# Patient Record
Sex: FEMALE | Race: WHITE | NOT HISPANIC OR LATINO | URBAN - METROPOLITAN AREA
[De-identification: names, ages, dates, MRNs, and addresses within clinical notes are randomized per-mention and may not be internally consistent; named-entity substitution may affect disease eponyms.]

---

## 2015-09-15 RX ORDER — HALOPERIDOL DECANOATE 100 MG/ML
1 INJECTION INTRAMUSCULAR
Qty: 0 | Refills: 0 | COMMUNITY
Start: 2015-09-15

## 2015-09-15 RX ORDER — HALOPERIDOL DECANOATE 100 MG/ML
7 INJECTION INTRAMUSCULAR
Qty: 0 | Refills: 0 | COMMUNITY
Start: 2015-09-15

## 2016-07-11 RX ORDER — ACETAMINOPHEN 500 MG
2 TABLET ORAL
Qty: 0 | Refills: 0 | COMMUNITY
Start: 2016-07-11

## 2017-01-09 ENCOUNTER — EMERGENCY (EMERGENCY)
Facility: HOSPITAL | Age: 67
LOS: 1 days | Discharge: HOME | End: 2017-01-09
Attending: EMERGENCY MEDICINE
Payer: COMMERCIAL

## 2017-01-09 VITALS
OXYGEN SATURATION: 99 % | TEMPERATURE: 99 F | SYSTOLIC BLOOD PRESSURE: 114 MMHG | WEIGHT: 139.99 LBS | HEIGHT: 66 IN | RESPIRATION RATE: 16 BRPM | DIASTOLIC BLOOD PRESSURE: 65 MMHG | HEART RATE: 77 BPM

## 2017-01-09 DIAGNOSIS — Z93.2 ILEOSTOMY STATUS: Chronic | ICD-10-CM

## 2017-01-09 LAB
ALBUMIN SERPL ELPH-MCNC: 3.6 G/DL — SIGNIFICANT CHANGE UP (ref 3.3–5.2)
ALP SERPL-CCNC: 88 U/L — SIGNIFICANT CHANGE UP (ref 40–120)
ALT FLD-CCNC: 22 U/L — SIGNIFICANT CHANGE UP
ANION GAP SERPL CALC-SCNC: 9 MMOL/L — SIGNIFICANT CHANGE UP (ref 5–17)
APTT BLD: 29.7 SEC — SIGNIFICANT CHANGE UP (ref 27.5–37.4)
AST SERPL-CCNC: 15 U/L — SIGNIFICANT CHANGE UP
BASOPHILS # BLD AUTO: 0 K/UL — SIGNIFICANT CHANGE UP (ref 0–0.2)
BASOPHILS NFR BLD AUTO: 0.2 % — SIGNIFICANT CHANGE UP (ref 0–2)
BILIRUB SERPL-MCNC: 0.4 MG/DL — SIGNIFICANT CHANGE UP (ref 0.4–2)
BLD GP AB SCN SERPL QL: SIGNIFICANT CHANGE UP
BUN SERPL-MCNC: 25 MG/DL — HIGH (ref 8–20)
CALCIUM SERPL-MCNC: 9 MG/DL — SIGNIFICANT CHANGE UP (ref 8.6–10.2)
CHLORIDE SERPL-SCNC: 104 MMOL/L — SIGNIFICANT CHANGE UP (ref 98–107)
CO2 SERPL-SCNC: 27 MMOL/L — SIGNIFICANT CHANGE UP (ref 22–29)
CREAT SERPL-MCNC: 0.89 MG/DL — SIGNIFICANT CHANGE UP (ref 0.5–1.3)
EOSINOPHIL # BLD AUTO: 0.1 K/UL — SIGNIFICANT CHANGE UP (ref 0–0.5)
EOSINOPHIL NFR BLD AUTO: 3.1 % — SIGNIFICANT CHANGE UP (ref 0–6)
GLUCOSE SERPL-MCNC: 95 MG/DL — SIGNIFICANT CHANGE UP (ref 70–115)
HCT VFR BLD CALC: 33.2 % — LOW (ref 37–47)
HGB BLD-MCNC: 10.4 G/DL — LOW (ref 12–16)
INR BLD: 1.3 RATIO — HIGH (ref 0.88–1.16)
LYMPHOCYTES # BLD AUTO: 1.4 K/UL — SIGNIFICANT CHANGE UP (ref 1–4.8)
LYMPHOCYTES # BLD AUTO: 33.1 % — SIGNIFICANT CHANGE UP (ref 20–55)
MCHC RBC-ENTMCNC: 29 PG — SIGNIFICANT CHANGE UP (ref 27–31)
MCHC RBC-ENTMCNC: 31.3 G/DL — LOW (ref 32–36)
MCV RBC AUTO: 92.5 FL — SIGNIFICANT CHANGE UP (ref 81–99)
MONOCYTES # BLD AUTO: 0.4 K/UL — SIGNIFICANT CHANGE UP (ref 0–0.8)
MONOCYTES NFR BLD AUTO: 10.3 % — HIGH (ref 3–10)
NEUTROPHILS # BLD AUTO: 2.3 K/UL — SIGNIFICANT CHANGE UP (ref 1.8–8)
NEUTROPHILS NFR BLD AUTO: 53.1 % — SIGNIFICANT CHANGE UP (ref 37–73)
PLATELET # BLD AUTO: 105 K/UL — LOW (ref 150–400)
POTASSIUM SERPL-MCNC: 4.3 MMOL/L — SIGNIFICANT CHANGE UP (ref 3.5–5.3)
POTASSIUM SERPL-SCNC: 4.3 MMOL/L — SIGNIFICANT CHANGE UP (ref 3.5–5.3)
PROT SERPL-MCNC: 6.1 G/DL — LOW (ref 6.6–8.7)
PROTHROM AB SERPL-ACNC: 14.3 SEC — HIGH (ref 10–13.1)
RBC # BLD: 3.59 M/UL — LOW (ref 4.4–5.2)
RBC # FLD: 14.7 % — SIGNIFICANT CHANGE UP (ref 11–15.6)
SODIUM SERPL-SCNC: 140 MMOL/L — SIGNIFICANT CHANGE UP (ref 135–145)
TYPE + AB SCN PNL BLD: SIGNIFICANT CHANGE UP
WBC # BLD: 4.26 K/UL — LOW (ref 4.8–10.8)
WBC # FLD AUTO: 4.26 K/UL — LOW (ref 4.8–10.8)

## 2017-01-09 PROCEDURE — 99284 EMERGENCY DEPT VISIT MOD MDM: CPT

## 2017-01-09 RX ORDER — SODIUM CHLORIDE 9 MG/ML
3 INJECTION INTRAMUSCULAR; INTRAVENOUS; SUBCUTANEOUS ONCE
Qty: 0 | Refills: 0 | Status: COMPLETED | OUTPATIENT
Start: 2017-01-09 | End: 2017-01-09

## 2017-01-09 RX ADMIN — SODIUM CHLORIDE 3 MILLILITER(S): 9 INJECTION INTRAMUSCULAR; INTRAVENOUS; SUBCUTANEOUS at 16:31

## 2017-01-09 NOTE — ED ADULT NURSE NOTE - CHIEF COMPLAINT QUOTE
BIBA, patient is awake and oriented times 3, complains of abdominal pain, patient was sent from [place] for eval
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
## 2017-01-09 NOTE — ED PROVIDER NOTE - CARE PLAN
Principal Discharge DX:	Vomiting, intractability of vomiting not specified, presence of nausea not specified, unspecified vomiting type

## 2017-01-09 NOTE — ED ADULT TRIAGE NOTE - CHIEF COMPLAINT QUOTE
BIBA, patient is awake and oriented times 3, complains of abdominal pain, patient was sent from [place] for eval

## 2017-01-09 NOTE — ED PROVIDER NOTE - OBJECTIVE STATEMENT
66 yr old female with vomiting. Pt has h/o colostomy that is working. pt had one episode of vomiting yesterday and once today

## 2017-01-10 VITALS
RESPIRATION RATE: 20 BRPM | SYSTOLIC BLOOD PRESSURE: 109 MMHG | TEMPERATURE: 99 F | OXYGEN SATURATION: 96 % | HEART RATE: 73 BPM | DIASTOLIC BLOOD PRESSURE: 64 MMHG

## 2017-01-10 PROCEDURE — 85610 PROTHROMBIN TIME: CPT

## 2017-01-10 PROCEDURE — 99284 EMERGENCY DEPT VISIT MOD MDM: CPT | Mod: 25

## 2017-01-10 PROCEDURE — 86850 RBC ANTIBODY SCREEN: CPT

## 2017-01-10 PROCEDURE — 85027 COMPLETE CBC AUTOMATED: CPT

## 2017-01-10 PROCEDURE — 86900 BLOOD TYPING SEROLOGIC ABO: CPT

## 2017-01-10 PROCEDURE — 74177 CT ABD & PELVIS W/CONTRAST: CPT

## 2017-01-10 PROCEDURE — 80053 COMPREHEN METABOLIC PANEL: CPT

## 2017-01-10 PROCEDURE — 74177 CT ABD & PELVIS W/CONTRAST: CPT | Mod: 26

## 2017-01-10 PROCEDURE — 86901 BLOOD TYPING SEROLOGIC RH(D): CPT

## 2017-01-10 PROCEDURE — 85730 THROMBOPLASTIN TIME PARTIAL: CPT

## 2017-01-10 RX ORDER — MOXIFLOXACIN HYDROCHLORIDE TABLETS, 400 MG 400 MG/1
1 TABLET, FILM COATED ORAL
Qty: 20 | Refills: 0 | OUTPATIENT
Start: 2017-01-10 | End: 2017-01-20

## 2017-01-10 RX ORDER — METRONIDAZOLE 500 MG
1 TABLET ORAL
Qty: 30 | Refills: 0 | OUTPATIENT
Start: 2017-01-10 | End: 2017-01-20

## 2017-01-13 ENCOUNTER — APPOINTMENT (OUTPATIENT)
Dept: COLORECTAL SURGERY | Facility: HOSPITAL | Age: 67
End: 2017-01-13

## 2017-01-17 ENCOUNTER — OUTPATIENT (OUTPATIENT)
Dept: OUTPATIENT SERVICES | Facility: HOSPITAL | Age: 67
LOS: 1 days | End: 2017-01-17
Payer: MEDICARE

## 2017-01-17 VITALS
HEART RATE: 76 BPM | TEMPERATURE: 97 F | WEIGHT: 166.01 LBS | HEIGHT: 66 IN | SYSTOLIC BLOOD PRESSURE: 110 MMHG | RESPIRATION RATE: 16 BRPM | DIASTOLIC BLOOD PRESSURE: 60 MMHG

## 2017-01-17 DIAGNOSIS — Z01.818 ENCOUNTER FOR OTHER PREPROCEDURAL EXAMINATION: ICD-10-CM

## 2017-01-17 DIAGNOSIS — I10 ESSENTIAL (PRIMARY) HYPERTENSION: ICD-10-CM

## 2017-01-17 DIAGNOSIS — Z93.2 ILEOSTOMY STATUS: Chronic | ICD-10-CM

## 2017-01-17 DIAGNOSIS — Z43.2 ENCOUNTER FOR ATTENTION TO ILEOSTOMY: ICD-10-CM

## 2017-01-17 LAB
ANION GAP SERPL CALC-SCNC: 11 MMOL/L — SIGNIFICANT CHANGE UP (ref 5–17)
APTT BLD: 30.2 SEC — SIGNIFICANT CHANGE UP (ref 27.5–37.4)
BASOPHILS # BLD AUTO: 0 K/UL — SIGNIFICANT CHANGE UP (ref 0–0.2)
BASOPHILS NFR BLD AUTO: 0.3 % — SIGNIFICANT CHANGE UP (ref 0–2)
BUN SERPL-MCNC: 30 MG/DL — HIGH (ref 8–20)
CALCIUM SERPL-MCNC: 9.1 MG/DL — SIGNIFICANT CHANGE UP (ref 8.6–10.2)
CHLORIDE SERPL-SCNC: 103 MMOL/L — SIGNIFICANT CHANGE UP (ref 98–107)
CO2 SERPL-SCNC: 26 MMOL/L — SIGNIFICANT CHANGE UP (ref 22–29)
CREAT SERPL-MCNC: 1.01 MG/DL — SIGNIFICANT CHANGE UP (ref 0.5–1.3)
EOSINOPHIL # BLD AUTO: 0.1 K/UL — SIGNIFICANT CHANGE UP (ref 0–0.5)
EOSINOPHIL NFR BLD AUTO: 3 % — SIGNIFICANT CHANGE UP (ref 0–6)
GLUCOSE SERPL-MCNC: 98 MG/DL — SIGNIFICANT CHANGE UP (ref 70–115)
HCT VFR BLD CALC: 31.6 % — LOW (ref 37–47)
HGB BLD-MCNC: 9.8 G/DL — LOW (ref 12–16)
INR BLD: 1.31 RATIO — HIGH (ref 0.88–1.16)
LYMPHOCYTES # BLD AUTO: 1.3 K/UL — SIGNIFICANT CHANGE UP (ref 1–4.8)
LYMPHOCYTES # BLD AUTO: 42.1 % — SIGNIFICANT CHANGE UP (ref 20–55)
MCHC RBC-ENTMCNC: 28.8 PG — SIGNIFICANT CHANGE UP (ref 27–31)
MCHC RBC-ENTMCNC: 31 G/DL — LOW (ref 32–36)
MCV RBC AUTO: 92.9 FL — SIGNIFICANT CHANGE UP (ref 81–99)
MONOCYTES # BLD AUTO: 0.5 K/UL — SIGNIFICANT CHANGE UP (ref 0–0.8)
MONOCYTES NFR BLD AUTO: 16.1 % — HIGH (ref 3–10)
NEUTROPHILS # BLD AUTO: 1.2 K/UL — LOW (ref 1.8–8)
NEUTROPHILS NFR BLD AUTO: 38.5 % — SIGNIFICANT CHANGE UP (ref 37–73)
PLATELET # BLD AUTO: 139 K/UL — LOW (ref 150–400)
POTASSIUM SERPL-MCNC: 4.3 MMOL/L — SIGNIFICANT CHANGE UP (ref 3.5–5.3)
POTASSIUM SERPL-SCNC: 4.3 MMOL/L — SIGNIFICANT CHANGE UP (ref 3.5–5.3)
PROTHROM AB SERPL-ACNC: 14.4 SEC — HIGH (ref 10–13.1)
RBC # BLD: 3.4 M/UL — LOW (ref 4.4–5.2)
RBC # FLD: 14.7 % — SIGNIFICANT CHANGE UP (ref 11–15.6)
SODIUM SERPL-SCNC: 140 MMOL/L — SIGNIFICANT CHANGE UP (ref 135–145)
WBC # BLD: 3.04 K/UL — LOW (ref 4.8–10.8)
WBC # FLD AUTO: 3.04 K/UL — LOW (ref 4.8–10.8)

## 2017-01-17 PROCEDURE — 85730 THROMBOPLASTIN TIME PARTIAL: CPT

## 2017-01-17 PROCEDURE — 80048 BASIC METABOLIC PNL TOTAL CA: CPT

## 2017-01-17 PROCEDURE — 85610 PROTHROMBIN TIME: CPT

## 2017-01-17 PROCEDURE — 85027 COMPLETE CBC AUTOMATED: CPT

## 2017-01-17 PROCEDURE — 93005 ELECTROCARDIOGRAM TRACING: CPT

## 2017-01-17 PROCEDURE — G0463: CPT

## 2017-01-17 PROCEDURE — 93010 ELECTROCARDIOGRAM REPORT: CPT

## 2017-01-17 NOTE — H&P PST ADULT - ASSESSMENT
66F poor historian, unable to provide information, with PMH HTN, CVA, Seizure Disorder, Hypothyroid, Osteopenia, GERD, Constipation, Neutropenia, Venous Insufficiency and Obesity with Ileostomy for Flexible Sigmoidoscopy.

## 2017-01-17 NOTE — H&P PST ADULT - COMMENTS
Unknown, unable to obtain information Difficult to obtain, unable to answer questions appropriately, consistently.

## 2017-01-17 NOTE — H&P PST ADULT - VTE RISK COMMENTS
Difficult to accurately assess, patient unable to provide specific information or answer appropriately.

## 2017-01-17 NOTE — H&P PST ADULT - HISTORY OF PRESENT ILLNESS
66F with history of Ileostomy, for Flexible Sigmoidoscopy. Patient is a resident of [facility], with a history of schizophrenia. Patient unable to provide any further information.

## 2017-01-18 DIAGNOSIS — Z01.818 ENCOUNTER FOR OTHER PREPROCEDURAL EXAMINATION: ICD-10-CM

## 2017-01-18 DIAGNOSIS — Z43.2 ENCOUNTER FOR ATTENTION TO ILEOSTOMY: ICD-10-CM

## 2017-01-24 ENCOUNTER — APPOINTMENT (OUTPATIENT)
Dept: COLORECTAL SURGERY | Facility: HOSPITAL | Age: 67
End: 2017-01-24

## 2017-02-10 ENCOUNTER — APPOINTMENT (OUTPATIENT)
Dept: COLORECTAL SURGERY | Facility: HOSPITAL | Age: 67
End: 2017-02-10

## 2017-03-08 ENCOUNTER — INPATIENT (INPATIENT)
Facility: HOSPITAL | Age: 67
LOS: 5 days | Discharge: PSYCHIATRIC OR SUBSTANCE USE FACILITY | DRG: 394 | End: 2017-03-14
Attending: HOSPITALIST | Admitting: FAMILY MEDICINE
Payer: MEDICARE

## 2017-03-08 VITALS
OXYGEN SATURATION: 96 % | HEART RATE: 93 BPM | RESPIRATION RATE: 20 BRPM | DIASTOLIC BLOOD PRESSURE: 74 MMHG | TEMPERATURE: 99 F | SYSTOLIC BLOOD PRESSURE: 121 MMHG

## 2017-03-08 DIAGNOSIS — Z93.2 ILEOSTOMY STATUS: Chronic | ICD-10-CM

## 2017-03-08 LAB
ALBUMIN SERPL ELPH-MCNC: 3.5 G/DL — SIGNIFICANT CHANGE UP (ref 3.3–5.2)
ALP SERPL-CCNC: 97 U/L — SIGNIFICANT CHANGE UP (ref 40–120)
ALT FLD-CCNC: 21 U/L — SIGNIFICANT CHANGE UP
ANION GAP SERPL CALC-SCNC: 11 MMOL/L — SIGNIFICANT CHANGE UP (ref 5–17)
ANISOCYTOSIS BLD QL: SLIGHT — SIGNIFICANT CHANGE UP
APTT BLD: 29 SEC — SIGNIFICANT CHANGE UP (ref 27.5–37.4)
AST SERPL-CCNC: 26 U/L — SIGNIFICANT CHANGE UP
BILIRUB SERPL-MCNC: 0.6 MG/DL — SIGNIFICANT CHANGE UP (ref 0.4–2)
BLD GP AB SCN SERPL QL: SIGNIFICANT CHANGE UP
BUN SERPL-MCNC: 28 MG/DL — HIGH (ref 8–20)
CALCIUM SERPL-MCNC: 9.1 MG/DL — SIGNIFICANT CHANGE UP (ref 8.6–10.2)
CHLORIDE SERPL-SCNC: 101 MMOL/L — SIGNIFICANT CHANGE UP (ref 98–107)
CO2 SERPL-SCNC: 24 MMOL/L — SIGNIFICANT CHANGE UP (ref 22–29)
CREAT SERPL-MCNC: 0.97 MG/DL — SIGNIFICANT CHANGE UP (ref 0.5–1.3)
ELLIPTOCYTES BLD QL SMEAR: SLIGHT — SIGNIFICANT CHANGE UP
GLUCOSE SERPL-MCNC: 100 MG/DL — SIGNIFICANT CHANGE UP (ref 70–115)
HCT VFR BLD CALC: 31.9 % — LOW (ref 37–47)
HGB BLD-MCNC: 10.2 G/DL — LOW (ref 12–16)
INR BLD: 1.27 RATIO — HIGH (ref 0.88–1.16)
LYMPHOCYTES # BLD AUTO: 13 % — LOW (ref 20–55)
MACROCYTES BLD QL: SLIGHT — SIGNIFICANT CHANGE UP
MCHC RBC-ENTMCNC: 29.2 PG — SIGNIFICANT CHANGE UP (ref 27–31)
MCHC RBC-ENTMCNC: 32 G/DL — SIGNIFICANT CHANGE UP (ref 32–36)
MCV RBC AUTO: 91.4 FL — SIGNIFICANT CHANGE UP (ref 81–99)
MICROCYTES BLD QL: SLIGHT — SIGNIFICANT CHANGE UP
MONOCYTES NFR BLD AUTO: 2 % — LOW (ref 3–10)
NEUTROPHILS NFR BLD AUTO: 83 % — HIGH (ref 37–73)
NEUTS BAND # BLD: 2 % — SIGNIFICANT CHANGE UP (ref 0–8)
OVALOCYTES BLD QL SMEAR: SLIGHT — SIGNIFICANT CHANGE UP
PLAT MORPH BLD: NORMAL — SIGNIFICANT CHANGE UP
PLATELET # BLD AUTO: 112 K/UL — LOW (ref 150–400)
POIKILOCYTOSIS BLD QL AUTO: SLIGHT — SIGNIFICANT CHANGE UP
POLYCHROMASIA BLD QL SMEAR: SLIGHT — SIGNIFICANT CHANGE UP
POTASSIUM SERPL-MCNC: 5.3 MMOL/L — SIGNIFICANT CHANGE UP (ref 3.5–5.3)
POTASSIUM SERPL-SCNC: 5.3 MMOL/L — SIGNIFICANT CHANGE UP (ref 3.5–5.3)
PROT SERPL-MCNC: 6.7 G/DL — SIGNIFICANT CHANGE UP (ref 6.6–8.7)
PROTHROM AB SERPL-ACNC: 14 SEC — HIGH (ref 10–13.1)
RBC # BLD: 3.49 M/UL — LOW (ref 4.4–5.2)
RBC # FLD: 14.6 % — SIGNIFICANT CHANGE UP (ref 11–15.6)
RBC BLD AUTO: ABNORMAL
SODIUM SERPL-SCNC: 136 MMOL/L — SIGNIFICANT CHANGE UP (ref 135–145)
TYPE + AB SCN PNL BLD: SIGNIFICANT CHANGE UP
WBC # BLD: 6.8 K/UL — SIGNIFICANT CHANGE UP (ref 4.8–10.8)
WBC # FLD AUTO: 6.8 K/UL — SIGNIFICANT CHANGE UP (ref 4.8–10.8)

## 2017-03-08 PROCEDURE — 99285 EMERGENCY DEPT VISIT HI MDM: CPT

## 2017-03-08 PROCEDURE — 74176 CT ABD & PELVIS W/O CONTRAST: CPT | Mod: 26

## 2017-03-08 RX ORDER — SODIUM CHLORIDE 9 MG/ML
3 INJECTION INTRAMUSCULAR; INTRAVENOUS; SUBCUTANEOUS ONCE
Qty: 0 | Refills: 0 | Status: COMPLETED | OUTPATIENT
Start: 2017-03-08 | End: 2017-03-08

## 2017-03-08 RX ORDER — CIPROFLOXACIN LACTATE 400MG/40ML
400 VIAL (ML) INTRAVENOUS ONCE
Qty: 0 | Refills: 0 | Status: COMPLETED | OUTPATIENT
Start: 2017-03-08 | End: 2017-03-08

## 2017-03-08 RX ORDER — METRONIDAZOLE 500 MG
500 TABLET ORAL ONCE
Qty: 0 | Refills: 0 | Status: COMPLETED | OUTPATIENT
Start: 2017-03-08 | End: 2017-03-08

## 2017-03-08 RX ADMIN — Medication 200 MILLIGRAM(S): at 22:48

## 2017-03-08 NOTE — ED PROVIDER NOTE - CONSTITUTIONAL, MLM
normal... Well appearing, well nourished, awake, alert, oriented to person, place,in no apparent distress.

## 2017-03-08 NOTE — ED CLERICAL - CLERICAL COMMENTS
Ellen Toribio 65 y/o f w/ hx rectal prolapse illiostomy current w/ low abd pain workse w ambulation r/o acute abd low grade fever  foul smel brownish rectal discharge

## 2017-03-08 NOTE — ED ADULT NURSE NOTE - CHIEF COMPLAINT QUOTE
pt presents to er from [place] with aide c/o of abdominal pain x 2 days. pt has ileostomy s/p vaginal and rectal prolapse on 3/20/15. surgeon dr lunsford

## 2017-03-08 NOTE — H&P ADULT - NSHPPHYSICALEXAM_GEN_ALL_CORE
General: Well developed. well nourished. not in distress  HEENT: AT, NC. PERRL.  Neck: supple. no JVD. no palpable lymph nodes  Chest: CTA bilaterally  Heart: normal S1,S2. RRR. no heart murmur.  Abdomen: soft. non-tender. non-distended. + BS. ilostomy with watery stools in the bag.  Genital: not examined  Ext: no C/C/E. no calf tenderness.   Neuro: sleeping but awakable  Skin: no rash

## 2017-03-08 NOTE — ED PROVIDER NOTE - PROGRESS NOTE DETAILS
CALLED BY CT THAT PT DOES NOT HAVE CAPACITY TO CONSENT FOR CT. PT SEEN BY DR LOPEZ WHO DID NOT SIGN PT OUT TO MAIN A [facility]. PT NEEDS CT ABDOMEN AND PELVIS B/C OF ILEOSTOMY HISTORY AND NOW PASSING STOOL PER RECTUM CALLED BY CT THAT PT DOES NOT HAVE CAPACITY TO CONSENT FOR CT. PT SEEN BY DR LOPEZ WHO DID NOT SIGN PT OUT TO [facility]. PT NEEDS CT ABDOMEN AND PELVIS B/C OF ILEOSTOMY HISTORY AND NOW PASSING STOOL PER RECTUM. IT IS MEDICALLY NECESSARY TO HAVE THIS TEST
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
## 2017-03-08 NOTE — ED PROVIDER NOTE - MEDICAL DECISION MAKING DETAILS
PT WITH STOOL PER RECTUM DESPITE ILEOSTOMY AND ABDOMINAL PAIN TODAY. EVAL SIG COLITIS. NEEDS ADMIT FOR IV AB, IVF AND BOWEL REST. DR KAYE CALLED AND CASE DISCUSSED, HE WILL ADMIT.  DR FERRARI COLORECTAL SURGEON TO CONSULT - CASE DISCUSSED

## 2017-03-08 NOTE — ED PROVIDER NOTE - CHPI ED SYMPTOMS NEG
no nausea/no dysuria/no vomiting/no abdominal distension/no chills/no hematuria/no blood in stool/no fever/no burning urination

## 2017-03-08 NOTE — H&P ADULT - NSHPLABSRESULTS_GEN_ALL_CORE
10.2   6.8   )-----------( 112      ( 08 Mar 2017 15:53 )             31.9     08 Mar 2017 15:53    136    |  101    |  28.0   ----------------------------<  100    5.3     |  24.0   |  0.97     Ca    9.1        08 Mar 2017 15:53    TPro  6.7    /  Alb  3.5    /  TBili  0.6    /  DBili  x      /  AST  26     /  ALT  21     /  AlkPhos  97     08 Mar 2017 15:53            PT/INR - ( 08 Mar 2017 15:53 )   PT: 14.0 sec;   INR: 1.27 ratio         PTT - ( 08 Mar 2017 15:53 )  PTT:29.0 sec

## 2017-03-08 NOTE — ED STATDOCS - PROGRESS NOTE DETAILS
67 year old female, with hx of ileostomy, presenting to the ED from [place] with aide at bedside sent in by Dr. Miller for further evaluation as pt says she normally is never able to have a bowel movement through the rectal area and is now having bowel movements through her rectum. Upon examination in the intake room, pt was found to have an ileostomy that appears to be functioning. ?Small prolapse noted. Pt to be transferred to the main ED for further workup.

## 2017-03-08 NOTE — ED PROVIDER NOTE - OBJECTIVE STATEMENT
PT SENT FROM  [place] WITH STOOL PER RECTUM. HAS ILEOSTOMY BY DR FERRARI IN PAST. WAS GOING TO HAVE OUTPT CT SCAN BUT THE PT DEVELOPED PAIN AND CAME TO ER FOR URTHER EVALUATION. NO FEVER/CHILLS. NO CP OR SOB. + ABDOMINAL PAIN. NO N/V. + GREEN LIQUIDS STOOL IN ILEOSTOMY. + GREENISH STOOL FROM RECTUM. NO OTHER COMPLAINTS

## 2017-03-08 NOTE — ED ADULT TRIAGE NOTE - CHIEF COMPLAINT QUOTE
pt presents to er from [place] with aide c/o of abdominal pain x 2 days. pt has ileostomy s/p vaginal and rectal prolapse on 3/20/15. surgeon dr lunsford

## 2017-03-09 ENCOUNTER — RESULT REVIEW (OUTPATIENT)
Age: 67
End: 2017-03-09

## 2017-03-09 DIAGNOSIS — F25.0 SCHIZOAFFECTIVE DISORDER, BIPOLAR TYPE: ICD-10-CM

## 2017-03-09 DIAGNOSIS — D64.9 ANEMIA, UNSPECIFIED: ICD-10-CM

## 2017-03-09 DIAGNOSIS — D69.6 THROMBOCYTOPENIA, UNSPECIFIED: ICD-10-CM

## 2017-03-09 DIAGNOSIS — R93.8 ABNORMAL FINDINGS ON DIAGNOSTIC IMAGING OF OTHER SPECIFIED BODY STRUCTURES: ICD-10-CM

## 2017-03-09 DIAGNOSIS — K52.9 NONINFECTIVE GASTROENTERITIS AND COLITIS, UNSPECIFIED: ICD-10-CM

## 2017-03-09 DIAGNOSIS — E03.9 HYPOTHYROIDISM, UNSPECIFIED: ICD-10-CM

## 2017-03-09 DIAGNOSIS — R10.9 UNSPECIFIED ABDOMINAL PAIN: ICD-10-CM

## 2017-03-09 LAB
ALBUMIN SERPL ELPH-MCNC: 3.3 G/DL — SIGNIFICANT CHANGE UP (ref 3.3–5.2)
ALP SERPL-CCNC: 87 U/L — SIGNIFICANT CHANGE UP (ref 40–120)
ALT FLD-CCNC: 17 U/L — SIGNIFICANT CHANGE UP
ANION GAP SERPL CALC-SCNC: 15 MMOL/L — SIGNIFICANT CHANGE UP (ref 5–17)
AST SERPL-CCNC: 14 U/L — SIGNIFICANT CHANGE UP
BILIRUB SERPL-MCNC: 0.4 MG/DL — SIGNIFICANT CHANGE UP (ref 0.4–2)
BUN SERPL-MCNC: 23 MG/DL — HIGH (ref 8–20)
C DIFF BY PCR RESULT: SIGNIFICANT CHANGE UP
C DIFF TOX GENS STL QL NAA+PROBE: SIGNIFICANT CHANGE UP
CALCIUM SERPL-MCNC: 8.2 MG/DL — LOW (ref 8.6–10.2)
CHLORIDE SERPL-SCNC: 101 MMOL/L — SIGNIFICANT CHANGE UP (ref 98–107)
CO2 SERPL-SCNC: 21 MMOL/L — LOW (ref 22–29)
CREAT SERPL-MCNC: 1.19 MG/DL — SIGNIFICANT CHANGE UP (ref 0.5–1.3)
GLUCOSE SERPL-MCNC: 124 MG/DL — HIGH (ref 70–115)
HCT VFR BLD CALC: 29.5 % — LOW (ref 37–47)
HGB BLD-MCNC: 9.3 G/DL — LOW (ref 12–16)
LACTATE SERPL-SCNC: 0.5 MMOL/L — SIGNIFICANT CHANGE UP (ref 0.5–2)
LYMPHOCYTES # BLD AUTO: 0.8 K/UL — LOW (ref 1–4.8)
LYMPHOCYTES # BLD AUTO: 14.9 % — LOW (ref 20–55)
MAGNESIUM SERPL-MCNC: 1.9 MG/DL — SIGNIFICANT CHANGE UP (ref 1.8–2.5)
MCHC RBC-ENTMCNC: 28.8 PG — SIGNIFICANT CHANGE UP (ref 27–31)
MCHC RBC-ENTMCNC: 31.5 G/DL — LOW (ref 32–36)
MCV RBC AUTO: 91.3 FL — SIGNIFICANT CHANGE UP (ref 81–99)
MONOCYTES # BLD AUTO: 0.6 K/UL — SIGNIFICANT CHANGE UP (ref 0–0.8)
MONOCYTES NFR BLD AUTO: 12 % — HIGH (ref 3–10)
NEUTROPHILS # BLD AUTO: 4 K/UL — SIGNIFICANT CHANGE UP (ref 1.8–8)
NEUTROPHILS NFR BLD AUTO: 72.9 % — SIGNIFICANT CHANGE UP (ref 37–73)
OB PNL STL: NEGATIVE — SIGNIFICANT CHANGE UP
PHOSPHATE SERPL-MCNC: 2.2 MG/DL — LOW (ref 2.4–4.7)
PLATELET # BLD AUTO: 104 K/UL — LOW (ref 150–400)
POTASSIUM SERPL-MCNC: 3.4 MMOL/L — LOW (ref 3.5–5.3)
POTASSIUM SERPL-SCNC: 3.4 MMOL/L — LOW (ref 3.5–5.3)
PROT SERPL-MCNC: 5.8 G/DL — LOW (ref 6.6–8.7)
RBC # BLD: 3.23 M/UL — LOW (ref 4.4–5.2)
RBC # FLD: 14.1 % — SIGNIFICANT CHANGE UP (ref 11–15.6)
SODIUM SERPL-SCNC: 137 MMOL/L — SIGNIFICANT CHANGE UP (ref 135–145)
WBC # BLD: 5.4 K/UL — SIGNIFICANT CHANGE UP (ref 4.8–10.8)
WBC # FLD AUTO: 5.4 K/UL — SIGNIFICANT CHANGE UP (ref 4.8–10.8)

## 2017-03-09 PROCEDURE — 99232 SBSQ HOSP IP/OBS MODERATE 35: CPT

## 2017-03-09 PROCEDURE — 99223 1ST HOSP IP/OBS HIGH 75: CPT

## 2017-03-09 RX ORDER — METRONIDAZOLE 500 MG
500 TABLET ORAL EVERY 8 HOURS
Qty: 0 | Refills: 0 | Status: DISCONTINUED | OUTPATIENT
Start: 2017-03-09 | End: 2017-03-12

## 2017-03-09 RX ORDER — LEVETIRACETAM 250 MG/1
500 TABLET, FILM COATED ORAL
Qty: 0 | Refills: 0 | Status: DISCONTINUED | OUTPATIENT
Start: 2017-03-09 | End: 2017-03-14

## 2017-03-09 RX ORDER — HEPARIN SODIUM 5000 [USP'U]/ML
5000 INJECTION INTRAVENOUS; SUBCUTANEOUS EVERY 8 HOURS
Qty: 0 | Refills: 0 | Status: DISCONTINUED | OUTPATIENT
Start: 2017-03-09 | End: 2017-03-14

## 2017-03-09 RX ORDER — LEVOTHYROXINE SODIUM 125 MCG
112 TABLET ORAL DAILY
Qty: 0 | Refills: 0 | Status: DISCONTINUED | OUTPATIENT
Start: 2017-03-09 | End: 2017-03-14

## 2017-03-09 RX ORDER — BENZTROPINE MESYLATE 1 MG
0.5 TABLET ORAL
Qty: 0 | Refills: 0 | Status: DISCONTINUED | OUTPATIENT
Start: 2017-03-09 | End: 2017-03-14

## 2017-03-09 RX ORDER — UREA 40 %
1 CREAM (GRAM) TOPICAL
Qty: 0 | Refills: 0 | COMMUNITY

## 2017-03-09 RX ORDER — LACTOBACILLUS ACIDOPHILUS 100MM CELL
1 CAPSULE ORAL DAILY
Qty: 0 | Refills: 0 | Status: DISCONTINUED | OUTPATIENT
Start: 2017-03-09 | End: 2017-03-14

## 2017-03-09 RX ORDER — ASPIRIN/CALCIUM CARB/MAGNESIUM 324 MG
81 TABLET ORAL DAILY
Qty: 0 | Refills: 0 | Status: DISCONTINUED | OUTPATIENT
Start: 2017-03-09 | End: 2017-03-14

## 2017-03-09 RX ORDER — ACETAMINOPHEN 500 MG
650 TABLET ORAL EVERY 6 HOURS
Qty: 0 | Refills: 0 | Status: DISCONTINUED | OUTPATIENT
Start: 2017-03-09 | End: 2017-03-14

## 2017-03-09 RX ORDER — BENZTROPINE MESYLATE 1 MG
1 TABLET ORAL AT BEDTIME
Qty: 0 | Refills: 0 | Status: DISCONTINUED | OUTPATIENT
Start: 2017-03-09 | End: 2017-03-14

## 2017-03-09 RX ORDER — PANTOPRAZOLE SODIUM 20 MG/1
40 TABLET, DELAYED RELEASE ORAL
Qty: 0 | Refills: 0 | Status: DISCONTINUED | OUTPATIENT
Start: 2017-03-09 | End: 2017-03-14

## 2017-03-09 RX ORDER — HALOPERIDOL DECANOATE 100 MG/ML
5 INJECTION INTRAMUSCULAR AT BEDTIME
Qty: 0 | Refills: 0 | Status: DISCONTINUED | OUTPATIENT
Start: 2017-03-09 | End: 2017-03-14

## 2017-03-09 RX ORDER — CIPROFLOXACIN LACTATE 400MG/40ML
400 VIAL (ML) INTRAVENOUS EVERY 12 HOURS
Qty: 0 | Refills: 0 | Status: DISCONTINUED | OUTPATIENT
Start: 2017-03-09 | End: 2017-03-12

## 2017-03-09 RX ORDER — DEXTROSE MONOHYDRATE, SODIUM CHLORIDE, AND POTASSIUM CHLORIDE 50; .745; 4.5 G/1000ML; G/1000ML; G/1000ML
1000 INJECTION, SOLUTION INTRAVENOUS
Qty: 0 | Refills: 0 | Status: DISCONTINUED | OUTPATIENT
Start: 2017-03-09 | End: 2017-03-12

## 2017-03-09 RX ORDER — LACTOBACILLUS ACIDOPHILUS 100MM CELL
1 CAPSULE ORAL
Qty: 0 | Refills: 0 | COMMUNITY

## 2017-03-09 RX ADMIN — Medication 1 TABLET(S): at 11:15

## 2017-03-09 RX ADMIN — DEXTROSE MONOHYDRATE, SODIUM CHLORIDE, AND POTASSIUM CHLORIDE 100 MILLILITER(S): 50; .745; 4.5 INJECTION, SOLUTION INTRAVENOUS at 09:38

## 2017-03-09 RX ADMIN — Medication 1 MILLIGRAM(S): at 22:06

## 2017-03-09 RX ADMIN — Medication 1 TABLET(S): at 13:30

## 2017-03-09 RX ADMIN — Medication 100 MILLIGRAM(S): at 00:00

## 2017-03-09 RX ADMIN — Medication 81 MILLIGRAM(S): at 11:15

## 2017-03-09 RX ADMIN — Medication 1 MILLIGRAM(S): at 06:07

## 2017-03-09 RX ADMIN — Medication 0.5 MILLIGRAM(S): at 09:38

## 2017-03-09 RX ADMIN — LEVETIRACETAM 500 MILLIGRAM(S): 250 TABLET, FILM COATED ORAL at 06:07

## 2017-03-09 RX ADMIN — PANTOPRAZOLE SODIUM 40 MILLIGRAM(S): 20 TABLET, DELAYED RELEASE ORAL at 06:08

## 2017-03-09 RX ADMIN — DEXTROSE MONOHYDRATE, SODIUM CHLORIDE, AND POTASSIUM CHLORIDE 100 MILLILITER(S): 50; .745; 4.5 INJECTION, SOLUTION INTRAVENOUS at 20:58

## 2017-03-09 RX ADMIN — Medication 112 MICROGRAM(S): at 06:08

## 2017-03-09 RX ADMIN — Medication 100 MILLIGRAM(S): at 13:30

## 2017-03-09 RX ADMIN — Medication 1 MILLIGRAM(S): at 13:30

## 2017-03-09 RX ADMIN — HEPARIN SODIUM 5000 UNIT(S): 5000 INJECTION INTRAVENOUS; SUBCUTANEOUS at 22:06

## 2017-03-09 RX ADMIN — Medication 100 MILLIGRAM(S): at 06:51

## 2017-03-09 RX ADMIN — Medication 200 MILLIGRAM(S): at 17:11

## 2017-03-09 RX ADMIN — Medication 100 MILLIGRAM(S): at 22:06

## 2017-03-09 RX ADMIN — LEVETIRACETAM 500 MILLIGRAM(S): 250 TABLET, FILM COATED ORAL at 17:12

## 2017-03-09 RX ADMIN — Medication 650 MILLIGRAM(S): at 16:41

## 2017-03-09 RX ADMIN — Medication 200 MILLIGRAM(S): at 05:58

## 2017-03-09 RX ADMIN — HALOPERIDOL DECANOATE 5 MILLIGRAM(S): 100 INJECTION INTRAMUSCULAR at 22:06
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
## 2017-03-09 NOTE — CONSULT NOTE ADULT - ASSESSMENT
The patient is unreliable but the intermittant abdominal pain could be related to the large peristomal hernia or biliary colic as she does have gallstones. The etiology of the colitis on cat scan is unclear but PMC should be excluded as should other infectious etiology although the diarrhea does not appear to be significant at this time. Some of the cat scan findings could be related to bowel being in the peristomal hernia as well.

## 2017-03-09 NOTE — PROGRESS NOTE ADULT - SUBJECTIVE AND OBJECTIVE BOX
INTERVAL HPI/OVERNIGHT EVENTS:    CC: Abdominal pain    HPI: 66 y/o female with Psychiatric disorder, with iliostomy after a leakage from repaire of rectal prolapse, multiple Er visits for abdominal pain, was sent from [facility] for abdominal pain. no vomiting. no fever. Ct abdomen shows colitis of descending and sigmoid colon. also thickening of the walls of segments  small bowel. patient was evaluated in the Er By the surgeon, Dr. Miller who recommend no surgical diagnosis or management but GI consult.     Patient seen and examined, denies abdominal pain, nausea or diarrhea       Vital Signs Last 24 Hrs  T(C): 37.7, Max: 38.1 (03-09 @ 08:11)  T(F): 99.9, Max: 100.6 (03-09 @ 08:11)  HR: 80 (80 - 93)  BP: 95/51 (95/51 - 121/74)  BP(mean): --  RR: 18 (18 - 20)  SpO2: 95% (93% - 96%)    PHYSICAL EXAM:    GENERAL: NAD, AOX1  HEAD:  Atraumatic, Normocephalic  EYES: EOMI, PERRLA, conjunctiva and sclera clear  ENMT: Moist mucous membranes  NECK: Supple, No JVD  CHEST/LUNG: Clear to auscultation bilaterally; No rales, rhonchi, wheezing, or rubs  HEART: Regular rate and rhythm; No murmurs, rubs, or gallops  ABDOMEN: Soft, Nontender, Nondistended; Bowel sounds present + colostomy in place hernia at site noted  EXTREMITIES:  2+ Peripheral Pulses, No clubbing, cyanosis, or edema        MEDICATIONS  (STANDING):  sodium chloride 0.9% lock flush 3milliLiter(s) IV Push once  aspirin enteric coated 81milliGRAM(s) Oral daily  levETIRAcetam 500milliGRAM(s) Oral two times a day  LORazepam     Tablet 1milliGRAM(s) Oral three times a day  benztropine 0.5milliGRAM(s) Oral <User Schedule>  benztropine 1milliGRAM(s) Oral at bedtime  haloperidol     Tablet 5milliGRAM(s) Oral at bedtime  pantoprazole    Tablet 40milliGRAM(s) Oral before breakfast  levothyroxine 112MICROGram(s) Oral daily  multivitamin 1Tablet(s) Oral daily  dextrose 5% + sodium chloride 0.45% with potassium chloride 20 mEq/L 1000milliLiter(s) IV Continuous <Continuous>  ciprofloxacin   IVPB 400milliGRAM(s) IV Intermittent every 12 hours  metroNIDAZOLE  IVPB 500milliGRAM(s) IV Intermittent every 8 hours  lactobacillus acidophilus 1Tablet(s) Oral daily    MEDICATIONS  (PRN):      Allergies    clozapine (Other)  Depakote (Other)  erythromycin (Unknown)  Neupogen (Other)    Intolerances          LABS:                          10.2   6.8   )-----------( 112      ( 08 Mar 2017 15:53 )             31.9     08 Mar 2017 15:53    136    |  101    |  28.0   ----------------------------<  100    5.3     |  24.0   |  0.97     Ca    9.1        08 Mar 2017 15:53    TPro  6.7    /  Alb  3.5    /  TBili  0.6    /  DBili  x      /  AST  26     /  ALT  21     /  AlkPhos  97     08 Mar 2017 15:53    PT/INR - ( 08 Mar 2017 15:53 )   PT: 14.0 sec;   INR: 1.27 ratio         PTT - ( 08 Mar 2017 15:53 )  PTT:29.0 sec      RADIOLOGY & ADDITIONAL TESTS:

## 2017-03-09 NOTE — PROGRESS NOTE ADULT - ASSESSMENT
66 y/o female with Psychiatric disorder, with loop ileostomy after a leakage from repair of rectal prolapse in 2015. She has apparently had multiple Er visits for abdominal pain and  was sent from [facility] for abdominal pain yesterday with no vomiting. no fever. Ct abdomen shows colitis of descending and sigmoid colon as well as very large peristomal hernia containing large and small bowel. There was  thickening of the walls of segments of the small bowel as well. The patient was evaluated in the Er By the surgeon, Dr. Miller who recommend no surgical diagnosis or management but GI consult. It was his intent to take down the loop colostomy after sigmoidoscopy but he has not been able to get her into his office for either.
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
## 2017-03-09 NOTE — PROGRESS NOTE ADULT - ASSESSMENT
DX colitis fevers  check labs unlikely abdomen source of fever  Check urine  will need flex sig to evaluate colon  npo and fleet ennema

## 2017-03-10 DIAGNOSIS — E87.6 HYPOKALEMIA: ICD-10-CM

## 2017-03-10 LAB
CULTURE RESULTS: SIGNIFICANT CHANGE UP
CULTURE RESULTS: SIGNIFICANT CHANGE UP
SPECIMEN SOURCE: SIGNIFICANT CHANGE UP
SPECIMEN SOURCE: SIGNIFICANT CHANGE UP

## 2017-03-10 PROCEDURE — 88305 TISSUE EXAM BY PATHOLOGIST: CPT | Mod: 26

## 2017-03-10 PROCEDURE — 99232 SBSQ HOSP IP/OBS MODERATE 35: CPT

## 2017-03-10 PROCEDURE — 99233 SBSQ HOSP IP/OBS HIGH 50: CPT

## 2017-03-10 PROCEDURE — 71020: CPT | Mod: 26

## 2017-03-10 RX ORDER — POTASSIUM CHLORIDE 20 MEQ
20 PACKET (EA) ORAL ONCE
Qty: 0 | Refills: 0 | Status: DISCONTINUED | OUTPATIENT
Start: 2017-03-10 | End: 2017-03-10

## 2017-03-10 RX ORDER — HALOPERIDOL DECANOATE 100 MG/ML
2 INJECTION INTRAMUSCULAR ONCE
Qty: 0 | Refills: 0 | Status: COMPLETED | OUTPATIENT
Start: 2017-03-10 | End: 2017-03-10

## 2017-03-10 RX ORDER — POTASSIUM CHLORIDE 20 MEQ
20 PACKET (EA) ORAL ONCE
Qty: 0 | Refills: 0 | Status: COMPLETED | OUTPATIENT
Start: 2017-03-10 | End: 2017-03-10

## 2017-03-10 RX ADMIN — Medication 1 MILLIGRAM(S): at 10:25

## 2017-03-10 RX ADMIN — Medication 1 MILLIGRAM(S): at 17:32

## 2017-03-10 RX ADMIN — HEPARIN SODIUM 5000 UNIT(S): 5000 INJECTION INTRAVENOUS; SUBCUTANEOUS at 21:15

## 2017-03-10 RX ADMIN — Medication 1 MILLIGRAM(S): at 21:14

## 2017-03-10 RX ADMIN — LEVETIRACETAM 500 MILLIGRAM(S): 250 TABLET, FILM COATED ORAL at 17:32

## 2017-03-10 RX ADMIN — Medication 1 MILLIGRAM(S): at 12:58

## 2017-03-10 RX ADMIN — Medication 100 MILLIGRAM(S): at 18:35

## 2017-03-10 RX ADMIN — HALOPERIDOL DECANOATE 2 MILLIGRAM(S): 100 INJECTION INTRAMUSCULAR at 10:13

## 2017-03-10 RX ADMIN — Medication 100 MILLIGRAM(S): at 18:00

## 2017-03-10 RX ADMIN — Medication 100 MILLIGRAM(S): at 21:15

## 2017-03-10 RX ADMIN — Medication 650 MILLIGRAM(S): at 17:31

## 2017-03-10 RX ADMIN — Medication 200 MILLIGRAM(S): at 17:32

## 2017-03-10 RX ADMIN — Medication 20 MILLIEQUIVALENT(S): at 21:14

## 2017-03-10 RX ADMIN — HALOPERIDOL DECANOATE 5 MILLIGRAM(S): 100 INJECTION INTRAMUSCULAR at 21:14

## 2017-03-10 NOTE — PROGRESS NOTE ADULT - SUBJECTIVE AND OBJECTIVE BOX
Patient with colitis present on ctscan.  She had abdominal pain and diarrhea from rectum, distal segment though both have resolved.  Her abdomen is begnin with no tenderness.  She does have a large parastomal hernia.  patient has developed fevers in the interum though chest xray and cdiff is negative.  Patient was taking clears and tolerating.  Patient fever wourk up pending blood and urine cultures.  I discussed with patient flex sig to r/o reasins for colitis though seems most consitent with diversion.  I also disussed with patient and brother.  The consent to flex sig understanding the risks and benefits involved, nut the do not any further surgical treatment such as reversal of ileostomy and repair of ventral hernia.  If flex show only mild cotiis would treat with cipro and flgyl make sure patient deferveses and return to [place] state.

## 2017-03-10 NOTE — PROGRESS NOTE ADULT - SUBJECTIVE AND OBJECTIVE BOX
Febrile to 102 yesterday. Has no complaints this morning. Denies abdominal pain. Asking to go home    Exam:  Vital Signs Last 24 Hrs  T(C): 37.2, Max: 39.1 (03-09 @ 16:09)  T(F): 99, Max: 102.4 (03-09 @ 16:09)  HR: 88 (80 - 88)  BP: 110/62 (95/51 - 110/62)  RR: 18 (18 - 18)  SpO2: 96% (95% - 96%)    General: in no distress  Respiratory: non labored on room air  Abdomen: soft, non tender, parastomal hernia, ileostomy with stool                          9.3    5.4   )-----------( 104      ( 09 Mar 2017 18:32 )             29.5   09 Mar 2017 18:32    137    |  101    |  23.0   ----------------------------<  124    3.4     |  21.0   |  1.19     Ca    8.2        09 Mar 2017 18:32  Phos  2.2       09 Mar 2017 18:32  Mg     1.9       09 Mar 2017 18:32    TPro  5.8    /  Alb  3.3    /  TBili  0.4    /  DBili  x      /  AST  14     /  ALT  17     /  AlkPhos  87     09 Mar 2017 18:32

## 2017-03-10 NOTE — BRIEF OPERATIVE NOTE - PRE-OP DX
Abdominal pain, unspecified location  03/10/2017    Active  Raymond Miller  Colitis  03/10/2017    Active  Raymond Miller

## 2017-03-10 NOTE — PROGRESS NOTE ADULT - SUBJECTIVE AND OBJECTIVE BOX
CC: abdominal pain     HPI:  68 y/o female with Psychiatric disorder, with iliostomy after a leakage from repair of rectal prolapse, multiple Er visits for abdominal pain, was sent from [facility] for abdominal pain. Ct abdomen shows colitis of descending and sigmoid colon. also thickening of the walls of segments  small bowel. Patient was evaluated in the Er By the surgeon, Dr. Miller who recommend no surgical diagnosis or management.  Patient currently being treated with Cipro and Flagyl.  Patient awaiting flex sig today. Patient had fever 102 yesterday.  Blood cultures pending. UA and Urine culture pending.    INTERVAL HPI/OVERNIGHT EVENTS: Patient agitated lying in bed.  Patient refusing to answer questions and to be examined.  Patient confused.    Vital Signs Last 24 Hrs  T(C): 37.4, Max: 39.1 (03-09 @ 16:09)  T(F): 99.4, Max: 102.4 (03-09 @ 16:09)  HR: 73 (73 - 88)  BP: 111/67 (100/64 - 111/67)  BP(mean): --  RR: 18 (18 - 18)  SpO2: 95% (95% - 96%)  I&O's Detail    I & Os for current day (as of 10 Mar 2017 11:20)  =============================================  IN:    dextrose 5% + sodium chloride 0.45% with potassium chloride 20 mEq/L: 1200 ml    Solution: 200 ml    Solution: 100 ml    Total IN: 1500 ml  ---------------------------------------------  OUT:    Ileostomy: 15 ml    Total OUT: 15 ml  ---------------------------------------------  Total NET: 1485 ml    Physical exam: Patient refused.                            9.3    5.4   )-----------( 104      ( 09 Mar 2017 18:32 )             29.5     09 Mar 2017 18:32    137    |  101    |  23.0   ----------------------------<  124    3.4     |  21.0   |  1.19     Ca    8.2        09 Mar 2017 18:32  Phos  2.2       09 Mar 2017 18:32  Mg     1.9       09 Mar 2017 18:32    TPro  5.8    /  Alb  3.3    /  TBili  0.4    /  DBili  x      /  AST  14     /  ALT  17     /  AlkPhos  87     09 Mar 2017 18:32    PT/INR - ( 08 Mar 2017 15:53 )   PT: 14.0 sec;   INR: 1.27 ratio         PTT - ( 08 Mar 2017 15:53 )  PTT:29.0 sec      LIVER FUNCTIONS - ( 09 Mar 2017 18:32 )  Alb: 3.3 g/dL / Pro: 5.8 g/dL / ALK PHOS: 87 U/L / ALT: 17 U/L / AST: 14 U/L / GGT: x               MEDICATIONS  (STANDING):  sodium chloride 0.9% lock flush 3milliLiter(s) IV Push once  aspirin enteric coated 81milliGRAM(s) Oral daily  levETIRAcetam 500milliGRAM(s) Oral two times a day  LORazepam     Tablet 1milliGRAM(s) Oral three times a day  benztropine 0.5milliGRAM(s) Oral <User Schedule>  benztropine 1milliGRAM(s) Oral at bedtime  haloperidol     Tablet 5milliGRAM(s) Oral at bedtime  pantoprazole    Tablet 40milliGRAM(s) Oral before breakfast  levothyroxine 112MICROGram(s) Oral daily  multivitamin 1Tablet(s) Oral daily  dextrose 5% + sodium chloride 0.45% with potassium chloride 20 mEq/L 1000milliLiter(s) IV Continuous <Continuous>  ciprofloxacin   IVPB 400milliGRAM(s) IV Intermittent every 12 hours  metroNIDAZOLE  IVPB 500milliGRAM(s) IV Intermittent every 8 hours  lactobacillus acidophilus 1Tablet(s) Oral daily  heparin  Injectable 5000Unit(s) SubCutaneous every 8 hours  sodium biphosphate Rectal Enema 1Enema Rectal once  sodium biphosphate Rectal Enema 1Enema Rectal once  potassium chloride   Powder 20milliEquivalent(s) Oral once    MEDICATIONS  (PRN):  acetaminophen   Tablet 650milliGRAM(s) Oral every 6 hours PRN For Temp greater than 38 C (100.4 F)  LORazepam   Injectable 1milliGRAM(s) IV Push three times a day PRN Agitation CC: abdominal pain     HPI:  66 y/o female with Psychiatric disorder, with iliostomy after a leakage from repair of rectal prolapse, multiple Er visits for abdominal pain, was sent from [facility] for abdominal pain. Ct abdomen shows colitis of descending and sigmoid colon. also thickening of the walls of segments  small bowel. Patient was evaluated in the Er By the surgeon, Dr. Miller who recommend no surgical diagnosis or management.  Patient currently being treated with Cipro and Flagyl.  Patient awaiting flex sig today. Patient had fever 102 yesterday.  Blood cultures pending. UA and Urine culture pending and chest xray. .    INTERVAL HPI/OVERNIGHT EVENTS: Patient agitated lying in bed.  Patient refusing to answer questions and to be examined.  Patient confused. Refusing all medications and examination.    Vital Signs Last 24 Hrs  T(C): 37.4, Max: 39.1 (03-09 @ 16:09)  T(F): 99.4, Max: 102.4 (03-09 @ 16:09)  HR: 73 (73 - 88)  BP: 111/67 (100/64 - 111/67)  BP(mean): --  RR: 18 (18 - 18)  SpO2: 95% (95% - 96%)  I&O's Detail    I & Os for current day (as of 10 Mar 2017 11:20)  =============================================  IN:    dextrose 5% + sodium chloride 0.45% with potassium chloride 20 mEq/L: 1200 ml    Solution: 200 ml    Solution: 100 ml    Total IN: 1500 ml  ---------------------------------------------  OUT:    Ileostomy: 15 ml    Total OUT: 15 ml  ---------------------------------------------  Total NET: 1485 ml    Physical exam: Patient refused.                            9.3    5.4   )-----------( 104      ( 09 Mar 2017 18:32 )             29.5     09 Mar 2017 18:32    137    |  101    |  23.0   ----------------------------<  124    3.4     |  21.0   |  1.19     Ca    8.2        09 Mar 2017 18:32  Phos  2.2       09 Mar 2017 18:32  Mg     1.9       09 Mar 2017 18:32    TPro  5.8    /  Alb  3.3    /  TBili  0.4    /  DBili  x      /  AST  14     /  ALT  17     /  AlkPhos  87     09 Mar 2017 18:32    PT/INR - ( 08 Mar 2017 15:53 )   PT: 14.0 sec;   INR: 1.27 ratio         PTT - ( 08 Mar 2017 15:53 )  PTT:29.0 sec      LIVER FUNCTIONS - ( 09 Mar 2017 18:32 )  Alb: 3.3 g/dL / Pro: 5.8 g/dL / ALK PHOS: 87 U/L / ALT: 17 U/L / AST: 14 U/L / GGT: x               MEDICATIONS  (STANDING):  sodium chloride 0.9% lock flush 3milliLiter(s) IV Push once  aspirin enteric coated 81milliGRAM(s) Oral daily  levETIRAcetam 500milliGRAM(s) Oral two times a day  LORazepam     Tablet 1milliGRAM(s) Oral three times a day  benztropine 0.5milliGRAM(s) Oral <User Schedule>  benztropine 1milliGRAM(s) Oral at bedtime  haloperidol     Tablet 5milliGRAM(s) Oral at bedtime  pantoprazole    Tablet 40milliGRAM(s) Oral before breakfast  levothyroxine 112MICROGram(s) Oral daily  multivitamin 1Tablet(s) Oral daily  dextrose 5% + sodium chloride 0.45% with potassium chloride 20 mEq/L 1000milliLiter(s) IV Continuous <Continuous>  ciprofloxacin   IVPB 400milliGRAM(s) IV Intermittent every 12 hours  metroNIDAZOLE  IVPB 500milliGRAM(s) IV Intermittent every 8 hours  lactobacillus acidophilus 1Tablet(s) Oral daily  heparin  Injectable 5000Unit(s) SubCutaneous every 8 hours  sodium biphosphate Rectal Enema 1Enema Rectal once  sodium biphosphate Rectal Enema 1Enema Rectal once  potassium chloride   Powder 20milliEquivalent(s) Oral once    MEDICATIONS  (PRN):  acetaminophen   Tablet 650milliGRAM(s) Oral every 6 hours PRN For Temp greater than 38 C (100.4 F)  LORazepam   Injectable 1milliGRAM(s) IV Push three times a day PRN Agitation

## 2017-03-10 NOTE — PROGRESS NOTE ADULT - PROBLEM SELECTOR PLAN 1
IV flagyl and cipro  C diff neg  Stool cultures testing  GI on consult  Will need eventual hernia repair  Flex sig today. IV flagyl and cipro day 3   C diff neg  Stool cultures testing  GI on consult  Will need eventual hernia repair  Flex sig today.  refusing iv fluids

## 2017-03-10 NOTE — PROGRESS NOTE ADULT - ASSESSMENT
67 year old female with multiple medical problems with history of leak following [place] repair of rectal prolapse followed by diverting ileostomy admitted with colitis. C diff negative and has no pain at rest, however still had fever. Continue current IV antibiotics. Cxr and urine cx for source of fever, blood cultures previously sent. Plan for flex sig today

## 2017-03-10 NOTE — PROGRESS NOTE ADULT - SUBJECTIVE AND OBJECTIVE BOX
Pt seen and examined F/U of abdominal pain and left sided colitis on cat scan  This morning she is resting comfortably and denies abdominal pain. Had temp of 102.4 last nite. On cipro and flagyl IV. BC ordered but no chest x ray. Stool for C diff toxin was  negative.    REVIEW OF SYSTEMS:    CONSTITUTIONAL: No fever, weight loss, or fatigue  EYES: No eye pain, visual disturbances, or discharge  ENMT:  No difficulty hearing, tinnitus, vertigo; No sinus or throat pain  RESPIRATORY: No cough, wheezing, chills or hemoptysis; No shortness of breath  CARDIOVASCULAR: No chest pain, palpitations, dizziness, or leg swelling  GASTROINTESTINAL: No abdominal or epigastric pain. No nausea, vomiting, or hematemesis; No diarrhea or constipation. No melena or hematochezia.    MEDICATIONS:  MEDICATIONS  (STANDING):  sodium chloride 0.9% lock flush 3milliLiter(s) IV Push once  aspirin enteric coated 81milliGRAM(s) Oral daily  levETIRAcetam 500milliGRAM(s) Oral two times a day  LORazepam     Tablet 1milliGRAM(s) Oral three times a day  benztropine 0.5milliGRAM(s) Oral <User Schedule>  benztropine 1milliGRAM(s) Oral at bedtime  haloperidol     Tablet 5milliGRAM(s) Oral at bedtime  pantoprazole    Tablet 40milliGRAM(s) Oral before breakfast  levothyroxine 112MICROGram(s) Oral daily  multivitamin 1Tablet(s) Oral daily  dextrose 5% + sodium chloride 0.45% with potassium chloride 20 mEq/L 1000milliLiter(s) IV Continuous <Continuous>  ciprofloxacin   IVPB 400milliGRAM(s) IV Intermittent every 12 hours  metroNIDAZOLE  IVPB 500milliGRAM(s) IV Intermittent every 8 hours  lactobacillus acidophilus 1Tablet(s) Oral daily  heparin  Injectable 5000Unit(s) SubCutaneous every 8 hours  sodium biphosphate Rectal Enema 1Enema Rectal once  sodium biphosphate Rectal Enema 1Enema Rectal once    MEDICATIONS  (PRN):  acetaminophen   Tablet 650milliGRAM(s) Oral every 6 hours PRN For Temp greater than 38 C (100.4 F)      Allergies    clozapine (Other)  Depakote (Other)  erythromycin (Unknown)  Neupogen (Other)    Intolerances        Vital Signs Last 24 Hrs  T(C): 37.2, Max: 39.1 (03-09 @ 16:09)  T(F): 99, Max: 102.4 (03-09 @ 16:09)  HR: 88 (80 - 88)  BP: 110/62 (95/51 - 110/62)  BP(mean): --  RR: 18 (18 - 18)  SpO2: 96% (95% - 96%)    I & Os for current day (as of 03-10 @ 07:17)  =============================================  IN: 1500 ml / OUT: 15 ml / NET: 1485 ml      PHYSICAL EXAM:    General: Well developed; well nourished; in no acute distress  HEENT: MMM, conjunctiva and sclera clear  Gastrointestinal:Abdomen: Soft  witj mild diffuse tenderness but no guarding or rebound, normal bowel sounds; No hepatosplenomegaly  no surgical scars. Colostomy in RLQ with some loose to semiformed brown stool in it.  Extremities: no cyanosis, clubbing or edema.  Skin: Warm and dry. No obvious rash    LABS:      CBC Full  -  ( 09 Mar 2017 18:32 )  WBC Count : 5.4 K/uL  Hemoglobin : 9.3 g/dL  Hematocrit : 29.5 %  Platelet Count - Automated : 104 K/uL  Mean Cell Volume : 91.3 fl  Mean Cell Hemoglobin : 28.8 pg  Mean Cell Hemoglobin Concentration : 31.5 g/dL  Auto Neutrophil # : 4.0 K/uL  Auto Lymphocyte # : 0.8 K/uL  Auto Monocyte # : 0.6 K/uL  Auto Eosinophil # : x  Auto Basophil # : x  Auto Neutrophil % : 72.9 %  Auto Lymphocyte % : 14.9 %  Auto Monocyte % : 12.0 %  Auto Eosinophil % : x  Auto Basophil % : x    09 Mar 2017 18:32    137    |  101    |  23.0   ----------------------------<  124    3.4     |  21.0   |  1.19     Ca    8.2        09 Mar 2017 18:32  Phos  2.2       09 Mar 2017 18:32  Mg     1.9       09 Mar 2017 18:32    TPro  5.8    /  Alb  3.3    /  TBili  0.4    /  DBili  x      /  AST  14     /  ALT  17     /  AlkPhos  87     09 Mar 2017 18:32    PT/INR - ( 08 Mar 2017 15:53 )   PT: 14.0 sec;   INR: 1.27 ratio         PTT - ( 08 Mar 2017 15:53 )  PTT:29.0 sec Pt seen and examined F/U of abdominal pain and left sided colitis on cat scan  This morning she is resting comfortably and denies abdominal pain. Had temp of 102.4 last nite. On cipro and flagyl IV. BC ordered but no chest x ray. Stool for C diff toxin was  negative.    REVIEW OF SYSTEMS:    CONSTITUTIONAL: No fever, weight loss, or fatigue  EYES: No eye pain, visual disturbances, or discharge  ENMT:  No difficulty hearing, tinnitus, vertigo; No sinus or throat pain  RESPIRATORY: No cough, wheezing, chills or hemoptysis; No shortness of breath  CARDIOVASCULAR: No chest pain, palpitations, dizziness, or leg swelling  GASTROINTESTINAL: No abdominal or epigastric pain. No nausea, vomiting, or hematemesis; No diarrhea or constipation. No melena or hematochezia.    MEDICATIONS:  MEDICATIONS  (STANDING):  sodium chloride 0.9% lock flush 3milliLiter(s) IV Push once  aspirin enteric coated 81milliGRAM(s) Oral daily  levETIRAcetam 500milliGRAM(s) Oral two times a day  LORazepam     Tablet 1milliGRAM(s) Oral three times a day  benztropine 0.5milliGRAM(s) Oral <User Schedule>  benztropine 1milliGRAM(s) Oral at bedtime  haloperidol     Tablet 5milliGRAM(s) Oral at bedtime  pantoprazole    Tablet 40milliGRAM(s) Oral before breakfast  levothyroxine 112MICROGram(s) Oral daily  multivitamin 1Tablet(s) Oral daily  dextrose 5% + sodium chloride 0.45% with potassium chloride 20 mEq/L 1000milliLiter(s) IV Continuous <Continuous>  ciprofloxacin   IVPB 400milliGRAM(s) IV Intermittent every 12 hours  metroNIDAZOLE  IVPB 500milliGRAM(s) IV Intermittent every 8 hours  lactobacillus acidophilus 1Tablet(s) Oral daily  heparin  Injectable 5000Unit(s) SubCutaneous every 8 hours  sodium biphosphate Rectal Enema 1Enema Rectal once  sodium biphosphate Rectal Enema 1Enema Rectal once    MEDICATIONS  (PRN):  acetaminophen   Tablet 650milliGRAM(s) Oral every 6 hours PRN For Temp greater than 38 C (100.4 F)      Allergies    clozapine (Other)  Depakote (Other)  erythromycin (Unknown)  Neupogen (Other)    Intolerances        Vital Signs Last 24 Hrs  T(C): 37.2, Max: 39.1 (03-09 @ 16:09)  T(F): 99, Max: 102.4 (03-09 @ 16:09)  HR: 88 (80 - 88)  BP: 110/62 (95/51 - 110/62)  BP(mean): --  RR: 18 (18 - 18)  SpO2: 96% (95% - 96%)    I & Os for current day (as of 03-10 @ 07:17)  =============================================  IN: 1500 ml / OUT: 15 ml / NET: 1485 ml      PHYSICAL EXAM:    General: Well developed; well nourished; in no acute distress  HEENT: MMM, conjunctiva and sclera clear  Gastrointestinal:Abdomen: Soft  witj mild diffuse tenderness but no guarding or rebound, normal bowel sounds; No hepatosplenomegaly  no surgical scars. Colostomy in RLQ with some loose to semiformed brown stool in it. Large peristomal hernia without change.  Extremities: no cyanosis, clubbing or edema.  Skin: Warm and dry. No obvious rash    LABS:      CBC Full  -  ( 09 Mar 2017 18:32 )  WBC Count : 5.4 K/uL  Hemoglobin : 9.3 g/dL  Hematocrit : 29.5 %  Platelet Count - Automated : 104 K/uL  Mean Cell Volume : 91.3 fl  Mean Cell Hemoglobin : 28.8 pg  Mean Cell Hemoglobin Concentration : 31.5 g/dL  Auto Neutrophil # : 4.0 K/uL  Auto Lymphocyte # : 0.8 K/uL  Auto Monocyte # : 0.6 K/uL  Auto Eosinophil # : x  Auto Basophil # : x  Auto Neutrophil % : 72.9 %  Auto Lymphocyte % : 14.9 %  Auto Monocyte % : 12.0 %  Auto Eosinophil % : x  Auto Basophil % : x    09 Mar 2017 18:32    137    |  101    |  23.0   ----------------------------<  124    3.4     |  21.0   |  1.19     Ca    8.2        09 Mar 2017 18:32  Phos  2.2       09 Mar 2017 18:32  Mg     1.9       09 Mar 2017 18:32    TPro  5.8    /  Alb  3.3    /  TBili  0.4    /  DBili  x      /  AST  14     /  ALT  17     /  AlkPhos  87     09 Mar 2017 18:32    PT/INR - ( 08 Mar 2017 15:53 )   PT: 14.0 sec;   INR: 1.27 ratio         PTT - ( 08 Mar 2017 15:53 )  PTT:29.0 sec

## 2017-03-10 NOTE — PROGRESS NOTE ADULT - PROBLEM SELECTOR PLAN 1
Etiology unclear. Possibly related to peristomal hernia or colitis but no pain now. Possibly biliary colic but no acute cholecystitis.

## 2017-03-10 NOTE — BRIEF OPERATIVE NOTE - PROCEDURE
Flexible sigmoidoscopy with biopsy  03/10/2017    Active  LEANNANZ  Flex sig  03/10/2017    Active  LEANNANZ

## 2017-03-10 NOTE — PROGRESS NOTE ADULT - ASSESSMENT
68 y/o female with Psychiatric disorder, with loop ileostomy after a leakage from repair of rectal prolapse in 2015. She has apparently had multiple Er visits for abdominal pain and  was sent from [facility] for abdominal pain yesterday with no vomiting. no fever. Ct abdomen shows colitis of descending and sigmoid colon as well as very large peristomal hernia containing large and small bowel. There was  thickening of the walls of segments of the small bowel as well. The patient was evaluated in the Er by the surgeon, Dr. Miller who recommend no surgical diagnosis or management but GI consult. It was his intent to take down the loop colostomy after sigmoidoscopy but he has not been able to get her into his office for either. Patient scheduled for flex sig today.  Patient agitated and confused and refusing to answer questions and be examined.

## 2017-03-11 LAB
ANION GAP SERPL CALC-SCNC: 9 MMOL/L — SIGNIFICANT CHANGE UP (ref 5–17)
APPEARANCE UR: CLEAR — SIGNIFICANT CHANGE UP
BILIRUB UR-MCNC: NEGATIVE — SIGNIFICANT CHANGE UP
BUN SERPL-MCNC: 13 MG/DL — SIGNIFICANT CHANGE UP (ref 8–20)
CALCIUM SERPL-MCNC: 8.7 MG/DL — SIGNIFICANT CHANGE UP (ref 8.6–10.2)
CHLORIDE SERPL-SCNC: 109 MMOL/L — HIGH (ref 98–107)
CO2 SERPL-SCNC: 24 MMOL/L — SIGNIFICANT CHANGE UP (ref 22–29)
COLOR SPEC: YELLOW — SIGNIFICANT CHANGE UP
CREAT SERPL-MCNC: 1.04 MG/DL — SIGNIFICANT CHANGE UP (ref 0.5–1.3)
CULTURE RESULTS: SIGNIFICANT CHANGE UP
DIFF PNL FLD: ABNORMAL
GLUCOSE SERPL-MCNC: 108 MG/DL — SIGNIFICANT CHANGE UP (ref 70–115)
GLUCOSE UR QL: NEGATIVE MG/DL — SIGNIFICANT CHANGE UP
HCT VFR BLD CALC: 29.6 % — LOW (ref 37–47)
HGB BLD-MCNC: 9.6 G/DL — LOW (ref 12–16)
KETONES UR-MCNC: NEGATIVE — SIGNIFICANT CHANGE UP
LEUKOCYTE ESTERASE UR-ACNC: ABNORMAL
MAGNESIUM SERPL-MCNC: 2 MG/DL — SIGNIFICANT CHANGE UP (ref 1.8–2.5)
MCHC RBC-ENTMCNC: 29.3 PG — SIGNIFICANT CHANGE UP (ref 27–31)
MCHC RBC-ENTMCNC: 32.4 G/DL — SIGNIFICANT CHANGE UP (ref 32–36)
MCV RBC AUTO: 90.2 FL — SIGNIFICANT CHANGE UP (ref 81–99)
NITRITE UR-MCNC: NEGATIVE — SIGNIFICANT CHANGE UP
PH UR: 6 — SIGNIFICANT CHANGE UP (ref 4.8–8)
PLATELET # BLD AUTO: 102 K/UL — LOW (ref 150–400)
POTASSIUM SERPL-MCNC: 3.8 MMOL/L — SIGNIFICANT CHANGE UP (ref 3.5–5.3)
POTASSIUM SERPL-SCNC: 3.8 MMOL/L — SIGNIFICANT CHANGE UP (ref 3.5–5.3)
PROT UR-MCNC: NEGATIVE MG/DL — SIGNIFICANT CHANGE UP
RBC # BLD: 3.28 M/UL — LOW (ref 4.4–5.2)
RBC # FLD: 13.4 % — SIGNIFICANT CHANGE UP (ref 11–15.6)
SODIUM SERPL-SCNC: 142 MMOL/L — SIGNIFICANT CHANGE UP (ref 135–145)
SP GR SPEC: 1.01 — SIGNIFICANT CHANGE UP (ref 1.01–1.02)
SPECIMEN SOURCE: SIGNIFICANT CHANGE UP
UROBILINOGEN FLD QL: NEGATIVE MG/DL — SIGNIFICANT CHANGE UP
WBC # BLD: 3.8 K/UL — LOW (ref 4.8–10.8)
WBC # FLD AUTO: 3.8 K/UL — LOW (ref 4.8–10.8)
WBC UR QL: SIGNIFICANT CHANGE UP

## 2017-03-11 PROCEDURE — 99232 SBSQ HOSP IP/OBS MODERATE 35: CPT

## 2017-03-11 PROCEDURE — 99233 SBSQ HOSP IP/OBS HIGH 50: CPT

## 2017-03-11 RX ADMIN — Medication 200 MILLIGRAM(S): at 05:18

## 2017-03-11 RX ADMIN — Medication 0.5 MILLIGRAM(S): at 10:48

## 2017-03-11 RX ADMIN — Medication 1 MILLIGRAM(S): at 00:37

## 2017-03-11 RX ADMIN — PANTOPRAZOLE SODIUM 40 MILLIGRAM(S): 20 TABLET, DELAYED RELEASE ORAL at 05:17

## 2017-03-11 RX ADMIN — Medication 112 MICROGRAM(S): at 05:17

## 2017-03-11 RX ADMIN — Medication 1 MILLIGRAM(S): at 22:43

## 2017-03-11 RX ADMIN — HALOPERIDOL DECANOATE 5 MILLIGRAM(S): 100 INJECTION INTRAMUSCULAR at 22:43

## 2017-03-11 RX ADMIN — LEVETIRACETAM 500 MILLIGRAM(S): 250 TABLET, FILM COATED ORAL at 05:17

## 2017-03-11 RX ADMIN — Medication 1 TABLET(S): at 11:21

## 2017-03-11 RX ADMIN — Medication 81 MILLIGRAM(S): at 11:21

## 2017-03-11 RX ADMIN — Medication 100 MILLIGRAM(S): at 05:18

## 2017-03-11 RX ADMIN — Medication 1 MILLIGRAM(S): at 05:17

## 2017-03-11 NOTE — PROGRESS NOTE ADULT - SUBJECTIVE AND OBJECTIVE BOX
Pt seen and examined She had a flexible sigmoidoscopy yesterday which showed diversion colitis to 40 cm. All stool studies are negative.    MEDICATIONS:  MEDICATIONS  (STANDING):  sodium chloride 0.9% lock flush 3milliLiter(s) IV Push once  aspirin enteric coated 81milliGRAM(s) Oral daily  levETIRAcetam 500milliGRAM(s) Oral two times a day  LORazepam     Tablet 1milliGRAM(s) Oral three times a day  benztropine 0.5milliGRAM(s) Oral <User Schedule>  benztropine 1milliGRAM(s) Oral at bedtime  haloperidol     Tablet 5milliGRAM(s) Oral at bedtime  pantoprazole    Tablet 40milliGRAM(s) Oral before breakfast  levothyroxine 112MICROGram(s) Oral daily  multivitamin 1Tablet(s) Oral daily  dextrose 5% + sodium chloride 0.45% with potassium chloride 20 mEq/L 1000milliLiter(s) IV Continuous <Continuous>  ciprofloxacin   IVPB 400milliGRAM(s) IV Intermittent every 12 hours  metroNIDAZOLE  IVPB 500milliGRAM(s) IV Intermittent every 8 hours  lactobacillus acidophilus 1Tablet(s) Oral daily  heparin  Injectable 5000Unit(s) SubCutaneous every 8 hours  sodium biphosphate Rectal Enema 1Enema Rectal once  sodium biphosphate Rectal Enema 1Enema Rectal once    MEDICATIONS  (PRN):  acetaminophen   Tablet 650milliGRAM(s) Oral every 6 hours PRN For Temp greater than 38 C (100.4 F)  LORazepam   Injectable 1milliGRAM(s) IV Push three times a day PRN Agitation      Allergies    clozapine (Other)  Depakote (Other)  erythromycin (Unknown)  Neupogen (Other)    Intolerances        Vital Signs Last 24 Hrs  T(C): 37.1, Max: 38 (03-10 @ 16:22)  T(F): 98.7, Max: 100.4 (03-10 @ 16:22)  HR: 77 (77 - 86)  BP: 98/60 (98/60 - 137/81)  BP(mean): --  RR: 18 (18 - 18)  SpO2: 97% (97% - 100%)  I & Os for 24h ending 03-11 @ 07:00  =============================================  IN: 1200 ml / OUT: 150 ml / NET: 1050 ml    I & Os for current day (as of 03-11 @ 10:15)  =============================================  IN: 100 ml / OUT: 0 ml / NET: 100 ml      PHYSICAL EXAM:    General: Well developed; well nourished; in no acute distress  HEENT: MMM, conjunctiva and sclera clear  Lungs: clear to auscultation and percussion.  Cor: RRR S1, S2 only w/o mrg or clicks  Gastrointestinal: Abdomen: Soft non-tender non-distended; Normal bowel sounds; No hepatosplenomegaly  + ileostomy with parastomal hernia.      LABS:      CBC Full  -  ( 11 Mar 2017 08:05 )  WBC Count : 3.8 K/uL  Hemoglobin : 9.6 g/dL  Hematocrit : 29.6 %  Platelet Count - Automated : 102 K/uL  Mean Cell Volume : 90.2 fl  Mean Cell Hemoglobin : 29.3 pg  Mean Cell Hemoglobin Concentration : 32.4 g/dL  Auto Neutrophil # : x  Auto Lymphocyte # : x  Auto Monocyte # : x  Auto Eosinophil # : x  Auto Basophil # : x  Auto Neutrophil % : x  Auto Lymphocyte % : x  Auto Monocyte % : x  Auto Eosinophil % : x  Auto Basophil % : x    11 Mar 2017 08:05    142    |  109    |  13.0   ----------------------------<  108    3.8     |  24.0   |  1.04     Ca    8.7        11 Mar 2017 08:05  Phos  2.2       09 Mar 2017 18:32  Mg     2.0       11 Mar 2017 08:05    TPro  5.8    /  Alb  3.3    /  TBili  0.4    /  DBili  x      /  AST  14     /  ALT  17     /  AlkPhos  87     09 Mar 2017 18:32                      RADIOLOGY & ADDITIONAL STUDIES (The following images were personally reviewed):

## 2017-03-11 NOTE — PROGRESS NOTE ADULT - ASSESSMENT
66 y/o female with Psychiatric disorder, with loop ileostomy after a leakage from repair of rectal prolapse in 2015. She has apparently had multiple Er visits for abdominal pain and  was sent from [facility] for abdominal pain yesterday with no vomiting. no fever. Ct abdomen shows colitis of descending and sigmoid colon as well as very large peristomal hernia containing large and small bowel. There was  thickening of the walls of segments of the small bowel as well. The patient was evaluated in the Er by the surgeon, Dr. Miller who recommend no surgical diagnosis or management but GI consult. It was his intent to take down the loop colostomy after sigmoidoscopy but he has not been able to get her into his office for either. Patient scheduled for flex sig today.  Patient agitated and confused and refusing to answer questions and be examined.

## 2017-03-11 NOTE — PROGRESS NOTE ADULT - PROBLEM SELECTOR PLAN 1
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

## 2017-03-11 NOTE — PROGRESS NOTE ADULT - PROBLEM SELECTOR PLAN 1
Pt. with diversion colitis. Will resolve with reattachment. No evidence of infectious colitis, and further diet advancement and management as per colorectal surgery. No plans or need for further GI w/u or f/u. Signing off. Reconsult as needed. Thank you.

## 2017-03-11 NOTE — PROGRESS NOTE ADULT - SUBJECTIVE AND OBJECTIVE BOX
CHIEF COMPLAINT/INTERVAL HISTORY:    Patient is a 67y old  Female who presents with a chief complaint of abdominal pain (08 Mar 2017 23:31)      HPI:  68 y/o female with Psychiatric disorder, with iliostomy after a leakage from repaire of rectal prolapse, multiple Er visits for abdominal pain, was sent from [facility] for abdominal pain. no vomiting. no fever. Ct abdomen shows colitis of descending and sigmoid colon. also thickening of the walls of segments  small bowel. patient was evaluated in the Er By the surgeon, Dr. Miller who recommend no surgical diagnosis or management but GI consult. (08 Mar 2017 23:31)      SUBJECTIVE & OBJECTIVE: Pt seen and examined at bedside. Refusing meds. "want to go home"    ICU Vital Signs Last 24 Hrs  T(C): 37.1, Max: 38 (03-10 @ 16:22)  T(F): 98.7, Max: 100.4 (03-10 @ 16:22)  HR: 77 (77 - 86)  BP: 98/60 (98/60 - 137/81)  BP(mean): --  ABP: --  ABP(mean): --  RR: 18 (18 - 18)  SpO2: 97% (97% - 100%)        MEDICATIONS  (STANDING):  sodium chloride 0.9% lock flush 3milliLiter(s) IV Push once  aspirin enteric coated 81milliGRAM(s) Oral daily  levETIRAcetam 500milliGRAM(s) Oral two times a day  LORazepam     Tablet 1milliGRAM(s) Oral three times a day  benztropine 0.5milliGRAM(s) Oral <User Schedule>  benztropine 1milliGRAM(s) Oral at bedtime  haloperidol     Tablet 5milliGRAM(s) Oral at bedtime  pantoprazole    Tablet 40milliGRAM(s) Oral before breakfast  levothyroxine 112MICROGram(s) Oral daily  multivitamin 1Tablet(s) Oral daily  dextrose 5% + sodium chloride 0.45% with potassium chloride 20 mEq/L 1000milliLiter(s) IV Continuous <Continuous>  ciprofloxacin   IVPB 400milliGRAM(s) IV Intermittent every 12 hours  metroNIDAZOLE  IVPB 500milliGRAM(s) IV Intermittent every 8 hours  lactobacillus acidophilus 1Tablet(s) Oral daily  heparin  Injectable 5000Unit(s) SubCutaneous every 8 hours  sodium biphosphate Rectal Enema 1Enema Rectal once  sodium biphosphate Rectal Enema 1Enema Rectal once    MEDICATIONS  (PRN):  acetaminophen   Tablet 650milliGRAM(s) Oral every 6 hours PRN For Temp greater than 38 C (100.4 F)  LORazepam   Injectable 1milliGRAM(s) IV Push three times a day PRN Agitation      LABS:                        9.6    3.8   )-----------( 102      ( 11 Mar 2017 08:05 )             29.6     11 Mar 2017 08:05    142    |  109    |  13.0   ----------------------------<  108    3.8     |  24.0   |  1.04     Ca    8.7        11 Mar 2017 08:05  Phos  2.2       09 Mar 2017 18:32  Mg     2.0       11 Mar 2017 08:05    TPro  5.8    /  Alb  3.3    /  TBili  0.4    /  DBili  x      /  AST  14     /  ALT  17     /  AlkPhos  87     09 Mar 2017 18:32      Urinalysis Basic - ( 11 Mar 2017 12:21 )    Color: Yellow / Appearance: Clear / S.010 / pH: x  Gluc: x / Ketone: Negative  / Bili: Negative / Urobili: Negative mg/dL   Blood: x / Protein: Negative mg/dL / Nitrite: Negative   Leuk Esterase: Trace / RBC: x / WBC 0-2   Sq Epi: x / Non Sq Epi: x / Bacteria: x        CAPILLARY BLOOD GLUCOSE      RECENT CULTURES:      RADIOLOGY & ADDITIONAL TESTS:      PHYSICAL EXAM:    GENERAL: NAD,   HEAD:  Atraumatic, Normocephalic  EYES: EOMI, PERRLA, conjunctiva and sclera clear  ENMT: Moist mucous membranes  NECK: Supple, No JVD  NERVOUS SYSTEM:  Alert & Oriented X1, non focal  CHEST/LUNG: Clear to auscultation bilaterally; No rales, rhonchi, wheezing, or rubs  HEART: Regular rate and rhythm; No murmurs, rubs, or gallops  ABDOMEN: Soft, Nontender, Nondistended; Bowel sounds present,  + colostomy in place hernia at site noted  EXTREMITIES:  2+ Peripheral Pulses, No clubbing, cyanosis, or edema

## 2017-03-12 LAB
CULTURE RESULTS: SIGNIFICANT CHANGE UP
SPECIMEN SOURCE: SIGNIFICANT CHANGE UP

## 2017-03-12 PROCEDURE — 99233 SBSQ HOSP IP/OBS HIGH 50: CPT

## 2017-03-12 RX ADMIN — LEVETIRACETAM 500 MILLIGRAM(S): 250 TABLET, FILM COATED ORAL at 06:31

## 2017-03-12 RX ADMIN — Medication 1 MILLIGRAM(S): at 06:31

## 2017-03-12 RX ADMIN — PANTOPRAZOLE SODIUM 40 MILLIGRAM(S): 20 TABLET, DELAYED RELEASE ORAL at 06:31

## 2017-03-12 RX ADMIN — Medication 112 MICROGRAM(S): at 06:31

## 2017-03-12 NOTE — CHART NOTE - NSCHARTNOTEFT_GEN_A_CORE
spoke with Dr Rivers with Dr Martinez    informed of pts condition    Dr Martinez rec d/c of current antibiotics    Dr Martinez spoke with Dr Miller re surgical options for patient during this admission ( reversal of ileostomy and repair of parastomal hernia)    Dr Miller stated to Dr Martinez that brother of patient is refusing for patient to have anymore surgery at this time and that he has no plans for surgical intervention on this admission.

## 2017-03-12 NOTE — PROGRESS NOTE ADULT - ASSESSMENT
66 y/o female with Psychiatric disorder, with loop ileostomy after a leakage from repair of rectal prolapse in 2015. She has apparently had multiple Er visits for abdominal pain and  was sent from [facility] for abdominal pain yesterday with no vomiting. no fever. Ct abdomen shows colitis of descending and sigmoid colon as well as very large peristomal hernia containing large and small bowel. There was  thickening of the walls of segments of the small bowel as well. The patient was evaluated in the Er by the surgeon, Dr. Miller who recommend no surgical diagnosis or management but GI consult. It was his intent to take down the loop colostomy after sigmoidoscopy but he has not been able to get her into his office for either. Patient scheduled for flex sig today.  Patient agitated and confused and refusing to answer questions and be examined.

## 2017-03-12 NOTE — PROGRESS NOTE ADULT - SUBJECTIVE AND OBJECTIVE BOX
CHIEF COMPLAINT/INTERVAL HISTORY:    Patient is a 67y old  Female who presents with a chief complaint of abdominal pain (08 Mar 2017 23:31)      HPI:  66 y/o female with Psychiatric disorder, with iliostomy after a leakage from repaire of rectal prolapse, multiple Er visits for abdominal pain, was sent from [facility] for abdominal pain. no vomiting. no fever. Ct abdomen shows colitis of descending and sigmoid colon. also thickening of the walls of segments  small bowel. patient was evaluated in the Er By the surgeon, Dr. Miller who recommend no surgical diagnosis or management but GI consult. (08 Mar 2017 23:31)      SUBJECTIVE & OBJECTIVE: Pt seen and examined at bedside. Denies any overnight issues.      ICU Vital Signs Last 24 Hrs  T(C): 37.1, Max: 37.1 (03-12 @ 08:47)  T(F): 98.7, Max: 98.7 (03-12 @ 08:47)  HR: 73 (70 - 77)  BP: 93/54 (93/54 - 116/68)  BP(mean): --  ABP: --  ABP(mean): --  RR: 16 (16 - 18)  SpO2: 95% (95% - 98%)        MEDICATIONS  (STANDING):  sodium chloride 0.9% lock flush 3milliLiter(s) IV Push once  aspirin enteric coated 81milliGRAM(s) Oral daily  levETIRAcetam 500milliGRAM(s) Oral two times a day  LORazepam     Tablet 1milliGRAM(s) Oral three times a day  benztropine 0.5milliGRAM(s) Oral <User Schedule>  benztropine 1milliGRAM(s) Oral at bedtime  haloperidol     Tablet 5milliGRAM(s) Oral at bedtime  pantoprazole    Tablet 40milliGRAM(s) Oral before breakfast  levothyroxine 112MICROGram(s) Oral daily  multivitamin 1Tablet(s) Oral daily  dextrose 5% + sodium chloride 0.45% with potassium chloride 20 mEq/L 1000milliLiter(s) IV Continuous <Continuous>  ciprofloxacin   IVPB 400milliGRAM(s) IV Intermittent every 12 hours  metroNIDAZOLE  IVPB 500milliGRAM(s) IV Intermittent every 8 hours  lactobacillus acidophilus 1Tablet(s) Oral daily  heparin  Injectable 5000Unit(s) SubCutaneous every 8 hours  sodium biphosphate Rectal Enema 1Enema Rectal once  sodium biphosphate Rectal Enema 1Enema Rectal once    MEDICATIONS  (PRN):  acetaminophen   Tablet 650milliGRAM(s) Oral every 6 hours PRN For Temp greater than 38 C (100.4 F)  LORazepam   Injectable 1milliGRAM(s) IV Push three times a day PRN Agitation      LABS:                        9.6    3.8   )-----------( 102      ( 11 Mar 2017 08:05 )             29.6     11 Mar 2017 08:05    142    |  109    |  13.0   ----------------------------<  108    3.8     |  24.0   |  1.04     Ca    8.7        11 Mar 2017 08:05  Mg     2.0       11 Mar 2017 08:05        Urinalysis Basic - ( 11 Mar 2017 12:21 )    Color: Yellow / Appearance: Clear / S.010 / pH: x  Gluc: x / Ketone: Negative  / Bili: Negative / Urobili: Negative mg/dL   Blood: x / Protein: Negative mg/dL / Nitrite: Negative   Leuk Esterase: Trace / RBC: x / WBC 0-2   Sq Epi: x / Non Sq Epi: x / Bacteria: x        CAPILLARY BLOOD GLUCOSE      RECENT CULTURES:      RADIOLOGY & ADDITIONAL TESTS:      PHYSICAL EXAM:    GENERAL: NAD,   HEAD:  Atraumatic, Normocephalic  EYES: EOMI, PERRLA, conjunctiva and sclera clear  ENMT: Moist mucous membranes  NECK: Supple, No JVD  NERVOUS SYSTEM:  Alert & Oriented X1, non focal  CHEST/LUNG: Clear to auscultation bilaterally; No rales, rhonchi, wheezing, or rubs  HEART: Regular rate and rhythm; No murmurs, rubs, or gallops  ABDOMEN: Soft, Nontender, Nondistended; Bowel sounds present,  + colostomy in place hernia at site noted  EXTREMITIES:  2+ Peripheral Pulses, No clubbing, cyanosis, or edema

## 2017-03-12 NOTE — PROGRESS NOTE ADULT - PROBLEM SELECTOR PLAN 1
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 Vivar.   f/u Flex sig path results  D/c planning for tomorrow

## 2017-03-13 ENCOUNTER — TRANSCRIPTION ENCOUNTER (OUTPATIENT)
Age: 67
End: 2017-03-13

## 2017-03-13 LAB — SURGICAL PATHOLOGY FINAL REPORT - CH: SIGNIFICANT CHANGE UP

## 2017-03-13 PROCEDURE — 99233 SBSQ HOSP IP/OBS HIGH 50: CPT

## 2017-03-13 RX ORDER — HALOPERIDOL DECANOATE 100 MG/ML
7 INJECTION INTRAMUSCULAR
Qty: 0 | Refills: 0 | COMMUNITY

## 2017-03-13 RX ORDER — MESALAMINE 400 MG
1000 TABLET, DELAYED RELEASE (ENTERIC COATED) ORAL AT BEDTIME
Qty: 0 | Refills: 0 | Status: DISCONTINUED | OUTPATIENT
Start: 2017-03-13 | End: 2017-03-14

## 2017-03-13 RX ORDER — MESALAMINE 400 MG
1 TABLET, DELAYED RELEASE (ENTERIC COATED) ORAL
Qty: 0 | Refills: 0 | COMMUNITY
Start: 2017-03-13

## 2017-03-13 RX ADMIN — Medication 1 MILLIGRAM(S): at 22:38

## 2017-03-13 RX ADMIN — Medication 1 MILLIGRAM(S): at 22:37

## 2017-03-13 RX ADMIN — HALOPERIDOL DECANOATE 5 MILLIGRAM(S): 100 INJECTION INTRAMUSCULAR at 22:38

## 2017-03-13 NOTE — DISCHARGE NOTE ADULT - HOSPITAL COURSE
68 y/o female with Psychiatric disorder, with loop ileostomy after a leakage from repair of rectal prolapse in 2015. She has apparently had multiple Er visits for abdominal pain and  was sent from [facility] for abdominal pain yesterday with no vomiting. no fever. Ct abdomen shows colitis of descending and sigmoid colon as well as very large peristomal hernia containing large and small bowel. There was  thickening of the walls of segments of the small bowel as well. The patient was evaluated in the Er by the surgeon, Dr. Miller who recommend no surgical diagnosis or management but GI consult. It was his intent to take down the loop colostomy after sigmoidoscopy but he has not been able to get her into his office for either. Patient scheduled for flex sig today. 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 No evidence of infectious colitis.  flagyl and cipro  that were inti tally started now d/hong. Advanced diet  C diff neg. GI consult signed off.   Flex sig showed mild colitis likely related to diversion  Patient needs reversal of ileostomy and extensive hernia repair.  However, patient though not apparently able to sign consent and brother do not want any further surgical intervention.  Dr. Miller will discuss with brother once again, and then either plan on surgery this week, or return to [facility] with canasa suppositories as she is stable at this point. 68 y/o female with Psychiatric disorder, with loop ileostomy after a leakage from repair of rectal prolapse in 2015. She has apparently had multiple Er visits for abdominal pain and  was sent from [facility] for abdominal pain yesterday with no vomiting. no fever. Ct abdomen shows colitis of descending and sigmoid colon as well as very large peristomal hernia containing large and small bowel. There was  thickening of the walls of segments of the small bowel as well. The patient was evaluated in the Er by the surgeon, Dr. Miller who recommend no surgical diagnosis or management but GI consult. It was his intent to take down the loop colostomy after sigmoidoscopy but he has not been able to get her into his office for either. Patient scheduled for flex sig today. 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 No evidence of infectious colitis.  flagyl and cipro  that were inti tally started now d/hong. Advanced diet  C diff neg. GI consult signed off.   Flex sig showed mild colitis likely related to diversion  Patient needs reversal of ileostomy and extensive hernia repair.  However, patient though not apparently able to sign consent and brother do not want any further surgical intervention.  Reversal of ileostomy and repair of ventral hernia deemed non urgent, pt despite having no capacity yet refusing surgery, no role of HCP when pt is refusing despite having no capacity as per Dr. Gusman, Plan to send pt back to [place], Discussed with Gulliver attending.  Agrees with plan.     As per Dr. Miller,    'Dx colitis  Flex sig showed mild colitis likely related to diversion, will check path.  Patient needs reversal of ileostomy and extensive hernia repair.  However, patient though not apparently able to sign consent and brother do not want any further surgical intervention.  I will discuss with brother once again, and then either plan on surgery this week, or return to [facility] with canasa suppositories as she is stable at this point'  VSS. Pt medically stable for discharge

## 2017-03-13 NOTE — PHYSICAL THERAPY INITIAL EVALUATION ADULT - ADDITIONAL COMMENTS
Pt is a poor historian, As per H&P pt lives @ [facility]. Pt unable to give Hx 2*2 cognition.

## 2017-03-13 NOTE — DISCHARGE NOTE ADULT - MEDICATION SUMMARY - MEDICATIONS TO TAKE
I will START or STAY ON the medications listed below when I get home from the hospital:    mesalamine 1000 mg rectal suppository  -- 1 suppository(ies) rectally once a day (at bedtime)  -- Indication: For Colitis    aspirin 81 mg oral tablet  -- 1 tab(s) by mouth once a day  -- Indication: For Cad    calcium (as carbonate) 500 mg oral tablet  -- 1 tab(s) by mouth 2 times a day  -- Indication: For Nutrition    levETIRAcetam 500 mg oral tablet  -- 500 milligram(s) by mouth 2 times a day  -- Indication: For Seizure    LORazepam 1 mg oral tablet  -- 1 tab(s) by mouth 3 times a day  -- Indication: For Anxiety    Imodium 2 mg oral capsule  -- 1 cap(s) by mouth once a day  -- Indication: For diarrhea    benztropine 1 mg oral tablet  -- 1 tab(s) by mouth once a day (in the evening)  -- Indication: For psych    benztropine 0.5 mg oral tablet  -- 1 tab(s) by mouth once a day (in the morning)  -- Indication: For psych    haloperidol 5 mg oral tablet  -- 1 tab(s) by mouth once a day (at bedtime)  -- Indication: For psych    aMILoride 5 mg oral tablet  -- 1 tab(s) by mouth once a day  -- Indication: For Htn    omeprazole 20 mg oral delayed release capsule  -- 1 cap(s) by mouth once a day  -- Indication: For ppx    levothyroxine 112 mcg (0.112 mg) oral tablet  -- 1 tab(s) by mouth once a day  -- Indication: For Hypothyroidism, unspecified type    multivitamin  -- 1 tab(s) by mouth once a day  -- Indication: For Nutrition    cyanocobalamin 1000 mcg oral tablet  -- 1 tab(s) by mouth once a day  -- Indication: For Nutrition    cholecalciferol 2000 intl units oral capsule  -- 1 cap(s) by mouth once a day (at bedtime)  -- Indication: For Nutrition I will START or STAY ON the medications listed below when I get home from the hospital:    mesalamine 1000 mg rectal suppository  -- 1 suppository(ies) rectally once a day (at bedtime)  -- Indication: For Colitis    aspirin 81 mg oral tablet  -- 1 tab(s) by mouth once a day  -- Indication: For ppx    calcium (as carbonate) 500 mg oral tablet  -- 1 tab(s) by mouth 2 times a day  -- Indication: For Nutrition    levETIRAcetam 500 mg oral tablet  -- 500 milligram(s) by mouth 2 times a day  -- Indication: For Seizure    LORazepam 1 mg oral tablet  -- 1 tab(s) by mouth 3 times a day  -- Indication: For Anxiety    Imodium 2 mg oral capsule  -- 1 cap(s) by mouth once a day  -- Indication: For Diarrhea    benztropine 1 mg oral tablet  -- 1 tab(s) by mouth once a day (in the evening)  -- Indication: For Schizo affective schizophrenia    benztropine 0.5 mg oral tablet  -- 1 tab(s) by mouth once a day (in the morning)  -- Indication: For Schizo affective schizophrenia    haloperidol 5 mg oral tablet  -- 1 tab(s) by mouth once a day (at bedtime)  -- Indication: For Schizo affective schizophrenia    omeprazole 20 mg oral delayed release capsule  -- 1 cap(s) by mouth once a day  -- Indication: For gerd    levothyroxine 112 mcg (0.112 mg) oral tablet  -- 1 tab(s) by mouth once a day  -- Indication: For Hypothyroidism    multivitamin  -- 1 tab(s) by mouth once a day  -- Indication: For Nutrition    cyanocobalamin 1000 mcg oral tablet  -- 1 tab(s) by mouth once a day  -- Indication: For Nutrition    cholecalciferol 2000 intl units oral capsule  -- 1 cap(s) by mouth once a day (at bedtime)  -- Indication: For Nutrition
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
## 2017-03-13 NOTE — PHYSICAL THERAPY INITIAL EVALUATION ADULT - PERTINENT HX OF CURRENT PROBLEM, REHAB EVAL
68 y/o female with Psychiatric disorder, with iliostomy after a leakage from repair of rectal prolapse, multiple ER visits for abdominal pain, was sent from [facility] for abdominal pain. no vomiting. no fever. Ct abdomen shows colitis of descending and sigmoid colon. also thickening of the walls of segments small bowel

## 2017-03-13 NOTE — PROGRESS NOTE ADULT - ASSESSMENT
Dx colitis  Flex sig showed mild colitis likely related to diversion, will check path.  Patient needs reversal of ileostomy and extensive hernia repair.  However, patient though not apparently able to sign consent and brother do not want any further surgical intervention.  I will discuss with brother once again, and then either plan on surgery this week, or return to [facility] with canasa suppositories as she is stable at this point.

## 2017-03-13 NOTE — DISCHARGE NOTE ADULT - PLAN OF CARE
resolution Follow up with Dr. Miller for follow up & further management. Pls take your meds as directed. Pls take your meds as directed. Follow up with your PMD & psychiatry as outpatient Pls take your meds as directed. Follow up with your PMD replaced Follow up with your PMD Norvasc on hold as BP on lower side. Follow up with your PMD & reevaluate need for it. Follow up with your PMD. Repeat BMP as outpatient Norvasc on hold as BP wnl. Follow up with your PMD & reevaluate need for it.

## 2017-03-13 NOTE — DISCHARGE NOTE ADULT - CARE PLAN
Principal Discharge DX:	Diversion colitis  Goal:	resolution  Instructions for follow-up, activity and diet:	Follow up with Dr. Miller for follow up & further management. Pls take your meds as directed.  Secondary Diagnosis:	Schizoaffective disorder, bipolar type  Instructions for follow-up, activity and diet:	Pls take your meds as directed. Follow up with your PMD & psychiatry as outpatient  Secondary Diagnosis:	Hypothyroidism  Instructions for follow-up, activity and diet:	Pls take your meds as directed. Follow up with your PMD  Secondary Diagnosis:	Hypokalemia  Goal:	replaced  Instructions for follow-up, activity and diet:	Follow up with your PMD  Secondary Diagnosis:	HTN (hypertension)  Instructions for follow-up, activity and diet:	Norvasc on hold as BP on lower side. Follow up with your PMD & reevaluate need for it. Principal Discharge DX:	Diversion colitis  Goal:	resolution  Instructions for follow-up, activity and diet:	Follow up with Dr. Miller for follow up & further management. Pls take your meds as directed.  Secondary Diagnosis:	Schizoaffective disorder, bipolar type  Instructions for follow-up, activity and diet:	Pls take your meds as directed. Follow up with your PMD & psychiatry as outpatient  Secondary Diagnosis:	Hypothyroidism  Instructions for follow-up, activity and diet:	Pls take your meds as directed. Follow up with your PMD  Secondary Diagnosis:	Hypokalemia  Goal:	replaced  Instructions for follow-up, activity and diet:	Follow up with your PMD. Repeat BMP as outpatient  Secondary Diagnosis:	HTN (hypertension)  Instructions for follow-up, activity and diet:	Norvasc on hold as BP wnl. Follow up with your PMD & reevaluate need for it.

## 2017-03-13 NOTE — DISCHARGE NOTE ADULT - PATIENT PORTAL LINK FT
“You can access the Follow[employer] Patient Portal, offered by [facility], by registering with the following website: [email or web address]”

## 2017-03-13 NOTE — PHYSICAL THERAPY INITIAL EVALUATION ADULT - GENERAL OBSERVATIONS, REHAB EVAL
Pt received supine in bed, + Iliostomy intact, pt without c/o pain, required increased time and encouragement to participate 2*2 cognitive state. Pt agreeable to PT evaluation.

## 2017-03-13 NOTE — DISCHARGE NOTE ADULT - CARE PROVIDER_API CALL
Raymond Miller), ColonRectal Surgery; Surgery  [address] [facility] [address] [place] [address]  Phone: [phone]  Fax: [phone]

## 2017-03-13 NOTE — DIETITIAN INITIAL EVALUATION ADULT. - OTHER INFO
Pt admitted for gastroenteritis. Tolerating full liquid diet well, 100% PO intake. Pt wants regular diet, Pt made aware of restrictions and advancement process. Pt has ileostomy.

## 2017-03-13 NOTE — DISCHARGE NOTE ADULT - CARE PROVIDERS DIRECT ADDRESSES
[email or web address]_Unknown

## 2017-03-14 VITALS
RESPIRATION RATE: 18 BRPM | HEART RATE: 76 BPM | SYSTOLIC BLOOD PRESSURE: 143 MMHG | TEMPERATURE: 98 F | OXYGEN SATURATION: 96 % | DIASTOLIC BLOOD PRESSURE: 84 MMHG

## 2017-03-14 PROCEDURE — 99233 SBSQ HOSP IP/OBS HIGH 50: CPT

## 2017-03-14 RX ORDER — INFLUENZA VIRUS VACCINE 15; 15; 15; 15 UG/.5ML; UG/.5ML; UG/.5ML; UG/.5ML
0.5 SUSPENSION INTRAMUSCULAR ONCE
Qty: 0 | Refills: 0 | Status: COMPLETED | OUTPATIENT
Start: 2017-03-14 | End: 2017-03-14

## 2017-03-14 RX ORDER — MESALAMINE 400 MG
1 TABLET, DELAYED RELEASE (ENTERIC COATED) ORAL
Qty: 2 | Refills: 0 | OUTPATIENT
Start: 2017-03-14 | End: 2017-03-16

## 2017-03-14 NOTE — PROGRESS NOTE ADULT - ASSESSMENT
Dx parastomal hernia diverting colitis  continue diet  I had a lengthy discussion with patient brother.  As we had previously discussed.  She wishes at this point to make sure she is comfortable and he does not want any further surgery to be done.  He fully understands risk of obstruction and need for emergency surgery.  She will return to [facility]

## 2017-03-14 NOTE — PROGRESS NOTE ADULT - PROBLEM SELECTOR PROBLEM 3
Hypothyroidism, unspecified type

## 2017-03-14 NOTE — PROGRESS NOTE ADULT - PROBLEM SELECTOR PLAN 4
refused po and iv meds for now
unable to check BMP, refusing labs
unable to check BMP, refusing labs
refused po and iv meds for now

## 2017-03-14 NOTE — PROGRESS NOTE ADULT - PROBLEM SELECTOR PLAN 2
1:1 in place  on Cogentin, ativan and haloperidol  Patient refusing to take medications, Haldol IM given.
with colitis but now with little if any diarrhea. ? resolving ischemic colitis. Will await further stool studies. Contiune antibiotics and clear liquids. Chest x ray and urine  culture as well.
1:1 in place  on Cogentin, ativan and haloperidol

## 2017-03-14 NOTE — PROGRESS NOTE ADULT - SUBJECTIVE AND OBJECTIVE BOX
see written note in chart  discussed patient's refusals with Dr Rivers  proposed surgery while needed is not emergent as such court authorization will be needed to go forward advise return to [place]

## 2017-03-14 NOTE — PROGRESS NOTE ADULT - PROBLEM SELECTOR PROBLEM 2
Schizo affective schizophrenia
Abnormal CAT scan
Schizo affective schizophrenia

## 2017-03-14 NOTE — PROGRESS NOTE ADULT - PROBLEM SELECTOR PLAN 1
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 Gusman, Plan to send pt back to [place], Discussed with Snyder attending.    D/c planning to [place] today

## 2017-03-14 NOTE — PROGRESS NOTE ADULT - ASSESSMENT
68 y/o female with Psychiatric disorder, with loop ileostomy after a leakage from repair of rectal prolapse in 2015. She has apparently had multiple Er visits for abdominal pain and  was sent from [facility] for abdominal pain yesterday with no vomiting. no fever. Ct abdomen shows colitis of descending and sigmoid colon as well as very large peristomal hernia containing large and small bowel. There was  thickening of the walls of segments of the small bowel as well. The patient was evaluated in the Er by the surgeon, Dr. Miller who recommend no surgical diagnosis or management but GI consult. It was his intent to take down the loop colostomy after sigmoidoscopy but he has not been able to get her into his office for either. Patient scheduled for flex sig today.  Patient agitated and confused and refusing to answer questions and be examined.

## 2017-03-14 NOTE — PROVIDER CONTACT NOTE (OTHER) - SITUATION
Pt refused medication at morning pass and then lunch pass. States that she doesn't need medications.

## 2017-03-24 ENCOUNTER — EMERGENCY (EMERGENCY)
Facility: HOSPITAL | Age: 67
LOS: 1 days | Discharge: HOME | End: 2017-03-24
Attending: EMERGENCY MEDICINE | Admitting: EMERGENCY MEDICINE
Payer: MEDICARE

## 2017-03-24 VITALS
RESPIRATION RATE: 18 BRPM | TEMPERATURE: 98 F | SYSTOLIC BLOOD PRESSURE: 110 MMHG | DIASTOLIC BLOOD PRESSURE: 66 MMHG | OXYGEN SATURATION: 98 % | HEART RATE: 70 BPM

## 2017-03-24 VITALS
DIASTOLIC BLOOD PRESSURE: 62 MMHG | TEMPERATURE: 98 F | RESPIRATION RATE: 16 BRPM | SYSTOLIC BLOOD PRESSURE: 109 MMHG | OXYGEN SATURATION: 99 % | HEART RATE: 76 BPM | WEIGHT: 154.98 LBS | HEIGHT: 64 IN

## 2017-03-24 DIAGNOSIS — Z93.2 ILEOSTOMY STATUS: Chronic | ICD-10-CM

## 2017-03-24 PROCEDURE — 99283 EMERGENCY DEPT VISIT LOW MDM: CPT

## 2017-03-24 NOTE — CHART NOTE - NSCHARTNOTEFT_GEN_A_CORE
Called to see patient for leaking ileostomy device.   Skin broken down along the inferior portion of the site where the wafer would adhere.   Applied coloplast comfeel ulcer dressing around stoma to provide barrier, and then ileostomy wafer applied .     Upon discharge-     Please do not change wafer daily.   The wafer should only be changed every 3 days.   When the wafer is changed, please apply the coloplast barrier dressing first.   The coloplast barrier can be folded in half to make a window for the stoma.  Then the ileostomy wafer can be applied over that as usual.   Please evaluate the current dressing and repeat what you see for all drssing changes to avoid leaking.   Please ask for more of these supplies when you see Dr. Miller early next week.   I have left you  a few samples to get you through a few dressing changes.

## 2017-03-24 NOTE — ED PROVIDER NOTE - MEDICAL DECISION MAKING DETAILS
67F presenting with leakage around ileostomy bag  - no other acute complaints - wll c/s surgery for evaluation of dressing/ bag

## 2017-03-24 NOTE — ED PROVIDER NOTE - OBJECTIVE STATEMENT
68 y/o female with Psychiatric disorder, with loop ileostomy after a leakage from repair of rectal prolapse in 2015, s/p recent admission for colitis - deemed to need ileostomy reversal/ parastomal hernia repair - however declined by family and deemed nonemergent - brought to ER today with leakeage around ileostomy bag - no other active complaints.  Nursing felt incorrect bag/ dressing used and patient requires better dressing - paged Dr. Miller's service for evaluation.

## 2017-03-24 NOTE — ED PROVIDER NOTE - PROGRESS NOTE DETAILS
patient seen by surgery PA Lula Lane who placed a barrier dressing on her skin underneath the bag to prevent leakage and prevent stool from contacting skin. She is sending patient home with extra comfeel Ulcer dressing with instructions for staff.

## 2017-03-27 ENCOUNTER — APPOINTMENT (OUTPATIENT)
Dept: COLORECTAL SURGERY | Facility: CLINIC | Age: 67
End: 2017-03-27

## 2017-03-27 VITALS
HEART RATE: 74 BPM | BODY MASS INDEX: 21.86 KG/M2 | TEMPERATURE: 98.4 F | HEIGHT: 66 IN | SYSTOLIC BLOOD PRESSURE: 129 MMHG | WEIGHT: 136 LBS | OXYGEN SATURATION: 97 % | DIASTOLIC BLOOD PRESSURE: 76 MMHG

## 2017-03-28 ENCOUNTER — APPOINTMENT (OUTPATIENT)
Dept: COLORECTAL SURGERY | Facility: CLINIC | Age: 67
End: 2017-03-28

## 2017-03-29 ENCOUNTER — TRANSCRIPTION ENCOUNTER (OUTPATIENT)
Age: 67
End: 2017-03-29

## 2017-03-31 ENCOUNTER — INPATIENT (INPATIENT)
Facility: HOSPITAL | Age: 67
LOS: 12 days | Discharge: PSYCHIATRIC OR SUBSTANCE USE FACILITY | DRG: 330 | End: 2017-04-13
Attending: COLON & RECTAL SURGERY | Admitting: COLON & RECTAL SURGERY
Payer: MEDICARE

## 2017-03-31 ENCOUNTER — TRANSCRIPTION ENCOUNTER (OUTPATIENT)
Age: 67
End: 2017-03-31

## 2017-03-31 VITALS
WEIGHT: 162.04 LBS | TEMPERATURE: 100 F | SYSTOLIC BLOOD PRESSURE: 150 MMHG | HEART RATE: 95 BPM | RESPIRATION RATE: 18 BRPM | HEIGHT: 66 IN | DIASTOLIC BLOOD PRESSURE: 70 MMHG

## 2017-03-31 DIAGNOSIS — Z93.2 ILEOSTOMY STATUS: ICD-10-CM

## 2017-03-31 DIAGNOSIS — Z93.2 ILEOSTOMY STATUS: Chronic | ICD-10-CM

## 2017-03-31 DIAGNOSIS — Z43.2 ENCOUNTER FOR ATTENTION TO ILEOSTOMY: ICD-10-CM

## 2017-03-31 LAB
ANION GAP SERPL CALC-SCNC: 10 MMOL/L — SIGNIFICANT CHANGE UP (ref 5–17)
APTT BLD: 30.4 SEC — SIGNIFICANT CHANGE UP (ref 27.5–37.4)
BASOPHILS # BLD AUTO: 0 K/UL — SIGNIFICANT CHANGE UP (ref 0–0.2)
BASOPHILS NFR BLD AUTO: 0.1 % — SIGNIFICANT CHANGE UP (ref 0–2)
BUN SERPL-MCNC: 24 MG/DL — HIGH (ref 8–20)
CALCIUM SERPL-MCNC: 9.1 MG/DL — SIGNIFICANT CHANGE UP (ref 8.6–10.2)
CHLORIDE SERPL-SCNC: 104 MMOL/L — SIGNIFICANT CHANGE UP (ref 98–107)
CO2 SERPL-SCNC: 29 MMOL/L — SIGNIFICANT CHANGE UP (ref 22–29)
CREAT SERPL-MCNC: 1.03 MG/DL — SIGNIFICANT CHANGE UP (ref 0.5–1.3)
EOSINOPHIL # BLD AUTO: 0 K/UL — SIGNIFICANT CHANGE UP (ref 0–0.5)
EOSINOPHIL NFR BLD AUTO: 0.7 % — SIGNIFICANT CHANGE UP (ref 0–6)
GLUCOSE SERPL-MCNC: 84 MG/DL — SIGNIFICANT CHANGE UP (ref 70–115)
HCT VFR BLD CALC: 32.9 % — LOW (ref 37–47)
HGB BLD-MCNC: 10.2 G/DL — LOW (ref 12–16)
INR BLD: 1.2 RATIO — HIGH (ref 0.88–1.16)
LYMPHOCYTES # BLD AUTO: 1.1 K/UL — SIGNIFICANT CHANGE UP (ref 1–4.8)
LYMPHOCYTES # BLD AUTO: 14.1 % — LOW (ref 20–55)
MCHC RBC-ENTMCNC: 29 PG — SIGNIFICANT CHANGE UP (ref 27–31)
MCHC RBC-ENTMCNC: 31 G/DL — LOW (ref 32–36)
MCV RBC AUTO: 93.5 FL — SIGNIFICANT CHANGE UP (ref 81–99)
MONOCYTES # BLD AUTO: 0.6 K/UL — SIGNIFICANT CHANGE UP (ref 0–0.8)
MONOCYTES NFR BLD AUTO: 8.1 % — SIGNIFICANT CHANGE UP (ref 3–10)
NEUTROPHILS # BLD AUTO: 5.8 K/UL — SIGNIFICANT CHANGE UP (ref 1.8–8)
NEUTROPHILS NFR BLD AUTO: 76.7 % — HIGH (ref 37–73)
PLATELET # BLD AUTO: 124 K/UL — LOW (ref 150–400)
POTASSIUM SERPL-MCNC: 4.6 MMOL/L — SIGNIFICANT CHANGE UP (ref 3.5–5.3)
POTASSIUM SERPL-SCNC: 4.6 MMOL/L — SIGNIFICANT CHANGE UP (ref 3.5–5.3)
PROTHROM AB SERPL-ACNC: 13.2 SEC — HIGH (ref 9.8–12.7)
RBC # BLD: 3.52 M/UL — LOW (ref 4.4–5.2)
RBC # FLD: 15.4 % — SIGNIFICANT CHANGE UP (ref 11–15.6)
SODIUM SERPL-SCNC: 143 MMOL/L — SIGNIFICANT CHANGE UP (ref 135–145)
WBC # BLD: 7.6 K/UL — SIGNIFICANT CHANGE UP (ref 4.8–10.8)
WBC # FLD AUTO: 7.6 K/UL — SIGNIFICANT CHANGE UP (ref 4.8–10.8)

## 2017-03-31 RX ORDER — LEVETIRACETAM 250 MG/1
500 TABLET, FILM COATED ORAL
Qty: 0 | Refills: 0 | Status: DISCONTINUED | OUTPATIENT
Start: 2017-03-31 | End: 2017-04-04

## 2017-03-31 RX ORDER — LOPERAMIDE HCL 2 MG
2 TABLET ORAL DAILY
Qty: 0 | Refills: 0 | Status: DISCONTINUED | OUTPATIENT
Start: 2017-03-31 | End: 2017-04-04

## 2017-03-31 RX ORDER — MESALAMINE 400 MG
1000 TABLET, DELAYED RELEASE (ENTERIC COATED) ORAL AT BEDTIME
Qty: 0 | Refills: 0 | Status: DISCONTINUED | OUTPATIENT
Start: 2017-03-31 | End: 2017-04-04

## 2017-03-31 RX ORDER — SODIUM CHLORIDE 9 MG/ML
1000 INJECTION, SOLUTION INTRAVENOUS
Qty: 0 | Refills: 0 | Status: DISCONTINUED | OUTPATIENT
Start: 2017-03-31 | End: 2017-04-04

## 2017-03-31 RX ORDER — HALOPERIDOL DECANOATE 100 MG/ML
5 INJECTION INTRAMUSCULAR AT BEDTIME
Qty: 0 | Refills: 0 | Status: DISCONTINUED | OUTPATIENT
Start: 2017-03-31 | End: 2017-04-04

## 2017-03-31 RX ORDER — LEVOTHYROXINE SODIUM 125 MCG
112 TABLET ORAL DAILY
Qty: 0 | Refills: 0 | Status: DISCONTINUED | OUTPATIENT
Start: 2017-03-31 | End: 2017-04-04

## 2017-03-31 RX ORDER — HEPARIN SODIUM 5000 [USP'U]/ML
5000 INJECTION INTRAVENOUS; SUBCUTANEOUS EVERY 8 HOURS
Qty: 0 | Refills: 0 | Status: DISCONTINUED | OUTPATIENT
Start: 2017-03-31 | End: 2017-04-04

## 2017-03-31 RX ORDER — BENZTROPINE MESYLATE 1 MG
0.5 TABLET ORAL DAILY
Qty: 0 | Refills: 0 | Status: DISCONTINUED | OUTPATIENT
Start: 2017-04-01 | End: 2017-04-04

## 2017-03-31 RX ORDER — BENZTROPINE MESYLATE 1 MG
1 TABLET ORAL AT BEDTIME
Qty: 0 | Refills: 0 | Status: DISCONTINUED | OUTPATIENT
Start: 2017-03-31 | End: 2017-04-04

## 2017-03-31 RX ORDER — PANTOPRAZOLE SODIUM 20 MG/1
40 TABLET, DELAYED RELEASE ORAL
Qty: 0 | Refills: 0 | Status: DISCONTINUED | OUTPATIENT
Start: 2017-03-31 | End: 2017-04-04

## 2017-03-31 RX ADMIN — SODIUM CHLORIDE 50 MILLILITER(S): 9 INJECTION, SOLUTION INTRAVENOUS at 14:16

## 2017-03-31 NOTE — H&P ADULT - HISTORY OF PRESENT ILLNESS
6 y/o female with Psychiatric disorder, with ileostomy after a leakage from repaire of rectal prolapse, multiple Er visits for abdominal pain. Previously admitted  and diagnosed with colitis secondary to diversion. Planned and offered reversal but brother refused to consent at the time for patient. Patient returned now for intent of reversal. no complaints. No significant changes or events since last admission. Ostomy functioning well.

## 2017-03-31 NOTE — H&P ADULT - NSHPREVIEWOFSYSTEMS_GEN_ALL_CORE
all review of system neg   except the following   GI: intermittent crampy abd pain, currently denies

## 2017-03-31 NOTE — H&P ADULT - PROBLEM SELECTOR PLAN 1
plan for reversal tues   stat CT a/p with contrast   NPO for scan   IVF   medical optimization for surgery   cont meds

## 2017-03-31 NOTE — H&P ADULT - NSHPPHYSICALEXAM_GEN_ALL_CORE
alert awake NAD   CTA bilat   NSR   abd: stoma pink viable, ostomy bag with stool and air   midline scar well healed soft ND NTTP   LE: calf soft supple NT bilat

## 2017-04-01 LAB
ANION GAP SERPL CALC-SCNC: 11 MMOL/L — SIGNIFICANT CHANGE UP (ref 5–17)
BASOPHILS # BLD AUTO: 0 K/UL — SIGNIFICANT CHANGE UP (ref 0–0.2)
BASOPHILS NFR BLD AUTO: 0.2 % — SIGNIFICANT CHANGE UP (ref 0–2)
BUN SERPL-MCNC: 19 MG/DL — SIGNIFICANT CHANGE UP (ref 8–20)
CALCIUM SERPL-MCNC: 9.2 MG/DL — SIGNIFICANT CHANGE UP (ref 8.6–10.2)
CHLORIDE SERPL-SCNC: 106 MMOL/L — SIGNIFICANT CHANGE UP (ref 98–107)
CO2 SERPL-SCNC: 26 MMOL/L — SIGNIFICANT CHANGE UP (ref 22–29)
CREAT SERPL-MCNC: 1.09 MG/DL — SIGNIFICANT CHANGE UP (ref 0.5–1.3)
EOSINOPHIL # BLD AUTO: 0.1 K/UL — SIGNIFICANT CHANGE UP (ref 0–0.5)
EOSINOPHIL NFR BLD AUTO: 3.1 % — SIGNIFICANT CHANGE UP (ref 0–6)
GLUCOSE SERPL-MCNC: 88 MG/DL — SIGNIFICANT CHANGE UP (ref 70–115)
HCT VFR BLD CALC: 29.3 % — LOW (ref 37–47)
HGB BLD-MCNC: 9.3 G/DL — LOW (ref 12–16)
LYMPHOCYTES # BLD AUTO: 0.9 K/UL — LOW (ref 1–4.8)
LYMPHOCYTES # BLD AUTO: 20.2 % — SIGNIFICANT CHANGE UP (ref 20–55)
MAGNESIUM SERPL-MCNC: 2.2 MG/DL — SIGNIFICANT CHANGE UP (ref 1.8–2.5)
MCHC RBC-ENTMCNC: 29.2 PG — SIGNIFICANT CHANGE UP (ref 27–31)
MCHC RBC-ENTMCNC: 31.7 G/DL — LOW (ref 32–36)
MCV RBC AUTO: 92.1 FL — SIGNIFICANT CHANGE UP (ref 81–99)
MONOCYTES # BLD AUTO: 0.4 K/UL — SIGNIFICANT CHANGE UP (ref 0–0.8)
MONOCYTES NFR BLD AUTO: 10.4 % — HIGH (ref 3–10)
NEUTROPHILS # BLD AUTO: 2.8 K/UL — SIGNIFICANT CHANGE UP (ref 1.8–8)
NEUTROPHILS NFR BLD AUTO: 66.1 % — SIGNIFICANT CHANGE UP (ref 37–73)
PHOSPHATE SERPL-MCNC: 3.5 MG/DL — SIGNIFICANT CHANGE UP (ref 2.4–4.7)
PLATELET # BLD AUTO: 109 K/UL — LOW (ref 150–400)
POTASSIUM SERPL-MCNC: 4.2 MMOL/L — SIGNIFICANT CHANGE UP (ref 3.5–5.3)
POTASSIUM SERPL-SCNC: 4.2 MMOL/L — SIGNIFICANT CHANGE UP (ref 3.5–5.3)
RBC # BLD: 3.18 M/UL — LOW (ref 4.4–5.2)
RBC # FLD: 15.2 % — SIGNIFICANT CHANGE UP (ref 11–15.6)
SODIUM SERPL-SCNC: 143 MMOL/L — SIGNIFICANT CHANGE UP (ref 135–145)
WBC # BLD: 4.2 K/UL — LOW (ref 4.8–10.8)
WBC # FLD AUTO: 4.2 K/UL — LOW (ref 4.8–10.8)

## 2017-04-01 RX ADMIN — Medication 1 MILLIGRAM(S): at 00:07

## 2017-04-01 RX ADMIN — PANTOPRAZOLE SODIUM 40 MILLIGRAM(S): 20 TABLET, DELAYED RELEASE ORAL at 06:05

## 2017-04-01 RX ADMIN — HEPARIN SODIUM 5000 UNIT(S): 5000 INJECTION INTRAVENOUS; SUBCUTANEOUS at 00:08

## 2017-04-01 RX ADMIN — HEPARIN SODIUM 5000 UNIT(S): 5000 INJECTION INTRAVENOUS; SUBCUTANEOUS at 06:05

## 2017-04-01 RX ADMIN — HALOPERIDOL DECANOATE 5 MILLIGRAM(S): 100 INJECTION INTRAMUSCULAR at 21:41

## 2017-04-01 RX ADMIN — LEVETIRACETAM 500 MILLIGRAM(S): 250 TABLET, FILM COATED ORAL at 06:05

## 2017-04-01 RX ADMIN — Medication 1000 MILLIGRAM(S): at 21:41

## 2017-04-01 RX ADMIN — Medication 1000 MILLIGRAM(S): at 00:08

## 2017-04-01 RX ADMIN — Medication 0.5 MILLIGRAM(S): at 11:49

## 2017-04-01 RX ADMIN — Medication 1 MILLIGRAM(S): at 21:41

## 2017-04-01 RX ADMIN — LEVETIRACETAM 500 MILLIGRAM(S): 250 TABLET, FILM COATED ORAL at 17:20

## 2017-04-01 RX ADMIN — Medication 112 MICROGRAM(S): at 06:05

## 2017-04-01 RX ADMIN — Medication 1 MILLIGRAM(S): at 00:08

## 2017-04-01 RX ADMIN — Medication 1 MILLIGRAM(S): at 11:49

## 2017-04-01 RX ADMIN — HALOPERIDOL DECANOATE 5 MILLIGRAM(S): 100 INJECTION INTRAMUSCULAR at 00:08

## 2017-04-01 RX ADMIN — Medication 1 MILLIGRAM(S): at 06:05

## 2017-04-01 NOTE — PROGRESS NOTE ADULT - SUBJECTIVE AND OBJECTIVE BOX
INTERVAL HPI/OVERNIGHT EVENTS: No issues over night.  Patient refused CT.     STATUS POST: Ileostomy, plan for reversal       SUBJECTIVE:  Flatus: [ x] YES [ ] NO             Bowel Movement: [x ] YES [ ] NO  Pain Control Adequate: [x ] YES [ ] NO  Nausea: [ ] YES [x ] NO            Vomiting: [ ] YES [ x] NO  Diarrhea: [ ] YES [x ] NO         Constipation: [ ] YES [x ] NO     Chest Pain: [ ] YES [x ] NO    SOB:  [ ] YES [x ] NO    MEDICATIONS  (STANDING):  lactated ringers. 1000milliLiter(s) IV Continuous <Continuous>  benztropine 0.5milliGRAM(s) Oral daily  benztropine 1milliGRAM(s) Oral at bedtime  haloperidol     Tablet 5milliGRAM(s) Oral at bedtime  levETIRAcetam 500milliGRAM(s) Oral two times a day  levothyroxine 112MICROGram(s) Oral daily  LORazepam     Tablet 1milliGRAM(s) Oral three times a day  mesalamine Suppository 1000milliGRAM(s) Rectal at bedtime  loperamide 2milliGRAM(s) Oral daily  pantoprazole    Tablet 40milliGRAM(s) Oral before breakfast  heparin  Injectable 5000Unit(s) SubCutaneous every 8 hours    MEDICATIONS  (PRN):      Vital Signs Last 24 Hrs  T(C): 37.4, Max: 37.6 (03-31 @ 11:46)  T(F): 99.4, Max: 99.7 (03-31 @ 11:46)  HR: 74 (74 - 95)  BP: 92/55 (92/55 - 150/70)  BP(mean): --  RR: 20 (18 - 20)  SpO2: 92% (92% - 96%)    PHYSICAL EXAM:      Constitutional: AOx3, NAD    Respiratory: CTABL    Cardiovascular: RRR    Gastrointestinal: Abd soft, NT/ND    Ileostomy: Pink patent viable, semi-formed stool in bag. +herniation      I&O's Detail    I & Os for current day (as of 01 Apr 2017 09:38)  =============================================  IN:    Total IN: 0 ml  ---------------------------------------------  OUT:    Colostomy: 1 ml    Total OUT: 1 ml  ---------------------------------------------  Total NET: -1 ml      LABS:                        9.3    4.2   )-----------( 109      ( 01 Apr 2017 07:26 )             29.3     01 Apr 2017 07:26    143    |  106    |  19.0   ----------------------------<  88     4.2     |  26.0   |  1.09     Ca    9.2        01 Apr 2017 07:26  Phos  3.5       01 Apr 2017 07:26  Mg     2.2       01 Apr 2017 07:26      PT/INR - ( 31 Mar 2017 16:05 )   PT: 13.2 sec;   INR: 1.20 ratio         PTT - ( 31 Mar 2017 16:05 )  PTT:30.4 sec      RADIOLOGY & ADDITIONAL STUDIES:

## 2017-04-02 LAB
ANION GAP SERPL CALC-SCNC: 14 MMOL/L — SIGNIFICANT CHANGE UP (ref 5–17)
BUN SERPL-MCNC: 29 MG/DL — HIGH (ref 8–20)
CALCIUM SERPL-MCNC: 9.4 MG/DL — SIGNIFICANT CHANGE UP (ref 8.6–10.2)
CHLORIDE SERPL-SCNC: 103 MMOL/L — SIGNIFICANT CHANGE UP (ref 98–107)
CO2 SERPL-SCNC: 24 MMOL/L — SIGNIFICANT CHANGE UP (ref 22–29)
CREAT SERPL-MCNC: 1.26 MG/DL — SIGNIFICANT CHANGE UP (ref 0.5–1.3)
GLUCOSE SERPL-MCNC: 107 MG/DL — SIGNIFICANT CHANGE UP (ref 70–115)
HCT VFR BLD CALC: 32.4 % — LOW (ref 37–47)
HGB BLD-MCNC: 10.5 G/DL — LOW (ref 12–16)
MAGNESIUM SERPL-MCNC: 2.2 MG/DL — SIGNIFICANT CHANGE UP (ref 1.8–2.5)
MCHC RBC-ENTMCNC: 29.8 PG — SIGNIFICANT CHANGE UP (ref 27–31)
MCHC RBC-ENTMCNC: 32.4 G/DL — SIGNIFICANT CHANGE UP (ref 32–36)
MCV RBC AUTO: 92 FL — SIGNIFICANT CHANGE UP (ref 81–99)
PHOSPHATE SERPL-MCNC: 3.7 MG/DL — SIGNIFICANT CHANGE UP (ref 2.4–4.7)
PLATELET # BLD AUTO: 117 K/UL — LOW (ref 150–400)
POTASSIUM SERPL-MCNC: 4.4 MMOL/L — SIGNIFICANT CHANGE UP (ref 3.5–5.3)
POTASSIUM SERPL-SCNC: 4.4 MMOL/L — SIGNIFICANT CHANGE UP (ref 3.5–5.3)
RBC # BLD: 3.52 M/UL — LOW (ref 4.4–5.2)
RBC # FLD: 15 % — SIGNIFICANT CHANGE UP (ref 11–15.6)
SODIUM SERPL-SCNC: 141 MMOL/L — SIGNIFICANT CHANGE UP (ref 135–145)
WBC # BLD: 4.1 K/UL — LOW (ref 4.8–10.8)
WBC # FLD AUTO: 4.1 K/UL — LOW (ref 4.8–10.8)

## 2017-04-02 RX ORDER — SOD SULF/SODIUM/NAHCO3/KCL/PEG
2000 SOLUTION, RECONSTITUTED, ORAL ORAL ONCE
Qty: 0 | Refills: 0 | Status: COMPLETED | OUTPATIENT
Start: 2017-04-02 | End: 2017-04-02

## 2017-04-02 RX ADMIN — Medication 2000 MILLILITER(S): at 20:00

## 2017-04-02 RX ADMIN — Medication 1 MILLIGRAM(S): at 12:54

## 2017-04-02 RX ADMIN — HALOPERIDOL DECANOATE 5 MILLIGRAM(S): 100 INJECTION INTRAMUSCULAR at 21:51

## 2017-04-02 RX ADMIN — PANTOPRAZOLE SODIUM 40 MILLIGRAM(S): 20 TABLET, DELAYED RELEASE ORAL at 06:52

## 2017-04-02 RX ADMIN — Medication 0.5 MILLIGRAM(S): at 12:53

## 2017-04-02 RX ADMIN — Medication 1 MILLIGRAM(S): at 06:53

## 2017-04-02 RX ADMIN — Medication 112 MICROGRAM(S): at 06:52

## 2017-04-02 RX ADMIN — LEVETIRACETAM 500 MILLIGRAM(S): 250 TABLET, FILM COATED ORAL at 06:52

## 2017-04-02 RX ADMIN — Medication 1000 MILLIGRAM(S): at 21:52

## 2017-04-02 RX ADMIN — Medication 1 MILLIGRAM(S): at 21:52

## 2017-04-02 RX ADMIN — Medication 1 MILLIGRAM(S): at 21:50

## 2017-04-02 NOTE — PROGRESS NOTE ADULT - SUBJECTIVE AND OBJECTIVE BOX
INTERVAL HPI/OVERNIGHT EVENTS: No events over night.  Patient confused, stating she is going home today because she had her operation yesterday.  Re-directed to bed. No complaints elicited.       SUBJECTIVE:  Flatus: [x ] YES [ ] NO             Bowel Movement: [x ] YES [ ] NO  Pain Control Adequate: [x ] YES [ ] NO  Nausea: [ ] YES [x ] NO            Vomiting: [ ] YES [x ] NO  Diarrhea: [ ] YES [x ] NO         Constipation: [ ] YES [x ] NO     Chest Pain: [ ] YES [x ] NO    SOB:  [ ] YES [x ] NO    MEDICATIONS  (STANDING):  lactated ringers. 1000milliLiter(s) IV Continuous <Continuous>  benztropine 0.5milliGRAM(s) Oral daily  benztropine 1milliGRAM(s) Oral at bedtime  haloperidol     Tablet 5milliGRAM(s) Oral at bedtime  levETIRAcetam 500milliGRAM(s) Oral two times a day  levothyroxine 112MICROGram(s) Oral daily  LORazepam     Tablet 1milliGRAM(s) Oral three times a day  mesalamine Suppository 1000milliGRAM(s) Rectal at bedtime  loperamide 2milliGRAM(s) Oral daily  pantoprazole    Tablet 40milliGRAM(s) Oral before breakfast  heparin  Injectable 5000Unit(s) SubCutaneous every 8 hours    MEDICATIONS  (PRN):      Vital Signs Last 24 Hrs  T(C): 37.9, Max: 37.9 (04-02 @ 00:00)  T(F): 100.3, Max: 100.3 (04-02 @ 00:00)  HR: 61 (61 - 77)  BP: 103/66 (103/66 - 106/67)  BP(mean): --  RR: 18 (18 - 18)  SpO2: 95% (95% - 96%)    PHYSICAL EXAM:      Constitutional: AOx3, NAD    Respiratory: CTABL    Cardiovascular: RRR    Gastrointestinal: Abdomen soft, NT/ND, ileostomy pink, patent, viable     Genitourinary: Appropriate u/o        I&O's Detail    I & Os for current day (as of 02 Apr 2017 09:13)  =============================================  IN:    Total IN: 0 ml  ---------------------------------------------  OUT:    Colostomy: 350 ml    Total OUT: 350 ml  ---------------------------------------------  Total NET: -350 ml      LABS:                        10.5   4.1   )-----------( 117      ( 02 Apr 2017 07:22 )             32.4     02 Apr 2017 07:22    141    |  103    |  29.0   ----------------------------<  107    4.4     |  24.0   |  1.26     Ca    9.4        02 Apr 2017 07:22  Phos  3.7       02 Apr 2017 07:22  Mg     2.2       02 Apr 2017 07:22      PT/INR - ( 31 Mar 2017 16:05 )   PT: 13.2 sec;   INR: 1.20 ratio         PTT - ( 31 Mar 2017 16:05 )  PTT:30.4 sec      RADIOLOGY & ADDITIONAL STUDIES:

## 2017-04-03 ENCOUNTER — RESULT REVIEW (OUTPATIENT)
Age: 67
End: 2017-04-03

## 2017-04-03 DIAGNOSIS — F25.0 SCHIZOAFFECTIVE DISORDER, BIPOLAR TYPE: ICD-10-CM

## 2017-04-03 RX ORDER — NEOMYCIN SULFATE 500 MG/1
500 TABLET ORAL
Qty: 0 | Refills: 0 | Status: DISCONTINUED | OUTPATIENT
Start: 2017-04-03 | End: 2017-04-04

## 2017-04-03 RX ORDER — METRONIDAZOLE 500 MG
500 TABLET ORAL
Qty: 0 | Refills: 0 | Status: DISCONTINUED | OUTPATIENT
Start: 2017-04-03 | End: 2017-04-04

## 2017-04-03 RX ORDER — CEFOTETAN DISODIUM 1 G
2 VIAL (EA) INJECTION ONCE
Qty: 0 | Refills: 0 | Status: DISCONTINUED | OUTPATIENT
Start: 2017-04-04 | End: 2017-04-04

## 2017-04-03 RX ADMIN — Medication 112 MICROGRAM(S): at 05:50

## 2017-04-03 RX ADMIN — Medication 2 MILLIGRAM(S): at 13:08

## 2017-04-03 RX ADMIN — PANTOPRAZOLE SODIUM 40 MILLIGRAM(S): 20 TABLET, DELAYED RELEASE ORAL at 05:50

## 2017-04-03 RX ADMIN — HEPARIN SODIUM 5000 UNIT(S): 5000 INJECTION INTRAVENOUS; SUBCUTANEOUS at 23:53

## 2017-04-03 RX ADMIN — LEVETIRACETAM 500 MILLIGRAM(S): 250 TABLET, FILM COATED ORAL at 17:47

## 2017-04-03 RX ADMIN — Medication 1000 MILLIGRAM(S): at 23:54

## 2017-04-03 RX ADMIN — Medication 1 MILLIGRAM(S): at 09:34

## 2017-04-03 RX ADMIN — Medication 1 MILLIGRAM(S): at 21:01

## 2017-04-03 RX ADMIN — NEOMYCIN SULFATE 500 MILLIGRAM(S): 500 TABLET ORAL at 23:53

## 2017-04-03 RX ADMIN — FENTANYL CITRATE 50 MICROGRAM(S): 50 INJECTION INTRAVENOUS at 23:25

## 2017-04-03 RX ADMIN — Medication 500 MILLIGRAM(S): at 23:53

## 2017-04-03 RX ADMIN — Medication 1 MILLIGRAM(S): at 05:50

## 2017-04-03 RX ADMIN — HALOPERIDOL DECANOATE 5 MILLIGRAM(S): 100 INJECTION INTRAMUSCULAR at 23:53

## 2017-04-03 RX ADMIN — Medication 500 MILLIGRAM(S): at 13:08

## 2017-04-03 RX ADMIN — NEOMYCIN SULFATE 500 MILLIGRAM(S): 500 TABLET ORAL at 15:00

## 2017-04-03 RX ADMIN — Medication 500 MILLIGRAM(S): at 15:00

## 2017-04-03 RX ADMIN — Medication 1 MILLIGRAM(S): at 23:53

## 2017-04-03 RX ADMIN — Medication 1 MILLIGRAM(S): at 13:08

## 2017-04-03 RX ADMIN — LEVETIRACETAM 500 MILLIGRAM(S): 250 TABLET, FILM COATED ORAL at 05:50

## 2017-04-03 RX ADMIN — NEOMYCIN SULFATE 500 MILLIGRAM(S): 500 TABLET ORAL at 13:08

## 2017-04-03 RX ADMIN — Medication 0.5 MILLIGRAM(S): at 13:08

## 2017-04-03 NOTE — BEHAVIORAL HEALTH ASSESSMENT NOTE - DETAILS
[facility] Madis[facility] External transfer summary reviewed NA last attempt 2007 past aggression at [place] allergic to e-mycin bradycardia on beta blockers

## 2017-04-03 NOTE — BEHAVIORAL HEALTH ASSESSMENT NOTE - NSBHCONSULTRECOMMENDOTHER_PSY_A_CORE FT
surrogate consent needed for informed consent Patient unable to comprehend her treatment needs, inherent risks, nor ramifications of refusal of recommended care

## 2017-04-03 NOTE — BEHAVIORAL HEALTH ASSESSMENT NOTE - NSBHCHARTREVIEWLAB_PSY_A_CORE FT
10.5   4.1   )-----------( 117      ( 02 Apr 2017 07:22 )             32.4     02 Apr 2017 07:22    141    |  103    |  29.0   ----------------------------<  107    4.4     |  24.0   |  1.26     Ca    9.4        02 Apr 2017 07:22  Phos  3.7       02 Apr 2017 07:22  Mg     2.2       02 Apr 2017 07:22

## 2017-04-03 NOTE — PROGRESS NOTE ADULT - SUBJECTIVE AND OBJECTIVE BOX
Patient is a 67y old  Female who presents with a chief complaint of ostomy reversal (31 Mar 2017 15:31)  She is scheduled for an OPEN VENTRAL HERNIA REPAIR, REVERSAL OF ILEOSTOMY, LYSIS OF   ADHESIONS, IMPLANTATION OF MESH.       PAST MEDICAL HISTORY:  Uterine prolapse  Rectal prolapse  Venous insufficiency  Urinary bladder incontinence  Displaced fracture of olecranon process with intraarticular extension of left ulna  Schizo affective schizophrenia  Urinary incontinence  GERD (gastroesophageal reflux disease)  CVA (cerebral vascular accident)  Splenomegaly  Anemia  Seizure  Hemiparesis, left  Suicide attempt  Schizoaffective disorder, bipolar type  Hypothyroidism  Osteopenia  Sensory hearing loss  Constipation  Neutropenia  Hypertension      PAST SURGICAL HISTORY:  H/O ileostomy    MEDICATIONS  (STANDING):  lactated ringers. 1000milliLiter(s) IV Continuous <Continuous>  benztropine 0.5milliGRAM(s) Oral daily  benztropine 1milliGRAM(s) Oral at bedtime  haloperidol     Tablet 5milliGRAM(s) Oral at bedtime  levETIRAcetam 500milliGRAM(s) Oral two times a day  levothyroxine 112MICROGram(s) Oral daily  mesalamine Suppository 1000milliGRAM(s) Rectal at bedtime  loperamide 2milliGRAM(s) Oral daily  pantoprazole    Tablet 40milliGRAM(s) Oral before breakfast  heparin  Injectable 5000Unit(s) SubCutaneous every 8 hours  neomycin 500milliGRAM(s) Oral <User Schedule>  metroNIDAZOLE    Tablet 500milliGRAM(s) Oral <User Schedule>    MEDICATIONS  (PRN):  LORazepam   Injectable 1milliGRAM(s) IV Push every 8 hours PRN Anxiety      Allergies:  clozapine (Other)                  Depakote (Other)                  erythromycin (Unknown)                  Neupogen (Other      SOCIAL HISTORY:  unknown                      10.5   4.1   )-----------( 117      ( 2017 07:22 )             32.4     PT/INR - ( 31 Mar 2017 16:05 )   PT: 13.2 sec;   INR: 1.20 ratio       PTT - ( 31 Mar 2017 16:05 )  PTT:30.4 sec    2017 07:22    141    |  103    |  29.0   ----------------------------<  107    4.4     |  24.0   |  1.26     Ca    9.4        2017 07:22  Phos  3.7       2017 07:22  Mg     2.2       2017 07:22    [id number]  Normal sinus rhythm  Normal ECG    TT ECHO: ECHO TRANSTHORACIC   -   Mar 13 2015    IMPRESSION:  Summary:   1.Technically limited study.   2. Normal left ventricular internal cavity size.   3. Left ventricular ejection fraction, by visual estimation, is 70 to        75%.   4. Hyperdynamic global left ventricular systolic function.   5. Normal right ventricular size and function.   6. Could not obtain LA volume; LA size appears mildy enlarged visually.   7. Sclerotic aortic valve with normal opening.   8. The right atrium is normal in size.   9. Small pericardial effusion.  10. Recommend clinical correlation.     CHEST P.A. LATERAL   -   03/10/2017    FINDINGS:  Single frontal view of the chest demonstrates the lungs to be clear. The   cardiomediastinal silhouette is normal. No acute osseous abnormalities.      CT ABDOMEN AND PELVIS     -      2017    IMPRESSION:   A patent  loop ileostomy in the right lower quadrant with a large   parastomal hernia containing nonobstructed loops of the small and large   bowel with cecum within the right lower quadrant of the abdomen. The  New colitis involving the rectum sigmoid left colon and distal transverse   colon without perforation.  No obstruction.  Anterior abdominal wall mesh intact.    ASA # =   3    Mallampati # =  UNABLE TO ASSESS

## 2017-04-03 NOTE — BEHAVIORAL HEALTH ASSESSMENT NOTE - NSBHCHARTREVIEWVS_PSY_A_CORE FT
Vital Signs Last 24 Hrs  T(C): 37, Max: 37 (04-03 @ 01:00)  T(F): 98.6, Max: 98.6 (04-03 @ 01:00)  HR: 68 (66 - 68)  BP: 123/85 (103/68 - 123/85)  BP(mean): --  RR: 18 (18 - 18)  SpO2: 100% (96% - 100%)

## 2017-04-03 NOTE — BEHAVIORAL HEALTH ASSESSMENT NOTE - NSBHCONSULTMEDS_PSY_A_CORE FT
continue per [place] transfer list   haldol 7 mg PO BID  Cogentin 0.5 mg PO am 1 mg po hs  Ativan 1 mg po TID

## 2017-04-03 NOTE — PROGRESS NOTE ADULT - SUBJECTIVE AND OBJECTIVE BOX
INTERVAL HPI/OVERNIGHT EVENTS: Patient had bowel prep over night, tolerated well.  She is comfortable appearing and confused at baseline.     SUBJECTIVE:  Flatus: [x ] YES [ ] NO             Bowel Movement: [x ] YES [ ] NO  Pain Control Adequate: [x ] YES [ ] NO  Nausea: [ ] YES x[ ] NO            Vomiting: [ ] YES [x ] NO  Diarrhea: [ ] YES [x ] NO         Constipation: [ ] YES [x ] NO     Chest Pain: [ ] YES [x ] NO    SOB:  [ ] YES [x ] NO    MEDICATIONS  (STANDING):  lactated ringers. 1000milliLiter(s) IV Continuous <Continuous>  benztropine 0.5milliGRAM(s) Oral daily  benztropine 1milliGRAM(s) Oral at bedtime  haloperidol     Tablet 5milliGRAM(s) Oral at bedtime  levETIRAcetam 500milliGRAM(s) Oral two times a day  levothyroxine 112MICROGram(s) Oral daily  LORazepam     Tablet 1milliGRAM(s) Oral three times a day  mesalamine Suppository 1000milliGRAM(s) Rectal at bedtime  loperamide 2milliGRAM(s) Oral daily  pantoprazole    Tablet 40milliGRAM(s) Oral before breakfast  heparin  Injectable 5000Unit(s) SubCutaneous every 8 hours    MEDICATIONS  (PRN):      Vital Signs Last 24 Hrs  T(C): 37, Max: 37 (04-03 @ 01:00)  T(F): 98.6, Max: 98.6 (04-03 @ 01:00)  HR: 66 (66 - 66)  BP: 103/68 (103/68 - 103/68)  BP(mean): --  RR: 18 (18 - 18)  SpO2: 96% (96% - 96%)    PHYSICAL EXAM:      Constitutional: Alert, oriented to person and place.     Respiratory: CTABL, No WRR    Cardiovascular: RRR    Gastrointestinal: Abdomen soft, non-TTP, ND    Ileostomy pink, patent, viable. Liquid stool and gas in bag.         I&O's Detail    I & Os for current day (as of 03 Apr 2017 08:50)  =============================================  IN:    Total IN: 0 ml  ---------------------------------------------  OUT:    Colostomy: 1 ml    Total OUT: 1 ml  ---------------------------------------------  Total NET: -1 ml      LABS:                        10.5   4.1   )-----------( 117      ( 02 Apr 2017 07:22 )             32.4     02 Apr 2017 07:22    141    |  103    |  29.0   ----------------------------<  107    4.4     |  24.0   |  1.26     Ca    9.4        02 Apr 2017 07:22  Phos  3.7       02 Apr 2017 07:22  Mg     2.2       02 Apr 2017 07:22            RADIOLOGY & ADDITIONAL STUDIES:

## 2017-04-04 ENCOUNTER — OTHER (OUTPATIENT)
Age: 67
End: 2017-04-04

## 2017-04-04 LAB
ANION GAP SERPL CALC-SCNC: 12 MMOL/L — SIGNIFICANT CHANGE UP (ref 5–17)
APTT BLD: 33.1 SEC — SIGNIFICANT CHANGE UP (ref 27.5–37.4)
BLD GP AB SCN SERPL QL: SIGNIFICANT CHANGE UP
BUN SERPL-MCNC: 23 MG/DL — HIGH (ref 8–20)
CALCIUM SERPL-MCNC: 9.7 MG/DL — SIGNIFICANT CHANGE UP (ref 8.6–10.2)
CHLORIDE SERPL-SCNC: 103 MMOL/L — SIGNIFICANT CHANGE UP (ref 98–107)
CO2 SERPL-SCNC: 25 MMOL/L — SIGNIFICANT CHANGE UP (ref 22–29)
CREAT SERPL-MCNC: 1.06 MG/DL — SIGNIFICANT CHANGE UP (ref 0.5–1.3)
GLUCOSE SERPL-MCNC: 92 MG/DL — SIGNIFICANT CHANGE UP (ref 70–115)
HCT VFR BLD CALC: 35.5 % — LOW (ref 37–47)
HGB BLD-MCNC: 11.4 G/DL — LOW (ref 12–16)
INR BLD: 1.19 RATIO — HIGH (ref 0.88–1.16)
MCHC RBC-ENTMCNC: 29.2 PG — SIGNIFICANT CHANGE UP (ref 27–31)
MCHC RBC-ENTMCNC: 32.1 G/DL — SIGNIFICANT CHANGE UP (ref 32–36)
MCV RBC AUTO: 90.8 FL — SIGNIFICANT CHANGE UP (ref 81–99)
PLATELET # BLD AUTO: 144 K/UL — LOW (ref 150–400)
POTASSIUM SERPL-MCNC: 4 MMOL/L — SIGNIFICANT CHANGE UP (ref 3.5–5.3)
POTASSIUM SERPL-SCNC: 4 MMOL/L — SIGNIFICANT CHANGE UP (ref 3.5–5.3)
PROTHROM AB SERPL-ACNC: 13.1 SEC — HIGH (ref 9.8–12.7)
RBC # BLD: 3.91 M/UL — LOW (ref 4.4–5.2)
RBC # FLD: 14.9 % — SIGNIFICANT CHANGE UP (ref 11–15.6)
SODIUM SERPL-SCNC: 140 MMOL/L — SIGNIFICANT CHANGE UP (ref 135–145)
TYPE + AB SCN PNL BLD: SIGNIFICANT CHANGE UP
WBC # BLD: 3.1 K/UL — LOW (ref 4.8–10.8)
WBC # FLD AUTO: 3.1 K/UL — LOW (ref 4.8–10.8)

## 2017-04-04 PROCEDURE — 44620 REPAIR BOWEL OPENING: CPT | Mod: 80,59

## 2017-04-04 PROCEDURE — 88304 TISSUE EXAM BY PATHOLOGIST: CPT | Mod: 26

## 2017-04-04 PROCEDURE — 88307 TISSUE EXAM BY PATHOLOGIST: CPT | Mod: 26

## 2017-04-04 PROCEDURE — 44625 REPAIR BOWEL OPENING: CPT | Mod: 80

## 2017-04-04 PROCEDURE — 44620 REPAIR BOWEL OPENING: CPT | Mod: 59

## 2017-04-04 PROCEDURE — 44625 REPAIR BOWEL OPENING: CPT

## 2017-04-04 PROCEDURE — 88302 TISSUE EXAM BY PATHOLOGIST: CPT | Mod: 26

## 2017-04-04 RX ORDER — LEVOTHYROXINE SODIUM 125 MCG
112 TABLET ORAL DAILY
Qty: 0 | Refills: 0 | Status: DISCONTINUED | OUTPATIENT
Start: 2017-04-04 | End: 2017-04-04

## 2017-04-04 RX ORDER — HEPARIN SODIUM 5000 [USP'U]/ML
5000 INJECTION INTRAVENOUS; SUBCUTANEOUS EVERY 8 HOURS
Qty: 0 | Refills: 0 | Status: DISCONTINUED | OUTPATIENT
Start: 2017-04-04 | End: 2017-04-13

## 2017-04-04 RX ORDER — MORPHINE SULFATE 50 MG/1
2 CAPSULE, EXTENDED RELEASE ORAL EVERY 4 HOURS
Qty: 0 | Refills: 0 | Status: DISCONTINUED | OUTPATIENT
Start: 2017-04-04 | End: 2017-04-08

## 2017-04-04 RX ORDER — LEVETIRACETAM 250 MG/1
500 TABLET, FILM COATED ORAL
Qty: 0 | Refills: 0 | Status: DISCONTINUED | OUTPATIENT
Start: 2017-04-04 | End: 2017-04-13

## 2017-04-04 RX ORDER — FENTANYL CITRATE 50 UG/ML
50 INJECTION INTRAVENOUS
Qty: 0 | Refills: 0 | Status: DISCONTINUED | OUTPATIENT
Start: 2017-04-04 | End: 2017-04-05

## 2017-04-04 RX ORDER — BENZTROPINE MESYLATE 1 MG
0.5 TABLET ORAL
Qty: 0 | Refills: 0 | Status: DISCONTINUED | OUTPATIENT
Start: 2017-04-04 | End: 2017-04-13

## 2017-04-04 RX ORDER — CEFOTETAN DISODIUM 1 G
1 VIAL (EA) INJECTION EVERY 12 HOURS
Qty: 0 | Refills: 0 | Status: COMPLETED | OUTPATIENT
Start: 2017-04-04 | End: 2017-04-05

## 2017-04-04 RX ORDER — HYDROMORPHONE HYDROCHLORIDE 2 MG/ML
0.5 INJECTION INTRAMUSCULAR; INTRAVENOUS; SUBCUTANEOUS
Qty: 0 | Refills: 0 | Status: DISCONTINUED | OUTPATIENT
Start: 2017-04-05 | End: 2017-04-06

## 2017-04-04 RX ORDER — HALOPERIDOL DECANOATE 100 MG/ML
5 INJECTION INTRAMUSCULAR
Qty: 0 | Refills: 0 | Status: DISCONTINUED | OUTPATIENT
Start: 2017-04-04 | End: 2017-04-13

## 2017-04-04 RX ORDER — SODIUM CHLORIDE 9 MG/ML
1000 INJECTION, SOLUTION INTRAVENOUS
Qty: 0 | Refills: 0 | Status: DISCONTINUED | OUTPATIENT
Start: 2017-04-05 | End: 2017-04-07

## 2017-04-04 RX ORDER — PANTOPRAZOLE SODIUM 20 MG/1
40 TABLET, DELAYED RELEASE ORAL DAILY
Qty: 0 | Refills: 0 | Status: DISCONTINUED | OUTPATIENT
Start: 2017-04-04 | End: 2017-04-13

## 2017-04-04 RX ORDER — LEVOTHYROXINE SODIUM 125 MCG
112 TABLET ORAL DAILY
Qty: 0 | Refills: 0 | Status: DISCONTINUED | OUTPATIENT
Start: 2017-04-04 | End: 2017-04-13

## 2017-04-04 RX ORDER — ONDANSETRON 8 MG/1
4 TABLET, FILM COATED ORAL ONCE
Qty: 0 | Refills: 0 | Status: DISCONTINUED | OUTPATIENT
Start: 2017-04-05 | End: 2017-04-13

## 2017-04-04 RX ORDER — HYDROMORPHONE HYDROCHLORIDE 2 MG/ML
1 INJECTION INTRAMUSCULAR; INTRAVENOUS; SUBCUTANEOUS EVERY 4 HOURS
Qty: 0 | Refills: 0 | Status: DISCONTINUED | OUTPATIENT
Start: 2017-04-04 | End: 2017-04-06

## 2017-04-04 RX ORDER — SODIUM CHLORIDE 9 MG/ML
1000 INJECTION INTRAMUSCULAR; INTRAVENOUS; SUBCUTANEOUS
Qty: 0 | Refills: 0 | Status: DISCONTINUED | OUTPATIENT
Start: 2017-04-04 | End: 2017-04-05

## 2017-04-04 RX ORDER — ONDANSETRON 8 MG/1
4 TABLET, FILM COATED ORAL EVERY 6 HOURS
Qty: 0 | Refills: 0 | Status: DISCONTINUED | OUTPATIENT
Start: 2017-04-04 | End: 2017-04-13

## 2017-04-04 RX ORDER — SODIUM CHLORIDE 9 MG/ML
1000 INJECTION, SOLUTION INTRAVENOUS
Qty: 0 | Refills: 0 | Status: DISCONTINUED | OUTPATIENT
Start: 2017-04-04 | End: 2017-04-06

## 2017-04-04 RX ORDER — ONDANSETRON 8 MG/1
4 TABLET, FILM COATED ORAL ONCE
Qty: 0 | Refills: 0 | Status: DISCONTINUED | OUTPATIENT
Start: 2017-04-04 | End: 2017-04-05

## 2017-04-04 NOTE — BRIEF OPERATIVE NOTE - POST-OP DX
Ileostomy dysfunction  04/04/2017    Raymond Avery  Parastomal hernia without obstruction or gangrene  04/04/2017    Raymond Avery  Ventral hernia, recurrent  04/04/2017    Raymond Avery

## 2017-04-04 NOTE — BRIEF OPERATIVE NOTE - PROCEDURE
Ileostomy takedown  04/04/2017  Ileostomy reversal  Active  CSANZ  Small bowel resection  04/04/2017    Active  CSANZ  Exploratory laparotomy  04/04/2017    Active  CSANZ  Lysis of adhesions of peritoneum  04/04/2017  Extensive  Active  CSANZ

## 2017-04-04 NOTE — PROGRESS NOTE ADULT - SUBJECTIVE AND OBJECTIVE BOX
INTERVAL HPI/OVERNIGHT EVENTS: No acute events over night.  Patient is agitated at present, but does not endorse any pain.  She has been made aware of plan for surgery this afternoon but verbally expressing resistance.  Brother Renzo Toribio spoken to by phone yesterday, made aware of plan for ileostomy reversal today, and will consent.       SUBJECTIVE:  Flatus: [x ] YES [ ] NO             Bowel Movement: [x ] YES [ ] NO  Pain Control Adequate: [x ] YES [ ] NO  Nausea: [ ] YES [x ] NO            Vomiting: [ ] YES [x ] NO  Diarrhea: [ ] YES [x ] NO         Constipation: [ ] YES [x ] NO     Chest Pain: [ ] YES [x ] NO    SOB:  [ ] YES [x ] NO      Vital Signs Last 24 Hrs  T(C): 36.8, Max: 37.2 (04-03 @ 16:14)  T(F): 98.3, Max: 98.9 (04-03 @ 16:14)  HR: 76 (63 - 76)  BP: 140/76 (120/69 - 140/76)  BP(mean): --  RR: 18 (18 - 18)  SpO2: 98% (98% - 99%)    PHYSICAL EXAM:      Constitutional: AOx2, Agitated     Respiratory: Appropriate work of breathing, good air exchange     Cardiovascular: RRR     Gastrointestinal: Abdomen soft, NT  Unable to visualize ostomy, patient refusing exam.     I&O's Detail    I & Os for current day (as of 04 Apr 2017 10:08)  =============================================  IN:    lactated ringers.: 1000 ml    Total IN: 1000 ml  ---------------------------------------------  OUT:    Total OUT: 0 ml  ---------------------------------------------  Total NET: 1000 ml      LABS:                        11.4   3.1   )-----------( 144      ( 04 Apr 2017 07:59 )             35.5     04 Apr 2017 07:59    140    |  103    |  23.0   ----------------------------<  92     4.0     |  25.0   |  1.06     Ca    9.7        04 Apr 2017 07:59      PT/INR - ( 04 Apr 2017 07:59 )   PT: 13.1 sec;   INR: 1.19 ratio         PTT - ( 04 Apr 2017 07:59 )  PTT:33.1 sec      RADIOLOGY & ADDITIONAL STUDIES:

## 2017-04-05 ENCOUNTER — TRANSCRIPTION ENCOUNTER (OUTPATIENT)
Age: 67
End: 2017-04-05

## 2017-04-05 RX ADMIN — Medication 1 TABLET(S): at 14:56

## 2017-04-05 RX ADMIN — Medication 0.5 MILLIGRAM(S): at 06:30

## 2017-04-05 RX ADMIN — Medication 1 MILLIGRAM(S): at 14:56

## 2017-04-05 RX ADMIN — Medication 1 MILLIGRAM(S): at 10:19

## 2017-04-05 RX ADMIN — HEPARIN SODIUM 5000 UNIT(S): 5000 INJECTION INTRAVENOUS; SUBCUTANEOUS at 14:56

## 2017-04-05 RX ADMIN — LEVETIRACETAM 500 MILLIGRAM(S): 250 TABLET, FILM COATED ORAL at 06:30

## 2017-04-05 RX ADMIN — HALOPERIDOL DECANOATE 5 MILLIGRAM(S): 100 INJECTION INTRAMUSCULAR at 06:30

## 2017-04-05 RX ADMIN — HYDROMORPHONE HYDROCHLORIDE 1 MILLIGRAM(S): 2 INJECTION INTRAMUSCULAR; INTRAVENOUS; SUBCUTANEOUS at 18:00

## 2017-04-05 RX ADMIN — LEVETIRACETAM 500 MILLIGRAM(S): 250 TABLET, FILM COATED ORAL at 17:07

## 2017-04-05 RX ADMIN — Medication 100 GRAM(S): at 17:07

## 2017-04-05 RX ADMIN — Medication 1 MILLIGRAM(S): at 21:15

## 2017-04-05 RX ADMIN — PANTOPRAZOLE SODIUM 40 MILLIGRAM(S): 20 TABLET, DELAYED RELEASE ORAL at 14:56

## 2017-04-05 RX ADMIN — Medication 112 MICROGRAM(S): at 06:30

## 2017-04-05 RX ADMIN — HEPARIN SODIUM 5000 UNIT(S): 5000 INJECTION INTRAVENOUS; SUBCUTANEOUS at 21:15

## 2017-04-05 RX ADMIN — Medication 0.5 MILLIGRAM(S): at 17:05

## 2017-04-05 RX ADMIN — Medication 100 GRAM(S): at 06:29

## 2017-04-05 RX ADMIN — HEPARIN SODIUM 5000 UNIT(S): 5000 INJECTION INTRAVENOUS; SUBCUTANEOUS at 06:48

## 2017-04-05 RX ADMIN — HYDROMORPHONE HYDROCHLORIDE 1 MILLIGRAM(S): 2 INJECTION INTRAMUSCULAR; INTRAVENOUS; SUBCUTANEOUS at 17:07

## 2017-04-05 RX ADMIN — HALOPERIDOL DECANOATE 5 MILLIGRAM(S): 100 INJECTION INTRAMUSCULAR at 17:07

## 2017-04-05 NOTE — PROGRESS NOTE ADULT - SUBJECTIVE AND OBJECTIVE BOX
SURGICAL PA NOTE:     STATUS POST:  Ileostomy takedown  04/04/2017  Ileostomy reversal   Small bowel resection  04/04/2017    Exploratory laparotomy  04/04/2017   Lysis of adhesions of peritoneum  04/04/2017  Extensive      Repair of parastomal hernia       Operative Findings:  · Operative Findings	Patient with multiple dense adhesions.  One loop stuck to the abdominal wall had to be resected.	      POST OPERATIVE DAY #: post op check    Vital Signs Last 24 Hrs  T(C): 37, Max: 37 (04-04 @ 21:49)  T(F): 98.6, Max: 98.6 (04-04 @ 21:49)  HR: 83 (67 - 83)  BP: 157/75 (115/48 - 157/75)  BP(mean): --  RR: 18 (14 - 19)  SpO2: 100% (98% - 100%)    HPI:  8 y/o female with Psychiatric disorder, with ileostomy after a leakage from repaire of rectal prolapse, multiple Er visits for abdominal pain. Previously admitted  and diagnosed with colitis secondary to diversion. Planned and offered reversal but brother refused to consent at the time for patient. Patient returned now for intent of reversal. no complaints. No significant changes or events since last admission. Ostomy functioning well. (31 Mar 2017 15:31)      Attention to ileostomy  No h/o HF  No pertinent family history in first degree relatives  No pertinent family history in first degree relatives  Handoff  MEWS Score  Uterine prolapse  Onychomycosis  Rectal prolapse  Venous insufficiency  Urinary bladder incontinence  Displaced fracture of olecranon process with intraarticular extension of left ulna  Schizo affective schizophrenia  Urinary incontinence  Obesity  GERD (gastroesophageal reflux disease)  HTN (hypertension)  CVA (cerebral vascular accident)  Venous insufficiency  Splenomegaly  Anemia  Neutropenia  Vaginal prolapse  Fall  Constipation  Osteopenia  Hypothyroid  Seizure  Hemiparesis, left  Behavior problems  Suicide attempt  Schizoaffective disorder, bipolar type  Hypothyroidism  Osteopenia  GERD (gastroesophageal reflux disease)  Vaginal prolapse without mention of uterine prolapse  Sensory hearing loss  Constipation  Neutropenia  Venous insufficiency (chronic) (peripheral)  Obesity  Hypertension  CVA (cerebral vascular accident)  Parastomal hernia without obstruction or gangrene  Ventral hernia, recurrent  Ileostomy dysfunction  Ileostomy dysfunction  Parastomal hernia without obstruction or gangrene  Ventral hernia, recurrent  Schizo affective schizophrenia  Ileostomy, has currently  Lysis of adhesions of peritoneum  Exploratory laparotomy  Small bowel resection  Ileostomy takedown  H/O ileostomy  No significant past surgical history  ENCOUNTER FOR ATTENTION TO ILE      SUBJECTIVE: Pt seen lying supine with HOB up, resting comfortably but playing with NGT    Diet: NPO    Activity: bedrest    SOB:  [ ] YES [ x] NO  Dyspnea: [ ]YES [ x]NO  Chest Discomfort: [ ] YES [x ] NO    Nausea: [ ] YES [x ] NO           Vomiting: [ ] YES [ x] NO  Flatus: [ ] YES [ x] NO             Bowel Movement: [ ] YES [x ] NO  Diarrhea: [ ] YES [x ] NO         Void: [ ]YES [ ]No  Constipation: [ ] YES [ ] NO       Pain (0-10):              Pain Control Adequate: [ ] YES [ ] NO    Chance: indwelling    NGT: indwelling      I&O's Detail  I & Os for 24h ending 04 Apr 2017 07:00  =============================================  IN:    lactated ringers.: 1000 ml    Total IN: 1000 ml  ---------------------------------------------  OUT:    Total OUT: 0 ml  ---------------------------------------------  Total NET: 1000 ml    I & Os for current day (as of 05 Apr 2017 00:43)  =============================================  IN:    Total IN: 0 ml  ---------------------------------------------  OUT:    Indwelling Catheter - Urethral: 125 ml    Bulb: 40 ml    Bulb: 40 ml    Total OUT: 205 ml  ---------------------------------------------  Total NET: -205 ml    I&O's Summary  I & Os for 24h ending 04 Apr 2017 07:00  =============================================  IN: 1000 ml / OUT: 0 ml / NET: 1000 ml    I & Os for current day (as of 05 Apr 2017 00:43)  =============================================  IN: 0 ml / OUT: 205 ml / NET: -205 ml    I&O's Detail  I & Os for 24h ending 04 Apr 2017 07:00  =============================================  IN:    lactated ringers.: 1000 ml    Total IN: 1000 ml  ---------------------------------------------  OUT:    Total OUT: 0 ml  ---------------------------------------------  Total NET: 1000 ml    I & Os for current day (as of 05 Apr 2017 00:43)  =============================================  IN:    Total IN: 0 ml  ---------------------------------------------  OUT:    Indwelling Catheter - Urethral: 125 ml    Bulb: 40 ml    Bulb: 40 ml    Total OUT: 205 ml  ---------------------------------------------  Total NET: -205 ml      MEDICATIONS  (STANDING):  sodium chloride 0.9%. 1000milliLiter(s) IV Continuous <Continuous>  heparin  Injectable 5000Unit(s) SubCutaneous every 8 hours  lactated ringers. 1000milliLiter(s) IV Continuous <Continuous>  multivitamin 1Tablet(s) Oral daily  levETIRAcetam 500milliGRAM(s) Oral two times a day  LORazepam     Tablet 1milliGRAM(s) Oral three times a day  benztropine 0.5milliGRAM(s) Oral two times a day  haloperidol     Tablet 5milliGRAM(s) Oral two times a day  levothyroxine 112MICROGram(s) Oral daily  pantoprazole  Injectable 40milliGRAM(s) IV Push daily  cefoTEtan  IVPB 1Gram(s) IV Intermittent every 12 hours    MEDICATIONS  (PRN):  fentaNYL    Injectable 50MICROGram(s) IV Push every 5 minutes PRN Severe Pain  ondansetron Injectable 4milliGRAM(s) IV Push once PRN Nausea and/or Vomiting  HYDROmorphone  Injectable 1milliGRAM(s) IV Push every 4 hours PRN Severe Pain  morphine  - Injectable 2milliGRAM(s) IV Push every 4 hours PRN Mild Pain (1 - 3)  ondansetron Injectable 4milliGRAM(s) IV Push every 6 hours PRN Nausea      LABS:                        11.4   3.1   )-----------( 144      ( 04 Apr 2017 07:59 )             35.5     04 Apr 2017 07:59    140    |  103    |  23.0   ----------------------------<  92     4.0     |  25.0   |  1.06     Ca    9.7        04 Apr 2017 07:59      PT/INR - ( 04 Apr 2017 07:59 )   PT: 13.1 sec;   INR: 1.19 ratio         PTT - ( 04 Apr 2017 07:59 )  PTT:33.1 sec        RADIOLOGY & ADDITIONAL STUDIES:

## 2017-04-05 NOTE — PROGRESS NOTE ADULT - GASTROINTESTINAL COMMENTS
R and L EVAN drains lower abd to SS - bloody fluid, ostomy dressing stained with bloody drainage (? penrose under dressing), midline dressing CDI
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
## 2017-04-06 LAB
ANION GAP SERPL CALC-SCNC: 11 MMOL/L — SIGNIFICANT CHANGE UP (ref 5–17)
BUN SERPL-MCNC: 15 MG/DL — SIGNIFICANT CHANGE UP (ref 8–20)
CALCIUM SERPL-MCNC: 8.8 MG/DL — SIGNIFICANT CHANGE UP (ref 8.6–10.2)
CHLORIDE SERPL-SCNC: 109 MMOL/L — HIGH (ref 98–107)
CO2 SERPL-SCNC: 28 MMOL/L — SIGNIFICANT CHANGE UP (ref 22–29)
CREAT SERPL-MCNC: 1.17 MG/DL — SIGNIFICANT CHANGE UP (ref 0.5–1.3)
GLUCOSE SERPL-MCNC: 105 MG/DL — SIGNIFICANT CHANGE UP (ref 70–115)
HCT VFR BLD CALC: 26.3 % — LOW (ref 37–47)
HGB BLD-MCNC: 8.4 G/DL — LOW (ref 12–16)
MAGNESIUM SERPL-MCNC: 2 MG/DL — SIGNIFICANT CHANGE UP (ref 1.8–2.5)
MCHC RBC-ENTMCNC: 29.4 PG — SIGNIFICANT CHANGE UP (ref 27–31)
MCHC RBC-ENTMCNC: 31.9 G/DL — LOW (ref 32–36)
MCV RBC AUTO: 92 FL — SIGNIFICANT CHANGE UP (ref 81–99)
PHOSPHATE SERPL-MCNC: 2.3 MG/DL — LOW (ref 2.4–4.7)
PLATELET # BLD AUTO: 119 K/UL — LOW (ref 150–400)
POTASSIUM SERPL-MCNC: 3.7 MMOL/L — SIGNIFICANT CHANGE UP (ref 3.5–5.3)
POTASSIUM SERPL-SCNC: 3.7 MMOL/L — SIGNIFICANT CHANGE UP (ref 3.5–5.3)
RBC # BLD: 2.86 M/UL — LOW (ref 4.4–5.2)
RBC # FLD: 15.6 % — SIGNIFICANT CHANGE UP (ref 11–15.6)
SODIUM SERPL-SCNC: 148 MMOL/L — HIGH (ref 135–145)
WBC # BLD: 5.8 K/UL — SIGNIFICANT CHANGE UP (ref 4.8–10.8)
WBC # FLD AUTO: 5.8 K/UL — SIGNIFICANT CHANGE UP (ref 4.8–10.8)

## 2017-04-06 RX ORDER — KETOROLAC TROMETHAMINE 30 MG/ML
15 SYRINGE (ML) INJECTION EVERY 6 HOURS
Qty: 0 | Refills: 0 | Status: DISCONTINUED | OUTPATIENT
Start: 2017-04-06 | End: 2017-04-06

## 2017-04-06 RX ORDER — ACETAMINOPHEN 500 MG
1000 TABLET ORAL ONCE
Qty: 0 | Refills: 0 | Status: COMPLETED | OUTPATIENT
Start: 2017-04-06 | End: 2017-04-06

## 2017-04-06 RX ADMIN — Medication 0.5 MILLIGRAM(S): at 05:07

## 2017-04-06 RX ADMIN — Medication 112 MICROGRAM(S): at 05:06

## 2017-04-06 RX ADMIN — HEPARIN SODIUM 5000 UNIT(S): 5000 INJECTION INTRAVENOUS; SUBCUTANEOUS at 14:57

## 2017-04-06 RX ADMIN — LEVETIRACETAM 500 MILLIGRAM(S): 250 TABLET, FILM COATED ORAL at 18:18

## 2017-04-06 RX ADMIN — LEVETIRACETAM 500 MILLIGRAM(S): 250 TABLET, FILM COATED ORAL at 05:07

## 2017-04-06 RX ADMIN — Medication 1 MILLIGRAM(S): at 15:00

## 2017-04-06 RX ADMIN — MORPHINE SULFATE 2 MILLIGRAM(S): 50 CAPSULE, EXTENDED RELEASE ORAL at 23:17

## 2017-04-06 RX ADMIN — Medication 1 MILLIGRAM(S): at 05:06

## 2017-04-06 RX ADMIN — Medication 400 MILLIGRAM(S): at 18:17

## 2017-04-06 RX ADMIN — HALOPERIDOL DECANOATE 5 MILLIGRAM(S): 100 INJECTION INTRAMUSCULAR at 18:17

## 2017-04-06 RX ADMIN — SODIUM CHLORIDE 100 MILLILITER(S): 9 INJECTION, SOLUTION INTRAVENOUS at 20:40

## 2017-04-06 RX ADMIN — PANTOPRAZOLE SODIUM 40 MILLIGRAM(S): 20 TABLET, DELAYED RELEASE ORAL at 15:00

## 2017-04-06 RX ADMIN — HEPARIN SODIUM 5000 UNIT(S): 5000 INJECTION INTRAVENOUS; SUBCUTANEOUS at 05:07

## 2017-04-06 RX ADMIN — Medication 0.5 MILLIGRAM(S): at 18:17

## 2017-04-06 RX ADMIN — HEPARIN SODIUM 5000 UNIT(S): 5000 INJECTION INTRAVENOUS; SUBCUTANEOUS at 20:39

## 2017-04-06 RX ADMIN — HALOPERIDOL DECANOATE 5 MILLIGRAM(S): 100 INJECTION INTRAMUSCULAR at 05:07

## 2017-04-06 RX ADMIN — Medication 1000 MILLIGRAM(S): at 19:15

## 2017-04-06 RX ADMIN — Medication 1 TABLET(S): at 14:59

## 2017-04-06 RX ADMIN — MORPHINE SULFATE 2 MILLIGRAM(S): 50 CAPSULE, EXTENDED RELEASE ORAL at 23:30

## 2017-04-06 RX ADMIN — Medication 1 MILLIGRAM(S): at 20:39

## 2017-04-06 NOTE — PROGRESS NOTE ADULT - SUBJECTIVE AND OBJECTIVE BOX
66 yo female s/p hernia repair and abdominal wall reconstruction    Exam:  dressings were removed   incision c/d/i  there are no signs of infection  EVAN draining serosanguinous fluid     A/P  dressings were changed today  can be changed q48 hours  continue current care as per primary team

## 2017-04-06 NOTE — PROGRESS NOTE ADULT - SUBJECTIVE AND OBJECTIVE BOX
INTERVAL HPI/OVERNIGHT EVENTS/SUBJECTIVE:    ICU Vital Signs Last 24 Hrs  T(C): 37.1, Max: 37.8 (04-05 @ 12:30)  T(F): 98.8, Max: 100 (04-05 @ 12:30)  HR: 100 (87 - 100)  BP: 112/65 (95/59 - 112/65)  BP(mean): --  ABP: --  ABP(mean): --  RR: 18 (17 - 18)  SpO2: 94% (91% - 99%)      I&O's Detail    I & Os for current day (as of 06 Apr 2017 12:02)  =============================================  IN:    lactated ringers.: 2500 ml    Oral Fluid: 2 ml    Total IN: 2502 ml  ---------------------------------------------  OUT:    Indwelling Catheter - Urethral: 2000 ml    Bulb: 100 ml    Bulb: 40 ml    Total OUT: 2140 ml  ---------------------------------------------  Total NET: 362 ml            MEDICATIONS  (STANDING):  lactated ringers. 1000milliLiter(s) IV Continuous <Continuous>  heparin  Injectable 5000Unit(s) SubCutaneous every 8 hours  multivitamin 1Tablet(s) Oral daily  levETIRAcetam 500milliGRAM(s) Oral two times a day  LORazepam     Tablet 1milliGRAM(s) Oral three times a day  benztropine 0.5milliGRAM(s) Oral two times a day  haloperidol     Tablet 5milliGRAM(s) Oral two times a day  levothyroxine 112MICROGram(s) Oral daily  pantoprazole  Injectable 40milliGRAM(s) IV Push daily    MEDICATIONS  (PRN):  ondansetron Injectable 4milliGRAM(s) IV Push once PRN Nausea and/or Vomiting  morphine  - Injectable 2milliGRAM(s) IV Push every 4 hours PRN Mild Pain (1 - 3)  ondansetron Injectable 4milliGRAM(s) IV Push every 6 hours PRN Nausea      NUTRITION/IVF:     CENTRAL LINE:  LOCATION:   DATE INSERTED:  CVP:  SCVO2:    AMADOR:   DATE INSERTED:    A-LINE:    LOCATION:   DATE INSERTED:   SVV:  CO/CI:     CHEST TUBE:  LOCATION:  DATE INSERTED: OUTPUT/24 HRS:  SUCTION/WATER SEAL:     NG/OG TUBE:  DATE INSERTED:  OUTPUT/24 HRS:    MISC:     PHYSICAL EXAM:    Gen:    Eyes:    Neurological:    ENMT:    Neck:    Pulmonary:    Cardiovascular:    Gastrointestinal:    Genitourinary:    Back:    Extremities:    Skin:    Musculoskeletal:          LABS:              RECENT CULTURES:            CAPILLARY BLOOD GLUCOSE      RADIOLOGY & ADDITIONAL STUDIES:    ASSESSMENT/PLAN:  67yFemale presenting with:    Neuro:    HEENT:    CV:    Pulm:    GI/Nutrition:    /Renal:    ID:    Lines/Tubes:    Endo:    Skin:    Proph:    Dispo:      CRITICAL CARE TIME SPENT: INTERVAL HPI/OVERNIGHT EVENTS/SUBJECTIVE:  POD # 2 s/p laparotomy, reversal ileostomy, repair para-stomal hernia  IV: LR @ 150cc/hr  diet: npo  Patient: afebrile, VSS awake, resting in bed comfortable at present, NGT in place minimal output ( scant ) over night.  no acute distress - says has mild surgical/ incisional pain- would like tylenol again        Vital Signs Last 24 Hrs  T(C): 37.1, Max: 37.8 (04-05 @ 12:30)  T(F): 98.8, Max: 100 (04-05 @ 12:30)  HR: 100 (87 - 100)  BP: 112/65 (95/59 - 112/65)  BP(mean): --  ABP: --  ABP(mean): --  RR: 18 (17 - 18)  SpO2: 94% (91% - 99%)      I&O's Detail    I & Os for current day (as of 06 Apr 2017 12:02)  =============================================  IN:    lactated ringers.: 2500 ml    Oral Fluid: 2 ml    Total IN: 2502 ml  ---------------------------------------------  OUT:    Indwelling Catheter - Urethral: 2000 ml    Bulb: 100 ml    Bulb: 40 ml    Total OUT: 2140 ml  ---------------------------------------------  Total NET: 362 ml            MEDICATIONS  (STANDING):  lactated ringers. 1000milliLiter(s) IV Continuous <Continuous>  heparin  Injectable 5000Unit(s) SubCutaneous every 8 hours  multivitamin 1Tablet(s) Oral daily  levETIRAcetam 500milliGRAM(s) Oral two times a day  LORazepam     Tablet 1milliGRAM(s) Oral three times a day  benztropine 0.5milliGRAM(s) Oral two times a day  haloperidol     Tablet 5milliGRAM(s) Oral two times a day  levothyroxine 112MICROGram(s) Oral daily  pantoprazole  Injectable 40milliGRAM(s) IV Push daily    MEDICATIONS  (PRN):  ondansetron Injectable 4milliGRAM(s) IV Push once PRN Nausea and/or Vomiting  morphine  - Injectable 2milliGRAM(s) IV Push every 4 hours PRN Mild Pain (1 - 3)  ondansetron Injectable 4milliGRAM(s) IV Push every 6 hours PRN Nausea        PHYSICAL EXAM:    Gen: alert awake NAD     Abdomen:  soft, non-distended, mild surgical/ incisional pain or discomfort, surgical site dressing dry and intact midline, right mid abdominal dressing dressing with drainage, old ostomy site with penrose drain.  bilateral VEAN drains   NGT scant output   Extremities: warm to toes no calf pain or tenderness                        RECENT CULTURES:            CAPILLARY BLOOD GLUCOSE      RADIOLOGY & ADDITIONAL STUDIES:    ASSESSMENT/PLAN:  67yFemale s/p ileostomy reversal paratomal hernia repair  pod# 3, stable  dvt ppx   IS   pain control, limit narcotics, tylenol  and toraol added   oob ambulation   PT   cont NGT / CLD, consider d/c in am and adv diet   IVF , will decreased after NGT out   labs and am labss

## 2017-04-06 NOTE — PHYSICAL THERAPY INITIAL EVALUATION ADULT - MANUAL MUSCLE TESTING RESULTS, REHAB EVAL
no strength deficits were identified/Functional AROM is 3/5 in billateral UE and LE in available ranges

## 2017-04-06 NOTE — PHYSICAL THERAPY INITIAL EVALUATION ADULT - ADDITIONAL COMMENTS
Pt lives at [facility]   Pt owns medical equipment: None

## 2017-04-07 LAB
ANION GAP SERPL CALC-SCNC: 11 MMOL/L — SIGNIFICANT CHANGE UP (ref 5–17)
ANISOCYTOSIS BLD QL: SLIGHT — SIGNIFICANT CHANGE UP
BUN SERPL-MCNC: 14 MG/DL — SIGNIFICANT CHANGE UP (ref 8–20)
CALCIUM SERPL-MCNC: 8.8 MG/DL — SIGNIFICANT CHANGE UP (ref 8.6–10.2)
CHLORIDE SERPL-SCNC: 110 MMOL/L — HIGH (ref 98–107)
CO2 SERPL-SCNC: 26 MMOL/L — SIGNIFICANT CHANGE UP (ref 22–29)
CREAT SERPL-MCNC: 0.99 MG/DL — SIGNIFICANT CHANGE UP (ref 0.5–1.3)
ELLIPTOCYTES BLD QL SMEAR: SLIGHT — SIGNIFICANT CHANGE UP
GLUCOSE SERPL-MCNC: 92 MG/DL — SIGNIFICANT CHANGE UP (ref 70–115)
HCT VFR BLD CALC: 23.3 % — LOW (ref 37–47)
HGB BLD-MCNC: 7.4 G/DL — LOW (ref 12–16)
HYPOCHROMIA BLD QL: SLIGHT — SIGNIFICANT CHANGE UP
LYMPHOCYTES # BLD AUTO: 17 % — LOW (ref 20–55)
MAGNESIUM SERPL-MCNC: 2.1 MG/DL — SIGNIFICANT CHANGE UP (ref 1.8–2.5)
MCHC RBC-ENTMCNC: 29 PG — SIGNIFICANT CHANGE UP (ref 27–31)
MCHC RBC-ENTMCNC: 31.8 G/DL — LOW (ref 32–36)
MCV RBC AUTO: 91.4 FL — SIGNIFICANT CHANGE UP (ref 81–99)
MICROCYTES BLD QL: SLIGHT — SIGNIFICANT CHANGE UP
MONOCYTES NFR BLD AUTO: 11 % — HIGH (ref 3–10)
NEUTROPHILS NFR BLD AUTO: 71 % — SIGNIFICANT CHANGE UP (ref 37–73)
NEUTS BAND # BLD: 1 % — SIGNIFICANT CHANGE UP (ref 0–8)
OVALOCYTES BLD QL SMEAR: SLIGHT — SIGNIFICANT CHANGE UP
PHOSPHATE SERPL-MCNC: 2 MG/DL — LOW (ref 2.4–4.7)
PLAT MORPH BLD: NORMAL — SIGNIFICANT CHANGE UP
PLATELET # BLD AUTO: 114 K/UL — LOW (ref 150–400)
POIKILOCYTOSIS BLD QL AUTO: SLIGHT — SIGNIFICANT CHANGE UP
POTASSIUM SERPL-MCNC: 3 MMOL/L — LOW (ref 3.5–5.3)
POTASSIUM SERPL-SCNC: 3 MMOL/L — LOW (ref 3.5–5.3)
RBC # BLD: 2.55 M/UL — LOW (ref 4.4–5.2)
RBC # FLD: 15.6 % — SIGNIFICANT CHANGE UP (ref 11–15.6)
RBC BLD AUTO: ABNORMAL
SODIUM SERPL-SCNC: 147 MMOL/L — HIGH (ref 135–145)
WBC # BLD: 3.5 K/UL — LOW (ref 4.8–10.8)
WBC # FLD AUTO: 3.5 K/UL — LOW (ref 4.8–10.8)

## 2017-04-07 PROCEDURE — 74000: CPT | Mod: 26

## 2017-04-07 RX ORDER — POTASSIUM PHOSPHATE, MONOBASIC POTASSIUM PHOSPHATE, DIBASIC 236; 224 MG/ML; MG/ML
15 INJECTION, SOLUTION INTRAVENOUS EVERY 6 HOURS
Qty: 0 | Refills: 0 | Status: COMPLETED | OUTPATIENT
Start: 2017-04-07 | End: 2017-04-07

## 2017-04-07 RX ORDER — DEXTROSE MONOHYDRATE, SODIUM CHLORIDE, AND POTASSIUM CHLORIDE 50; .745; 4.5 G/1000ML; G/1000ML; G/1000ML
1000 INJECTION, SOLUTION INTRAVENOUS
Qty: 0 | Refills: 0 | Status: DISCONTINUED | OUTPATIENT
Start: 2017-04-07 | End: 2017-04-09

## 2017-04-07 RX ADMIN — MORPHINE SULFATE 2 MILLIGRAM(S): 50 CAPSULE, EXTENDED RELEASE ORAL at 10:35

## 2017-04-07 RX ADMIN — MORPHINE SULFATE 2 MILLIGRAM(S): 50 CAPSULE, EXTENDED RELEASE ORAL at 05:41

## 2017-04-07 RX ADMIN — Medication 0.5 MILLIGRAM(S): at 05:05

## 2017-04-07 RX ADMIN — POTASSIUM PHOSPHATE, MONOBASIC POTASSIUM PHOSPHATE, DIBASIC 62.5 MILLIMOLE(S): 236; 224 INJECTION, SOLUTION INTRAVENOUS at 11:51

## 2017-04-07 RX ADMIN — Medication 1 MILLIGRAM(S): at 05:06

## 2017-04-07 RX ADMIN — POTASSIUM PHOSPHATE, MONOBASIC POTASSIUM PHOSPHATE, DIBASIC 62.5 MILLIMOLE(S): 236; 224 INJECTION, SOLUTION INTRAVENOUS at 17:40

## 2017-04-07 RX ADMIN — Medication 1 MILLIGRAM(S): at 21:01

## 2017-04-07 RX ADMIN — Medication 0.5 MILLIGRAM(S): at 17:40

## 2017-04-07 RX ADMIN — HALOPERIDOL DECANOATE 5 MILLIGRAM(S): 100 INJECTION INTRAMUSCULAR at 05:06

## 2017-04-07 RX ADMIN — HEPARIN SODIUM 5000 UNIT(S): 5000 INJECTION INTRAVENOUS; SUBCUTANEOUS at 05:05

## 2017-04-07 RX ADMIN — HALOPERIDOL DECANOATE 5 MILLIGRAM(S): 100 INJECTION INTRAMUSCULAR at 17:39

## 2017-04-07 RX ADMIN — PANTOPRAZOLE SODIUM 40 MILLIGRAM(S): 20 TABLET, DELAYED RELEASE ORAL at 11:50

## 2017-04-07 RX ADMIN — MORPHINE SULFATE 2 MILLIGRAM(S): 50 CAPSULE, EXTENDED RELEASE ORAL at 23:57

## 2017-04-07 RX ADMIN — Medication 112 MICROGRAM(S): at 05:05

## 2017-04-07 RX ADMIN — LEVETIRACETAM 500 MILLIGRAM(S): 250 TABLET, FILM COATED ORAL at 17:39

## 2017-04-07 RX ADMIN — Medication 1 MILLIGRAM(S): at 14:00

## 2017-04-07 RX ADMIN — Medication 1 TABLET(S): at 11:51

## 2017-04-07 RX ADMIN — MORPHINE SULFATE 2 MILLIGRAM(S): 50 CAPSULE, EXTENDED RELEASE ORAL at 05:58

## 2017-04-07 RX ADMIN — LEVETIRACETAM 500 MILLIGRAM(S): 250 TABLET, FILM COATED ORAL at 05:06

## 2017-04-07 RX ADMIN — DEXTROSE MONOHYDRATE, SODIUM CHLORIDE, AND POTASSIUM CHLORIDE 100 MILLILITER(S): 50; .745; 4.5 INJECTION, SOLUTION INTRAVENOUS at 14:11

## 2017-04-07 RX ADMIN — DEXTROSE MONOHYDRATE, SODIUM CHLORIDE, AND POTASSIUM CHLORIDE 100 MILLILITER(S): 50; .745; 4.5 INJECTION, SOLUTION INTRAVENOUS at 11:51

## 2017-04-07 RX ADMIN — HEPARIN SODIUM 5000 UNIT(S): 5000 INJECTION INTRAVENOUS; SUBCUTANEOUS at 21:01

## 2017-04-07 RX ADMIN — DEXTROSE MONOHYDRATE, SODIUM CHLORIDE, AND POTASSIUM CHLORIDE 100 MILLILITER(S): 50; .745; 4.5 INJECTION, SOLUTION INTRAVENOUS at 21:01

## 2017-04-07 RX ADMIN — MORPHINE SULFATE 2 MILLIGRAM(S): 50 CAPSULE, EXTENDED RELEASE ORAL at 12:30

## 2017-04-07 RX ADMIN — HEPARIN SODIUM 5000 UNIT(S): 5000 INJECTION INTRAVENOUS; SUBCUTANEOUS at 14:01

## 2017-04-07 NOTE — PROGRESS NOTE ADULT - SUBJECTIVE AND OBJECTIVE BOX
INTERVAL HPI/OVERNIGHT EVENTS/SUBJECTIVE:   POD #  s/p laparotomy, reversal ileostomy, repair para-stomal hernia  IV: LR @ 150cc/hr  diet: npo  Patient: afebrile, VSS awake, resting in bed comfortable at present, NGT accidentally pulled overnight by patient        Vital Signs Last 24 Hrs  T(C): 37.4, Max: 37.8 (04-06 @ 16:33)  T(F): 99.3, Max: 100.1 (04-06 @ 16:33)  HR: 90 (90 - 105)  BP: 133/64 (101/60 - 134/69)  BP(mean): --  ABP: --  ABP(mean): --  RR: 16 (16 - 18)  SpO2: 94% (92% - 95%)      I&O's Detail    I & Os for current day (as of 07 Apr 2017 10:09)  =============================================  IN:    lactated ringers.: 2000 ml    Total IN: 2000 ml  ---------------------------------------------  OUT:    Indwelling Catheter - Urethral: 2550 ml    Bulb: 110 ml    Bulb: 70 ml    Total OUT: 2730 ml  ---------------------------------------------  Total NET: -730 ml            MEDICATIONS  (STANDING):  lactated ringers. 1000milliLiter(s) IV Continuous <Continuous>  heparin  Injectable 5000Unit(s) SubCutaneous every 8 hours  multivitamin 1Tablet(s) Oral daily  levETIRAcetam 500milliGRAM(s) Oral two times a day  LORazepam     Tablet 1milliGRAM(s) Oral three times a day  benztropine 0.5milliGRAM(s) Oral two times a day  haloperidol     Tablet 5milliGRAM(s) Oral two times a day  levothyroxine 112MICROGram(s) Oral daily  pantoprazole  Injectable 40milliGRAM(s) IV Push daily    MEDICATIONS  (PRN):  ondansetron Injectable 4milliGRAM(s) IV Push once PRN Nausea and/or Vomiting  morphine  - Injectable 2milliGRAM(s) IV Push every 4 hours PRN Mild Pain (1 - 3)  ondansetron Injectable 4milliGRAM(s) IV Push every 6 hours PRN Nausea        PHYSICAL EXAM:    Gen: alert and awake     abdomen:  soft, but distended, mild surgical/ incisional pain or discomfort, surgical site dressing dry and intact midline, right mid abdominal dressing  old ostomy site with penrose drain.  bilateral EVAN drains     Extremities: warm to toes no calf pain or tenderness            LABS:  CBC Full  -  ( 07 Apr 2017 05:27 )  WBC Count : 3.5 K/uL  Hemoglobin : 7.4 g/dL  Hematocrit : 23.3 %  Platelet Count - Automated : 114 K/uL  Mean Cell Volume : 91.4 fl  Mean Cell Hemoglobin : 29.0 pg  Mean Cell Hemoglobin Concentration : 31.8 g/dL  Auto Neutrophil # : x  Auto Lymphocyte # : x  Auto Monocyte # : x  Auto Eosinophil # : x  Auto Basophil # : x  Auto Neutrophil % : 71.0 %  Auto Lymphocyte % : 17.0 %  Auto Monocyte % : 11.0 %  Auto Eosinophil % : x  Auto Basophil % : x    04-07    147<H>  |  110<H>  |  14.0  ----------------------------<  92  3.0<L>   |  26.0  |  0.99    Ca    8.8      07 Apr 2017 05:27  Phos  2.0     04-07  Mg     2.1     04-07          RECENT CULTURES:            CAPILLARY BLOOD GLUCOSE      RADIOLOGY & ADDITIONAL STUDIES:    ASSESSMENT/PLAN:   67yFemale s/p ileostomy reversal paratomal hernia repair  pod# 4, stable, ngt out with distention  will discuss ambulation with patient and if she passes flatus , if not flatus and continued distention will replace NGT   dvt ppx   IS   pain control, limit narcotics, tylenol  and toraol added   oob ambulation   PT    labs, supplements and am labs

## 2017-04-08 LAB
ANION GAP SERPL CALC-SCNC: 10 MMOL/L — SIGNIFICANT CHANGE UP (ref 5–17)
ANION GAP SERPL CALC-SCNC: 13 MMOL/L — SIGNIFICANT CHANGE UP (ref 5–17)
ANISOCYTOSIS BLD QL: SLIGHT — SIGNIFICANT CHANGE UP
BASOPHILS # BLD AUTO: 0 K/UL — SIGNIFICANT CHANGE UP (ref 0–0.2)
BASOPHILS NFR BLD AUTO: 0.4 % — SIGNIFICANT CHANGE UP (ref 0–2)
BUN SERPL-MCNC: 9 MG/DL — SIGNIFICANT CHANGE UP (ref 8–20)
BUN SERPL-MCNC: 9 MG/DL — SIGNIFICANT CHANGE UP (ref 8–20)
CALCIUM SERPL-MCNC: 8.8 MG/DL — SIGNIFICANT CHANGE UP (ref 8.6–10.2)
CALCIUM SERPL-MCNC: 8.9 MG/DL — SIGNIFICANT CHANGE UP (ref 8.6–10.2)
CHLORIDE SERPL-SCNC: 112 MMOL/L — HIGH (ref 98–107)
CHLORIDE SERPL-SCNC: 113 MMOL/L — HIGH (ref 98–107)
CO2 SERPL-SCNC: 26 MMOL/L — SIGNIFICANT CHANGE UP (ref 22–29)
CO2 SERPL-SCNC: 27 MMOL/L — SIGNIFICANT CHANGE UP (ref 22–29)
CREAT ?TM UR-MCNC: 36 MG/DL — SIGNIFICANT CHANGE UP
CREAT SERPL-MCNC: 0.85 MG/DL — SIGNIFICANT CHANGE UP (ref 0.5–1.3)
CREAT SERPL-MCNC: 0.9 MG/DL — SIGNIFICANT CHANGE UP (ref 0.5–1.3)
EOSINOPHIL # BLD AUTO: 0 K/UL — SIGNIFICANT CHANGE UP (ref 0–0.5)
EOSINOPHIL NFR BLD AUTO: 1.2 % — SIGNIFICANT CHANGE UP (ref 0–6)
GLUCOSE SERPL-MCNC: 119 MG/DL — HIGH (ref 70–115)
GLUCOSE SERPL-MCNC: 131 MG/DL — HIGH (ref 70–115)
HCT VFR BLD CALC: 24.2 % — LOW (ref 37–47)
HGB BLD-MCNC: 7.6 G/DL — LOW (ref 12–16)
HYPOCHROMIA BLD QL: SLIGHT — SIGNIFICANT CHANGE UP
LYMPHOCYTES # BLD AUTO: 0.7 K/UL — LOW (ref 1–4.8)
LYMPHOCYTES # BLD AUTO: 28 % — SIGNIFICANT CHANGE UP (ref 20–55)
MACROCYTES BLD QL: SLIGHT — SIGNIFICANT CHANGE UP
MAGNESIUM SERPL-MCNC: 2.1 MG/DL — SIGNIFICANT CHANGE UP (ref 1.8–2.5)
MCHC RBC-ENTMCNC: 28.7 PG — SIGNIFICANT CHANGE UP (ref 27–31)
MCHC RBC-ENTMCNC: 31.4 G/DL — LOW (ref 32–36)
MCV RBC AUTO: 91.3 FL — SIGNIFICANT CHANGE UP (ref 81–99)
MONOCYTES # BLD AUTO: 0.2 K/UL — SIGNIFICANT CHANGE UP (ref 0–0.8)
MONOCYTES NFR BLD AUTO: 8.7 % — SIGNIFICANT CHANGE UP (ref 3–10)
NEUTROPHILS # BLD AUTO: 1.6 K/UL — LOW (ref 1.8–8)
NEUTROPHILS NFR BLD AUTO: 61.3 % — SIGNIFICANT CHANGE UP (ref 37–73)
OSMOLALITY SERPL: 328 MOS/KG — HIGH (ref 275–300)
OSMOLALITY UR: 266 MOSM/KG — LOW (ref 300–1000)
OVALOCYTES BLD QL SMEAR: SLIGHT — SIGNIFICANT CHANGE UP
PHOSPHATE SERPL-MCNC: 2.3 MG/DL — LOW (ref 2.4–4.7)
PLAT MORPH BLD: NORMAL — SIGNIFICANT CHANGE UP
PLATELET # BLD AUTO: 151 K/UL — SIGNIFICANT CHANGE UP (ref 150–400)
POIKILOCYTOSIS BLD QL AUTO: SLIGHT — SIGNIFICANT CHANGE UP
POTASSIUM SERPL-MCNC: 3.2 MMOL/L — LOW (ref 3.5–5.3)
POTASSIUM SERPL-MCNC: 3.4 MMOL/L — LOW (ref 3.5–5.3)
POTASSIUM SERPL-SCNC: 3.2 MMOL/L — LOW (ref 3.5–5.3)
POTASSIUM SERPL-SCNC: 3.4 MMOL/L — LOW (ref 3.5–5.3)
RBC # BLD: 2.65 M/UL — LOW (ref 4.4–5.2)
RBC # FLD: 15.4 % — SIGNIFICANT CHANGE UP (ref 11–15.6)
RBC BLD AUTO: ABNORMAL
SODIUM SERPL-SCNC: 150 MMOL/L — HIGH (ref 135–145)
SODIUM SERPL-SCNC: 151 MMOL/L — HIGH (ref 135–145)
SODIUM UR-SCNC: 86 MMOL/L — SIGNIFICANT CHANGE UP
WBC # BLD: 2.5 K/UL — LOW (ref 4.8–10.8)
WBC # FLD AUTO: 2.5 K/UL — LOW (ref 4.8–10.8)

## 2017-04-08 PROCEDURE — 74020: CPT | Mod: 26

## 2017-04-08 RX ORDER — POTASSIUM PHOSPHATE, MONOBASIC POTASSIUM PHOSPHATE, DIBASIC 236; 224 MG/ML; MG/ML
15 INJECTION, SOLUTION INTRAVENOUS EVERY 6 HOURS
Qty: 0 | Refills: 0 | Status: COMPLETED | OUTPATIENT
Start: 2017-04-08 | End: 2017-04-08

## 2017-04-08 RX ORDER — POTASSIUM CHLORIDE 20 MEQ
40 PACKET (EA) ORAL EVERY 4 HOURS
Qty: 0 | Refills: 0 | Status: COMPLETED | OUTPATIENT
Start: 2017-04-08 | End: 2017-04-09

## 2017-04-08 RX ORDER — SODIUM CHLORIDE 9 MG/ML
1000 INJECTION, SOLUTION INTRAVENOUS ONCE
Qty: 0 | Refills: 0 | Status: COMPLETED | OUTPATIENT
Start: 2017-04-08 | End: 2017-04-08

## 2017-04-08 RX ADMIN — HEPARIN SODIUM 5000 UNIT(S): 5000 INJECTION INTRAVENOUS; SUBCUTANEOUS at 21:19

## 2017-04-08 RX ADMIN — SODIUM CHLORIDE 2000 MILLILITER(S): 9 INJECTION, SOLUTION INTRAVENOUS at 18:40

## 2017-04-08 RX ADMIN — MORPHINE SULFATE 2 MILLIGRAM(S): 50 CAPSULE, EXTENDED RELEASE ORAL at 00:30

## 2017-04-08 RX ADMIN — Medication 40 MILLIEQUIVALENT(S): at 21:19

## 2017-04-08 RX ADMIN — Medication 0.5 MILLIGRAM(S): at 05:52

## 2017-04-08 RX ADMIN — HALOPERIDOL DECANOATE 5 MILLIGRAM(S): 100 INJECTION INTRAMUSCULAR at 05:52

## 2017-04-08 RX ADMIN — Medication 0.5 MILLIGRAM(S): at 17:53

## 2017-04-08 RX ADMIN — POTASSIUM PHOSPHATE, MONOBASIC POTASSIUM PHOSPHATE, DIBASIC 62.5 MILLIMOLE(S): 236; 224 INJECTION, SOLUTION INTRAVENOUS at 21:18

## 2017-04-08 RX ADMIN — LEVETIRACETAM 500 MILLIGRAM(S): 250 TABLET, FILM COATED ORAL at 17:53

## 2017-04-08 RX ADMIN — Medication 1 TABLET(S): at 12:47

## 2017-04-08 RX ADMIN — Medication 1 MILLIGRAM(S): at 21:19

## 2017-04-08 RX ADMIN — DEXTROSE MONOHYDRATE, SODIUM CHLORIDE, AND POTASSIUM CHLORIDE 100 MILLILITER(S): 50; .745; 4.5 INJECTION, SOLUTION INTRAVENOUS at 05:52

## 2017-04-08 RX ADMIN — Medication 112 MICROGRAM(S): at 05:52

## 2017-04-08 RX ADMIN — LEVETIRACETAM 500 MILLIGRAM(S): 250 TABLET, FILM COATED ORAL at 05:52

## 2017-04-08 RX ADMIN — Medication 1 MILLIGRAM(S): at 15:57

## 2017-04-08 RX ADMIN — Medication 40 MILLIEQUIVALENT(S): at 18:41

## 2017-04-08 RX ADMIN — Medication 1 MILLIGRAM(S): at 05:52

## 2017-04-08 RX ADMIN — PANTOPRAZOLE SODIUM 40 MILLIGRAM(S): 20 TABLET, DELAYED RELEASE ORAL at 12:46

## 2017-04-08 RX ADMIN — HEPARIN SODIUM 5000 UNIT(S): 5000 INJECTION INTRAVENOUS; SUBCUTANEOUS at 15:56

## 2017-04-08 RX ADMIN — POTASSIUM PHOSPHATE, MONOBASIC POTASSIUM PHOSPHATE, DIBASIC 62.5 MILLIMOLE(S): 236; 224 INJECTION, SOLUTION INTRAVENOUS at 16:04

## 2017-04-08 RX ADMIN — HALOPERIDOL DECANOATE 5 MILLIGRAM(S): 100 INJECTION INTRAMUSCULAR at 17:54

## 2017-04-08 RX ADMIN — HEPARIN SODIUM 5000 UNIT(S): 5000 INJECTION INTRAVENOUS; SUBCUTANEOUS at 05:53

## 2017-04-08 NOTE — PROGRESS NOTE ADULT - SUBJECTIVE AND OBJECTIVE BOX
INTERVAL HPI/OVERNIGHT EVENTS/SUBJECTIVE:  POD #  4 s/p laparotomy, reversal ileostomy, repair para-stomal hernia  IV: LR @ 150cc/hr  diet: npo/sips/chips  Patient: afebrile, VSS awake, resting in bed comfortable at present, ambulated yesterday passed gas   feels hungry  Passing flatus     Vital Signs Last 24 Hrs  T(C): 36.8, Max: 37.9 (04-07 @ 19:30)  T(F): 98.3, Max: 100.2 (04-07 @ 19:30)  HR: 84 (81 - 100)  BP: 128/74 (128/74 - 145/70)  BP(mean): --  ABP: --  ABP(mean): --  RR: 17 (17 - 20)  SpO2: 97% (95% - 99%)      I&O's Detail    I & Os for current day (as of 08 Apr 2017 11:09)  =============================================  IN:    dextrose 5% + sodium chloride 0.45% with potassium chloride 20 mEq/L: 1300 ml    Total IN: 1300 ml  ---------------------------------------------  OUT:    Indwelling Catheter - Urethral: 5050 ml    Bulb: 75 ml    Bulb: 70 ml    Total OUT: 5195 ml  ---------------------------------------------  Total NET: -3895 ml            MEDICATIONS  (STANDING):  heparin  Injectable 5000Unit(s) SubCutaneous every 8 hours  multivitamin 1Tablet(s) Oral daily  levETIRAcetam 500milliGRAM(s) Oral two times a day  LORazepam     Tablet 1milliGRAM(s) Oral three times a day  benztropine 0.5milliGRAM(s) Oral two times a day  haloperidol     Tablet 5milliGRAM(s) Oral two times a day  levothyroxine 112MICROGram(s) Oral daily  pantoprazole  Injectable 40milliGRAM(s) IV Push daily  dextrose 5% + sodium chloride 0.45% with potassium chloride 20 mEq/L 1000milliLiter(s) IV Continuous <Continuous>    MEDICATIONS  (PRN):  ondansetron Injectable 4milliGRAM(s) IV Push once PRN Nausea and/or Vomiting  morphine  - Injectable 2milliGRAM(s) IV Push every 4 hours PRN Mild Pain (1 - 3)  ondansetron Injectable 4milliGRAM(s) IV Push every 6 hours PRN Nausea      PHYSICAL EXAM:    Gen: alert and awake   abdomen:  soft, distended mildly improved, mild surgical/ incisional pain or discomfort, surgical site dressing removed, midline wound with staples intact no drainage no erythema,  old ostomy site with penrose drain.  bilateral EVAN drains with serosang drainage     Extremities: warm to toes no calf pain or tenderness        LABS:  CBC Full  -  ( 08 Apr 2017 07:39 )  WBC Count : 2.5 K/uL  Hemoglobin : 7.6 g/dL  Hematocrit : 24.2 %  Platelet Count - Automated : 151 K/uL  Mean Cell Volume : 91.3 fl  Mean Cell Hemoglobin : 28.7 pg  Mean Cell Hemoglobin Concentration : 31.4 g/dL  Auto Neutrophil # : 1.6 K/uL  Auto Lymphocyte # : 0.7 K/uL  Auto Monocyte # : 0.2 K/uL  Auto Eosinophil # : 0.0 K/uL  Auto Basophil # : 0.0 K/uL  Auto Neutrophil % : 61.3 %  Auto Lymphocyte % : 28.0 %  Auto Monocyte % : 8.7 %  Auto Eosinophil % : 1.2 %  Auto Basophil % : 0.4 %    04-08    150<H>  |  113<H>  |  9.0  ----------------------------<  131<H>  3.2<L>   |  27.0  |  0.90    Ca    8.8      08 Apr 2017 07:39  Phos  2.3     04-08  Mg     2.1     04-08          RECENT CULTURES:            CAPILLARY BLOOD GLUCOSE      RADIOLOGY & ADDITIONAL STUDIES:    ASSESSMENT/PLAN:    67yFemale s/p ileostomy reversal paratomal hernia repair  with post op distention but clinically doing ok, no nausea/vomiting     dvt ppx   IS   pain control, limit narcotics, tylenol   oob ambulation   PT    labs, supplements and am labs   patient passing flatus and ABXR with improved dilatation INTERVAL HPI/OVERNIGHT EVENTS/SUBJECTIVE:  POD #   s/p laparotomy, reversal ileostomy, repair para-stomal hernia  IV: d5 1/2 Ns KCL  diet: npo/sips/chips  Patient: afebrile, VSS awake, resting in bed comfortable at present, ambulated yesterday passed gas   feels hungry  Passing flatus     Vital Signs Last 24 Hrs  T(C): 36.8, Max: 37.9 (04-07 @ 19:30)  T(F): 98.3, Max: 100.2 (04-07 @ 19:30)  HR: 84 (81 - 100)  BP: 128/74 (128/74 - 145/70)  BP(mean): --  ABP: --  ABP(mean): --  RR: 17 (17 - 20)  SpO2: 97% (95% - 99%)      I&O's Detail    I & Os for current day (as of 08 Apr 2017 11:09)  =============================================  IN:    dextrose 5% + sodium chloride 0.45% with potassium chloride 20 mEq/L: 1300 ml    Total IN: 1300 ml  ---------------------------------------------  OUT:    Indwelling Catheter - Urethral: 5050 ml    Bulb: 75 ml    Bulb: 70 ml    Total OUT: 5195 ml  ---------------------------------------------  Total NET: -3895 ml            MEDICATIONS  (STANDING):  heparin  Injectable 5000Unit(s) SubCutaneous every 8 hours  multivitamin 1Tablet(s) Oral daily  levETIRAcetam 500milliGRAM(s) Oral two times a day  LORazepam     Tablet 1milliGRAM(s) Oral three times a day  benztropine 0.5milliGRAM(s) Oral two times a day  haloperidol     Tablet 5milliGRAM(s) Oral two times a day  levothyroxine 112MICROGram(s) Oral daily  pantoprazole  Injectable 40milliGRAM(s) IV Push daily  dextrose 5% + sodium chloride 0.45% with potassium chloride 20 mEq/L 1000milliLiter(s) IV Continuous <Continuous>    MEDICATIONS  (PRN):  ondansetron Injectable 4milliGRAM(s) IV Push once PRN Nausea and/or Vomiting  morphine  - Injectable 2milliGRAM(s) IV Push every 4 hours PRN Mild Pain (1 - 3)  ondansetron Injectable 4milliGRAM(s) IV Push every 6 hours PRN Nausea      PHYSICAL EXAM:    Gen: alert and awake   abdomen:  soft, distended mildly improved, mild surgical/ incisional pain or discomfort, surgical site dressing removed, midline wound with staples intact no drainage no erythema,  old ostomy site with penrose drain.  bilateral EVAN drains with serosang drainage     Extremities: warm to toes no calf pain or tenderness        LABS:  CBC Full  -  ( 08 Apr 2017 07:39 )  WBC Count : 2.5 K/uL  Hemoglobin : 7.6 g/dL  Hematocrit : 24.2 %  Platelet Count - Automated : 151 K/uL  Mean Cell Volume : 91.3 fl  Mean Cell Hemoglobin : 28.7 pg  Mean Cell Hemoglobin Concentration : 31.4 g/dL  Auto Neutrophil # : 1.6 K/uL  Auto Lymphocyte # : 0.7 K/uL  Auto Monocyte # : 0.2 K/uL  Auto Eosinophil # : 0.0 K/uL  Auto Basophil # : 0.0 K/uL  Auto Neutrophil % : 61.3 %  Auto Lymphocyte % : 28.0 %  Auto Monocyte % : 8.7 %  Auto Eosinophil % : 1.2 %  Auto Basophil % : 0.4 %    04-08    150<H>  |  113<H>  |  9.0  ----------------------------<  131<H>  3.2<L>   |  27.0  |  0.90    Ca    8.8      08 Apr 2017 07:39  Phos  2.3     04-08  Mg     2.1     04-08          RECENT CULTURES:            CAPILLARY BLOOD GLUCOSE      RADIOLOGY & ADDITIONAL STUDIES:    ASSESSMENT/PLAN:    67yFemale s/p ileostomy reversal paratomal hernia repair  with post op distention but clinically doing ok, no nausea/vomiting   labs with evidence of hypernatremia/hyperchloremia hypokalemia, increased UOP, concern for possible DI   urine studies ordered, will calculate FENA   dvt ppx   IS   pain control, limit narcotics, tylenol   oob ambulation   PT    labs, supplements and am labs   patient passing flatus and ABXR with improved dilatation

## 2017-04-09 DIAGNOSIS — N25.1 NEPHROGENIC DIABETES INSIPIDUS: ICD-10-CM

## 2017-04-09 LAB
ANION GAP SERPL CALC-SCNC: 8 MMOL/L — SIGNIFICANT CHANGE UP (ref 5–17)
ANISOCYTOSIS BLD QL: SLIGHT — SIGNIFICANT CHANGE UP
BUN SERPL-MCNC: 8 MG/DL — SIGNIFICANT CHANGE UP (ref 8–20)
CALCIUM SERPL-MCNC: 8.5 MG/DL — LOW (ref 8.6–10.2)
CHLORIDE SERPL-SCNC: 111 MMOL/L — HIGH (ref 98–107)
CO2 SERPL-SCNC: 26 MMOL/L — SIGNIFICANT CHANGE UP (ref 22–29)
CREAT SERPL-MCNC: 0.85 MG/DL — SIGNIFICANT CHANGE UP (ref 0.5–1.3)
EOSINOPHIL # BLD AUTO: 0.2 K/UL — SIGNIFICANT CHANGE UP (ref 0–0.5)
EOSINOPHIL NFR BLD AUTO: 7 % — HIGH (ref 0–6)
GLUCOSE SERPL-MCNC: 115 MG/DL — SIGNIFICANT CHANGE UP (ref 70–115)
HCT VFR BLD CALC: 22.9 % — LOW (ref 37–47)
HGB BLD-MCNC: 7.2 G/DL — LOW (ref 12–16)
HYPERCHROMIA BLD QL AUTO: SLIGHT — SIGNIFICANT CHANGE UP
LYMPHOCYTES # BLD AUTO: 0.8 K/UL — LOW (ref 1–4.8)
LYMPHOCYTES # BLD AUTO: 34.3 % — SIGNIFICANT CHANGE UP (ref 20–55)
MACROCYTES BLD QL: SLIGHT — SIGNIFICANT CHANGE UP
MAGNESIUM SERPL-MCNC: 1.8 MG/DL — SIGNIFICANT CHANGE UP (ref 1.8–2.5)
MCHC RBC-ENTMCNC: 28.6 PG — SIGNIFICANT CHANGE UP (ref 27–31)
MCHC RBC-ENTMCNC: 31.4 G/DL — LOW (ref 32–36)
MCV RBC AUTO: 90.9 FL — SIGNIFICANT CHANGE UP (ref 81–99)
MICROCYTES BLD QL: SLIGHT — SIGNIFICANT CHANGE UP
MONOCYTES # BLD AUTO: 0.2 K/UL — SIGNIFICANT CHANGE UP (ref 0–0.8)
MONOCYTES NFR BLD AUTO: 10.4 % — HIGH (ref 3–10)
NEUTROPHILS # BLD AUTO: 1.1 K/UL — LOW (ref 1.8–8)
NEUTROPHILS NFR BLD AUTO: 47.9 % — SIGNIFICANT CHANGE UP (ref 37–73)
PHOSPHATE SERPL-MCNC: 2.5 MG/DL — SIGNIFICANT CHANGE UP (ref 2.4–4.7)
PLAT MORPH BLD: NORMAL — SIGNIFICANT CHANGE UP
PLATELET # BLD AUTO: 147 K/UL — LOW (ref 150–400)
POLYCHROMASIA BLD QL SMEAR: SLIGHT — SIGNIFICANT CHANGE UP
POTASSIUM SERPL-MCNC: 4.3 MMOL/L — SIGNIFICANT CHANGE UP (ref 3.5–5.3)
POTASSIUM SERPL-SCNC: 4.3 MMOL/L — SIGNIFICANT CHANGE UP (ref 3.5–5.3)
RBC # BLD: 2.52 M/UL — LOW (ref 4.4–5.2)
RBC # FLD: 15.2 % — SIGNIFICANT CHANGE UP (ref 11–15.6)
RBC BLD AUTO: ABNORMAL
SODIUM SERPL-SCNC: 145 MMOL/L — SIGNIFICANT CHANGE UP (ref 135–145)
WBC # BLD: 2.3 K/UL — LOW (ref 4.8–10.8)
WBC # FLD AUTO: 2.3 K/UL — LOW (ref 4.8–10.8)

## 2017-04-09 PROCEDURE — 99231 SBSQ HOSP IP/OBS SF/LOW 25: CPT

## 2017-04-09 RX ORDER — OXYCODONE HYDROCHLORIDE 5 MG/1
10 TABLET ORAL
Qty: 0 | Refills: 0 | Status: DISCONTINUED | OUTPATIENT
Start: 2017-04-09 | End: 2017-04-09

## 2017-04-09 RX ORDER — OXYCODONE HYDROCHLORIDE 5 MG/1
5 TABLET ORAL
Qty: 0 | Refills: 0 | Status: DISCONTINUED | OUTPATIENT
Start: 2017-04-09 | End: 2017-04-13

## 2017-04-09 RX ORDER — POLYETHYLENE GLYCOL 3350 17 G/17G
17 POWDER, FOR SOLUTION ORAL DAILY
Qty: 0 | Refills: 0 | Status: DISCONTINUED | OUTPATIENT
Start: 2017-04-09 | End: 2017-04-13

## 2017-04-09 RX ORDER — SODIUM CHLORIDE 9 MG/ML
1000 INJECTION, SOLUTION INTRAVENOUS
Qty: 0 | Refills: 0 | Status: DISCONTINUED | OUTPATIENT
Start: 2017-04-09 | End: 2017-04-09

## 2017-04-09 RX ORDER — ACETAMINOPHEN 500 MG
650 TABLET ORAL EVERY 6 HOURS
Qty: 0 | Refills: 0 | Status: DISCONTINUED | OUTPATIENT
Start: 2017-04-09 | End: 2017-04-13

## 2017-04-09 RX ORDER — MAGNESIUM SULFATE 500 MG/ML
2 VIAL (ML) INJECTION ONCE
Qty: 0 | Refills: 0 | Status: COMPLETED | OUTPATIENT
Start: 2017-04-09 | End: 2017-04-09

## 2017-04-09 RX ORDER — DOCUSATE SODIUM 100 MG
100 CAPSULE ORAL THREE TIMES A DAY
Qty: 0 | Refills: 0 | Status: DISCONTINUED | OUTPATIENT
Start: 2017-04-09 | End: 2017-04-13

## 2017-04-09 RX ORDER — DEXTROSE MONOHYDRATE, SODIUM CHLORIDE, AND POTASSIUM CHLORIDE 50; .745; 4.5 G/1000ML; G/1000ML; G/1000ML
1000 INJECTION, SOLUTION INTRAVENOUS
Qty: 0 | Refills: 0 | Status: DISCONTINUED | OUTPATIENT
Start: 2017-04-09 | End: 2017-04-10

## 2017-04-09 RX ORDER — ACETAMINOPHEN 500 MG
1000 TABLET ORAL ONCE
Qty: 0 | Refills: 0 | Status: COMPLETED | OUTPATIENT
Start: 2017-04-09 | End: 2017-04-09

## 2017-04-09 RX ADMIN — Medication 112 MICROGRAM(S): at 05:52

## 2017-04-09 RX ADMIN — OXYCODONE HYDROCHLORIDE 10 MILLIGRAM(S): 5 TABLET ORAL at 05:47

## 2017-04-09 RX ADMIN — DEXTROSE MONOHYDRATE, SODIUM CHLORIDE, AND POTASSIUM CHLORIDE 100 MILLILITER(S): 50; .745; 4.5 INJECTION, SOLUTION INTRAVENOUS at 02:48

## 2017-04-09 RX ADMIN — LEVETIRACETAM 500 MILLIGRAM(S): 250 TABLET, FILM COATED ORAL at 20:04

## 2017-04-09 RX ADMIN — OXYCODONE HYDROCHLORIDE 10 MILLIGRAM(S): 5 TABLET ORAL at 06:30

## 2017-04-09 RX ADMIN — PANTOPRAZOLE SODIUM 40 MILLIGRAM(S): 20 TABLET, DELAYED RELEASE ORAL at 14:41

## 2017-04-09 RX ADMIN — Medication 1 MILLIGRAM(S): at 23:09

## 2017-04-09 RX ADMIN — Medication 1 MILLIGRAM(S): at 05:47

## 2017-04-09 RX ADMIN — Medication 1 MILLIGRAM(S): at 14:45

## 2017-04-09 RX ADMIN — Medication 1 TABLET(S): at 14:40

## 2017-04-09 RX ADMIN — Medication 100 MILLIGRAM(S): at 14:37

## 2017-04-09 RX ADMIN — DEXTROSE MONOHYDRATE, SODIUM CHLORIDE, AND POTASSIUM CHLORIDE 50 MILLILITER(S): 50; .745; 4.5 INJECTION, SOLUTION INTRAVENOUS at 17:00

## 2017-04-09 RX ADMIN — SODIUM CHLORIDE 1000 MILLILITER(S): 9 INJECTION, SOLUTION INTRAVENOUS at 07:55

## 2017-04-09 RX ADMIN — Medication 400 MILLIGRAM(S): at 05:47

## 2017-04-09 RX ADMIN — Medication 0.5 MILLIGRAM(S): at 20:04

## 2017-04-09 RX ADMIN — DEXTROSE MONOHYDRATE, SODIUM CHLORIDE, AND POTASSIUM CHLORIDE 50 MILLILITER(S): 50; .745; 4.5 INJECTION, SOLUTION INTRAVENOUS at 14:45

## 2017-04-09 RX ADMIN — Medication 100 MILLIGRAM(S): at 23:09

## 2017-04-09 RX ADMIN — HEPARIN SODIUM 5000 UNIT(S): 5000 INJECTION INTRAVENOUS; SUBCUTANEOUS at 20:05

## 2017-04-09 RX ADMIN — HALOPERIDOL DECANOATE 5 MILLIGRAM(S): 100 INJECTION INTRAMUSCULAR at 20:04

## 2017-04-09 RX ADMIN — Medication 650 MILLIGRAM(S): at 16:59

## 2017-04-09 RX ADMIN — Medication 40 MILLIEQUIVALENT(S): at 02:45

## 2017-04-09 RX ADMIN — HEPARIN SODIUM 5000 UNIT(S): 5000 INJECTION INTRAVENOUS; SUBCUTANEOUS at 05:48

## 2017-04-09 RX ADMIN — Medication 50 GRAM(S): at 16:59

## 2017-04-09 RX ADMIN — HALOPERIDOL DECANOATE 5 MILLIGRAM(S): 100 INJECTION INTRAMUSCULAR at 05:47

## 2017-04-09 RX ADMIN — Medication 1000 MILLIGRAM(S): at 06:15

## 2017-04-09 RX ADMIN — Medication 650 MILLIGRAM(S): at 19:00

## 2017-04-09 RX ADMIN — LEVETIRACETAM 500 MILLIGRAM(S): 250 TABLET, FILM COATED ORAL at 05:47

## 2017-04-09 RX ADMIN — Medication 0.5 MILLIGRAM(S): at 05:47

## 2017-04-09 NOTE — PROGRESS NOTE ADULT - PROBLEM SELECTOR PLAN 1
Hypotonic IVF
IVF, keep NGT and asher rahman JP output, chk am labs, NPO, plan per Dr Miller, plan per Dr Parrish (hernia repair), analgesics prn    constant observation

## 2017-04-09 NOTE — PROGRESS NOTE ADULT - SUBJECTIVE AND OBJECTIVE BOX
Renal :      145    |  111<H>  |  8.0    ----------------------------<  115    Ca:8.5<L> (2017 06:49)  4.3     |  26.0   |  0.85       eGFR if Non : 72  eGFR if : 83                            7.2<L>  2.3<L> )-----------( 147<L>    ( 2017 06:49 )             22.9<L>    Phos:2.5 mg/dL M.8 mg/dL PTH:-- Uric acid:-- Serum Osm:--  Ferritin:-- Iron:-- TIBC:-- Tsat:--  B12:-- TSH:-- ( @ 06:49)      UProt:-- UCr:36 mg/dL P/C Ratio:-- 24 hour Prot:-- UVol:-- CrCl:--  Dodie:86 mmol/L UOsm:266 mosm/kg<L> UVol:-- UCl:-- UK:-- ( @ 13:19)      AUBREY Reviewed, Discussed lia Mcintyre ( [facility] [place] )    DX : Partial Nephrogenic  DI,    Lytes  Normalized now,    Will Treat  w. HCTZ once Oral intake is optimized, Renal :      145    |  111<H>  |  8.0    ----------------------------<  115    Ca:8.5<L> (2017 06:49)  4.3     |  26.0   |  0.85       eGFR if Non : 72  eGFR if : 83                            7.2<L>  2.3<L> )-----------( 147<L>    ( 2017 06:49 )             22.9<L>    Phos:2.5 mg/dL M.8 mg/dL PTH:-- Uric acid:-- Serum Osm:--  Ferritin:-- Iron:-- TIBC:-- Tsat:--  B12:-- TSH:-- ( @ 06:49)      UProt:-- UCr:36 mg/dL P/C Ratio:-- 24 hour Prot:-- UVol:-- CrCl:--  Dodie:86 mmol/L UOsm:266 mosm/kg<L> UVol:-- UCl:-- UK:-- ( @ 13:19)      EMR Reviewed, Discussed ila Mcintyre ( [facility] [place] )    DX : Partial Nephrogenic  DI,    Lytes  Normalized now,    Will Treat  w. HCTZ once Oral intake is optimized,    The principal cell of the kidney collecting duct is one of the most highly regulated epithelial cell types in  vertebrates. The effects of hormonal, autocrine, and paracrine factors to regulate principal cell transport  processes are central to the maintenance of fluid and electrolyte balance in the face of wide variations in food and  water intake. In marked contrast with the epithelial cells lining the proximal tubule, the collecting duct is  electrically tight, and ion and osmotic gradients can be very high. The central role of principal cells in salt and  water transport is reflected by their defining transporters—the epithelial Na1 channel (ENaC), the renal outer  medullary K1 channel, and the aquaporin 2 (AQP2) water channel. The coordinated regulation of ENaC by  aldosterone, and AQP2 by arginine vasopressin () in principal cells is essential for the control of plasma Na1  and K1 concentrations, extracellular fluid volume, and BP. In addition to these essential hormones, additional  neuronal, physical, and chemical factors influence Na1, K1, and water homeostasis. Notably, a variety of  secreted paracrine and autocrine agents such as bradykinin, ATP, endothelin, nitric oxide, and prostaglandin  E2 counterbalance and limit the natriferic effects of aldosterone and the water-retaining effects of .  Considerable recent progress has improved our understanding of the transporters, receptors, second messengers,  and signaling events that mediate principal cell responses to changing environments in health  and disease. This review primarily addresses the structure and function of the key transporters and the  complex interplay of regulatory factors that modulate principal cell ion and water transport.    ALEXANDRA,  10: 135–146, 2015. doi: 10.2215/CJN.[id number]

## 2017-04-09 NOTE — PROGRESS NOTE ADULT - SUBJECTIVE AND OBJECTIVE BOX
INTERVAL HPI/OVERNIGHT EVENTS/SUBJECTIVE:  POD #   s/p laparotomy, reversal ileostomy, repair para-stomal hernia  IV: d5 1/2 Ns KCL  diet: CLD, free water   Patient: afebrile, VSS awake, resting in bed comfortable at present, ambulated yesterday passed gas increased flatus overnight    feels hungry  Passing flatus tolerated clears        Vital Signs Last 24 Hrs  T(C): 37.1, Max: 37.9 (04-08 @ 15:44)  T(F): 98.8, Max: 100.2 (04-08 @ 15:44)  HR: 73 (72 - 82)  BP: 105/66 (105/66 - 147/80)  BP(mean): --  ABP: --  ABP(mean): --  RR: 17 (17 - 18)  SpO2: 95% (95% - 97%)      I&O's Detail    I & Os for current day (as of 09 Apr 2017 10:34)  =============================================  IN:    Lactated Ringers IV Bolus: 1000 ml    dextrose 5% + sodium chloride 0.45% with potassium chloride 20 mEq/L: 800 ml    Total IN: 1800 ml  ---------------------------------------------  OUT:    Indwelling Catheter - Urethral: 3700 ml    Bulb: 37 ml    Bulb: 21 ml    Total OUT: 3758 ml  ---------------------------------------------  Total NET: -1958 ml            MEDICATIONS  (STANDING):  heparin  Injectable 5000Unit(s) SubCutaneous every 8 hours  multivitamin 1Tablet(s) Oral daily  levETIRAcetam 500milliGRAM(s) Oral two times a day  LORazepam     Tablet 1milliGRAM(s) Oral three times a day  benztropine 0.5milliGRAM(s) Oral two times a day  haloperidol     Tablet 5milliGRAM(s) Oral two times a day  levothyroxine 112MICROGram(s) Oral daily  pantoprazole  Injectable 40milliGRAM(s) IV Push daily  dextrose 5% + sodium chloride 0.45% with potassium chloride 20 mEq/L 1000milliLiter(s) IV Continuous <Continuous>  sodium chloride 0.45%. 1000milliLiter(s) IV Continuous <Continuous>    MEDICATIONS  (PRN):  ondansetron Injectable 4milliGRAM(s) IV Push once PRN Nausea and/or Vomiting  ondansetron Injectable 4milliGRAM(s) IV Push every 6 hours PRN Nausea  oxyCODONE IR 5milliGRAM(s) Oral every 3 hours PRN Moderate Pain (4 - 6)  oxyCODONE IR 10milliGRAM(s) Oral every 3 hours PRN Severe Pain (7 - 10)  acetaminophen   Tablet. 650milliGRAM(s) Oral every 6 hours PRN Mild Pain (1 - 3)      NUTRITION/IVF:  free water cld d5 1/2 NS kcl       Gen: alert and awake   abdomen:  soft, distended mildly improved, mild surgical/ incisional pain or discomfort, surgical site dressing removed, midline wound with staples intact no drainage no erythema,  old ostomy site with penrose drain.  bilateral EVAN drains with serosang drainage     Extremities: warm to toes no calf pain or tenderness          LABS:  CBC Full  -  ( 09 Apr 2017 06:49 )  WBC Count : 2.3 K/uL  Hemoglobin : 7.2 g/dL  Hematocrit : 22.9 %  Platelet Count - Automated : 147 K/uL  Mean Cell Volume : 90.9 fl  Mean Cell Hemoglobin : 28.6 pg  Mean Cell Hemoglobin Concentration : 31.4 g/dL  Auto Neutrophil # : 1.1 K/uL  Auto Lymphocyte # : 0.8 K/uL  Auto Monocyte # : 0.2 K/uL  Auto Eosinophil # : 0.2 K/uL  Auto Basophil # : x  Auto Neutrophil % : 47.9 %  Auto Lymphocyte % : 34.3 %  Auto Monocyte % : 10.4 %  Auto Eosinophil % : 7.0 %  Auto Basophil % : x    04-09    145  |  111<H>  |  8.0  ----------------------------<  115  4.3   |  26.0  |  0.85    Ca    8.5<L>      09 Apr 2017 06:49  Phos  2.5     04-09  Mg     1.8     04-09          RECENT CULTURES:            CAPILLARY BLOOD GLUCOSE      RADIOLOGY & ADDITIONAL STUDIES:    ASSESSMENT/PLAN:    67yFemale s/p ileostomy reversal paratomal hernia repair  with post op distention, improved,  clinically doing well no nausea/vomiting  found to have nephrogenic DI     dvt ppx   IS   free water deficit, free water added   f/u renal consult   cosnider thiazide diuretic   pain control, li tylenol   oob ambulation   PT    labs, supplements and am labs   cont with clears, bowel regimen, possible fulls in am

## 2017-04-10 PROCEDURE — 99223 1ST HOSP IP/OBS HIGH 75: CPT

## 2017-04-10 RX ADMIN — DEXTROSE MONOHYDRATE, SODIUM CHLORIDE, AND POTASSIUM CHLORIDE 50 MILLILITER(S): 50; .745; 4.5 INJECTION, SOLUTION INTRAVENOUS at 03:11

## 2017-04-10 RX ADMIN — HALOPERIDOL DECANOATE 5 MILLIGRAM(S): 100 INJECTION INTRAMUSCULAR at 05:25

## 2017-04-10 RX ADMIN — Medication 0.5 MILLIGRAM(S): at 16:56

## 2017-04-10 RX ADMIN — Medication 112 MICROGRAM(S): at 05:24

## 2017-04-10 RX ADMIN — LEVETIRACETAM 500 MILLIGRAM(S): 250 TABLET, FILM COATED ORAL at 16:56

## 2017-04-10 RX ADMIN — HALOPERIDOL DECANOATE 5 MILLIGRAM(S): 100 INJECTION INTRAMUSCULAR at 16:56

## 2017-04-10 RX ADMIN — PANTOPRAZOLE SODIUM 40 MILLIGRAM(S): 20 TABLET, DELAYED RELEASE ORAL at 14:47

## 2017-04-10 RX ADMIN — LEVETIRACETAM 500 MILLIGRAM(S): 250 TABLET, FILM COATED ORAL at 05:24

## 2017-04-10 RX ADMIN — HEPARIN SODIUM 5000 UNIT(S): 5000 INJECTION INTRAVENOUS; SUBCUTANEOUS at 07:06

## 2017-04-10 RX ADMIN — Medication 100 MILLIGRAM(S): at 05:24

## 2017-04-10 NOTE — CONSULT NOTE ADULT - SUBJECTIVE AND OBJECTIVE BOX
Renal :      INTERVAL HPI/OVERNIGHT EVENTS: No events over night. Patient refusing labs this morning due to claim of hemophilia. She denies pain, only complaint is of frequent BM    STATUS POST: Ileostomy reversal, repair of parastomal hernia     POST OPERATIVE DAY #: 5    SUBJECTIVE:  Flatus: [x ] YES [ ] NO             Bowel Movement: [x ] YES [ ] NO  Pain Control Adequate: [x] YES [ ] NO  Nausea: [ ] YES [x ] NO            Vomiting: [ ] YES [x ] NO  Diarrhea: [ ] YES [x ] NO         Constipation: [ ] YES x[ ] NO     Chest Pain: [ ] YES [x ] NO    SOB:  [ ] YES [x ] NO    MEDICATIONS  (STANDING):  heparin  Injectable 5000Unit(s) SubCutaneous every 8 hours  multivitamin 1Tablet(s) Oral daily  levETIRAcetam 500milliGRAM(s) Oral two times a day  LORazepam     Tablet 1milliGRAM(s) Oral three times a day  benztropine 0.5milliGRAM(s) Oral two times a day  haloperidol     Tablet 5milliGRAM(s) Oral two times a day  levothyroxine 112MICROGram(s) Oral daily  pantoprazole  Injectable 40milliGRAM(s) IV Push daily  dextrose 5% + sodium chloride 0.45% with potassium chloride 20 mEq/L 1000milliLiter(s) IV Continuous <Continuous>  docusate sodium 100milliGRAM(s) Oral three times a day  polyethylene glycol 3350 17Gram(s) Oral daily    MEDICATIONS  (PRN):  ondansetron Injectable 4milliGRAM(s) IV Push once PRN Nausea and/or Vomiting  ondansetron Injectable 4milliGRAM(s) IV Push every 6 hours PRN Nausea  oxyCODONE IR 5milliGRAM(s) Oral every 3 hours PRN Moderate Pain (4 - 6)  acetaminophen   Tablet. 650milliGRAM(s) Oral every 6 hours PRN Mild Pain (1 - 3)      Vital Signs Last 24 Hrs  T(C): 36.8, Max: 36.9 (04-09 @ 15:41)  T(F): 98.2, Max: 98.4 (04-09 @ 15:41)  HR: 71 (69 - 71)  BP: 119/72 (105/65 - 131/75)  BP(mean): --  RR: 17 (17 - 17)  SpO2: 96% (96% - 97%)    PHYSICAL EXAM:      Constitutional: Agitated, alert    Respiratory: Appropriate work of breathing, good air exchange     Cardiovascular: RRR    Gastrointestinal: Abdomen soft, NT/ND    Genitourinary: U/o remains high, clear output (as recorded below)        I&O's Detail    I & Os for current day (as of 10 Apr 2017 07:32)  =============================================  IN:    Lactated Ringers IV Bolus: 1000 ml    dextrose 5% + sodium chloride 0.45% with potassium chloride 20 mEq/L: 800 ml    Oral Fluid: 800 ml    Total IN: 2600 ml  ---------------------------------------------  OUT:    Indwelling Catheter - Urethral: 8200 ml    Bulb: 40 ml    Bulb: 30 ml    Total OUT: 8270 ml  ---------------------------------------------  Total NET: -5670 ml      LABS:                        7.2    2.3   )-----------( 147      ( 09 Apr 2017 06:49 )             22.9     04-09    145  |  111<H>  |  8.0  ----------------------------<  115  4.3   |  26.0  |  0.85    Ca    8.5<L>      09 Apr 2017 06:49  Phos  2.5     04-09  Mg     1.8     04-09            RADIOLOGY & ADDITIONAL STUDIES:

## 2017-04-10 NOTE — CONSULT NOTE ADULT - ASSESSMENT
67F s/p above, with partial  nephrogenic DI  1. f/u labs  2. OOB/ambulate  3. Advance diet  4. HCTZ as Op,

## 2017-04-10 NOTE — PROGRESS NOTE ADULT - SUBJECTIVE AND OBJECTIVE BOX
Pt doing well, laying in bed comfortably  Dressings c/d/i  incision c/d/i  EVAN serosanguinous drainage 60/45 cc in 24 hours  continue current care as per primary team

## 2017-04-11 LAB
ALBUMIN SERPL ELPH-MCNC: 2.9 G/DL — LOW (ref 3.3–5.2)
ALP SERPL-CCNC: 101 U/L — SIGNIFICANT CHANGE UP (ref 40–120)
ALT FLD-CCNC: 30 U/L — SIGNIFICANT CHANGE UP
ANION GAP SERPL CALC-SCNC: 9 MMOL/L — SIGNIFICANT CHANGE UP (ref 5–17)
AST SERPL-CCNC: 16 U/L — SIGNIFICANT CHANGE UP
BILIRUB SERPL-MCNC: 0.3 MG/DL — LOW (ref 0.4–2)
BUN SERPL-MCNC: 9 MG/DL — SIGNIFICANT CHANGE UP (ref 8–20)
CALCIUM SERPL-MCNC: 8.7 MG/DL — SIGNIFICANT CHANGE UP (ref 8.6–10.2)
CHLORIDE SERPL-SCNC: 106 MMOL/L — SIGNIFICANT CHANGE UP (ref 98–107)
CO2 SERPL-SCNC: 27 MMOL/L — SIGNIFICANT CHANGE UP (ref 22–29)
CREAT SERPL-MCNC: 0.93 MG/DL — SIGNIFICANT CHANGE UP (ref 0.5–1.3)
GLUCOSE SERPL-MCNC: 103 MG/DL — SIGNIFICANT CHANGE UP (ref 70–115)
HCT VFR BLD CALC: 25.3 % — LOW (ref 37–47)
HGB BLD-MCNC: 8 G/DL — LOW (ref 12–16)
MAGNESIUM SERPL-MCNC: 2.2 MG/DL — SIGNIFICANT CHANGE UP (ref 1.8–2.5)
MCHC RBC-ENTMCNC: 29.4 PG — SIGNIFICANT CHANGE UP (ref 27–31)
MCHC RBC-ENTMCNC: 31.6 G/DL — LOW (ref 32–36)
MCV RBC AUTO: 93 FL — SIGNIFICANT CHANGE UP (ref 81–99)
OSMOLALITY SERPL: 301 MOS/KG — HIGH (ref 275–300)
PHOSPHATE SERPL-MCNC: 3.1 MG/DL — SIGNIFICANT CHANGE UP (ref 2.4–4.7)
PLATELET # BLD AUTO: 162 K/UL — SIGNIFICANT CHANGE UP (ref 150–400)
POTASSIUM SERPL-MCNC: 3.8 MMOL/L — SIGNIFICANT CHANGE UP (ref 3.5–5.3)
POTASSIUM SERPL-SCNC: 3.8 MMOL/L — SIGNIFICANT CHANGE UP (ref 3.5–5.3)
PROT SERPL-MCNC: 5.7 G/DL — LOW (ref 6.6–8.7)
RBC # BLD: 2.72 M/UL — LOW (ref 4.4–5.2)
RBC # FLD: 14.8 % — SIGNIFICANT CHANGE UP (ref 11–15.6)
SODIUM SERPL-SCNC: 142 MMOL/L — SIGNIFICANT CHANGE UP (ref 135–145)
SURGICAL PATHOLOGY FINAL REPORT - CH: SIGNIFICANT CHANGE UP
WBC # BLD: 4.1 K/UL — LOW (ref 4.8–10.8)
WBC # FLD AUTO: 4.1 K/UL — LOW (ref 4.8–10.8)

## 2017-04-11 RX ORDER — SODIUM CHLORIDE 9 MG/ML
1000 INJECTION, SOLUTION INTRAVENOUS ONCE
Qty: 0 | Refills: 0 | Status: COMPLETED | OUTPATIENT
Start: 2017-04-11 | End: 2017-04-11

## 2017-04-11 RX ORDER — HALOPERIDOL DECANOATE 100 MG/ML
2.5 INJECTION INTRAMUSCULAR EVERY 6 HOURS
Qty: 0 | Refills: 0 | Status: DISCONTINUED | OUTPATIENT
Start: 2017-04-11 | End: 2017-04-13

## 2017-04-11 RX ADMIN — SODIUM CHLORIDE 1000 MILLILITER(S): 9 INJECTION, SOLUTION INTRAVENOUS at 08:08

## 2017-04-11 NOTE — PROGRESS NOTE BEHAVIORAL HEALTH - NSBHFUPINTERVALHXFT_PSY_A_CORE
notes by surgery and nephrology reviewed  recent CBC noted
no change in mental state chronically delusional with labile affect
POD #1  surgical notes reviewed doing well   on 1 to 1 for safety
chronically mentally ill episodes of irritability and paranoia Illogical speech Unable to explain why she refused all her medications

## 2017-04-11 NOTE — PROGRESS NOTE ADULT - SUBJECTIVE AND OBJECTIVE BOX
pt doing well  as per Dr. Miller pt to be discharged today  b/l EVAN drains were pulled with serosanguinous output <30 cc in 24 hours   drain sites covered with gauze, dressing can be removed tomorrow  pt to follow up in office in 1 week

## 2017-04-11 NOTE — PROGRESS NOTE BEHAVIORAL HEALTH - NSBHCHARTREVIEWVS_PSY_A_CORE FT
Vital Signs Last 24 Hrs  T(C): 36.9, Max: 36.9 (04-09 @ 15:41)  T(F): 98.5, Max: 98.5 (04-10 @ 07:26)  HR: 83 (69 - 83)  BP: 124/78 (105/65 - 131/75)  BP(mean): --  RR: 18 (17 - 18)  SpO2: 96% (96% - 97%)
Vital Signs Last 24 Hrs  T(C): 36.9, Max: 37.2 (04-03 @ 16:14)  T(F): 98.4, Max: 98.9 (04-03 @ 16:14)  HR: 83 (63 - 83)  BP: 127/80 (120/69 - 140/76)  BP(mean): --  RR: 18 (18 - 18)  SpO2: 98% (98% - 99%)
Vital Signs Last 24 Hrs  T(C): 36.9, Max: 38 (04-11 @ 01:00)  T(F): 98.5, Max: 100.4 (04-11 @ 01:00)  HR: 79 (79 - 86)  BP: 123/71 (98/51 - 129/72)  BP(mean): --  RR: 16 (16 - 18)  SpO2: 96% (95% - 96%)
Vital Signs Last 24 Hrs  T(C): 37.4, Max: 37.4 (04-05 @ 07:53)  T(F): 99.4, Max: 99.4 (04-05 @ 07:53)  HR: 91 (67 - 103)  BP: 100/68 (100/68 - 157/75)  BP(mean): --  RR: 16 (14 - 20)  SpO2: 100% (95% - 100%)

## 2017-04-11 NOTE — PROGRESS NOTE BEHAVIORAL HEALTH - THOUGHT PROCESS
Flight of ideas/Other
Impaired reasoning/Disorganized/Flight of ideas
Disorganized/Overinclusive
Overinclusive/Disorganized

## 2017-04-11 NOTE — PROGRESS NOTE BEHAVIORAL HEALTH - AXIS III
ileostomy reversal ventral hernia repair
ileostomy reversal ventral hernia repair
see colorectal surgery note
see colorectal surgery note

## 2017-04-11 NOTE — PROGRESS NOTE BEHAVIORAL HEALTH - NSBHCHARTREVIEWLAB_PSY_A_CORE FT
7.2    2.3   )-----------( 147      ( 09 Apr 2017 06:49 )             22.9     04-09    145  |  111<H>  |  8.0  ----------------------------<  115  4.3   |  26.0  |  0.85    Ca    8.5<L>      09 Apr 2017 06:49  Phos  2.5     04-09  Mg     1.8     04-09
11.4   3.1   )-----------( 144      ( 04 Apr 2017 07:59 )             35.5     04 Apr 2017 07:59    140    |  103    |  23.0   ----------------------------<  92     4.0     |  25.0   |  1.06     Ca    9.7        04 Apr 2017 07:59        PT/INR - ( 04 Apr 2017 07:59 )   PT: 13.1 sec;   INR: 1.19 ratio         PTT - ( 04 Apr 2017 07:59 )  PTT:33.1 sec
11.4   3.1   )-----------( 144      ( 04 Apr 2017 07:59 )             35.5     04-04    140  |  103  |  23.0<H>  ----------------------------<  92  4.0   |  25.0  |  1.06    Ca    9.7      04 Apr 2017 07:59        PT/INR - ( 04 Apr 2017 07:59 )   PT: 13.1 sec;   INR: 1.19 ratio         PTT - ( 04 Apr 2017 07:59 )  PTT:33.1 sec
8.0    4.1   )-----------( 162      ( 11 Apr 2017 08:48 )             25.3     04-11    142  |  106  |  9.0  ----------------------------<  103  3.8   |  27.0  |  0.93    Ca    8.7      11 Apr 2017 08:46  Phos  3.1     04-11  Mg     2.2     04-11    TPro  5.7<L>  /  Alb  2.9<L>  /  TBili  0.3<L>  /  DBili  x   /  AST  16  /  ALT  30  /  AlkPhos  101  04-11    LIVER FUNCTIONS - ( 11 Apr 2017 08:46 )  Alb: 2.9 g/dL / Pro: 5.7 g/dL / ALK PHOS: 101 U/L / ALT: 30 U/L / AST: 16 U/L / GGT: x

## 2017-04-11 NOTE — PROGRESS NOTE BEHAVIORAL HEALTH - PRIMARY DX
Schizo affective schizophrenia

## 2017-04-11 NOTE — PROGRESS NOTE BEHAVIORAL HEALTH - NSBHCONSULTOBSREASON_PSY_A_CORE FT
very agitated delusional poor response to reality orientation
drains, IV tubes with high likelihood of pulling out tubes
elevated risk of self injury has drains post operatively
safety risk for pulling out NG tube and IV

## 2017-04-11 NOTE — PROGRESS NOTE BEHAVIORAL HEALTH - SUMMARY
pilpeñaim patient admitted for surgery
refusing medications
cooperative with medical care
cooperative with post op care  NPO except for medications

## 2017-04-11 NOTE — PROGRESS NOTE BEHAVIORAL HEALTH - NSBHCONSULTMEDS_PSY_A_CORE FT
resume post OR as medically appropriate once able to tolerate PO
refusing Haldol
no change on haldol
resumed

## 2017-04-11 NOTE — PROGRESS NOTE BEHAVIORAL HEALTH - RISK ASSESSMENT
high risk for self injurious behaviors
elevated risk of unintentional self injury poor reality orientation
no interval change
NA

## 2017-04-11 NOTE — PROGRESS NOTE BEHAVIORAL HEALTH - NSBHATTESTSEENBY_PSY_A_CORE
attending Psychiatrist without NP/Trainee
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
## 2017-04-11 NOTE — PROGRESS NOTE BEHAVIORAL HEALTH - NSBHCONSULTRECOMMENDOTHER_PSY_A_CORE FT
surrogate consent from family for surgery  patient does not object to surgery but does not have ability to give INFORMED consent
follow CBC  WBC and Hb dropped
none
none

## 2017-04-11 NOTE — PROGRESS NOTE BEHAVIORAL HEALTH - NSBHCONSFOLLOWNEEDS_PSY_A_CORE
no psychiatric contraindications to discharge

## 2017-04-12 PROCEDURE — 99261: CPT

## 2017-04-12 PROCEDURE — 85027 COMPLETE CBC AUTOMATED: CPT

## 2017-04-12 PROCEDURE — 80048 BASIC METABOLIC PNL TOTAL CA: CPT

## 2017-04-12 PROCEDURE — 84300 ASSAY OF URINE SODIUM: CPT

## 2017-04-12 PROCEDURE — 36415 COLL VENOUS BLD VENIPUNCTURE: CPT

## 2017-04-12 PROCEDURE — 84588 ASSAY OF VASOPRESSIN: CPT

## 2017-04-12 PROCEDURE — 83735 ASSAY OF MAGNESIUM: CPT

## 2017-04-12 PROCEDURE — 84100 ASSAY OF PHOSPHORUS: CPT

## 2017-04-12 PROCEDURE — 97163 PT EVAL HIGH COMPLEX 45 MIN: CPT

## 2017-04-12 PROCEDURE — 83935 ASSAY OF URINE OSMOLALITY: CPT

## 2017-04-12 PROCEDURE — 86900 BLOOD TYPING SEROLOGIC ABO: CPT

## 2017-04-12 PROCEDURE — 86901 BLOOD TYPING SEROLOGIC RH(D): CPT

## 2017-04-12 PROCEDURE — 85610 PROTHROMBIN TIME: CPT

## 2017-04-12 PROCEDURE — 88304 TISSUE EXAM BY PATHOLOGIST: CPT

## 2017-04-12 PROCEDURE — 82570 ASSAY OF URINE CREATININE: CPT

## 2017-04-12 PROCEDURE — 85730 THROMBOPLASTIN TIME PARTIAL: CPT

## 2017-04-12 PROCEDURE — 74000: CPT

## 2017-04-12 PROCEDURE — 83930 ASSAY OF BLOOD OSMOLALITY: CPT

## 2017-04-12 PROCEDURE — 88307 TISSUE EXAM BY PATHOLOGIST: CPT

## 2017-04-12 PROCEDURE — 86850 RBC ANTIBODY SCREEN: CPT

## 2017-04-12 PROCEDURE — 74020: CPT

## 2017-04-12 PROCEDURE — 80053 COMPREHEN METABOLIC PANEL: CPT

## 2017-04-12 PROCEDURE — 88302 TISSUE EXAM BY PATHOLOGIST: CPT

## 2017-04-12 RX ADMIN — PANTOPRAZOLE SODIUM 40 MILLIGRAM(S): 20 TABLET, DELAYED RELEASE ORAL at 12:51

## 2017-04-12 RX ADMIN — Medication 1 TABLET(S): at 12:51

## 2017-04-12 NOTE — PROGRESS NOTE ADULT - ASSESSMENT
Cont. current pain regimen  Cont. current diet  Cont. heparin DVT prophylaxis   Monitor drain sites for infection/bleeding   Pt must still remain on 1 to 1  Pt encouraged to get OOB to either walk or to the chair
67F planned for ileostomy reversal.  1. CT with rectal contrast tomorrow  2. NPO tomorrow  3. OR planning for Tuesday
67F planned for reversal of ileostomy this afternoon.  1. NPO/IVF  2. Pre op abx  3. Ostomy care as needed  4. PRN anxiolytics for agitation
67F planning for ileostomy reversal.  1. CT with rectal contrast Monday  2. CLD tomorrow  3. Prep tomorrow  4. Medical optimization for OR Tuesday/wed
67F s/p above, no with nephrogenic DI  1. f/u labs  2. OOB/ambulate  3. Advance diet  4. Appreciate nephrology management
67F scheduled for ileostomy reversal tomorrow.  1. CTAP with rectal contrast scheduled today  2. Prep for OR tomorrow  3. Abx ppx ordered  4. Bowel prep to continue until complete   5. NPO
as above
stable
1.S/P Surgery  2.hypernatremia  3.DI - Partial & Nephrogenic,

## 2017-04-12 NOTE — PROGRESS NOTE ADULT - SUBJECTIVE AND OBJECTIVE BOX
INTERVAL HPI/OVERNIGHT EVENTS:  Pt examined at the bedside resting comfortably.  Pt denies any pain, fever, chills and offers no new complaints at this time.  Pt is refusing blood draws and only allowed a limited physical exam because she did not want her belly to be touched.      STATUS POST: ileostomy reversal, parastomal hernia repair      POST OPERATIVE DAY #: 8    SUBJECTIVE:  Flatus: [x ] YES [ ] NO             Bowel Movement: [ x] YES [ ] NO  Pain (0-10):            Pain Control Adequate: [x ] YES [ ] NO  Nausea: [ ] YES [x ] NO            Vomiting: [ ] YES [x ] NO  Diarrhea: [ ] YES [x ] NO         Constipation: [ ] YES [x ] NO     Chest Pain: [ ] YES [ x] NO    SOB:  [ ] YES [x ] NO    MEDICATIONS  (STANDING):  heparin  Injectable 5000Unit(s) SubCutaneous every 8 hours  multivitamin 1Tablet(s) Oral daily  levETIRAcetam 500milliGRAM(s) Oral two times a day  benztropine 0.5milliGRAM(s) Oral two times a day  haloperidol     Tablet 5milliGRAM(s) Oral two times a day  levothyroxine 112MICROGram(s) Oral daily  pantoprazole  Injectable 40milliGRAM(s) IV Push daily  docusate sodium 100milliGRAM(s) Oral three times a day  polyethylene glycol 3350 17Gram(s) Oral daily    MEDICATIONS  (PRN):  ondansetron Injectable 4milliGRAM(s) IV Push once PRN Nausea and/or Vomiting  ondansetron Injectable 4milliGRAM(s) IV Push every 6 hours PRN Nausea  oxyCODONE IR 5milliGRAM(s) Oral every 3 hours PRN Moderate Pain (4 - 6)  acetaminophen   Tablet. 650milliGRAM(s) Oral every 6 hours PRN Mild Pain (1 - 3)  haloperidol    Injectable 2.5milliGRAM(s) IntraMuscular every 6 hours PRN Agitation      Vital Signs Last 24 Hrs  T(C): 37.2, Max: 37.7 (04-11 @ 15:34)  T(F): 98.9, Max: 99.9 (04-11 @ 15:34)  HR: 83 (79 - 87)  BP: 124/67 (105/47 - 131/74)  BP(mean): --  RR: 18 (16 - 18)  SpO2: 95% (94% - 96%)    PHYSICAL EXAM:    Constitutional: NAD, lying in bed c gown off but covered by blankets.    Eyes: PERRLA, EOM intact    Respiratory: appropriate work of breathing, no respiratory distress, exam limited.    Cardiovascular: no JVD, RRR.    Gastrointestinal: exam limited.  Pt would not let us inspect or palpate her abdomen.    Vascular: capillary refill <2s, 2+ radial/DP pulses b/l    Neurological: alert, positive motor and sensation x4, exam limited    Skin: warm, dry, intact, exam limited    Lymph Nodes: no swollen cervical nodes appreciated, exam limited     Musculoskeletal:    Psychiatric: alert and agitated        I&O's Detail    I & Os for current day (as of 12 Apr 2017 07:50)  =============================================  IN:    Oral Fluid: 1040 ml    Solution: 1000 ml    Total IN: 2040 ml  ---------------------------------------------  OUT:    Indwelling Catheter - Urethral: 1955 ml    Voided: 900 ml    Total OUT: 2855 ml  ---------------------------------------------  Total NET: -815 ml      LABS:                        8.0    4.1   )-----------( 162      ( 11 Apr 2017 08:48 )             25.3     04-11    142  |  106  |  9.0  ----------------------------<  103  3.8   |  27.0  |  0.93    Ca    8.7      11 Apr 2017 08:46  Phos  3.1     04-11  Mg     2.2     04-11    TPro  5.7<L>  /  Alb  2.9<L>  /  TBili  0.3<L>  /  DBili  x   /  AST  16  /  ALT  30  /  AlkPhos  101  04-11          RADIOLOGY & ADDITIONAL STUDIES:

## 2017-04-12 NOTE — PROGRESS NOTE ADULT - ATTENDING COMMENTS
Patient seen and examined this morning. Stable. Labs pending. Will schedule Toradol if creatinine is okay and add tylenol for pain control as she is unable to voice pain level adequately. Continue NGT and rahman. Physical therapy.
Dx s/p ileostomy reversal closure abdominal wound, ventral hernia repair
Patient seen and examined. Tolerating liquids and had BM this morning. Still distended on exam but soft. EVAN with SS output. Advance to full liquids. Physical therapy and bowel regimen
Patient with distention no nausea    dx ileus   continue NPO
Abdomen decreased distention  Dx ileus  Xray unchanged but decreased distention.  Try on clears but advance slowly  SANJAY mendoza get renal consult
Patient seen this morning. Refused exam. Having bowel movements and tolerating diet. Drains removed yesterday by Plastics. Ready for discharge back  to [facility] with 1 week follow up with Plastic surgery and 2 week follow up with Dr. Miller

## 2017-04-12 NOTE — PROGRESS NOTE ADULT - PROVIDER SPECIALTY LIST ADULT
Anesthesia
Colorectal Surgery
Nephrology
Plastic Surgery
Surgery
Surgery

## 2017-04-13 ENCOUNTER — TRANSCRIPTION ENCOUNTER (OUTPATIENT)
Age: 67
End: 2017-04-13

## 2017-04-13 VITALS
TEMPERATURE: 99 F | SYSTOLIC BLOOD PRESSURE: 135 MMHG | RESPIRATION RATE: 17 BRPM | HEART RATE: 70 BPM | OXYGEN SATURATION: 96 % | DIASTOLIC BLOOD PRESSURE: 75 MMHG

## 2017-04-13 RX ORDER — ACETAMINOPHEN 500 MG
2 TABLET ORAL
Qty: 0 | Refills: 0 | COMMUNITY
Start: 2017-04-13

## 2017-04-13 RX ORDER — POLYETHYLENE GLYCOL 3350 17 G/17G
17 POWDER, FOR SOLUTION ORAL
Qty: 0 | Refills: 0 | COMMUNITY
Start: 2017-04-13

## 2017-04-13 RX ORDER — DOCUSATE SODIUM 100 MG
1 CAPSULE ORAL
Qty: 0 | Refills: 0 | COMMUNITY
Start: 2017-04-13

## 2017-04-13 RX ORDER — OXYCODONE HYDROCHLORIDE 5 MG/1
1 TABLET ORAL
Qty: 0 | Refills: 0 | COMMUNITY
Start: 2017-04-13

## 2017-04-13 NOTE — DISCHARGE NOTE ADULT - CARE PLAN
Principal Discharge DX:	H/O ileostomy  Goal:	Reversal of ileostomy, Alleviation of pain and symptoms  Instructions for follow-up, activity and diet:	Continue usual diet. Activity as tolerated but no lifting over 10 pounds until otherwise directed by a physician. Contact a physician or present to the nearest emergency room for fever, chills, or pain not relieved by medications. Principal Discharge DX:	H/O ileostomy  Goal:	Reversal of ileostomy, Alleviation of pain and symptoms  Instructions for follow-up, activity and diet:	Continue usual diet. Activity as tolerated but no lifting over 10 pounds until otherwise directed by a physician. Contact a physician or present to the nearest emergency room for fever, chills, or pain not relieved by medications. Follow up with Dr. Miller and Dr. Parrish in 2 weeks. Follow up with Dr. Bianchi in 1 week.

## 2017-04-13 NOTE — DISCHARGE NOTE ADULT - MEDICATION SUMMARY - MEDICATIONS TO TAKE
I will START or STAY ON the medications listed below when I get home from the hospital:    mesalamine 1000 mg rectal suppository  -- 1 suppository(ies) rectally once a day (at bedtime)  -- For rectal use only.    -- Indication: For Home med    oxyCODONE 5 mg oral tablet  -- 1 tab(s) by mouth every 3 hours, As needed, Moderate Pain (4 - 6)  -- Indication: For Pain    acetaminophen 325 mg oral tablet  -- 2 tab(s) by mouth every 6 hours, As needed, Mild Pain (1 - 3)  -- Indication: For Pain    aspirin 81 mg oral tablet  -- 1 tab(s) by mouth once a day  -- Indication: For Home med    calcium (as carbonate) 500 mg oral tablet  -- 1 tab(s) by mouth 2 times a day  -- Indication: For Home med    levETIRAcetam 500 mg oral tablet  -- 500 milligram(s) by mouth 2 times a day  -- Indication: For Home med    LORazepam 1 mg oral tablet  -- 1 tab(s) by mouth 3 times a day  -- Indication: For Home med    Imodium 2 mg oral capsule  -- 1 cap(s) by mouth once a day  -- Indication: For As needed for diarrhea    benztropine 1 mg oral tablet  -- 1 tab(s) by mouth once a day (in the evening)  -- Indication: For Home med    benztropine 0.5 mg oral tablet  -- 1 tab(s) by mouth once a day (in the morning)  -- Indication: For Home med    haloperidol  -- 7 milligram(s) by mouth 2 times a day  -- Indication: For Home med    aMILoride 5 mg oral tablet  -- 1 tab(s) by mouth once a day  -- Indication: For Home med    docusate sodium 100 mg oral capsule  -- 1 cap(s) by mouth 3 times a day  -- Indication: For Stool softener    polyethylene glycol 3350 oral powder for reconstitution  -- 17 gram(s) by mouth once a day  -- Indication: For Home med    simethicone 80 mg oral tablet, chewable  -- 80 milligram(s) by mouth 3 times a day  -- Indication: For Home med    omeprazole 20 mg oral delayed release capsule  -- 1 cap(s) by mouth once a day  -- Indication: For Home med    levothyroxine 112 mcg (0.112 mg) oral tablet  -- 1 tab(s) by mouth once a day  -- Indication: For Home med    multivitamin  -- 1 tab(s) by mouth once a day  -- Indication: For Home med    cyanocobalamin 1000 mcg/mL injectable solution  -- 1000 microgram(s) intramuscular once a month  -- Indication: For Home med    cholecalciferol 2000 intl units oral capsule  -- 1 cap(s) by mouth once a day (at bedtime)  -- Indication: For Home med

## 2017-04-13 NOTE — DISCHARGE NOTE ADULT - CARE PROVIDERS DIRECT ADDRESSES
[email or web address]

## 2017-04-13 NOTE — DISCHARGE NOTE ADULT - HOSPITAL COURSE
Ot is a 67F admitted for reversal of ileostomy and repair of ventral hernia. Her procedure date was 4/9. During the procedure there was an adherent loop of small bowel that had to be resected. An anastomosis was performed without incident. Post operatively she did experience a self limiting ileus.  She was briefly treated with an NGT but she removed it herself and it was not reinserted. 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  Dr. Matthew was consulted for the condition since he has treated her before. Her fluid losses were replaced with IV fluids. At this time her urine output has significantly decreased and her electrolytes have normalized. Her operative JPs have been removed. Patient is stable: tolerating diet, voiding, ambulating, pain well controlled. She will follow up with her surgeons and Dr. Bianchi as an outpatient.

## 2017-04-13 NOTE — DISCHARGE NOTE ADULT - CARE PROVIDER_API CALL
Raymond Miller), ColonRectal Surgery; Surgery  [address]  [place] [address]  Phone: [phone]  Fax: [phone]    Ibrahima Parrish), Plastic Surgery  [address] [place] [id number]  Phone: [phone]  Fax: [phone]    Fabiano Bianchi), Internal Medicine; Nephrology  [address] [place] [address]  Phone: [phone]  Fax: [phone]

## 2017-04-13 NOTE — DISCHARGE NOTE ADULT - PLAN OF CARE
Reversal of ileostomy, Alleviation of pain and symptoms Continue usual diet. Activity as tolerated but no lifting over 10 pounds until otherwise directed by a physician. Contact a physician or present to the nearest emergency room for fever, chills, or pain not relieved by medications. Continue usual diet. Activity as tolerated but no lifting over 10 pounds until otherwise directed by a physician. Contact a physician or present to the nearest emergency room for fever, chills, or pain not relieved by medications. Follow up with Dr. Miller and Dr. Parrish in 2 weeks. Follow up with Dr. Bianchi in 1 week.

## 2017-04-13 NOTE — DISCHARGE NOTE ADULT - PATIENT PORTAL LINK FT
“You can access the FollowHealth Patient Portal, offered by [facility], by registering with the following website: [email or web address]”

## 2017-04-13 NOTE — CHART NOTE - NSCHARTNOTEFT_GEN_A_CORE
Penrose drain removed intact at bedside from Q with Dr. Miller, dry gauze dressings placed. Pt tolerated well

## 2017-04-19 ENCOUNTER — EMERGENCY (EMERGENCY)
Facility: HOSPITAL | Age: 67
LOS: 1 days | Discharge: HOME | End: 2017-04-19
Attending: EMERGENCY MEDICINE
Payer: COMMERCIAL

## 2017-04-19 VITALS
SYSTOLIC BLOOD PRESSURE: 147 MMHG | TEMPERATURE: 103 F | WEIGHT: 139.99 LBS | HEART RATE: 112 BPM | DIASTOLIC BLOOD PRESSURE: 78 MMHG | OXYGEN SATURATION: 98 % | RESPIRATION RATE: 18 BRPM

## 2017-04-19 DIAGNOSIS — E03.9 HYPOTHYROIDISM, UNSPECIFIED: ICD-10-CM

## 2017-04-19 DIAGNOSIS — A41.9 SEPSIS, UNSPECIFIED ORGANISM: ICD-10-CM

## 2017-04-19 DIAGNOSIS — Z88.8 ALLERGY STATUS TO OTHER DRUGS, MEDICAMENTS AND BIOLOGICAL SUBSTANCES STATUS: ICD-10-CM

## 2017-04-19 DIAGNOSIS — R50.9 FEVER, UNSPECIFIED: ICD-10-CM

## 2017-04-19 DIAGNOSIS — R10.84 GENERALIZED ABDOMINAL PAIN: ICD-10-CM

## 2017-04-19 DIAGNOSIS — Z79.899 OTHER LONG TERM (CURRENT) DRUG THERAPY: ICD-10-CM

## 2017-04-19 DIAGNOSIS — K43.5 PARASTOMAL HERNIA WITHOUT OBSTRUCTION OR GANGRENE: Chronic | ICD-10-CM

## 2017-04-19 DIAGNOSIS — Z79.82 LONG TERM (CURRENT) USE OF ASPIRIN: ICD-10-CM

## 2017-04-19 DIAGNOSIS — I10 ESSENTIAL (PRIMARY) HYPERTENSION: ICD-10-CM

## 2017-04-19 DIAGNOSIS — R19.7 DIARRHEA, UNSPECIFIED: ICD-10-CM

## 2017-04-19 DIAGNOSIS — Z93.2 ILEOSTOMY STATUS: Chronic | ICD-10-CM

## 2017-04-19 DIAGNOSIS — K21.9 GASTRO-ESOPHAGEAL REFLUX DISEASE WITHOUT ESOPHAGITIS: ICD-10-CM

## 2017-04-19 LAB
ALBUMIN SERPL ELPH-MCNC: 3.2 G/DL — LOW (ref 3.3–5.2)
ALP SERPL-CCNC: 90 U/L — SIGNIFICANT CHANGE UP (ref 40–120)
ALT FLD-CCNC: 13 U/L — SIGNIFICANT CHANGE UP
ANION GAP SERPL CALC-SCNC: 10 MMOL/L — SIGNIFICANT CHANGE UP (ref 5–17)
ANISOCYTOSIS BLD QL: SLIGHT — SIGNIFICANT CHANGE UP
AST SERPL-CCNC: 13 U/L — SIGNIFICANT CHANGE UP
BASOPHILS # BLD AUTO: 0 K/UL — SIGNIFICANT CHANGE UP (ref 0–0.2)
BASOPHILS NFR BLD AUTO: 2 % — SIGNIFICANT CHANGE UP (ref 0–2)
BILIRUB SERPL-MCNC: 0.3 MG/DL — LOW (ref 0.4–2)
BLD GP AB SCN SERPL QL: SIGNIFICANT CHANGE UP
BUN SERPL-MCNC: 22 MG/DL — HIGH (ref 8–20)
CALCIUM SERPL-MCNC: 8.7 MG/DL — SIGNIFICANT CHANGE UP (ref 8.6–10.2)
CHLORIDE SERPL-SCNC: 101 MMOL/L — SIGNIFICANT CHANGE UP (ref 98–107)
CO2 SERPL-SCNC: 25 MMOL/L — SIGNIFICANT CHANGE UP (ref 22–29)
CREAT SERPL-MCNC: 0.99 MG/DL — SIGNIFICANT CHANGE UP (ref 0.5–1.3)
ELLIPTOCYTES BLD QL SMEAR: SLIGHT — SIGNIFICANT CHANGE UP
EOSINOPHIL # BLD AUTO: 0 K/UL — SIGNIFICANT CHANGE UP (ref 0–0.5)
EOSINOPHIL NFR BLD AUTO: 0 % — SIGNIFICANT CHANGE UP (ref 0–6)
GLUCOSE SERPL-MCNC: 114 MG/DL — SIGNIFICANT CHANGE UP (ref 70–115)
HCT VFR BLD CALC: 26.2 % — LOW (ref 37–47)
HGB BLD-MCNC: 8.1 G/DL — LOW (ref 12–16)
HYPOCHROMIA BLD QL: SLIGHT — SIGNIFICANT CHANGE UP
LACTATE BLDV-MCNC: 1.3 MMOL/L — SIGNIFICANT CHANGE UP (ref 0.7–2)
LIDOCAIN IGE QN: 179 U/L — HIGH (ref 22–51)
LYMPHOCYTES # BLD AUTO: 0.8 K/UL — LOW (ref 1–4.8)
LYMPHOCYTES # BLD AUTO: 12 % — LOW (ref 20–55)
MACROCYTES BLD QL: SLIGHT — SIGNIFICANT CHANGE UP
MCHC RBC-ENTMCNC: 28.2 PG — SIGNIFICANT CHANGE UP (ref 27–31)
MCHC RBC-ENTMCNC: 30.9 G/DL — LOW (ref 32–36)
MCV RBC AUTO: 91.3 FL — SIGNIFICANT CHANGE UP (ref 81–99)
MICROCYTES BLD QL: SLIGHT — SIGNIFICANT CHANGE UP
MONOCYTES # BLD AUTO: 0.6 K/UL — SIGNIFICANT CHANGE UP (ref 0–0.8)
MONOCYTES NFR BLD AUTO: 3 % — SIGNIFICANT CHANGE UP (ref 3–10)
NEUTROPHILS # BLD AUTO: 6 K/UL — SIGNIFICANT CHANGE UP (ref 1.8–8)
NEUTROPHILS NFR BLD AUTO: 82 % — HIGH (ref 37–73)
NEUTS BAND # BLD: 1 % — SIGNIFICANT CHANGE UP (ref 0–8)
OVALOCYTES BLD QL SMEAR: SLIGHT — SIGNIFICANT CHANGE UP
PLAT MORPH BLD: NORMAL — SIGNIFICANT CHANGE UP
PLATELET # BLD AUTO: 299 K/UL — SIGNIFICANT CHANGE UP (ref 150–400)
POIKILOCYTOSIS BLD QL AUTO: SLIGHT — SIGNIFICANT CHANGE UP
POTASSIUM SERPL-MCNC: 4.7 MMOL/L — SIGNIFICANT CHANGE UP (ref 3.5–5.3)
POTASSIUM SERPL-SCNC: 4.7 MMOL/L — SIGNIFICANT CHANGE UP (ref 3.5–5.3)
PROT SERPL-MCNC: 6.4 G/DL — LOW (ref 6.6–8.7)
RBC # BLD: 2.87 M/UL — LOW (ref 4.4–5.2)
RBC # FLD: 15.1 % — SIGNIFICANT CHANGE UP (ref 11–15.6)
RBC BLD AUTO: ABNORMAL
SODIUM SERPL-SCNC: 136 MMOL/L — SIGNIFICANT CHANGE UP (ref 135–145)
TYPE + AB SCN PNL BLD: SIGNIFICANT CHANGE UP
WBC # BLD: 7.4 K/UL — SIGNIFICANT CHANGE UP (ref 4.8–10.8)
WBC # FLD AUTO: 7.4 K/UL — SIGNIFICANT CHANGE UP (ref 4.8–10.8)

## 2017-04-19 PROCEDURE — 74022 RADEX COMPL AQT ABD SERIES: CPT | Mod: 26

## 2017-04-19 PROCEDURE — 74177 CT ABD & PELVIS W/CONTRAST: CPT | Mod: 26

## 2017-04-19 PROCEDURE — 99285 EMERGENCY DEPT VISIT HI MDM: CPT

## 2017-04-19 RX ORDER — SODIUM CHLORIDE 9 MG/ML
2000 INJECTION INTRAMUSCULAR; INTRAVENOUS; SUBCUTANEOUS ONCE
Qty: 0 | Refills: 0 | Status: COMPLETED | OUTPATIENT
Start: 2017-04-19 | End: 2017-04-19

## 2017-04-19 RX ORDER — METRONIDAZOLE 500 MG
500 TABLET ORAL EVERY 8 HOURS
Qty: 0 | Refills: 0 | Status: DISCONTINUED | OUTPATIENT
Start: 2017-04-19 | End: 2017-04-23

## 2017-04-19 RX ORDER — VANCOMYCIN HCL 1 G
500 VIAL (EA) INTRAVENOUS EVERY 6 HOURS
Qty: 0 | Refills: 0 | Status: DISCONTINUED | OUTPATIENT
Start: 2017-04-19 | End: 2017-04-23

## 2017-04-19 RX ORDER — METRONIDAZOLE 500 MG
1 TABLET ORAL
Qty: 0 | Refills: 0 | COMMUNITY
Start: 2017-04-19

## 2017-04-19 RX ORDER — VANCOMYCIN HCL 1 G
10 VIAL (EA) INTRAVENOUS
Qty: 0 | Refills: 0 | COMMUNITY
Start: 2017-04-19

## 2017-04-19 RX ORDER — SODIUM CHLORIDE 9 MG/ML
1000 INJECTION, SOLUTION INTRAVENOUS
Qty: 0 | Refills: 0 | Status: DISCONTINUED | OUTPATIENT
Start: 2017-04-19 | End: 2017-04-23

## 2017-04-19 RX ADMIN — Medication 500 MILLIGRAM(S): at 23:46

## 2017-04-19 RX ADMIN — Medication 2 MILLIGRAM(S): at 18:15

## 2017-04-19 RX ADMIN — SODIUM CHLORIDE 2000 MILLILITER(S): 9 INJECTION INTRAMUSCULAR; INTRAVENOUS; SUBCUTANEOUS at 17:00

## 2017-04-19 NOTE — ED PROVIDER NOTE - PROGRESS NOTE DETAILS
pt is a [facility] patient and is unable to give consent; pt with recent surgery with fever and will need ct scan  of abdomen and pelvis with contrast to evaluated for acute abdomen;

## 2017-04-19 NOTE — CONSULT NOTE ADULT - ASSESSMENT
post op ileostomy reversal and hernia repair. now with fevers and diahrea. recommend stool for c diff, iv fluids, blood cultures, Ct scan of abdomen and pelvis, rule out abscess/collection. abx flagyl/vanco for possible c diff.

## 2017-04-19 NOTE — DISCHARGE NOTE ADULT - CARE PROVIDERS DIRECT ADDRESSES
[email or web address],DirectAddress_Unknown

## 2017-04-19 NOTE — DISCHARGE NOTE ADULT - CARE PROVIDER_API CALL
Raymond Miller), ColonRectal Surgery; Surgery  [address] [place] [address]  Phone: [phone]  Fax: [phone]

## 2017-04-19 NOTE — DISCHARGE NOTE ADULT - PATIENT PORTAL LINK FT
“You can access the FollowHealth Patient Portal, offered by [facility], by registering with the following website: [email or web address]”

## 2017-04-19 NOTE — ED ADULT NURSE NOTE - OBJECTIVE STATEMENT
68 yo female 66 yo female 1 week post ileostomy reversal presents with complaints fevers, vomiting and diarrhea. Abdominal surgical site closed with sutures noted but warm to touch with some redness. Patient poor historian and uncooperative.  Patient yelling and pushing away staff at times.

## 2017-04-19 NOTE — H&P ADULT - GASTROINTESTINAL COMMENTS
Right mid abdomen incision with sutures - no erythema, midline incision with sutures - no erythema, abd markedly distended with diffuse tenderness with ? mild rebound tenderness

## 2017-04-19 NOTE — ED ADULT NURSE NOTE - CHIEF COMPLAINT QUOTE
pt comes to ed from [place] with 1:1 aid. s/p hernia repair and ileostomy reversal 1 week ago. patient sent for eval of diarrhea and fever. patient arrives febrile 103 tachycardiac. code sepsis initiated dr. low to bedside

## 2017-04-19 NOTE — DISCHARGE NOTE ADULT - NS AS ACTIVITY OBS
Walking-Outdoors allowed/Stairs allowed/Showering allowed/Walking-Indoors allowed/Do not make important decisions/Do not drive or operate machinery

## 2017-04-19 NOTE — DISCHARGE NOTE ADULT - MEDICATION SUMMARY - MEDICATIONS TO TAKE
I will START or STAY ON the medications listed below when I get home from the hospital:    mesalamine 1000 mg rectal suppository  -- 1 suppository(ies) rectally once a day (at bedtime)  -- For rectal use only.    -- Indication: For Per PMD    metroNIDAZOLE 500 mg oral tablet  -- 1 tab(s) by mouth every 8 hours  -- Indication: For Proctocolitis    oxyCODONE 5 mg oral tablet  -- 1 tab(s) by mouth every 3 hours, As needed, Moderate Pain (4 - 6)  -- Indication: For Pain    acetaminophen 325 mg oral tablet  -- 2 tab(s) by mouth every 6 hours, As needed, Mild Pain (1 - 3)  -- Indication: For Pain    aspirin 81 mg oral tablet  -- 1 tab(s) by mouth once a day  -- Indication: For AC    calcium (as carbonate) 500 mg oral tablet  -- 1 tab(s) by mouth 2 times a day  -- Indication: For Supplement    levETIRAcetam 500 mg oral tablet  -- 500 milligram(s) by mouth 2 times a day  -- Indication: For Seizure ppx    LORazepam 1 mg oral tablet  -- 1 tab(s) by mouth 3 times a day  -- Indication: For Agitation    Imodium 2 mg oral capsule  -- 1 cap(s) by mouth once a day  -- Indication: For Diarrhea    benztropine 1 mg oral tablet  -- 1 tab(s) by mouth once a day (in the evening)  -- Indication: For CNS    benztropine 0.5 mg oral tablet  -- 1 tab(s) by mouth once a day (in the morning)  -- Indication: For CNS    haloperidol  -- 7 milligram(s) by mouth 2 times a day  -- Indication: For Psychosis    aMILoride 5 mg oral tablet  -- 1 tab(s) by mouth once a day  -- Indication: For Diuretic    vancomycin 250 mg/5 mL oral solution  -- 10 milliliter(s) by mouth every 6 hours  -- Indication: For Proctocolitis    docusate sodium 100 mg oral capsule  -- 1 cap(s) by mouth 3 times a day  -- Indication: For Constipation    polyethylene glycol 3350 oral powder for reconstitution  -- 17 gram(s) by mouth once a day  -- Indication: For Constipation    simethicone 80 mg oral tablet, chewable  -- 80 milligram(s) by mouth 3 times a day  -- Indication: For Gas    omeprazole 20 mg oral delayed release capsule  -- 1 cap(s) by mouth once a day  -- Indication: For GERD    levothyroxine 112 mcg (0.112 mg) oral tablet  -- 1 tab(s) by mouth once a day  -- Indication: For Hypothyroid    multivitamin  -- 1 tab(s) by mouth once a day  -- Indication: For Supplement    cyanocobalamin 1000 mcg/mL injectable solution  -- 1000 microgram(s) intramuscular once a month  -- Indication: For Supplement    cholecalciferol 2000 intl units oral capsule  -- 1 cap(s) by mouth once a day (at bedtime)  -- Indication: For Supplement

## 2017-04-19 NOTE — H&P ADULT - ASSESSMENT
s/p ileostomy reversal, SB resection, , repair of parastomal/ventral hernia on 4/4 , now with possible sepsis

## 2017-04-19 NOTE — DISCHARGE NOTE ADULT - HOSPITAL COURSE
67F s/p reversal of ileostomy discharged from [facility] 4/13 to [facility].  She returns today with fever/chills and abdominal pain. Labs revealed no elevation in white count or lactate levels.  All other labs grossly normal.  She was hydrated and underwent a CT scan which revealed proctocolitis.  Of note, she also has a heterogeneous, enlarged spleen that has not changed when compared to prior imaging.  There were also GB stones present but no evidence of acute cholecystitis.  After discussion with colorectal attending Dr. Martinez, the decision was made to discharge the patient back to facility with empiric antibiotic therapy and follow up with colorectal surgeon Dr. Miller in 1 week. Upon discharge, patients vital signs were stable. 67F s/p reversal of ileostomy discharged from [facility] 4/13 to [facility].  She returns today with fever/chills and abdominal pain. Labs revealed no elevation in white count or lactate.  All other labs grossly normal.  She was hydrated and underwent a CT scan which revealed proctocolitis.  Of note, she also has a heterogeneous, enlarged spleen that has not changed when compared to prior imaging.  There were also GB stones present but no evidence of acute cholecystitis.  The case, inclusive of vitals, labs and imaging results was discussed with Dr. Martinez of colorectal surgery.  His decision was to discharge the patient back to facility with empiric antibiotic therapy and outpatient follow up in 1 week.

## 2017-04-19 NOTE — ED PROVIDER NOTE - OBJECTIVE STATEMENT
66 yo female [facility] patient comes to ed s/p recent reversal of ileostomy, hernia repair with fever and abdominal pain; pt noted with decreased activity over the last 24 hours; aide also noted patient with diarrhea

## 2017-04-19 NOTE — H&P ADULT - HISTORY OF PRESENT ILLNESS
66 yo female, with hx of HTN, hypothyroidism, stroke 2005 with left sided weakness, hx seizures, resident of [facility], s/p reversal of ileostomy, small bowel resection, and repair of parastomal /ventral hernia on 4/4/17 by Dr Miller and plastic surgeon Dr Parrish. Pt was discharged on 4/13/17 abner po well after mild ileus, afebrile, + BM, voiding well (hx nephrogenic DI) . but into diaper. Pt now returns to [facility] ER with 2 day h/o progressive worsening fever and abd pain, fever now 103 in ER, + chills, fecal incontinence, and urinary incontinence. Denies nausea/vomiting. No appetite.

## 2017-04-19 NOTE — DISCHARGE NOTE ADULT - CARE PLAN
Principal Discharge DX:	Acute onset sepsis  Goal:	Antibiotic therapy for proctocolitis  Instructions for follow-up, activity and diet:	Patient should take Flagyl and Vancomycin orally for the next week to treat colitis seen on CT scan. An appointment should be made to see Dr. Miller in the office 1 week from today.  Secondary Diagnosis:	Abdominal pain, unspecified location

## 2017-04-19 NOTE — ED ADULT TRIAGE NOTE - CHIEF COMPLAINT QUOTE
pt comes to ed from [place] with 1:1 aid. s/p hernia repair and ileostomy reversal 1 week ago. patient sent for eval of diarrhea and fever. patient arrives febrile 103 tachycardiac. code sepsis initiated dr. low to bedside

## 2017-04-19 NOTE — DISCHARGE NOTE ADULT - PLAN OF CARE
Antibiotic therapy for proctocolitis Patient should take Flagyl and Vancomycin orally for the next week to treat colitis seen on CT scan. An appointment should be made to see Dr. Miller in the office 1 week from today.

## 2017-04-19 NOTE — CONSULT NOTE ADULT - SUBJECTIVE AND OBJECTIVE BOX
post op female from ileostomy reversal and hernia repair, now with complaints of fever 103 at [facility] and diahhrea which started today.    PE: awake (not alert, this is her baseline CP)  abdomen: distended, -BS, incisions are clean and dry, no evidence of wound infection    wnc:normal  lytes normal, no acidosis, creatine normal

## 2017-04-20 ENCOUNTER — APPOINTMENT (OUTPATIENT)
Dept: COLORECTAL SURGERY | Facility: CLINIC | Age: 67
End: 2017-04-20

## 2017-04-20 VITALS
HEIGHT: 66 IN | RESPIRATION RATE: 14 BRPM | DIASTOLIC BLOOD PRESSURE: 64 MMHG | HEART RATE: 74 BPM | WEIGHT: 136 LBS | TEMPERATURE: 98.5 F | BODY MASS INDEX: 21.86 KG/M2 | SYSTOLIC BLOOD PRESSURE: 106 MMHG

## 2017-04-20 DIAGNOSIS — K51.80 OTHER ULCERATIVE COLITIS W/OUT COMPLICATIONS: ICD-10-CM

## 2017-04-20 PROCEDURE — 74177 CT ABD & PELVIS W/CONTRAST: CPT

## 2017-04-20 PROCEDURE — 87186 SC STD MICRODIL/AGAR DIL: CPT

## 2017-04-20 PROCEDURE — 83690 ASSAY OF LIPASE: CPT

## 2017-04-20 PROCEDURE — 87040 BLOOD CULTURE FOR BACTERIA: CPT

## 2017-04-20 PROCEDURE — 99285 EMERGENCY DEPT VISIT HI MDM: CPT | Mod: 25

## 2017-04-20 PROCEDURE — 74022 RADEX COMPL AQT ABD SERIES: CPT

## 2017-04-20 PROCEDURE — 83605 ASSAY OF LACTIC ACID: CPT

## 2017-04-20 PROCEDURE — 86901 BLOOD TYPING SEROLOGIC RH(D): CPT

## 2017-04-20 PROCEDURE — 85027 COMPLETE CBC AUTOMATED: CPT

## 2017-04-20 PROCEDURE — 86900 BLOOD TYPING SEROLOGIC ABO: CPT

## 2017-04-20 PROCEDURE — 80053 COMPREHEN METABOLIC PANEL: CPT

## 2017-04-20 PROCEDURE — 96374 THER/PROPH/DIAG INJ IV PUSH: CPT | Mod: XU

## 2017-04-20 PROCEDURE — 86850 RBC ANTIBODY SCREEN: CPT

## 2017-04-20 RX ADMIN — Medication 500 MILLIGRAM(S): at 02:29

## 2017-04-21 LAB — VASOPRESSIN SERPL-MCNC: 1.8 PG/ML — SIGNIFICANT CHANGE UP

## 2017-04-21 NOTE — ED POST DISCHARGE NOTE - RESULT SUMMARY
+BC- pt was admitted to the surgical team and dc by surgical PA- micro made aware and will contact surgical team

## 2017-04-23 LAB
-  CEFTRIAXONE: SIGNIFICANT CHANGE UP
-  CLINDAMYCIN: SIGNIFICANT CHANGE UP
-  ERYTHROMYCIN: SIGNIFICANT CHANGE UP
-  PENICILLIN: SIGNIFICANT CHANGE UP
-  VANCOMYCIN: SIGNIFICANT CHANGE UP
METHOD TYPE: SIGNIFICANT CHANGE UP

## 2017-04-24 LAB
CULTURE RESULTS: SIGNIFICANT CHANGE UP
SPECIMEN SOURCE: SIGNIFICANT CHANGE UP

## 2017-04-25 LAB
CULTURE RESULTS: SIGNIFICANT CHANGE UP
ORGANISM # SPEC MICROSCOPIC CNT: SIGNIFICANT CHANGE UP
ORGANISM # SPEC MICROSCOPIC CNT: SIGNIFICANT CHANGE UP
SPECIMEN SOURCE: SIGNIFICANT CHANGE UP

## 2017-04-27 ENCOUNTER — EMERGENCY (EMERGENCY)
Facility: HOSPITAL | Age: 67
LOS: 1 days | Discharge: HOME | End: 2017-04-27
Attending: EMERGENCY MEDICINE
Payer: COMMERCIAL

## 2017-04-27 VITALS
OXYGEN SATURATION: 99 % | DIASTOLIC BLOOD PRESSURE: 82 MMHG | HEART RATE: 78 BPM | RESPIRATION RATE: 18 BRPM | SYSTOLIC BLOOD PRESSURE: 121 MMHG | TEMPERATURE: 98 F

## 2017-04-27 VITALS
DIASTOLIC BLOOD PRESSURE: 99 MMHG | HEIGHT: 62 IN | OXYGEN SATURATION: 98 % | SYSTOLIC BLOOD PRESSURE: 103 MMHG | HEART RATE: 80 BPM | TEMPERATURE: 99 F | RESPIRATION RATE: 16 BRPM | WEIGHT: 139.99 LBS

## 2017-04-27 DIAGNOSIS — R10.84 GENERALIZED ABDOMINAL PAIN: ICD-10-CM

## 2017-04-27 DIAGNOSIS — K21.9 GASTRO-ESOPHAGEAL REFLUX DISEASE WITHOUT ESOPHAGITIS: ICD-10-CM

## 2017-04-27 DIAGNOSIS — Z88.8 ALLERGY STATUS TO OTHER DRUGS, MEDICAMENTS AND BIOLOGICAL SUBSTANCES STATUS: ICD-10-CM

## 2017-04-27 DIAGNOSIS — Z93.2 ILEOSTOMY STATUS: Chronic | ICD-10-CM

## 2017-04-27 DIAGNOSIS — I10 ESSENTIAL (PRIMARY) HYPERTENSION: ICD-10-CM

## 2017-04-27 DIAGNOSIS — Z98.890 OTHER SPECIFIED POSTPROCEDURAL STATES: ICD-10-CM

## 2017-04-27 DIAGNOSIS — Z88.1 ALLERGY STATUS TO OTHER ANTIBIOTIC AGENTS STATUS: ICD-10-CM

## 2017-04-27 DIAGNOSIS — Z79.82 LONG TERM (CURRENT) USE OF ASPIRIN: ICD-10-CM

## 2017-04-27 DIAGNOSIS — Z79.899 OTHER LONG TERM (CURRENT) DRUG THERAPY: ICD-10-CM

## 2017-04-27 DIAGNOSIS — E78.5 HYPERLIPIDEMIA, UNSPECIFIED: ICD-10-CM

## 2017-04-27 DIAGNOSIS — K43.5 PARASTOMAL HERNIA WITHOUT OBSTRUCTION OR GANGRENE: Chronic | ICD-10-CM

## 2017-04-27 LAB
ALBUMIN SERPL ELPH-MCNC: 3.2 G/DL — LOW (ref 3.3–5.2)
ALP SERPL-CCNC: 80 U/L — SIGNIFICANT CHANGE UP (ref 40–120)
ALT FLD-CCNC: 14 U/L — SIGNIFICANT CHANGE UP
ANION GAP SERPL CALC-SCNC: 9 MMOL/L — SIGNIFICANT CHANGE UP (ref 5–17)
APTT BLD: 30.9 SEC — SIGNIFICANT CHANGE UP (ref 27.5–37.4)
AST SERPL-CCNC: 14 U/L — SIGNIFICANT CHANGE UP
BASOPHILS # BLD AUTO: 0 K/UL — SIGNIFICANT CHANGE UP (ref 0–0.2)
BASOPHILS NFR BLD AUTO: 0.3 % — SIGNIFICANT CHANGE UP (ref 0–2)
BILIRUB SERPL-MCNC: 0.2 MG/DL — LOW (ref 0.4–2)
BLD GP AB SCN SERPL QL: SIGNIFICANT CHANGE UP
BUN SERPL-MCNC: 14 MG/DL — SIGNIFICANT CHANGE UP (ref 8–20)
CALCIUM SERPL-MCNC: 8.6 MG/DL — SIGNIFICANT CHANGE UP (ref 8.6–10.2)
CHLORIDE SERPL-SCNC: 106 MMOL/L — SIGNIFICANT CHANGE UP (ref 98–107)
CO2 SERPL-SCNC: 25 MMOL/L — SIGNIFICANT CHANGE UP (ref 22–29)
CREAT SERPL-MCNC: 0.81 MG/DL — SIGNIFICANT CHANGE UP (ref 0.5–1.3)
EOSINOPHIL # BLD AUTO: 0.1 K/UL — SIGNIFICANT CHANGE UP (ref 0–0.5)
EOSINOPHIL NFR BLD AUTO: 2.5 % — SIGNIFICANT CHANGE UP (ref 0–6)
GLUCOSE SERPL-MCNC: 101 MG/DL — SIGNIFICANT CHANGE UP (ref 70–115)
HCT VFR BLD CALC: 24.2 % — LOW (ref 37–47)
HGB BLD-MCNC: 7.6 G/DL — LOW (ref 12–16)
INR BLD: 1.28 RATIO — HIGH (ref 0.88–1.16)
LIDOCAIN IGE QN: 177 U/L — HIGH (ref 22–51)
LYMPHOCYTES # BLD AUTO: 1.1 K/UL — SIGNIFICANT CHANGE UP (ref 1–4.8)
LYMPHOCYTES # BLD AUTO: 30.9 % — SIGNIFICANT CHANGE UP (ref 20–55)
MAGNESIUM SERPL-MCNC: 2.3 MG/DL — SIGNIFICANT CHANGE UP (ref 1.8–2.5)
MCHC RBC-ENTMCNC: 27.9 PG — SIGNIFICANT CHANGE UP (ref 27–31)
MCHC RBC-ENTMCNC: 31.4 G/DL — LOW (ref 32–36)
MCV RBC AUTO: 89 FL — SIGNIFICANT CHANGE UP (ref 81–99)
MONOCYTES # BLD AUTO: 0.5 K/UL — SIGNIFICANT CHANGE UP (ref 0–0.8)
MONOCYTES NFR BLD AUTO: 12.9 % — HIGH (ref 3–10)
NEUTROPHILS # BLD AUTO: 1.9 K/UL — SIGNIFICANT CHANGE UP (ref 1.8–8)
NEUTROPHILS NFR BLD AUTO: 53.1 % — SIGNIFICANT CHANGE UP (ref 37–73)
PHOSPHATE SERPL-MCNC: 3.8 MG/DL — SIGNIFICANT CHANGE UP (ref 2.4–4.7)
PLATELET # BLD AUTO: 246 K/UL — SIGNIFICANT CHANGE UP (ref 150–400)
POTASSIUM SERPL-MCNC: 4 MMOL/L — SIGNIFICANT CHANGE UP (ref 3.5–5.3)
POTASSIUM SERPL-SCNC: 4 MMOL/L — SIGNIFICANT CHANGE UP (ref 3.5–5.3)
PROT SERPL-MCNC: 6.1 G/DL — LOW (ref 6.6–8.7)
PROTHROM AB SERPL-ACNC: 14.1 SEC — HIGH (ref 9.8–12.7)
RBC # BLD: 2.72 M/UL — LOW (ref 4.4–5.2)
RBC # FLD: 15.6 % — SIGNIFICANT CHANGE UP (ref 11–15.6)
SODIUM SERPL-SCNC: 140 MMOL/L — SIGNIFICANT CHANGE UP (ref 135–145)
TYPE + AB SCN PNL BLD: SIGNIFICANT CHANGE UP
WBC # BLD: 3.6 K/UL — LOW (ref 4.8–10.8)
WBC # FLD AUTO: 3.6 K/UL — LOW (ref 4.8–10.8)

## 2017-04-27 PROCEDURE — 83690 ASSAY OF LIPASE: CPT

## 2017-04-27 PROCEDURE — 99284 EMERGENCY DEPT VISIT MOD MDM: CPT

## 2017-04-27 PROCEDURE — 86850 RBC ANTIBODY SCREEN: CPT

## 2017-04-27 PROCEDURE — 86901 BLOOD TYPING SEROLOGIC RH(D): CPT

## 2017-04-27 PROCEDURE — 85610 PROTHROMBIN TIME: CPT

## 2017-04-27 PROCEDURE — 83735 ASSAY OF MAGNESIUM: CPT

## 2017-04-27 PROCEDURE — 74177 CT ABD & PELVIS W/CONTRAST: CPT

## 2017-04-27 PROCEDURE — 74177 CT ABD & PELVIS W/CONTRAST: CPT | Mod: 26

## 2017-04-27 PROCEDURE — 85730 THROMBOPLASTIN TIME PARTIAL: CPT

## 2017-04-27 PROCEDURE — 99284 EMERGENCY DEPT VISIT MOD MDM: CPT | Mod: 25

## 2017-04-27 PROCEDURE — 86900 BLOOD TYPING SEROLOGIC ABO: CPT

## 2017-04-27 PROCEDURE — 80053 COMPREHEN METABOLIC PANEL: CPT

## 2017-04-27 PROCEDURE — 84100 ASSAY OF PHOSPHORUS: CPT

## 2017-04-27 PROCEDURE — 85027 COMPLETE CBC AUTOMATED: CPT

## 2017-04-27 NOTE — ED ADULT NURSE REASSESSMENT NOTE - NS ED NURSE REASSESS COMMENT FT1
received patient alert skin warm and dry color good awaiting transport back to [place] will continue to follow

## 2017-04-27 NOTE — ED ADULT TRIAGE NOTE - CHIEF COMPLAINT QUOTE
Sent from [facility] for abdominal pain at site of recent reversal of ileostomy. as per [facility] special care needs/considerations "client positive for Cdiff, has two recent surgical scars secondary to ileostomy reversal, unsteady gait, on vancomycin and flagyl until 4/29.

## 2017-04-27 NOTE — ED ADULT NURSE NOTE - OBJECTIVE STATEMENT
Sent from [facility] for abdominal pain at site of recent reversal of ileostomy. as per [facility] special care needs/considerations "client positive for Cdiff, has two recent surgical scars secondary to ileostomy reversal, unsteady gait, on vancomycin and flagyl until 4/29.

## 2017-04-27 NOTE — ED PROVIDER NOTE - OBJECTIVE STATEMENT
pt presents with genralized achy non radiating abd pain had recent isleostomy reversal. deneis melena or hematochezia. no BRBPR. denies fever. denies HA or neck pain. no chest pain or sob. + abd pain. no n/v/d. no urinary f/u/d. no back pain. no motor or sensory deficits. denies drug use. no recent travel. no rash. no other acute issues symptoms or concerns

## 2017-04-27 NOTE — ED ADULT NURSE NOTE - CHIEF COMPLAINT QUOTE
Sent from [facility] for abdominal pain at site of recent reversal of ileostomy. as per [facility] special care needs/considerations "client positive for Cdiff, has two recent surgical scars secondary to ileostomy reversal, unsteady gait, on vancomycin and flagyl until 4/29.

## 2017-04-27 NOTE — ED PROVIDER NOTE - MEDICAL DECISION MAKING DETAILS
colitis resolving pt in nad wound c/d/i stable for d.c back to [facility] return to ed for intractable abd pain fevers or unable to tolerate po fluids

## 2017-04-27 NOTE — ED CLERICAL - CLERICAL COMMENTS
amelia /Ellen Albrecht r/o toxic carmelina colon/abd abscess. recent surgery Dr. Miller reversal illiostomy/hernia  admitted 4/19 for cdiff . has abd pain diarrhea

## 2017-04-27 NOTE — ED PROVIDER NOTE - PROGRESS NOTE DETAILS
per dr jones patient needs ct scan and implied consent per  in this case of patient who cannot give consent herself

## 2017-05-04 ENCOUNTER — APPOINTMENT (OUTPATIENT)
Dept: COLORECTAL SURGERY | Facility: CLINIC | Age: 67
End: 2017-05-04

## 2017-05-04 DIAGNOSIS — A04.7 ENTEROCOLITIS DUE TO CLOSTRIDIUM DIFFICILE: ICD-10-CM

## 2017-05-19 ENCOUNTER — APPOINTMENT (OUTPATIENT)
Dept: COLORECTAL SURGERY | Facility: CLINIC | Age: 67
End: 2017-05-19

## 2017-05-19 ENCOUNTER — CHART COPY (OUTPATIENT)
Age: 67
End: 2017-05-19

## 2017-05-19 ENCOUNTER — OTHER (OUTPATIENT)
Age: 67
End: 2017-05-19

## 2017-05-19 DIAGNOSIS — Z43.2 ENCOUNTER FOR ATTENTION TO ILEOSTOMY: ICD-10-CM

## 2017-05-19 DIAGNOSIS — M85.80 OTHER SPECIFIED DISORDERS OF BONE DENSITY AND STRUCTURE, UNSPECIFIED SITE: ICD-10-CM

## 2017-05-19 DIAGNOSIS — I10 ESSENTIAL (PRIMARY) HYPERTENSION: ICD-10-CM

## 2017-05-25 ENCOUNTER — EMERGENCY (EMERGENCY)
Facility: HOSPITAL | Age: 67
LOS: 1 days | Discharge: HOME | End: 2017-05-25
Attending: EMERGENCY MEDICINE
Payer: COMMERCIAL

## 2017-05-25 ENCOUNTER — APPOINTMENT (OUTPATIENT)
Dept: NEPHROLOGY | Facility: CLINIC | Age: 67
End: 2017-05-25

## 2017-05-25 VITALS
OXYGEN SATURATION: 96 % | TEMPERATURE: 102 F | HEART RATE: 90 BPM | SYSTOLIC BLOOD PRESSURE: 90 MMHG | HEIGHT: 66 IN | DIASTOLIC BLOOD PRESSURE: 64 MMHG | RESPIRATION RATE: 16 BRPM | WEIGHT: 169.98 LBS

## 2017-05-25 VITALS — TEMPERATURE: 99 F

## 2017-05-25 DIAGNOSIS — F05 DELIRIUM DUE TO KNOWN PHYSIOLOGICAL CONDITION: ICD-10-CM

## 2017-05-25 DIAGNOSIS — R69 ILLNESS, UNSPECIFIED: ICD-10-CM

## 2017-05-25 DIAGNOSIS — K43.5 PARASTOMAL HERNIA WITHOUT OBSTRUCTION OR GANGRENE: Chronic | ICD-10-CM

## 2017-05-25 DIAGNOSIS — Z79.82 LONG TERM (CURRENT) USE OF ASPIRIN: ICD-10-CM

## 2017-05-25 DIAGNOSIS — Z98.890 OTHER SPECIFIED POSTPROCEDURAL STATES: ICD-10-CM

## 2017-05-25 DIAGNOSIS — E03.9 HYPOTHYROIDISM, UNSPECIFIED: ICD-10-CM

## 2017-05-25 DIAGNOSIS — F25.0 SCHIZOAFFECTIVE DISORDER, BIPOLAR TYPE: ICD-10-CM

## 2017-05-25 DIAGNOSIS — R50.9 FEVER, UNSPECIFIED: ICD-10-CM

## 2017-05-25 DIAGNOSIS — Z93.2 ILEOSTOMY STATUS: Chronic | ICD-10-CM

## 2017-05-25 DIAGNOSIS — Z88.1 ALLERGY STATUS TO OTHER ANTIBIOTIC AGENTS STATUS: ICD-10-CM

## 2017-05-25 DIAGNOSIS — Z88.8 ALLERGY STATUS TO OTHER DRUGS, MEDICAMENTS AND BIOLOGICAL SUBSTANCES STATUS: ICD-10-CM

## 2017-05-25 DIAGNOSIS — I10 ESSENTIAL (PRIMARY) HYPERTENSION: ICD-10-CM

## 2017-05-25 DIAGNOSIS — R56.9 UNSPECIFIED CONVULSIONS: ICD-10-CM

## 2017-05-25 LAB
ALBUMIN SERPL ELPH-MCNC: 3.4 G/DL — SIGNIFICANT CHANGE UP (ref 3.3–5.2)
ALP SERPL-CCNC: 81 U/L — SIGNIFICANT CHANGE UP (ref 40–120)
ALT FLD-CCNC: 14 U/L — SIGNIFICANT CHANGE UP
ANION GAP SERPL CALC-SCNC: 13 MMOL/L — SIGNIFICANT CHANGE UP (ref 5–17)
APPEARANCE UR: CLEAR — SIGNIFICANT CHANGE UP
AST SERPL-CCNC: 25 U/L — SIGNIFICANT CHANGE UP
BACTERIA # UR AUTO: ABNORMAL
BASOPHILS # BLD AUTO: 0 K/UL — SIGNIFICANT CHANGE UP (ref 0–0.2)
BASOPHILS NFR BLD AUTO: 0.1 % — SIGNIFICANT CHANGE UP (ref 0–2)
BILIRUB SERPL-MCNC: 0.7 MG/DL — SIGNIFICANT CHANGE UP (ref 0.4–2)
BILIRUB UR-MCNC: NEGATIVE — SIGNIFICANT CHANGE UP
BUN SERPL-MCNC: 21 MG/DL — HIGH (ref 8–20)
CALCIUM SERPL-MCNC: 8.8 MG/DL — SIGNIFICANT CHANGE UP (ref 8.6–10.2)
CHLORIDE SERPL-SCNC: 99 MMOL/L — SIGNIFICANT CHANGE UP (ref 98–107)
CK SERPL-CCNC: 64 U/L — SIGNIFICANT CHANGE UP (ref 25–170)
CO2 SERPL-SCNC: 25 MMOL/L — SIGNIFICANT CHANGE UP (ref 22–29)
COLOR SPEC: SIGNIFICANT CHANGE UP
CREAT SERPL-MCNC: 1 MG/DL — SIGNIFICANT CHANGE UP (ref 0.5–1.3)
DIFF PNL FLD: ABNORMAL
EPI CELLS # UR: SIGNIFICANT CHANGE UP
GLUCOSE SERPL-MCNC: 104 MG/DL — SIGNIFICANT CHANGE UP (ref 70–115)
GLUCOSE UR QL: NEGATIVE MG/DL — SIGNIFICANT CHANGE UP
HCT VFR BLD CALC: 24.4 % — LOW (ref 37–47)
HGB BLD-MCNC: 7.6 G/DL — LOW (ref 12–16)
KETONES UR-MCNC: NEGATIVE — SIGNIFICANT CHANGE UP
LACTATE BLDV-MCNC: 0.6 MMOL/L — LOW (ref 0.7–2)
LEUKOCYTE ESTERASE UR-ACNC: ABNORMAL
LIDOCAIN IGE QN: 71 U/L — HIGH (ref 22–51)
LYMPHOCYTES # BLD AUTO: 0.8 K/UL — LOW (ref 1–4.8)
LYMPHOCYTES # BLD AUTO: 12.1 % — LOW (ref 20–55)
MAGNESIUM SERPL-MCNC: 2.1 MG/DL — SIGNIFICANT CHANGE UP (ref 1.6–2.6)
MCHC RBC-ENTMCNC: 28.4 PG — SIGNIFICANT CHANGE UP (ref 27–31)
MCHC RBC-ENTMCNC: 31.1 G/DL — LOW (ref 32–36)
MCV RBC AUTO: 91 FL — SIGNIFICANT CHANGE UP (ref 81–99)
MONOCYTES # BLD AUTO: 0.8 K/UL — SIGNIFICANT CHANGE UP (ref 0–0.8)
MONOCYTES NFR BLD AUTO: 12.2 % — HIGH (ref 3–10)
NEUTROPHILS # BLD AUTO: 5.2 K/UL — SIGNIFICANT CHANGE UP (ref 1.8–8)
NEUTROPHILS NFR BLD AUTO: 75.5 % — HIGH (ref 37–73)
NITRITE UR-MCNC: NEGATIVE — SIGNIFICANT CHANGE UP
PH UR: 7 — SIGNIFICANT CHANGE UP (ref 5–8)
PHOSPHATE SERPL-MCNC: 3.1 MG/DL — SIGNIFICANT CHANGE UP (ref 2.4–4.7)
PLATELET # BLD AUTO: 179 K/UL — SIGNIFICANT CHANGE UP (ref 150–400)
POTASSIUM SERPL-MCNC: 4.4 MMOL/L — SIGNIFICANT CHANGE UP (ref 3.5–5.3)
POTASSIUM SERPL-SCNC: 4.4 MMOL/L — SIGNIFICANT CHANGE UP (ref 3.5–5.3)
PROT SERPL-MCNC: 6.5 G/DL — LOW (ref 6.6–8.7)
PROT UR-MCNC: 15 MG/DL
RBC # BLD: 2.68 M/UL — LOW (ref 4.4–5.2)
RBC # FLD: 16.7 % — HIGH (ref 11–15.6)
RBC CASTS # UR COMP ASSIST: ABNORMAL /HPF (ref 0–4)
SODIUM SERPL-SCNC: 137 MMOL/L — SIGNIFICANT CHANGE UP (ref 135–145)
SP GR SPEC: 1 — LOW (ref 1.01–1.02)
TSH SERPL-MCNC: 0.81 UIU/ML — SIGNIFICANT CHANGE UP (ref 0.27–4.2)
UROBILINOGEN FLD QL: NEGATIVE MG/DL — SIGNIFICANT CHANGE UP
WBC # BLD: 6.9 K/UL — SIGNIFICANT CHANGE UP (ref 4.8–10.8)
WBC # FLD AUTO: 6.9 K/UL — SIGNIFICANT CHANGE UP (ref 4.8–10.8)
WBC UR QL: SIGNIFICANT CHANGE UP

## 2017-05-25 PROCEDURE — 93010 ELECTROCARDIOGRAM REPORT: CPT

## 2017-05-25 PROCEDURE — 71010: CPT | Mod: 26

## 2017-05-25 PROCEDURE — 99285 EMERGENCY DEPT VISIT HI MDM: CPT

## 2017-05-25 RX ORDER — PIPERACILLIN AND TAZOBACTAM 4; .5 G/20ML; G/20ML
3.38 INJECTION, POWDER, LYOPHILIZED, FOR SOLUTION INTRAVENOUS EVERY 8 HOURS
Qty: 0 | Refills: 0 | Status: DISCONTINUED | OUTPATIENT
Start: 2017-05-25 | End: 2017-05-29

## 2017-05-25 RX ORDER — SODIUM CHLORIDE 9 MG/ML
400 INJECTION INTRAMUSCULAR; INTRAVENOUS; SUBCUTANEOUS ONCE
Qty: 0 | Refills: 0 | Status: COMPLETED | OUTPATIENT
Start: 2017-05-25 | End: 2017-05-25

## 2017-05-25 RX ORDER — SODIUM CHLORIDE 9 MG/ML
1000 INJECTION INTRAMUSCULAR; INTRAVENOUS; SUBCUTANEOUS ONCE
Qty: 0 | Refills: 0 | Status: COMPLETED | OUTPATIENT
Start: 2017-05-25 | End: 2017-05-25

## 2017-05-25 RX ORDER — ACETAMINOPHEN 500 MG
650 TABLET ORAL ONCE
Qty: 0 | Refills: 0 | Status: COMPLETED | OUTPATIENT
Start: 2017-05-25 | End: 2017-05-25

## 2017-05-25 RX ADMIN — SODIUM CHLORIDE 3000 MILLILITER(S): 9 INJECTION INTRAMUSCULAR; INTRAVENOUS; SUBCUTANEOUS at 11:10

## 2017-05-25 RX ADMIN — SODIUM CHLORIDE 2400 MILLILITER(S): 9 INJECTION INTRAMUSCULAR; INTRAVENOUS; SUBCUTANEOUS at 11:20

## 2017-05-25 RX ADMIN — Medication 650 MILLIGRAM(S): at 16:00

## 2017-05-25 RX ADMIN — PIPERACILLIN AND TAZOBACTAM 25 GRAM(S): 4; .5 INJECTION, POWDER, LYOPHILIZED, FOR SOLUTION INTRAVENOUS at 14:30

## 2017-05-25 NOTE — ED BEHAVIORAL HEALTH ASSESSMENT NOTE - SUMMARY
patient delirious from infection baseline chronic severe mental illness with mood lability and delusional ideations

## 2017-05-25 NOTE — ED BEHAVIORAL HEALTH NOTE - BEHAVIORAL HEALTH NOTE
Patient suffers from chronic mental illness dx schizoaffective d/o. She is talking loudly, stating that she wants to go home; occasionally making bizarre statements. She denies si/hi/avh, a&ox2. She reports mood as "great", affect anxious, with good eye contact. Now medically stable. Delirium appears resolved. Cleared to d/c back to [facility].

## 2017-05-25 NOTE — ED PROVIDER NOTE - ENMT, MLM
Airway patent, Nasal mucosa clear. Mouth with normal mucosa. Throat has no vesicles, no oropharyngeal exudates and uvula is midline. poor dentition no abscess

## 2017-05-25 NOTE — ED BEHAVIORAL HEALTH ASSESSMENT NOTE - HPI (INCLUDE ILLNESS QUALITY, SEVERITY, DURATION, TIMING, CONTEXT, MODIFYING FACTORS, ASSOCIATED SIGNS AND SYMPTOMS)
sent for fever from [facility] recent admission here with c difficile diagnosis  recent surgery for ileostomy reversal and hernia repair  history from [facility] reviewed  patient unable to participate in interview

## 2017-05-25 NOTE — ED PROVIDER NOTE - OBJECTIVE STATEMENT
pt presents with fever and genraliZed weakness started this am. + h/o neutropenia and hyothyroid.  no recent change in medications. + fever. denies HA or neck pain. no chest pain or sob. no abd pain. no n/v/d. no urinary f/u/d. no back pain. no motor or sensory deficits. denies drug use. no recent travel. no rash. no other acute issues symptoms or concerns no blood in stool

## 2017-05-25 NOTE — ED ADULT NURSE NOTE - OBJECTIVE STATEMENT
pt alert and awake to voice, BIBA for fever and lethargy, was adminsitered 650mg tylenol pta by [facility] at 0845. pt alert and awake to voice, BIBA for fever, chills and lethargy, was administered tylenol 650mg PO pta by [facility] at 0845. pt s/p ileostomy reversal and parastomal hernia repair 4/4/17. pt treated for cdiff until 4/27-5/15 when pt tested negative.

## 2017-05-25 NOTE — ED ADULT NURSE REASSESSMENT NOTE - NS ED NURSE REASSESS COMMENT FT1
Assumed pt care @1900. Pt is sleeping in NAD. NSR on cardiac monitor.  IV clean dry and intact, flushes without difficulty. Safety maintained, Bed locked in lowest position. Pt has [place] aide @ bedside. Will continue to monitor.
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
## 2017-05-25 NOTE — ED PROVIDER NOTE - MEDICAL DECISION MAKING DETAILS
don not suspect emningitis/encephalitis has fever unknonw origin she appears at basline per aids. will treat supportively tyl q 4 hours dispo back to [facility] they were provided with results she was cleared by psych . return to ed for intractable HA persistent vomiting or new onset motor or sensory deficits.

## 2017-05-25 NOTE — ED BEHAVIORAL HEALTH ASSESSMENT NOTE - DESCRIPTION
diagnosis of sepsis work up in progress remains febrile see extensive summary disabled inpatient at [facility]

## 2017-05-25 NOTE — ED BEHAVIORAL HEALTH ASSESSMENT NOTE - DETAILS
[facility] external transfer summary reviewed NA see [place] note patient deliriuous unable to provide meaningful information self

## 2017-05-25 NOTE — ED CLERICAL - CLERICAL COMMENTS
lucero Toribio 65 y/o f fever chills 102.6 sp surg illiostomy reversal and para stoma hernia repari 4/4/27 cdiff colitis treated r/o bacteremia or sepsis

## 2017-05-26 ENCOUNTER — INPATIENT (INPATIENT)
Facility: HOSPITAL | Age: 67
LOS: 3 days | Discharge: NURSING, REHAB OR LONG-TERM CARE | DRG: 871 | End: 2017-05-30
Attending: FAMILY MEDICINE | Admitting: HOSPITALIST
Payer: MEDICARE

## 2017-05-26 VITALS
HEART RATE: 95 BPM | SYSTOLIC BLOOD PRESSURE: 108 MMHG | DIASTOLIC BLOOD PRESSURE: 66 MMHG | OXYGEN SATURATION: 96 % | TEMPERATURE: 99 F | RESPIRATION RATE: 20 BRPM

## 2017-05-26 DIAGNOSIS — Z93.2 ILEOSTOMY STATUS: Chronic | ICD-10-CM

## 2017-05-26 DIAGNOSIS — Z29.9 ENCOUNTER FOR PROPHYLACTIC MEASURES, UNSPECIFIED: ICD-10-CM

## 2017-05-26 DIAGNOSIS — I69.339 MONOPLEGIA OF UPPER LIMB FOLLOWING CEREBRAL INFARCTION AFFECTING UNSPECIFIED SIDE: ICD-10-CM

## 2017-05-26 DIAGNOSIS — R11.10 VOMITING, UNSPECIFIED: ICD-10-CM

## 2017-05-26 DIAGNOSIS — I69.359 HEMIPLEGIA AND HEMIPARESIS FOLLOWING CEREBRAL INFARCTION AFFECTING UNSPECIFIED SIDE: ICD-10-CM

## 2017-05-26 DIAGNOSIS — G40.909 EPILEPSY, UNSPECIFIED, NOT INTRACTABLE, WITHOUT STATUS EPILEPTICUS: ICD-10-CM

## 2017-05-26 DIAGNOSIS — K43.5 PARASTOMAL HERNIA WITHOUT OBSTRUCTION OR GANGRENE: Chronic | ICD-10-CM

## 2017-05-26 DIAGNOSIS — R79.89 OTHER SPECIFIED ABNORMAL FINDINGS OF BLOOD CHEMISTRY: ICD-10-CM

## 2017-05-26 DIAGNOSIS — A49.9 BACTERIAL INFECTION, UNSPECIFIED: ICD-10-CM

## 2017-05-26 DIAGNOSIS — F25.9 SCHIZOAFFECTIVE DISORDER, UNSPECIFIED: ICD-10-CM

## 2017-05-26 DIAGNOSIS — D64.9 ANEMIA, UNSPECIFIED: ICD-10-CM

## 2017-05-26 LAB
ALBUMIN SERPL ELPH-MCNC: 3.2 G/DL — LOW (ref 3.3–5.2)
ALP SERPL-CCNC: 73 U/L — SIGNIFICANT CHANGE UP (ref 40–120)
ALT FLD-CCNC: 14 U/L — SIGNIFICANT CHANGE UP
ANION GAP SERPL CALC-SCNC: 12 MMOL/L — SIGNIFICANT CHANGE UP (ref 5–17)
ANISOCYTOSIS BLD QL: SLIGHT — SIGNIFICANT CHANGE UP
AST SERPL-CCNC: 15 U/L — SIGNIFICANT CHANGE UP
BILIRUB SERPL-MCNC: 0.4 MG/DL — SIGNIFICANT CHANGE UP (ref 0.4–2)
BLD GP AB SCN SERPL QL: SIGNIFICANT CHANGE UP
BUN SERPL-MCNC: 21 MG/DL — HIGH (ref 8–20)
CALCIUM SERPL-MCNC: 8.9 MG/DL — SIGNIFICANT CHANGE UP (ref 8.6–10.2)
CHLORIDE SERPL-SCNC: 102 MMOL/L — SIGNIFICANT CHANGE UP (ref 98–107)
CO2 SERPL-SCNC: 23 MMOL/L — SIGNIFICANT CHANGE UP (ref 22–29)
CREAT SERPL-MCNC: 1.04 MG/DL — SIGNIFICANT CHANGE UP (ref 0.5–1.3)
CULTURE RESULTS: SIGNIFICANT CHANGE UP
ELLIPTOCYTES BLD QL SMEAR: SLIGHT — SIGNIFICANT CHANGE UP
EOSINOPHIL # BLD AUTO: 0 K/UL — SIGNIFICANT CHANGE UP (ref 0–0.5)
EOSINOPHIL NFR BLD AUTO: 0.2 % — SIGNIFICANT CHANGE UP (ref 0–6)
GLUCOSE SERPL-MCNC: 105 MG/DL — SIGNIFICANT CHANGE UP (ref 70–115)
HCT VFR BLD CALC: 22.4 % — LOW (ref 37–47)
HGB BLD-MCNC: 6.9 G/DL — CRITICAL LOW (ref 12–16)
HYPERCHROMIA BLD QL AUTO: SLIGHT — SIGNIFICANT CHANGE UP
HYPOCHROMIA BLD QL: SLIGHT — SIGNIFICANT CHANGE UP
LACTATE BLDV-MCNC: 0.6 MMOL/L — LOW (ref 0.7–2)
LYMPHOCYTES # BLD AUTO: 1.2 K/UL — SIGNIFICANT CHANGE UP (ref 1–4.8)
LYMPHOCYTES # BLD AUTO: 21.2 % — SIGNIFICANT CHANGE UP (ref 20–55)
MACROCYTES BLD QL: SLIGHT — SIGNIFICANT CHANGE UP
MCHC RBC-ENTMCNC: 27.9 PG — SIGNIFICANT CHANGE UP (ref 27–31)
MCHC RBC-ENTMCNC: 30.8 G/DL — LOW (ref 32–36)
MCV RBC AUTO: 90.7 FL — SIGNIFICANT CHANGE UP (ref 81–99)
MICROCYTES BLD QL: SLIGHT — SIGNIFICANT CHANGE UP
MONOCYTES # BLD AUTO: 0.6 K/UL — SIGNIFICANT CHANGE UP (ref 0–0.8)
MONOCYTES NFR BLD AUTO: 10.5 % — HIGH (ref 3–10)
NEUTROPHILS # BLD AUTO: 4 K/UL — SIGNIFICANT CHANGE UP (ref 1.8–8)
NEUTROPHILS NFR BLD AUTO: 67.8 % — SIGNIFICANT CHANGE UP (ref 37–73)
OVALOCYTES BLD QL SMEAR: SLIGHT — SIGNIFICANT CHANGE UP
PLAT MORPH BLD: NORMAL — SIGNIFICANT CHANGE UP
PLATELET # BLD AUTO: 140 K/UL — LOW (ref 150–400)
POIKILOCYTOSIS BLD QL AUTO: SLIGHT — SIGNIFICANT CHANGE UP
POLYCHROMASIA BLD QL SMEAR: SLIGHT — SIGNIFICANT CHANGE UP
POTASSIUM SERPL-MCNC: 3.5 MMOL/L — SIGNIFICANT CHANGE UP (ref 3.5–5.3)
POTASSIUM SERPL-SCNC: 3.5 MMOL/L — SIGNIFICANT CHANGE UP (ref 3.5–5.3)
PROT SERPL-MCNC: 6.2 G/DL — LOW (ref 6.6–8.7)
RBC # BLD: 2.46 M/UL — LOW (ref 4.4–5.2)
RBC # BLD: 2.47 M/UL — LOW (ref 4.4–5.2)
RBC # FLD: 16.5 % — HIGH (ref 11–15.6)
RBC BLD AUTO: ABNORMAL
RETICS #: 45.8 K/UL — SIGNIFICANT CHANGE UP (ref 25–125)
RETICS/RBC NFR: 1.9 % — SIGNIFICANT CHANGE UP (ref 0.5–2.6)
SCHISTOCYTES BLD QL AUTO: SLIGHT — SIGNIFICANT CHANGE UP
SODIUM SERPL-SCNC: 137 MMOL/L — SIGNIFICANT CHANGE UP (ref 135–145)
SPECIMEN SOURCE: SIGNIFICANT CHANGE UP
SPHEROCYTES BLD QL SMEAR: SLIGHT — SIGNIFICANT CHANGE UP
TYPE + AB SCN PNL BLD: SIGNIFICANT CHANGE UP
WBC # BLD: 5.9 K/UL — SIGNIFICANT CHANGE UP (ref 4.8–10.8)
WBC # FLD AUTO: 5.9 K/UL — SIGNIFICANT CHANGE UP (ref 4.8–10.8)

## 2017-05-26 PROCEDURE — 99223 1ST HOSP IP/OBS HIGH 75: CPT | Mod: GC

## 2017-05-26 PROCEDURE — 93010 ELECTROCARDIOGRAM REPORT: CPT

## 2017-05-26 PROCEDURE — 99284 EMERGENCY DEPT VISIT MOD MDM: CPT

## 2017-05-26 RX ORDER — PANTOPRAZOLE SODIUM 20 MG/1
40 TABLET, DELAYED RELEASE ORAL
Qty: 0 | Refills: 0 | Status: DISCONTINUED | OUTPATIENT
Start: 2017-05-26 | End: 2017-05-28

## 2017-05-26 RX ORDER — CALCIUM CARBONATE 500(1250)
1 TABLET ORAL DAILY
Qty: 0 | Refills: 0 | Status: DISCONTINUED | OUTPATIENT
Start: 2017-05-26 | End: 2017-05-30

## 2017-05-26 RX ORDER — ONDANSETRON 8 MG/1
4 TABLET, FILM COATED ORAL EVERY 6 HOURS
Qty: 0 | Refills: 0 | Status: DISCONTINUED | OUTPATIENT
Start: 2017-05-26 | End: 2017-05-30

## 2017-05-26 RX ORDER — ACETAMINOPHEN 500 MG
650 TABLET ORAL ONCE
Qty: 0 | Refills: 0 | Status: DISCONTINUED | OUTPATIENT
Start: 2017-05-26 | End: 2017-05-26

## 2017-05-26 RX ORDER — PETROLATUM,WHITE
1 JELLY (GRAM) TOPICAL DAILY
Qty: 0 | Refills: 0 | Status: DISCONTINUED | OUTPATIENT
Start: 2017-05-26 | End: 2017-05-30

## 2017-05-26 RX ORDER — ACETAMINOPHEN 500 MG
650 TABLET ORAL ONCE
Qty: 0 | Refills: 0 | Status: COMPLETED | OUTPATIENT
Start: 2017-05-26 | End: 2017-05-26

## 2017-05-26 RX ORDER — BENZTROPINE MESYLATE 1 MG
1 TABLET ORAL AT BEDTIME
Qty: 0 | Refills: 0 | Status: DISCONTINUED | OUTPATIENT
Start: 2017-05-27 | End: 2017-05-30

## 2017-05-26 RX ORDER — HALOPERIDOL DECANOATE 100 MG/ML
7 INJECTION INTRAMUSCULAR
Qty: 0 | Refills: 0 | Status: DISCONTINUED | OUTPATIENT
Start: 2017-05-26 | End: 2017-05-30

## 2017-05-26 RX ORDER — LEVOTHYROXINE SODIUM 125 MCG
112 TABLET ORAL DAILY
Qty: 0 | Refills: 0 | Status: DISCONTINUED | OUTPATIENT
Start: 2017-05-27 | End: 2017-05-30

## 2017-05-26 RX ORDER — LEVETIRACETAM 250 MG/1
500 TABLET, FILM COATED ORAL
Qty: 0 | Refills: 0 | Status: DISCONTINUED | OUTPATIENT
Start: 2017-05-26 | End: 2017-05-30

## 2017-05-26 RX ORDER — DIPHENHYDRAMINE HCL 50 MG
1 CAPSULE ORAL
Qty: 12 | Refills: 0 | OUTPATIENT
Start: 2017-05-26 | End: 2017-05-29

## 2017-05-26 RX ORDER — BENZTROPINE MESYLATE 1 MG
0.5 TABLET ORAL DAILY
Qty: 0 | Refills: 0 | Status: DISCONTINUED | OUTPATIENT
Start: 2017-05-27 | End: 2017-05-30

## 2017-05-26 RX ORDER — SODIUM CHLORIDE 9 MG/ML
3 INJECTION INTRAMUSCULAR; INTRAVENOUS; SUBCUTANEOUS EVERY 8 HOURS
Qty: 0 | Refills: 0 | Status: DISCONTINUED | OUTPATIENT
Start: 2017-05-26 | End: 2017-05-30

## 2017-05-26 RX ORDER — ONDANSETRON 8 MG/1
4 TABLET, FILM COATED ORAL ONCE
Qty: 0 | Refills: 0 | Status: DISCONTINUED | OUTPATIENT
Start: 2017-05-26 | End: 2017-05-26

## 2017-05-26 RX ORDER — SODIUM CHLORIDE 9 MG/ML
1000 INJECTION INTRAMUSCULAR; INTRAVENOUS; SUBCUTANEOUS
Qty: 0 | Refills: 0 | Status: COMPLETED | OUTPATIENT
Start: 2017-05-26 | End: 2017-05-26

## 2017-05-26 RX ORDER — CHOLECALCIFEROL (VITAMIN D3) 125 MCG
2000 CAPSULE ORAL
Qty: 0 | Refills: 0 | Status: DISCONTINUED | OUTPATIENT
Start: 2017-05-26 | End: 2017-05-30

## 2017-05-26 RX ORDER — SACCHAROMYCES BOULARDII 250 MG
250 POWDER IN PACKET (EA) ORAL
Qty: 0 | Refills: 0 | Status: DISCONTINUED | OUTPATIENT
Start: 2017-05-26 | End: 2017-05-30

## 2017-05-26 RX ORDER — PIPERACILLIN AND TAZOBACTAM 4; .5 G/20ML; G/20ML
3.38 INJECTION, POWDER, LYOPHILIZED, FOR SOLUTION INTRAVENOUS ONCE
Qty: 0 | Refills: 0 | Status: COMPLETED | OUTPATIENT
Start: 2017-05-26 | End: 2017-05-26

## 2017-05-26 RX ORDER — FAMOTIDINE 10 MG/ML
1 INJECTION INTRAVENOUS
Qty: 7 | Refills: 0 | OUTPATIENT
Start: 2017-05-26 | End: 2017-06-02

## 2017-05-26 RX ORDER — VANCOMYCIN HCL 1 G
1000 VIAL (EA) INTRAVENOUS ONCE
Qty: 0 | Refills: 0 | Status: COMPLETED | OUTPATIENT
Start: 2017-05-26 | End: 2017-05-26

## 2017-05-26 RX ADMIN — Medication 250 MILLIGRAM(S): at 21:12

## 2017-05-26 RX ADMIN — ONDANSETRON 4 MILLIGRAM(S): 8 TABLET, FILM COATED ORAL at 19:14

## 2017-05-26 RX ADMIN — LEVETIRACETAM 500 MILLIGRAM(S): 250 TABLET, FILM COATED ORAL at 21:12

## 2017-05-26 RX ADMIN — HALOPERIDOL DECANOATE 7 MILLIGRAM(S): 100 INJECTION INTRAMUSCULAR at 21:12

## 2017-05-26 RX ADMIN — SODIUM CHLORIDE 125 MILLILITER(S): 9 INJECTION INTRAMUSCULAR; INTRAVENOUS; SUBCUTANEOUS at 19:15

## 2017-05-26 RX ADMIN — Medication 1 MILLIGRAM(S): at 21:12

## 2017-05-26 RX ADMIN — PIPERACILLIN AND TAZOBACTAM 200 GRAM(S): 4; .5 INJECTION, POWDER, LYOPHILIZED, FOR SOLUTION INTRAVENOUS at 19:14

## 2017-05-26 RX ADMIN — Medication 1 APPLICATION(S): at 21:13

## 2017-05-26 RX ADMIN — Medication 650 MILLIGRAM(S): at 19:14

## 2017-05-26 RX ADMIN — Medication 250 MILLIGRAM(S): at 21:14

## 2017-05-26 RX ADMIN — SODIUM CHLORIDE 3 MILLILITER(S): 9 INJECTION INTRAMUSCULAR; INTRAVENOUS; SUBCUTANEOUS at 21:13

## 2017-05-26 NOTE — ED PROVIDER NOTE - CARE PLAN
Principal Discharge DX:	Blood culture positive for microorganism  Secondary Diagnosis:	Generalized weakness

## 2017-05-26 NOTE — H&P ADULT - NSHPLABSRESULTS_GEN_ALL_CORE
6.9    5.9   )-----------( 140      ( 26 May 2017 16:13 )             22.4   05-26    137  |  102  |  21.0<H>  ----------------------------<  105  3.5   |  23.0  |  1.04    Ca    8.9      26 May 2017 16:13  Phos  3.1     05-25  Mg     2.1     05-25    TPro  6.2<L>  /  Alb  3.2<L>  /  TBili  0.4  /  DBili  x   /  AST  15  /  ALT  14  /  AlkPhos  73  05-26    =========5/25 (day prior to admission):  CXR:  IMPRESSION:   No evidence of active chest disease.         If cough persists despite conservative therapy and further evaluation of   lung parenchyma required, a noncontrast CT exam suggested to exclude   occult pathology not evident on plain film radiography..

## 2017-05-26 NOTE — ED STATDOCS - PROGRESS NOTE DETAILS
66 y/o F w/ PMHx of CVA, HTN, and Hypothyroidism BIB her aid with abnormal lab work and abd pain today. Pt was here last night c/o fever and diarrhea. Pt will be sent to [facility] for further evaluation by another provider. Pt here today for reevaluation of blood culture. Will obtain septic labs although pt does not appear septic.

## 2017-05-26 NOTE — ED POST DISCHARGE NOTE - RESULT SUMMARY
+BC results, spoke to JOSETTE gordillo at [facility], patient will need to return to ED for w/u bacteremia

## 2017-05-26 NOTE — ED PROVIDER NOTE - OBJECTIVE STATEMENT
This patient is a 67 year old woman called back to the ER from [place] for positive blood cultures.  Patient was seen for fever.  She states that she still have generalized weakness.  No reports of cough, vomiting, or diarrhea.  She had a recent colostomy reversal and was recently treated for C-diff.

## 2017-05-26 NOTE — H&P ADULT - PROBLEM SELECTOR PLAN 8
SCDs b/l  Hold lovenox for now due to acute H/H drop concern for blood loss  Amb as tolerated  Discussed case with Dr. Aleman and [facility] Dr. Goldstein, saw patient together

## 2017-05-26 NOTE — H&P ADULT - PROBLEM SELECTOR PLAN 3
Controlled on medications, chronic  Psych consult- Dr. Gusman saw patient 5/25, will f/u recs for this admission  -c/w Acute onset, two known episodes so far on 5/25 and 5/26   Vomiting yellow with orange (sauce colored)  Aspiration precautions  Elevate head of bed 30 degrees

## 2017-05-26 NOTE — H&P ADULT - PROBLEM SELECTOR PLAN 6
History of seizure disorder  Unknown last seizure  Will f/u with [facility] records   Seizure precautions   Aspiration precautions Residual weakness in left hand , LUE and LLE with paresis of left phalanges  Monitor vitals, patient appears at baseline as per [place] aide

## 2017-05-26 NOTE — H&P ADULT - HISTORY OF PRESENT ILLNESS
Ms. Toribio is a 68yo F with [facility] s/f Ms. Toribio is a 66yo F with PMH s/f HTN, CVA in 2005 with residual left sided weakness, seizures, recently admitted in April for sepsis 2/2 proctocolitis with who presents with fever and positive blood cultures. Yesterday patient had a fever at 104.1 rectally, with blood cultures drawn and was discharged from the ED. She comes back today due to 1 of 2 cultures growing gram positive cocci, currently with an unknown Ms. Troibio is a 66yo F from [facility] with PMH s/f HTN, CVA in 2005 with residual left sided weakness, seizures, recently admitted in April 2017 for sepsis 2/2 proctocolitis with who presents with fever and positive blood cultures. Yesterday patient was found to have a 104.1 fever rectally in the ED, with blood cultures drawn, vomited 1x in the ED (scant amount) witnessed by her [place] aide.      She comes back today with the same aide at bedside due to 1 of 2 cultures growing gram positive cocci in clusters.   HPI limited by aide's limited interaction with patient as well as patient's inability to provide a clear history.     When asked if she is in pain, patient fixates that she was "eaten by a cobra". She denies diarrhea and states that her stool was "normal", "beige". She denies abdominal pain, difficulty breathing, chest pain at this time.   She is currently stating she wants "some water in her mouth" and gesturing to the back of her throat, asking if her tonsils are still present.     At baseline, the aide states that Ellen is usually tearful and worried, and that she is able to ambulate by herself though occasionally requires a walker. She feeds herself and puts on her own clothing. At [place], she refuses podiatry consults and does not like to have her feet cleaned.   [place] aide at bedside.     Approximately 15 minutes after exam, patient subsequently vomited a yellow/orange substance onto her blanket which was witnessed by her aide, (color orange after having eaten marinara sauce today a/p aide), no dark brown/red vomit. Ms. Toribio is a 66yo F from [facility] facility with PMH s/f schizoaffective disorder, HTN, CVA in 2005 with residual left sided weakness, seizures, recently admitted in April 2017 for sepsis 2/2 proctocolitis s/p reversal of ileostomy after repair of rectal prolapse in 2015 with small bowel resection, repair of ventral/parasternal hernia who presents with fever and positive blood cultures.   Yesterday patient was found to have a 104.1 fever rectally in the ED, with blood cultures drawn, vomited 1x in the ED (scant amount) witnessed by her [place] aide.   She comes back today with the same aide at bedside due to 1 of 2 cultures growing gram positive cocci in clusters.   HPI limited by aide's limited interaction with patient as well as patient's inability to provide a clear history.     When asked if she is in pain, patient fixates that she was "eaten by a cobra". She denies diarrhea and states that her stool was "normal", "beige". She denies abdominal pain, difficulty breathing, chest pain at this time.   She is currently stating she wants "some water in her mouth" and gesturing to the back of her throat, asking if her tonsils are present.     At baseline, the [place] aide at bedside states that Ellen is usually tearful and worried, that she currently appears her baseline and that she is able to ambulate by herself though occasionally requires a walker. She feeds herself and puts on her own clothing. At [place], she refuses podiatry consults and does not like to have her feet cleaned.       Approximately 15 minutes after exam, patient subsequently vomited a yellow/orange substance onto her blanket which was witnessed by her aide, (color orange after having eaten marinara sauce today a/p aide), no dark brown/red vomit. Ms. Toribio is a 66yo F from [facility] facility with PMH s/f schizoaffective disorder, HTN, CVA in 2005 with residual left sided weakness, seizures, recently admitted in April 2017 for sepsis 2/2 proctocolitis s/p reversal of ileostomy after repair of rectal prolapse in 2015 with small bowel resection, repair of ventral/parasternal hernia who presents with fever and positive blood cultures.   Yesterday patient was found to have a 104.1 fever rectally in the ED, with blood cultures drawn, vomited 1x in the ED (scant amount) witnessed by her [place] aide.   She comes back today with the same aide at bedside due to 1 of 2 cultures growing gram positive cocci in clusters.   HPI limited by aide's limited interaction with patient as well as patient's inability to provide a clear history.     When asked if she is in pain, patient fixates that she was "eaten by a cobra". She denies diarrhea and states that her stool was "normal", "beige". She denies abdominal pain, difficulty breathing, chest pain at this time.   She is currently stating she wants "some water in her mouth" and gesturing to the back of her throat, asking if her tonsils are present.     At baseline, the [place] aide at bedside states that Ellen is usually tearful and worried, that she currently appears her baseline and that she is able to ambulate by herself though occasionally requires a walker. She feeds herself and puts on her own clothing. At [place], she refuses podiatry consults and does not like to have her feet cleaned.       Approximately 20 minutes after exam, patient subsequently vomited a yellow/orange substance onto her blanket which was witnessed by her aide, (color orange after having eaten marinara sauce today a/p aide), no dark brown/red vomit. Ms. Toribio is a 66yo F from [facility] facility with PMH s/f schizoaffective disorder, HTN, CVA in 2005 with residual left sided weakness, seizures, recently admitted in April 2017 for sepsis 2/2 proctocolitis s/p reversal of ileostomy after repair of rectal prolapse in 2015 with small bowel resection, repair of ventral/parasternal hernia who presents with fever and positive blood cultures.   Yesterday patient was found to have a 104.1 fever rectally in the ED, with blood cultures drawn, vomited 1x in the ED (scant amount) witnessed by her [place] aide.   She comes back today with the same aide at bedside due to 1 of 2 cultures growing gram positive cocci in clusters.   HPI limited by aide's limited interaction with patient as well as patient's inability to provide a clear history.     When asked if she is in pain, patient repeatedly states that she was "eaten by a cobra". She denies diarrhea and states that her stool was "normal", "beige". She denies abdominal pain, difficulty breathing, chest pain at this time.   She is currently stating she wants "some water in her mouth" and gesturing to the back of her throat, asking if her tonsils are present.     At baseline, the [place] aide at bedside states that Ellen is usually tearful and worried, that she currently appears her baseline and that she is able to ambulate by herself though occasionally requires a walker. She feeds herself and puts on her own clothing. At [place], she refuses podiatry consults and does not like to have her feet cleaned.       Approximately 20 minutes after exam, patient subsequently vomited a yellow/orange substance onto her blanket which was witnessed by her aide, (color orange after having eaten marinara sauce today a/p aide), no dark brown/red vomit.

## 2017-05-26 NOTE — H&P ADULT - PROBLEM SELECTOR PLAN 1
Acute onset, with history of sepsis due to proctocolitis in April 2017  Possible sources include proctocolitis, UTI, aspiration pneumonia at this time  Gram positive cocci in clusters on blood culture 1 of 2 on 5/25  F/u repeat blood cultures and second culture from 5/25   AM CBC, CMP, procalcitonin  F/u repeat UA, urine culture: first UA negative Acute onset, with history of sepsis due to proctocolitis in April 2017  Possible sources include proctocolitis, UTI, aspiration pneumonia at this time  Gram positive cocci in clusters on blood culture 1 of 2 on 5/25  F/u repeat blood cultures and second culture from 5/25   AM CBC, CMP, procalcitonin  F/u repeat UA, urine culture: first UA negative, repeating and possibly consider treating as patient unable to confirm/deny dysuria  CT abdomen/pelvis  NPO for now due to vomiting, pending CT with contrast   f/u EKG Acute onset, with history of sepsis due to proctocolitis in April 2017  Possible sources include proctocolitis, UTI, aspiration pneumonia at this time  Gram positive cocci in clusters on blood culture 1 of 2 on 5/25  F/u repeat blood cultures and second culture from 5/25   AM CBC, CMP, procalcitonin  F/u repeat UA, urine culture: first UA negative, repeating and possibly consider treating as patient unable to confirm/deny dysuria  CT abdomen/pelvis  NPO for now due to vomiting, pending CT with contrast   f/u EKG  Vitals per routine  Vanco 1gram, f/u blood cultures and urine cultures Acute onset, with history of sepsis due to proctocolitis in April 2017  Concern for recurrent proctocolitis with possible UTI, aspiration pneumonia, poor foot hygiene with potential for infection though no visible open lesions   Gram positive cocci in clusters on blood culture 1 of 2 on 5/25  F/u repeat blood cultures and second culture from 5/25   AM CBC, CMP, procalcitonin  F/u repeat UA, urine culture: first UA negative, repeating and possibly consider treating as patient unable to confirm/deny dysuria  CT abdomen/pelvis  NPO for now due to vomiting, pending CT with contrast   f/u EKG  Vitals per routine  Vanco 1gram, f/u blood cultures and urine cultures

## 2017-05-26 NOTE — H&P ADULT - PROBLEM SELECTOR PLAN 2
Acute drop in H/H from 7.6 on 5/25 to 6.9 today   AM CBC  Repeat H/H with reticulocyte count stat Concern for acute blood loss of unknown source with difficulty eliciting history from patient and aide   Acute drop in H/H from 7.6 on 5/25 to 6.9 today   AM CBC  Repeat H/H with reticulocyte count stat  Negative guaiac, will repeat

## 2017-05-26 NOTE — H&P ADULT - PROBLEM SELECTOR PLAN 5
History of seizure disorder  Unknown last seizure  Will f/u with [facility] records   Seizure precautions   Aspiration precautions Residual weakness in left hand , LUE and LLE with paresis of left phalanges  Monitor vitals, patient appears at baseline as per [place] aide Likely acute kidney injury possibly due to dehydration, vomiting  Encourage PO hydration and feeding TID  1L @ NS@ 125  Consider LR at 75cc/hr if continues to vomit, NPO for procedure, monitor for signs of fluid overload

## 2017-05-26 NOTE — H&P ADULT - ATTENDING COMMENTS
Note addended where needed. Plan discussed with patient and we saw the patient together at the bedside with the resident team on 5/26.

## 2017-05-26 NOTE — H&P ADULT - NSHPPHYSICALEXAM_GEN_ALL_CORE
Vital Signs Last 24 Hrs  T(C): 37.8, Max: 37.8 (05-26 @ 16:03)  T(F): 100, Max: 100 (05-26 @ 16:03)  HR: 95 (95 - 95)  BP: 108/66 (108/66 - 108/66)  BP(mean): --  RR: 20 (20 - 20)  SpO2: 96% (96% - 96%) RA    PHYSICAL EXAM:      Constitutional: NAD, laying in bed, appearing forlorn  HEENT: PERRLA, EOMI, Normal Hearing  Neck: No JVD, supple  Back: Normal spine flexure, No CVA tenderness  Respiratory: CTABL no w/r/r  Cardiovascular: S1 and S2, RRR, no m/r/g  Gastrointestinal: BS+ normoactive, soft, ND, NTTP   Extremities: No c/c/e  Vascular: 2+ peripheral pulses  Neurological: AAO x 3, no focal deficits  Psychiatric: Nonsensical speech, exhibiting signs of belief perseverance   Musculoskeletal: 5/5 strength b/l upper and lower extremities  Skin: No rashes Vital Signs Last 24 Hrs  T(C): 37.8, Max: 37.8 (05-26 @ 16:03)  T(F): 100, Max: 100 (05-26 @ 16:03)  HR: 95 (95 - 95)  BP: 108/66 (108/66 - 108/66)  BP(mean): --  RR: 20 (20 - 20)  SpO2: 96% (96% - 96%) RA    PHYSICAL EXAM:      Constitutional: NAD, sitting in bed, appearing sad, apprehensive  HEENT: PERRLA, EOMI, Normal Hearing  Neck: No JVD, supple  Back: Normal spine flexure, No CVA tenderness  Respiratory: grossly CTABL though exam at bases limited due to patient non-cooperation to breathe in/out   Cardiovascular: S1 and S2, RRR, no m/r/g; palpable DP, PT and radial pulses 2+ b/l  Gastrointestinal: BS+ normoactive, soft, ND, NTTP (patient did not exhibit guarding, rigidity or grimace upon palpation);   Extremities: Trace edema in LE b/l; hemiparesis in left hand with loosely closed fist and inability to move fingers, left arm weakness and left leg weakness.   Vascular: 2+ peripheral pulses  Neurological: AAOx1 though limited due to patient fixation on being "eaten by cobra"  Psychiatric: Nonsensical speech, exhibiting signs of belief perseverance; emotionally labile, somewhat redirectable   Musculoskeletal: 5/5 strength b/l upper and lower extremities  Skin: +malodorous crusting lesions in between toes b/l with crusting yellow nailbeds, adequate perfusion; dry, papery skin Vital Signs Last 24 Hrs  T(C): 37.8, Max: 37.8 (05-26 @ 16:03)  T(F): 100, Max: 100 (05-26 @ 16:03)  HR: 95 (95 - 95)  BP: 108/66 (108/66 - 108/66)  BP(mean): --  RR: 20 (20 - 20)  SpO2: 96% (96% - 96%) RA    PHYSICAL EXAM:      Constitutional: NAD, sitting in bed, appearing sad, apprehensive  HEENT: PERRLA, EOMI, Normal Hearing  Neck: No JVD, supple  Back: Normal spine flexure, No CVA tenderness  Respiratory: grossly CTABL though exam at bases limited due to patient non-cooperation to breathe in/out   Cardiovascular: S1 and S2, RRR, no m/r/g; palpable DP, PT and radial pulses 2+ b/l  Gastrointestinal: BS+ normoactive, soft, ND, NTTP (patient did not exhibit guarding, rigidity or grimace upon palpation);   Extremities: Trace edema in LE b/l; hemiparesis in left hand with loosely closed fist and inability to move fingers, left arm weakness and left leg weakness.   Vascular: 2+ peripheral pulses  Neurological: AAOx1 though limited due to patient fixation on being "eaten by cobra"  Psychiatric: Nonsensical speech, exhibiting signs of belief perseverance; emotionally labile, somewhat redirectable   Musculoskeletal: 5/5 strength b/l upper and lower extremities  Skin: +malodorous crusting lesions in between toes b/l with crusting yellow nailbeds, adequate perfusion; dry, papery skin; no visible open lesions

## 2017-05-26 NOTE — H&P ADULT - NSHPREVIEWOFSYSTEMS_GEN_ALL_CORE
ROS limited due to patient's inability to provide a complete history  ROS notable for +vomiting +fever yesterday   No diarrhea, no constipation, no abdominal pain, SOB, CP. ROS limited due to patient's inability to provide a complete history  ROS notable for +vomiting +fever yesterday   No diarrhea, no constipation, no abdominal pain, SOB, CP.  Patient unable to confirm/deny dysuria, chills, headache, changes in vision

## 2017-05-26 NOTE — H&P ADULT - PROBLEM SELECTOR PLAN 4
Controlled on medications, chronic  Psych consult- Dr. Gusman saw patient 5/25, will f/u recs for this admission  -will continue psych meds for now pending psych eval

## 2017-05-26 NOTE — ED ADULT NURSE REASSESSMENT NOTE - NS ED NURSE REASSESS COMMENT FT1
Pt vomitted.  Unable to give PO med or oral contrast at this time.  Call placed to medical resident.  Awaiting call back.

## 2017-05-26 NOTE — H&P ADULT - ASSESSMENT
Ms. 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 Currently with bacteremia of unknown source though possibly 2/2 recurrent proctocolitis, possible UTI, concern for aspiration pneumonia and dehydration.     Admit to: inpatient level of care  Attending: Dr. Aleman  Service: medicine-resident  Bed type: any monitored bed  Diet: NPO for now (vomiting, pending CT with contrast)  Ambulation: as tolerated Ms. 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 Currently with bacteremia of unknown source though possibly 2/2 recurrent proctocolitis, possible UTI, concern for aspiration pneumonia and dehydration. Patient had TTE in 2015 with 75% EF     Admit to: inpatient level of care  Attending: Dr. Aleman  Service: medicine-resident  Bed type: any monitored bed  Diet: NPO for now (vomiting, pending CT with contrast)  Ambulation: as tolerated

## 2017-05-26 NOTE — H&P ADULT - PROBLEM SELECTOR PLAN 7
SCDs b/l  Hold lovenox for now due to acute H/H drop concern for blood loss SCDs b/l  Hold lovenox for now due to acute H/H drop concern for blood loss  Amb as tolerated SCDs b/l  Hold lovenox for now due to acute H/H drop concern for blood loss  Amb as tolerated  Discussed case with Dr. Aleman and SROC Dr. Goldstein, saw patient together History of seizure disorder  Unknown last seizure  Will f/u with [facility] records   Seizure precautions   Aspiration precautions

## 2017-05-27 DIAGNOSIS — F25.9 SCHIZOAFFECTIVE DISORDER, UNSPECIFIED: ICD-10-CM

## 2017-05-27 DIAGNOSIS — R79.89 OTHER SPECIFIED ABNORMAL FINDINGS OF BLOOD CHEMISTRY: ICD-10-CM

## 2017-05-27 LAB
ALBUMIN SERPL ELPH-MCNC: 3.2 G/DL — LOW (ref 3.3–5.2)
ALP SERPL-CCNC: 76 U/L — SIGNIFICANT CHANGE UP (ref 40–120)
ALT FLD-CCNC: 16 U/L — SIGNIFICANT CHANGE UP
ANION GAP SERPL CALC-SCNC: 13 MMOL/L — SIGNIFICANT CHANGE UP (ref 5–17)
ANISOCYTOSIS BLD QL: SLIGHT — SIGNIFICANT CHANGE UP
AST SERPL-CCNC: 16 U/L — SIGNIFICANT CHANGE UP
BILIRUB SERPL-MCNC: 0.3 MG/DL — LOW (ref 0.4–2)
BUN SERPL-MCNC: 18 MG/DL — SIGNIFICANT CHANGE UP (ref 8–20)
CALCIUM SERPL-MCNC: 9 MG/DL — SIGNIFICANT CHANGE UP (ref 8.6–10.2)
CHLORIDE SERPL-SCNC: 111 MMOL/L — HIGH (ref 98–107)
CO2 SERPL-SCNC: 24 MMOL/L — SIGNIFICANT CHANGE UP (ref 22–29)
CREAT SERPL-MCNC: 0.97 MG/DL — SIGNIFICANT CHANGE UP (ref 0.5–1.3)
DACRYOCYTES BLD QL SMEAR: SLIGHT — SIGNIFICANT CHANGE UP
ELLIPTOCYTES BLD QL SMEAR: SLIGHT — SIGNIFICANT CHANGE UP
GLUCOSE SERPL-MCNC: 92 MG/DL — SIGNIFICANT CHANGE UP (ref 70–115)
HCT VFR BLD CALC: 23.6 % — LOW (ref 37–47)
HGB BLD-MCNC: 7.3 G/DL — LOW (ref 12–16)
HYPOCHROMIA BLD QL: SLIGHT — SIGNIFICANT CHANGE UP
LYMPHOCYTES # BLD AUTO: 25 % — SIGNIFICANT CHANGE UP (ref 20–55)
MAGNESIUM SERPL-MCNC: 2.2 MG/DL — SIGNIFICANT CHANGE UP (ref 1.6–2.6)
MCHC RBC-ENTMCNC: 28.2 PG — SIGNIFICANT CHANGE UP (ref 27–31)
MCHC RBC-ENTMCNC: 30.9 G/DL — LOW (ref 32–36)
MCV RBC AUTO: 91.1 FL — SIGNIFICANT CHANGE UP (ref 81–99)
MICROCYTES BLD QL: SLIGHT — SIGNIFICANT CHANGE UP
MONOCYTES NFR BLD AUTO: 11 % — HIGH (ref 3–10)
NEUTROPHILS NFR BLD AUTO: 64 % — SIGNIFICANT CHANGE UP (ref 37–73)
OVALOCYTES BLD QL SMEAR: SLIGHT — SIGNIFICANT CHANGE UP
PHOSPHATE SERPL-MCNC: 3 MG/DL — SIGNIFICANT CHANGE UP (ref 2.4–4.7)
PLAT MORPH BLD: NORMAL — SIGNIFICANT CHANGE UP
PLATELET # BLD AUTO: 131 K/UL — LOW (ref 150–400)
POIKILOCYTOSIS BLD QL AUTO: SLIGHT — SIGNIFICANT CHANGE UP
POTASSIUM SERPL-MCNC: 3.6 MMOL/L — SIGNIFICANT CHANGE UP (ref 3.5–5.3)
POTASSIUM SERPL-SCNC: 3.6 MMOL/L — SIGNIFICANT CHANGE UP (ref 3.5–5.3)
PREALB SERPL-MCNC: 9 MG/DL — LOW (ref 18–38)
PROCALCITONIN SERPL-MCNC: 0.99 NG/ML — HIGH (ref 0–0.05)
PROT SERPL-MCNC: 6.2 G/DL — LOW (ref 6.6–8.7)
RBC # BLD: 2.59 M/UL — LOW (ref 4.4–5.2)
RBC # FLD: 16.5 % — HIGH (ref 11–15.6)
RBC BLD AUTO: ABNORMAL
SODIUM SERPL-SCNC: 148 MMOL/L — HIGH (ref 135–145)
WBC # BLD: 3.2 K/UL — LOW (ref 4.8–10.8)
WBC # FLD AUTO: 3.2 K/UL — LOW (ref 4.8–10.8)

## 2017-05-27 PROCEDURE — 99233 SBSQ HOSP IP/OBS HIGH 50: CPT | Mod: GC

## 2017-05-27 PROCEDURE — 99223 1ST HOSP IP/OBS HIGH 75: CPT

## 2017-05-27 PROCEDURE — 74177 CT ABD & PELVIS W/CONTRAST: CPT | Mod: 26

## 2017-05-27 PROCEDURE — 71260 CT THORAX DX C+: CPT | Mod: 26

## 2017-05-27 RX ORDER — SODIUM CHLORIDE 9 MG/ML
1000 INJECTION, SOLUTION INTRAVENOUS
Qty: 0 | Refills: 0 | Status: DISCONTINUED | OUTPATIENT
Start: 2017-05-27 | End: 2017-05-27

## 2017-05-27 RX ORDER — DOCUSATE SODIUM 100 MG
100 CAPSULE ORAL
Qty: 0 | Refills: 0 | Status: DISCONTINUED | OUTPATIENT
Start: 2017-05-27 | End: 2017-05-28

## 2017-05-27 RX ORDER — VANCOMYCIN HCL 1 G
1000 VIAL (EA) INTRAVENOUS EVERY 12 HOURS
Qty: 0 | Refills: 0 | Status: DISCONTINUED | OUTPATIENT
Start: 2017-05-27 | End: 2017-05-28

## 2017-05-27 RX ADMIN — Medication 250 MILLIGRAM(S): at 18:25

## 2017-05-27 RX ADMIN — HALOPERIDOL DECANOATE 7 MILLIGRAM(S): 100 INJECTION INTRAMUSCULAR at 18:25

## 2017-05-27 RX ADMIN — SODIUM CHLORIDE 3 MILLILITER(S): 9 INJECTION INTRAMUSCULAR; INTRAVENOUS; SUBCUTANEOUS at 05:55

## 2017-05-27 RX ADMIN — Medication 1 TABLET(S): at 14:33

## 2017-05-27 RX ADMIN — Medication 2000 UNIT(S): at 14:33

## 2017-05-27 RX ADMIN — Medication 1 MILLIGRAM(S): at 21:09

## 2017-05-27 RX ADMIN — SODIUM CHLORIDE 3 MILLILITER(S): 9 INJECTION INTRAMUSCULAR; INTRAVENOUS; SUBCUTANEOUS at 21:04

## 2017-05-27 RX ADMIN — SODIUM CHLORIDE 3 MILLILITER(S): 9 INJECTION INTRAMUSCULAR; INTRAVENOUS; SUBCUTANEOUS at 14:32

## 2017-05-27 RX ADMIN — Medication 0.5 MILLIGRAM(S): at 14:33

## 2017-05-27 RX ADMIN — LEVETIRACETAM 500 MILLIGRAM(S): 250 TABLET, FILM COATED ORAL at 18:25

## 2017-05-27 RX ADMIN — Medication 1 MILLIGRAM(S): at 14:32

## 2017-05-27 RX ADMIN — Medication 1 MILLIGRAM(S): at 06:05

## 2017-05-27 RX ADMIN — Medication 250 MILLIGRAM(S): at 06:05

## 2017-05-27 RX ADMIN — Medication 100 MILLIGRAM(S): at 18:25

## 2017-05-27 RX ADMIN — SODIUM CHLORIDE 75 MILLILITER(S): 9 INJECTION, SOLUTION INTRAVENOUS at 06:05

## 2017-05-27 RX ADMIN — HALOPERIDOL DECANOATE 7 MILLIGRAM(S): 100 INJECTION INTRAMUSCULAR at 06:04

## 2017-05-27 RX ADMIN — LEVETIRACETAM 500 MILLIGRAM(S): 250 TABLET, FILM COATED ORAL at 06:05

## 2017-05-27 RX ADMIN — Medication 1 APPLICATION(S): at 14:34

## 2017-05-27 RX ADMIN — PANTOPRAZOLE SODIUM 40 MILLIGRAM(S): 20 TABLET, DELAYED RELEASE ORAL at 06:05

## 2017-05-27 NOTE — CONSULT NOTE ADULT - ASSESSMENT
67F with extensive CR history with bacteremia but no clinical evidence of colitis flare at this time no abd pain   CT scan reviewed with Dr ANDERSON   all images reviewed   patient seen and evaluated   no acute intervention from CR standpoint   care per medical team   please re consult as needed   re consult should abd examine change

## 2017-05-27 NOTE — PROGRESS NOTE ADULT - ASSESSMENT
Ms. 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 Currently with bacteremia of unknown source though possibly 2/2 recurrent proctocolitis, Urine growing <10,000 gram positive cocci, , concern for aspiration pneumonia after two episodes of vomiting in past two days and dehydration. Patient had TTE in 2015 with 75% EF. We will continue to monitor her and hydrate her as needed,

## 2017-05-27 NOTE — PROGRESS NOTE ADULT - PROBLEM SELECTOR PLAN 6
Residual weakness in left hand , LUE and LLE with paresis of left phalanges  Monitor vitals, patient appears at baseline as per [place] aide Residual weakness in left hand , LUE and LLE with paresis of left phalanges  Monitor vitals, patient appears at baseline as per [place] aide  not on aspirin now, not on statin, will check lipid panel

## 2017-05-27 NOTE — PROGRESS NOTE ADULT - PROBLEM SELECTOR PLAN 2
Concern for acute blood loss of unknown source with difficulty eliciting history from patient and aide   Acute drop in H/H from 7.6 on 5/25 to 6.9 today   AM CBC  Repeat H/H with reticulocyte count stat  Negative guaiac, will repeat Concern for acute blood loss of unknown source with difficulty eliciting history from patient and aide   Acute drop in H/H from 7.6 on 5/25 to 6.9 today   Repeat H/H with reticulocyte count stat  Negative guaiac, will repeat  to receive blood transfusion   surgical eval as she was seen by them prior  anemia w/up    essential also with pancytopenia - likely medication induced (from psych meds) f/up b12 and folate and monitor levels

## 2017-05-27 NOTE — PROGRESS NOTE BEHAVIORAL HEALTH - RISK ASSESSMENT
Patient has chronic schizophrenia, with irritability, is operating at baseline, no acute suicidal or homicidal risk.

## 2017-05-27 NOTE — BEHAVIORAL HEALTH ASSESSMENT NOTE - OTHER
[facility] NA Not assessed unable to assess Current medical issues. Has a 1:1 staff at her bedside. Self-dialoguing.

## 2017-05-27 NOTE — PROGRESS NOTE ADULT - ATTENDING COMMENTS
Note addended where needed. Plan discussed with patient at bedside with resident team Note addended where needed. Plan discussed with patient at bedside with resident team  Elevated na and chloride likely 2/2 being drawn on the side of the fluids running

## 2017-05-27 NOTE — PROGRESS NOTE ADULT - PROBLEM SELECTOR PLAN 1
Acute onset, with history of sepsis due to proctocolitis in April 2017  Concern for recurrent proctocolitis with possible UTI, aspiration pneumonia, poor foot hygiene with potential for infection though no visible open lesions   Gram positive cocci in clusters on blood culture 1 of 2 on 5/25  F/u repeat blood cultures and second culture from 5/25   AM CBC, CMP, procalcitonin  F/u repeat UA, urine culture: first UA negative, repeating and possibly consider treating as patient unable to confirm/deny dysuria  CT abdomen/pelvis pending read  f/u EKG  Vitals per routine  Vanco 1gram Acute onset, with history of sepsis due to proctocolitis in April 2017  Concern for recurrent proctocolitis with possible UTI, aspiration pneumonia, poor foot hygiene with potential for infection though no visible open lesions   Gram positive cocci in clusters on blood culture 1 of 2 on 5/25  F/u repeat blood cultures and second culture from 5/25   AM CBC, CMP, procalcitonin  F/u repeat UA, urine culture: first UA negative, repeating and possibly consider treating as patient unable to confirm/deny dysuria  CT abdomen/pelvis pending read  f/u EKG  Vitals per routine  Vanco 1gram Q12 as per pharmacy recs, f/u trough Acute onset, with history of sepsis due to proctocolitis in April 2017  Concern for recurrent proctocolitis with possible UTI  Gram positive cocci in clusters on blood culture 1 of 2 on 5/25  F/u repeat blood cultures and second culture from 5/25   CT abdomen/pelvis read noted.   on vanco at this time, pending blood cx repeat in lab   Vitals per routine  Vanco 1gram Q12 as per pharmacy recs, f/u trough Acute onset, with history of sepsis due to proctocolitis in April 2017.   Concern for recurrent proctocolitis with possible UTI (pyelo on ct reading)   Gram positive cocci in clusters on blood culture 1 of 2 on 5/25  F/u repeat blood cultures and second culture from 5/25   CT abdomen/pelvis read noted.   on vanco at this time, pending blood cx repeat in lab   Vitals per routine  Vanco 1gram Q12 as per pharmacy recs, f/u trough

## 2017-05-27 NOTE — PROGRESS NOTE BEHAVIORAL HEALTH - NSBHFUPINTERVALHXFT_PSY_A_CORE
Patient is unhappy being in the hospital and repeatedly expresses desire to return home.  She is irritable and states "I am not a psychiatric patient".  She states that she is taking haldol although she doesn't need it.  She has poor sleep that is chronic since 1997.  She states that she has been eating regularly.

## 2017-05-27 NOTE — PROGRESS NOTE ADULT - PROBLEM SELECTOR PLAN 3
Acute onset, two known episodes so far on 5/25 and 5/26   Vomiting yellow with orange (sauce colored)  Aspiration precautions  Elevate head of bed 30 degrees Acute onset, two known episodes so far on 5/25 and 5/26   Vomiting yellow with orange (sauce colored)  Aspiration precautions  Elevate head of bed 30 degrees  tolerating some of her diet, zofran prn, advance diet, axr to evaluate b/c of distention this am   If negative, simethicone to be added Acute onset, two known episodes so far on 5/25 and 5/26   Vomiting yellow with orange (sauce colored)  Aspiration precautions  Elevate head of bed 30 degrees  tolerating some of her diet, zofran prn, advance diet, axr to evaluate b/c of distention this am   If negative, simethicone to be added    ct readings with multiple lesions on spleen noted - no further intervention at this time

## 2017-05-27 NOTE — PROGRESS NOTE ADULT - PROBLEM SELECTOR PLAN 5
Likely acute kidney injury possibly due to dehydration, vomiting  Encourage PO hydration and feeding TID  s/p 1L @ NS@ 125  Now LR @ 75 cc/hr Likely acute kidney injury possibly due to dehydration, vomiting  Encourage PO hydration and feeding TID  s/p 1L @ NS@ 125  Now LR @ 75 cc/hr  can titrate off fluids when tolerating diet

## 2017-05-27 NOTE — CONSULT NOTE ADULT - ATTENDING COMMENTS
Patient sen and examined. CT scan of abdomen reviewed. She denies abdominal pain and has no tenderness on exam. Having bowel movements. No acute surgical issues

## 2017-05-27 NOTE — BEHAVIORAL HEALTH ASSESSMENT NOTE - RISK ASSESSMENT
Low risk for suicidality/homicidality at this time.  She is at elevated risk fr aggression due to current irritability.

## 2017-05-27 NOTE — CHART NOTE - NSCHARTNOTEFT_GEN_A_CORE
Spoke with Patient's brother Renzo Toribio who was notified of the need for the transfusion, the possible risks of transfusion reactions, and this was witnessed by Dr.Carly Peters.    Phone # [phone]

## 2017-05-27 NOTE — BEHAVIORAL HEALTH ASSESSMENT NOTE - HPI (INCLUDE ILLNESS QUALITY, SEVERITY, DURATION, TIMING, CONTEXT, MODIFYING FACTORS, ASSOCIATED SIGNS AND SYMPTOMS)
67 year old, single female, resident of [facility]; with recent admission to [facility] for C-difficile diagnosis; with recent surgery for ileostomy reversal and hernia repair; was seen on 5/25/15 in the ER for a fever; was discharged; was sent back to the hospital yesterday, and was admitted with bacteremia. Patient's current H&H as of today are 7.3/23.6.    Met with patient in her room with a 1:1 staff from [facility] with her; on assessment, patient is irritable; stating: "I'm not taking to you; you are not making me stay here", and endorses her current mood as "upset". When asked about her current sleep pattern she states "I don't want to talk", and refused to answer further questions related to her mental status.

## 2017-05-27 NOTE — PROGRESS NOTE ADULT - SUBJECTIVE AND OBJECTIVE BOX
Patient is a 67y old  Female who presents with a chief complaint of "I was eaten by a cobra" (26 May 2017 18:14)      INTERVAL HPI/OVERNIGHT EVENTS:  Ms. Toribio slept well overnight, had no further episodes of vomiting since being in the ED yesterday, no hematuria, hematochezia as per bedside aide, no hematemesis. She says "no" when asked if she has leg pain, chest pain. She says her belly feels "tight". LBM yesterday evening, no diarrhea      Allergies    clozapine (Other)  Depakote (Other)  erythromycin (Unknown)  Neupogen (Other)    Intolerances        Vital Signs Last 24 Hrs  T(C): 36.8, Max: 37.8 (05-26 @ 16:03)  T(F): 98.3, Max: 100 (05-26 @ 16:03)  HR: 84 (72 - 97)  BP: 124/69 (101/56 - 125/76)  BP(mean): --  RR: 18 (18 - 20)  SpO2: 98% (95% - 98%) RA        Daily   I&O's Detail  Intake: 1L NS , now on 1L LR at 75cc/hr              REVIEW OF SYSTEMS:    CONSTITUTIONAL: No fever, weight loss, or fatigue  EYES: No eye pain, visual disturbances, or discharge  ENMT:  No difficulty hearing  RESPIRATORY: No cough, wheezing, chills or hemoptysis; No shortness of breath  CARDIOVASCULAR: No chest pain   GASTROINTESTINAL: No abdominal; no further vomiting at this time; diarrhea or constipation. No melena or hematochezia.  GENITOURINARY: Unable to tell us if she has dysuria  NEUROLOGICAL: No headaches  LYMPH NODES: No enlarged glands  ENDOCRINE: No heat or cold intolerance; No hair loss  PSYCHIATRIC: +Depression, anxiety, mood swings  HEME/LYMPH: No easy bruising, or bleeding gums  ALLERY AND IMMUNOLOGIC: No hives or eczema      PHYSICAL EXAM:    GENERAL: NAD, laying in bed, covers over her head  HEAD:  Atraumatic, Normocephalic  EYES: EOMI, PERRLA, conjunctiva and sclera clear  ENMT: No tonsillar erythema, exudates, or enlargement; DRY mucous membranes; poor dentition with majority of bottom teeth missing  NECK: Supple, No JVD   NERVOUS SYSTEM:  Alert to self, refusing to answer when asked where/when  CHEST/LUNG: Clear to auscultation bilaterally; No rales, rhonchi, wheezing, or rubs  HEART: Regular rate and rhythm; No murmurs, rubs, or gallops  ABDOMEN: Softly distended, moderate tenderness to palpation; +BS  EXTREMITIES:  2+ Peripheral Pulses; trace edema b/l; skin between toes improved since yesterday after cleaning, no visible open sores or lesions  LYMPH: No lymphadenopathy noted  SKIN: no sacral decubitus ulcers, skin intact      LABS:                                 7.3    3.2   )-----------( 131      ( 27 May 2017 05:29 )             23.6       148<H>  |  111<H>  |  18.0  ----------------------------<  92  3.6   |  24.0  |  0.97    Ca    9.0      27 May 2017 05:29  Phos  3.0       Mg     2.2         TPro  6.2<L>  /  Alb  3.2<L>  /  TBili  0.3<L>  /  DBili  x   /  AST  16  /  ALT  16  /  AlkPhos  76           Urinalysis Basic - ( 25 May 2017 16:06 )    Color: Pale Yellow / Appearance: Clear / S.005 / pH: x  Gluc: x / Ketone: Negative  / Bili: Negative / Urobili: Negative mg/dL   Blood: x / Protein: 15 mg/dL / Nitrite: Negative   Leuk Esterase: Small / RBC: 3-5 /HPF / WBC 3-5   Sq Epi: x / Non Sq Epi: Occasional / Bacteria: Occasional        Culture Results:   <10,000 CFU/ml Gram Negative Rods ( @ 16:06)  Culture Results:   Growth in aerobic bottle: Gram Positive Cocci in Clusters  Aerobic Bottle: 20:20 Hours to positivity  Anaerobic Bottle: No growth to date  .  TYPE: (C=Critical, N=Notification, A=Abnormal) C  TESTS:  _ Positive Blood GS  DATE/TIME CALLED: _ 20 ( @ 11:04)    Procalcitonin, Serum: 0.99 ng/mL ( @ 05:29)      Albumin, Serum: 3.2 g/dL ( @ 05:29)  Albumin, Serum: 3.2 g/dL ( @ 16:13)    LIVER FUNCTIONS - ( 27 May 2017 05:29 )  Alb: 3.2 g/dL / Pro: 6.2 g/dL / ALK PHOS: 76 U/L / ALT: 16 U/L / AST: 16 U/L / GGT: x             RADIOLOGY & ADDITIONAL TESTS: Patient is a 67y old  Female who presents with a chief complaint of "I was eaten by a cobra" (26 May 2017 18:14)      INTERVAL HPI/OVERNIGHT EVENTS:  Ms. Toribio slept well overnight, had no further episodes of vomiting since being in the ED yesterday, no hematuria, hematochezia as per bedside aide, no hematemesis. She says "no" when asked if she has leg pain, chest pain. She says her belly feels "tight". LBM yesterday evening, no diarrhea      Allergies    clozapine (Other)  Depakote (Other)  erythromycin (Unknown)  Neupogen (Other)    Intolerances        Vital Signs Last 24 Hrs  T(C): 36.8, Max: 37.8 (05-26 @ 16:03)  T(F): 98.3, Max: 100 (05-26 @ 16:03)  HR: 84 (72 - 97)  BP: 124/69 (101/56 - 125/76)  BP(mean): --  RR: 18 (18 - 20)  SpO2: 98% (95% - 98%) RA        Daily   I&O's Detail  Intake: 1L NS , now on 1L LR at 75cc/hr      REVIEW OF SYSTEMS:    CONSTITUTIONAL: No fever, weight loss, or fatigue  EYES: No eye pain, visual disturbances, or discharge  ENMT:  No difficulty hearing  RESPIRATORY: No cough, wheezing, chills or hemoptysis; No shortness of breath  CARDIOVASCULAR: No chest pain   GASTROINTESTINAL: No abdominal; no further vomiting at this time; diarrhea or constipation. No melena or hematochezia.  GENITOURINARY: Unable to tell us if she has dysuria  NEUROLOGICAL: No headaches  LYMPH NODES: No enlarged glands  ENDOCRINE: No heat or cold intolerance; No hair loss  PSYCHIATRIC: +Depression, anxiety, mood swings  HEME/LYMPH: No easy bruising, or bleeding gums  ALLERY AND IMMUNOLOGIC: No hives or eczema      PHYSICAL EXAM:    GENERAL: NAD, laying in bed, covers over her head  HEAD:  Atraumatic, Normocephalic  EYES: EOMI, PERRLA, conjunctiva and sclera clear  ENMT: No tonsillar erythema, exudates, or enlargement; DRY mucous membranes; poor dentition with majority of bottom teeth missing  NECK: Supple, No JVD   NERVOUS SYSTEM:  Alert to self, refusing to answer when asked where/when  CHEST/LUNG: Clear to auscultation bilaterally; No rales, rhonchi, wheezing, or rubs  HEART: Regular rate and rhythm; No murmurs, rubs, or gallops  ABDOMEN: Softly distended, moderate tenderness to palpation; +BS  EXTREMITIES:  2+ Peripheral Pulses; trace edema b/l; skin between toes improved since yesterday after cleaning, no visible open sores or lesions  LYMPH: No lymphadenopathy noted  SKIN: no sacral decubitus ulcers, skin intact      LABS:                                 7.3    3.2   )-----------( 131      ( 27 May 2017 05:29 )             23.6       148<H>  |  111<H>  |  18.0  ----------------------------<  92  3.6   |  24.0  |  0.97    Ca    9.0      27 May 2017 05:29  Phos  3.0       Mg     2.2         TPro  6.2<L>  /  Alb  3.2<L>  /  TBili  0.3<L>  /  DBili  x   /  AST  16  /  ALT  16  /  AlkPhos  76           Urinalysis Basic - ( 25 May 2017 16:06 )    Color: Pale Yellow / Appearance: Clear / S.005 / pH: x  Gluc: x / Ketone: Negative  / Bili: Negative / Urobili: Negative mg/dL   Blood: x / Protein: 15 mg/dL / Nitrite: Negative   Leuk Esterase: Small / RBC: 3-5 /HPF / WBC 3-5   Sq Epi: x / Non Sq Epi: Occasional / Bacteria: Occasional        Culture Results:   <10,000 CFU/ml Gram Negative Rods ( @ 16:06)  Culture Results:   Growth in aerobic bottle: Gram Positive Cocci in Clusters  Aerobic Bottle: 20:20 Hours to positivity  Anaerobic Bottle: No growth to date  .  TYPE: (C=Critical, N=Notification, A=Abnormal) C  TESTS:  _ Positive Blood GS  DATE/TIME CALLED: _ 20 ( @ 11:04)    Procalcitonin, Serum: 0.99 ng/mL ( @ 05:29)      Albumin, Serum: 3.2 g/dL ( @ 05:29)  Albumin, Serum: 3.2 g/dL ( @ 16:13)    LIVER FUNCTIONS - ( 27 May 2017 05:29 )  Alb: 3.2 g/dL / Pro: 6.2 g/dL / ALK PHOS: 76 U/L / ALT: 16 U/L / AST: 16 U/L / GGT: x             RADIOLOGY & ADDITIONAL TESTS:

## 2017-05-27 NOTE — BEHAVIORAL HEALTH ASSESSMENT NOTE - SUMMARY
67 year old, single female, resident of [facility]; with recent admission to [facility] for C-difficile diagnosis; with recent surgery for ileostomy reversal and hernia repair; was seen on 5/25/15 in the ER for a fever; was discharged; was sent back to the hospital yesterday, and was admitted with bacteremia. Patient's current H&H as of today are 7.3/23.6.    Patient is irritable and uncooperative; with history of severe mental illness; she is unable to participate fully in the psychiatric assessment, she is at elevated risk for gression towards others, and is unable to reliably contract for safety; she will benefit from a psychiatric reassessment once medically stable.

## 2017-05-27 NOTE — PROGRESS NOTE ADULT - PROBLEM SELECTOR PLAN 7
History of seizure disorder  Unknown last seizure  Will f/u with [facility] records   Seizure precautions   Aspiration precautions History of seizure disorder  Unknown last seizure  Seizure precautions   Aspiration precautions  c/w keppra

## 2017-05-27 NOTE — PROGRESS NOTE BEHAVIORAL HEALTH - SUMMARY
Patient is a 67 y.o. female with PPH of Schizoaffective disorder she has increased mood lability with current treatment.   She denies suicidal and homicidal ideations at this time.  She is on 1:1 with [facility] aide.

## 2017-05-28 DIAGNOSIS — K59.00 CONSTIPATION, UNSPECIFIED: ICD-10-CM

## 2017-05-28 LAB
-  AMPICILLIN/SULBACTAM: SIGNIFICANT CHANGE UP
-  CEFAZOLIN: SIGNIFICANT CHANGE UP
-  CIPROFLOXACIN: SIGNIFICANT CHANGE UP
-  CLINDAMYCIN: SIGNIFICANT CHANGE UP
-  ERYTHROMYCIN: SIGNIFICANT CHANGE UP
-  GENTAMICIN: SIGNIFICANT CHANGE UP
-  LEVOFLOXACIN: SIGNIFICANT CHANGE UP
-  MOXIFLOXACIN(AEROBIC): SIGNIFICANT CHANGE UP
-  OXACILLIN: SIGNIFICANT CHANGE UP
-  PENICILLIN: SIGNIFICANT CHANGE UP
-  RIFAMPIN: SIGNIFICANT CHANGE UP
-  TETRACYCLINE: SIGNIFICANT CHANGE UP
-  TRIMETHOPRIM/SULFAMETHOXAZOLE: SIGNIFICANT CHANGE UP
-  VANCOMYCIN: SIGNIFICANT CHANGE UP
CHOLEST SERPL-MCNC: 122 MG/DL — SIGNIFICANT CHANGE UP (ref 110–199)
FERRITIN SERPL-MCNC: 112.3 NG/ML — SIGNIFICANT CHANGE UP (ref 11–306)
FOLATE SERPL-MCNC: 17.2 NG/ML — HIGH (ref 4–16)
HCT VFR BLD CALC: 25.9 % — LOW (ref 37–47)
HDLC SERPL-MCNC: 17 MG/DL — LOW
HGB BLD-MCNC: 8 G/DL — LOW (ref 12–16)
IRON SATN MFR SERPL: 25 % — SIGNIFICANT CHANGE UP (ref 14–50)
IRON SATN MFR SERPL: 60 UG/DL — SIGNIFICANT CHANGE UP (ref 37–145)
LIPID PNL WITH DIRECT LDL SERPL: 64 MG/DL — SIGNIFICANT CHANGE UP
METHOD TYPE: SIGNIFICANT CHANGE UP
RBC # BLD: 2.91 M/UL — LOW (ref 4.4–5.2)
RETICS #: 30 K/UL — SIGNIFICANT CHANGE UP (ref 25–125)
RETICS/RBC NFR: 1 % — SIGNIFICANT CHANGE UP (ref 0.5–2.6)
TIBC SERPL-MCNC: 245 UG/DL — SIGNIFICANT CHANGE UP (ref 220–430)
TOTAL CHOLESTEROL/HDL RATIO MEASUREMENT: 7 RATIO — SIGNIFICANT CHANGE UP (ref 3.3–7.1)
TRANSFERRIN SERPL-MCNC: 171 MG/DL — LOW (ref 192–382)
TRIGL SERPL-MCNC: 203 MG/DL — HIGH (ref 10–200)
VANCOMYCIN TROUGH SERPL-MCNC: 22.2 UG/ML — HIGH (ref 10–20)
VIT B12 SERPL-MCNC: 1212 PG/ML — HIGH (ref 180–914)

## 2017-05-28 PROCEDURE — 74020: CPT | Mod: 26

## 2017-05-28 PROCEDURE — 99233 SBSQ HOSP IP/OBS HIGH 50: CPT | Mod: GC

## 2017-05-28 RX ORDER — VANCOMYCIN HCL 1 G
750 VIAL (EA) INTRAVENOUS ONCE
Qty: 0 | Refills: 0 | Status: COMPLETED | OUTPATIENT
Start: 2017-05-28 | End: 2017-05-28

## 2017-05-28 RX ORDER — SIMETHICONE 80 MG/1
100 TABLET, CHEWABLE ORAL
Qty: 0 | Refills: 0 | Status: DISCONTINUED | OUTPATIENT
Start: 2017-05-28 | End: 2017-05-28

## 2017-05-28 RX ORDER — VANCOMYCIN HCL 1 G
750 VIAL (EA) INTRAVENOUS EVERY 12 HOURS
Qty: 0 | Refills: 0 | Status: DISCONTINUED | OUTPATIENT
Start: 2017-05-29 | End: 2017-05-30

## 2017-05-28 RX ORDER — SIMVASTATIN 20 MG/1
20 TABLET, FILM COATED ORAL AT BEDTIME
Qty: 0 | Refills: 0 | Status: DISCONTINUED | OUTPATIENT
Start: 2017-05-28 | End: 2017-05-30

## 2017-05-28 RX ORDER — VANCOMYCIN HCL 1 G
VIAL (EA) INTRAVENOUS
Qty: 0 | Refills: 0 | Status: DISCONTINUED | OUTPATIENT
Start: 2017-05-28 | End: 2017-05-30

## 2017-05-28 RX ORDER — SENNA PLUS 8.6 MG/1
2 TABLET ORAL AT BEDTIME
Qty: 0 | Refills: 0 | Status: DISCONTINUED | OUTPATIENT
Start: 2017-05-28 | End: 2017-05-30

## 2017-05-28 RX ORDER — ASPIRIN/CALCIUM CARB/MAGNESIUM 324 MG
81 TABLET ORAL DAILY
Qty: 0 | Refills: 0 | Status: DISCONTINUED | OUTPATIENT
Start: 2017-05-28 | End: 2017-05-28

## 2017-05-28 RX ORDER — ENOXAPARIN SODIUM 100 MG/ML
40 INJECTION SUBCUTANEOUS DAILY
Qty: 0 | Refills: 0 | Status: DISCONTINUED | OUTPATIENT
Start: 2017-05-28 | End: 2017-05-30

## 2017-05-28 RX ORDER — ASPIRIN/CALCIUM CARB/MAGNESIUM 324 MG
81 TABLET ORAL DAILY
Qty: 0 | Refills: 0 | Status: DISCONTINUED | OUTPATIENT
Start: 2017-05-28 | End: 2017-05-30

## 2017-05-28 RX ORDER — POLYETHYLENE GLYCOL 3350 17 G/17G
17 POWDER, FOR SOLUTION ORAL DAILY
Qty: 0 | Refills: 0 | Status: DISCONTINUED | OUTPATIENT
Start: 2017-05-28 | End: 2017-05-30

## 2017-05-28 RX ORDER — DOCUSATE SODIUM 100 MG
100 CAPSULE ORAL THREE TIMES A DAY
Qty: 0 | Refills: 0 | Status: DISCONTINUED | OUTPATIENT
Start: 2017-05-28 | End: 2017-05-30

## 2017-05-28 RX ORDER — PANTOPRAZOLE SODIUM 20 MG/1
20 TABLET, DELAYED RELEASE ORAL DAILY
Qty: 0 | Refills: 0 | Status: DISCONTINUED | OUTPATIENT
Start: 2017-05-28 | End: 2017-05-30

## 2017-05-28 RX ORDER — FENOFIBRATE,MICRONIZED 130 MG
48 CAPSULE ORAL DAILY
Qty: 0 | Refills: 0 | Status: DISCONTINUED | OUTPATIENT
Start: 2017-05-28 | End: 2017-05-28

## 2017-05-28 RX ADMIN — Medication 1 MILLIGRAM(S): at 22:14

## 2017-05-28 RX ADMIN — HALOPERIDOL DECANOATE 7 MILLIGRAM(S): 100 INJECTION INTRAMUSCULAR at 18:02

## 2017-05-28 RX ADMIN — PANTOPRAZOLE SODIUM 40 MILLIGRAM(S): 20 TABLET, DELAYED RELEASE ORAL at 06:32

## 2017-05-28 RX ADMIN — SENNA PLUS 2 TABLET(S): 8.6 TABLET ORAL at 22:14

## 2017-05-28 RX ADMIN — Medication 1 TABLET(S): at 13:04

## 2017-05-28 RX ADMIN — Medication 1 APPLICATION(S): at 13:17

## 2017-05-28 RX ADMIN — LEVETIRACETAM 500 MILLIGRAM(S): 250 TABLET, FILM COATED ORAL at 06:32

## 2017-05-28 RX ADMIN — PANTOPRAZOLE SODIUM 20 MILLIGRAM(S): 20 TABLET, DELAYED RELEASE ORAL at 13:03

## 2017-05-28 RX ADMIN — Medication 1 MILLIGRAM(S): at 13:06

## 2017-05-28 RX ADMIN — Medication 81 MILLIGRAM(S): at 20:21

## 2017-05-28 RX ADMIN — Medication 100 MILLIGRAM(S): at 13:04

## 2017-05-28 RX ADMIN — Medication 100 MILLIGRAM(S): at 22:15

## 2017-05-28 RX ADMIN — ENOXAPARIN SODIUM 40 MILLIGRAM(S): 100 INJECTION SUBCUTANEOUS at 13:03

## 2017-05-28 RX ADMIN — Medication 250 MILLIGRAM(S): at 18:05

## 2017-05-28 RX ADMIN — Medication 250 MILLIGRAM(S): at 06:47

## 2017-05-28 RX ADMIN — Medication 250 MILLIGRAM(S): at 06:32

## 2017-05-28 RX ADMIN — Medication 1 MILLIGRAM(S): at 06:35

## 2017-05-28 RX ADMIN — Medication 0.5 MILLIGRAM(S): at 13:04

## 2017-05-28 RX ADMIN — Medication 112 MICROGRAM(S): at 06:32

## 2017-05-28 RX ADMIN — SODIUM CHLORIDE 3 MILLILITER(S): 9 INJECTION INTRAMUSCULAR; INTRAVENOUS; SUBCUTANEOUS at 14:00

## 2017-05-28 RX ADMIN — SIMVASTATIN 20 MILLIGRAM(S): 20 TABLET, FILM COATED ORAL at 22:14

## 2017-05-28 RX ADMIN — SODIUM CHLORIDE 3 MILLILITER(S): 9 INJECTION INTRAMUSCULAR; INTRAVENOUS; SUBCUTANEOUS at 06:39

## 2017-05-28 RX ADMIN — POLYETHYLENE GLYCOL 3350 17 GRAM(S): 17 POWDER, FOR SOLUTION ORAL at 13:04

## 2017-05-28 RX ADMIN — HALOPERIDOL DECANOATE 7 MILLIGRAM(S): 100 INJECTION INTRAMUSCULAR at 06:32

## 2017-05-28 RX ADMIN — LEVETIRACETAM 500 MILLIGRAM(S): 250 TABLET, FILM COATED ORAL at 18:05

## 2017-05-28 RX ADMIN — Medication 150 MILLIGRAM(S): at 20:21

## 2017-05-28 RX ADMIN — Medication 100 MILLIGRAM(S): at 06:33

## 2017-05-28 RX ADMIN — SODIUM CHLORIDE 3 MILLILITER(S): 9 INJECTION INTRAMUSCULAR; INTRAVENOUS; SUBCUTANEOUS at 21:37

## 2017-05-28 NOTE — PROGRESS NOTE ADULT - PROBLEM SELECTOR PLAN 6
Residual weakness in left hand , LUE and LLE with paresis of left phalanges  Monitor vitals, patient appears at baseline as per [place] aide  Not on ASA , Statin, pending LIPID panel Residual weakness in left hand , LUE and LLE with paresis of left phalanges  Monitor vitals, patient appears at baseline as per [place] aide  Not on ASA , Statin to be started due to lipid panel

## 2017-05-28 NOTE — PROGRESS NOTE ADULT - PROBLEM SELECTOR PLAN 7
History of seizure disorder  Unknown last seizure  Seizure precautions   Aspiration precautions  c/w keppra

## 2017-05-28 NOTE — PROGRESS NOTE ADULT - ASSESSMENT
Ms. 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 Currently with bacteremia of unknown source,  Urine growing <10,000 gram positive cocci, will f/u repeat UA today.   CXR negative (less likely aspiration pna with CXR performed after vomiting episode).    Patient had TTE in 2015 with 75% EF. We will continue to monitor her and hydrate her as needed, currently tolerating PO without further incidences of vomiting, though abdomen acutely worsening distension, will f/u abdominal X ray and increase constipation medications and consider simethicone if Abd Xray negative for obstruction. Ms. 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 Currently with bacteremia of unknown source,  Urine growing <10,000 gram positive cocci, will f/u repeat UA today.   CXR negative (less likely aspiration pna with CXR performed after vomiting episode).    Patient had TTE in 2015 with 75% EF. We will continue to monitor her and hydrate her as needed, currently tolerating PO without further incidences of vomiting, though abdomen acutely worsening distension, will f/u abdominal X ray and increase constipation medications and consider simethicone if Abd Xray negative for obstruction.

## 2017-05-28 NOTE — PROGRESS NOTE ADULT - ATTENDING COMMENTS
Note addended where needed. Plan discussed with patient at bedside. Once acute phase resolved, add aspirin. Plan d/c once cultures remain negative, s/p BM. Plan d/w patients team

## 2017-05-28 NOTE — PROGRESS NOTE ADULT - SUBJECTIVE AND OBJECTIVE BOX
Patient is a 67y old  Female who presents with a chief complaint of "I was eaten by a cobra" (26 May 2017 18:14)      INTERVAL HPI/OVERNIGHT EVENTS:  Ms. Toribio is sitting up in bed speaking nonsensically this morning with repetitive phrases including "I told them not to let her go home" and speaking about an aunt and her cobra. Bedside aide from [place] states she did not have a bowel movement last night (LBM in ED on admission as per aide), has been voiding freely. Ellen denies dysuria, abdominal pain , n/v, fever, leg pain but states that "you cannot call me Ellen, it's Malu" and telling a story about her nurses at [place]. Intermittently she is crying without visible tears this morning and speaking about individuals she has met.       Allergies    clozapine (Other)  Depakote (Other)  erythromycin (Unknown)  Neupogen (Other)    Intolerances        Vital Signs Last 24 Hrs  T(C): 36.9, Max: 36.9 ( @ 23:02)  T(F): 98.5, Max: 98.5 ( @ 23:02)  HR: 80 (80 - 104)  BP: 120/61 (117/74 - 143/70)  BP(mean): --  RR: 20 (18 - 20)  SpO2: 94% (94% - 98%) RA        Daily   I&O's Detail  Intake: tolerating regular diet (unmeasured) without vomiting since ED       REVIEW OF SYSTEMS:    CONSTITUTIONAL: No fever,   EYES: No eye pain,  or discharge  ENMT:  No difficulty hearing  RESPIRATORY: No cough, wheezing, chills or hemoptysis; No shortness of breath  CARDIOVASCULAR: No chest pain   GASTROINTESTINAL: No abdominal; no further vomiting at this time; +constipation (LBM in ED though not witnessed)  No melena or hematochezia.  GENITOURINARY: No pain with urination   NEUROLOGICAL: No headaches  LYMPH NODES: No enlarged glands  ENDOCRINE: No heat or cold intolerance; No hair loss  PSYCHIATRIC: +Depression, anxiety, mood swings   HEME/LYMPH: No easy bruising, or bleeding gums  ALLERY AND IMMUNOLOGIC: No hives or eczema      PHYSICAL EXAM:    GENERAL: NAD, sitting up in bed, talking nonsensically , alert  HEAD:  Atraumatic, Normocephalic  EYES: EOMI, PERRLA, conjunctiva and sclera clear  ENMT: No tonsillar erythema, exudates, or enlargement; DRY mucous membranes; poor dentition with majority of bottom teeth missing  NECK: Supple, No JVD   NERVOUS SYSTEM:  AAOx3 grossly (Month/year, self "You know my name already you called me Ellen", place "[facility] [place]"   CHEST/LUNG: Grossly CTABL though not cooperating with deep inspirations   HEART: Regular rate and rhythm; No murmurs, rubs, or gallops; palpable radial pulses 2+ b/l   ABDOMEN: +soft but severely distended, midline abdominal scar unchanged; +BS , NTTP though patient grimacing while stating "NO" when asked if she has pain   EXTREMITIES:  2+ Peripheral Pulses; trace edema b/l; no visible lesions between toes   LYMPH: No lymphadenopathy noted  SKIN: no sacral decubitus ulcers, skin intact      LABS:                                             8.0    x     )-----------( x        ( 28 May 2017 05:45 )             25.9   05-    148<H>  |  111<H>  |  18.0  ----------------------------<  92  3.6   |  24.0  |  0.97    Ca    9.0      27 May 2017 05:29  Phos  3.0       Mg     2.2         TPro  6.2<L>  /  Alb  3.2<L>  /  TBili  0.3<L>  /  DBili  x   /  AST  16  /  ALT  16  /  AlkPhos  76  05-27  Urinalysis Basic - ( 25 May 2017 16:06 )    Color: Pale Yellow / Appearance: Clear / S.005 / pH: x  Gluc: x / Ketone: Negative  / Bili: Negative / Urobili: Negative mg/dL   Blood: x / Protein: 15 mg/dL / Nitrite: Negative   Leuk Esterase: Small / RBC: 3-5 /HPF / WBC 3-5   Sq Epi: x / Non Sq Epi: Occasional / Bacteria: Occasional        Culture Results:   <10,000 CFU/ml Gram Negative Rods ( @ 16:06)  Culture Results:   Growth in aerobic bottle: Gram Positive Cocci in Clusters  Aerobic Bottle: 20:20 Hours to positivity  Anaerobic Bottle: No growth to date  .  TYPE: (C=Critical, N=Notification, A=Abnormal) C  TESTS:  _ Positive Blood GS  DATE/TIME CALLED: _ 20 ( @ 11:04)    Procalcitonin, Serum: 0.99 ng/mL ( @ 05:29)      Albumin, Serum: 3.2 g/dL ( @ 05:29)  Albumin, Serum: 3.2 g/dL ( @ 16:13)    LIVER FUNCTIONS - ( 27 May 2017 05:29 )  Alb: 3.2 g/dL / Pro: 6.2 g/dL / ALK PHOS: 76 U/L / ALT: 16 U/L / AST: 16 U/L / GGT: x             RADIOLOGY & ADDITIONAL TESTS:    BOWEL: No bowel obstruction. Small bowel anastomosis in the pelvis.    PERITONEUM: No ascites.  VESSELS:  Atherosclerotic change of the abdominal aorta and its branches.  RETROPERITONEUM: No lymphadenopathy.    ABDOMINAL WALL: Ventral hernia mesh.  BONES: Multiple old left-sided rib fractures. Degenerative changes of the   spine.    IMPRESSION:   1.  Bibasilar atelectasis.  2.  Heterogeneous enhancement of the renal parenchyma, possibly   pyelonephritis.  3.  Splenomegaly with innumerable hypodense lesions, unchanged since   2015.    **PENDING abdominal X ray STAT from yesterday, radiology called and aware, will f/u  *no bowel obstruction on CT abdomen Patient is a 67y old  Female who presents with a chief complaint of "I was eaten by a cobra" (26 May 2017 18:14)      INTERVAL HPI/OVERNIGHT EVENTS:  Ms. Toribio is sitting up in bed speaking nonsensically this morning with repetitive phrases including "I told them not to let her go home" and speaking about an aunt and her cobra. Bedside aide from [place] states she did not have a bowel movement last night (LBM in ED on admission as per aide), has been voiding freely. Ellen denies dysuria, abdominal pain , n/v, fever, leg pain but states that "you cannot call me Ellen, it's Malu" and telling a story about her nurses at [place]. Intermittently she is crying without visible tears this morning and speaking about individuals she has met.       Allergies    clozapine (Other)  Depakote (Other)  erythromycin (Unknown)  Neupogen (Other)    Intolerances    Vital Signs Last 24 Hrs  T(C): 36.9, Max: 36.9 ( @ 23:02)  T(F): 98.5, Max: 98.5 ( @ 23:02)  HR: 80 (80 - 104)  BP: 120/61 (117/74 - 143/70)  BP(mean): --  RR: 20 (18 - 20)  SpO2: 94% (94% - 98%) RA    Daily   I&O's Detail  Intake: tolerating regular diet (unmeasured) without vomiting since ED       REVIEW OF SYSTEMS:    CONSTITUTIONAL: No fever,   EYES: No eye pain,  or discharge  ENMT:  No difficulty hearing  RESPIRATORY: No cough, wheezing, chills or hemoptysis; No shortness of breath  CARDIOVASCULAR: No chest pain   GASTROINTESTINAL: No abdominal; no further vomiting at this time; +constipation (LBM in ED though not witnessed)  No melena or hematochezia.  GENITOURINARY: No pain with urination   NEUROLOGICAL: No headaches  LYMPH NODES: No enlarged glands  ENDOCRINE: No heat or cold intolerance; No hair loss  PSYCHIATRIC: +Depression, anxiety, mood swings   HEME/LYMPH: No easy bruising, or bleeding gums  ALLERY AND IMMUNOLOGIC: No hives or eczema      PHYSICAL EXAM:    GENERAL: NAD, sitting up in bed, talking nonsensically , alert  HEAD:  Atraumatic, Normocephalic  EYES: EOMI, PERRLA, conjunctiva and sclera clear  ENMT: No tonsillar erythema, exudates, or enlargement; DRY mucous membranes; poor dentition with majority of bottom teeth missing  NECK: Supple, No JVD   NERVOUS SYSTEM:  AAOx3 grossly (Month/year, self "You know my name already you called me Ellen", place "[facility]"   CHEST/LUNG: Grossly CTABL though not cooperating with deep inspirations   HEART: Regular rate and rhythm; No murmurs, rubs, or gallops; palpable radial pulses 2+ b/l   ABDOMEN: +soft but distended, midline abdominal scar unchanged; +BS , NTTP though patient grimacing while stating "NO" when asked if she has pain. Dull to percussion throughout abdomen   EXTREMITIES:  2+ Peripheral Pulses; trace edema b/l; no visible lesions between toes   LYMPH: No lymphadenopathy noted  SKIN: no sacral decubitus ulcers, skin intact      LABS:                                             8.0    x     )-----------( x        ( 28 May 2017 05:45 )             25.9   05-    148<H>  |  111<H>  |  18.0  ----------------------------<  92  3.6   |  24.0  |  0.97    Ca    9.0      27 May 2017 05:29  Phos  3.0     -  Mg     2.2         TPro  6.2<L>  /  Alb  3.2<L>  /  TBili  0.3<L>  /  DBili  x   /  AST  16  /  ALT  16  /  AlkPhos  76  05-27  Urinalysis Basic - ( 25 May 2017 16:06 )    Color: Pale Yellow / Appearance: Clear / S.005 / pH: x  Gluc: x / Ketone: Negative  / Bili: Negative / Urobili: Negative mg/dL   Blood: x / Protein: 15 mg/dL / Nitrite: Negative   Leuk Esterase: Small / RBC: 3-5 /HPF / WBC 3-5   Sq Epi: x / Non Sq Epi: Occasional / Bacteria: Occasional        Culture Results:   <10,000 CFU/ml Gram Negative Rods ( @ 16:06)  Culture Results:   Growth in aerobic bottle: Gram Positive Cocci in Clusters  Aerobic Bottle: 20:20 Hours to positivity  Anaerobic Bottle: No growth to date  .  TYPE: (C=Critical, N=Notification, A=Abnormal) C  TESTS:  _ Positive Blood GS  DATE/TIME CALLED: _ 20 ( @ 11:04)    Procalcitonin, Serum: 0.99 ng/mL ( @ 05:29)      Albumin, Serum: 3.2 g/dL ( @ 05:29)  Albumin, Serum: 3.2 g/dL ( @ 16:13)    LIVER FUNCTIONS - ( 27 May 2017 05:29 )  Alb: 3.2 g/dL / Pro: 6.2 g/dL / ALK PHOS: 76 U/L / ALT: 16 U/L / AST: 16 U/L / GGT: x             RADIOLOGY & ADDITIONAL TESTS:    BOWEL: No bowel obstruction. Small bowel anastomosis in the pelvis.    PERITONEUM: No ascites.  VESSELS:  Atherosclerotic change of the abdominal aorta and its branches.  RETROPERITONEUM: No lymphadenopathy.    ABDOMINAL WALL: Ventral hernia mesh.  BONES: Multiple old left-sided rib fractures. Degenerative changes of the   spine.    IMPRESSION:   1.  Bibasilar atelectasis.  2.  Heterogeneous enhancement of the renal parenchyma, possibly   pyelonephritis.  3.  Splenomegaly with innumerable hypodense lesions, unchanged since   2015.    **PENDING abdominal X ray STAT from yesterday, radiology called and aware, will f/u  *no bowel obstruction on CT abdomen

## 2017-05-28 NOTE — PROGRESS NOTE ADULT - PROBLEM SELECTOR PLAN 5
Likely acute kidney injury possibly due to dehydration, vomiting  Encourage PO hydration and feeding TID  Now off fluids, s/p 1L NS and 1 L LR on 5/27

## 2017-05-28 NOTE — PROGRESS NOTE ADULT - PROBLEM SELECTOR PLAN 1
Acute onset, with history of sepsis due to proctocolitis in April 2017.   Gram positive cocci in clusters on blood culture 1 of 2 on 5/25, second culture 5/25 negative  F/u repeat blood cultures and second culture from 5/25   CT abdomen/pelvis read noted.   on vanco at this time, pending blood cx repeat in lab   Vancomycin day # 2 Acute onset, with history of sepsis due to proctocolitis in April 2017.   Gram positive cocci in clusters on blood culture 1 of 2 on 5/25, second culture 5/25 negative  F/u repeat blood cultures and second culture from 5/25   CT abdomen/pelvis read noted.   on vanco at this time, pending blood cx repeat in lab   Vancomycin day # 2 with florastor

## 2017-05-28 NOTE — PROGRESS NOTE ADULT - PROBLEM SELECTOR PLAN 3
Acute onset, two known episodes so far on 5/25 and 5/26   Vomiting yellow with orange (sauce colored)  Aspiration precautions  Elevate head of bed 30 degrees  tolerating some of her diet, zofran prn, advance diet  F/u abd x ray this AM pending, radiology aware   If negative, simethicone to be added  CT chestabd pelvis: with multiple lesions on spleen noted - no further intervention at this time Concern for acute blood loss of unknown source with difficulty eliciting history from patient and aide   Improving, Hb 7.2 yesterday to Hb 8 this AM  Negative guaiac, will repeat  s/p 1 u pRBC yesterday   Colorectal surgery visit appreciated, no acute intervention at this time   anemia w/up  essential also with pancytopenia - likely medication induced (from psych meds) f/up F/up anemia workup (B12, folate)

## 2017-05-28 NOTE — PROGRESS NOTE ADULT - PROBLEM SELECTOR PLAN 2
Concern for acute blood loss of unknown source with difficulty eliciting history from patient and aide   Improving, Hb 7.2 yesterday to Hb 8 this AM  Negative guaiac, will repeat  s/p 1 u pRBC yesterday   Colorectal surgery visit appreciated, no acute intervention at this time   anemia w/up  essential also with pancytopenia - likely medication induced (from psych meds) f/up F/up anemia workup (B12, folate) Acute on chronic  Adding senna and increasing colace to TID  Adding miralax QD  Consider fleet enema Acute on chronic  Adding senna and increasing colace to TID  Adding miralax QD  Consider fleet enema  axr with large amt of stool, repeat in am

## 2017-05-29 ENCOUNTER — TRANSCRIPTION ENCOUNTER (OUTPATIENT)
Age: 67
End: 2017-05-29

## 2017-05-29 DIAGNOSIS — D64.9 ANEMIA, UNSPECIFIED: ICD-10-CM

## 2017-05-29 DIAGNOSIS — R19.5 OTHER FECAL ABNORMALITIES: ICD-10-CM

## 2017-05-29 LAB
ANISOCYTOSIS BLD QL: SLIGHT — SIGNIFICANT CHANGE UP
APPEARANCE UR: CLEAR — SIGNIFICANT CHANGE UP
BILIRUB UR-MCNC: NEGATIVE — SIGNIFICANT CHANGE UP
COLOR SPEC: SIGNIFICANT CHANGE UP
DIFF PNL FLD: NEGATIVE — SIGNIFICANT CHANGE UP
EOSINOPHIL # BLD AUTO: 0.1 K/UL — SIGNIFICANT CHANGE UP (ref 0–0.5)
EOSINOPHIL NFR BLD AUTO: 2.4 % — SIGNIFICANT CHANGE UP (ref 0–5)
FERRITIN SERPL-MCNC: 101.2 NG/ML — SIGNIFICANT CHANGE UP (ref 11–306)
GLUCOSE UR QL: NEGATIVE MG/DL — SIGNIFICANT CHANGE UP
HCT VFR BLD CALC: 27.6 % — LOW (ref 37–47)
HGB BLD-MCNC: 8.5 G/DL — LOW (ref 12–16)
HYPOCHROMIA BLD QL: SLIGHT — SIGNIFICANT CHANGE UP
IRON SATN MFR SERPL: 24 UG/DL — LOW (ref 37–145)
IRON SATN MFR SERPL: 9 % — LOW (ref 14–50)
KETONES UR-MCNC: NEGATIVE — SIGNIFICANT CHANGE UP
LEUKOCYTE ESTERASE UR-ACNC: NEGATIVE — SIGNIFICANT CHANGE UP
LYMPHOCYTES # BLD AUTO: 0.6 K/UL — LOW (ref 1–4.8)
LYMPHOCYTES # BLD AUTO: 26.1 % — SIGNIFICANT CHANGE UP (ref 20–55)
MACROCYTES BLD QL: SLIGHT — SIGNIFICANT CHANGE UP
MCHC RBC-ENTMCNC: 27.4 PG — SIGNIFICANT CHANGE UP (ref 27–31)
MCHC RBC-ENTMCNC: 30.8 G/DL — LOW (ref 32–36)
MCV RBC AUTO: 89 FL — SIGNIFICANT CHANGE UP (ref 81–99)
MICROCYTES BLD QL: SLIGHT — SIGNIFICANT CHANGE UP
MONOCYTES # BLD AUTO: 0.3 K/UL — SIGNIFICANT CHANGE UP (ref 0–0.8)
MONOCYTES NFR BLD AUTO: 13.3 % — HIGH (ref 3–10)
NEUTROPHILS # BLD AUTO: 1.4 K/UL — LOW (ref 1.8–8)
NEUTROPHILS NFR BLD AUTO: 57.8 % — SIGNIFICANT CHANGE UP (ref 37–73)
NITRITE UR-MCNC: NEGATIVE — SIGNIFICANT CHANGE UP
OB PNL STL: POSITIVE
OVALOCYTES BLD QL SMEAR: SLIGHT — SIGNIFICANT CHANGE UP
PH UR: 8 — SIGNIFICANT CHANGE UP (ref 5–8)
PLAT MORPH BLD: NORMAL — SIGNIFICANT CHANGE UP
PLATELET # BLD AUTO: 176 K/UL — SIGNIFICANT CHANGE UP (ref 150–400)
POIKILOCYTOSIS BLD QL AUTO: SLIGHT — SIGNIFICANT CHANGE UP
PROT UR-MCNC: NEGATIVE MG/DL — SIGNIFICANT CHANGE UP
RBC # BLD: 3.1 M/UL — LOW (ref 4.4–5.2)
RBC # FLD: 17.2 % — HIGH (ref 11–15.6)
RBC BLD AUTO: ABNORMAL
SP GR SPEC: 1.01 — SIGNIFICANT CHANGE UP (ref 1.01–1.02)
SPHEROCYTES BLD QL SMEAR: SLIGHT — SIGNIFICANT CHANGE UP
TIBC SERPL-MCNC: 255 UG/DL — SIGNIFICANT CHANGE UP (ref 220–430)
TRANSFERRIN SERPL-MCNC: 178 MG/DL — LOW (ref 192–382)
UROBILINOGEN FLD QL: NEGATIVE MG/DL — SIGNIFICANT CHANGE UP
WBC # BLD: 2.5 K/UL — LOW (ref 4.8–10.8)
WBC # FLD AUTO: 2.5 K/UL — LOW (ref 4.8–10.8)

## 2017-05-29 PROCEDURE — 99223 1ST HOSP IP/OBS HIGH 75: CPT

## 2017-05-29 PROCEDURE — 99233 SBSQ HOSP IP/OBS HIGH 50: CPT | Mod: GC

## 2017-05-29 PROCEDURE — 74020: CPT | Mod: 26

## 2017-05-29 RX ADMIN — Medication 2000 UNIT(S): at 13:50

## 2017-05-29 RX ADMIN — SODIUM CHLORIDE 3 MILLILITER(S): 9 INJECTION INTRAMUSCULAR; INTRAVENOUS; SUBCUTANEOUS at 14:00

## 2017-05-29 RX ADMIN — ENOXAPARIN SODIUM 40 MILLIGRAM(S): 100 INJECTION SUBCUTANEOUS at 12:33

## 2017-05-29 RX ADMIN — SODIUM CHLORIDE 3 MILLILITER(S): 9 INJECTION INTRAMUSCULAR; INTRAVENOUS; SUBCUTANEOUS at 22:02

## 2017-05-29 RX ADMIN — Medication 250 MILLIGRAM(S): at 05:32

## 2017-05-29 RX ADMIN — HALOPERIDOL DECANOATE 7 MILLIGRAM(S): 100 INJECTION INTRAMUSCULAR at 18:54

## 2017-05-29 RX ADMIN — HALOPERIDOL DECANOATE 7 MILLIGRAM(S): 100 INJECTION INTRAMUSCULAR at 05:32

## 2017-05-29 RX ADMIN — LEVETIRACETAM 500 MILLIGRAM(S): 250 TABLET, FILM COATED ORAL at 05:32

## 2017-05-29 RX ADMIN — Medication 150 MILLIGRAM(S): at 20:13

## 2017-05-29 RX ADMIN — Medication 250 MILLIGRAM(S): at 18:54

## 2017-05-29 RX ADMIN — Medication 100 MILLIGRAM(S): at 13:50

## 2017-05-29 RX ADMIN — Medication 81 MILLIGRAM(S): at 12:39

## 2017-05-29 RX ADMIN — Medication 0.5 MILLIGRAM(S): at 21:12

## 2017-05-29 RX ADMIN — SIMVASTATIN 20 MILLIGRAM(S): 20 TABLET, FILM COATED ORAL at 22:00

## 2017-05-29 RX ADMIN — Medication 1 MILLIGRAM(S): at 13:50

## 2017-05-29 RX ADMIN — Medication 100 MILLIGRAM(S): at 21:59

## 2017-05-29 RX ADMIN — Medication 1 TABLET(S): at 12:35

## 2017-05-29 RX ADMIN — Medication 112 MICROGRAM(S): at 05:32

## 2017-05-29 RX ADMIN — SODIUM CHLORIDE 3 MILLILITER(S): 9 INJECTION INTRAMUSCULAR; INTRAVENOUS; SUBCUTANEOUS at 05:35

## 2017-05-29 RX ADMIN — Medication 1 APPLICATION(S): at 12:53

## 2017-05-29 RX ADMIN — Medication 100 MILLIGRAM(S): at 05:32

## 2017-05-29 RX ADMIN — Medication 150 MILLIGRAM(S): at 08:46

## 2017-05-29 RX ADMIN — Medication 1 MILLIGRAM(S): at 22:00

## 2017-05-29 RX ADMIN — Medication 1 MILLIGRAM(S): at 05:32

## 2017-05-29 RX ADMIN — PANTOPRAZOLE SODIUM 20 MILLIGRAM(S): 20 TABLET, DELAYED RELEASE ORAL at 12:34

## 2017-05-29 RX ADMIN — Medication 0.5 MILLIGRAM(S): at 12:35

## 2017-05-29 RX ADMIN — POLYETHYLENE GLYCOL 3350 17 GRAM(S): 17 POWDER, FOR SOLUTION ORAL at 12:36

## 2017-05-29 RX ADMIN — SENNA PLUS 2 TABLET(S): 8.6 TABLET ORAL at 22:00

## 2017-05-29 RX ADMIN — Medication 0.5 MILLIGRAM(S): at 12:53

## 2017-05-29 RX ADMIN — LEVETIRACETAM 500 MILLIGRAM(S): 250 TABLET, FILM COATED ORAL at 18:54

## 2017-05-29 NOTE — DISCHARGE NOTE ADULT - HOSPITAL COURSE
Ms. Toribio is a 68 yo F sent in from [place] [facility] with PMH s/f old stroke in 2005 with residual right sided weakness, hypothyroidism, chronic constipation, proctocolitis in April 2017 who presented after vomiting and found to have one positive blood culture the day prior to admission.     Psychiatry was consulted on the day of admission for her schizoaffective disorder and she was on 1:1 monitoring with a bedside aide present and continued on her psychiatric medications from [place].     For the positive blood culture, she was started on Vancomycin the day of admission and additional blood cultures were sent. The blood culture subsequently grew coagulase negative Staph and she was continued on Vancomycin. A urinalysis was performed and a urine culture was sent which was not concerning for UTI.   On admission, Ms. Toribio had a hemoglobin of 6.9 and received 1 unit pRBCs and was found to have a hemoglobin of 7.3, then 8.5 on 5/29.   A CT of the abdomen with IV and Po contrast was performed which did not reveal a GI bleed, but fecal occult, initially negative on admission, was found to be positive on 5/28 after multiple bowel movements. GI was consulted (Dr. Wood's group) for possible need for endoscopy/colonoscopy.         Colorectal surgery was consulted due to recent admission due to proctocolitis, with no acute interventions recommended.     On 5/28, patient's abdomen was found to be distended and abdominal X ray was performed which revealed a large amount of stool in the bowel. She was started on additional stool softeners, and then received coca cola at the advice of her bedside aide from [place], and subsequently had multiple bowel movements of small quantity with abdominal distension resolved on 5/29.    Patient in medically optimized condition for discharge with instructions to follow up with PMD in 1-2 days as outpatient. Ms. Toribio is a 68 yo F sent in from [place] [facility] with PMH s/f old stroke in 2005 with residual right sided weakness, hypothyroidism, chronic constipation, proctocolitis in April 2017 who presented after vomiting and found to have one positive blood culture the day prior to admission.     Psychiatry was consulted on the day of admission for her schizoaffective disorder and she was on 1:1 monitoring with a bedside aide present and continued on her psychiatric medications from [place].     For the positive blood culture, she was started on Vancomycin the day of admission and additional blood cultures were sent. The blood culture subsequently grew coagulase negative Staph and she was continued on Vancomycin, but subsequent sets of blood cultures were negative suggesting possible contaminant due to only 1 of 2 blood cultures taken prior to admission being positive.   A urinalysis was performed and a urine culture was sent which was not concerning for UTI with negative culture.   On admission, Ms. Toribio had a hemoglobin of 6.9 and received 1 unit pRBCs and was found to have a hemoglobin of 7.3, then 8.5 on 5/29. Guaic was negative on admission. Her hemoglobin remained stable during her stay with no further need for transfusion.     On 5/28, patient's abdomen was found to be distended and abdominal X ray was performed which revealed a large amount of stool in the bowel. She was started on additional stool softeners, and then received coca cola at the advice of her bedside aide from [place], and subsequently had multiple bowel movements of small quantity with abdominal distension resolved on 5/29.    A CT of the abdomen with IV and Po contrast was performed which did not reveal a GI bleed, but fecal occult, initially negative on admission, was found to be positive on 5/28 after multiple bowel movements. Colorectal surgery was consulted due to recent admission due to proctocolitis, with no acute interventions recommended.     GI was consulted (Dr. Wood's group) for possible need for endoscopy/colonoscopy, but no scope was recommended due to prior studies being done in 2015 which were negative and low suspicion of new onset malignancy developing over 2 years.     Patient remained clinically stable and afebrile during her stay.     Patient in medically optimized condition for discharge with instructions to follow up with PMD in 1-2 days as outpatient. Ms. Toribio is a 68 yo F sent in from [place] [facility] with PMH s/f old stroke in 2005 with residual right sided weakness, hypothyroidism, chronic constipation, proctocolitis in April 2017 who presented after vomiting and found to have one positive blood culture the day prior to admission.     Psychiatry was consulted on the day of admission for her schizoaffective disorder and she was on 1:1 monitoring with a bedside aide present and continued on her psychiatric medications from [place].     For the positive blood culture, she was started on Vancomycin the day of admission and additional blood cultures were sent. The blood culture subsequently grew coagulase negative Staph and she was continued on Vancomycin, but subsequent sets of blood cultures were negative suggesting possible contaminant due to only 1 of 2 blood cultures taken prior to admission being positive. She was started on vitamin C to help with infection. Iron supplementation was not started inpatient due to concern for acute bacteremia, but will be recommended as outpatient when patient's infection resolves.   A urinalysis was performed and a urine culture was sent which was not concerning for UTI with negative culture.   On admission, Ms. Toribio had a hemoglobin of 6.9 and received 1 unit pRBCs and was found to have a hemoglobin of 7.3, then 8.5 on 5/29. Guaic was negative on admission. Her hemoglobin remained stable during her stay with no further need for transfusion.     Ms. Toribio was found to have severe protein calorie malnutrition with a low prealbumin and was started on prostat supplementation while inpatient with recommendation to continue protein supplementation once discharged.     On 5/28, patient's abdomen was found to be distended and abdominal X ray was performed which revealed a large amount of stool in the bowel. She was started on additional stool softeners, and then received coca cola at the advice of her bedside aide from [place], and subsequently had multiple bowel movements of small quantity with abdominal distension resolved on 5/29.    A CT of the abdomen with IV and Po contrast was performed which did not reveal a GI bleed, but fecal occult, initially negative on admission, was found to be positive on 5/28 after multiple bowel movements. Colorectal surgery was consulted due to recent admission due to proctocolitis, with no acute interventions recommended.     GI was consulted (Dr. Wood's group) for possible need for endoscopy/colonoscopy, but no scope was recommended due to prior studies being done in 2015 which were negative and low suspicion of new onset malignancy developing over 2 years.     Patient remained clinically stable and afebrile during her stay.     Patient in medically optimized condition for discharge with instructions to follow up with PMD in 1-2 days as outpatient. Ms. Toribio is a 68 yo F sent in from [place] [facility] with PMH s/f old stroke in 2005 with residual right sided weakness, hypothyroidism, chronic constipation, proctocolitis in April 2017 who presented after vomiting and found to have one positive blood culture the day prior to admission.     Psychiatry was consulted on the day of admission for her schizoaffective disorder and she was on 1:1 monitoring with a bedside aide present and continued on her psychiatric medications from [place].     For the positive blood culture, she was started on Vancomycin the day of admission and additional blood cultures were sent. The blood culture subsequently grew coagulase negative Staph and she was continued on Vancomycin, but subsequent sets of blood cultures were negative suggesting possible contaminant due to only 1 of 2 blood cultures taken prior to admission being positive. Due to one positive culture, patient was continued on levaquin.   She was started on vitamin C to help with infection. Iron supplementation was not started inpatient due to concern for acute bacteremia, but will be recommended as outpatient when patient's infection resolves.   A urinalysis was performed and a urine culture was sent which was not concerning for UTI with negative culture.   On admission, Ms. Toribio had a hemoglobin of 6.9 and received 1 unit pRBCs and was found to have a hemoglobin of 7.3, then 8.5 on 5/29. Guaic was negative on admission. Her hemoglobin remained stable during her stay with no further need for transfusion.     Ms. Toribio was found to have severe protein calorie malnutrition with a low prealbumin and was started on prostat supplementation while inpatient with recommendation to continue protein supplementation once discharged.     On 5/28, patient's abdomen was found to be distended and abdominal X ray was performed which revealed a large amount of stool in the bowel. She was started on additional stool softeners, and then received coca cola at the advice of her bedside aide from [place], and subsequently had multiple bowel movements of small quantity with abdominal distension resolved on 5/29.    A CT of the abdomen with IV and Po contrast was performed which did not reveal a GI bleed, but fecal occult, initially negative on admission, was found to be positive on 5/28 after multiple bowel movements. Colorectal surgery was consulted due to recent admission due to proctocolitis, with no acute interventions recommended.     GI was consulted (Dr. Wood's group) for possible need for endoscopy/colonoscopy, but no scope was recommended due to prior studies being done in 2015 which were negative and low suspicion of new onset malignancy developing over 2 years.     Patient remained clinically stable and afebrile during her stay.     Patient in medically optimized condition for discharge with instructions to follow up with PMD in 1-2 days as outpatient. Ms. Toribio is a 66 yo F sent in from [place] [facility] with PMH s/f old stroke in 2005 with residual right sided weakness, hypothyroidism, chronic constipation, proctocolitis in April 2017 who presented after vomiting and found to have one positive blood culture the day prior to admission.     Psychiatry was consulted on the day of admission for her schizoaffective disorder and she was on 1:1 monitoring with a bedside aide present and continued on her psychiatric medications from [place].     For the positive blood culture, she was started on Vancomycin the day of admission and additional blood cultures were sent. The blood culture subsequently grew coagulase negative Staph and she was continued on Vancomycin, but subsequent sets of blood cultures were negative suggesting possible contaminant due to only 1 of 2 blood cultures taken prior to admission being positive. Due to one positive culture, patient received 3 days total of vancomycin which was discontinued without further need for antibiotic.   She was started on vitamin C to help with infection. Iron supplementation was not started inpatient due to concern for acute bacteremia, but will be recommended as outpatient when patient's infection resolves.   A urinalysis was performed and a urine culture was sent which was not concerning for UTI with negative culture.   On admission, Ms. Toribio had a hemoglobin of 6.9 and received 1 unit pRBCs and was found to have a hemoglobin of 7.3, then 8.5 on 5/29. Guaic was negative on admission. Her hemoglobin remained stable during her stay with no further need for transfusion.     Ms. Toribio was found to have severe protein calorie malnutrition with a low prealbumin and was started on prostat supplementation while inpatient with recommendation to continue protein supplementation once discharged.     On 5/28, patient's abdomen was found to be distended and abdominal X ray was performed which revealed a large amount of stool in the bowel. She was started on additional stool softeners, and then received coca cola at the advice of her bedside aide from [place], and subsequently had multiple bowel movements of small quantity with abdominal distension resolved on 5/29.    A CT of the abdomen with IV and Po contrast was performed which did not reveal a GI bleed, but fecal occult, initially negative on admission, was found to be positive on 5/28 after multiple bowel movements. Colorectal surgery was consulted due to recent admission due to proctocolitis, with no acute interventions recommended.     GI was consulted (Dr. Chattelbash's group) for possible need for endoscopy/colonoscopy, but no scope was recommended due to prior studies being done in 2015 which were negative and low suspicion of new onset malignancy developing over 2 years. For her CAD she was continued on aspirin once Hemoglobin remained stable.     Patient remained clinically stable and afebrile during her stay.     Patient in medically optimized condition for discharge with instructions to follow up with PMD in 1-2 days as outpatient. Patient was started on pantoprazole 40mg QD as per GI recommendations, and was discontinued of antibiotics. Ms. Toribio is a 66 yo F sent in from [facility] with PMH s/f old stroke in 2005 with residual right sided weakness, hypothyroidism, chronic constipation, proctocolitis in April 2017 who presented after vomiting and found to have one positive blood culture the day prior to admission.     Psychiatry was consulted on the day of admission for her schizoaffective disorder and she was on 1:1 monitoring with a bedside aide present and continued on her psychiatric medications from [place].     For the positive blood culture, she was started on Vancomycin the day of admission and additional blood cultures were sent. The blood culture subsequently grew coagulase negative Staph and she was continued on Vancomycin, but subsequent sets of blood cultures were negative suggesting possible contaminant due to only 1 of 2 blood cultures taken prior to admission being positive. Due to one positive culture, patient received 3 days total of vancomycin which was discontinued without further need for antibiotic.   She was started on vitamin C to help with infection. Iron supplementation was not started inpatient due to concern for acute bacteremia, but will be recommended as outpatient when patient's infection resolves.   A urinalysis was performed and a urine culture was sent which was not concerning for UTI with negative culture.   On admission, Ms. Toribio had a hemoglobin of 6.9 and received 1 unit pRBCs and was found to have a hemoglobin of 7.3, then 8.5 on 5/29. Guaic was negative on admission. Her hemoglobin remained stable during her stay with no further need for transfusion.     Ms. Toribio was found to have severe protein calorie malnutrition with a low prealbumin and was started on prostat supplementation while inpatient with recommendation to continue protein supplementation once discharged.     On 5/28, patient's abdomen was found to be distended and abdominal X ray was performed which revealed a large amount of stool in the bowel. She was started on additional stool softeners, and then received coca cola at the advice of her bedside aide from [place], and subsequently had multiple bowel movements of small quantity with abdominal distension resolved on 5/29. Patient continued to ambulate as tolerated while inpatient without difficulty.     A CT of the abdomen with IV and Po contrast was performed which did not reveal a GI bleed, but fecal occult, initially negative on admission, was found to be positive on 5/28 after multiple bowel movements. Colorectal surgery was consulted due to recent admission due to proctocolitis, with no acute interventions recommended.     GI was consulted (Dr. Wood's group) for possible need for endoscopy/colonoscopy, but no scope was recommended due to prior studies being done in 2015 which were negative and low suspicion of new onset malignancy developing over 2 years. For her CAD she was continued on aspirin once Hemoglobin remained stable.     Patient remained clinically stable and afebrile during her stay.     Patient in medically optimized condition for discharge with instructions to follow up with PMD in 1-2 days as outpatient. Patient was started on pantoprazole 40mg QD as per GI recommendations, and was discontinued of antibiotics.

## 2017-05-29 NOTE — DISCHARGE NOTE ADULT - PLAN OF CARE
Treated Please continue all diet and activity as tolerated, and continue all medications as prescribed. Stable You received 1 unit of packed RBC on the day of admission. Please follow up with your PMD in 1-2 days after discharge to have a follow up CBC. Continue iron pills and vitamin C for anemia as prescribed and tolerated. Please continue to follow up with your PMD as outpatient in 1-2 days after discharge. Please continue all diet and activity as tolerated Resolved Please continue medications for constipation as needed, and continue diet and activity as tolerated. Take all medications as prescribed and eat a diet including fiber and green vegetables. Continue all medications as prescribed. Please have a follow up TSH and T4 level drawn as outpatient and have a ultrasound of the thyroid once discharged from the hospital. stable Please continue all medications as prescribed and follow up with psychiatrist on the day of discharge, follow up with PMD in 1-2 days after discharge. Please continue all diet and activity as tolerated, and continue all medications as prescribed. You received 3 days of vancomycin antibiotic treating Please continue to take Prostat supplement TID as tolerated. Follow up with your PMD in 1-2 days. Please continue all diet and activity as tolerated, and continue all medications as prescribed. You received 3 days of vancomycin antibiotic but the bacteremia is likely a contaminant. Please continue to follow up with your PMD as outpatient in 1-2 days after discharge. Please continue all diet and activity as tolerated. Please continue statin and aspirin as prescribed for CVA.

## 2017-05-29 NOTE — PROGRESS NOTE ADULT - ATTENDING COMMENTS
I have personally seen and examined the patient. I agree with the above history, physical and plan which I have reviewed and edited as appropriate. Patient awake, alert, calm, comfortable, no complaints. Cardiopulmonary exam is within normal limits. Patient did have a BM this am, no blood noted, H & H is stable, anemia of chronic disease, will f/ on blood cx results. Likely to return to [place] in am.

## 2017-05-29 NOTE — DISCHARGE NOTE ADULT - CARE PLAN
Principal Discharge DX:	Bacteremia due to Gram-positive bacteria  Goal:	Treated  Instructions for follow-up, activity and diet:	Please continue all diet and activity as tolerated, and continue all medications as prescribed.  Secondary Diagnosis:	Anemia  Secondary Diagnosis:	CVA, old, hemiparesis  Secondary Diagnosis:	Constipation  Secondary Diagnosis:	Hypothyroidism  Secondary Diagnosis:	Schizoaffective disorder Principal Discharge DX:	Bacteremia due to Gram-positive bacteria  Goal:	Treated  Instructions for follow-up, activity and diet:	Please continue all diet and activity as tolerated, and continue all medications as prescribed. You received 3 days of vancomycin antibiotic  Secondary Diagnosis:	Anemia  Goal:	Stable  Instructions for follow-up, activity and diet:	You received 1 unit of packed RBC on the day of admission. Please follow up with your PMD in 1-2 days after discharge to have a follow up CBC. Continue iron pills and vitamin C for anemia as prescribed and tolerated.  Secondary Diagnosis:	CVA, old, hemiparesis  Goal:	Stable  Instructions for follow-up, activity and diet:	Please continue to follow up with your PMD as outpatient in 1-2 days after discharge. Please continue all diet and activity as tolerated  Secondary Diagnosis:	Constipation  Goal:	Resolved  Instructions for follow-up, activity and diet:	Please continue medications for constipation as needed, and continue diet and activity as tolerated. Take all medications as prescribed and eat a diet including fiber and green vegetables.  Secondary Diagnosis:	Hypothyroidism  Goal:	Stable  Instructions for follow-up, activity and diet:	Continue all medications as prescribed. Please have a follow up TSH and T4 level drawn as outpatient and have a ultrasound of the thyroid once discharged from the hospital.  Secondary Diagnosis:	Schizoaffective disorder  Goal:	stable  Instructions for follow-up, activity and diet:	Please continue all medications as prescribed and follow up with psychiatrist on the day of discharge, follow up with PMD in 1-2 days after discharge. Principal Discharge DX:	Bacteremia due to Gram-positive bacteria  Goal:	Treated  Instructions for follow-up, activity and diet:	Please continue all diet and activity as tolerated, and continue all medications as prescribed. You received 3 days of vancomycin antibiotic  Secondary Diagnosis:	Anemia  Goal:	Stable  Instructions for follow-up, activity and diet:	You received 1 unit of packed RBC on the day of admission. Please follow up with your PMD in 1-2 days after discharge to have a follow up CBC. Continue iron pills and vitamin C for anemia as prescribed and tolerated.  Secondary Diagnosis:	CVA, old, hemiparesis  Goal:	Stable  Instructions for follow-up, activity and diet:	Please continue to follow up with your PMD as outpatient in 1-2 days after discharge. Please continue all diet and activity as tolerated  Secondary Diagnosis:	Constipation  Goal:	Resolved  Instructions for follow-up, activity and diet:	Please continue medications for constipation as needed, and continue diet and activity as tolerated. Take all medications as prescribed and eat a diet including fiber and green vegetables.  Secondary Diagnosis:	Hypothyroidism  Goal:	Stable  Instructions for follow-up, activity and diet:	Continue all medications as prescribed. Please have a follow up TSH and T4 level drawn as outpatient and have a ultrasound of the thyroid once discharged from the hospital.  Secondary Diagnosis:	Schizoaffective disorder  Goal:	stable  Instructions for follow-up, activity and diet:	Please continue all medications as prescribed and follow up with psychiatrist on the day of discharge, follow up with PMD in 1-2 days after discharge.  Secondary Diagnosis:	Protein calorie malnutrition  Goal:	treating  Instructions for follow-up, activity and diet:	Please continue to take Prostat supplement TID as tolerated. Follow up with your PMD in 1-2 days. Principal Discharge DX:	Bacteremia due to Gram-positive bacteria  Goal:	Treated  Instructions for follow-up, activity and diet:	Please continue all diet and activity as tolerated, and continue all medications as prescribed. You received 3 days of vancomycin antibiotic but the bacteremia is likely a contaminant.  Secondary Diagnosis:	Anemia  Goal:	Stable  Instructions for follow-up, activity and diet:	You received 1 unit of packed RBC on the day of admission. Please follow up with your PMD in 1-2 days after discharge to have a follow up CBC. Continue iron pills and vitamin C for anemia as prescribed and tolerated.  Secondary Diagnosis:	CVA, old, hemiparesis  Goal:	Stable  Instructions for follow-up, activity and diet:	Please continue to follow up with your PMD as outpatient in 1-2 days after discharge. Please continue all diet and activity as tolerated  Secondary Diagnosis:	Constipation  Goal:	Resolved  Instructions for follow-up, activity and diet:	Please continue medications for constipation as needed, and continue diet and activity as tolerated. Take all medications as prescribed and eat a diet including fiber and green vegetables.  Secondary Diagnosis:	Hypothyroidism  Goal:	Stable  Instructions for follow-up, activity and diet:	Continue all medications as prescribed. Please have a follow up TSH and T4 level drawn as outpatient and have a ultrasound of the thyroid once discharged from the hospital.  Secondary Diagnosis:	Schizoaffective disorder  Goal:	stable  Instructions for follow-up, activity and diet:	Please continue all medications as prescribed and follow up with psychiatrist on the day of discharge, follow up with PMD in 1-2 days after discharge.  Secondary Diagnosis:	Protein calorie malnutrition  Goal:	treating  Instructions for follow-up, activity and diet:	Please continue to take Prostat supplement TID as tolerated. Follow up with your PMD in 1-2 days. Principal Discharge DX:	Bacteremia due to Gram-positive bacteria  Goal:	Treated  Instructions for follow-up, activity and diet:	Please continue all diet and activity as tolerated, and continue all medications as prescribed. You received 3 days of vancomycin antibiotic but the bacteremia is likely a contaminant.  Secondary Diagnosis:	Anemia  Goal:	Stable  Instructions for follow-up, activity and diet:	You received 1 unit of packed RBC on the day of admission. Please follow up with your PMD in 1-2 days after discharge to have a follow up CBC. Continue iron pills and vitamin C for anemia as prescribed and tolerated.  Secondary Diagnosis:	CVA, old, hemiparesis  Goal:	Stable  Instructions for follow-up, activity and diet:	Please continue to follow up with your PMD as outpatient in 1-2 days after discharge. Please continue all diet and activity as tolerated. Please continue statin and aspirin as prescribed for CVA.  Secondary Diagnosis:	Constipation  Goal:	Resolved  Instructions for follow-up, activity and diet:	Please continue medications for constipation as needed, and continue diet and activity as tolerated. Take all medications as prescribed and eat a diet including fiber and green vegetables.  Secondary Diagnosis:	Hypothyroidism  Goal:	Stable  Instructions for follow-up, activity and diet:	Continue all medications as prescribed. Please have a follow up TSH and T4 level drawn as outpatient and have a ultrasound of the thyroid once discharged from the hospital.  Secondary Diagnosis:	Schizoaffective disorder  Goal:	stable  Instructions for follow-up, activity and diet:	Please continue all medications as prescribed and follow up with psychiatrist on the day of discharge, follow up with PMD in 1-2 days after discharge.  Secondary Diagnosis:	Protein calorie malnutrition  Goal:	treating  Instructions for follow-up, activity and diet:	Please continue to take Prostat supplement TID as tolerated. Follow up with your PMD in 1-2 days.

## 2017-05-29 NOTE — PROGRESS NOTE ADULT - PROBLEM SELECTOR PLAN 3
Concern for acute blood loss of unknown source with difficulty eliciting history from patient and aide   Improving, Hb 7.2 yesterday to Hb 8 this AM  Negative guaiac, will repeat  s/p 1 u pRBC yesterday   Colorectal surgery visit appreciated, no acute intervention at this time   anemia w/up  essential also with pancytopenia - likely medication induced (from psych meds) f/up F/up anemia workup (B12, folate) Concern for acute blood loss of unknown source with difficulty eliciting history from patient and aide   Improving  F/u guaiac  s/p 1 u pRBC 5/27  Colorectal surgery visit appreciated, no acute intervention at this time   anemia w/up  essential also with pancytopenia - likely medication induced (from psych meds) f/up B12, folate WNL  Transferrin low but ferritin WNL, will hold off iron for now due to possible acute infectious process

## 2017-05-29 NOTE — DISCHARGE NOTE ADULT - NS AS ACTIVITY OBS
No Heavy lifting/straining/Stairs allowed/Walking-Outdoors allowed/Do not make important decisions/Walking-Indoors allowed/Showering allowed/Do not drive or operate machinery/Bathing allowed

## 2017-05-29 NOTE — DISCHARGE NOTE ADULT - PROVIDER TOKENS
FREE:[LAST:[Private MD],PHONE:[(   )    -],FAX:[(   )    -],ADDRESS:[Private MD  [place] [facility]  Psychiatry and Primary Care Physician]],TOKEN:'[id number]'

## 2017-05-29 NOTE — PROGRESS NOTE ADULT - PROBLEM SELECTOR PLAN 2
Acute on chronic  Adding senna and increasing colace to TID  Adding miralax QD  Consider fleet enema  axr with large amt of stool, repeat in am Now resolved.   Senna 2 tabs bedtime  Colace TID  Miralax QD  Consider fleet enema or coca cola as patient responds well to both at [place]   F/up repeat abd x ray this AM Now resolved.   Senna 2 tabs bedtime  Colace TID  Miralax QD  Consider fleet enema or coca cola as patient responds well to both at [place]   Consider repeat X ray if clinically worsens

## 2017-05-29 NOTE — PROGRESS NOTE ADULT - PROBLEM SELECTOR PLAN 5
Likely acute kidney injury possibly due to dehydration, vomiting  Encourage PO hydration and feeding TID  Now off fluids, s/p 1L NS and 1 L LR on 5/27 Likely acute kidney injury possibly due to dehydration, vomiting  Encourage PO hydration and feeding TID  Now off fluids

## 2017-05-29 NOTE — CONSULT NOTE ADULT - PROBLEM SELECTOR RECOMMENDATION 9
Pt. with normochromic, normocytic indices. Iron studies ordered. No signs of active GI bleeding. No plans for endoscopic intervention presently given current bacteremia and pt's poor functional status. Pt. with normochromic, normocytic indices. Iron studies ordered. No signs of active GI bleeding. No plans for endoscopic intervention presently given current bacteremia and pt's poor functional status. In addition she had an essentially negative colonoscopy in March of 2015 and it is unlikely that she developed a colonic malignancy in roughly two years. In addition she had a recent CT of the abdomen and pelvis that showed no evidence of colitis or colonic malignancy.

## 2017-05-29 NOTE — PROGRESS NOTE ADULT - PROBLEM SELECTOR PLAN 1
Acute onset, with history of sepsis due to proctocolitis in April 2017.   Gram positive cocci in clusters on blood culture 1 of 2 on 5/25, second culture 5/25 negative  F/u repeat blood cultures and second culture from 5/25   CT abdomen/pelvis read noted.   on vanco at this time, pending blood cx repeat in lab   Vancomycin day # 2 with florastor Acute onset, with history of sepsis due to proctocolitis in April 2017, CT abd unremarkable  Coagulase negative staph in 1 of 2 bottles drawn on 5/25, f/up 2 sets in lab  CT abdomen/pelvis read noted.   Vanco day #3  Florastor

## 2017-05-29 NOTE — DISCHARGE NOTE ADULT - MEDICATION SUMMARY - MEDICATIONS TO TAKE
I will START or STAY ON the medications listed below when I get home from the hospital:    mesalamine 1000 mg rectal suppository  -- 1 suppository(ies) rectally once a day (at bedtime)  -- For rectal use only.    -- Indication: For IBD    oxyCODONE 5 mg oral tablet  -- 1 tab(s) by mouth every 3 hours, As needed, Moderate Pain (4 - 6)  -- Indication: For Pain    acetaminophen 325 mg oral tablet  -- 2 tab(s) by mouth every 6 hours, As needed, Mild Pain (1 - 3)  -- Indication: For Pain    aspirin 81 mg oral tablet, chewable  -- 1 tab(s) by mouth once a day  -- Indication: For CAD    calcium carbonate 1250 mg (500 mg elemental calcium) oral tablet  -- 1 tab(s) by mouth once a day  -- Indication: For GERD    LORazepam 1 mg oral tablet  -- 1 tab(s) by mouth 3 times a day  -- Indication: For Anticonvulsant    levETIRAcetam 500 mg oral tablet  -- 1 tab(s) by mouth 2 times a day  -- Indication: For Anticonvulsant    Imodium 2 mg oral capsule  -- 1 cap(s) by mouth once a day  -- Indication: For diarrhea    simvastatin 20 mg oral tablet  -- 1 tab(s) by mouth once a day (at bedtime)  -- Indication: For Hyperlipidemia    benztropine 0.5 mg oral tablet  -- 1 tab(s) by mouth once a day  -- Indication: For Parkinsons    benztropine 1 mg oral tablet  -- 1 tab(s) by mouth once a day (at bedtime)  -- Indication: For Parkinsons    haloperidol 2 mg/mL oral concentrate  -- 3.5 milliliter(s) by mouth 2 times a day  -- Indication: For Schizoaffective disorder    petrolatum topical ointment  -- 1 application on skin once a day  -- Indication: For Skin    aMILoride 5 mg oral tablet  -- 1 tab(s) by mouth once a day  -- Indication: For Hypertension    docusate sodium 100 mg oral capsule  -- 1 cap(s) by mouth 3 times a day, As Needed  -- Indication: For Constipation    polyethylene glycol 3350 oral powder for reconstitution  -- 17 gram(s) by mouth once a day  -- Indication: For Constipation    senna oral tablet  -- 2 tab(s) by mouth once a day (at bedtime), As Needed  -- Indication: For Constipation    simethicone 80 mg oral tablet, chewable  -- 80 milligram(s) by mouth 3 times a day  -- Indication: For gas    levothyroxine 112 mcg (0.112 mg) oral tablet  -- 1 tab(s) by mouth once a day  -- Indication: For Hypothyroidism    multivitamin  -- 1 tab(s) by mouth once a day  -- Indication: For Vitamin    cyanocobalamin 1000 mcg/mL injectable solution  -- 1000 microgram(s) intramuscular once a month  -- Indication: For Vitamin    ascorbic acid 500 mg oral tablet  -- 1 tab(s) by mouth once a day  -- Indication: For Vitamin    cholecalciferol oral tablet  -- 2000 unit(s) by mouth every 48 hours  -- Indication: For Vitamin

## 2017-05-29 NOTE — DISCHARGE NOTE ADULT - ADDITIONAL INSTRUCTIONS
Follow up with PMD in 1-2 days  Follow up with Psychiatrist on day of discharge  Get a repeat CBC, get TSH and T4 levels checked, get a follow up thyroid ultrasound as outpatient  Continue all medications as prescribed and continue diet and activity as tolerated

## 2017-05-29 NOTE — PROGRESS NOTE ADULT - ASSESSMENT
Ms. Toribio is a 68yo F from 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 Currently with bacteremia of unknown source,  Urine growing <10,000 gram positive cocci, will f/u repeat UA today.   CXR negative (less likely aspiration pna with CXR performed after vomiting episode).    Patient had TTE in 2015 with 75% EF. We will continue to monitor her and hydrate her as needed, currently tolerating PO without further incidences of vomiting, though abdomen acutely worsening distension, will f/u abdominal X ray and increase constipation medications and consider simethicone if Abd Xray negative for obstruction. Ms. Toribio is a 68yo F from 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 Currently with bacteremia of unknown source,  Urine culture (-).,1 of 2 cultures growing coagulase negative staph, 2 sets pending in lab.    Patient had TTE in 2015 with 75% EF. We will continue to monitor her and hydrate her as needed, currently tolerating PO without further incidences of vomiting, abdominal distension on X ray abd showing stool now resolved with bowel movement yesterday s/p coca cola and stool softeners. We will continue to treat bacteremia with Vanco day #3, possibly contaminant, pending 2 sets of cultures in lab.

## 2017-05-29 NOTE — CONSULT NOTE ADULT - PROBLEM SELECTOR RECOMMENDATION 2
No gross bleeding noted. Stools are brown and not melenotic or bloody. Would keep on Pantoprazole 40 mg./d. for mucosal stress protection.

## 2017-05-29 NOTE — DISCHARGE NOTE ADULT - PATIENT PORTAL LINK FT
“You can access the [facility] Patient Portal, offered by [facility], by registering with the following website: [email or web address]”

## 2017-05-29 NOTE — DISCHARGE NOTE ADULT - OTHER SIGNIFICANT FINDINGS
8.3    2.3   )-----------( 168      ( 30 May 2017 05:43 )             26.6   CT chest/abd/pelvis with IV contrast:   IMPRESSION:   1.  Bibasilar atelectasis.  2.  Heterogeneous enhancement of the renal parenchyma, possibly   pyelonephritis.  3.  Splenomegaly with innumerable hypodense lesions, unchanged since   March 23, 2015.          Xray abdomen 5/28:   IMPRESSION:    Moderate stool load within colon.  air-fluid levels within nondistended   mid abdominal small bowel..    Xray abdomen 5/29:  IMPRESSION:  Nonspecific bowel gas pattern.

## 2017-05-29 NOTE — DISCHARGE NOTE ADULT - MEDICATION SUMMARY - MEDICATIONS TO STOP TAKING
I will STOP taking the medications listed below when I get home from the hospital:    omeprazole 20 mg oral delayed release capsule  -- 1 cap(s) by mouth once a day    vancomycin 250 mg/5 mL oral solution  -- 10 milliliter(s) by mouth every 6 hours    metroNIDAZOLE 500 mg oral tablet  -- 1 tab(s) by mouth every 8 hours

## 2017-05-29 NOTE — PROGRESS NOTE ADULT - PROBLEM SELECTOR PLAN 8
SCDs b/l  Consider adding lovenox pending Abd xray this AM, H/H improved   Amb as tolerated Lovenox 40 SQQD
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
## 2017-05-29 NOTE — DISCHARGE NOTE ADULT - INSTRUCTIONS
Continue a regular diet and activity as tolerated.   •Heart-healthy fish. Eat heart-healthy fish at least twice a week. Fish can be a good alternative to high-fat meats. For example, cod, tuna and halibut have less total fat, saturated fat and cholesterol than do meat and poultry. Fish such as salmon, mackerel, tuna, sardines and bluefish are rich in omega-3 fatty acids, which promote heart health by lowering blood fats called triglycerides.      Avoid fried fish and fish with high levels of mercury, such as tilefish, swordfish and kirsten mackerel.    •"Good" fats. Foods containing monounsaturated and polyunsaturated fats can help lower your cholesterol levels. These include avocados, almonds, pecans, walnuts, olives, and canola, olive and peanut oils. But don't overdo it, as all fats are high in calories.      Foods to avoid:  Saturated fats. High-fat dairy products and animal proteins such as beef, hot dogs, sausage and chamorro contain saturated fats. Limit your daily calories from saturated fat to less than 7 percent.  •Trans fats. These types of fats are found in processed snacks, baked goods, shortening and stick margarines. Avoid these items.  •Cholesterol. Sources of cholesterol include high-fat dairy products and high-fat animal proteins, egg yolks, liver, and other organ meats. Aim for no more than 200 milligrams (mg) of cholesterol a day.  Sodium. Aim for less than 2,300 mg of sodium a day. Continue a regular diet and activity as tolerated.   •Heart-healthy fish. Eat heart-healthy fish at least twice a week. Fish can be a good alternative to high-fat meats. For example, cod, tuna and halibut have less total fat, saturated fat and cholesterol than do meat and poultry. Fish such as salmon, mackerel, tuna, sardines and bluefish are rich in omega-3 fatty acids, which promote heart health by lowering blood fats called triglycerides.      Avoid fried fish and fish with high levels of mercury, such as tilefish, swordfish and kirsten mackerel.    •"Good" fats. Foods containing monounsaturated and polyunsaturated fats can help lower your cholesterol levels. These include avocados, almonds, pecans, walnuts, olives, and canola, olive and peanut oils. But don't overdo it, as all fats are high in calories.      Foods to avoid:  Saturated fats. High-fat dairy products and animal proteins such as beef, hot dogs, sausage and chamorro contain saturated fats. Limit your daily calories from saturated fat to less than 7 percent.  •Trans fats. These types of fats are found in processed snacks, baked goods, shortening and stick margarines. Avoid these items.  •Cholesterol. Sources of cholesterol include high-fat dairy products and high-fat animal proteins, egg yolks, liver, and other organ meats. Aim for no more than 200 milligrams (mg) of cholesterol a day.  Sodium. Aim for less than 2,300 mg of sodium a day.    For protein-calorie malnutrition, please continue to take prostat TID as tolerated

## 2017-05-29 NOTE — PROGRESS NOTE ADULT - PROBLEM SELECTOR PLAN 6
Residual weakness in left hand , LUE and LLE with paresis of left phalanges  Monitor vitals, patient appears at baseline as per [place] aide  Not on ASA , Statin to be started due to lipid panel Residual weakness in left hand , LUE and LLE with paresis of left phalanges  Monitor vitals, patient appears at baseline as per [place] aide  Started aspirin 81mg, statin due to lipid panel

## 2017-05-29 NOTE — PROGRESS NOTE ADULT - SUBJECTIVE AND OBJECTIVE BOX
Patient is a 67y old  Female who presents with a chief complaint of "I was eaten by a cobra" (26 May 2017 18:14)      INTERVAL HPI/OVERNIGHT EVENTS:  Ms. Toribio is resting comfortably this morning with a sheet covering her head. Her bedside aide states "she had her moments" but that she slept well overnight. She is stating that she does not have any pain this morning, no dysuria, no abdominal pain, no constipation/diarrhea, no fever/chills. LBM yesterday after drinking coca cola and taking stool softeners.     Allergies    clozapine (Other)  Depakote (Other)  erythromycin (Unknown)  Neupogen (Other)    Intolerances  Vital Signs Last 24 Hrs  T(C): 37, Max: 37.4 (- @ 17:30)  T(F): 98.6, Max: 99.4 ( @ 17:30)  HR: 79 (75 - 87)  BP: 102/64 (102/64 - 134/65)  BP(mean): --  RR: 16 (16 - 97)  SpO2: 98% (18% - 98%)RA     Daily   I&O's Detail  Intake: tolerating regular diet (unmeasured) without vomiting since ED       REVIEW OF SYSTEMS:    CONSTITUTIONAL: No fever,   EYES: No eye pain,  or discharge  ENMT:  No difficulty hearing  RESPIRATORY: No cough, wheezing, chills or hemoptysis; No shortness of breath  CARDIOVASCULAR: No chest pain   GASTROINTESTINAL: No abdominal; no further vomiting at this time; +constipation (LBM in ED though not witnessed)  No melena or hematochezia.  GENITOURINARY: No pain with urination   NEUROLOGICAL: No headaches  LYMPH NODES: No enlarged glands  ENDOCRINE: No heat or cold intolerance; No hair loss  PSYCHIATRIC: +Depression, anxiety, mood swings   HEME/LYMPH: No easy bruising, or bleeding gums  ALLERY AND IMMUNOLOGIC: No hives or eczema      PHYSICAL EXAM:    GENERAL: NAD, sitting up in bed, talking nonsensically , alert  HEAD:  Atraumatic, Normocephalic  EYES: EOMI, PERRLA, conjunctiva and sclera clear  ENMT: No tonsillar erythema, exudates, or enlargement; DRY mucous membranes; poor dentition with majority of bottom teeth missing  NECK: Supple, No JVD   NERVOUS SYSTEM:  AAOx3 grossly (Month/year, self "You know my name already you called me Ellen", place "[facility]"   CHEST/LUNG: Grossly CTABL though not cooperating with deep inspirations   HEART: Regular rate and rhythm; No murmurs, rubs, or gallops; palpable radial pulses 2+ b/l   ABDOMEN: +soft but distended, midline abdominal scar unchanged; +BS , NTTP though patient grimacing while stating "NO" when asked if she has pain. Dull to percussion throughout abdomen   EXTREMITIES:  2+ Peripheral Pulses; trace edema b/l; no visible lesions between toes   LYMPH: No lymphadenopathy noted  SKIN: no sacral decubitus ulcers, skin intact      LABS:                                             8.0    x     )-----------( x        ( 28 May 2017 05:45 )             25.9       148<H>  |  111<H>  |  18.0  ----------------------------<  92  3.6   |  24.0  |  0.97    Ca    9.0      27 May 2017 05:29  Phos  3.0       Mg     2.2         TPro  6.2<L>  /  Alb  3.2<L>  /  TBili  0.3<L>  /  DBili  x   /  AST  16  /  ALT  16  /  AlkPhos  76    Urinalysis Basic - ( 25 May 2017 16:06 )    Color: Pale Yellow / Appearance: Clear / S.005 / pH: x  Gluc: x / Ketone: Negative  / Bili: Negative / Urobili: Negative mg/dL   Blood: x / Protein: 15 mg/dL / Nitrite: Negative   Leuk Esterase: Small / RBC: 3-5 /HPF / WBC 3-5   Sq Epi: x / Non Sq Epi: Occasional / Bacteria: Occasional        Culture Results:   <10,000 CFU/ml Gram Negative Rods ( @ 16:06)  Culture Results:   Growth in aerobic bottle: Gram Positive Cocci in Clusters  Aerobic Bottle: 20:20 Hours to positivity  Anaerobic Bottle: No growth to date  .  TYPE: (C=Critical, N=Notification, A=Abnormal) C  TESTS:  _ Positive Blood GS  DATE/TIME CALLED: _ 20 ( @ 11:04)    Procalcitonin, Serum: 0.99 ng/mL ( @ 05:29)      Albumin, Serum: 3.2 g/dL ( @ 05:29)  Albumin, Serum: 3.2 g/dL ( @ 16:13)    LIVER FUNCTIONS - ( 27 May 2017 05:29 )  Alb: 3.2 g/dL / Pro: 6.2 g/dL / ALK PHOS: 76 U/L / ALT: 16 U/L / AST: 16 U/L / GGT: x             RADIOLOGY & ADDITIONAL TESTS:    BOWEL: No bowel obstruction. Small bowel anastomosis in the pelvis.    PERITONEUM: No ascites.  VESSELS:  Atherosclerotic change of the abdominal aorta and its branches.  RETROPERITONEUM: No lymphadenopathy.    ABDOMINAL WALL: Ventral hernia mesh.  BONES: Multiple old left-sided rib fractures. Degenerative changes of the   spine.    IMPRESSION:   1.  Bibasilar atelectasis.  2.  Heterogeneous enhancement of the renal parenchyma, possibly   pyelonephritis.  3.  Splenomegaly with innumerable hypodense lesions, unchanged since   2015.    **PENDING abdominal X ray STAT from yesterday, radiology called and aware, will f/u  *no bowel obstruction on CT abdomen Patient is a 67y old  Female who presents with a chief complaint of "I was eaten by a cobra" (26 May 2017 18:14)      INTERVAL HPI/OVERNIGHT EVENTS:  Ms. Toribio is resting comfortably this morning with a sheet covering her head. Her bedside aide states "she had her moments" but that she slept well overnight. She is stating that she does not have any pain this morning, no dysuria, no abdominal pain, no constipation/diarrhea, no fever/chills. LBM yesterday after drinking coca cola and taking stool softeners.     Allergies    clozapine (Other)  Depakote (Other)  erythromycin (Unknown)  Neupogen (Other)    Intolerances  Vital Signs Last 24 Hrs  T(C): 37, Max: 37.4 (05-28 @ 17:30)  T(F): 98.6, Max: 99.4 (05-28 @ 17:30)  HR: 79 (75 - 87)  BP: 102/64 (102/64 - 134/65)  BP(mean): --  RR: 16 (16 - 97)  SpO2: 98% (18% - 98%)RA     Daily   I&O's Detail  *tolerating her regular diet without vomiting since being in the ED     REVIEW OF SYSTEMS:    CONSTITUTIONAL: No fever,   EYES: No eye pain,  or discharge  ENMT:  No difficulty hearing  RESPIRATORY: No cough, wheezing, chills or hemoptysis; No shortness of breath  CARDIOVASCULAR: No chest pain   GASTROINTESTINAL: No abdominal pain; no further vomiting at this time; constipation now resolved  GENITOURINARY: No pain with urination   NEUROLOGICAL: No headaches  LYMPH NODES: No enlarged glands  ENDOCRINE: No heat or cold intolerance; No hair loss  PSYCHIATRIC: +Depression, anxiety, mood swings   HEME/LYMPH: No easy bruising, or bleeding gums  ALLERY AND IMMUNOLOGIC: No hives or eczema      PHYSICAL EXAM:    GENERAL: NAD, sitting up in bed, sheet over her head, pleasant, calm   HEAD:  Atraumatic, Normocephalic  EYES: EOMI, PERRLA, conjunctiva and sclera clear  ENMT: No tonsillar erythema, exudates, or enlargement; MMM; poor dentition with majority of bottom teeth missing  NECK: Supple, No JVD   NERVOUS SYSTEM:  AAOx3 grossly (Month/year, self " [place]", place "[facility]") unchanged from yesterday  CHEST/LUNG: Grossly CTABL though not cooperating with poor inspiratory effort  HEART: Regular rate and rhythm; No murmurs, rubs, or gallops; palpable radial pulses 2+ b/l   ABDOMEN: +soft, distension now resolved, patient stating NTTP though grimacing with deep palpation; +BS   EXTREMITIES:  2+ Peripheral Pulses; trace edema b/l; no visible lesions between toes unchanged from admission  LYMPH: No lymphadenopathy noted  SKIN: no sacral decubitus ulcers, skin intact      LABS:                          8.0    x     )-----------( x        ( 28 May 2017 05:45 )             25.9         Culture Results:   <10,000 CFU/ml Gram Negative Rods (05-25 @ 16:06)  Culture Results:   Growth in aerobic bottle: Gram Positive Cocci in Clusters  Aerobic Bottle: 20:20 Hours to positivity  Anaerobic Bottle: No growth to date  .  TYPE: (C=Critical, N=Notification, A=Abnormal) C  TESTS:  _ Positive Blood GS  DATE/TIME CALLED: _ 05/26/20 (05-25 @ 11:04)    Procalcitonin, Serum: 0.99 ng/mL (05-27 @ 05:29)      Albumin, Serum: 3.2 g/dL (05-27 @ 05:29)  Albumin, Serum: 3.2 g/dL (05-26 @ 16:13)    LIVER FUNCTIONS - ( 27 May 2017 05:29 )  Alb: 3.2 g/dL / Pro: 6.2 g/dL / ALK PHOS: 76 U/L / ALT: 16 U/L / AST: 16 U/L / GGT: x             RADIOLOGY & ADDITIONAL TESTS:    BOWEL: No bowel obstruction. Small bowel anastomosis in the pelvis.    PERITONEUM: No ascites.  VESSELS:  Atherosclerotic change of the abdominal aorta and its branches.  RETROPERITONEUM: No lymphadenopathy.    ABDOMINAL WALL: Ventral hernia mesh.  BONES: Multiple old left-sided rib fractures. Degenerative changes of the   spine.    IMPRESSION:   1.  Bibasilar atelectasis.  2.  Heterogeneous enhancement of the renal parenchyma, possibly   pyelonephritis.  3.  Splenomegaly with innumerable hypodense lesions, unchanged since   March 23, 2015.    **PENDING abdominal X ray STAT from yesterday, radiology called and aware, will f/u  *no bowel obstruction on CT abdomen     MEDICATIONS  (STANDING):  sodium chloride 0.9% lock flush 3milliLiter(s) IV Push every 8 hours  petrolatum white Ointment 1Application(s) Topical daily  benztropine 0.5milliGRAM(s) Oral daily  haloperidol    Concentrate 7milliGRAM(s) Oral two times a day  benztropine 1milliGRAM(s) Oral at bedtime  saccharomyces boulardii 250milliGRAM(s) Oral two times a day  calcium carbonate 1250 mG (OsCal) 1Tablet(s) Oral daily  cholecalciferol Oral Tab/Cap - Peds 2000Unit(s) Oral every 48 hours  levETIRAcetam 500milliGRAM(s) Oral two times a day  levothyroxine 112MICROGram(s) Oral daily  LORazepam     Tablet 1milliGRAM(s) Oral three times a day  senna 2Tablet(s) Oral at bedtime  docusate sodium 100milliGRAM(s) Oral three times a day  polyethylene glycol 3350 17Gram(s) Oral daily  enoxaparin Injectable 40milliGRAM(s) SubCutaneous daily  pantoprazole  Injectable 20milliGRAM(s) IV Push daily  simvastatin 20milliGRAM(s) Oral at bedtime  vancomycin  IVPB  IV Intermittent   vancomycin  IVPB 750milliGRAM(s) IV Intermittent every 12 hours  aspirin  chewable 81milliGRAM(s) Oral daily    MEDICATIONS  (PRN):  ondansetron Injectable 4milliGRAM(s) IV Push every 6 hours PRN Nausea and/or Vomiting

## 2017-05-29 NOTE — DISCHARGE NOTE ADULT - SECONDARY DIAGNOSIS.
Anemia CVA, old, hemiparesis Constipation Hypothyroidism Schizoaffective disorder Protein calorie malnutrition

## 2017-05-30 VITALS
SYSTOLIC BLOOD PRESSURE: 137 MMHG | DIASTOLIC BLOOD PRESSURE: 70 MMHG | HEART RATE: 102 BPM | OXYGEN SATURATION: 94 % | TEMPERATURE: 99 F | RESPIRATION RATE: 18 BRPM

## 2017-05-30 DIAGNOSIS — I50.42 CHRONIC COMBINED SYSTOLIC (CONGESTIVE) AND DIASTOLIC (CONGESTIVE) HEART FAILURE: ICD-10-CM

## 2017-05-30 LAB
ANISOCYTOSIS BLD QL: SLIGHT — SIGNIFICANT CHANGE UP
CULTURE RESULTS: NO GROWTH — SIGNIFICANT CHANGE UP
ELLIPTOCYTES BLD QL SMEAR: SLIGHT — SIGNIFICANT CHANGE UP
EOSINOPHIL NFR BLD AUTO: 2 % — SIGNIFICANT CHANGE UP (ref 0–5)
HCT VFR BLD CALC: 26.6 % — LOW (ref 37–47)
HGB BLD-MCNC: 8.3 G/DL — LOW (ref 12–16)
HYPOCHROMIA BLD QL: SLIGHT — SIGNIFICANT CHANGE UP
LYMPHOCYTES # BLD AUTO: 21 % — SIGNIFICANT CHANGE UP (ref 20–55)
MACROCYTES BLD QL: SLIGHT — SIGNIFICANT CHANGE UP
MCHC RBC-ENTMCNC: 27.9 PG — SIGNIFICANT CHANGE UP (ref 27–31)
MCHC RBC-ENTMCNC: 31.2 G/DL — LOW (ref 32–36)
MCV RBC AUTO: 89.3 FL — SIGNIFICANT CHANGE UP (ref 81–99)
MICROCYTES BLD QL: SLIGHT — SIGNIFICANT CHANGE UP
MONOCYTES NFR BLD AUTO: 7 % — SIGNIFICANT CHANGE UP (ref 3–10)
NEUTROPHILS NFR BLD AUTO: 70 % — SIGNIFICANT CHANGE UP (ref 37–73)
OVALOCYTES BLD QL SMEAR: SLIGHT — SIGNIFICANT CHANGE UP
PLAT MORPH BLD: NORMAL — SIGNIFICANT CHANGE UP
PLATELET # BLD AUTO: 168 K/UL — SIGNIFICANT CHANGE UP (ref 150–400)
POIKILOCYTOSIS BLD QL AUTO: SLIGHT — SIGNIFICANT CHANGE UP
POLYCHROMASIA BLD QL SMEAR: SLIGHT — SIGNIFICANT CHANGE UP
RBC # BLD: 2.98 M/UL — LOW (ref 4.4–5.2)
RBC # FLD: 16.7 % — HIGH (ref 11–15.6)
RBC BLD AUTO: ABNORMAL
SPECIMEN SOURCE: SIGNIFICANT CHANGE UP
VANCOMYCIN TROUGH SERPL-MCNC: 26.1 UG/ML — CRITICAL HIGH (ref 10–20)
WBC # BLD: 2.3 K/UL — LOW (ref 4.8–10.8)
WBC # FLD AUTO: 2.3 K/UL — LOW (ref 4.8–10.8)

## 2017-05-30 PROCEDURE — 99238 HOSP IP/OBS DSCHRG MGMT 30/<: CPT

## 2017-05-30 PROCEDURE — 99232 SBSQ HOSP IP/OBS MODERATE 35: CPT

## 2017-05-30 RX ORDER — BENZTROPINE MESYLATE 1 MG
1 TABLET ORAL
Qty: 0 | Refills: 0 | COMMUNITY
Start: 2017-05-30

## 2017-05-30 RX ORDER — PANTOPRAZOLE SODIUM 20 MG/1
1 TABLET, DELAYED RELEASE ORAL
Qty: 30 | Refills: 0 | OUTPATIENT
Start: 2017-05-30 | End: 2017-06-29

## 2017-05-30 RX ORDER — CALCIUM CARBONATE 500(1250)
1 TABLET ORAL
Qty: 0 | Refills: 0 | COMMUNITY
Start: 2017-05-30

## 2017-05-30 RX ORDER — POLYETHYLENE GLYCOL 3350 17 G/17G
17 POWDER, FOR SOLUTION ORAL
Qty: 0 | Refills: 0 | DISCHARGE
Start: 2017-05-30

## 2017-05-30 RX ORDER — DOCUSATE SODIUM 100 MG
1 CAPSULE ORAL
Qty: 0 | Refills: 0 | DISCHARGE
Start: 2017-05-30

## 2017-05-30 RX ORDER — ASCORBIC ACID 60 MG
500 TABLET,CHEWABLE ORAL DAILY
Qty: 0 | Refills: 0 | Status: DISCONTINUED | OUTPATIENT
Start: 2017-05-30 | End: 2017-05-30

## 2017-05-30 RX ORDER — SENNA PLUS 8.6 MG/1
2 TABLET ORAL
Qty: 0 | Refills: 0 | DISCHARGE
Start: 2017-05-30

## 2017-05-30 RX ORDER — ASCORBIC ACID 60 MG
1 TABLET,CHEWABLE ORAL
Qty: 0 | Refills: 0 | COMMUNITY
Start: 2017-05-30

## 2017-05-30 RX ORDER — CHOLECALCIFEROL (VITAMIN D3) 125 MCG
50 CAPSULE ORAL
Qty: 0 | Refills: 0 | DISCHARGE
Start: 2017-05-30

## 2017-05-30 RX ORDER — HALOPERIDOL DECANOATE 100 MG/ML
3.5 INJECTION INTRAMUSCULAR
Qty: 0 | Refills: 0 | COMMUNITY
Start: 2017-05-30

## 2017-05-30 RX ORDER — SIMVASTATIN 20 MG/1
1 TABLET, FILM COATED ORAL
Qty: 0 | Refills: 0 | DISCHARGE
Start: 2017-05-30

## 2017-05-30 RX ORDER — ASPIRIN/CALCIUM CARB/MAGNESIUM 324 MG
1 TABLET ORAL
Qty: 0 | Refills: 0 | COMMUNITY
Start: 2017-05-30

## 2017-05-30 RX ORDER — PETROLATUM,WHITE
1 JELLY (GRAM) TOPICAL
Qty: 0 | Refills: 0 | COMMUNITY
Start: 2017-05-30

## 2017-05-30 RX ORDER — LEVETIRACETAM 250 MG/1
1 TABLET, FILM COATED ORAL
Qty: 0 | Refills: 0 | COMMUNITY
Start: 2017-05-30

## 2017-05-30 RX ORDER — LEVOTHYROXINE SODIUM 125 MCG
1 TABLET ORAL
Qty: 0 | Refills: 0 | DISCHARGE
Start: 2017-05-30

## 2017-05-30 RX ORDER — CHOLECALCIFEROL (VITAMIN D3) 125 MCG
2000 CAPSULE ORAL
Qty: 0 | Refills: 0 | DISCHARGE
Start: 2017-05-30

## 2017-05-30 RX ORDER — SIMETHICONE 80 MG/1
80 TABLET, CHEWABLE ORAL THREE TIMES A DAY
Qty: 0 | Refills: 0 | Status: DISCONTINUED | OUTPATIENT
Start: 2017-05-30 | End: 2017-05-30

## 2017-05-30 RX ADMIN — Medication 1 APPLICATION(S): at 13:15

## 2017-05-30 RX ADMIN — LEVETIRACETAM 500 MILLIGRAM(S): 250 TABLET, FILM COATED ORAL at 05:57

## 2017-05-30 RX ADMIN — Medication 500 MILLIGRAM(S): at 13:05

## 2017-05-30 RX ADMIN — Medication 1 MILLIGRAM(S): at 13:05

## 2017-05-30 RX ADMIN — SODIUM CHLORIDE 3 MILLILITER(S): 9 INJECTION INTRAMUSCULAR; INTRAVENOUS; SUBCUTANEOUS at 13:16

## 2017-05-30 RX ADMIN — Medication 250 MILLIGRAM(S): at 05:56

## 2017-05-30 RX ADMIN — Medication 112 MICROGRAM(S): at 05:56

## 2017-05-30 RX ADMIN — Medication 1 TABLET(S): at 13:06

## 2017-05-30 RX ADMIN — PANTOPRAZOLE SODIUM 20 MILLIGRAM(S): 20 TABLET, DELAYED RELEASE ORAL at 13:07

## 2017-05-30 RX ADMIN — Medication 100 MILLIGRAM(S): at 13:06

## 2017-05-30 RX ADMIN — HALOPERIDOL DECANOATE 7 MILLIGRAM(S): 100 INJECTION INTRAMUSCULAR at 05:56

## 2017-05-30 RX ADMIN — Medication 0.5 MILLIGRAM(S): at 13:06

## 2017-05-30 RX ADMIN — Medication 1 MILLIGRAM(S): at 05:56

## 2017-05-30 RX ADMIN — Medication 100 MILLIGRAM(S): at 05:57

## 2017-05-30 RX ADMIN — SIMETHICONE 80 MILLIGRAM(S): 80 TABLET, CHEWABLE ORAL at 13:06

## 2017-05-30 RX ADMIN — ENOXAPARIN SODIUM 40 MILLIGRAM(S): 100 INJECTION SUBCUTANEOUS at 13:07

## 2017-05-30 RX ADMIN — SODIUM CHLORIDE 3 MILLILITER(S): 9 INJECTION INTRAMUSCULAR; INTRAVENOUS; SUBCUTANEOUS at 06:01

## 2017-05-30 RX ADMIN — Medication 81 MILLIGRAM(S): at 13:06

## 2017-05-30 NOTE — PROGRESS NOTE ADULT - PROBLEM SELECTOR PLAN 5
Likely acute kidney injury possibly due to dehydration, vomiting  Encourage PO hydration and feeding TID  Now off fluids

## 2017-05-30 NOTE — PROGRESS NOTE ADULT - PROBLEM SELECTOR PLAN 3
Concern for acute blood loss of unknown source with difficulty eliciting history from patient and aide   Improved, GI consult appreciated, no need for scope as 2015 WNL, no active GI bleed  Guaiac positive   s/p 1 u pRBC 5/27  Colorectal surgery visit appreciated, no acute intervention at this time   Anemia likely medication induced (from psych meds), starting vit C 500 QD but hold iron for now due to bacteremia

## 2017-05-30 NOTE — PROGRESS NOTE ADULT - PROBLEM SELECTOR PLAN 1
Acute onset, with history of sepsis due to proctocolitis in April 2017, CT abd unremarkable  Coagulase negative staph in 1 of 2 bottles drawn on 5/25, f/up 1 set from 5/28   CT abdomen/pelvis read noted.   Vanco day #4  Florastor, no diarrhea

## 2017-05-30 NOTE — PROGRESS NOTE ADULT - ATTENDING COMMENTS
I have personally seen and examined the patient. I agree with the above history, physical and plan which I have reviewed and edited as appropriate. Patient awake, alert, oriented, calm, comfortable, no complaints. Cardiopulmonary exam is within normal limits.  Patient with anemia of chronic disease, no signs of active bleeding at this time ,  H & H is stable. Patient did have a normal BM.  Will d/c to facility today.

## 2017-05-30 NOTE — PROGRESS NOTE ADULT - SUBJECTIVE AND OBJECTIVE BOX
Pt seen and examined. Her Hb is stable and as stated in yesterday's consult she had a colonoscopy with biopsy in 2015 which showed non specific proctosigmoiditis which at that time was felt to be secondary to a large rectal prolapse which was subsequently repaired. No signs of active GI bleeding here and yesterday's JOB showed brown stool.      MEDICATIONS:  MEDICATIONS  (STANDING):  sodium chloride 0.9% lock flush 3milliLiter(s) IV Push every 8 hours  petrolatum white Ointment 1Application(s) Topical daily  benztropine 0.5milliGRAM(s) Oral daily  haloperidol    Concentrate 7milliGRAM(s) Oral two times a day  benztropine 1milliGRAM(s) Oral at bedtime  saccharomyces boulardii 250milliGRAM(s) Oral two times a day  calcium carbonate 1250 mG (OsCal) 1Tablet(s) Oral daily  cholecalciferol Oral Tab/Cap - Peds 2000Unit(s) Oral every 48 hours  levETIRAcetam 500milliGRAM(s) Oral two times a day  levothyroxine 112MICROGram(s) Oral daily  LORazepam     Tablet 1milliGRAM(s) Oral three times a day  senna 2Tablet(s) Oral at bedtime  docusate sodium 100milliGRAM(s) Oral three times a day  polyethylene glycol 3350 17Gram(s) Oral daily  enoxaparin Injectable 40milliGRAM(s) SubCutaneous daily  pantoprazole  Injectable 20milliGRAM(s) IV Push daily  simvastatin 20milliGRAM(s) Oral at bedtime  vancomycin  IVPB  IV Intermittent   vancomycin  IVPB 750milliGRAM(s) IV Intermittent every 12 hours  aspirin  chewable 81milliGRAM(s) Oral daily  ascorbic acid 500milliGRAM(s) Oral daily    MEDICATIONS  (PRN):  ondansetron Injectable 4milliGRAM(s) IV Push every 6 hours PRN Nausea and/or Vomiting      Allergies    clozapine (Other)  Depakote (Other)  erythromycin (Unknown)  Neupogen (Other)    Intolerances        Vital Signs Last 24 Hrs  T(C): 37.2, Max: 37.3 (05-29 @ 20:17)  T(F): 98.9, Max: 99.1 (05- @ 20:17)  HR: 88 (83 - 101)  BP: 120/77 (109/70 - 122/72)  BP(mean): --  RR: 20 (16 - 20)  SpO2: 96% (96% - 96%)    I & Os for current day (as of  @ 07:25)  =============================================  IN: 600 ml / OUT: 0 ml / NET: 600 ml      PHYSICAL EXAM:    General: Well developed; well nourished; in no acute distress  HEENT: MMM, conjunctiva pink and sclera anicteric.  Lungs: clear to auscultation and percussion.  Cor: RRR S1, S2 only w/o mrg or clicks  Gastrointestinal: Abdomen: Soft non-tender non-distended; Normal bowel sounds; No hepatosplenomegaly  multiple surgical scars.  Extremities: no cyanosis, clubbing or edema.      LABS:      CBC Full  -  ( 30 May 2017 05:43 )  WBC Count : 2.3 K/uL  Hemoglobin : 8.3 g/dL  Hematocrit : 26.6 %  Platelet Count - Automated : 168 K/uL  Mean Cell Volume : 89.3 fl  Mean Cell Hemoglobin : 27.9 pg  Mean Cell Hemoglobin Concentration : 31.2 g/dL  Auto Neutrophil # : x  Auto Lymphocyte # : x  Auto Monocyte # : x  Auto Eosinophil # : x  Auto Basophil # : x  Auto Neutrophil % : 70.0 %  Auto Lymphocyte % : 21.0 %  Auto Monocyte % : 7.0 %  Auto Eosinophil % : 2.0 %  Auto Basophil % : x                Urinalysis Basic - ( 29 May 2017 06:53 )    Color: Pale Yellow / Appearance: Clear / S.010 / pH: x  Gluc: x / Ketone: Negative  / Bili: Negative / Urobili: Negative mg/dL   Blood: x / Protein: Negative mg/dL / Nitrite: Negative   Leuk Esterase: Negative / RBC: x / WBC x   Sq Epi: x / Non Sq Epi: x / Bacteria: x                RADIOLOGY & ADDITIONAL STUDIES (The following images were personally reviewed):

## 2017-05-30 NOTE — PROGRESS NOTE ADULT - PROBLEM SELECTOR PLAN 2
Also likely multi-factorial in etiology. No signs of active GI bleeding. No plans or need to repeat GI work up. Stable for discharge from a GI standpoint on Pantoprazole 40 mg,/d. Signing off. Reconsult as needed.

## 2017-05-30 NOTE — PROGRESS NOTE ADULT - PROBLEM SELECTOR PLAN 1
Pt. presents with anemia and neutropenia so her anemia is likely secondary to chronic disease. No plans or need to repeat colonoscopy presently. Stable for discharge from a GI standpoint on Pantoprazole 40 mg./d. Signing off. Reconsult as needed.

## 2017-05-30 NOTE — PROGRESS NOTE ADULT - PROBLEM SELECTOR PROBLEM 8
Need for prophylactic measure
Seizure disorder

## 2017-05-30 NOTE — PROGRESS NOTE ADULT - PROBLEM SELECTOR PROBLEM 1
Bacteremia due to Gram-positive bacteria
Anemia, unspecified type

## 2017-05-30 NOTE — PROGRESS NOTE ADULT - PROBLEM SELECTOR PLAN 2
Now resolved.   Senna 2 tabs bedtime  Colace TID  Miralax QD  Consider fleet enema or coca cola as patient responds well to both at [place]   Abx x ray repeat shows nonspecific gas pattern

## 2017-05-30 NOTE — PROGRESS NOTE ADULT - PROBLEM SELECTOR PLAN 6
HFrEF 55-60% with grade I diastolic dysfunction  Hemodynamically stable for now, normotensive  Will c/t monitor, encourage PO intake  Will watch for hypotension as patient chronically constipated with recent large quantity BMs

## 2017-05-30 NOTE — PROGRESS NOTE ADULT - SUBJECTIVE AND OBJECTIVE BOX
Patient is a 67y old  Female who presents with a chief complaint of "I was eaten by a cobra" (26 May 2017 18:14)      INTERVAL HPI/OVERNIGHT EVENTS:  Ms. Toribio is resting comfortably this morning with a sheet covering her head. Her bedside aide states "she has been like usual"  but that she slept well overnight. She is stating that she does not have any pain this morning, + burning upon urination; no abdominal pain, no constipation/diarrhea, no fever/chills. LBM 5/28 and 5/29 (multiple small amount as per nurse)  after drinking coca cola and taking stool softeners.     Allergies    clozapine (Other)  Depakote (Other)  erythromycin (Unknown)  Neupogen (Other)    Intolerances    Vital Signs Last 24 Hrs  T(C): 37.2, Max: 37.3 (05-29 @ 20:17)  T(F): 98.9, Max: 99.1 (05-29 @ 20:17)  HR: 88 (83 - 101)  BP: 120/77 (109/70 - 122/72)  BP(mean): --  RR: 20 (16 - 20)  SpO2: 96% (96% - 96%)RA    Daily   I&O's Detail    I & Os for current day (as of 30 May 2017 07:24)  =============================================  IN:    Oral Fluid: 600 ml    Total IN: 600 ml  ---------------------------------------------  OUT:    Total OUT: 0 ml  ---------------------------------------------  Total NET: 600 ml    *tolerating her regular diet without vomiting since being in the ED     REVIEW OF SYSTEMS:    CONSTITUTIONAL: No fever,   EYES: No eye pain,  or discharge  ENMT:  No difficulty hearing  RESPIRATORY: No cough, wheezing, chills or hemoptysis; No shortness of breath  CARDIOVASCULAR: No chest pain   GASTROINTESTINAL: No abdominal pain; no further vomiting at this time; constipation now resolved  GENITOURINARY: +BURNING upon urination  NEUROLOGICAL: No headaches  LYMPH NODES: No enlarged glands  ENDOCRINE: No heat or cold intolerance; No hair loss  PSYCHIATRIC: +Depression, anxiety, mood swings   HEME/LYMPH: No easy bruising, or bleeding gums  ALLERY AND IMMUNOLOGIC: No hives or eczema      PHYSICAL EXAM:    GENERAL: NAD, sitting up in bed, sheet over her head, pleasant, calm compared to admission   HEAD:  Atraumatic, Normocephalic  EYES: EOMI, PERRLA, conjunctiva and sclera clear  ENMT: No tonsillar erythema, exudates, or enlargement; MMM; poor dentition with majority of bottom teeth missing  NECK: Supple, No JVD   NERVOUS SYSTEM:  AAOx3 grossly (month, year, name, place)    unchanged from yesterday  CHEST/LUNG: CTABL at upper lobes, not cooperating fully with poor inspiratory effort at bases but grossly unchanged from prior   HEART: Regular rate and rhythm; No murmurs, rubs, or gallops; palpable radial pulses 2+ b/l   ABDOMEN: +softly distended worsened since yesterday, but NTTP, +BS normoactive   EXTREMITIES:  2+ Peripheral Pulses; trace edema b/l; no visible lesions between toes unchanged from admission  LYMPH: No lymphadenopathy noted  SKIN: no sacral decubitus ulcers, skin intact      LABS:                        8.3    2.3   )-----------( 168      ( 30 May 2017 05:43 )             26.6       Culture Results:   <10,000 CFU/ml Gram Negative Rods (05-25 @ 16:06)  Culture Results:   Growth in aerobic bottle: Gram Positive Cocci in Clusters  Aerobic Bottle: 20:20 Hours to positivity  Anaerobic Bottle: No growth to date  .  TYPE: (C=Critical, N=Notification, A=Abnormal) C  TESTS:  _ Positive Blood GS  DATE/TIME CALLED: _ 05/26/20 (05-25 @ 11:04)    Procalcitonin, Serum: 0.99 ng/mL (05-27 @ 05:29)      Albumin, Serum: 3.2 g/dL (05-27 @ 05:29)  Albumin, Serum: 3.2 g/dL (05-26 @ 16:13)    LIVER FUNCTIONS - ( 27 May 2017 05:29 )  Alb: 3.2 g/dL / Pro: 6.2 g/dL / ALK PHOS: 76 U/L / ALT: 16 U/L / AST: 16 U/L / GGT: x             RADIOLOGY & ADDITIONAL TESTS:    BOWEL: No bowel obstruction. Small bowel anastomosis in the pelvis.    PERITONEUM: No ascites.  VESSELS:  Atherosclerotic change of the abdominal aorta and its branches.  RETROPERITONEUM: No lymphadenopathy.    ABDOMINAL WALL: Ventral hernia mesh.  BONES: Multiple old left-sided rib fractures. Degenerative changes of the   spine.    IMPRESSION:   1.  Bibasilar atelectasis.  2.  Heterogeneous enhancement of the renal parenchyma, possibly   pyelonephritis.  3.  Splenomegaly with innumerable hypodense lesions, unchanged since   March 23, 2015.    **PENDING abdominal X ray STAT from yesterday, radiology called and aware, will f/u  *no bowel obstruction on CT abdomen       MEDICATIONS  (STANDING):  sodium chloride 0.9% lock flush 3milliLiter(s) IV Push every 8 hours  petrolatum white Ointment 1Application(s) Topical daily  benztropine 0.5milliGRAM(s) Oral daily  haloperidol    Concentrate 7milliGRAM(s) Oral two times a day  benztropine 1milliGRAM(s) Oral at bedtime  saccharomyces boulardii 250milliGRAM(s) Oral two times a day  calcium carbonate 1250 mG (OsCal) 1Tablet(s) Oral daily  cholecalciferol Oral Tab/Cap - Peds 2000Unit(s) Oral every 48 hours  levETIRAcetam 500milliGRAM(s) Oral two times a day  levothyroxine 112MICROGram(s) Oral daily  LORazepam     Tablet 1milliGRAM(s) Oral three times a day  senna 2Tablet(s) Oral at bedtime  docusate sodium 100milliGRAM(s) Oral three times a day  polyethylene glycol 3350 17Gram(s) Oral daily  enoxaparin Injectable 40milliGRAM(s) SubCutaneous daily  pantoprazole  Injectable 20milliGRAM(s) IV Push daily  simvastatin 20milliGRAM(s) Oral at bedtime  vancomycin  IVPB  IV Intermittent   vancomycin  IVPB 750milliGRAM(s) IV Intermittent every 12 hours  aspirin  chewable 81milliGRAM(s) Oral daily  ascorbic acid 500milliGRAM(s) Oral daily    MEDICATIONS  (PRN):  ondansetron Injectable 4milliGRAM(s) IV Push every 6 hours PRN Nausea and/or Vomiting

## 2017-05-30 NOTE — PROGRESS NOTE ADULT - PROBLEM SELECTOR PLAN 4
Controlled on medications, chronic  Psych consult- Dr. Gusman saw patient 5/25, will f/u recs for this admission  -will continue psych meds for now pending psych eval
Controlled on medications, chronic  Psych consult appreciated  C/w haldol 7 BID, ativan 1 TID, cogentin 0.5 am and 1mg QHS

## 2017-05-30 NOTE — PROGRESS NOTE ADULT - ASSESSMENT
Ms. 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 Currently with bacteremia of unknown source: Blood culture 1 tube positive for coagulase negative staph on Vanco day #4, 2 sets blood cx negative so far with 1 set pending in lab from 5/28.     Patient had TTE in 2015 with 75% EF. We will continue to monitor her and hydrate her as needed, currently tolerating PO without further incidences of vomiting, abdominal distension on X ray abd showing stool now resolved with repeat Abx x ray nonspecific gas pattern;   We will continue to treat bacteremia with Vanco day #4, possibly contaminant, pending 1 more set of cultures in lab. For her new onset dysuria we are pending urine culture in lab, first urine culture was negative.

## 2017-05-30 NOTE — PROGRESS NOTE ADULT - PROBLEM SELECTOR PLAN 7
Residual weakness in left hand , LUE and LLE with paresis of left phalanges  Monitor vitals, patient appears at baseline as per [place] aide  Started aspirin 81mg, statin due to lipid panel

## 2017-06-20 ENCOUNTER — OUTPATIENT (OUTPATIENT)
Dept: OUTPATIENT SERVICES | Facility: HOSPITAL | Age: 67
LOS: 1 days | End: 2017-06-20
Payer: COMMERCIAL

## 2017-06-20 DIAGNOSIS — K43.5 PARASTOMAL HERNIA WITHOUT OBSTRUCTION OR GANGRENE: Chronic | ICD-10-CM

## 2017-06-20 DIAGNOSIS — R13.19 OTHER DYSPHAGIA: ICD-10-CM

## 2017-06-20 DIAGNOSIS — Z93.2 ILEOSTOMY STATUS: Chronic | ICD-10-CM

## 2017-06-20 PROCEDURE — 76536 US EXAM OF HEAD AND NECK: CPT | Mod: 26

## 2017-06-20 PROCEDURE — 76536 US EXAM OF HEAD AND NECK: CPT

## 2017-07-24 ENCOUNTER — APPOINTMENT (OUTPATIENT)
Dept: COLORECTAL SURGERY | Facility: CLINIC | Age: 67
End: 2017-07-24

## 2017-07-24 VITALS
TEMPERATURE: 98.1 F | RESPIRATION RATE: 14 BRPM | BODY MASS INDEX: 21.86 KG/M2 | WEIGHT: 136 LBS | HEART RATE: 76 BPM | DIASTOLIC BLOOD PRESSURE: 81 MMHG | SYSTOLIC BLOOD PRESSURE: 136 MMHG | HEIGHT: 66 IN
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
## 2018-02-22 ENCOUNTER — OTHER (OUTPATIENT)
Age: 68
End: 2018-02-22

## 2018-03-07 ENCOUNTER — INPATIENT (INPATIENT)
Facility: HOSPITAL | Age: 68
LOS: 3 days | Discharge: PSYCHIATRIC OR SUBSTANCE USE FACILITY | DRG: 390 | End: 2018-03-11
Attending: COLON & RECTAL SURGERY | Admitting: COLON & RECTAL SURGERY
Payer: MEDICARE

## 2018-03-07 VITALS
TEMPERATURE: 98 F | HEIGHT: 67 IN | DIASTOLIC BLOOD PRESSURE: 85 MMHG | RESPIRATION RATE: 20 BRPM | WEIGHT: 145.06 LBS | OXYGEN SATURATION: 97 % | SYSTOLIC BLOOD PRESSURE: 134 MMHG | HEART RATE: 101 BPM

## 2018-03-07 DIAGNOSIS — Z93.2 ILEOSTOMY STATUS: Chronic | ICD-10-CM

## 2018-03-07 DIAGNOSIS — K43.5 PARASTOMAL HERNIA WITHOUT OBSTRUCTION OR GANGRENE: Chronic | ICD-10-CM

## 2018-03-07 LAB
ALBUMIN SERPL ELPH-MCNC: 4.1 G/DL — SIGNIFICANT CHANGE UP (ref 3.3–5.2)
ALP SERPL-CCNC: 115 U/L — SIGNIFICANT CHANGE UP (ref 40–120)
ALT FLD-CCNC: 24 U/L — SIGNIFICANT CHANGE UP
ANION GAP SERPL CALC-SCNC: 12 MMOL/L — SIGNIFICANT CHANGE UP (ref 5–17)
APTT BLD: 31.3 SEC — SIGNIFICANT CHANGE UP (ref 27.5–37.4)
AST SERPL-CCNC: 39 U/L — HIGH
BILIRUB SERPL-MCNC: 0.4 MG/DL — SIGNIFICANT CHANGE UP (ref 0.4–2)
BLD GP AB SCN SERPL QL: SIGNIFICANT CHANGE UP
BUN SERPL-MCNC: 25 MG/DL — HIGH (ref 8–20)
CALCIUM SERPL-MCNC: 9.9 MG/DL — SIGNIFICANT CHANGE UP (ref 8.6–10.2)
CHLORIDE SERPL-SCNC: 100 MMOL/L — SIGNIFICANT CHANGE UP (ref 98–107)
CO2 SERPL-SCNC: 29 MMOL/L — SIGNIFICANT CHANGE UP (ref 22–29)
CREAT SERPL-MCNC: 1.04 MG/DL — SIGNIFICANT CHANGE UP (ref 0.5–1.3)
EOSINOPHIL # BLD AUTO: 0 K/UL — SIGNIFICANT CHANGE UP (ref 0–0.5)
EOSINOPHIL NFR BLD AUTO: 0.4 % — SIGNIFICANT CHANGE UP (ref 0–6)
GLUCOSE SERPL-MCNC: 114 MG/DL — SIGNIFICANT CHANGE UP (ref 70–115)
HCT VFR BLD CALC: 34.3 % — LOW (ref 37–47)
HGB BLD-MCNC: 11.1 G/DL — LOW (ref 12–16)
INR BLD: 1.17 RATIO — HIGH (ref 0.88–1.16)
LACTATE BLDV-MCNC: 0.5 MMOL/L — SIGNIFICANT CHANGE UP (ref 0.5–2)
LIDOCAIN IGE QN: 135 U/L — HIGH (ref 22–51)
LYMPHOCYTES # BLD AUTO: 0.9 K/UL — LOW (ref 1–4.8)
LYMPHOCYTES # BLD AUTO: 18 % — LOW (ref 20–55)
MCHC RBC-ENTMCNC: 30.6 PG — SIGNIFICANT CHANGE UP (ref 27–31)
MCHC RBC-ENTMCNC: 32.4 G/DL — SIGNIFICANT CHANGE UP (ref 32–36)
MCV RBC AUTO: 94.5 FL — SIGNIFICANT CHANGE UP (ref 81–99)
MONOCYTES # BLD AUTO: 0.5 K/UL — SIGNIFICANT CHANGE UP (ref 0–0.8)
MONOCYTES NFR BLD AUTO: 9.9 % — SIGNIFICANT CHANGE UP (ref 3–10)
NEUTROPHILS # BLD AUTO: 3.4 K/UL — SIGNIFICANT CHANGE UP (ref 1.8–8)
NEUTROPHILS NFR BLD AUTO: 71.5 % — SIGNIFICANT CHANGE UP (ref 37–73)
PLATELET # BLD AUTO: 156 K/UL — SIGNIFICANT CHANGE UP (ref 150–400)
POTASSIUM SERPL-MCNC: 5.2 MMOL/L — SIGNIFICANT CHANGE UP (ref 3.5–5.3)
POTASSIUM SERPL-SCNC: 5.2 MMOL/L — SIGNIFICANT CHANGE UP (ref 3.5–5.3)
PROT SERPL-MCNC: 7.2 G/DL — SIGNIFICANT CHANGE UP (ref 6.6–8.7)
PROTHROM AB SERPL-ACNC: 12.9 SEC — HIGH (ref 9.8–12.7)
RBC # BLD: 3.63 M/UL — LOW (ref 4.4–5.2)
RBC # FLD: 13.8 % — SIGNIFICANT CHANGE UP (ref 11–15.6)
SODIUM SERPL-SCNC: 141 MMOL/L — SIGNIFICANT CHANGE UP (ref 135–145)
TYPE + AB SCN PNL BLD: SIGNIFICANT CHANGE UP
WBC # BLD: 4.8 K/UL — SIGNIFICANT CHANGE UP (ref 4.8–10.8)
WBC # FLD AUTO: 4.8 K/UL — SIGNIFICANT CHANGE UP (ref 4.8–10.8)

## 2018-03-07 PROCEDURE — 74177 CT ABD & PELVIS W/CONTRAST: CPT | Mod: 26

## 2018-03-07 PROCEDURE — 99285 EMERGENCY DEPT VISIT HI MDM: CPT

## 2018-03-07 RX ORDER — MORPHINE SULFATE 50 MG/1
2 CAPSULE, EXTENDED RELEASE ORAL ONCE
Qty: 0 | Refills: 0 | Status: DISCONTINUED | OUTPATIENT
Start: 2018-03-07 | End: 2018-03-07

## 2018-03-07 RX ORDER — SODIUM CHLORIDE 9 MG/ML
1000 INJECTION INTRAMUSCULAR; INTRAVENOUS; SUBCUTANEOUS
Qty: 0 | Refills: 0 | Status: DISCONTINUED | OUTPATIENT
Start: 2018-03-07 | End: 2018-03-08

## 2018-03-07 RX ORDER — ONDANSETRON 8 MG/1
4 TABLET, FILM COATED ORAL ONCE
Qty: 0 | Refills: 0 | Status: COMPLETED | OUTPATIENT
Start: 2018-03-07 | End: 2018-03-07

## 2018-03-07 RX ADMIN — ONDANSETRON 4 MILLIGRAM(S): 8 TABLET, FILM COATED ORAL at 18:24

## 2018-03-07 RX ADMIN — MORPHINE SULFATE 2 MILLIGRAM(S): 50 CAPSULE, EXTENDED RELEASE ORAL at 22:33

## 2018-03-07 RX ADMIN — SODIUM CHLORIDE 250 MILLILITER(S): 9 INJECTION INTRAMUSCULAR; INTRAVENOUS; SUBCUTANEOUS at 22:34

## 2018-03-07 RX ADMIN — MORPHINE SULFATE 2 MILLIGRAM(S): 50 CAPSULE, EXTENDED RELEASE ORAL at 18:24

## 2018-03-07 NOTE — ED ADULT NURSE NOTE - CHPI ED SYMPTOMS NEG
no fever/no hematuria/no nausea/no diarrhea/no vomiting/no dysuria/no burning urination/no chills/no blood in stool

## 2018-03-07 NOTE — ED ADULT NURSE NOTE - OBJECTIVE STATEMENT
as per [facility] pt sent in for protruding hernia to mid abdominal region. pt c/o pain to abdomen denies nausea vomiting no tenderness noted to abdomen BS X4 with diminished bowel sounds to left lower quadrant. pt A+OX2 [facility] aide at bedside. pt has bruise to left upper forehead

## 2018-03-07 NOTE — ED PROVIDER NOTE - PROGRESS NOTE DETAILS
recjaxon sign out  ct reviewed called dr harvey, does not have privileges spoke to dr yeh will admit, she will call pa

## 2018-03-07 NOTE — ED PROVIDER NOTE - OBJECTIVE STATEMENT
68 year old female with PMH colostomy and subsequent reversal, uterine prolapse, schizophrenia, CVA, seizures presents with abd pain. Pain started today with diffuse cramping, distension, and 1 episode of vomiting. No diarrhea, fever, chills, dysuria, hematuria, chest pain, SOB.

## 2018-03-08 DIAGNOSIS — K56.609 UNSPECIFIED INTESTINAL OBSTRUCTION, UNSPECIFIED AS TO PARTIAL VERSUS COMPLETE OBSTRUCTION: ICD-10-CM

## 2018-03-08 DIAGNOSIS — F25.8 OTHER SCHIZOAFFECTIVE DISORDERS: ICD-10-CM

## 2018-03-08 DIAGNOSIS — F25.0 SCHIZOAFFECTIVE DISORDER, BIPOLAR TYPE: ICD-10-CM

## 2018-03-08 LAB
ANION GAP SERPL CALC-SCNC: 8 MMOL/L — SIGNIFICANT CHANGE UP (ref 5–17)
APPEARANCE UR: CLEAR — SIGNIFICANT CHANGE UP
BILIRUB UR-MCNC: NEGATIVE — SIGNIFICANT CHANGE UP
BUN SERPL-MCNC: 21 MG/DL — HIGH (ref 8–20)
CALCIUM SERPL-MCNC: 9.3 MG/DL — SIGNIFICANT CHANGE UP (ref 8.6–10.2)
CHLORIDE SERPL-SCNC: 116 MMOL/L — HIGH (ref 98–107)
CO2 SERPL-SCNC: 27 MMOL/L — SIGNIFICANT CHANGE UP (ref 22–29)
COLOR SPEC: SIGNIFICANT CHANGE UP
CREAT SERPL-MCNC: 0.97 MG/DL — SIGNIFICANT CHANGE UP (ref 0.5–1.3)
DIFF PNL FLD: NEGATIVE — SIGNIFICANT CHANGE UP
GLUCOSE SERPL-MCNC: 90 MG/DL — SIGNIFICANT CHANGE UP (ref 70–115)
GLUCOSE UR QL: NEGATIVE MG/DL — SIGNIFICANT CHANGE UP
HCT VFR BLD CALC: 33.2 % — LOW (ref 37–47)
HGB BLD-MCNC: 10.4 G/DL — LOW (ref 12–16)
KETONES UR-MCNC: NEGATIVE — SIGNIFICANT CHANGE UP
LEUKOCYTE ESTERASE UR-ACNC: NEGATIVE — SIGNIFICANT CHANGE UP
MAGNESIUM SERPL-MCNC: 2.2 MG/DL — SIGNIFICANT CHANGE UP (ref 1.6–2.6)
MCHC RBC-ENTMCNC: 30.1 PG — SIGNIFICANT CHANGE UP (ref 27–31)
MCHC RBC-ENTMCNC: 31.3 G/DL — LOW (ref 32–36)
MCV RBC AUTO: 96 FL — SIGNIFICANT CHANGE UP (ref 81–99)
NITRITE UR-MCNC: NEGATIVE — SIGNIFICANT CHANGE UP
PH UR: 8 — SIGNIFICANT CHANGE UP (ref 5–8)
PHOSPHATE SERPL-MCNC: 3 MG/DL — SIGNIFICANT CHANGE UP (ref 2.4–4.7)
PLATELET # BLD AUTO: 128 K/UL — LOW (ref 150–400)
POTASSIUM SERPL-MCNC: 4.2 MMOL/L — SIGNIFICANT CHANGE UP (ref 3.5–5.3)
POTASSIUM SERPL-SCNC: 4.2 MMOL/L — SIGNIFICANT CHANGE UP (ref 3.5–5.3)
PROT UR-MCNC: NEGATIVE MG/DL — SIGNIFICANT CHANGE UP
RBC # BLD: 3.46 M/UL — LOW (ref 4.4–5.2)
RBC # FLD: 13.8 % — SIGNIFICANT CHANGE UP (ref 11–15.6)
SODIUM SERPL-SCNC: 151 MMOL/L — HIGH (ref 135–145)
SP GR SPEC: 1.01 — SIGNIFICANT CHANGE UP (ref 1.01–1.02)
UROBILINOGEN FLD QL: NEGATIVE MG/DL — SIGNIFICANT CHANGE UP
WBC # BLD: 3 K/UL — LOW (ref 4.8–10.8)
WBC # FLD AUTO: 3 K/UL — LOW (ref 4.8–10.8)

## 2018-03-08 PROCEDURE — 44120 REMOVAL OF SMALL INTESTINE: CPT

## 2018-03-08 PROCEDURE — 93010 ELECTROCARDIOGRAM REPORT: CPT

## 2018-03-08 RX ORDER — BENZTROPINE MESYLATE 1 MG
0.5 TABLET ORAL DAILY
Qty: 0 | Refills: 0 | Status: DISCONTINUED | OUTPATIENT
Start: 2018-03-08 | End: 2018-03-11

## 2018-03-08 RX ORDER — SODIUM CHLORIDE 9 MG/ML
1000 INJECTION, SOLUTION INTRAVENOUS
Qty: 0 | Refills: 0 | Status: DISCONTINUED | OUTPATIENT
Start: 2018-03-08 | End: 2018-03-11

## 2018-03-08 RX ORDER — LEVOTHYROXINE SODIUM 125 MCG
112 TABLET ORAL DAILY
Qty: 0 | Refills: 0 | Status: DISCONTINUED | OUTPATIENT
Start: 2018-03-08 | End: 2018-03-08

## 2018-03-08 RX ORDER — HALOPERIDOL DECANOATE 100 MG/ML
7 INJECTION INTRAMUSCULAR
Qty: 0 | Refills: 0 | Status: DISCONTINUED | OUTPATIENT
Start: 2018-03-08 | End: 2018-03-08

## 2018-03-08 RX ORDER — SIMVASTATIN 20 MG/1
20 TABLET, FILM COATED ORAL AT BEDTIME
Qty: 0 | Refills: 0 | Status: DISCONTINUED | OUTPATIENT
Start: 2018-03-08 | End: 2018-03-08

## 2018-03-08 RX ORDER — BENZTROPINE MESYLATE 1 MG
0.5 TABLET ORAL DAILY
Qty: 0 | Refills: 0 | Status: DISCONTINUED | OUTPATIENT
Start: 2018-03-08 | End: 2018-03-08

## 2018-03-08 RX ORDER — MESALAMINE 400 MG
1000 TABLET, DELAYED RELEASE (ENTERIC COATED) ORAL AT BEDTIME
Qty: 0 | Refills: 0 | Status: DISCONTINUED | OUTPATIENT
Start: 2018-03-08 | End: 2018-03-11

## 2018-03-08 RX ORDER — LEVETIRACETAM 250 MG/1
500 TABLET, FILM COATED ORAL
Qty: 0 | Refills: 0 | Status: DISCONTINUED | OUTPATIENT
Start: 2018-03-08 | End: 2018-03-11

## 2018-03-08 RX ORDER — BENZTROPINE MESYLATE 1 MG
1 TABLET ORAL AT BEDTIME
Qty: 0 | Refills: 0 | Status: DISCONTINUED | OUTPATIENT
Start: 2018-03-08 | End: 2018-03-11

## 2018-03-08 RX ORDER — SIMVASTATIN 20 MG/1
20 TABLET, FILM COATED ORAL AT BEDTIME
Qty: 0 | Refills: 0 | Status: DISCONTINUED | OUTPATIENT
Start: 2018-03-08 | End: 2018-03-11

## 2018-03-08 RX ORDER — HALOPERIDOL DECANOATE 100 MG/ML
7 INJECTION INTRAMUSCULAR
Qty: 0 | Refills: 0 | Status: DISCONTINUED | OUTPATIENT
Start: 2018-03-08 | End: 2018-03-11

## 2018-03-08 RX ORDER — ONDANSETRON 8 MG/1
4 TABLET, FILM COATED ORAL EVERY 8 HOURS
Qty: 0 | Refills: 0 | Status: DISCONTINUED | OUTPATIENT
Start: 2018-03-08 | End: 2018-03-11

## 2018-03-08 RX ORDER — BENZTROPINE MESYLATE 1 MG
1 TABLET ORAL AT BEDTIME
Qty: 0 | Refills: 0 | Status: DISCONTINUED | OUTPATIENT
Start: 2018-03-08 | End: 2018-03-08

## 2018-03-08 RX ORDER — MESALAMINE 400 MG
1000 TABLET, DELAYED RELEASE (ENTERIC COATED) ORAL AT BEDTIME
Qty: 0 | Refills: 0 | Status: DISCONTINUED | OUTPATIENT
Start: 2018-03-08 | End: 2018-03-08

## 2018-03-08 RX ORDER — LEVOTHYROXINE SODIUM 125 MCG
112 TABLET ORAL DAILY
Qty: 0 | Refills: 0 | Status: DISCONTINUED | OUTPATIENT
Start: 2018-03-08 | End: 2018-03-11

## 2018-03-08 RX ORDER — LEVETIRACETAM 250 MG/1
500 TABLET, FILM COATED ORAL
Qty: 0 | Refills: 0 | Status: DISCONTINUED | OUTPATIENT
Start: 2018-03-08 | End: 2018-03-08

## 2018-03-08 RX ADMIN — Medication 112 MICROGRAM(S): at 06:11

## 2018-03-08 RX ADMIN — SIMVASTATIN 20 MILLIGRAM(S): 20 TABLET, FILM COATED ORAL at 23:03

## 2018-03-08 RX ADMIN — HALOPERIDOL DECANOATE 7 MILLIGRAM(S): 100 INJECTION INTRAMUSCULAR at 18:11

## 2018-03-08 RX ADMIN — Medication 1000 MILLIGRAM(S): at 23:03

## 2018-03-08 RX ADMIN — Medication 1 MILLIGRAM(S): at 16:34

## 2018-03-08 RX ADMIN — SODIUM CHLORIDE 250 MILLILITER(S): 9 INJECTION INTRAMUSCULAR; INTRAVENOUS; SUBCUTANEOUS at 03:41

## 2018-03-08 RX ADMIN — LEVETIRACETAM 500 MILLIGRAM(S): 250 TABLET, FILM COATED ORAL at 18:12

## 2018-03-08 RX ADMIN — Medication 1 MILLIGRAM(S): at 23:03

## 2018-03-08 RX ADMIN — Medication 0.5 MILLIGRAM(S): at 16:33

## 2018-03-08 RX ADMIN — SODIUM CHLORIDE 100 MILLILITER(S): 9 INJECTION, SOLUTION INTRAVENOUS at 23:02

## 2018-03-08 RX ADMIN — SODIUM CHLORIDE 125 MILLILITER(S): 9 INJECTION, SOLUTION INTRAVENOUS at 03:41

## 2018-03-08 RX ADMIN — HALOPERIDOL DECANOATE 7 MILLIGRAM(S): 100 INJECTION INTRAMUSCULAR at 06:09

## 2018-03-08 RX ADMIN — LEVETIRACETAM 500 MILLIGRAM(S): 250 TABLET, FILM COATED ORAL at 06:10

## 2018-03-08 NOTE — ED ADULT NURSE REASSESSMENT NOTE - NS ED NURSE REASSESS COMMENT FT1
Patient refusing NGT, explained detailed information regarding NGT insertion, benefits necessity for procedure, general surgery made aware.
pt refused NG tube. surgical MD made aware.

## 2018-03-08 NOTE — BEHAVIORAL HEALTH ASSESSMENT NOTE - NSBHCHARTREVIEWVS_PSY_A_CORE FT
Vital Signs Last 24 Hrs  T(C): 37.1 (08 Mar 2018 07:15), Max: 37.4 (08 Mar 2018 05:41)  T(F): 98.7 (08 Mar 2018 07:15), Max: 99.3 (08 Mar 2018 05:41)  HR: 79 (08 Mar 2018 07:15) (79 - 101)  BP: 124/66 (08 Mar 2018 07:15) (124/66 - 155/81)  BP(mean): --  RR: 18 (08 Mar 2018 07:15) (18 - 20)  SpO2: 98% (08 Mar 2018 07:15) (96% - 98%)

## 2018-03-08 NOTE — H&P ADULT - NSHPPHYSICALEXAM_GEN_ALL_CORE
Gen: NAD, alert and oriented  Pulm: CTABL  CVS: s1s2, RRR  Abd: distended, +BS, no guarding, no rebound, large ventral hernia on right  EXt: warm, compartment soft, no calf pain

## 2018-03-08 NOTE — BEHAVIORAL HEALTH ASSESSMENT NOTE - SUMMARY
67 yo female with complex history past bowel resection after prolapse now admitted with SBO, psych history chronic illness

## 2018-03-08 NOTE — H&P ADULT - ASSESSMENT
68 year old female with multiple medical problems admitted for SBO, patient with one episode of vomiting 24 hours ago.     -Admit  -NPO  -IVF  -NGT (patient refused)

## 2018-03-08 NOTE — BEHAVIORAL HEALTH ASSESSMENT NOTE - NSBHCHARTREVIEWLAB_PSY_A_CORE FT
11.1   4.8   )-----------( 156      ( 07 Mar 2018 18:38 )             34.3     03-07    141  |  100  |  25.0<H>  ----------------------------<  114  5.2   |  29.0  |  1.04    Ca    9.9      07 Mar 2018 18:38    TPro  7.2  /  Alb  4.1  /  TBili  0.4  /  DBili  x   /  AST  39<H>  /  ALT  24  /  AlkPhos  115  03-07    LIVER FUNCTIONS - ( 07 Mar 2018 18:38 )  Alb: 4.1 g/dL / Pro: 7.2 g/dL / ALK PHOS: 115 U/L / ALT: 24 U/L / AST: 39 U/L / GGT: x           PT/INR - ( 07 Mar 2018 18:38 )   PT: 12.9 sec;   INR: 1.17 ratio         PTT - ( 07 Mar 2018 18:38 )  PTT:31.3 sec

## 2018-03-08 NOTE — BEHAVIORAL HEALTH ASSESSMENT NOTE - NSBHCHARTREVIEWIMAGING_PSY_A_CORE FT
< from: CT Abdomen and Pelvis w/ IV Cont (03.07.18 @ 20:36) >    Centrally displaced abnormally dilated fluid-filled filled loops of small   bowel consistent with bowel obstruction transition in right lower   quadrant. Postsurgical changes related to bowel resection and anastomosis   and ileostomy.    < end of copied text >

## 2018-03-08 NOTE — H&P ADULT - HISTORY OF PRESENT ILLNESS
68 year old female with PMH colostomy and subsequent reversal, uterine prolapse, schizophrenia, CVA, seizures presents with abd pain. Pain started today with diffuse cramping, distension, and 1 episode of vomiting. No diarrhea, fever, chills, dysuria, hematuria, chest pain, SOB. Patient states she is passing flatus and had a normal bowel movement yesterday. At time of exam patient states she is currently not experiencing any nausea. Patient states her abdominal pain is generalized and not localized to one area.

## 2018-03-09 LAB
ANION GAP SERPL CALC-SCNC: 12 MMOL/L — SIGNIFICANT CHANGE UP (ref 5–17)
BUN SERPL-MCNC: 19 MG/DL — SIGNIFICANT CHANGE UP (ref 8–20)
CALCIUM SERPL-MCNC: 9.1 MG/DL — SIGNIFICANT CHANGE UP (ref 8.6–10.2)
CHLORIDE SERPL-SCNC: 113 MMOL/L — HIGH (ref 98–107)
CO2 SERPL-SCNC: 23 MMOL/L — SIGNIFICANT CHANGE UP (ref 22–29)
CREAT SERPL-MCNC: 1.13 MG/DL — SIGNIFICANT CHANGE UP (ref 0.5–1.3)
GLUCOSE SERPL-MCNC: 77 MG/DL — SIGNIFICANT CHANGE UP (ref 70–115)
HCT VFR BLD CALC: 32 % — LOW (ref 37–47)
HGB BLD-MCNC: 9.9 G/DL — LOW (ref 12–16)
MCHC RBC-ENTMCNC: 29.7 PG — SIGNIFICANT CHANGE UP (ref 27–31)
MCHC RBC-ENTMCNC: 30.9 G/DL — LOW (ref 32–36)
MCV RBC AUTO: 96.1 FL — SIGNIFICANT CHANGE UP (ref 81–99)
PLATELET # BLD AUTO: 109 K/UL — LOW (ref 150–400)
POTASSIUM SERPL-MCNC: 3.8 MMOL/L — SIGNIFICANT CHANGE UP (ref 3.5–5.3)
POTASSIUM SERPL-SCNC: 3.8 MMOL/L — SIGNIFICANT CHANGE UP (ref 3.5–5.3)
RBC # BLD: 3.33 M/UL — LOW (ref 4.4–5.2)
RBC # FLD: 13.8 % — SIGNIFICANT CHANGE UP (ref 11–15.6)
SODIUM SERPL-SCNC: 148 MMOL/L — HIGH (ref 135–145)
WBC # BLD: 2.5 K/UL — LOW (ref 4.8–10.8)
WBC # FLD AUTO: 2.5 K/UL — LOW (ref 4.8–10.8)

## 2018-03-09 PROCEDURE — 74019 RADEX ABDOMEN 2 VIEWS: CPT | Mod: 26

## 2018-03-09 PROCEDURE — 99024 POSTOP FOLLOW-UP VISIT: CPT

## 2018-03-09 PROCEDURE — 74018 RADEX ABDOMEN 1 VIEW: CPT | Mod: 26,59

## 2018-03-09 RX ADMIN — Medication 1 MILLIGRAM(S): at 15:17

## 2018-03-09 RX ADMIN — Medication 1000 MILLIGRAM(S): at 21:22

## 2018-03-09 RX ADMIN — Medication 1 MILLIGRAM(S): at 21:21

## 2018-03-09 RX ADMIN — LEVETIRACETAM 500 MILLIGRAM(S): 250 TABLET, FILM COATED ORAL at 05:09

## 2018-03-09 RX ADMIN — SODIUM CHLORIDE 100 MILLILITER(S): 9 INJECTION, SOLUTION INTRAVENOUS at 11:21

## 2018-03-09 RX ADMIN — SIMVASTATIN 20 MILLIGRAM(S): 20 TABLET, FILM COATED ORAL at 21:21

## 2018-03-09 RX ADMIN — HALOPERIDOL DECANOATE 7 MILLIGRAM(S): 100 INJECTION INTRAMUSCULAR at 05:09

## 2018-03-09 RX ADMIN — LEVETIRACETAM 500 MILLIGRAM(S): 250 TABLET, FILM COATED ORAL at 16:41

## 2018-03-09 RX ADMIN — HALOPERIDOL DECANOATE 7 MILLIGRAM(S): 100 INJECTION INTRAMUSCULAR at 16:41

## 2018-03-09 RX ADMIN — Medication 1 MILLIGRAM(S): at 21:22

## 2018-03-09 RX ADMIN — Medication 1 MILLIGRAM(S): at 05:09

## 2018-03-09 RX ADMIN — Medication 0.5 MILLIGRAM(S): at 11:22

## 2018-03-09 RX ADMIN — Medication 112 MICROGRAM(S): at 05:09

## 2018-03-09 NOTE — PROGRESS NOTE ADULT - ASSESSMENT
A/P - pSBO    Extensive past surgical history with multiple operations by Dr. Miller  3/20/15 [place] procedure  3/26/15 Ex-lap, CONOR, appy and diverting loop ileostomy for anastomotic leak  7/20/15 Rigid proctosigmoidoscope with findings of posterior anastomotic defect  11/19/15 Ex-lap, CONOR, VHR with biologic mesh, primary repair of parastomal hernia  7/1/16 VHR with parastomal hernia repair  4/4/17 Ex-lap, CONOR, SBR, ileostomy reversal, VHR with abdominal wall reconstruction via component separation and biologic mesh    Clinically improving with radiographic e/o decreasing dilation  Will trial clear liquids today.  Cont IVF.  AXR and labs in AM.
SBO  NPO
stable, SBO ( pt refused NGT), s/p colostomy/ileostomy reversal

## 2018-03-10 LAB
ANION GAP SERPL CALC-SCNC: 13 MMOL/L — SIGNIFICANT CHANGE UP (ref 5–17)
BUN SERPL-MCNC: 18 MG/DL — SIGNIFICANT CHANGE UP (ref 8–20)
CALCIUM SERPL-MCNC: 9.3 MG/DL — SIGNIFICANT CHANGE UP (ref 8.6–10.2)
CHLORIDE SERPL-SCNC: 107 MMOL/L — SIGNIFICANT CHANGE UP (ref 98–107)
CO2 SERPL-SCNC: 25 MMOL/L — SIGNIFICANT CHANGE UP (ref 22–29)
CREAT SERPL-MCNC: 1.18 MG/DL — SIGNIFICANT CHANGE UP (ref 0.5–1.3)
GLUCOSE SERPL-MCNC: 125 MG/DL — HIGH (ref 70–115)
HCT VFR BLD CALC: 35.9 % — LOW (ref 37–47)
HGB BLD-MCNC: 11.4 G/DL — LOW (ref 12–16)
LEVETIRACETAM SERPL-MCNC: 26.5 MCG/ML — SIGNIFICANT CHANGE UP (ref 12–46)
MAGNESIUM SERPL-MCNC: 2.1 MG/DL — SIGNIFICANT CHANGE UP (ref 1.8–2.6)
MCHC RBC-ENTMCNC: 30.1 PG — SIGNIFICANT CHANGE UP (ref 27–31)
MCHC RBC-ENTMCNC: 31.8 G/DL — LOW (ref 32–36)
MCV RBC AUTO: 94.7 FL — SIGNIFICANT CHANGE UP (ref 81–99)
PHOSPHATE SERPL-MCNC: 2.9 MG/DL — SIGNIFICANT CHANGE UP (ref 2.4–4.7)
PLATELET # BLD AUTO: 131 K/UL — LOW (ref 150–400)
POTASSIUM SERPL-MCNC: 3.6 MMOL/L — SIGNIFICANT CHANGE UP (ref 3.5–5.3)
POTASSIUM SERPL-SCNC: 3.6 MMOL/L — SIGNIFICANT CHANGE UP (ref 3.5–5.3)
RBC # BLD: 3.79 M/UL — LOW (ref 4.4–5.2)
RBC # FLD: 13.6 % — SIGNIFICANT CHANGE UP (ref 11–15.6)
SODIUM SERPL-SCNC: 145 MMOL/L — SIGNIFICANT CHANGE UP (ref 135–145)
WBC # BLD: 3.7 K/UL — LOW (ref 4.8–10.8)
WBC # FLD AUTO: 3.7 K/UL — LOW (ref 4.8–10.8)

## 2018-03-10 RX ADMIN — LEVETIRACETAM 500 MILLIGRAM(S): 250 TABLET, FILM COATED ORAL at 18:06

## 2018-03-10 RX ADMIN — LEVETIRACETAM 500 MILLIGRAM(S): 250 TABLET, FILM COATED ORAL at 05:46

## 2018-03-10 RX ADMIN — Medication 1 MILLIGRAM(S): at 21:33

## 2018-03-10 RX ADMIN — Medication 112 MICROGRAM(S): at 05:45

## 2018-03-10 RX ADMIN — HALOPERIDOL DECANOATE 7 MILLIGRAM(S): 100 INJECTION INTRAMUSCULAR at 18:06

## 2018-03-10 RX ADMIN — Medication 1 MILLIGRAM(S): at 05:45

## 2018-03-10 RX ADMIN — Medication 0.5 MILLIGRAM(S): at 12:28

## 2018-03-10 RX ADMIN — Medication 1 MILLIGRAM(S): at 14:59

## 2018-03-10 RX ADMIN — HALOPERIDOL DECANOATE 7 MILLIGRAM(S): 100 INJECTION INTRAMUSCULAR at 05:45

## 2018-03-10 RX ADMIN — SIMVASTATIN 20 MILLIGRAM(S): 20 TABLET, FILM COATED ORAL at 21:33

## 2018-03-11 ENCOUNTER — TRANSCRIPTION ENCOUNTER (OUTPATIENT)
Age: 68
End: 2018-03-11

## 2018-03-11 VITALS
HEART RATE: 78 BPM | TEMPERATURE: 99 F | DIASTOLIC BLOOD PRESSURE: 78 MMHG | RESPIRATION RATE: 18 BRPM | OXYGEN SATURATION: 98 % | SYSTOLIC BLOOD PRESSURE: 126 MMHG

## 2018-03-11 LAB
ANION GAP SERPL CALC-SCNC: 11 MMOL/L — SIGNIFICANT CHANGE UP (ref 5–17)
BASOPHILS # BLD AUTO: 0 K/UL — SIGNIFICANT CHANGE UP (ref 0–0.2)
BASOPHILS NFR BLD AUTO: 0.3 % — SIGNIFICANT CHANGE UP (ref 0–2)
BUN SERPL-MCNC: 16 MG/DL — SIGNIFICANT CHANGE UP (ref 8–20)
CALCIUM SERPL-MCNC: 9.4 MG/DL — SIGNIFICANT CHANGE UP (ref 8.6–10.2)
CHLORIDE SERPL-SCNC: 104 MMOL/L — SIGNIFICANT CHANGE UP (ref 98–107)
CO2 SERPL-SCNC: 25 MMOL/L — SIGNIFICANT CHANGE UP (ref 22–29)
CREAT SERPL-MCNC: 1.09 MG/DL — SIGNIFICANT CHANGE UP (ref 0.5–1.3)
EOSINOPHIL # BLD AUTO: 0.1 K/UL — SIGNIFICANT CHANGE UP (ref 0–0.5)
EOSINOPHIL NFR BLD AUTO: 2.8 % — SIGNIFICANT CHANGE UP (ref 0–6)
GLUCOSE SERPL-MCNC: 106 MG/DL — SIGNIFICANT CHANGE UP (ref 70–115)
HCT VFR BLD CALC: 35.2 % — LOW (ref 37–47)
HGB BLD-MCNC: 11.3 G/DL — LOW (ref 12–16)
LYMPHOCYTES # BLD AUTO: 1 K/UL — SIGNIFICANT CHANGE UP (ref 1–4.8)
LYMPHOCYTES # BLD AUTO: 31.8 % — SIGNIFICANT CHANGE UP (ref 20–55)
MAGNESIUM SERPL-MCNC: 2 MG/DL — SIGNIFICANT CHANGE UP (ref 1.6–2.6)
MCHC RBC-ENTMCNC: 30.3 PG — SIGNIFICANT CHANGE UP (ref 27–31)
MCHC RBC-ENTMCNC: 32.1 G/DL — SIGNIFICANT CHANGE UP (ref 32–36)
MCV RBC AUTO: 94.4 FL — SIGNIFICANT CHANGE UP (ref 81–99)
MONOCYTES # BLD AUTO: 0.5 K/UL — SIGNIFICANT CHANGE UP (ref 0–0.8)
MONOCYTES NFR BLD AUTO: 14.1 % — HIGH (ref 3–10)
NEUTROPHILS # BLD AUTO: 1.7 K/UL — LOW (ref 1.8–8)
NEUTROPHILS NFR BLD AUTO: 51 % — SIGNIFICANT CHANGE UP (ref 37–73)
PHOSPHATE SERPL-MCNC: 3.7 MG/DL — SIGNIFICANT CHANGE UP (ref 2.4–4.7)
PLATELET # BLD AUTO: 120 K/UL — LOW (ref 150–400)
POTASSIUM SERPL-MCNC: 3.8 MMOL/L — SIGNIFICANT CHANGE UP (ref 3.5–5.3)
POTASSIUM SERPL-SCNC: 3.8 MMOL/L — SIGNIFICANT CHANGE UP (ref 3.5–5.3)
RBC # BLD: 3.73 M/UL — LOW (ref 4.4–5.2)
RBC # FLD: 13.5 % — SIGNIFICANT CHANGE UP (ref 11–15.6)
SODIUM SERPL-SCNC: 140 MMOL/L — SIGNIFICANT CHANGE UP (ref 135–145)
WBC # BLD: 3.3 K/UL — LOW (ref 4.8–10.8)
WBC # FLD AUTO: 3.3 K/UL — LOW (ref 4.8–10.8)

## 2018-03-11 PROCEDURE — 86900 BLOOD TYPING SEROLOGIC ABO: CPT

## 2018-03-11 PROCEDURE — 83605 ASSAY OF LACTIC ACID: CPT

## 2018-03-11 PROCEDURE — 74019 RADEX ABDOMEN 2 VIEWS: CPT

## 2018-03-11 PROCEDURE — 93005 ELECTROCARDIOGRAM TRACING: CPT

## 2018-03-11 PROCEDURE — 85610 PROTHROMBIN TIME: CPT

## 2018-03-11 PROCEDURE — 80048 BASIC METABOLIC PNL TOTAL CA: CPT

## 2018-03-11 PROCEDURE — 86850 RBC ANTIBODY SCREEN: CPT

## 2018-03-11 PROCEDURE — 85730 THROMBOPLASTIN TIME PARTIAL: CPT

## 2018-03-11 PROCEDURE — 80177 DRUG SCRN QUAN LEVETIRACETAM: CPT

## 2018-03-11 PROCEDURE — 86901 BLOOD TYPING SEROLOGIC RH(D): CPT

## 2018-03-11 PROCEDURE — 83690 ASSAY OF LIPASE: CPT

## 2018-03-11 PROCEDURE — 81003 URINALYSIS AUTO W/O SCOPE: CPT

## 2018-03-11 PROCEDURE — 96375 TX/PRO/DX INJ NEW DRUG ADDON: CPT

## 2018-03-11 PROCEDURE — 99285 EMERGENCY DEPT VISIT HI MDM: CPT | Mod: 25

## 2018-03-11 PROCEDURE — 36415 COLL VENOUS BLD VENIPUNCTURE: CPT

## 2018-03-11 PROCEDURE — 84100 ASSAY OF PHOSPHORUS: CPT

## 2018-03-11 PROCEDURE — 83735 ASSAY OF MAGNESIUM: CPT

## 2018-03-11 PROCEDURE — 96374 THER/PROPH/DIAG INJ IV PUSH: CPT | Mod: XU

## 2018-03-11 PROCEDURE — 74177 CT ABD & PELVIS W/CONTRAST: CPT

## 2018-03-11 PROCEDURE — 85027 COMPLETE CBC AUTOMATED: CPT

## 2018-03-11 PROCEDURE — 80053 COMPREHEN METABOLIC PANEL: CPT

## 2018-03-11 RX ADMIN — Medication 0.5 MILLIGRAM(S): at 12:33

## 2018-03-11 RX ADMIN — Medication 1 MILLIGRAM(S): at 12:39

## 2018-03-11 RX ADMIN — Medication 112 MICROGRAM(S): at 05:16

## 2018-03-11 RX ADMIN — HALOPERIDOL DECANOATE 7 MILLIGRAM(S): 100 INJECTION INTRAMUSCULAR at 05:16

## 2018-03-11 RX ADMIN — LEVETIRACETAM 500 MILLIGRAM(S): 250 TABLET, FILM COATED ORAL at 16:50

## 2018-03-11 RX ADMIN — HALOPERIDOL DECANOATE 7 MILLIGRAM(S): 100 INJECTION INTRAMUSCULAR at 16:50

## 2018-03-11 RX ADMIN — Medication 1 MILLIGRAM(S): at 05:16

## 2018-03-11 RX ADMIN — LEVETIRACETAM 500 MILLIGRAM(S): 250 TABLET, FILM COATED ORAL at 05:16

## 2018-03-11 NOTE — DISCHARGE NOTE ADULT - PLAN OF CARE
To be pain free and return to baseline Follow up with Dr. Syed Miller out patient [phone] return to [facility] Pt may resume a regular diet as tolerated, activity as tolerated.  As per discussion with Dr. Sanchez, will hold Benztropine for now and will revisit at later appointment.  Follow up [facility] as needed.  Follow up with Dr. Syed Miller out patient [phone].  Return to the ED immediately for worsening abdominal pain, nausea, vomiting, inability to have a bowel movement, high fever or worsening of your condition.

## 2018-03-11 NOTE — DISCHARGE NOTE ADULT - ADDITIONAL INSTRUCTIONS
Follow up: Please call and make an appointment with Dr. Raymond Miller with in 1 week after discharge. Also, please call and make an appointment with your primary care physician as per your usual schedule.   Activity: May return to normal activities as tolerated, Please, limit activity and rest until follow up appointment.   Diet: May continue Regular diet as tolerated.  Medications: Please take all home medications as prescribed by your primary care doctor. Pain medication has been prescribed for you. Please, take it as it has been prescribed, do not drive or operate heavy machinery while taking narcotics.   If confusion, altered mental status, fever, chest pain, shortness of breath, new or worsening pain, vomiting, change or worsening of medical status, please come back to the emergency room, and in case of emergency call 911.

## 2018-03-11 NOTE — DISCHARGE NOTE ADULT - CARE PLAN
Principal Discharge DX:	Small bowel obstruction  Goal:	To be pain free and return to baseline  Assessment and plan of treatment:	Follow up with Dr. Syed Miller out patient [phone]  Secondary Diagnosis:	Schizo affective schizophrenia  Assessment and plan of treatment:	return to [facility]  Secondary Diagnosis:	Behavior problems Principal Discharge DX:	Small bowel obstruction  Goal:	To be pain free and return to baseline  Assessment and plan of treatment:	Pt may resume a regular diet as tolerated, activity as tolerated.  As per discussion with Dr. Sanchez, will hold Benztropine for now and will revisit at later appointment.  Follow up [facility] as needed.  Follow up with Dr. Syed Miller out patient [phone].  Return to the ED immediately for worsening abdominal pain, nausea, vomiting, inability to have a bowel movement, high fever or worsening of your condition.  Secondary Diagnosis:	Schizo affective schizophrenia  Assessment and plan of treatment:	return to [facility]  Secondary Diagnosis:	Behavior problems

## 2018-03-11 NOTE — DISCHARGE NOTE ADULT - CARE PROVIDER_API CALL
Raymond Miller), ColonRectal Surgery; Surgery  [address] [facility] [address] [place] [address]  Phone: [phone]  Fax: [phone] Raymond Miller), ColonRectal Surgery; Surgery  [address] [facility] [place] [address] [place] [address]  Phone: [phone]  Fax: [phone]    Colton Real  [address] [place] [address]  [phone]  Phone: (   )    -  Fax: (   )    -

## 2018-03-11 NOTE — DISCHARGE NOTE ADULT - PROVIDER TOKENS
TOKNGOC:'[id number]' TOKEN:'[id number]',FREE:[LAST:[Addie],FIRST:[Colton],PHONE:[(   )    -],FAX:[(   )    -],ADDRESS:[[address] [place] [address]  [phone]]]

## 2018-03-11 NOTE — DISCHARGE NOTE ADULT - HOSPITAL COURSE
68 year old female with PMH colostomy and subsequent reversal, uterine prolapse, schizophrenia, CVA, seizures presents with abd pain. Pain started today with diffuse cramping, distension, and 1 episode of vomiting. No diarrhea, fever, chills, dysuria, hematuria, chest pain, SOB. Patient states she is passing flatus and had a normal bowel movement yesterday. At time of exam patient states she is currently not experiencing any nausea. Patient states her abdominal pain is generalized and not localized to one area. (08 Mar 2018 01:28)  Pt diet was advanced, pt tolerating w/o nausea or vominting, XR of abdomen shows major improvement (decreasing small bowel dilation), pt + BM, + flatus, ambulating w/ walker, abdominal exam is benign. Pt is cleared for discharge as per Dr. Martinez. 68 year old female with PMH colostomy and subsequent reversal, uterine prolapse, schizophrenia, CVA, seizures presents with abd pain. Pain started today with diffuse cramping, distension, and 1 episode of vomiting. No diarrhea, fever, chills, dysuria, hematuria, chest pain, SOB. Patient states she is passing flatus and had a normal bowel movement yesterday. At time of exam patient states she is currently not experiencing any nausea. Patient states her abdominal pain is generalized and not localized to one area.  Pt diet was advanced, pt tolerating w/o nausea or vomiting, XR of abdomen shows major improvement (decreasing small bowel dilation), pt + BM, + flatus, ambulating w/ walker, abdominal exam is benign. Pt is cleared for back to [place].  Per discussion with Dr Sanchez, pt will stop Benztropine and will revisit medication at next follow up visit.

## 2018-03-11 NOTE — DISCHARGE NOTE ADULT - PATIENT PORTAL LINK FT
You can access the [facility] Portal, offered by [facility], by registering with the following website: [email or web address]

## 2018-03-11 NOTE — DISCHARGE NOTE ADULT - CARE PROVIDERS DIRECT ADDRESSES
,[email or web address],DirectAddress_Unknown

## 2018-03-11 NOTE — DISCHARGE NOTE ADULT - MEDICATION SUMMARY - MEDICATIONS TO TAKE
I will START or STAY ON the medications listed below when I get home from the hospital:    mesalamine 1000 mg rectal suppository  -- 1 suppository(ies) rectally once a day (at bedtime)  -- For rectal use only.    -- Indication: For home med    oxyCODONE 5 mg oral tablet  -- 1 tab(s) by mouth every 3 hours, As needed, Moderate Pain (4 - 6)  -- Indication: For home med    acetaminophen 325 mg oral tablet  -- 2 tab(s) by mouth every 6 hours, As needed, Mild Pain (1 - 3)  -- Indication: For home med    aspirin 81 mg oral tablet, chewable  -- 1 tab(s) by mouth once a day  -- Indication: For home med    calcium carbonate 1250 mg (500 mg elemental calcium) oral tablet  -- 1 tab(s) by mouth once a day  -- Indication: For home med    LORazepam 1 mg oral tablet  -- 1 tab(s) by mouth 3 times a day  -- Indication: For home med    levETIRAcetam 500 mg oral tablet  -- 1 tab(s) by mouth 2 times a day  -- Indication: For home med    Imodium 2 mg oral capsule  -- 1 cap(s) by mouth once a day  -- Indication: For home med    simvastatin 20 mg oral tablet  -- 1 tab(s) by mouth once a day (at bedtime)  -- Indication: For home med    benztropine 0.5 mg oral tablet  -- 1 tab(s) by mouth once a day  -- Indication: For home med    benztropine 1 mg oral tablet  -- 1 tab(s) by mouth once a day (at bedtime)  -- Indication: For home med    haloperidol 2 mg/mL oral concentrate  -- 3.5 milliliter(s) by mouth 2 times a day  -- Indication: For home med    petrolatum topical ointment  -- 1 application on skin once a day  -- Indication: For home med    aMILoride 5 mg oral tablet  -- 1 tab(s) by mouth once a day  -- Indication: For home med    docusate sodium 100 mg oral capsule  -- 1 cap(s) by mouth 3 times a day, As Needed  -- Indication: For home med    polyethylene glycol 3350 oral powder for reconstitution  -- 17 gram(s) by mouth once a day  -- Indication: For home med    senna oral tablet  -- 2 tab(s) by mouth once a day (at bedtime), As Needed  -- Indication: For home med    simethicone 80 mg oral tablet, chewable  -- 80 milligram(s) by mouth 3 times a day  -- Indication: For home med    levothyroxine 112 mcg (0.112 mg) oral tablet  -- 1 tab(s) by mouth once a day  -- Indication: For home med    multivitamin  -- 1 tab(s) by mouth once a day  -- Indication: For home med    cyanocobalamin 1000 mcg/mL injectable solution  -- 1000 microgram(s) intramuscular once a month  -- Indication: For home med    ascorbic acid 500 mg oral tablet  -- 1 tab(s) by mouth once a day  -- Indication: For home med    cholecalciferol oral tablet  -- 2000 unit(s) by mouth every 48 hours  -- Indication: For home med I will START or STAY ON the medications listed below when I get home from the hospital:    LORazepam 1 mg oral tablet  -- 1 tab(s) by mouth 3 times a day  -- Indication: For home med    simvastatin 20 mg oral tablet  -- 1 tab(s) by mouth once a day (at bedtime)  -- Indication: For home med    trolamine salicylate 10% topical cream  -- Apply on skin to affected area 3 times a day  -- Indication: For home med    aMILoride 5 mg oral tablet  -- 1 tab(s) by mouth once a day  -- Indication: For home med    docusate sodium 100 mg oral capsule  -- 1 cap(s) by mouth 3 times a day, As Needed  -- Indication: For home med    polyethylene glycol 3350 oral powder for reconstitution  -- 17 gram(s) by mouth once a day  -- Indication: For home med    senna oral tablet  -- 2 tab(s) by mouth once a day (at bedtime), As Needed  -- Indication: For home med    levothyroxine 112 mcg (0.112 mg) oral tablet  -- 1 tab(s) by mouth once a day  -- Indication: For home med    multivitamin  -- 1 tab(s) by mouth once a day  -- Indication: For home med    cyanocobalamin 1000 mcg/mL injectable solution  -- 1000 microgram(s) intramuscular once a month  -- Indication: For home med    cholecalciferol oral tablet  -- 2000 unit(s) by mouth every 48 hours  -- Indication: For home med

## 2018-03-11 NOTE — DISCHARGE NOTE ADULT - MEDICATION SUMMARY - MEDICATIONS TO STOP TAKING
I will STOP taking the medications listed below when I get home from the hospital:  None I will STOP taking the medications listed below when I get home from the hospital:    benztropine 0.5 mg oral tablet  -- 1 tab(s) by mouth once a day    benztropine 1 mg oral tablet  -- 1 tab(s) by mouth once a day (at bedtime)

## 2018-03-11 NOTE — PROGRESS NOTE ADULT - SUBJECTIVE AND OBJECTIVE BOX
INTERVAL HPI/OVERNIGHT EVENTS/SUBJECTIVE: 68 year old female with PMH colostomy and subsequent reversal, uterine prolapse, schizophrenia, CVA, seizures presents with abd pain. Pain started today with diffuse cramping, distension, and 1 episode of vomiting. No diarrhea, fever, chills, dysuria, hematuria, chest pain, SOB. Patient states she is passing flatus and had a normal bowel movement yesterday. At time of exam patient states she is currently not experiencing any nausea. Patient states her abdominal pain is generalized and not localized to one area. (08 Mar 2018 01:28)   Pt resting in bed comfortably with no complaints at this time. Nurse stated that pt vomited overnight, pt removed peripheral IV as well. Pt has refused placement of new Peripheral IV and had been combative with nursing staff requesting 1-1. Denies fever, chills, CP, SOB. + BM, + Flatus, + Void, + OOBA      ICU Vital Signs Last 24 Hrs  T(C): 36.8 (09 Mar 2018 23:13), Max: 37.2 (09 Mar 2018 15:48)  T(F): 98.3 (09 Mar 2018 23:13), Max: 99 (09 Mar 2018 15:48)  HR: 76 (09 Mar 2018 23:13) (73 - 76)  BP: 148/75 (09 Mar 2018 23:13) (134/71 - 148/75)  RR: 17 (09 Mar 2018 23:13) (17 - 18)  SpO2: 93% (09 Mar 2018 23:13) (93% - 93%)      I&O's Detail            MEDICATIONS  (STANDING):  benztropine 0.5 milliGRAM(s) Oral daily  benztropine 1 milliGRAM(s) Oral at bedtime  haloperidol    Concentrate 7 milliGRAM(s) Oral two times a day  lactated ringers. 1000 milliLiter(s) (100 mL/Hr) IV Continuous <Continuous>  levETIRAcetam 500 milliGRAM(s) Oral two times a day  levothyroxine 112 MICROGram(s) Oral daily  LORazepam     Tablet 1 milliGRAM(s) Oral three times a day  mesalamine Suppository 1000 milliGRAM(s) Rectal at bedtime  simvastatin 20 milliGRAM(s) Oral at bedtime    MEDICATIONS  (PRN):  ondansetron Injectable 4 milliGRAM(s) IV Push every 8 hours PRN Nausea and/or Vomiting      NUTRITION/IVF: CLD      PHYSICAL EXAM:    Pulmonary: CTA BL, Non labored, chest rise is  symmetrical with respiration    Cardiovascular: S1S2 RRR    Gastrointestinal: Abdomen softly distended and non tympanic, + ventral hernia. Non tender    Genitourinary: No Chance, no discharge or rash.    Extremities: No gross deformities or angulations, No calf tenderness, Distal pulses intact, Capillary refill < 2sec    Skin: Warm and dry, no rash.     Musculoskeletal: No focal deficits, strength 5/5 through out all extremities bilaterally, Baseline ROM.      LABS:  CBC Full  -  ( 09 Mar 2018 08:11 )  WBC Count : 2.5 K/uL  Hemoglobin : 9.9 g/dL  Hematocrit : 32.0 %  Platelet Count - Automated : 109 K/uL  Mean Cell Volume : 96.1 fl  Mean Cell Hemoglobin : 29.7 pg  Mean Cell Hemoglobin Concentration : 30.9 g/dL  03-    148<H>  |  113<H>  |  19.0  ----------------------------<  77  3.8   |  23.0  |  1.13    Ca    9.1      09 Mar 2018 08:10        Urinalysis Basic - ( 08 Mar 2018 18:26 )    Color: Pale Yellow / Appearance: Clear / S.010 / pH: x  Gluc: x / Ketone: Negative  / Bili: Negative / Urobili: Negative mg/dL   Blood: x / Protein: Negative mg/dL / Nitrite: Negative   Leuk Esterase: Negative / RBC: x / WBC x   Sq Epi: x / Non Sq Epi: x / Bacteria: x      ASSESSMENT/PLAN:  68yFemale presenting with:- Possible SBO  Extensive past surgical history with multiple operations by Dr. Miller  3/20/15 Altmeier procedure  3/26/15 Ex-lap, CONOR, appy and diverting loop ileostomy for anastomotic leak  7/20/15 Rigid proctosigmoidoscope with findings of posterior anastomotic defect  11/19/15 Ex-lap, CONOR, VHR with biologic mesh, primary repair of parastomal hernia  16 VHR with parastomal hernia repair  17 Ex-lap, CONOR, SBR, ileostomy reversal, VHR with abdominal wall reconstruction via component separation and biologic mesh    Clinically improving with radiographic e/o decreasing dilation  Continue CLD  Contact [facility] regarding pt being combative. Place pt 1-1  f/u AM Labs
INTERVAL HPI/OVERNIGHT EVENTS/SUBJECTIVE: 68 year old female with PMH colostomy and subsequent reversal, uterine prolapse, schizophrenia, CVA, seizures presents with abd pain. Pain started today with diffuse cramping, distension, and 1 episode of vomiting. No diarrhea, fever, chills, dysuria, hematuria, chest pain, SOB. Patient states she is passing flatus and had a normal bowel movement yesterday. At time of exam patient states she is currently not experiencing any nausea. Patient states her abdominal pain is generalized and not localized to one area. (08 Mar 2018 01:28)   Pt resting in bed comfortably with no complaints at this time. Nurse/Pt stated that tolerating food w/o n/v/d, pt behavior has improved, 1-1 in place. Abdominal XR shows decreasing small bowel dilitation since 3/7. Denies fever, chills, CP, SOB. + BM, + Flatus, + Void, + OOBA      ICU Vital Signs Last 24 Hrs  T(C): 36.6 (11 Mar 2018 07:50), Max: 36.9 (10 Mar 2018 23:23)  T(F): 97.9 (11 Mar 2018 07:50), Max: 98.4 (10 Mar 2018 23:23)  HR: 76 (11 Mar 2018 07:50) (72 - 85)  BP: 126/77 (11 Mar 2018 07:50) (110/73 - 132/84)  RR: 18 (11 Mar 2018 07:50) (18 - 18)  SpO2: 98% (11 Mar 2018 07:50) (96% - 98%)      I&O's Detail            MEDICATIONS  (STANDING):  benztropine 0.5 milliGRAM(s) Oral daily  benztropine 1 milliGRAM(s) Oral at bedtime  haloperidol    Concentrate 7 milliGRAM(s) Oral two times a day  lactated ringers. 1000 milliLiter(s) (100 mL/Hr) IV Continuous <Continuous>  levETIRAcetam 500 milliGRAM(s) Oral two times a day  levothyroxine 112 MICROGram(s) Oral daily  LORazepam     Tablet 1 milliGRAM(s) Oral three times a day  mesalamine Suppository 1000 milliGRAM(s) Rectal at bedtime  simvastatin 20 milliGRAM(s) Oral at bedtime    MEDICATIONS  (PRN):  ondansetron Injectable 4 milliGRAM(s) IV Push every 8 hours PRN Nausea and/or Vomiting      NUTRITION/IVF: CLD      PHYSICAL EXAM:    Pulmonary: CTA BL, Non labored, chest rise is  symmetrical with respiration    Cardiovascular: S1S2 RRR    Gastrointestinal: Abdomen softly distended and non tympanic, + ventral hernia. Non tender    Genitourinary: No Chance, no discharge or rash.    Extremities: No gross deformities or angulations, No calf tenderness, Distal pulses intact, Capillary refill < 2sec    Skin: Warm and dry, no rash.     Musculoskeletal: No focal deficits, strength 5/5 through out all extremities bilaterally, Baseline ROM.           LABS:  CBC Full  -  ( 10 Mar 2018 08:58 )  WBC Count : 3.7 K/uL  Hemoglobin : 11.4 g/dL  Hematocrit : 35.9 %  Platelet Count - Automated : 131 K/uL  Mean Cell Volume : 94.7 fl  Mean Cell Hemoglobin : 30.1 pg  Mean Cell Hemoglobin Concentration : 31.8 g/dL      03-10    145  |  107  |  18.0  ----------------------------<  125<H>  3.6   |  25.0  |  1.18    Ca    9.3      10 Mar 2018 08:58  Phos  2.9     03-10  Mg     2.1     03-10          RECENT CULTURES:            CAPILLARY BLOOD GLUCOSE      RADIOLOGY & ADDITIONAL STUDIES:    ASSESSMENT/PLAN:  68yFemale presenting with:- Resolving SBO  Extensive past surgical history with multiple operations by Dr. Miller  3/20/15 Altmeier procedure  3/26/15 Ex-lap, CONOR, appy and diverting loop ileostomy for anastomotic leak  7/20/15 Rigid proctosigmoidoscope with findings of posterior anastomotic defect  11/19/15 Ex-lap, CONOR, VHR with biologic mesh, primary repair of parastomal hernia  7/1/16 VHR with parastomal hernia repair  4/4/17 Ex-lap, CONOR, SBR, ileostomy reversal, VHR with abdominal wall reconstruction via component separation and biologic mesh    Clinically improving with radiographic e/o decreasing dilation  Continue CLD  f/u AM Labs  DISPO Planing
No c/o. Denies N/V. No abdominal pain.  Reports passing small amts of gas yesterday evening and this AM. Uncertain if passed stool.    MEDICATIONS  (STANDING):  benztropine 0.5 milliGRAM(s) Oral daily  benztropine 1 milliGRAM(s) Oral at bedtime  haloperidol    Concentrate 7 milliGRAM(s) Oral two times a day  lactated ringers. 1000 milliLiter(s) (100 mL/Hr) IV Continuous <Continuous>  levETIRAcetam 500 milliGRAM(s) Oral two times a day  levothyroxine 112 MICROGram(s) Oral daily  LORazepam     Tablet 1 milliGRAM(s) Oral three times a day  mesalamine Suppository 1000 milliGRAM(s) Rectal at bedtime  simvastatin 20 milliGRAM(s) Oral at bedtime    MEDICATIONS  (PRN):  ondansetron Injectable 4 milliGRAM(s) IV Push every 8 hours PRN Nausea and/or Vomiting    Vital Signs Last 24 Hrs  T(C): 37.1 (09 Mar 2018 08:12), Max: 37.5 (08 Mar 2018 16:25)  T(F): 98.8 (09 Mar 2018 08:12), Max: 99.5 (08 Mar 2018 16:25)  HR: 84 (09 Mar 2018 08:12) (80 - 86)  BP: 144/76 (09 Mar 2018 08:12) (112/69 - 147/74)  BP(mean): --  RR: 18 (09 Mar 2018 08:12) (18 - 19)  SpO2: 97% (09 Mar 2018 08:12) (97% - 99%)  I&O's Detail    08 Mar 2018 07:01  -  09 Mar 2018 07:00  --------------------------------------------------------  IN:    lactated ringers.: 2300 mL  Total IN: 2300 mL    OUT:  Total OUT: 0 mL    Total NET: 2300 mL    NAD  ABD exam :soft, NT, moderate distention                        10.4   3.0   )-----------( 128      ( 08 Mar 2018 09:05 )             33.2     03-09    148<H>  |  113<H>  |  19.0  ----------------------------<  77  3.8   |  23.0  |  1.13    Ca    9.1      09 Mar 2018 08:10  Phos  3.0     03-08  Mg     2.2     03-08    TPro  7.2  /  Alb  4.1  /  TBili  0.4  /  DBili  x   /  AST  39<H>  /  ALT  24  /  AlkPhos  115  03-07    < from: Xray Abdomen 2 View PORTABLE -Routine (03.09.18 @ 05:27) >  :  XR ABDOMEN PORTABLE ROUTINE 2V                          PROCEDURE DATE:  03/09/2018          INTERPRETATION:  X-RAY ABDOMEN SUPINE:    History: Partial small bowel obstruction. This is a follow-up exam..    Date and time of exam: 3/9/2018 4:20 AM.    Comparison: Abdominal CT scan 3/7/2018    Findings: Persistent dilated mid abdominal small bowel loops less   pronounced since the prior examination. No evidence of organomegaly..    Impression:  Persistent but decreasing small bowel dilatation since 3/7/2018..                NÉSTOR CARDOSO M.D., ATTENDING RADIOLOGIST  This document has been electronically signed. Mar  9 2018  8:44AM                  < end of copied text >
chart reviewed surgery note appreciated patient seen comfortable asking for coffee maintains NPO status No Pain  mental status at baseline Pleasant almost jovial  Vital Signs Last 24 Hrs  T(C): 37.2 (09 Mar 2018 00:03), Max: 37.5 (08 Mar 2018 16:25)  T(F): 98.9 (09 Mar 2018 00:03), Max: 99.5 (08 Mar 2018 16:25)  HR: 86 (09 Mar 2018 00:03) (80 - 86)  BP: 112/69 (09 Mar 2018 00:03) (112/69 - 147/74)  RR: 18 (09 Mar 2018 00:03) (18 - 19)  SpO2: 98% (09 Mar 2018 00:03) (98% - 99%)
SURGICAL PA NOTE:     STATUS POST:      POST OPERATIVE DAY #:     Vital Signs Last 24 Hrs  T(C): 37.1 (08 Mar 2018 07:15), Max: 37.4 (08 Mar 2018 05:41)  T(F): 98.7 (08 Mar 2018 07:15), Max: 99.3 (08 Mar 2018 05:41)  HR: 79 (08 Mar 2018 07:15) (79 - 101)  BP: 124/66 (08 Mar 2018 07:15) (124/66 - 155/81)  BP(mean): --  RR: 18 (08 Mar 2018 07:15) (18 - 20)  SpO2: 98% (08 Mar 2018 07:15) (96% - 98%)    HPI:  68 year old female with PMH colostomy and subsequent reversal, uterine prolapse, schizophrenia, CVA, seizures presents with abd pain. Pain started today with diffuse cramping, distension, and 1 episode of vomiting. No diarrhea, fever, chills, dysuria, hematuria, chest pain, SOB. Patient states she is passing flatus and had a normal bowel movement yesterday. At time of exam patient states she is currently not experiencing any nausea. Patient states her abdominal pain is generalized and not localized to one area. (08 Mar 2018 01:28)      Small bowel obstruction  Unknown h/o HF  No pertinent family history in first degree relatives  No pertinent family history in first degree relatives  MEWS Score  Uterine prolapse  Onychomycosis  Rectal prolapse  Venous insufficiency  Urinary bladder incontinence  Displaced fracture of olecranon process with intraarticular extension of left ulna  Schizo affective schizophrenia  Urinary incontinence  Obesity  GERD (gastroesophageal reflux disease)  HTN (hypertension)  CVA (cerebral vascular accident)  Venous insufficiency  Splenomegaly  Anemia  Neutropenia  Vaginal prolapse  Fall  Constipation  Osteopenia  Hypothyroid  Seizure  Hemiparesis, left  Behavior problems  Suicide attempt  Schizoaffective disorder, bipolar type  Hypothyroidism  Osteopenia  GERD (gastroesophageal reflux disease)  Vaginal prolapse without mention of uterine prolapse  Sensory hearing loss  Constipation  Neutropenia  Venous insufficiency (chronic) (peripheral)  Obesity  Hypertension  CVA (cerebral vascular accident)  Small bowel obstruction  Schizoaffective disorder, chronic condition  Schizo affective schizophrenia  Parastomal hernia without obstruction or gangrene  H/O ileostomy  No significant past surgical history  STOMACH PAIN VOMIT  90+      SUBJECTIVE: Pt seen lying supine with HOB up, no aide in room, pt poor historian, states no current nausea/vomiting, states had BM yest, c/o mild abd pain, c/o mild abd distention/bloating, voids into diaper    Diet: NPO    Activity: bedrest    Fevers: [ ]Yes [x ]NO  Chills: [ ] Yes [x ] NO  SOB:  [ ] YES [ x] NO  Dyspnea: [ ]YES [ x]NO  Chest Discomfort: [ ] YES [x ] NO    Nausea: [ ] YES [x ] NO           Vomiting: [ ] YES [x ] NO  Flatus: [ ] YES [x ] NO             Bowel Movement: [ ] YES [x ] NO  Diarrhea: [ ] YES [x ] NO         Void: [x ]YES [ ]No  Constipation: [ ] YES [x ] NO       Pain (0-10):       2       Pain Control Adequate: [ ] YES [ ] NO    Chance:    NGT:      I&O's Detail    I&O's Summary    I&O's Detail      MEDICATIONS  (STANDING):  benztropine 0.5 milliGRAM(s) Oral daily  benztropine 1 milliGRAM(s) Oral at bedtime  haloperidol    Concentrate 7 milliGRAM(s) Oral two times a day  lactated ringers. 1000 milliLiter(s) (100 mL/Hr) IV Continuous <Continuous>  levETIRAcetam 500 milliGRAM(s) Oral two times a day  levothyroxine 112 MICROGram(s) Oral daily  LORazepam     Tablet 1 milliGRAM(s) Oral three times a day  mesalamine Suppository 1000 milliGRAM(s) Rectal at bedtime  simvastatin 20 milliGRAM(s) Oral at bedtime    MEDICATIONS  (PRN):  ondansetron Injectable 4 milliGRAM(s) IV Push every 8 hours PRN Nausea and/or Vomiting      LABS:                        10.4   3.0   )-----------( 128      ( 08 Mar 2018 09:05 )             33.2     03-07    141  |  100  |  25.0<H>  ----------------------------<  114  5.2   |  29.0  |  1.04    Ca    9.9      07 Mar 2018 18:38    TPro  7.2  /  Alb  4.1  /  TBili  0.4  /  DBili  x   /  AST  39<H>  /  ALT  24  /  AlkPhos  115  03-07    PT/INR - ( 07 Mar 2018 18:38 )   PT: 12.9 sec;   INR: 1.17 ratio         PTT - ( 07 Mar 2018 18:38 )  PTT:31.3 sec        RADIOLOGY & ADDITIONAL STUDIES:    < from: CT Abdomen and Pelvis w/ IV Cont (03.07.18 @ 20:36) >     EXAM:  CT ABDOMEN AND PELVIS IC                          PROCEDURE DATE:  03/07/2018          INTERPRETATION:  CLINICAL INFORMATION: Abdominal pain and distention     TECHNIQUE:    CT of abdomen and pelvis was performed with axial images   were obtained from the diaphragm to pubic symphysis with oral contrast.    COMPARISON:  Prior study 5/27/2017.    FINDINGS:    Liver: 1.5 x 2 cm hypodense lesion in segment 4 unchanged as compared to   previous exam  Gallbladder: Multiple gallstones  Bile ducts: No intrahepatic or extrahepatic biliary dilatation  Pancreas: within normal limits    Spleen: Splenomegaly with multiple hypodense lesions suggestive of   underlying infiltrative disease unchanged as compared to previous exam  Adrenal: within normal limits  Kidneys, Ureters and Bladder: Symmetric in size and position bilaterally.   Hypodense cystic lesions in the kidneys, left greater than right. No   perinephric attending or collections. No hydronephrosis. No intrarenal   calculi. Normal caliber of the ureters. Limited unopacified bladder   grossly unremarkable.    Pelvis: Small groin pelvic adenopathy. No pelvic free fluid.  Status post   hysterectomy.      Bowel: Retrocardiac hiatal hernia. Postsurgical changes status post   ileostomy.Distended dilated centrally positioned air-fluid loops of   small bowel consistent with obstruction transition right lower quadrant.   Gas is present in the colon Peritoneum: No intra-abdominal free air,   ascites or organized fluid collections. Multiple nonspecific   intra-abdominal and mesenteric lymphadenopathy.    Retroperitoneum: within normal limits  Vessels: Atherosclerotic changes.    Lower chest and lung Bases: The visualized lung bases clear except for   subsegmental dependent atelectasis. Heart normal in size. Arterial   calcification.    Bones: Degenerative changes. Chronic left rib fractures. Diffuse   osteopenia.    IMPRESSION:     Centrally displaced abnormally dilated fluid-filled filled loops of small   bowel consistent with bowel obstruction transition in right lower   quadrant. Postsurgical changes related to bowel resection and anastomosis   and ileostomy.      Splenomegaly with numerous hypodense lesions within spleen unchanged as   compared to prior study consistent with diffuse infiltrative disease    Hypodensity segment 4 of the liver unchanged.    Few scattered small intra-abdominal and mesenteric lymph nodes.      ERICKA FRAZIER M.D., ATTENDING RADIOLOGIST  This document has been electronically signed. Mar  7 2018  8:46PM    < end of copied text >

## 2018-03-19 ENCOUNTER — INPATIENT (INPATIENT)
Facility: HOSPITAL | Age: 68
LOS: 2 days | Discharge: PSYCHIATRIC OR SUBSTANCE USE FACILITY | DRG: 389 | End: 2018-03-22
Attending: SURGERY | Admitting: SURGERY
Payer: MEDICARE

## 2018-03-19 VITALS
OXYGEN SATURATION: 98 % | TEMPERATURE: 98 F | WEIGHT: 175.05 LBS | SYSTOLIC BLOOD PRESSURE: 127 MMHG | DIASTOLIC BLOOD PRESSURE: 82 MMHG | RESPIRATION RATE: 18 BRPM | HEART RATE: 79 BPM | HEIGHT: 63 IN

## 2018-03-19 DIAGNOSIS — Z93.2 ILEOSTOMY STATUS: Chronic | ICD-10-CM

## 2018-03-19 DIAGNOSIS — K56.601 COMPLETE INTESTINAL OBSTRUCTION, UNSPECIFIED AS TO CAUSE: ICD-10-CM

## 2018-03-19 DIAGNOSIS — K43.5 PARASTOMAL HERNIA WITHOUT OBSTRUCTION OR GANGRENE: Chronic | ICD-10-CM

## 2018-03-19 LAB
ALBUMIN SERPL ELPH-MCNC: 4 G/DL — SIGNIFICANT CHANGE UP (ref 3.3–5.2)
ALP SERPL-CCNC: 118 U/L — SIGNIFICANT CHANGE UP (ref 40–120)
ALT FLD-CCNC: 18 U/L — SIGNIFICANT CHANGE UP
ANION GAP SERPL CALC-SCNC: 12 MMOL/L — SIGNIFICANT CHANGE UP (ref 5–17)
APPEARANCE UR: CLEAR — SIGNIFICANT CHANGE UP
AST SERPL-CCNC: 18 U/L — SIGNIFICANT CHANGE UP
BASOPHILS # BLD AUTO: 0 K/UL — SIGNIFICANT CHANGE UP (ref 0–0.2)
BASOPHILS NFR BLD AUTO: 0.2 % — SIGNIFICANT CHANGE UP (ref 0–2)
BILIRUB SERPL-MCNC: 0.6 MG/DL — SIGNIFICANT CHANGE UP (ref 0.4–2)
BILIRUB UR-MCNC: NEGATIVE — SIGNIFICANT CHANGE UP
BUN SERPL-MCNC: 23 MG/DL — HIGH (ref 8–20)
CALCIUM SERPL-MCNC: 9.6 MG/DL — SIGNIFICANT CHANGE UP (ref 8.6–10.2)
CHLORIDE SERPL-SCNC: 102 MMOL/L — SIGNIFICANT CHANGE UP (ref 98–107)
CO2 SERPL-SCNC: 28 MMOL/L — SIGNIFICANT CHANGE UP (ref 22–29)
COLOR SPEC: YELLOW — SIGNIFICANT CHANGE UP
CREAT SERPL-MCNC: 1.1 MG/DL — SIGNIFICANT CHANGE UP (ref 0.5–1.3)
DIFF PNL FLD: NEGATIVE — SIGNIFICANT CHANGE UP
EOSINOPHIL # BLD AUTO: 0.1 K/UL — SIGNIFICANT CHANGE UP (ref 0–0.5)
EOSINOPHIL NFR BLD AUTO: 1.4 % — SIGNIFICANT CHANGE UP (ref 0–6)
GLUCOSE SERPL-MCNC: 94 MG/DL — SIGNIFICANT CHANGE UP (ref 70–115)
GLUCOSE UR QL: NEGATIVE MG/DL — SIGNIFICANT CHANGE UP
HCT VFR BLD CALC: 35.8 % — LOW (ref 37–47)
HGB BLD-MCNC: 11.2 G/DL — LOW (ref 12–16)
KETONES UR-MCNC: NEGATIVE — SIGNIFICANT CHANGE UP
LACTATE BLDV-MCNC: 0.9 MMOL/L — SIGNIFICANT CHANGE UP (ref 0.5–2)
LEUKOCYTE ESTERASE UR-ACNC: NEGATIVE — SIGNIFICANT CHANGE UP
LYMPHOCYTES # BLD AUTO: 1.2 K/UL — SIGNIFICANT CHANGE UP (ref 1–4.8)
LYMPHOCYTES # BLD AUTO: 29 % — SIGNIFICANT CHANGE UP (ref 20–55)
MCHC RBC-ENTMCNC: 29.9 PG — SIGNIFICANT CHANGE UP (ref 27–31)
MCHC RBC-ENTMCNC: 31.3 G/DL — LOW (ref 32–36)
MCV RBC AUTO: 95.5 FL — SIGNIFICANT CHANGE UP (ref 81–99)
MONOCYTES # BLD AUTO: 0.4 K/UL — SIGNIFICANT CHANGE UP (ref 0–0.8)
MONOCYTES NFR BLD AUTO: 10.2 % — HIGH (ref 3–10)
NEUTROPHILS # BLD AUTO: 2.5 K/UL — SIGNIFICANT CHANGE UP (ref 1.8–8)
NEUTROPHILS NFR BLD AUTO: 59.2 % — SIGNIFICANT CHANGE UP (ref 37–73)
NITRITE UR-MCNC: NEGATIVE — SIGNIFICANT CHANGE UP
PH UR: 8 — SIGNIFICANT CHANGE UP (ref 5–8)
PLATELET # BLD AUTO: 143 K/UL — LOW (ref 150–400)
POTASSIUM SERPL-MCNC: 4.3 MMOL/L — SIGNIFICANT CHANGE UP (ref 3.5–5.3)
POTASSIUM SERPL-SCNC: 4.3 MMOL/L — SIGNIFICANT CHANGE UP (ref 3.5–5.3)
PROT SERPL-MCNC: 6.7 G/DL — SIGNIFICANT CHANGE UP (ref 6.6–8.7)
PROT UR-MCNC: NEGATIVE MG/DL — SIGNIFICANT CHANGE UP
RBC # BLD: 3.75 M/UL — LOW (ref 4.4–5.2)
RBC # FLD: 13.6 % — SIGNIFICANT CHANGE UP (ref 11–15.6)
SODIUM SERPL-SCNC: 142 MMOL/L — SIGNIFICANT CHANGE UP (ref 135–145)
SP GR SPEC: 1.02 — SIGNIFICANT CHANGE UP (ref 1.01–1.02)
UROBILINOGEN FLD QL: NEGATIVE MG/DL — SIGNIFICANT CHANGE UP
WBC # BLD: 4.2 K/UL — LOW (ref 4.8–10.8)
WBC # FLD AUTO: 4.2 K/UL — LOW (ref 4.8–10.8)

## 2018-03-19 PROCEDURE — 74177 CT ABD & PELVIS W/CONTRAST: CPT | Mod: 26

## 2018-03-19 PROCEDURE — 99285 EMERGENCY DEPT VISIT HI MDM: CPT

## 2018-03-19 RX ORDER — PANTOPRAZOLE SODIUM 20 MG/1
40 TABLET, DELAYED RELEASE ORAL
Qty: 0 | Refills: 0 | Status: DISCONTINUED | OUTPATIENT
Start: 2018-03-19 | End: 2018-03-21

## 2018-03-19 RX ORDER — LEVOTHYROXINE SODIUM 125 MCG
112 TABLET ORAL DAILY
Qty: 0 | Refills: 0 | Status: DISCONTINUED | OUTPATIENT
Start: 2018-03-19 | End: 2018-03-22

## 2018-03-19 RX ORDER — HALOPERIDOL DECANOATE 100 MG/ML
7 INJECTION INTRAMUSCULAR
Qty: 0 | Refills: 0 | Status: DISCONTINUED | OUTPATIENT
Start: 2018-03-19 | End: 2018-03-22

## 2018-03-19 RX ORDER — ENOXAPARIN SODIUM 100 MG/ML
30 INJECTION SUBCUTANEOUS DAILY
Qty: 0 | Refills: 0 | Status: DISCONTINUED | OUTPATIENT
Start: 2018-03-19 | End: 2018-03-22

## 2018-03-19 RX ORDER — PANTOPRAZOLE SODIUM 20 MG/1
40 TABLET, DELAYED RELEASE ORAL ONCE
Qty: 0 | Refills: 0 | Status: COMPLETED | OUTPATIENT
Start: 2018-03-19 | End: 2018-03-19

## 2018-03-19 RX ORDER — LEVETIRACETAM 250 MG/1
1000 TABLET, FILM COATED ORAL EVERY 12 HOURS
Qty: 0 | Refills: 0 | Status: DISCONTINUED | OUTPATIENT
Start: 2018-03-19 | End: 2018-03-21

## 2018-03-19 RX ORDER — MORPHINE SULFATE 50 MG/1
2 CAPSULE, EXTENDED RELEASE ORAL EVERY 4 HOURS
Qty: 0 | Refills: 0 | Status: DISCONTINUED | OUTPATIENT
Start: 2018-03-19 | End: 2018-03-21

## 2018-03-19 RX ORDER — MORPHINE SULFATE 50 MG/1
4 CAPSULE, EXTENDED RELEASE ORAL EVERY 4 HOURS
Qty: 0 | Refills: 0 | Status: DISCONTINUED | OUTPATIENT
Start: 2018-03-19 | End: 2018-03-21

## 2018-03-19 RX ORDER — SODIUM CHLORIDE 9 MG/ML
3 INJECTION INTRAMUSCULAR; INTRAVENOUS; SUBCUTANEOUS ONCE
Qty: 0 | Refills: 0 | Status: COMPLETED | OUTPATIENT
Start: 2018-03-19 | End: 2018-03-19

## 2018-03-19 RX ORDER — BENZTROPINE MESYLATE 1 MG
1 TABLET ORAL DAILY
Qty: 0 | Refills: 0 | Status: DISCONTINUED | OUTPATIENT
Start: 2018-03-19 | End: 2018-03-19

## 2018-03-19 RX ORDER — ONDANSETRON 8 MG/1
4 TABLET, FILM COATED ORAL ONCE
Qty: 0 | Refills: 0 | Status: COMPLETED | OUTPATIENT
Start: 2018-03-19 | End: 2018-03-19

## 2018-03-19 RX ORDER — SIMETHICONE 80 MG/1
80 TABLET, CHEWABLE ORAL
Qty: 0 | Refills: 0 | COMMUNITY

## 2018-03-19 RX ORDER — SODIUM CHLORIDE 9 MG/ML
1000 INJECTION, SOLUTION INTRAVENOUS
Qty: 0 | Refills: 0 | Status: DISCONTINUED | OUTPATIENT
Start: 2018-03-19 | End: 2018-03-21

## 2018-03-19 RX ORDER — HALOPERIDOL DECANOATE 100 MG/ML
4 INJECTION INTRAMUSCULAR
Qty: 0 | Refills: 0 | Status: DISCONTINUED | OUTPATIENT
Start: 2018-03-19 | End: 2018-03-19

## 2018-03-19 RX ORDER — ACETAMINOPHEN 500 MG
1000 TABLET ORAL ONCE
Qty: 0 | Refills: 0 | Status: COMPLETED | OUTPATIENT
Start: 2018-03-19 | End: 2018-03-19

## 2018-03-19 RX ORDER — SODIUM CHLORIDE 9 MG/ML
1000 INJECTION INTRAMUSCULAR; INTRAVENOUS; SUBCUTANEOUS ONCE
Qty: 0 | Refills: 0 | Status: COMPLETED | OUTPATIENT
Start: 2018-03-19 | End: 2018-03-19

## 2018-03-19 RX ORDER — LOPERAMIDE HCL 2 MG
1 TABLET ORAL
Qty: 0 | Refills: 0 | COMMUNITY

## 2018-03-19 RX ORDER — BENZTROPINE MESYLATE 1 MG
1 TABLET ORAL DAILY
Qty: 0 | Refills: 0 | Status: DISCONTINUED | OUTPATIENT
Start: 2018-03-19 | End: 2018-03-21

## 2018-03-19 RX ORDER — SIMVASTATIN 20 MG/1
10 TABLET, FILM COATED ORAL AT BEDTIME
Qty: 0 | Refills: 0 | Status: DISCONTINUED | OUTPATIENT
Start: 2018-03-19 | End: 2018-03-22

## 2018-03-19 RX ADMIN — HALOPERIDOL DECANOATE 4 MILLIGRAM(S): 100 INJECTION INTRAMUSCULAR at 20:26

## 2018-03-19 RX ADMIN — Medication 400 MILLIGRAM(S): at 20:23

## 2018-03-19 RX ADMIN — ONDANSETRON 4 MILLIGRAM(S): 8 TABLET, FILM COATED ORAL at 11:25

## 2018-03-19 RX ADMIN — PANTOPRAZOLE SODIUM 40 MILLIGRAM(S): 20 TABLET, DELAYED RELEASE ORAL at 11:25

## 2018-03-19 RX ADMIN — SODIUM CHLORIDE 3 MILLILITER(S): 9 INJECTION INTRAMUSCULAR; INTRAVENOUS; SUBCUTANEOUS at 11:02

## 2018-03-19 RX ADMIN — Medication 204 MILLIGRAM(S): at 15:53

## 2018-03-19 RX ADMIN — ENOXAPARIN SODIUM 30 MILLIGRAM(S): 100 INJECTION SUBCUTANEOUS at 23:45

## 2018-03-19 RX ADMIN — MORPHINE SULFATE 4 MILLIGRAM(S): 50 CAPSULE, EXTENDED RELEASE ORAL at 19:53

## 2018-03-19 RX ADMIN — MORPHINE SULFATE 4 MILLIGRAM(S): 50 CAPSULE, EXTENDED RELEASE ORAL at 20:08

## 2018-03-19 RX ADMIN — SODIUM CHLORIDE 1000 MILLILITER(S): 9 INJECTION INTRAMUSCULAR; INTRAVENOUS; SUBCUTANEOUS at 11:25

## 2018-03-19 RX ADMIN — LEVETIRACETAM 400 MILLIGRAM(S): 250 TABLET, FILM COATED ORAL at 23:44

## 2018-03-19 RX ADMIN — SODIUM CHLORIDE 125 MILLILITER(S): 9 INJECTION, SOLUTION INTRAVENOUS at 20:06

## 2018-03-19 RX ADMIN — SODIUM CHLORIDE 125 MILLILITER(S): 9 INJECTION, SOLUTION INTRAVENOUS at 19:05

## 2018-03-19 NOTE — ED PROVIDER NOTE - PROGRESS NOTE DETAILS
pt with extensive psych history who needs a ct scan which is medically necessary to rule out bowel obstruction which could be life threatening pt with extensive psych history who needs a ct scan with IV contrast which is medically necessary to rule out bowel obstruction which could be life threatening pt had a ct scan which showed a closed loop obstruction and right incarcerated stomal hernia to be admitted to surgery

## 2018-03-19 NOTE — ED PROVIDER NOTE - ENMT NEGATIVE STATEMENT, MLM
February 2, 2017      Bartolome March MD  [address]  [facility] [address]           [facility] - Neurosurgery 7th Fl  [address]  [facility] [address]  Phone: [phone]          Patient: Santa Rodriguez   MR Number: [id number]   YOB: 1925   Date of Visit: 2/2/2017       Dear Dr. Bartolome March:    Thank you for referring Santa Rodriguez to me for evaluation. Attached you will find relevant portions of my assessment and plan of care.    If you have questions, please do not hesitate to call me. I look forward to following Santa Rodriguez along with you.    Sincerely,    Lexie Cat MD    Enclosure  CC:  No Recipients    If you would like to receive this communication electronically, please contact [email or web address] or [phone] to request more information on [facility] access.    For providers and/or their staff who would like to refer a patient to [facility], please contact us through our one-stop-shop provider referral line, [facility], at [phone].    If you feel you have received this communication in error or would no longer like to receive these types of communications, please e-mail [email or web address]          Ears: no ear pain and no hearing problems.Nose: no nasal congestion and no nasal drainage.Mouth/Throat: no dysphagia, no hoarseness and no throat pain.Neck: no lumps, no pain, no stiffness and no swollen glands.

## 2018-03-19 NOTE — ED PROVIDER NOTE - OBJECTIVE STATEMENT
68 year old female with multiple medical problems including a reversal of an iliostomy and schizophrenia presents with c/o abdominal pain and distensiob

## 2018-03-19 NOTE — H&P ADULT - HISTORY OF PRESENT ILLNESS
Patient presents to ED from [facility] with 1 day history of abdominal pain, multiple episodes of vomiting, per [facility] representative. History and physical limited by history of schizophrenia. Last bowel movement several days ago, has not been passing flatus. No fever, chills, chest pain, dyspnea. Extensive past surgical history with multiple operations by colorectal surgeon, Dr. Miller:  3/20/15: Altemeier procedure  3/26/15: Ex-lap, CONOR, appendectomy and diverting loop ileostomy for anastomotic leak  7/20/15: Rigid proctosigmoidoscope with findings of posterior anastomotic defect  11/19/15: Ex-lap, CONOR, VHR with biologic mesh, primary repair of parastomal hernia  7/1/16: VHR with parastomal hernia repair  4/4/17: Ex-lap, CONOR, SBR, ileostomy reversal, VHR with abdominal wall reconstruction via component separation and biologic mesh

## 2018-03-19 NOTE — H&P ADULT - ASSESSMENT
69 y/o F h/o schizophrenia, extensive colorectal surgical history (see below), with clinical symptoms and imaging findings consistent with small bowel obstruction; former stomal hernia with incarceration vs closed loop small bowel obstruction. Patient currently hemodynamically stable, afebrile, lactate 0.9, NGT in place suctioning feculent material (about 700 cc in 1 hour).     Plan:  -Admit to Acute Care Surgery, Dr. Rivera  -Monitor vital signs, I/Os  -NPO, LR at 125 cc/hr  -Serial abdominal exams  -Repeat lactate stat  -Morning labs: CBC, CMP, Mag, Phos, Lactate  -1:1 at bedside from [place]  -Restart home psychiatric medications; will discuss with pharmacy about transition from PO to IV/IM meds

## 2018-03-19 NOTE — ED ADULT NURSE NOTE - OBJECTIVE STATEMENT
68 yof in company of [employer] 1:1, aox3 presents to ed complaining of abdominal distention and pain. BILATERAL protrusions of contour of abdomen consistent with herniation. scarring noted midline lower quadrant secondary to ostomy reversal. hypoactive bowel sounds noted in all 4 quadrants. emesis dark, denies BM  x "a couple days" no acute respiratory distress, discomfort/ vomiting.

## 2018-03-19 NOTE — ED ADULT TRIAGE NOTE - CHIEF COMPLAINT QUOTE
pt biba from [facility] for abd pain with vomiting, ems reports has hx of ileostomy reversal but unknown date

## 2018-03-19 NOTE — H&P ADULT - GASTROINTESTINAL COMMENTS
R-sided abdominal hernia, at former stoma site with loop of bowel that is easily reducible; midline ventral hernia

## 2018-03-19 NOTE — H&P ADULT - ATTENDING COMMENTS
68 female with schizoaffective disorder with extensive abdominal surgeries with recent admission for small bowel obstruction managed with NGT decompression. Patient returns with similar symptoms of obstipation, nausea, vomiting.   Currently afebrile with stable vital signs  Abdomen is soft, distended, mild tenderness of right abdomen, hernia at previous ostomy site is readily reducible  No leukocytosis, lactate normal  CT scan shows dilated small bowel with "incarceration and possible closed loop obstruction" at prior stomal site, no free fluid    68F with schizoaffective disorder with small bowel obstruction with easily reducible hernia at previous stomal site, could not appreciate closed loop obstruction on CT scan but no free fluid and lactate normal x 2, abdominal exam currently benign    Admit  NPO/IVF  NGT decompression  Strict I/Os  Serial abdominal examinations  Trend lactate  Maintain 1:1 for safety purposes, sitter from [facility] states that they have been assigned to be with patient for her hospitalization. If any concerns of lapse in coverage, will obtain hospital personnel.

## 2018-03-19 NOTE — ED PROVIDER NOTE - CARE PLAN
Principal Discharge DX:	Complete intestinal obstruction, unspecified cause  Secondary Diagnosis:	Incarcerated hernia  Secondary Diagnosis:	Abdominal pain

## 2018-03-20 LAB
ALBUMIN SERPL ELPH-MCNC: 3.2 G/DL — LOW (ref 3.3–5.2)
ALP SERPL-CCNC: 99 U/L — SIGNIFICANT CHANGE UP (ref 40–120)
ALT FLD-CCNC: 16 U/L — SIGNIFICANT CHANGE UP
ANION GAP SERPL CALC-SCNC: 8 MMOL/L — SIGNIFICANT CHANGE UP (ref 5–17)
ANISOCYTOSIS BLD QL: SLIGHT — SIGNIFICANT CHANGE UP
AST SERPL-CCNC: 15 U/L — SIGNIFICANT CHANGE UP
BASOPHILS # BLD AUTO: 0 K/UL — SIGNIFICANT CHANGE UP (ref 0–0.2)
BASOPHILS NFR BLD AUTO: 0 % — SIGNIFICANT CHANGE UP (ref 0–2)
BILIRUB SERPL-MCNC: 0.4 MG/DL — SIGNIFICANT CHANGE UP (ref 0.4–2)
BUN SERPL-MCNC: 21 MG/DL — HIGH (ref 8–20)
CALCIUM SERPL-MCNC: 8.9 MG/DL — SIGNIFICANT CHANGE UP (ref 8.6–10.2)
CHLORIDE SERPL-SCNC: 112 MMOL/L — HIGH (ref 98–107)
CO2 SERPL-SCNC: 28 MMOL/L — SIGNIFICANT CHANGE UP (ref 22–29)
CREAT SERPL-MCNC: 1.08 MG/DL — SIGNIFICANT CHANGE UP (ref 0.5–1.3)
DACRYOCYTES BLD QL SMEAR: SLIGHT — SIGNIFICANT CHANGE UP
EOSINOPHIL # BLD AUTO: 0.1 K/UL — SIGNIFICANT CHANGE UP (ref 0–0.5)
EOSINOPHIL NFR BLD AUTO: 2 % — SIGNIFICANT CHANGE UP (ref 0–5)
GLUCOSE SERPL-MCNC: 83 MG/DL — SIGNIFICANT CHANGE UP (ref 70–115)
HCT VFR BLD CALC: 31.2 % — LOW (ref 37–47)
HGB BLD-MCNC: 9.5 G/DL — LOW (ref 12–16)
HYPOCHROMIA BLD QL: SLIGHT — SIGNIFICANT CHANGE UP
LACTATE SERPL-SCNC: 0.7 MMOL/L — SIGNIFICANT CHANGE UP (ref 0.5–2)
LYMPHOCYTES # BLD AUTO: 0.9 K/UL — LOW (ref 1–4.8)
LYMPHOCYTES # BLD AUTO: 25 % — SIGNIFICANT CHANGE UP (ref 20–55)
MAGNESIUM SERPL-MCNC: 2.3 MG/DL — SIGNIFICANT CHANGE UP (ref 1.6–2.6)
MCHC RBC-ENTMCNC: 29.3 PG — SIGNIFICANT CHANGE UP (ref 27–31)
MCHC RBC-ENTMCNC: 30.4 G/DL — LOW (ref 32–36)
MCV RBC AUTO: 96.3 FL — SIGNIFICANT CHANGE UP (ref 81–99)
MONOCYTES # BLD AUTO: 0.6 K/UL — SIGNIFICANT CHANGE UP (ref 0–0.8)
MONOCYTES NFR BLD AUTO: 16 % — HIGH (ref 3–10)
NEUTROPHILS # BLD AUTO: 1.5 K/UL — LOW (ref 1.8–8)
NEUTROPHILS NFR BLD AUTO: 56 % — SIGNIFICANT CHANGE UP (ref 37–73)
NEUTS BAND # BLD: 1 % — SIGNIFICANT CHANGE UP (ref 0–8)
OVALOCYTES BLD QL SMEAR: SLIGHT — SIGNIFICANT CHANGE UP
PHOSPHATE SERPL-MCNC: 3.6 MG/DL — SIGNIFICANT CHANGE UP (ref 2.4–4.7)
PLAT MORPH BLD: NORMAL — SIGNIFICANT CHANGE UP
PLATELET # BLD AUTO: 110 K/UL — LOW (ref 150–400)
POIKILOCYTOSIS BLD QL AUTO: SLIGHT — SIGNIFICANT CHANGE UP
POTASSIUM SERPL-MCNC: 4.1 MMOL/L — SIGNIFICANT CHANGE UP (ref 3.5–5.3)
POTASSIUM SERPL-SCNC: 4.1 MMOL/L — SIGNIFICANT CHANGE UP (ref 3.5–5.3)
PROT SERPL-MCNC: 5.8 G/DL — LOW (ref 6.6–8.7)
RBC # BLD: 3.24 M/UL — LOW (ref 4.4–5.2)
RBC # FLD: 13.6 % — SIGNIFICANT CHANGE UP (ref 11–15.6)
RBC BLD AUTO: ABNORMAL
SCHISTOCYTES BLD QL AUTO: SLIGHT — SIGNIFICANT CHANGE UP
SODIUM SERPL-SCNC: 148 MMOL/L — HIGH (ref 135–145)
WBC # BLD: 3 K/UL — LOW (ref 4.8–10.8)
WBC # FLD AUTO: 3 K/UL — LOW (ref 4.8–10.8)

## 2018-03-20 PROCEDURE — 74018 RADEX ABDOMEN 1 VIEW: CPT | Mod: 26

## 2018-03-20 PROCEDURE — 74018 RADEX ABDOMEN 1 VIEW: CPT | Mod: 26,77

## 2018-03-20 RX ADMIN — Medication 1 MILLIGRAM(S): at 23:09

## 2018-03-20 RX ADMIN — Medication 1 MILLIGRAM(S): at 11:05

## 2018-03-20 RX ADMIN — LEVETIRACETAM 400 MILLIGRAM(S): 250 TABLET, FILM COATED ORAL at 11:05

## 2018-03-20 RX ADMIN — ENOXAPARIN SODIUM 30 MILLIGRAM(S): 100 INJECTION SUBCUTANEOUS at 11:05

## 2018-03-20 RX ADMIN — LEVETIRACETAM 400 MILLIGRAM(S): 250 TABLET, FILM COATED ORAL at 23:08

## 2018-03-20 RX ADMIN — HALOPERIDOL DECANOATE 7 MILLIGRAM(S): 100 INJECTION INTRAMUSCULAR at 05:26

## 2018-03-20 RX ADMIN — PANTOPRAZOLE SODIUM 40 MILLIGRAM(S): 20 TABLET, DELAYED RELEASE ORAL at 17:21

## 2018-03-20 RX ADMIN — PANTOPRAZOLE SODIUM 40 MILLIGRAM(S): 20 TABLET, DELAYED RELEASE ORAL at 05:26

## 2018-03-20 RX ADMIN — SODIUM CHLORIDE 125 MILLILITER(S): 9 INJECTION, SOLUTION INTRAVENOUS at 06:13

## 2018-03-20 RX ADMIN — HALOPERIDOL DECANOATE 7 MILLIGRAM(S): 100 INJECTION INTRAMUSCULAR at 17:21

## 2018-03-20 RX ADMIN — Medication 112 MICROGRAM(S): at 05:25

## 2018-03-20 RX ADMIN — Medication 1 MILLIGRAM(S): at 13:16

## 2018-03-20 NOTE — PROGRESS NOTE ADULT - ATTENDING COMMENTS
The patient was seen and examined  No new problems  Afebrile  Abdomen is soft, non-tender.  Hernias are easily reducible  Will give gastrografin via NGT--AXR  DVT prophylaxis  1:1 per psychiatry

## 2018-03-20 NOTE — PATIENT PROFILE ADULT. - AGENT'S NAME
Renzo Toribio (brother)                               (alternate= Sukh Toribio- brother  Cell [phone])

## 2018-03-21 LAB
ANION GAP SERPL CALC-SCNC: 16 MMOL/L — SIGNIFICANT CHANGE UP (ref 5–17)
BASOPHILS NFR BLD AUTO: 1 % — SIGNIFICANT CHANGE UP (ref 0–2)
BUN SERPL-MCNC: 21 MG/DL — HIGH (ref 8–20)
CALCIUM SERPL-MCNC: 9.3 MG/DL — SIGNIFICANT CHANGE UP (ref 8.6–10.2)
CHLORIDE SERPL-SCNC: 116 MMOL/L — HIGH (ref 98–107)
CO2 SERPL-SCNC: 23 MMOL/L — SIGNIFICANT CHANGE UP (ref 22–29)
CREAT SERPL-MCNC: 1.29 MG/DL — SIGNIFICANT CHANGE UP (ref 0.5–1.3)
DACRYOCYTES BLD QL SMEAR: SLIGHT — SIGNIFICANT CHANGE UP
EOSINOPHIL NFR BLD AUTO: 2 % — SIGNIFICANT CHANGE UP (ref 0–5)
GLUCOSE SERPL-MCNC: 81 MG/DL — SIGNIFICANT CHANGE UP (ref 70–115)
HCT VFR BLD CALC: 32 % — LOW (ref 37–47)
HGB BLD-MCNC: 9.8 G/DL — LOW (ref 12–16)
HYPOCHROMIA BLD QL: SLIGHT — SIGNIFICANT CHANGE UP
LYMPHOCYTES # BLD AUTO: 25 % — SIGNIFICANT CHANGE UP (ref 20–55)
MAGNESIUM SERPL-MCNC: 2.4 MG/DL — SIGNIFICANT CHANGE UP (ref 1.6–2.6)
MCHC RBC-ENTMCNC: 29.8 PG — SIGNIFICANT CHANGE UP (ref 27–31)
MCHC RBC-ENTMCNC: 30.6 G/DL — LOW (ref 32–36)
MCV RBC AUTO: 97.3 FL — SIGNIFICANT CHANGE UP (ref 81–99)
MONOCYTES NFR BLD AUTO: 10 % — SIGNIFICANT CHANGE UP (ref 3–10)
NEUTROPHILS NFR BLD AUTO: 61 % — SIGNIFICANT CHANGE UP (ref 37–73)
NEUTS BAND # BLD: 1 % — SIGNIFICANT CHANGE UP (ref 0–8)
OVALOCYTES BLD QL SMEAR: SLIGHT — SIGNIFICANT CHANGE UP
PHOSPHATE SERPL-MCNC: 3.3 MG/DL — SIGNIFICANT CHANGE UP (ref 2.4–4.7)
PLAT MORPH BLD: NORMAL — SIGNIFICANT CHANGE UP
PLATELET # BLD AUTO: 123 K/UL — LOW (ref 150–400)
POTASSIUM SERPL-MCNC: 3.8 MMOL/L — SIGNIFICANT CHANGE UP (ref 3.5–5.3)
POTASSIUM SERPL-SCNC: 3.8 MMOL/L — SIGNIFICANT CHANGE UP (ref 3.5–5.3)
RBC # BLD: 3.29 M/UL — LOW (ref 4.4–5.2)
RBC # FLD: 13.5 % — SIGNIFICANT CHANGE UP (ref 11–15.6)
RBC BLD AUTO: ABNORMAL
SODIUM SERPL-SCNC: 155 MMOL/L — HIGH (ref 135–145)
STOMATOCYTES BLD QL SMEAR: SLIGHT — SIGNIFICANT CHANGE UP
WBC # BLD: 3.2 K/UL — LOW (ref 4.8–10.8)
WBC # FLD AUTO: 3.2 K/UL — LOW (ref 4.8–10.8)

## 2018-03-21 RX ORDER — SODIUM CHLORIDE 9 MG/ML
2000 INJECTION, SOLUTION INTRAVENOUS ONCE
Qty: 0 | Refills: 0 | Status: DISCONTINUED | OUTPATIENT
Start: 2018-03-21 | End: 2018-03-21

## 2018-03-21 RX ORDER — PANTOPRAZOLE SODIUM 20 MG/1
40 TABLET, DELAYED RELEASE ORAL
Qty: 0 | Refills: 0 | Status: DISCONTINUED | OUTPATIENT
Start: 2018-03-21 | End: 2018-03-22

## 2018-03-21 RX ORDER — LEVETIRACETAM 250 MG/1
1000 TABLET, FILM COATED ORAL
Qty: 0 | Refills: 0 | Status: DISCONTINUED | OUTPATIENT
Start: 2018-03-21 | End: 2018-03-22

## 2018-03-21 RX ORDER — HALOPERIDOL DECANOATE 100 MG/ML
7 INJECTION INTRAMUSCULAR
Qty: 0 | Refills: 0 | COMMUNITY

## 2018-03-21 RX ADMIN — Medication 112 MICROGRAM(S): at 05:07

## 2018-03-21 RX ADMIN — HALOPERIDOL DECANOATE 7 MILLIGRAM(S): 100 INJECTION INTRAMUSCULAR at 05:07

## 2018-03-21 RX ADMIN — HALOPERIDOL DECANOATE 7 MILLIGRAM(S): 100 INJECTION INTRAMUSCULAR at 16:15

## 2018-03-21 RX ADMIN — Medication 1 MILLIGRAM(S): at 11:30

## 2018-03-21 RX ADMIN — LEVETIRACETAM 1000 MILLIGRAM(S): 250 TABLET, FILM COATED ORAL at 16:15

## 2018-03-21 RX ADMIN — ENOXAPARIN SODIUM 30 MILLIGRAM(S): 100 INJECTION SUBCUTANEOUS at 11:30

## 2018-03-21 RX ADMIN — SIMVASTATIN 10 MILLIGRAM(S): 20 TABLET, FILM COATED ORAL at 20:47

## 2018-03-21 RX ADMIN — PANTOPRAZOLE SODIUM 40 MILLIGRAM(S): 20 TABLET, DELAYED RELEASE ORAL at 05:08

## 2018-03-21 RX ADMIN — Medication 1 MILLIGRAM(S): at 10:07

## 2018-03-21 NOTE — PROGRESS NOTE ADULT - ATTENDING COMMENTS
The patient was seen and examined  Having BM's  Reports being hungry  Will start diet  Hypernatremia noted--will allow free access to drinking water  Repeat labs in AM

## 2018-03-21 NOTE — BEHAVIORAL HEALTH ASSESSMENT NOTE - HPI (INCLUDE ILLNESS QUALITY, SEVERITY, DURATION, TIMING, CONTEXT, MODIFYING FACTORS, ASSOCIATED SIGNS AND SYMPTOMS)
asked to see patient who was re-admitted with bowel obstruction Aggressive towards staff uncooperative with care  diet now being advanced

## 2018-03-21 NOTE — BEHAVIORAL HEALTH ASSESSMENT NOTE - SUMMARY
69 yo female with complex history past bowel resection after prolapse now admitted with SBO, psych history chronic illness

## 2018-03-21 NOTE — PHYSICAL THERAPY INITIAL EVALUATION ADULT - PERTINENT HX OF CURRENT PROBLEM, REHAB EVAL
pt presents to [facility] due to small bowel obstruction, abdominal pain, vomiting, hx of multiple abdominal procedures

## 2018-03-21 NOTE — PHYSICAL THERAPY INITIAL EVALUATION ADULT - GAIT PATTERN USED, PT EVAL
periods of intermittent yelling without rationale during the session, verbal cues to re-direct to task, pt refusing further ambulation becoming agitated without rationale

## 2018-03-21 NOTE — PHYSICAL THERAPY INITIAL EVALUATION ADULT - DISCHARGE DISPOSITION, PT EVAL
return back to [facility], use of her RW, distant S for safety due to cognition

## 2018-03-22 ENCOUNTER — TRANSCRIPTION ENCOUNTER (OUTPATIENT)
Age: 68
End: 2018-03-22

## 2018-03-22 VITALS
SYSTOLIC BLOOD PRESSURE: 129 MMHG | HEART RATE: 70 BPM | RESPIRATION RATE: 18 BRPM | DIASTOLIC BLOOD PRESSURE: 69 MMHG | OXYGEN SATURATION: 96 % | TEMPERATURE: 99 F

## 2018-03-22 LAB
ANION GAP SERPL CALC-SCNC: 10 MMOL/L — SIGNIFICANT CHANGE UP (ref 5–17)
BUN SERPL-MCNC: 18 MG/DL — SIGNIFICANT CHANGE UP (ref 8–20)
CALCIUM SERPL-MCNC: 9.1 MG/DL — SIGNIFICANT CHANGE UP (ref 8.6–10.2)
CHLORIDE SERPL-SCNC: 109 MMOL/L — HIGH (ref 98–107)
CO2 SERPL-SCNC: 27 MMOL/L — SIGNIFICANT CHANGE UP (ref 22–29)
CREAT SERPL-MCNC: 1.17 MG/DL — SIGNIFICANT CHANGE UP (ref 0.5–1.3)
EOSINOPHIL NFR BLD AUTO: 1 % — SIGNIFICANT CHANGE UP (ref 0–5)
GLUCOSE SERPL-MCNC: 100 MG/DL — SIGNIFICANT CHANGE UP (ref 70–115)
HCT VFR BLD CALC: 30.5 % — LOW (ref 37–47)
HGB BLD-MCNC: 9.6 G/DL — LOW (ref 12–16)
LYMPHOCYTES # BLD AUTO: 30 % — SIGNIFICANT CHANGE UP (ref 20–55)
MAGNESIUM SERPL-MCNC: 2.2 MG/DL — SIGNIFICANT CHANGE UP (ref 1.6–2.6)
MCHC RBC-ENTMCNC: 29.8 PG — SIGNIFICANT CHANGE UP (ref 27–31)
MCHC RBC-ENTMCNC: 31.5 G/DL — LOW (ref 32–36)
MCV RBC AUTO: 94.7 FL — SIGNIFICANT CHANGE UP (ref 81–99)
MONOCYTES NFR BLD AUTO: 6 % — SIGNIFICANT CHANGE UP (ref 3–10)
NEUTROPHILS NFR BLD AUTO: 63 % — SIGNIFICANT CHANGE UP (ref 37–73)
PHOSPHATE SERPL-MCNC: 2.8 MG/DL — SIGNIFICANT CHANGE UP (ref 2.4–4.7)
PLATELET # BLD AUTO: 125 K/UL — LOW (ref 150–400)
POTASSIUM SERPL-MCNC: 3.8 MMOL/L — SIGNIFICANT CHANGE UP (ref 3.5–5.3)
POTASSIUM SERPL-SCNC: 3.8 MMOL/L — SIGNIFICANT CHANGE UP (ref 3.5–5.3)
RBC # BLD: 3.22 M/UL — LOW (ref 4.4–5.2)
RBC # FLD: 13.4 % — SIGNIFICANT CHANGE UP (ref 11–15.6)
SODIUM SERPL-SCNC: 146 MMOL/L — HIGH (ref 135–145)
WBC # BLD: 3.1 K/UL — LOW (ref 4.8–10.8)
WBC # FLD AUTO: 3.1 K/UL — LOW (ref 4.8–10.8)

## 2018-03-22 PROCEDURE — 83735 ASSAY OF MAGNESIUM: CPT

## 2018-03-22 PROCEDURE — 36415 COLL VENOUS BLD VENIPUNCTURE: CPT

## 2018-03-22 PROCEDURE — 85027 COMPLETE CBC AUTOMATED: CPT

## 2018-03-22 PROCEDURE — 99285 EMERGENCY DEPT VISIT HI MDM: CPT | Mod: 25

## 2018-03-22 PROCEDURE — 81003 URINALYSIS AUTO W/O SCOPE: CPT

## 2018-03-22 PROCEDURE — 80053 COMPREHEN METABOLIC PANEL: CPT

## 2018-03-22 PROCEDURE — 97163 PT EVAL HIGH COMPLEX 45 MIN: CPT

## 2018-03-22 PROCEDURE — 83605 ASSAY OF LACTIC ACID: CPT

## 2018-03-22 PROCEDURE — 96374 THER/PROPH/DIAG INJ IV PUSH: CPT | Mod: XU

## 2018-03-22 PROCEDURE — 80048 BASIC METABOLIC PNL TOTAL CA: CPT

## 2018-03-22 PROCEDURE — 84100 ASSAY OF PHOSPHORUS: CPT

## 2018-03-22 PROCEDURE — 96375 TX/PRO/DX INJ NEW DRUG ADDON: CPT | Mod: XU

## 2018-03-22 PROCEDURE — 74018 RADEX ABDOMEN 1 VIEW: CPT

## 2018-03-22 PROCEDURE — 74177 CT ABD & PELVIS W/CONTRAST: CPT

## 2018-03-22 RX ORDER — LEVETIRACETAM 250 MG/1
1 TABLET, FILM COATED ORAL
Qty: 0 | Refills: 0 | COMMUNITY

## 2018-03-22 RX ORDER — HALOPERIDOL DECANOATE 100 MG/ML
7 INJECTION INTRAMUSCULAR
Qty: 0 | Refills: 0 | COMMUNITY

## 2018-03-22 RX ORDER — HALOPERIDOL DECANOATE 100 MG/ML
7 INJECTION INTRAMUSCULAR
Qty: 0 | Refills: 0 | DISCHARGE
Start: 2018-03-22

## 2018-03-22 RX ORDER — LEVETIRACETAM 250 MG/1
1 TABLET, FILM COATED ORAL
Qty: 0 | Refills: 0 | COMMUNITY
Start: 2018-03-22

## 2018-03-22 RX ADMIN — PANTOPRAZOLE SODIUM 40 MILLIGRAM(S): 20 TABLET, DELAYED RELEASE ORAL at 05:36

## 2018-03-22 RX ADMIN — HALOPERIDOL DECANOATE 7 MILLIGRAM(S): 100 INJECTION INTRAMUSCULAR at 18:35

## 2018-03-22 RX ADMIN — HALOPERIDOL DECANOATE 7 MILLIGRAM(S): 100 INJECTION INTRAMUSCULAR at 05:36

## 2018-03-22 RX ADMIN — LEVETIRACETAM 1000 MILLIGRAM(S): 250 TABLET, FILM COATED ORAL at 05:36

## 2018-03-22 RX ADMIN — Medication 112 MICROGRAM(S): at 05:36

## 2018-03-22 RX ADMIN — LEVETIRACETAM 1000 MILLIGRAM(S): 250 TABLET, FILM COATED ORAL at 18:35

## 2018-03-22 NOTE — DISCHARGE NOTE ADULT - PLAN OF CARE
resolution of obstruction Pt may resume regular diet as tolerated.  Activity as tolerated.  Follow up ACS service 2 weeks from discharge.  Per discussion with Dr Sanchez, pt to discontinue Benztropine and will be revisited at next appointment.  Return to the ED immediately for worsening abdominal pain, nausea and vomiting, high fever, inability to have a bowel movement or worsening of your condition.

## 2018-03-22 NOTE — DISCHARGE NOTE ADULT - HOSPITAL COURSE
Patient presents to ED from [facility] with 1 day history of abdominal pain, multiple episodes of vomiting, per [place] representative. History and physical limited by history of schizophrenia. Last bowel movement several days ago, has not been passing flatus. No fever, chills, chest pain, dyspnea. Extensive past surgical history with multiple operations by colorectal surgeon, Dr. Miller:  3/20/15: Altemeier procedure  3/26/15: Ex-lap, CONOR, appendectomy and diverting loop ileostomy for anastomotic leak  7/20/15: Rigid proctosigmoidoscope with findings of posterior anastomotic defect  11/19/15: Ex-lap, CONOR, VHR with biologic mesh, primary repair of parastomal hernia  7/1/16: VHR with parastomal hernia repair  4/4/17: Ex-lap, CONOR, SBR, ileostomy reversal, VHR with abdominal wall reconstruction via component separation and biologic mesh      CT showed right stomal hernia with high grade/likely closed loop small bowel obstruction.  Pt was treated conservatively.  Began having bowel function, tolerated diet.  Pt without pain, stable for d/c back to [place] today.  Per Dr Sanchez, pt to stop Benztropine and will revisit medication need at pt's next appointment. Patient presents to ED from [facility] with 1 day history of abdominal pain, multiple episodes of vomiting, per [place] representative. History and physical limited by history of schizophrenia. Last bowel movement several days ago, has not been passing flatus. No fever, chills, chest pain, dyspnea. Extensive past surgical history with multiple operations by colorectal surgeon, Dr. Miller:  3/20/15: Altemeier procedure  3/26/15: Ex-lap, CONOR, appendectomy and diverting loop ileostomy for anastomotic leak  7/20/15: Rigid proctosigmoidoscope with findings of posterior anastomotic defect  11/19/15: Ex-lap, CONOR, VHR with biologic mesh, primary repair of parastomal hernia  7/1/16: VHR with parastomal hernia repair  4/4/17: Ex-lap, CONOR, SBR, ileostomy reversal, VHR with abdominal wall reconstruction via component separation and biologic mesh      CT showed right stomal hernia with high grade/likely closed loop small bowel obstruction.  Pt was treated conservatively with NGT.  Began having bowel function, tolerated diet.  Pt without pain, stable for d/c back to [place] today.  Per Dr Sanchez, pt to stop Benztropine and will revisit medication need at pt's next appointment.

## 2018-03-22 NOTE — PROGRESS NOTE ADULT - ATTENDING COMMENTS
The patient was seen and examined  No new problems  Normal bowel function  Tolerating her diet  Can discharge

## 2018-03-22 NOTE — DISCHARGE NOTE ADULT - PROVIDER TOKENS
FREE:[LAST:[Addie],FIRST:[Colton],PHONE:[(   )    -],FAX:[(   )    -],ADDRESS:[[address] [place] [address]  [phone]]]

## 2018-03-22 NOTE — DISCHARGE NOTE ADULT - PATIENT PORTAL LINK FT
You can access the [facility] Patient Portal, offered by [facility], by registering with the following website: [email or web address]

## 2018-03-22 NOTE — DISCHARGE NOTE ADULT - MEDICATION SUMMARY - MEDICATIONS TO TAKE
I will START or STAY ON the medications listed below when I get home from the hospital:    LORazepam 1 mg oral tablet  -- 1 tab(s) by mouth 3 times a day  -- Indication: For home med    levETIRAcetam 1000 mg oral tablet  -- 1 tab(s) by mouth 2 times a day  -- Indication: For home med    simvastatin 20 mg oral tablet  -- 1 tab(s) by mouth once a day (at bedtime)  -- Indication: For home med    haloperidol 1 mg oral tablet  -- 7 tab(s) by mouth 2 times a day  -- Indication: For home med    trolamine salicylate 10% topical cream  -- Apply on skin to affected area 3 times a day  -- Indication: For home med    aMILoride 5 mg oral tablet  -- 1 tab(s) by mouth once a day  -- Indication: For home med    docusate sodium 100 mg oral capsule  -- 1 cap(s) by mouth 3 times a day, As Needed  -- Indication: For home med    polyethylene glycol 3350 oral powder for reconstitution  -- 17 gram(s) by mouth once a day  -- Indication: For home med    senna oral tablet  -- 2 tab(s) by mouth once a day (at bedtime), As Needed  -- Indication: For home med    levothyroxine 112 mcg (0.112 mg) oral tablet  -- 1 tab(s) by mouth once a day  -- Indication: For home med    multivitamin  -- 1 tab(s) by mouth once a day  -- Indication: For home med    cyanocobalamin 1000 mcg/mL injectable solution  -- 1000 microgram(s) intramuscular once a month  -- Indication: For home med    cholecalciferol oral tablet  -- 2000 unit(s) by mouth every 48 hours  -- Indication: For home med

## 2018-03-22 NOTE — DISCHARGE NOTE ADULT - CARE PLAN
Principal Discharge DX:	Complete intestinal obstruction, unspecified cause  Goal:	resolution of obstruction  Assessment and plan of treatment:	Pt may resume regular diet as tolerated.  Activity as tolerated.  Follow up ACS service 2 weeks from discharge.  Per discussion with Dr Sanchez, pt to discontinue Benztropine and will be revisited at next appointment.  Return to the ED immediately for worsening abdominal pain, nausea and vomiting, high fever, inability to have a bowel movement or worsening of your condition.

## 2018-03-22 NOTE — PROGRESS NOTE ADULT - ASSESSMENT
69 y/o F h/o schizoaffective disorder with extensive abdominal surgeries with SBO, likely at prior ostomy site. Now with bowel function. No clinical signs of ischemia. Hemodynamically stable, afebrile.     Plan:  -Advance to regular diet  -Pain control as needed  -Monitor for bowel function  -Monitor vitals, strict I/Os  -Maintain 1:1 for safety purposes; Psychiatry recommending 1:1 from [place]  -Continue home medications  -DVT ppx: lovenox, SCDs  -Encourage OOB, ambulation, IS
67 y/o F h/o schizoaffective disorder with extensive abdominal surgeries with SBO, likely at prior ostomy site Now SBO resolved with regular bowel function.  Hemodynamically stable, afebrile.     Plan:    -cont regular diet  -Pain control as needed  -Monitor for bowel function  -Monitor vitals, strict I/Os  -Maintain 1:1 for safety purposes; Psychiatry recommending 1:1 from [place]  -Continue home medications  -DVT ppx: lovenox, SCDs  -Encourage OOB, ambulation, IS
69 y/o F h/o schizoaffective disorder with extensive abdominal surgeries with SBO, likely at prior ostomy site, CT reading "incarceration and possible closed loop obstruction." No clinical signs of ischemia. Lactate normal x3. Hemodynamically stable, afebrile.     Plan:  -Continue NGT on low continuous wall suction  -NPO/IVF  -Pain control as needed  -Monitor for bowel function  -Monitor vitals, strict I/Os  -Serial abdominal exams  -Maintain 1:1 for safety purposes  -Continue home medications  -DVT ppx: lovenox, SCDs  -Encourage OOB, ambulation, IS

## 2018-03-22 NOTE — DISCHARGE NOTE ADULT - CARE PROVIDER_API CALL
Colton Real  [address] [place] [id number]  [phone]  Phone: (   )    -  Fax: (   )    -

## 2018-03-22 NOTE — PROGRESS NOTE ADULT - SUBJECTIVE AND OBJECTIVE BOX
INTERVAL HPI/OVERNIGHT EVENTS: Patient pulled NGT overnight due to agitation. NGT was not replaced. Patient was given gastrografin challenge yesterday, had several bowel movements overnight and this morning. No episodes of vomiting. Pain controlled. No fever, chills, chest pain, dyspnea.     MEDICATIONS  (STANDING):  enoxaparin Injectable 30 milliGRAM(s) SubCutaneous daily  haloperidol     Tablet 7 milliGRAM(s) Oral two times a day  levETIRAcetam 1000 milliGRAM(s) Oral two times a day  levothyroxine 112 MICROGram(s) Oral daily  pantoprazole    Tablet 40 milliGRAM(s) Oral before breakfast  simvastatin 10 milliGRAM(s) Oral at bedtime    MEDICATIONS  (PRN):  LORazepam   Injectable 1 milliGRAM(s) IV Push every 8 hours PRN Agitation      Vital Signs Last 24 Hrs  T(C): 36.9 (21 Mar 2018 05:37), Max: 37 (21 Mar 2018 02:04)  T(F): 98.4 (21 Mar 2018 05:37), Max: 98.6 (21 Mar 2018 02:04)  HR: 88 (21 Mar 2018 05:37) (80 - 91)  BP: 142/69 (21 Mar 2018 05:37) (135/72 - 142/69)  BP(mean): --  RR: 20 (21 Mar 2018 05:37) (18 - 20)  SpO2: 94% (21 Mar 2018 05:37) (94% - 96%)    Physical exam  General: NAD, AOx3, resting comfortably in bed  HEENT: PERRLA, EOMI  Neck: supple, nontender  Respiratory: no respiratory distress, lungs CTAB  Heart: regular rate and rhythm, no murmurs  Abdomen: soft, nontender, nondistended. Normal bowel sounds. No guarding or rebound. Abdominal wall defect R abdomen at former ostomy site without protrusion of bowel. Multiple large incisions to anterior abdominal wall from prior surgical operations.   Extremities: no peripheral edema. Normal ROM.      I&O's Detail    20 Mar 2018 07:01  -  21 Mar 2018 07:00  --------------------------------------------------------  IN:    lactated ringers.: 3000 mL  Total IN: 3000 mL    OUT:    Nasoenteral Tube: 300 mL  Total OUT: 300 mL    Total NET: 2700 mL      21 Mar 2018 07:01  -  21 Mar 2018 13:26  --------------------------------------------------------  IN:    Oral Fluid: 480 mL  Total IN: 480 mL    OUT:  Total OUT: 0 mL    Total NET: 480 mL          LABS:                        9.8    3.2   )-----------( 123      ( 21 Mar 2018 06:16 )             32.0     03-21    155<H>  |  116<H>  |  21.0<H>  ----------------------------<  81  3.8   |  23.0  |  1.29    Ca    9.3      21 Mar 2018 06:16  Phos  3.3     -  Mg     2.4     -    TPro  5.8<L>  /  Alb  3.2<L>  /  TBili  0.4  /  DBili  x   /  AST  15  /  ALT  16  /  AlkPhos  99  03-20      Urinalysis Basic - ( 19 Mar 2018 20:38 )    Color: Yellow / Appearance: Clear / S.020 / pH: x  Gluc: x / Ketone: Negative  / Bili: Negative / Urobili: Negative mg/dL   Blood: x / Protein: Negative mg/dL / Nitrite: Negative   Leuk Esterase: Negative / RBC: x / WBC x   Sq Epi: x / Non Sq Epi: x / Bacteria: x
INTERVAL HPI/OVERNIGHT EVENTS:  no overnight. issues. no complaitns of abdominal pain. no n/v. passing flatus. had a BM yestrday. tolerating diet.       MEDICATIONS  (STANDING):  enoxaparin Injectable 30 milliGRAM(s) SubCutaneous daily  haloperidol     Tablet 7 milliGRAM(s) Oral two times a day  levETIRAcetam 1000 milliGRAM(s) Oral two times a day  levothyroxine 112 MICROGram(s) Oral daily  pantoprazole    Tablet 40 milliGRAM(s) Oral before breakfast  simvastatin 10 milliGRAM(s) Oral at bedtime    MEDICATIONS  (PRN):  LORazepam   Injectable 1 milliGRAM(s) IV Push every 8 hours PRN Agitation      Vital Signs Last 24 Hrs  T(C): 37 (22 Mar 2018 07:26), Max: 37.6 (21 Mar 2018 15:37)  T(F): 98.6 (22 Mar 2018 07:26), Max: 99.6 (21 Mar 2018 15:37)  HR: 77 (22 Mar 2018 07:26) (68 - 77)  BP: 128/76 (22 Mar 2018 07:26) (112/69 - 135/68)  BP(mean): --  RR: 18 (22 Mar 2018 07:26) (18 - 22)  SpO2: 95% (22 Mar 2018 07:26) (95% - 96%)    Physical exam  General: NAD, AOx3, resting comfortably in bed  HEENT: PERRLA, EOMI  Neck: supple, nontender  Respiratory: no respiratory distress, lungs CTAB  Heart: regular rate and rhythm, no murmurs  Abdomen: soft, nontender, nondistended. Normal bowel sounds. No guarding or rebound. Abdominal wall defect R abdomen at former ostomy site without protrusion of bowel. Multiple large incisions to anterior abdominal wall from prior surgical operations.   Extremities: no peripheral edema. Normal ROM.      I&O's Detail    21 Mar 2018 07:01  -  22 Mar 2018 07:00  --------------------------------------------------------  IN:    Oral Fluid: 480 mL  Total IN: 480 mL    OUT:  Total OUT: 0 mL    Total NET: 480 mL          LABS:                        9.6    3.1   )-----------( 125      ( 22 Mar 2018 06:50 )             30.5     03-22    146<H>  |  109<H>  |  18.0  ----------------------------<  100  3.8   |  27.0  |  1.17    Ca    9.1      22 Mar 2018 06:50  Phos  2.8     03-22  Mg     2.2     03-22            RADIOLOGY & ADDITIONAL STUDIES:
INTERVAL HPI/OVERNIGHT EVENTS: No acute events overnight.    SUBJECTIVE: Alert and oriented to person and place. Pain controlled. No n/v, NGT in place on low continuous wall suction. No bowel function yet. 1:1 at bedside, no agitation overnight. No fever, chills, chest pain, dyspnea.      MEDICATIONS  (STANDING):  benztropine 1 milliGRAM(s) Oral daily  enoxaparin Injectable 30 milliGRAM(s) SubCutaneous daily  haloperidol     Tablet 7 milliGRAM(s) Oral two times a day  lactated ringers. 1000 milliLiter(s) (125 mL/Hr) IV Continuous <Continuous>  levETIRAcetam  IVPB 1000 milliGRAM(s) IV Intermittent every 12 hours  levothyroxine 112 MICROGram(s) Oral daily  LORazepam   Injectable 1 milliGRAM(s) IntraMuscular three times a day  pantoprazole  Injectable 40 milliGRAM(s) IV Push two times a day  simvastatin 10 milliGRAM(s) Oral at bedtime    MEDICATIONS  (PRN):  morphine  - Injectable 2 milliGRAM(s) IV Push every 4 hours PRN Moderate Pain  morphine  - Injectable 4 milliGRAM(s) IV Push every 4 hours PRN Severe Pain (7 - 10)      Vital Signs Last 24 Hrs  T(C): 37.1 (20 Mar 2018 07:50), Max: 38 (19 Mar 2018 19:37)  T(F): 98.7 (20 Mar 2018 07:50), Max: 100.4 (19 Mar 2018 19:37)  HR: 81 (20 Mar 2018 07:50) (70 - 94)  BP: 113/68 (20 Mar 2018 07:50) (106/64 - 138/75)  BP(mean): --  RR: 18 (20 Mar 2018 07:50) (18 - 18)  SpO2: 95% (20 Mar 2018 07:50) (95% - 98%)    Physical exam:  General: NAD, AOx3, resting comfortably in bed  HEENT: PERRLA, EOMI  Neck: supple, nontender  Respiratory: no respiratory distress, lungs CTAB  Heart: regular rate and rhythm, no murmurs  Abdomen: soft, nontender, nondistended. Normal bowel sounds. No guarding or rebound. Abdominal wall defect R abdomen at former ostomy site without protrusion of bowel (though easily reducible on exam yesterday). Multiple large incisions to anterior abdominal wall from prior surgical operations.   Extremities: no peripheral edema. Normal ROM.      I&O's Detail    19 Mar 2018 07:01  -  20 Mar 2018 07:00  --------------------------------------------------------  IN:    lactated ringers.: 1125 mL    Solution: 100 mL  Total IN: 1225 mL    OUT:    Nasoenteral Tube: 260 mL  Total OUT: 260 mL    Total NET: 965 mL          LABS:                        9.5    3.0   )-----------( 110      ( 20 Mar 2018 06:21 )             31.2     03-20    148<H>  |  112<H>  |  21.0<H>  ----------------------------<  83  4.1   |  28.0  |  1.08    Ca    8.9      20 Mar 2018 06:21  Phos  3.6     -20  Mg     2.3     -20    TPro  5.8<L>  /  Alb  3.2<L>  /  TBili  0.4  /  DBili  x   /  AST  15  /  ALT  16  /  AlkPhos  99  03-20      Urinalysis Basic - ( 19 Mar 2018 20:38 )    Color: Yellow / Appearance: Clear / S.020 / pH: x  Gluc: x / Ketone: Negative  / Bili: Negative / Urobili: Negative mg/dL   Blood: x / Protein: Negative mg/dL / Nitrite: Negative   Leuk Esterase: Negative / RBC: x / WBC x   Sq Epi: x / Non Sq Epi: x / Bacteria: x
discussed status with Dr Shell at [facility] Patient was not on 1 to 1 at [facility] and has no recent suicidal concern   Will place on enhanced supervision for safety With return to [facility] once cleared by medical team
medically stable for return to [place]  psychiatric treatment at [place]

## 2018-04-22 ENCOUNTER — INPATIENT (INPATIENT)
Facility: HOSPITAL | Age: 68
LOS: 2 days | Discharge: PSYCHIATRIC OR SUBSTANCE USE FACILITY | DRG: 395 | End: 2018-04-25
Attending: SURGERY | Admitting: SURGERY
Payer: MEDICARE

## 2018-04-22 VITALS
SYSTOLIC BLOOD PRESSURE: 132 MMHG | OXYGEN SATURATION: 98 % | DIASTOLIC BLOOD PRESSURE: 89 MMHG | TEMPERATURE: 98 F | HEART RATE: 84 BPM | WEIGHT: 154.98 LBS | HEIGHT: 66 IN | RESPIRATION RATE: 18 BRPM

## 2018-04-22 DIAGNOSIS — K43.5 PARASTOMAL HERNIA WITHOUT OBSTRUCTION OR GANGRENE: Chronic | ICD-10-CM

## 2018-04-22 DIAGNOSIS — K56.609 UNSPECIFIED INTESTINAL OBSTRUCTION, UNSPECIFIED AS TO PARTIAL VERSUS COMPLETE OBSTRUCTION: ICD-10-CM

## 2018-04-22 DIAGNOSIS — Z93.2 ILEOSTOMY STATUS: Chronic | ICD-10-CM

## 2018-04-22 LAB
ALBUMIN SERPL ELPH-MCNC: 4.3 G/DL — SIGNIFICANT CHANGE UP (ref 3.3–5.2)
ALP SERPL-CCNC: 123 U/L — HIGH (ref 40–120)
ALT FLD-CCNC: 20 U/L — SIGNIFICANT CHANGE UP
ANION GAP SERPL CALC-SCNC: 15 MMOL/L — SIGNIFICANT CHANGE UP (ref 5–17)
APTT BLD: 31.7 SEC — SIGNIFICANT CHANGE UP (ref 27.5–37.4)
AST SERPL-CCNC: 21 U/L — SIGNIFICANT CHANGE UP
BILIRUB SERPL-MCNC: 0.6 MG/DL — SIGNIFICANT CHANGE UP (ref 0.4–2)
BLD GP AB SCN SERPL QL: SIGNIFICANT CHANGE UP
BUN SERPL-MCNC: 25 MG/DL — HIGH (ref 8–20)
CALCIUM SERPL-MCNC: 9.9 MG/DL — SIGNIFICANT CHANGE UP (ref 8.6–10.2)
CHLORIDE SERPL-SCNC: 97 MMOL/L — LOW (ref 98–107)
CO2 SERPL-SCNC: 29 MMOL/L — SIGNIFICANT CHANGE UP (ref 22–29)
CREAT SERPL-MCNC: 1.11 MG/DL — SIGNIFICANT CHANGE UP (ref 0.5–1.3)
EOSINOPHIL # BLD AUTO: 0 K/UL — SIGNIFICANT CHANGE UP (ref 0–0.5)
EOSINOPHIL NFR BLD AUTO: 0.4 % — SIGNIFICANT CHANGE UP (ref 0–6)
GLUCOSE SERPL-MCNC: 105 MG/DL — SIGNIFICANT CHANGE UP (ref 70–115)
HCT VFR BLD CALC: 38.1 % — SIGNIFICANT CHANGE UP (ref 37–47)
HGB BLD-MCNC: 11.8 G/DL — LOW (ref 12–16)
INR BLD: 1.12 RATIO — SIGNIFICANT CHANGE UP (ref 0.88–1.16)
LIDOCAIN IGE QN: 167 U/L — HIGH (ref 22–51)
LYMPHOCYTES # BLD AUTO: 0.8 K/UL — LOW (ref 1–4.8)
LYMPHOCYTES # BLD AUTO: 15.7 % — LOW (ref 20–55)
MCHC RBC-ENTMCNC: 29.4 PG — SIGNIFICANT CHANGE UP (ref 27–31)
MCHC RBC-ENTMCNC: 31 G/DL — LOW (ref 32–36)
MCV RBC AUTO: 95 FL — SIGNIFICANT CHANGE UP (ref 81–99)
MONOCYTES # BLD AUTO: 0.5 K/UL — SIGNIFICANT CHANGE UP (ref 0–0.8)
MONOCYTES NFR BLD AUTO: 9.5 % — SIGNIFICANT CHANGE UP (ref 3–10)
NEUTROPHILS # BLD AUTO: 3.8 K/UL — SIGNIFICANT CHANGE UP (ref 1.8–8)
NEUTROPHILS NFR BLD AUTO: 74.2 % — HIGH (ref 37–73)
PLATELET # BLD AUTO: 123 K/UL — LOW (ref 150–400)
POTASSIUM SERPL-MCNC: 4.4 MMOL/L — SIGNIFICANT CHANGE UP (ref 3.5–5.3)
POTASSIUM SERPL-SCNC: 4.4 MMOL/L — SIGNIFICANT CHANGE UP (ref 3.5–5.3)
PROT SERPL-MCNC: 7.3 G/DL — SIGNIFICANT CHANGE UP (ref 6.6–8.7)
PROTHROM AB SERPL-ACNC: 12.3 SEC — SIGNIFICANT CHANGE UP (ref 9.8–12.7)
RBC # BLD: 4.01 M/UL — LOW (ref 4.4–5.2)
RBC # FLD: 14.2 % — SIGNIFICANT CHANGE UP (ref 11–15.6)
SODIUM SERPL-SCNC: 141 MMOL/L — SIGNIFICANT CHANGE UP (ref 135–145)
TROPONIN T SERPL-MCNC: <0.01 NG/ML — SIGNIFICANT CHANGE UP (ref 0–0.06)
TYPE + AB SCN PNL BLD: SIGNIFICANT CHANGE UP
WBC # BLD: 5.2 K/UL — SIGNIFICANT CHANGE UP (ref 4.8–10.8)
WBC # FLD AUTO: 5.2 K/UL — SIGNIFICANT CHANGE UP (ref 4.8–10.8)

## 2018-04-22 PROCEDURE — 74018 RADEX ABDOMEN 1 VIEW: CPT | Mod: 26

## 2018-04-22 PROCEDURE — 74177 CT ABD & PELVIS W/CONTRAST: CPT | Mod: 26

## 2018-04-22 PROCEDURE — 71045 X-RAY EXAM CHEST 1 VIEW: CPT | Mod: 26

## 2018-04-22 PROCEDURE — 99284 EMERGENCY DEPT VISIT MOD MDM: CPT

## 2018-04-22 PROCEDURE — 71045 X-RAY EXAM CHEST 1 VIEW: CPT | Mod: 26,77

## 2018-04-22 RX ORDER — ENOXAPARIN SODIUM 100 MG/ML
40 INJECTION SUBCUTANEOUS DAILY
Qty: 0 | Refills: 0 | Status: DISCONTINUED | OUTPATIENT
Start: 2018-04-22 | End: 2018-04-25

## 2018-04-22 RX ORDER — LEVOTHYROXINE SODIUM 125 MCG
112 TABLET ORAL DAILY
Qty: 0 | Refills: 0 | Status: DISCONTINUED | OUTPATIENT
Start: 2018-04-22 | End: 2018-04-23

## 2018-04-22 RX ORDER — SODIUM CHLORIDE 9 MG/ML
1000 INJECTION INTRAMUSCULAR; INTRAVENOUS; SUBCUTANEOUS ONCE
Qty: 0 | Refills: 0 | Status: COMPLETED | OUTPATIENT
Start: 2018-04-22 | End: 2018-04-22

## 2018-04-22 RX ORDER — LEVETIRACETAM 250 MG/1
1000 TABLET, FILM COATED ORAL
Qty: 0 | Refills: 0 | Status: DISCONTINUED | OUTPATIENT
Start: 2018-04-22 | End: 2018-04-25

## 2018-04-22 RX ORDER — DOCUSATE SODIUM 100 MG
100 CAPSULE ORAL THREE TIMES A DAY
Qty: 0 | Refills: 0 | Status: DISCONTINUED | OUTPATIENT
Start: 2018-04-22 | End: 2018-04-25

## 2018-04-22 RX ORDER — SIMVASTATIN 20 MG/1
20 TABLET, FILM COATED ORAL AT BEDTIME
Qty: 0 | Refills: 0 | Status: DISCONTINUED | OUTPATIENT
Start: 2018-04-22 | End: 2018-04-25

## 2018-04-22 RX ORDER — HALOPERIDOL DECANOATE 100 MG/ML
5 INJECTION INTRAMUSCULAR ONCE
Qty: 0 | Refills: 0 | Status: COMPLETED | OUTPATIENT
Start: 2018-04-22 | End: 2018-04-22

## 2018-04-22 RX ORDER — ONDANSETRON 8 MG/1
4 TABLET, FILM COATED ORAL ONCE
Qty: 0 | Refills: 0 | Status: COMPLETED | OUTPATIENT
Start: 2018-04-22 | End: 2018-04-22

## 2018-04-22 RX ORDER — PANTOPRAZOLE SODIUM 20 MG/1
40 TABLET, DELAYED RELEASE ORAL DAILY
Qty: 0 | Refills: 0 | Status: DISCONTINUED | OUTPATIENT
Start: 2018-04-22 | End: 2018-04-23

## 2018-04-22 RX ORDER — ACETAMINOPHEN 500 MG
1000 TABLET ORAL ONCE
Qty: 0 | Refills: 0 | Status: COMPLETED | OUTPATIENT
Start: 2018-04-23 | End: 2018-04-23

## 2018-04-22 RX ORDER — SODIUM CHLORIDE 9 MG/ML
1000 INJECTION, SOLUTION INTRAVENOUS
Qty: 0 | Refills: 0 | Status: DISCONTINUED | OUTPATIENT
Start: 2018-04-22 | End: 2018-04-24

## 2018-04-22 RX ORDER — ACETAMINOPHEN 500 MG
1000 TABLET ORAL ONCE
Qty: 0 | Refills: 0 | Status: DISCONTINUED | OUTPATIENT
Start: 2018-04-22 | End: 2018-04-24

## 2018-04-22 RX ORDER — HALOPERIDOL DECANOATE 100 MG/ML
7 INJECTION INTRAMUSCULAR
Qty: 0 | Refills: 0 | Status: DISCONTINUED | OUTPATIENT
Start: 2018-04-22 | End: 2018-04-25

## 2018-04-22 RX ORDER — SENNA PLUS 8.6 MG/1
2 TABLET ORAL AT BEDTIME
Qty: 0 | Refills: 0 | Status: DISCONTINUED | OUTPATIENT
Start: 2018-04-22 | End: 2018-04-25

## 2018-04-22 RX ORDER — HYDROMORPHONE HYDROCHLORIDE 2 MG/ML
0.5 INJECTION INTRAMUSCULAR; INTRAVENOUS; SUBCUTANEOUS ONCE
Qty: 0 | Refills: 0 | Status: DISCONTINUED | OUTPATIENT
Start: 2018-04-22 | End: 2018-04-22

## 2018-04-22 RX ADMIN — HYDROMORPHONE HYDROCHLORIDE 0.5 MILLIGRAM(S): 2 INJECTION INTRAMUSCULAR; INTRAVENOUS; SUBCUTANEOUS at 13:24

## 2018-04-22 RX ADMIN — HALOPERIDOL DECANOATE 5 MILLIGRAM(S): 100 INJECTION INTRAMUSCULAR at 19:03

## 2018-04-22 RX ADMIN — SODIUM CHLORIDE 1000 MILLILITER(S): 9 INJECTION INTRAMUSCULAR; INTRAVENOUS; SUBCUTANEOUS at 13:24

## 2018-04-22 RX ADMIN — HYDROMORPHONE HYDROCHLORIDE 0.5 MILLIGRAM(S): 2 INJECTION INTRAMUSCULAR; INTRAVENOUS; SUBCUTANEOUS at 13:40

## 2018-04-22 RX ADMIN — ONDANSETRON 4 MILLIGRAM(S): 8 TABLET, FILM COATED ORAL at 13:24

## 2018-04-22 NOTE — H&P ADULT - ASSESSMENT
68 year old female, schizophrenia, extensive surgical history and adhesive disease, now with SBO, similar to previous episode, nontoxic, hemodynamically normal, benign abdomen

## 2018-04-22 NOTE — ED PROVIDER NOTE - OBJECTIVE STATEMENT
67 y/o F pt with a hx of bipolar schizoaffective, HTN, GERD disorder  was sent to ED from [facility] to be evaluated for abd pain, nausea, and  vomiting since last night. Pt has hx of hiatal hernia repair (2017) and a hx of small obstruction (march 2018). Pt said she had epigastric pain earlier today. Denies CP at this time. No further complaints at this time.

## 2018-04-22 NOTE — ED ADULT NURSE NOTE - CHPI ED SYMPTOMS NEG
no fever/no blood in stool/no diarrhea/no burning urination/no hematuria/no dysuria/no chills/no abdominal distension

## 2018-04-22 NOTE — H&P ADULT - NSHPLABSRESULTS_GEN_ALL_CORE
INTERVAL HPI/OVERNIGHT EVENTS:    SUBJECTIVE: Patient seen and examined at bedside. No acute events overnight. Pain minimal and well controlled with current pain medications. Tolerating diet without increased pain, nausea or emesis. Positive flatus, positive BM, voiding without difficulty. Out of bed ambulating. No other complaints at this time.  LABS:                        11.8   5.2   )-----------( 123      ( 22 Apr 2018 13:35 )             38.1     04-22    141  |  97<L>  |  25.0<H>  ----------------------------<  105  4.4   |  29.0  |  1.11    Ca    9.9      22 Apr 2018 13:35    TPro  7.3  /  Alb  4.3  /  TBili  0.6  /  DBili  x   /  AST  21  /  ALT  20  /  AlkPhos  123<H>  04-22    PT/INR - ( 22 Apr 2018 13:35 )   PT: 12.3 sec;   INR: 1.12 ratio         PTT - ( 22 Apr 2018 13:35 )  PTT:31.7 sec      RADIOLOGY & ADDITIONAL STUDIES:   CT abd/pelv: BOWEL: Distended stomach and dilated small bowel with a transition point in the right anterolateral ventral hernia just lateral to the mesh, suspect closed loop. Suspect additional transition point at the right lower quadrant small bowel anastomosis. The bowel distention is mildly increased from prior.

## 2018-04-22 NOTE — ED ADULT NURSE NOTE - CHIEF COMPLAINT QUOTE
Patient BIBA, sent from [facility] for abdominal pain, states pain started yesterday, nausea and vomiting present

## 2018-04-22 NOTE — ED ADULT TRIAGE NOTE - CHIEF COMPLAINT QUOTE
Patient BIBA, sent from [facility] for abdominal pain, states pain started yesterday, nausea and vomiting present

## 2018-04-22 NOTE — ED PROVIDER NOTE - GASTROINTESTINAL, MLM
Rachel Earl is a 75 year old female presenting for a MANNIE  Medication verified, no changes  Denies known Latex allergy or symptoms of Latex sensitivity   Tobacco history verified.  Verbal permission granted by patient to leave a detailed message with medical information on answering machine at phone number given? yes         Vomited in the ED and had a bag of umbilicus material,  diffuse abd tenderness greater in epigastric region, lower mid abd hernia , no rebound tenderness

## 2018-04-22 NOTE — H&P ADULT - NSHPPHYSICALEXAM_GEN_ALL_CORE
Vital Signs Last 24 Hrs  T(C): 36.7 (22 Apr 2018 11:58), Max: 36.7 (22 Apr 2018 11:58)  T(F): 98 (22 Apr 2018 11:58), Max: 98 (22 Apr 2018 11:58)  HR: 84 (22 Apr 2018 11:58) (84 - 84)  BP: 132/89 (22 Apr 2018 11:58) (132/89 - 132/89)  BP(mean): --  RR: 18 (22 Apr 2018 11:58) (18 - 18)  SpO2: 98% (22 Apr 2018 11:58) (98% - 98%)    PE  Gen: NAD, AAOx3  Pulm: CTAB  CV: RRR  Abd: soft, nontender, nondistended, obese, multiple surgical scars including midline ventral hernia  Ext: Moving all extremities  Vasc: 2+ peripheral pulses  Neuro: GCS 15, nonfocal

## 2018-04-22 NOTE — ED ADULT NURSE NOTE - OBJECTIVE STATEMENT
pt reports epigastric pain and nausea vomiting starting last night. pt has hx of bowel reconstruction and multiple sbo's. a and o x3. breathing even and unlabored. abdomen soft nontender nondistended. will continue to monitor.

## 2018-04-22 NOTE — H&P ADULT - PROBLEM SELECTOR PLAN 1
-Admit ACS monitored bed under Dr. Watson  -NPO  -LR @ 100  -serial abdominal exams  -AM labs  -NGT to low continuous suction  -dvt ppx lovenox  -restart home medications  -pain management  -encourage OOB / ambulation  -zofran PRN nausea  -patient discussed with Dr. Watson

## 2018-04-22 NOTE — H&P ADULT - HISTORY OF PRESENT ILLNESS
Patient is a 68 year old female, extensive surgical history including previous SBO due to adhesive disease, ventral hernia with mesh, ileostomy reversal and parastomal hernia, who now presents to the ED with 1 day history of nausea, emesis and abdominal pain. Pt from [place], has schizophrenia, poor historian, accompanied by caregiver, report of 1 episode of nausea and emesis earlier this morning. Pt had recently been admitted to ACS service for similar complaints last month, was admitted for SBO, which was treated nonoperatively and resolved after 2 days, patient was discharged back to [place]. At this time, patient denies nausea, denies any abdominal pain, denies anorexia. Pt states it is similar to last month when she was in the hospital. No further episodes of nausea or emesis since arrival in ED. CT scan performed in ED shows dilated stomach and loops of small bowel, very similar in appearance to her admission last month. Pt has no other complaints at this time.

## 2018-04-22 NOTE — ED PROVIDER NOTE - DISPOSITION TYPE
Patient is aware that Dr. Rodriguez and her nurse are out of the office today, and is requesting a call back from Dr. Rodriguez's nurse regarding questions she has about a positive pregnancy test that she received on 10-24-17.   ADMIT

## 2018-04-23 LAB
ANION GAP SERPL CALC-SCNC: 14 MMOL/L — SIGNIFICANT CHANGE UP (ref 5–17)
ANISOCYTOSIS BLD QL: SLIGHT — SIGNIFICANT CHANGE UP
BUN SERPL-MCNC: 26 MG/DL — HIGH (ref 8–20)
CALCIUM SERPL-MCNC: 9.2 MG/DL — SIGNIFICANT CHANGE UP (ref 8.6–10.2)
CHLORIDE SERPL-SCNC: 105 MMOL/L — SIGNIFICANT CHANGE UP (ref 98–107)
CO2 SERPL-SCNC: 25 MMOL/L — SIGNIFICANT CHANGE UP (ref 22–29)
CREAT SERPL-MCNC: 1.28 MG/DL — SIGNIFICANT CHANGE UP (ref 0.5–1.3)
EOSINOPHIL # BLD AUTO: 0 K/UL — SIGNIFICANT CHANGE UP (ref 0–0.5)
EOSINOPHIL NFR BLD AUTO: 0 % — SIGNIFICANT CHANGE UP (ref 0–5)
GLUCOSE SERPL-MCNC: 95 MG/DL — SIGNIFICANT CHANGE UP (ref 70–115)
HCT VFR BLD CALC: 35.4 % — LOW (ref 37–47)
HGB BLD-MCNC: 10.8 G/DL — LOW (ref 12–16)
HYPOCHROMIA BLD QL: SLIGHT — SIGNIFICANT CHANGE UP
LYMPHOCYTES # BLD AUTO: 0.9 K/UL — LOW (ref 1–4.8)
LYMPHOCYTES # BLD AUTO: 29.1 % — SIGNIFICANT CHANGE UP (ref 20–55)
MACROCYTES BLD QL: SLIGHT — SIGNIFICANT CHANGE UP
MAGNESIUM SERPL-MCNC: 2.4 MG/DL — SIGNIFICANT CHANGE UP (ref 1.6–2.6)
MCHC RBC-ENTMCNC: 29.3 PG — SIGNIFICANT CHANGE UP (ref 27–31)
MCHC RBC-ENTMCNC: 30.5 G/DL — LOW (ref 32–36)
MCV RBC AUTO: 96.2 FL — SIGNIFICANT CHANGE UP (ref 81–99)
MICROCYTES BLD QL: SLIGHT — SIGNIFICANT CHANGE UP
MONOCYTES # BLD AUTO: 0.4 K/UL — SIGNIFICANT CHANGE UP (ref 0–0.8)
MONOCYTES NFR BLD AUTO: 12.8 % — HIGH (ref 3–10)
NEUTROPHILS # BLD AUTO: 1.8 K/UL — SIGNIFICANT CHANGE UP (ref 1.8–8)
NEUTROPHILS NFR BLD AUTO: 57.8 % — SIGNIFICANT CHANGE UP (ref 37–73)
OVALOCYTES BLD QL SMEAR: SLIGHT — SIGNIFICANT CHANGE UP
PHOSPHATE SERPL-MCNC: 3.1 MG/DL — SIGNIFICANT CHANGE UP (ref 2.4–4.7)
PLAT MORPH BLD: NORMAL — SIGNIFICANT CHANGE UP
PLATELET # BLD AUTO: 108 K/UL — LOW (ref 150–400)
POIKILOCYTOSIS BLD QL AUTO: SLIGHT — SIGNIFICANT CHANGE UP
POTASSIUM SERPL-MCNC: 4 MMOL/L — SIGNIFICANT CHANGE UP (ref 3.5–5.3)
POTASSIUM SERPL-SCNC: 4 MMOL/L — SIGNIFICANT CHANGE UP (ref 3.5–5.3)
RBC # BLD: 3.68 M/UL — LOW (ref 4.4–5.2)
RBC # FLD: 14.2 % — SIGNIFICANT CHANGE UP (ref 11–15.6)
RBC BLD AUTO: ABNORMAL
SODIUM SERPL-SCNC: 144 MMOL/L — SIGNIFICANT CHANGE UP (ref 135–145)
TSH SERPL-MCNC: 0.86 UIU/ML — SIGNIFICANT CHANGE UP (ref 0.27–4.2)
WBC # BLD: 3.2 K/UL — LOW (ref 4.8–10.8)
WBC # FLD AUTO: 3.2 K/UL — LOW (ref 4.8–10.8)

## 2018-04-23 PROCEDURE — 74018 RADEX ABDOMEN 1 VIEW: CPT | Mod: 26

## 2018-04-23 PROCEDURE — 99233 SBSQ HOSP IP/OBS HIGH 50: CPT

## 2018-04-23 RX ORDER — PANTOPRAZOLE SODIUM 20 MG/1
40 TABLET, DELAYED RELEASE ORAL EVERY 12 HOURS
Qty: 0 | Refills: 0 | Status: DISCONTINUED | OUTPATIENT
Start: 2018-04-23 | End: 2018-04-24

## 2018-04-23 RX ORDER — HALOPERIDOL DECANOATE 100 MG/ML
2 INJECTION INTRAMUSCULAR EVERY 8 HOURS
Qty: 0 | Refills: 0 | Status: DISCONTINUED | OUTPATIENT
Start: 2018-04-23 | End: 2018-04-25

## 2018-04-23 RX ORDER — HALOPERIDOL DECANOATE 100 MG/ML
2.5 INJECTION INTRAMUSCULAR EVERY 6 HOURS
Qty: 0 | Refills: 0 | Status: DISCONTINUED | OUTPATIENT
Start: 2018-04-23 | End: 2018-04-25

## 2018-04-23 RX ORDER — LEVOTHYROXINE SODIUM 125 MCG
56 TABLET ORAL AT BEDTIME
Qty: 0 | Refills: 0 | Status: DISCONTINUED | OUTPATIENT
Start: 2018-04-23 | End: 2018-04-24

## 2018-04-23 RX ADMIN — Medication 400 MILLIGRAM(S): at 06:50

## 2018-04-23 RX ADMIN — LEVETIRACETAM 1000 MILLIGRAM(S): 250 TABLET, FILM COATED ORAL at 06:18

## 2018-04-23 RX ADMIN — LEVETIRACETAM 1000 MILLIGRAM(S): 250 TABLET, FILM COATED ORAL at 17:34

## 2018-04-23 RX ADMIN — HALOPERIDOL DECANOATE 7 MILLIGRAM(S): 100 INJECTION INTRAMUSCULAR at 06:19

## 2018-04-23 RX ADMIN — HALOPERIDOL DECANOATE 7 MILLIGRAM(S): 100 INJECTION INTRAMUSCULAR at 17:34

## 2018-04-23 RX ADMIN — Medication 1 MILLIGRAM(S): at 06:19

## 2018-04-23 RX ADMIN — Medication 1 MILLIGRAM(S): at 14:45

## 2018-04-23 RX ADMIN — SODIUM CHLORIDE 100 MILLILITER(S): 9 INJECTION, SOLUTION INTRAVENOUS at 14:46

## 2018-04-23 RX ADMIN — Medication 56 MICROGRAM(S): at 21:17

## 2018-04-23 RX ADMIN — ENOXAPARIN SODIUM 40 MILLIGRAM(S): 100 INJECTION SUBCUTANEOUS at 14:46

## 2018-04-23 RX ADMIN — Medication 1000 MILLIGRAM(S): at 21:45

## 2018-04-23 RX ADMIN — Medication 100 MILLIGRAM(S): at 21:17

## 2018-04-23 RX ADMIN — PANTOPRAZOLE SODIUM 40 MILLIGRAM(S): 20 TABLET, DELAYED RELEASE ORAL at 14:45

## 2018-04-23 RX ADMIN — Medication 112 MICROGRAM(S): at 06:23

## 2018-04-23 RX ADMIN — Medication 1000 MILLIGRAM(S): at 06:50

## 2018-04-23 RX ADMIN — Medication 100 MILLIGRAM(S): at 06:21

## 2018-04-23 RX ADMIN — Medication 100 MILLIGRAM(S): at 14:45

## 2018-04-23 RX ADMIN — SIMVASTATIN 20 MILLIGRAM(S): 20 TABLET, FILM COATED ORAL at 21:17

## 2018-04-23 RX ADMIN — Medication 400 MILLIGRAM(S): at 21:40

## 2018-04-23 NOTE — PROGRESS NOTE ADULT - SUBJECTIVE AND OBJECTIVE BOX
INTERVAL HPI/OVERNIGHT EVENTS: Patient seen and examined at bedside with attending.   No acute events overnight, she remains agitated and anxious , but responds to questioning.   She reported being hungry , denies passing flatus or having bowel function. Denies any fever , chills, nausea, vomiting, chest pain or SOB.    cc bilious over 24 hrs.      MEDICATIONS  (STANDING):  acetaminophen  IVPB. 1000 milliGRAM(s) IV Intermittent once  acetaminophen  IVPB. 1000 milliGRAM(s) IV Intermittent once  docusate sodium 100 milliGRAM(s) Oral three times a day  enoxaparin Injectable 40 milliGRAM(s) SubCutaneous daily  haloperidol     Tablet 7 milliGRAM(s) Oral two times a day  lactated ringers. 1000 milliLiter(s) (100 mL/Hr) IV Continuous <Continuous>  levETIRAcetam 1000 milliGRAM(s) Oral two times a day  levothyroxine Injectable 56 MICROGram(s) IV Push at bedtime  multivitamin Oral Tab/Cap - Peds 1 Tablet(s) Oral daily  pantoprazole  Injectable 40 milliGRAM(s) IV Push every 12 hours  simvastatin 20 milliGRAM(s) Oral at bedtime    MEDICATIONS  (PRN):  bisacodyl Suppository 10 milliGRAM(s) Rectal daily PRN Constipation  haloperidol     Tablet 2 milliGRAM(s) Oral every 8 hours PRN severe agitation  haloperidol    Injectable 2.5 milliGRAM(s) IntraMuscular every 6 hours PRN Agitation  LORazepam     Tablet 1 milliGRAM(s) Oral every 8 hours PRN Agitation  senna 2 Tablet(s) Oral at bedtime PRN Constipation      Vital Signs Last 24 Hrs  T(C): 37 (23 Apr 2018 15:41), Max: 37.6 (23 Apr 2018 00:40)  T(F): 98.6 (23 Apr 2018 15:41), Max: 99.7 (23 Apr 2018 00:40)  HR: 83 (23 Apr 2018 15:41) (83 - 97)  BP: 115/81 (23 Apr 2018 15:41) (100/55 - 133/72)  BP(mean): --  RR: 18 (23 Apr 2018 15:41) (16 - 20)  SpO2: 93% (23 Apr 2018 15:41) (92% - 96%)    Physical Exam:    Anxious, agitated, responds to questioning.   RRR  CTAB  abdomen: soft NT, distended, multiple hernias , not reducible bowel in them, no rigidity no guarding.  extrem: - c/c/e    I&O's Detail    22 Apr 2018 07:01  -  23 Apr 2018 07:00  --------------------------------------------------------  IN:  Total IN: 0 mL    OUT:    Nasoenteral Tube: 230 mL  Total OUT: 230 mL    Total NET: -230 mL      23 Apr 2018 07:01  -  23 Apr 2018 15:56  --------------------------------------------------------  IN:    lactated ringers.: 500 mL  Total IN: 500 mL    OUT:  Total OUT: 0 mL    Total NET: 500 mL          LABS:                        10.8   3.2   )-----------( 108      ( 23 Apr 2018 07:15 )             35.4     04-23    144  |  105  |  26.0<H>  ----------------------------<  95  4.0   |  25.0  |  1.28    Ca    9.2      23 Apr 2018 07:15  Phos  3.1     04-23  Mg     2.4     04-23    TPro  7.3  /  Alb  4.3  /  TBili  0.6  /  DBili  x   /  AST  21  /  ALT  20  /  AlkPhos  123<H>  04-22    PT/INR - ( 22 Apr 2018 13:35 )   PT: 12.3 sec;   INR: 1.12 ratio         PTT - ( 22 Apr 2018 13:35 )  PTT:31.7 sec      RADIOLOGY & ADDITIONAL STUDIES:
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
## 2018-04-23 NOTE — BEHAVIORAL HEALTH ASSESSMENT NOTE - NSBHCHARTREVIEWINVESTIGATE_PSY_A_CORE FT
< from: 12 Lead ECG (04.22.18 @ 12:53) >    Normal sinus rhythm  Minimal voltage criteria for LVH, may be normal variant  Cannot rule out Anterior infarct , age undetermined    < end of copied text >

## 2018-04-23 NOTE — BEHAVIORAL HEALTH ASSESSMENT NOTE - NSBHCHARTREVIEWLAB_PSY_A_CORE FT
11.8   5.2   )-----------( 123      ( 22 Apr 2018 13:35 )             38.1     04-22    141  |  97<L>  |  25.0<H>  ----------------------------<  105  4.4   |  29.0  |  1.11    Ca    9.9      22 Apr 2018 13:35    TPro  7.3  /  Alb  4.3  /  TBili  0.6  /  DBili  x   /  AST  21  /  ALT  20  /  AlkPhos  123<H>  04-22    LIVER FUNCTIONS - ( 22 Apr 2018 13:35 )  Alb: 4.3 g/dL / Pro: 7.3 g/dL / ALK PHOS: 123 U/L / ALT: 20 U/L / AST: 21 U/L / GGT: x           PT/INR - ( 22 Apr 2018 13:35 )   PT: 12.3 sec;   INR: 1.12 ratio         PTT - ( 22 Apr 2018 13:35 )  PTT:31.7 sec

## 2018-04-23 NOTE — BEHAVIORAL HEALTH ASSESSMENT NOTE - NSBHCHARTREVIEWVS_PSY_A_CORE FT
Vital Signs Last 24 Hrs  T(C): 37 (23 Apr 2018 05:45), Max: 37.6 (23 Apr 2018 00:40)  T(F): 98.6 (23 Apr 2018 05:45), Max: 99.7 (23 Apr 2018 00:40)  HR: 95 (23 Apr 2018 05:45) (84 - 96)  BP: 133/72 (23 Apr 2018 05:45) (100/55 - 133/72)  BP(mean): --  RR: 20 (23 Apr 2018 05:45) (16 - 20)  SpO2: 92% (23 Apr 2018 05:45) (92% - 98%)

## 2018-04-23 NOTE — BEHAVIORAL HEALTH ASSESSMENT NOTE - NSBHCHARTREVIEWIMAGING_PSY_A_CORE FT
< from: CT Abdomen and Pelvis w/ IV Cont (04.22.18 @ 17:02) >    High-grade small bowel obstruction mildly worsened from 3/19. Suspect 2   transition points: strangulated right ventral wall hernia and right lower   quadrant small bowel anastomosis.    < end of copied text >

## 2018-04-24 ENCOUNTER — TRANSCRIPTION ENCOUNTER (OUTPATIENT)
Age: 68
End: 2018-04-24

## 2018-04-24 DIAGNOSIS — F25.0 SCHIZOAFFECTIVE DISORDER, BIPOLAR TYPE: ICD-10-CM

## 2018-04-24 PROCEDURE — 74018 RADEX ABDOMEN 1 VIEW: CPT | Mod: 26

## 2018-04-24 PROCEDURE — 93010 ELECTROCARDIOGRAM REPORT: CPT

## 2018-04-24 PROCEDURE — 99233 SBSQ HOSP IP/OBS HIGH 50: CPT

## 2018-04-24 RX ORDER — ACETAMINOPHEN 500 MG
650 TABLET ORAL EVERY 6 HOURS
Qty: 0 | Refills: 0 | Status: DISCONTINUED | OUTPATIENT
Start: 2018-04-24 | End: 2018-04-25

## 2018-04-24 RX ORDER — PANTOPRAZOLE SODIUM 20 MG/1
40 TABLET, DELAYED RELEASE ORAL
Qty: 0 | Refills: 0 | Status: DISCONTINUED | OUTPATIENT
Start: 2018-04-24 | End: 2018-04-25

## 2018-04-24 RX ORDER — PANTOPRAZOLE SODIUM 20 MG/1
1 TABLET, DELAYED RELEASE ORAL
Qty: 0 | Refills: 0 | COMMUNITY
Start: 2018-04-24

## 2018-04-24 RX ORDER — LEVOTHYROXINE SODIUM 125 MCG
112 TABLET ORAL DAILY
Qty: 0 | Refills: 0 | Status: DISCONTINUED | OUTPATIENT
Start: 2018-04-24 | End: 2018-04-25

## 2018-04-24 RX ORDER — ONDANSETRON 8 MG/1
4 TABLET, FILM COATED ORAL EVERY 6 HOURS
Qty: 0 | Refills: 0 | Status: DISCONTINUED | OUTPATIENT
Start: 2018-04-24 | End: 2018-04-25

## 2018-04-24 RX ADMIN — Medication 1 MILLIGRAM(S): at 00:42

## 2018-04-24 RX ADMIN — HALOPERIDOL DECANOATE 2 MILLIGRAM(S): 100 INJECTION INTRAMUSCULAR at 11:46

## 2018-04-24 RX ADMIN — Medication 100 MILLIGRAM(S): at 21:51

## 2018-04-24 RX ADMIN — HALOPERIDOL DECANOATE 7 MILLIGRAM(S): 100 INJECTION INTRAMUSCULAR at 16:43

## 2018-04-24 RX ADMIN — LEVETIRACETAM 1000 MILLIGRAM(S): 250 TABLET, FILM COATED ORAL at 16:41

## 2018-04-24 RX ADMIN — HALOPERIDOL DECANOATE 7 MILLIGRAM(S): 100 INJECTION INTRAMUSCULAR at 05:04

## 2018-04-24 RX ADMIN — Medication 650 MILLIGRAM(S): at 21:53

## 2018-04-24 RX ADMIN — PANTOPRAZOLE SODIUM 40 MILLIGRAM(S): 20 TABLET, DELAYED RELEASE ORAL at 05:03

## 2018-04-24 RX ADMIN — SODIUM CHLORIDE 100 MILLILITER(S): 9 INJECTION, SOLUTION INTRAVENOUS at 11:46

## 2018-04-24 RX ADMIN — ENOXAPARIN SODIUM 40 MILLIGRAM(S): 100 INJECTION SUBCUTANEOUS at 11:43

## 2018-04-24 RX ADMIN — Medication 650 MILLIGRAM(S): at 22:50

## 2018-04-24 RX ADMIN — LEVETIRACETAM 1000 MILLIGRAM(S): 250 TABLET, FILM COATED ORAL at 05:03

## 2018-04-24 RX ADMIN — SIMVASTATIN 20 MILLIGRAM(S): 20 TABLET, FILM COATED ORAL at 21:51

## 2018-04-24 RX ADMIN — Medication 100 MILLIGRAM(S): at 05:04

## 2018-04-24 NOTE — DISCHARGE NOTE ADULT - PLAN OF CARE
Alleviation of pain and symptoms Follow up: Please call and make an appointment with Dr. Watson 10-14 days after discharge. Also, please call and make an appointment with your primary care physician as per your usual schedule.     Activity: May return to normal activities as tolerated.    Diet: May continue regular diet.    Medications: Please take all home medications as prescribed by your primary care doctor.    Patient is advised to RETURN TO THE EMERGENCY DEPARTMENT for any of the following - worsening pain, fever/chills, nausea/vomiting, altered mental status, chest pain, shortness of breath, or any other new / worsening symptom.

## 2018-04-24 NOTE — DISCHARGE NOTE ADULT - CARE PLAN
Principal Discharge DX:	Small bowel obstruction  Goal:	Alleviation of pain and symptoms  Assessment and plan of treatment:	Follow up: Please call and make an appointment with Dr. Watson 10-14 days after discharge. Also, please call and make an appointment with your primary care physician as per your usual schedule.     Activity: May return to normal activities as tolerated.    Diet: May continue regular diet.    Medications: Please take all home medications as prescribed by your primary care doctor.    Patient is advised to RETURN TO THE EMERGENCY DEPARTMENT for any of the following - worsening pain, fever/chills, nausea/vomiting, altered mental status, chest pain, shortness of breath, or any other new / worsening symptom.

## 2018-04-24 NOTE — DISCHARGE NOTE ADULT - PATIENT PORTAL LINK FT
You can access the [facility], offered by [facility], by registering with the following website: [email or web address]

## 2018-04-24 NOTE — PROGRESS NOTE ADULT - SUBJECTIVE AND OBJECTIVE BOX
Acute Care Surgery /Trauma PROGRESS NOTE:     SUBJECTIVE: Pt seen and examined at bedside. Given gastrograffin challenge, KUBs show contrast through to colon. This AM, self-d/c'ed NGT. Asking to drink liquids. Denies n/v. Voiding well. Passing flatus, 2 x BM. Denies f/c/sob/cp. on 1:1     Vital Signs Last 24 Hrs  T(C): 37.4 (24 Apr 2018 08:00), Max: 37.4 (24 Apr 2018 08:00)  T(F): 99.4 (24 Apr 2018 08:00), Max: 99.4 (24 Apr 2018 08:00)  HR: 80 (24 Apr 2018 08:00) (80 - 91)  BP: 119/66 (24 Apr 2018 08:00) (115/81 - 137/69)  BP(mean): --  RR: 16 (24 Apr 2018 08:00) (16 - 18)  SpO2: 96% (24 Apr 2018 08:00) (93% - 96%)    OBJECTIVE:  NAD  NC/AT  RRR  No respiratory distress  Abd soft, nondistended, nontender, no guarding or rebound. No peritoneal signs.  No pedal edema    I&O's Detail    23 Apr 2018 07:01  -  24 Apr 2018 07:00  --------------------------------------------------------  IN:    lactated ringers.: 2400 mL    Oral Fluid: 240 mL  Total IN: 2640 mL    OUT:    Nasoenteral Tube: 50 mL  Total OUT: 50 mL    Total NET: 2590 mL          MEDICATIONS  (STANDING):  acetaminophen  IVPB. 1000 milliGRAM(s) IV Intermittent once  docusate sodium 100 milliGRAM(s) Oral three times a day  enoxaparin Injectable 40 milliGRAM(s) SubCutaneous daily  haloperidol     Tablet 7 milliGRAM(s) Oral two times a day  lactated ringers. 1000 milliLiter(s) (100 mL/Hr) IV Continuous <Continuous>  levETIRAcetam 1000 milliGRAM(s) Oral two times a day  levothyroxine Injectable 56 MICROGram(s) IV Push at bedtime  multivitamin Oral Tab/Cap - Peds 1 Tablet(s) Oral daily  pantoprazole  Injectable 40 milliGRAM(s) IV Push every 12 hours  simvastatin 20 milliGRAM(s) Oral at bedtime    MEDICATIONS  (PRN):  bisacodyl Suppository 10 milliGRAM(s) Rectal daily PRN Constipation  haloperidol     Tablet 2 milliGRAM(s) Oral every 8 hours PRN severe agitation  haloperidol    Injectable 2.5 milliGRAM(s) IntraMuscular every 6 hours PRN Agitation  LORazepam     Tablet 1 milliGRAM(s) Oral every 8 hours PRN Agitation  senna 2 Tablet(s) Oral at bedtime PRN Constipation      LABS:                        10.8   3.2   )-----------( 108      ( 23 Apr 2018 07:15 )             35.4     04-23    144  |  105  |  26.0<H>  ----------------------------<  95  4.0   |  25.0  |  1.28    Ca    9.2      23 Apr 2018 07:15  Phos  3.1     04-23  Mg     2.4     04-23    TPro  7.3  /  Alb  4.3  /  TBili  0.6  /  DBili  x   /  AST  21  /  ALT  20  /  AlkPhos  123<H>  04-22    PT/INR - ( 22 Apr 2018 13:35 )   PT: 12.3 sec;   INR: 1.12 ratio         PTT - ( 22 Apr 2018 13:35 )  PTT:31.7 sec        RADIOLOGY & ADDITIONAL STUDIES:

## 2018-04-24 NOTE — DISCHARGE NOTE ADULT - MEDICATION SUMMARY - MEDICATIONS TO TAKE
I will START or STAY ON the medications listed below when I get home from the hospital:    LORazepam 1 mg oral tablet  -- 1 tab(s) by mouth 3 times a day  -- Indication: For Home medication    levETIRAcetam 1000 mg oral tablet  -- 1 tab(s) by mouth 2 times a day  -- Indication: For Home medication    simvastatin 20 mg oral tablet  -- 1 tab(s) by mouth once a day (at bedtime)  -- Indication: For Home medication    haloperidol 1 mg oral tablet  -- 7 tab(s) by mouth 2 times a day  -- Indication: For Home medication    trolamine salicylate 10% topical cream  -- Apply on skin to affected area 3 times a day  -- Indication: For Home medication    aMILoride 5 mg oral tablet  -- 1 tab(s) by mouth once a day  -- Indication: For Home medication    docusate sodium 100 mg oral capsule  -- 1 cap(s) by mouth 3 times a day, As Needed  -- Indication: For Home medication    polyethylene glycol 3350 oral powder for reconstitution  -- 17 gram(s) by mouth once a day  -- Indication: For Home medication    senna oral tablet  -- 2 tab(s) by mouth once a day (at bedtime), As Needed  -- Indication: For Home medication    pantoprazole 40 mg oral delayed release tablet  -- 1 tab(s) by mouth once a day (before a meal)  -- Indication: For Home medication    levothyroxine 112 mcg (0.112 mg) oral tablet  -- 1 tab(s) by mouth once a day  -- Indication: For Home medication    multivitamin  -- 1 tab(s) by mouth once a day  -- Indication: For Home medication    cyanocobalamin 1000 mcg/mL injectable solution  -- 1000 microgram(s) intramuscular once a month  -- Indication: For Home medication    cholecalciferol oral tablet  -- 2000 unit(s) by mouth every 48 hours  -- Indication: For Home medication

## 2018-04-24 NOTE — PROGRESS NOTE ADULT - SUBJECTIVE AND OBJECTIVE BOX
seen earlier today  calmer aide reports marked reduction in agitation Had BM earlier No c/o pain  patient mumbling , disorganized but calm  Vital Signs Last 24 Hrs  T(C): 37.4 (24 Apr 2018 08:00), Max: 37.4 (24 Apr 2018 08:00)  T(F): 99.4 (24 Apr 2018 08:00), Max: 99.4 (24 Apr 2018 08:00)  HR: 80 (24 Apr 2018 08:00) (80 - 91)  BP: 119/66 (24 Apr 2018 08:00) (115/81 - 137/69)  RR: 16 (24 Apr 2018 08:00) (16 - 18)  SpO2: 96% (24 Apr 2018 08:00) (93% - 96%)                        10.8   3.2   )-----------( 108      ( 23 Apr 2018 07:15 )             35.4     04-23    144  |  105  |  26.0<H>  ----------------------------<  95  4.0   |  25.0  |  1.28    Ca    9.2      23 Apr 2018 07:15  Phos  3.1     04-23  Mg     2.4     04-23

## 2018-04-24 NOTE — DISCHARGE NOTE ADULT - CARE PROVIDER_API CALL
Joe Watson (MD), Surgery  [address]  [address]  [place] [address]  Phone: [phone]  Fax: [phone]

## 2018-04-25 VITALS
HEART RATE: 80 BPM | TEMPERATURE: 98 F | SYSTOLIC BLOOD PRESSURE: 122 MMHG | RESPIRATION RATE: 18 BRPM | OXYGEN SATURATION: 97 % | DIASTOLIC BLOOD PRESSURE: 76 MMHG

## 2018-04-25 DIAGNOSIS — F25.0 SCHIZOAFFECTIVE DISORDER, BIPOLAR TYPE: ICD-10-CM

## 2018-04-25 RX ADMIN — LEVETIRACETAM 1000 MILLIGRAM(S): 250 TABLET, FILM COATED ORAL at 05:49

## 2018-04-25 RX ADMIN — PANTOPRAZOLE SODIUM 40 MILLIGRAM(S): 20 TABLET, DELAYED RELEASE ORAL at 05:48

## 2018-04-25 RX ADMIN — Medication 100 MILLIGRAM(S): at 05:49

## 2018-04-25 RX ADMIN — HALOPERIDOL DECANOATE 7 MILLIGRAM(S): 100 INJECTION INTRAMUSCULAR at 05:49

## 2018-04-25 RX ADMIN — Medication 112 MICROGRAM(S): at 05:49

## 2018-04-25 RX ADMIN — HALOPERIDOL DECANOATE 2 MILLIGRAM(S): 100 INJECTION INTRAMUSCULAR at 10:43

## 2018-04-25 NOTE — PROGRESS NOTE ADULT - SUBJECTIVE AND OBJECTIVE BOX
chart reviewed and patient seen Aide reports calm behaviors Cooperative with care No c/o pain Had BM Tolerating advanced diet  Affect blunted Thinking process slow Thoughts disorganized  Vital Signs Last 24 Hrs  T(C): 37.1 (25 Apr 2018 04:59), Max: 37.4 (24 Apr 2018 08:00)  T(F): 98.8 (25 Apr 2018 04:59), Max: 99.4 (24 Apr 2018 08:00)  HR: 82 (25 Apr 2018 04:59) (74 - 83)  BP: 138/87 (25 Apr 2018 04:59) (117/66 - 138/87)  RR: 18 (25 Apr 2018 04:59) (16 - 18)  SpO2: 98% (25 Apr 2018 04:59) (92% - 98%)   ROS: negative  < from: Xray Kidney Ureter Bladder (04.24.18 @ 05:20) >  Decreased small bowel air distention..    < end of copied text >

## 2018-04-25 NOTE — PROGRESS NOTE ADULT - ASSESSMENT
68F with SBO with incisional hernia  -ADAT  -dc once tolerating regular diet back to [place]  -f/u   -pain control  -DVT ppx  -strict I/Os  -encourage OOB  -incentive spirometer  -SW  -1:1
68YF with SBO in incisional hernia    - Pain control PRN  - NGT to suction  - Gastrograffin challenge today.  - C/w home meds.   - serial abdominal exams, fu bowel function.  - ambulate as tolerated.   - Psych consult.   - DVT ppx: scds lovenox.  - c/w 1:1  Patient seen and examined with attending who agrees on above plan.
SBO - marked improvement   Surgical note indicates plan to return to [facility] once tolerating full diet
behavior improved cooperative with care
Detail Level: Generalized

## 2018-04-25 NOTE — PROGRESS NOTE ADULT - SUBJECTIVE AND OBJECTIVE BOX
No verbal response when questioned   afebrile  abd soft nonetender   surgically stable  Pt has BM will be transferred back to facility

## 2018-05-04 ENCOUNTER — EMERGENCY (EMERGENCY)
Facility: HOSPITAL | Age: 68
LOS: 1 days | Discharge: HOME | End: 2018-05-04
Attending: EMERGENCY MEDICINE
Payer: MEDICARE

## 2018-05-04 VITALS
TEMPERATURE: 98 F | HEIGHT: 65 IN | OXYGEN SATURATION: 98 % | RESPIRATION RATE: 20 BRPM | HEART RATE: 84 BPM | DIASTOLIC BLOOD PRESSURE: 88 MMHG | SYSTOLIC BLOOD PRESSURE: 132 MMHG | WEIGHT: 164.91 LBS

## 2018-05-04 DIAGNOSIS — Z93.2 ILEOSTOMY STATUS: Chronic | ICD-10-CM

## 2018-05-04 DIAGNOSIS — K43.5 PARASTOMAL HERNIA WITHOUT OBSTRUCTION OR GANGRENE: Chronic | ICD-10-CM

## 2018-05-04 LAB
ALBUMIN SERPL ELPH-MCNC: 3.6 G/DL — SIGNIFICANT CHANGE UP (ref 3.3–5.2)
ALP SERPL-CCNC: 91 U/L — SIGNIFICANT CHANGE UP (ref 40–120)
ALT FLD-CCNC: 15 U/L — SIGNIFICANT CHANGE UP
ANION GAP SERPL CALC-SCNC: 13 MMOL/L — SIGNIFICANT CHANGE UP (ref 5–17)
APPEARANCE UR: ABNORMAL
APTT BLD: 28.9 SEC — SIGNIFICANT CHANGE UP (ref 27.5–37.4)
AST SERPL-CCNC: 12 U/L — SIGNIFICANT CHANGE UP
BILIRUB SERPL-MCNC: 0.3 MG/DL — LOW (ref 0.4–2)
BILIRUB UR-MCNC: NEGATIVE — SIGNIFICANT CHANGE UP
BUN SERPL-MCNC: 15 MG/DL — SIGNIFICANT CHANGE UP (ref 8–20)
CALCIUM SERPL-MCNC: 8.9 MG/DL — SIGNIFICANT CHANGE UP (ref 8.6–10.2)
CHLORIDE SERPL-SCNC: 105 MMOL/L — SIGNIFICANT CHANGE UP (ref 98–107)
CO2 SERPL-SCNC: 24 MMOL/L — SIGNIFICANT CHANGE UP (ref 22–29)
COLOR SPEC: YELLOW — SIGNIFICANT CHANGE UP
CREAT SERPL-MCNC: 0.79 MG/DL — SIGNIFICANT CHANGE UP (ref 0.5–1.3)
DIFF PNL FLD: ABNORMAL
GLUCOSE SERPL-MCNC: 115 MG/DL — SIGNIFICANT CHANGE UP (ref 70–115)
GLUCOSE UR QL: NEGATIVE MG/DL — SIGNIFICANT CHANGE UP
HCT VFR BLD CALC: 30.3 % — LOW (ref 37–47)
HGB BLD-MCNC: 9.5 G/DL — LOW (ref 12–16)
INR BLD: 1.19 RATIO — HIGH (ref 0.88–1.16)
KETONES UR-MCNC: NEGATIVE — SIGNIFICANT CHANGE UP
LACTATE BLDV-MCNC: 0.7 MMOL/L — SIGNIFICANT CHANGE UP (ref 0.5–2)
LEUKOCYTE ESTERASE UR-ACNC: ABNORMAL
LYMPHOCYTES # BLD AUTO: 34 % — SIGNIFICANT CHANGE UP (ref 20–55)
MCHC RBC-ENTMCNC: 29.6 PG — SIGNIFICANT CHANGE UP (ref 27–31)
MCHC RBC-ENTMCNC: 31.4 G/DL — LOW (ref 32–36)
MCV RBC AUTO: 94.4 FL — SIGNIFICANT CHANGE UP (ref 81–99)
MONOCYTES NFR BLD AUTO: 10 % — SIGNIFICANT CHANGE UP (ref 3–10)
NEUTROPHILS NFR BLD AUTO: 55 % — SIGNIFICANT CHANGE UP (ref 37–73)
NITRITE UR-MCNC: NEGATIVE — SIGNIFICANT CHANGE UP
PH UR: 6.5 — SIGNIFICANT CHANGE UP (ref 5–8)
PLATELET # BLD AUTO: 150 K/UL — SIGNIFICANT CHANGE UP (ref 150–400)
POTASSIUM SERPL-MCNC: 3.9 MMOL/L — SIGNIFICANT CHANGE UP (ref 3.5–5.3)
POTASSIUM SERPL-SCNC: 3.9 MMOL/L — SIGNIFICANT CHANGE UP (ref 3.5–5.3)
PROT SERPL-MCNC: 6.2 G/DL — LOW (ref 6.6–8.7)
PROT UR-MCNC: 15 MG/DL
PROTHROM AB SERPL-ACNC: 13.1 SEC — HIGH (ref 9.8–12.7)
RBC # BLD: 3.21 M/UL — LOW (ref 4.4–5.2)
RBC # FLD: 13.7 % — SIGNIFICANT CHANGE UP (ref 11–15.6)
SODIUM SERPL-SCNC: 142 MMOL/L — SIGNIFICANT CHANGE UP (ref 135–145)
SP GR SPEC: 1.01 — SIGNIFICANT CHANGE UP (ref 1.01–1.02)
UROBILINOGEN FLD QL: NEGATIVE MG/DL — SIGNIFICANT CHANGE UP
WBC # BLD: 3.4 K/UL — LOW (ref 4.8–10.8)
WBC # FLD AUTO: 3.4 K/UL — LOW (ref 4.8–10.8)

## 2018-05-04 PROCEDURE — 80053 COMPREHEN METABOLIC PANEL: CPT

## 2018-05-04 PROCEDURE — 74176 CT ABD & PELVIS W/O CONTRAST: CPT | Mod: 26,77

## 2018-05-04 PROCEDURE — 74176 CT ABD & PELVIS W/O CONTRAST: CPT

## 2018-05-04 PROCEDURE — 85610 PROTHROMBIN TIME: CPT

## 2018-05-04 PROCEDURE — 99284 EMERGENCY DEPT VISIT MOD MDM: CPT

## 2018-05-04 PROCEDURE — 74019 RADEX ABDOMEN 2 VIEWS: CPT

## 2018-05-04 PROCEDURE — 81001 URINALYSIS AUTO W/SCOPE: CPT

## 2018-05-04 PROCEDURE — 99282 EMERGENCY DEPT VISIT SF MDM: CPT

## 2018-05-04 PROCEDURE — 83605 ASSAY OF LACTIC ACID: CPT

## 2018-05-04 PROCEDURE — 85730 THROMBOPLASTIN TIME PARTIAL: CPT

## 2018-05-04 PROCEDURE — 36415 COLL VENOUS BLD VENIPUNCTURE: CPT

## 2018-05-04 PROCEDURE — 85027 COMPLETE CBC AUTOMATED: CPT

## 2018-05-04 PROCEDURE — 74019 RADEX ABDOMEN 2 VIEWS: CPT | Mod: 26

## 2018-05-04 PROCEDURE — 74176 CT ABD & PELVIS W/O CONTRAST: CPT | Mod: 26

## 2018-05-04 RX ORDER — SODIUM CHLORIDE 9 MG/ML
1000 INJECTION INTRAMUSCULAR; INTRAVENOUS; SUBCUTANEOUS ONCE
Qty: 0 | Refills: 0 | Status: COMPLETED | OUTPATIENT
Start: 2018-05-04 | End: 2018-05-04

## 2018-05-04 NOTE — ED PROVIDER NOTE - CARE PLAN
Principal Discharge DX:	Abdominal pain Principal Discharge DX:	Abdominal pain  Secondary Diagnosis:	Ventral hernia

## 2018-05-04 NOTE — ED ADULT NURSE REASSESSMENT NOTE - NS ED NURSE REASSESS COMMENT FT1
pt in no distress, repeat XR to be completed as per ER MD. pt in no apparent distress, sleeping comfortably, easily arousable.

## 2018-05-04 NOTE — CONSULT NOTE ADULT - ATTENDING COMMENTS
67 yo schizophrenic female w/ multiple medical problems and multiple prior abdominal surgeries well known to [facility] service presents to ED w/ abdominal pain.  [place] aide present for entire evaluation.  Patient states +flatus and BM.  Says pain has improved.  Hungry.  Afebrile.  HD normal.  No leukocytosis.  NAD.  Abdomen: obese, soft, ND/NT, well-healed surgical scars, reducible abdominal wall hernia.  CT A/P reviewed - improved from previous scan.  Delay done to see transit of contrast and contrast does go to colon, no signs of obstruction.  May give PO trial and if tolerates, return to [place].

## 2018-05-04 NOTE — CONSULT NOTE ADULT - ASSESSMENT
68YF with ventral incisional hernias containing bowel.    - Patient currently has no signs of obstruction and CT scan showed the contrast passing to colon.   - Recommend to feed the patient and send her back to [facility].  - Patient seen and examined with attending who agrees on above plan.

## 2018-05-04 NOTE — ED ADULT NURSE REASSESSMENT NOTE - NS ED NURSE REASSESS COMMENT FT1
pt CT completed, pt remains in no distress, awake alert and at baseline mental status. [facility] aid at bedside. even and unlabored resps present, skin warm dry and intact. xray to be done 8pm.

## 2018-05-04 NOTE — ED ADULT TRIAGE NOTE - CHIEF COMPLAINT QUOTE
Pt sent from [facility] for no bowel movement, and known bowel obstruction. Pt denies any discomfort at this time.

## 2018-05-04 NOTE — ED PROVIDER NOTE - OBJECTIVE STATEMENT
69 y/o F pt with hx of CVA, Schizophrenia, seizures  presents to ED BIBA from [facility] for abdominal pain and no bowel movement. Pt tolerated PO today no vomiting. Pt was recently admitted in March for SBO. Pt currently denies any pain. Hx limited from pt secondary to pt being a poor historian
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
## 2018-05-04 NOTE — ED PROVIDER NOTE - PROGRESS NOTE DETAILS
case d/w surgical resident - who requests xray in few hours - as patient as recently been admitted they doubt obstruction signed out to Dr Pederson pending surgical evaluation and repeat axr As per Surgery contrast passed through entire bowel and pt can be dc'd back to [place].

## 2018-05-05 VITALS
SYSTOLIC BLOOD PRESSURE: 108 MMHG | TEMPERATURE: 99 F | HEART RATE: 72 BPM | RESPIRATION RATE: 20 BRPM | OXYGEN SATURATION: 94 % | DIASTOLIC BLOOD PRESSURE: 66 MMHG

## 2018-05-05 NOTE — ED ADULT NURSE NOTE - OBJECTIVE STATEMENT
from [facility] for abdominal pain and no bowel movement. Pt tolerated PO today no vomiting. Pt was recently admitted in March for SBO. Pt currently denies any pain.

## 2018-05-05 NOTE — ED ADULT NURSE NOTE - CHIEF COMPLAINT QUOTE
Pt sent from [place] for no bowel movement, and known bowel obstruction. Pt denies any discomfort at this time.

## 2018-05-23 PROCEDURE — 83690 ASSAY OF LIPASE: CPT

## 2018-05-23 PROCEDURE — 86901 BLOOD TYPING SEROLOGIC RH(D): CPT

## 2018-05-23 PROCEDURE — 84443 ASSAY THYROID STIM HORMONE: CPT

## 2018-05-23 PROCEDURE — 83735 ASSAY OF MAGNESIUM: CPT

## 2018-05-23 PROCEDURE — 36415 COLL VENOUS BLD VENIPUNCTURE: CPT

## 2018-05-23 PROCEDURE — 85610 PROTHROMBIN TIME: CPT

## 2018-05-23 PROCEDURE — 85027 COMPLETE CBC AUTOMATED: CPT

## 2018-05-23 PROCEDURE — 84484 ASSAY OF TROPONIN QUANT: CPT

## 2018-05-23 PROCEDURE — 71045 X-RAY EXAM CHEST 1 VIEW: CPT

## 2018-05-23 PROCEDURE — 96374 THER/PROPH/DIAG INJ IV PUSH: CPT | Mod: XU

## 2018-05-23 PROCEDURE — 74177 CT ABD & PELVIS W/CONTRAST: CPT

## 2018-05-23 PROCEDURE — 80048 BASIC METABOLIC PNL TOTAL CA: CPT

## 2018-05-23 PROCEDURE — 86900 BLOOD TYPING SEROLOGIC ABO: CPT

## 2018-05-23 PROCEDURE — 84100 ASSAY OF PHOSPHORUS: CPT

## 2018-05-23 PROCEDURE — 93005 ELECTROCARDIOGRAM TRACING: CPT

## 2018-05-23 PROCEDURE — 96375 TX/PRO/DX INJ NEW DRUG ADDON: CPT

## 2018-05-23 PROCEDURE — 99285 EMERGENCY DEPT VISIT HI MDM: CPT | Mod: 25

## 2018-05-23 PROCEDURE — 80053 COMPREHEN METABOLIC PANEL: CPT

## 2018-05-23 PROCEDURE — 86850 RBC ANTIBODY SCREEN: CPT

## 2018-05-23 PROCEDURE — 74018 RADEX ABDOMEN 1 VIEW: CPT

## 2018-05-23 PROCEDURE — 85730 THROMBOPLASTIN TIME PARTIAL: CPT

## 2018-06-29 PROCEDURE — P9016: CPT

## 2018-06-29 PROCEDURE — 74177 CT ABD & PELVIS W/CONTRAST: CPT

## 2018-06-29 PROCEDURE — 99285 EMERGENCY DEPT VISIT HI MDM: CPT | Mod: 25

## 2018-06-29 PROCEDURE — 83690 ASSAY OF LIPASE: CPT

## 2018-06-29 PROCEDURE — 36415 COLL VENOUS BLD VENIPUNCTURE: CPT

## 2018-06-29 PROCEDURE — 71260 CT THORAX DX C+: CPT

## 2018-06-29 PROCEDURE — 82550 ASSAY OF CK (CPK): CPT

## 2018-06-29 PROCEDURE — 96374 THER/PROPH/DIAG INJ IV PUSH: CPT

## 2018-06-29 PROCEDURE — 81003 URINALYSIS AUTO W/O SCOPE: CPT

## 2018-06-29 PROCEDURE — 80053 COMPREHEN METABOLIC PANEL: CPT

## 2018-06-29 PROCEDURE — 83605 ASSAY OF LACTIC ACID: CPT

## 2018-06-29 PROCEDURE — 86900 BLOOD TYPING SEROLOGIC ABO: CPT

## 2018-06-29 PROCEDURE — 71045 X-RAY EXAM CHEST 1 VIEW: CPT

## 2018-06-29 PROCEDURE — 80061 LIPID PANEL: CPT

## 2018-06-29 PROCEDURE — 82746 ASSAY OF FOLIC ACID SERUM: CPT

## 2018-06-29 PROCEDURE — 83550 IRON BINDING TEST: CPT

## 2018-06-29 PROCEDURE — 87186 SC STD MICRODIL/AGAR DIL: CPT

## 2018-06-29 PROCEDURE — 93005 ELECTROCARDIOGRAM TRACING: CPT

## 2018-06-29 PROCEDURE — 84443 ASSAY THYROID STIM HORMONE: CPT

## 2018-06-29 PROCEDURE — 82272 OCCULT BLD FECES 1-3 TESTS: CPT

## 2018-06-29 PROCEDURE — 82728 ASSAY OF FERRITIN: CPT

## 2018-06-29 PROCEDURE — 87040 BLOOD CULTURE FOR BACTERIA: CPT

## 2018-06-29 PROCEDURE — 80202 ASSAY OF VANCOMYCIN: CPT

## 2018-06-29 PROCEDURE — 84145 PROCALCITONIN (PCT): CPT

## 2018-06-29 PROCEDURE — 87086 URINE CULTURE/COLONY COUNT: CPT

## 2018-06-29 PROCEDURE — 84100 ASSAY OF PHOSPHORUS: CPT

## 2018-06-29 PROCEDURE — 86850 RBC ANTIBODY SCREEN: CPT

## 2018-06-29 PROCEDURE — 85018 HEMOGLOBIN: CPT

## 2018-06-29 PROCEDURE — 84466 ASSAY OF TRANSFERRIN: CPT

## 2018-06-29 PROCEDURE — 86920 COMPATIBILITY TEST SPIN: CPT

## 2018-06-29 PROCEDURE — 85045 AUTOMATED RETICULOCYTE COUNT: CPT

## 2018-06-29 PROCEDURE — 36430 TRANSFUSION BLD/BLD COMPNT: CPT

## 2018-06-29 PROCEDURE — 74020: CPT

## 2018-06-29 PROCEDURE — 84134 ASSAY OF PREALBUMIN: CPT

## 2018-06-29 PROCEDURE — 86901 BLOOD TYPING SEROLOGIC RH(D): CPT

## 2018-06-29 PROCEDURE — 81001 URINALYSIS AUTO W/SCOPE: CPT

## 2018-06-29 PROCEDURE — 85027 COMPLETE CBC AUTOMATED: CPT

## 2018-06-29 PROCEDURE — 83735 ASSAY OF MAGNESIUM: CPT

## 2018-06-29 PROCEDURE — 82607 VITAMIN B-12: CPT

## 2018-07-22 ENCOUNTER — EMERGENCY (EMERGENCY)
Facility: HOSPITAL | Age: 68
LOS: 1 days | Discharge: HOME | End: 2018-07-22
Attending: EMERGENCY MEDICINE
Payer: MEDICARE

## 2018-07-22 VITALS
WEIGHT: 179.9 LBS | HEART RATE: 75 BPM | TEMPERATURE: 97 F | DIASTOLIC BLOOD PRESSURE: 82 MMHG | HEIGHT: 66 IN | SYSTOLIC BLOOD PRESSURE: 144 MMHG | RESPIRATION RATE: 16 BRPM

## 2018-07-22 VITALS
OXYGEN SATURATION: 98 % | TEMPERATURE: 100 F | HEART RATE: 79 BPM | RESPIRATION RATE: 18 BRPM | SYSTOLIC BLOOD PRESSURE: 135 MMHG | DIASTOLIC BLOOD PRESSURE: 72 MMHG

## 2018-07-22 DIAGNOSIS — Z93.2 ILEOSTOMY STATUS: Chronic | ICD-10-CM

## 2018-07-22 DIAGNOSIS — K43.5 PARASTOMAL HERNIA WITHOUT OBSTRUCTION OR GANGRENE: Chronic | ICD-10-CM

## 2018-07-22 PROCEDURE — 99283 EMERGENCY DEPT VISIT LOW MDM: CPT | Mod: 25

## 2018-07-22 PROCEDURE — 99283 EMERGENCY DEPT VISIT LOW MDM: CPT
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
## 2018-07-22 NOTE — ED PROVIDER NOTE - CARE PLAN
Principal Discharge DX:	Injury of head, initial encounter  Secondary Diagnosis:	Ecchymosis of left eye, initial encounter  Secondary Diagnosis:	Epistaxis

## 2018-07-22 NOTE — ED PROVIDER NOTE - PHYSICAL EXAMINATION
Alert, lucid, and in no apparent distress. Pt is normocephalic, ecchymosis to left cheek. No nasal septa hematoma.  Pupils are equal, round, no dysmetria. External ear without bleeding or mass, dried blood from left nostril or malodor from nose, lips pink, moist mucous membranes, tongue midline. Neck supple.   Lungs clear to auscultation. No rib tenderness to palpation. Heart regular rate and rhythm, normal S1, S2, no murmurs, gallops, rubs.  Abdomen is soft, nontender, no pulsatile mass, no masses, no distension, no rebound. No CVA Tenderness, no suprapubic tenderness.   Non-focal sensory, 5 out of 5 motor strength, no dysmetria, fluent, goal directed speech. CN2 to 12 intact. Skin with ecchymosis of left paranasal fold. No crepitus. Pt does not appear agitated

## 2018-07-22 NOTE — ED PROVIDER NOTE - PROGRESS NOTE DETAILS
Spoke with Proxy (brother) at 4pm. He asked that we do NOT sedate her for the xrays and CT scans that would be needed to fully evaluate her injuries. Pt has been in stretcher without distress. Spoke with Dr. Scherer from [place], who agreed with plan to discharge back to [facility] after 3 hours of ED observation for mental status. Pt currently at her baseline mentation, speaking in full sentences, and still refusing testing.
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
## 2018-07-22 NOTE — ED ADULT NURSE REASSESSMENT NOTE - NS ED NURSE REASSESS COMMENT FT1
patient rec'd at this itme patient alert and uncooperative at this time [facility] aide at bedside transportation to be set up to transport back to [facility].  patient wont not let v/s be taken and would not remove covers for head.  will monitor and chart changes
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
## 2018-07-22 NOTE — ED PROVIDER NOTE - MEDICAL DECISION MAKING DETAILS
Pt with ecchymosis to left face, cannot rule out fx. Health proxy Renzo Acevesrachelchris ([phone]) asked for no sedation to be used and not to xray/CT if she does not consent.

## 2018-07-24 PROCEDURE — 36415 COLL VENOUS BLD VENIPUNCTURE: CPT

## 2018-07-24 PROCEDURE — 71046 X-RAY EXAM CHEST 2 VIEWS: CPT

## 2018-07-24 PROCEDURE — 80053 COMPREHEN METABOLIC PANEL: CPT

## 2018-07-24 PROCEDURE — 85610 PROTHROMBIN TIME: CPT

## 2018-07-24 PROCEDURE — 74176 CT ABD & PELVIS W/O CONTRAST: CPT

## 2018-07-24 PROCEDURE — 87040 BLOOD CULTURE FOR BACTERIA: CPT

## 2018-07-24 PROCEDURE — 83605 ASSAY OF LACTIC ACID: CPT

## 2018-07-24 PROCEDURE — 86901 BLOOD TYPING SEROLOGIC RH(D): CPT

## 2018-07-24 PROCEDURE — 86900 BLOOD TYPING SEROLOGIC ABO: CPT

## 2018-07-24 PROCEDURE — 82272 OCCULT BLD FECES 1-3 TESTS: CPT

## 2018-07-24 PROCEDURE — 85027 COMPLETE CBC AUTOMATED: CPT

## 2018-07-24 PROCEDURE — 96375 TX/PRO/DX INJ NEW DRUG ADDON: CPT

## 2018-07-24 PROCEDURE — 80048 BASIC METABOLIC PNL TOTAL CA: CPT

## 2018-07-24 PROCEDURE — 87045 FECES CULTURE AEROBIC BACT: CPT

## 2018-07-24 PROCEDURE — 87177 OVA AND PARASITES SMEARS: CPT

## 2018-07-24 PROCEDURE — 84100 ASSAY OF PHOSPHORUS: CPT

## 2018-07-24 PROCEDURE — 87329 GIARDIA AG IA: CPT

## 2018-07-24 PROCEDURE — 97163 PT EVAL HIGH COMPLEX 45 MIN: CPT

## 2018-07-24 PROCEDURE — 88305 TISSUE EXAM BY PATHOLOGIST: CPT

## 2018-07-24 PROCEDURE — 86850 RBC ANTIBODY SCREEN: CPT

## 2018-07-24 PROCEDURE — 87046 STOOL CULTR AEROBIC BACT EA: CPT

## 2018-07-24 PROCEDURE — 87086 URINE CULTURE/COLONY COUNT: CPT

## 2018-07-24 PROCEDURE — 85730 THROMBOPLASTIN TIME PARTIAL: CPT

## 2018-07-24 PROCEDURE — 81001 URINALYSIS AUTO W/SCOPE: CPT

## 2018-07-24 PROCEDURE — 83735 ASSAY OF MAGNESIUM: CPT

## 2018-07-24 PROCEDURE — 99285 EMERGENCY DEPT VISIT HI MDM: CPT | Mod: 25

## 2018-07-24 PROCEDURE — 96374 THER/PROPH/DIAG INJ IV PUSH: CPT

## 2018-07-24 PROCEDURE — 87493 C DIFF AMPLIFIED PROBE: CPT

## 2018-07-30 ENCOUNTER — APPOINTMENT (OUTPATIENT)
Dept: ORTHOPEDIC SURGERY | Facility: CLINIC | Age: 68
End: 2018-07-30
Payer: MEDICARE

## 2018-07-30 VITALS
SYSTOLIC BLOOD PRESSURE: 116 MMHG | WEIGHT: 136 LBS | HEART RATE: 80 BPM | HEIGHT: 66 IN | BODY MASS INDEX: 21.86 KG/M2 | DIASTOLIC BLOOD PRESSURE: 63 MMHG

## 2018-07-30 DIAGNOSIS — S82.121D DISPLACED FRACTURE OF LATERAL CONDYLE OF RIGHT TIBIA, SUBSEQUENT ENCOUNTER FOR CLOSED FRACTURE WITH ROUTINE HEALING: ICD-10-CM

## 2018-07-30 DIAGNOSIS — R10.9 UNSPECIFIED ABDOMINAL PAIN: ICD-10-CM

## 2018-07-30 PROCEDURE — 73560 X-RAY EXAM OF KNEE 1 OR 2: CPT | Mod: RT

## 2018-07-30 PROCEDURE — 72170 X-RAY EXAM OF PELVIS: CPT

## 2018-07-30 PROCEDURE — 99214 OFFICE O/P EST MOD 30 MIN: CPT

## 2018-08-01 ENCOUNTER — OUTPATIENT (OUTPATIENT)
Dept: OUTPATIENT SERVICES | Facility: HOSPITAL | Age: 68
LOS: 1 days | End: 2018-08-01
Payer: MEDICARE

## 2018-08-01 DIAGNOSIS — K43.5 PARASTOMAL HERNIA WITHOUT OBSTRUCTION OR GANGRENE: Chronic | ICD-10-CM

## 2018-08-01 DIAGNOSIS — Z93.2 ILEOSTOMY STATUS: Chronic | ICD-10-CM

## 2018-08-01 PROCEDURE — G9001: CPT

## 2018-08-21 ENCOUNTER — EMERGENCY (EMERGENCY)
Facility: HOSPITAL | Age: 68
LOS: 1 days | Discharge: HOME | End: 2018-08-21
Attending: EMERGENCY MEDICINE
Payer: MEDICARE

## 2018-08-21 VITALS
DIASTOLIC BLOOD PRESSURE: 63 MMHG | RESPIRATION RATE: 17 BRPM | SYSTOLIC BLOOD PRESSURE: 146 MMHG | HEART RATE: 80 BPM | OXYGEN SATURATION: 97 % | TEMPERATURE: 98 F

## 2018-08-21 VITALS
HEART RATE: 68 BPM | RESPIRATION RATE: 16 BRPM | WEIGHT: 153 LBS | OXYGEN SATURATION: 99 % | TEMPERATURE: 98 F | HEIGHT: 66 IN | SYSTOLIC BLOOD PRESSURE: 130 MMHG | DIASTOLIC BLOOD PRESSURE: 72 MMHG

## 2018-08-21 DIAGNOSIS — K43.5 PARASTOMAL HERNIA WITHOUT OBSTRUCTION OR GANGRENE: Chronic | ICD-10-CM

## 2018-08-21 DIAGNOSIS — Z93.2 ILEOSTOMY STATUS: Chronic | ICD-10-CM

## 2018-08-21 LAB
ALBUMIN SERPL ELPH-MCNC: 3.9 G/DL — SIGNIFICANT CHANGE UP (ref 3.3–5.2)
ALP SERPL-CCNC: 152 U/L — HIGH (ref 40–120)
ALT FLD-CCNC: 17 U/L — SIGNIFICANT CHANGE UP
ANION GAP SERPL CALC-SCNC: 12 MMOL/L — SIGNIFICANT CHANGE UP (ref 5–17)
ANISOCYTOSIS BLD QL: SLIGHT — SIGNIFICANT CHANGE UP
AST SERPL-CCNC: 16 U/L — SIGNIFICANT CHANGE UP
BASOPHILS # BLD AUTO: 0 K/UL — SIGNIFICANT CHANGE UP (ref 0–0.2)
BASOPHILS NFR BLD AUTO: 0.3 % — SIGNIFICANT CHANGE UP (ref 0–2)
BILIRUB SERPL-MCNC: 0.3 MG/DL — LOW (ref 0.4–2)
BUN SERPL-MCNC: 25 MG/DL — HIGH (ref 8–20)
CALCIUM SERPL-MCNC: 9.3 MG/DL — SIGNIFICANT CHANGE UP (ref 8.6–10.2)
CHLORIDE SERPL-SCNC: 105 MMOL/L — SIGNIFICANT CHANGE UP (ref 98–107)
CO2 SERPL-SCNC: 26 MMOL/L — SIGNIFICANT CHANGE UP (ref 22–29)
CREAT SERPL-MCNC: 0.98 MG/DL — SIGNIFICANT CHANGE UP (ref 0.5–1.3)
ELLIPTOCYTES BLD QL SMEAR: SLIGHT — SIGNIFICANT CHANGE UP
EOSINOPHIL # BLD AUTO: 0 K/UL — SIGNIFICANT CHANGE UP (ref 0–0.5)
EOSINOPHIL NFR BLD AUTO: 4 % — SIGNIFICANT CHANGE UP (ref 0–5)
GLUCOSE SERPL-MCNC: 90 MG/DL — SIGNIFICANT CHANGE UP (ref 70–115)
HCT VFR BLD CALC: 31.3 % — LOW (ref 37–47)
HGB BLD-MCNC: 9.5 G/DL — LOW (ref 12–16)
HYPERCHROMIA BLD QL AUTO: SLIGHT — SIGNIFICANT CHANGE UP
LIDOCAIN IGE QN: 262 U/L — HIGH (ref 22–51)
LYMPHOCYTES # BLD AUTO: 1.2 K/UL — SIGNIFICANT CHANGE UP (ref 1–4.8)
LYMPHOCYTES # BLD AUTO: 37 % — SIGNIFICANT CHANGE UP (ref 20–55)
MACROCYTES BLD QL: SLIGHT — SIGNIFICANT CHANGE UP
MCHC RBC-ENTMCNC: 28 PG — SIGNIFICANT CHANGE UP (ref 27–31)
MCHC RBC-ENTMCNC: 30.4 G/DL — LOW (ref 32–36)
MCV RBC AUTO: 92.3 FL — SIGNIFICANT CHANGE UP (ref 81–99)
MICROCYTES BLD QL: SLIGHT — SIGNIFICANT CHANGE UP
MONOCYTES # BLD AUTO: 0.5 K/UL — SIGNIFICANT CHANGE UP (ref 0–0.8)
MONOCYTES NFR BLD AUTO: 16 % — HIGH (ref 3–10)
NEUTROPHILS # BLD AUTO: 1.2 K/UL — LOW (ref 1.8–8)
NEUTROPHILS NFR BLD AUTO: 39 % — SIGNIFICANT CHANGE UP (ref 37–73)
OVALOCYTES BLD QL SMEAR: SLIGHT — SIGNIFICANT CHANGE UP
PLAT MORPH BLD: NORMAL — SIGNIFICANT CHANGE UP
PLATELET # BLD AUTO: 125 K/UL — LOW (ref 150–400)
POIKILOCYTOSIS BLD QL AUTO: SLIGHT — SIGNIFICANT CHANGE UP
POTASSIUM SERPL-MCNC: 4.3 MMOL/L — SIGNIFICANT CHANGE UP (ref 3.5–5.3)
POTASSIUM SERPL-SCNC: 4.3 MMOL/L — SIGNIFICANT CHANGE UP (ref 3.5–5.3)
PROT SERPL-MCNC: 6.6 G/DL — SIGNIFICANT CHANGE UP (ref 6.6–8.7)
RBC # BLD: 3.39 M/UL — LOW (ref 4.4–5.2)
RBC # FLD: 14.8 % — SIGNIFICANT CHANGE UP (ref 11–15.6)
RBC BLD AUTO: ABNORMAL
SODIUM SERPL-SCNC: 143 MMOL/L — SIGNIFICANT CHANGE UP (ref 135–145)
VARIANT LYMPHS # BLD: 4 % — SIGNIFICANT CHANGE UP (ref 0–6)
WBC # BLD: 3 K/UL — LOW (ref 4.8–10.8)
WBC # FLD AUTO: 3 K/UL — LOW (ref 4.8–10.8)

## 2018-08-21 PROCEDURE — 99284 EMERGENCY DEPT VISIT MOD MDM: CPT | Mod: 25

## 2018-08-21 PROCEDURE — 83690 ASSAY OF LIPASE: CPT

## 2018-08-21 PROCEDURE — 99284 EMERGENCY DEPT VISIT MOD MDM: CPT

## 2018-08-21 PROCEDURE — 80053 COMPREHEN METABOLIC PANEL: CPT

## 2018-08-21 PROCEDURE — 74177 CT ABD & PELVIS W/CONTRAST: CPT

## 2018-08-21 PROCEDURE — 96360 HYDRATION IV INFUSION INIT: CPT

## 2018-08-21 PROCEDURE — 85027 COMPLETE CBC AUTOMATED: CPT

## 2018-08-21 PROCEDURE — 74177 CT ABD & PELVIS W/CONTRAST: CPT | Mod: 26

## 2018-08-21 PROCEDURE — 36415 COLL VENOUS BLD VENIPUNCTURE: CPT

## 2018-08-21 RX ORDER — SODIUM CHLORIDE 9 MG/ML
1000 INJECTION INTRAMUSCULAR; INTRAVENOUS; SUBCUTANEOUS ONCE
Qty: 0 | Refills: 0 | Status: COMPLETED | OUTPATIENT
Start: 2018-08-21 | End: 2018-08-21

## 2018-08-21 RX ORDER — ONDANSETRON 8 MG/1
4 TABLET, FILM COATED ORAL ONCE
Qty: 0 | Refills: 0 | Status: COMPLETED | OUTPATIENT
Start: 2018-08-21 | End: 2018-08-21

## 2018-08-21 RX ADMIN — SODIUM CHLORIDE 1000 MILLILITER(S): 9 INJECTION INTRAMUSCULAR; INTRAVENOUS; SUBCUTANEOUS at 15:10

## 2018-08-21 RX ADMIN — SODIUM CHLORIDE 1000 MILLILITER(S): 9 INJECTION INTRAMUSCULAR; INTRAVENOUS; SUBCUTANEOUS at 14:00

## 2018-08-21 NOTE — ED ADULT TRIAGE NOTE - CHIEF COMPLAINT QUOTE
pt comes [facility] ed [facility] with [facility] aid. Patient reports abdominal pain/nausea/vomiting since this morning. hx of bowel obstructions per patient. alert and oriented x2. last bm this morning.

## 2018-08-21 NOTE — ED ADULT NURSE REASSESSMENT NOTE - NS ED NURSE REASSESS COMMENT FT1
Pt is resting in bed comfortably at this time, no apparent distress noted at this time. pt safety maintained. Pt denies any complaints at this time. Plan of care explained, pt states understanding and was able to successfully teach back to show proficiency.
Pt awaiting transport back to [facility].
Report rec'd.  Pt reassessed.  Pt has aide at bedside.  Pt currently denies pain.  Awaiting ct results. No acute distress noted.

## 2018-08-21 NOTE — ED PROVIDER NOTE - PROGRESS NOTE DETAILS
Sarmad: This note serves as administrative consent to perform CT with IV contrast in order to better elucidate for possible life threatening emergencies. Pt signed out to Dr. Ritter for f/u of CT and reevaluation

## 2018-08-21 NOTE — ED ADULT NURSE NOTE - NSIMPLEMENTINTERV_GEN_ALL_ED
Implemented All Fall Risk Interventions:  [place] to call system. Call bell, personal items and telephone within reach. Instruct patient to call for assistance. Room bathroom lighting operational. Non-slip footwear when patient is off stretcher. Physically safe environment: no spills, clutter or unnecessary equipment. Stretcher in lowest position, wheels locked, appropriate side rails in place. Provide visual cue, wrist band, yellow gown, etc. Monitor gait and stability. Monitor for mental status changes and reorient to person, place, and time. Review medications for side effects contributing to fall risk. Reinforce activity limits and safety measures with patient and family.

## 2018-08-21 NOTE — ED PROVIDER NOTE - GASTROINTESTINAL, MLM
Abdomen soft, non-tender, no guarding. Abdomen soft, ? mild tenderness in epigastrium; no guarding/ rebound.

## 2018-08-21 NOTE — ED ADULT NURSE NOTE - OBJECTIVE STATEMENT
Pt is a 68YOF who was brought in for abdominal pain. Pt states the pain is generalized in her lower abdomen and she is denying any loose bowels or changes in bowel patterns. Pt states she usually takes Mylanta of Maalox for her stomach pain.

## 2018-08-21 NOTE — ED PROVIDER NOTE - MEDICAL DECISION MAKING DETAILS
Patient pain resolved and with no abd tenderness on re-exam.  Neg CT will d/c home.  Patient given detailed discharge and return instructions and verbalized understanding.  Patient will follow up without fail.  All questions answered.

## 2018-08-21 NOTE — ED PROVIDER NOTE - OBJECTIVE STATEMENT
68F from [place] with /o schizoaffective d/o, GERD, HTN, CVA, seizure , h/o bowel obstruction, presenting with nausea, nonbilious vomiting since this morning. Last BM today and pt passing gas. Initially said her stomach hurt previously but pain resolved. Pt denies any bloating and aid states that abdomen looks normal.

## 2018-08-22 DIAGNOSIS — Z71.89 OTHER SPECIFIED COUNSELING: ICD-10-CM

## 2018-09-13 ENCOUNTER — APPOINTMENT (OUTPATIENT)
Dept: COLORECTAL SURGERY | Facility: CLINIC | Age: 68
End: 2018-09-13
Payer: MEDICARE

## 2018-09-13 DIAGNOSIS — K62.3 RECTAL PROLAPSE: ICD-10-CM

## 2018-09-13 PROCEDURE — 99214 OFFICE O/P EST MOD 30 MIN: CPT

## 2018-09-13 NOTE — PROGRESS NOTE BEHAVIORAL HEALTH - NS ED BHA MED ROS GENITOURINARY
No complaints
No complaints
Unable to assess
No complaints
Has The Growth Been Previously Biopsied?: has been previously biopsied

## 2018-09-17 ENCOUNTER — EMERGENCY (EMERGENCY)
Facility: HOSPITAL | Age: 68
LOS: 1 days | Discharge: HOME | End: 2018-09-17
Attending: EMERGENCY MEDICINE
Payer: MEDICARE

## 2018-09-17 VITALS
DIASTOLIC BLOOD PRESSURE: 74 MMHG | TEMPERATURE: 98 F | HEART RATE: 85 BPM | OXYGEN SATURATION: 100 % | WEIGHT: 199.96 LBS | RESPIRATION RATE: 20 BRPM | SYSTOLIC BLOOD PRESSURE: 147 MMHG

## 2018-09-17 DIAGNOSIS — K43.5 PARASTOMAL HERNIA WITHOUT OBSTRUCTION OR GANGRENE: Chronic | ICD-10-CM

## 2018-09-17 DIAGNOSIS — Z93.2 ILEOSTOMY STATUS: Chronic | ICD-10-CM

## 2018-09-17 PROCEDURE — 12011 RPR F/E/E/N/L/M 2.5 CM/<: CPT

## 2018-09-17 PROCEDURE — 70450 CT HEAD/BRAIN W/O DYE: CPT | Mod: 26

## 2018-09-17 PROCEDURE — 70450 CT HEAD/BRAIN W/O DYE: CPT

## 2018-09-17 PROCEDURE — 90471 IMMUNIZATION ADMIN: CPT

## 2018-09-17 PROCEDURE — 99284 EMERGENCY DEPT VISIT MOD MDM: CPT | Mod: 25

## 2018-09-17 PROCEDURE — 90715 TDAP VACCINE 7 YRS/> IM: CPT

## 2018-09-17 PROCEDURE — 72125 CT NECK SPINE W/O DYE: CPT | Mod: 26

## 2018-09-17 PROCEDURE — 72125 CT NECK SPINE W/O DYE: CPT

## 2018-09-17 RX ORDER — TETANUS TOXOID, REDUCED DIPHTHERIA TOXOID AND ACELLULAR PERTUSSIS VACCINE, ADSORBED 5; 2.5; 8; 8; 2.5 [IU]/.5ML; [IU]/.5ML; UG/.5ML; UG/.5ML; UG/.5ML
0.5 SUSPENSION INTRAMUSCULAR ONCE
Qty: 0 | Refills: 0 | Status: COMPLETED | OUTPATIENT
Start: 2018-09-17 | End: 2018-09-17

## 2018-09-17 RX ADMIN — TETANUS TOXOID, REDUCED DIPHTHERIA TOXOID AND ACELLULAR PERTUSSIS VACCINE, ADSORBED 0.5 MILLILITER(S): 5; 2.5; 8; 8; 2.5 SUSPENSION INTRAMUSCULAR at 10:04

## 2018-09-17 NOTE — ED ADULT TRIAGE NOTE - CHIEF COMPLAINT QUOTE
as per ems pt is being sent in for a fall unwitnessed lac to rt side of forehead sent in by [facility]. as per ems pt is at baseline
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
## 2018-09-17 NOTE — ED PROVIDER NOTE - CARE PLAN
Principal Discharge DX:	Laceration of forehead, initial encounter  Secondary Diagnosis:	Injury of head, initial encounter
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
## 2018-11-27 ENCOUNTER — OUTPATIENT (OUTPATIENT)
Dept: OUTPATIENT SERVICES | Facility: HOSPITAL | Age: 68
LOS: 1 days | End: 2018-11-27
Payer: MEDICARE

## 2018-11-27 DIAGNOSIS — R56.9 UNSPECIFIED CONVULSIONS: ICD-10-CM

## 2018-11-27 DIAGNOSIS — Z93.2 ILEOSTOMY STATUS: Chronic | ICD-10-CM

## 2018-11-27 DIAGNOSIS — K43.5 PARASTOMAL HERNIA WITHOUT OBSTRUCTION OR GANGRENE: Chronic | ICD-10-CM

## 2018-11-27 PROCEDURE — 95819 EEG AWAKE AND ASLEEP: CPT | Mod: 26

## 2018-11-27 PROCEDURE — 95819 EEG AWAKE AND ASLEEP: CPT

## 2018-12-03 ENCOUNTER — OUTPATIENT (OUTPATIENT)
Dept: OUTPATIENT SERVICES | Facility: HOSPITAL | Age: 68
LOS: 1 days | End: 2018-12-03
Payer: COMMERCIAL

## 2018-12-03 ENCOUNTER — INPATIENT (INPATIENT)
Facility: HOSPITAL | Age: 68
LOS: 1 days | Discharge: PSYCHIATRIC OR SUBSTANCE USE FACILITY | DRG: 552 | End: 2018-12-05
Attending: INTERNAL MEDICINE | Admitting: HOSPITALIST
Payer: MEDICARE

## 2018-12-03 VITALS
RESPIRATION RATE: 20 BRPM | DIASTOLIC BLOOD PRESSURE: 68 MMHG | TEMPERATURE: 98 F | HEIGHT: 63 IN | HEART RATE: 74 BPM | OXYGEN SATURATION: 98 % | WEIGHT: 179.9 LBS | SYSTOLIC BLOOD PRESSURE: 145 MMHG

## 2018-12-03 DIAGNOSIS — Z93.2 ILEOSTOMY STATUS: Chronic | ICD-10-CM

## 2018-12-03 DIAGNOSIS — K43.5 PARASTOMAL HERNIA WITHOUT OBSTRUCTION OR GANGRENE: Chronic | ICD-10-CM

## 2018-12-03 DIAGNOSIS — R51 HEADACHE: ICD-10-CM

## 2018-12-03 LAB
ALBUMIN SERPL ELPH-MCNC: 4.1 G/DL — SIGNIFICANT CHANGE UP (ref 3.3–5.2)
ALP SERPL-CCNC: 182 U/L — HIGH (ref 40–120)
ALT FLD-CCNC: 16 U/L — SIGNIFICANT CHANGE UP
ANION GAP SERPL CALC-SCNC: 13 MMOL/L — SIGNIFICANT CHANGE UP (ref 5–17)
ANISOCYTOSIS BLD QL: SLIGHT — SIGNIFICANT CHANGE UP
APTT BLD: 29.7 SEC — SIGNIFICANT CHANGE UP (ref 27.5–36.3)
AST SERPL-CCNC: 14 U/L — SIGNIFICANT CHANGE UP
BILIRUB SERPL-MCNC: 0.3 MG/DL — LOW (ref 0.4–2)
BUN SERPL-MCNC: 27 MG/DL — HIGH (ref 8–20)
CALCIUM SERPL-MCNC: 9.3 MG/DL — SIGNIFICANT CHANGE UP (ref 8.6–10.2)
CHLORIDE SERPL-SCNC: 108 MMOL/L — HIGH (ref 98–107)
CO2 SERPL-SCNC: 23 MMOL/L — SIGNIFICANT CHANGE UP (ref 22–29)
CREAT SERPL-MCNC: 1.15 MG/DL — SIGNIFICANT CHANGE UP (ref 0.5–1.3)
EOSINOPHIL NFR BLD AUTO: 4 % — SIGNIFICANT CHANGE UP (ref 0–5)
GLUCOSE SERPL-MCNC: 108 MG/DL — SIGNIFICANT CHANGE UP (ref 70–115)
HCT VFR BLD CALC: 33.8 % — LOW (ref 37–47)
HGB BLD-MCNC: 10.5 G/DL — LOW (ref 12–16)
INR BLD: 1.16 RATIO — SIGNIFICANT CHANGE UP (ref 0.88–1.16)
LYMPHOCYTES # BLD AUTO: 26 % — SIGNIFICANT CHANGE UP (ref 20–55)
MACROCYTES BLD QL: SLIGHT — SIGNIFICANT CHANGE UP
MCHC RBC-ENTMCNC: 28.6 PG — SIGNIFICANT CHANGE UP (ref 27–31)
MCHC RBC-ENTMCNC: 31.1 G/DL — LOW (ref 32–36)
MCV RBC AUTO: 92.1 FL — SIGNIFICANT CHANGE UP (ref 81–99)
MICROCYTES BLD QL: SLIGHT — SIGNIFICANT CHANGE UP
MONOCYTES NFR BLD AUTO: 8 % — SIGNIFICANT CHANGE UP (ref 3–10)
NEUTROPHILS NFR BLD AUTO: 62 % — SIGNIFICANT CHANGE UP (ref 37–73)
OVALOCYTES BLD QL SMEAR: SLIGHT — SIGNIFICANT CHANGE UP
PLAT MORPH BLD: NORMAL — SIGNIFICANT CHANGE UP
PLATELET # BLD AUTO: 119 K/UL — LOW (ref 150–400)
POIKILOCYTOSIS BLD QL AUTO: SLIGHT — SIGNIFICANT CHANGE UP
POTASSIUM SERPL-MCNC: 4.2 MMOL/L — SIGNIFICANT CHANGE UP (ref 3.5–5.3)
POTASSIUM SERPL-SCNC: 4.2 MMOL/L — SIGNIFICANT CHANGE UP (ref 3.5–5.3)
PROT SERPL-MCNC: 7.1 G/DL — SIGNIFICANT CHANGE UP (ref 6.6–8.7)
PROTHROM AB SERPL-ACNC: 13.4 SEC — HIGH (ref 10–12.9)
RBC # BLD: 3.67 M/UL — LOW (ref 4.4–5.2)
RBC # FLD: 14.5 % — SIGNIFICANT CHANGE UP (ref 11–15.6)
RBC BLD AUTO: ABNORMAL
SODIUM SERPL-SCNC: 144 MMOL/L — SIGNIFICANT CHANGE UP (ref 135–145)
WBC # BLD: 3 K/UL — LOW (ref 4.8–10.8)
WBC # FLD AUTO: 3 K/UL — LOW (ref 4.8–10.8)

## 2018-12-03 PROCEDURE — 72146 MRI CHEST SPINE W/O DYE: CPT | Mod: 26

## 2018-12-03 PROCEDURE — 72148 MRI LUMBAR SPINE W/O DYE: CPT | Mod: 26

## 2018-12-03 PROCEDURE — 70450 CT HEAD/BRAIN W/O DYE: CPT | Mod: 26

## 2018-12-03 PROCEDURE — 72141 MRI NECK SPINE W/O DYE: CPT | Mod: 26

## 2018-12-03 PROCEDURE — 72128 CT CHEST SPINE W/O DYE: CPT | Mod: 26

## 2018-12-03 PROCEDURE — 99285 EMERGENCY DEPT VISIT HI MDM: CPT

## 2018-12-03 PROCEDURE — 72131 CT LUMBAR SPINE W/O DYE: CPT | Mod: 26

## 2018-12-03 RX ORDER — ACETAMINOPHEN 500 MG
1000 TABLET ORAL ONCE
Qty: 0 | Refills: 0 | Status: COMPLETED | OUTPATIENT
Start: 2018-12-03 | End: 2018-12-03

## 2018-12-03 RX ORDER — METHOCARBAMOL 500 MG/1
750 TABLET, FILM COATED ORAL ONCE
Qty: 0 | Refills: 0 | Status: COMPLETED | OUTPATIENT
Start: 2018-12-03 | End: 2018-12-03

## 2018-12-03 RX ADMIN — Medication 400 MILLIGRAM(S): at 19:04

## 2018-12-03 RX ADMIN — METHOCARBAMOL 750 MILLIGRAM(S): 500 TABLET, FILM COATED ORAL at 19:05
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
## 2018-12-03 NOTE — ED PROVIDER NOTE - OBJECTIVE STATEMENT
68 year old female with a h/ o schizoaffective disorder and multiple medical problems including a CVA  presents with c/o back pain. pt states that she fell 3 weeks ago and has been getting progressively worsening back pain. she lives at [facility] and had a ct which showed an oblique fracture of T12. she denies any numbness or tingling or any bowel or bladder incontinence

## 2018-12-03 NOTE — ED ADULT NURSE REASSESSMENT NOTE - NS ED NURSE REASSESS COMMENT FT1
[facility] care of pt 1930, charting as noted. pt alert and oriented x3, flat affect, poor eye contact. VSS afebrile. pt being transported to MRI. pt educated on plan of care, pt able to successfully teach back plan of care to RN, RN will continue to reeducate pt during hospital stay.

## 2018-12-03 NOTE — CONSULT NOTE ADULT - ATTENDING COMMENTS
CAT scan and MRI data has been reviewed in addition to the above mentioned physical exam. I think a TLSO brace to be the best management for this patient at this point in time I think given her past medical history combined with stable MRI some ligamentous perspective I do not think a multilevel thoracic or lumbar fusion would be advisable for this patient. Continue the TLSO management PTOT weightbearing as tolerated.

## 2018-12-03 NOTE — ED CLERICAL - CLERICAL COMMENTS
oblique vertebral body fx at T12, sent from Radiology dept to be seen by Ortho. hx mult fx, neutropenia, htn, ileostomy, s/p repair of vaginal *and* rectal prolapse. Dr Swartz called.

## 2018-12-03 NOTE — ED STATDOCS - OBJECTIVE STATEMENT
Telemedicine assessment was conducted (using real time 2 way audio-video technology) by Dr. Keke Lilly located at [address] [place] [address]  ++++++++++++++++++++++++   Pertinent patient history and initial plan:     Pt is a 69 y/o F, with PMHx of anemia, constipation, CVA, GERD, HTN, hypothyroidism, presenting to the ED from [facility] with back pain s/p fall 3.5 weeks ago. Pt has been having constant back luciana since that has not been controlled with motrin. Sent for outpatient radiology today with fracture shown on CT and sent in for ortho evaluation. No other c/o pain or injuries. Telemedicine assessment was conducted (using real time 2 way audio-video technology) by Dr. Keke Lilly located at [address] [place] [id number]  ++++++++++++++++++++++++   Pertinent patient history and initial plan:     Pt is a 67 y/o F, with PMHx of anemia, constipation, CVA, GERD, HTN, hypothyroidism, presenting to the ED from [place] with back pain s/p fall 3.5 weeks ago. Pt has been having constant back pain since that has not been controlled with motrin. Sent for outpatient radiology today with fracture shown on CT and sent in for ortho evaluation. No other c/o pain or injuries.    On virtual exam pt is in wheelchair, nad, cooperative    Plan - pain ctrl, basic labs

## 2018-12-03 NOTE — ED ADULT NURSE NOTE - OBJECTIVE STATEMENT
Pt was outpatient radiology for CT of lower back secondary to pain. Ws brought to ER, fracture was seen on CT.

## 2018-12-03 NOTE — ED ADULT NURSE NOTE - NSIMPLEMENTINTERV_GEN_ALL_ED
Implemented All Fall with Harm Risk Interventions:  [place] to call system. Call bell, personal items and telephone within reach. Instruct patient to call for assistance. Room bathroom lighting operational. Non-slip footwear when patient is off stretcher. Physically safe environment: no spills, clutter or unnecessary equipment. Stretcher in lowest position, wheels locked, appropriate side rails in place. Provide visual cue, wrist band, yellow gown, etc. Monitor gait and stability. Monitor for mental status changes and reorient to person, place, and time. Review medications for side effects contributing to fall risk. Reinforce activity limits and safety measures with patient and family. Provide visual clues: red socks.

## 2018-12-03 NOTE — ED PROVIDER NOTE - CARE PLAN
Principal Discharge DX:	Closed fracture of first thoracic vertebra, unspecified fracture morphology, initial encounter  Secondary Diagnosis:	Acute midline thoracic back pain

## 2018-12-04 DIAGNOSIS — S22.019A UNSPECIFIED FRACTURE OF FIRST THORACIC VERTEBRA, INITIAL ENCOUNTER FOR CLOSED FRACTURE: ICD-10-CM

## 2018-12-04 DIAGNOSIS — E03.9 HYPOTHYROIDISM, UNSPECIFIED: ICD-10-CM

## 2018-12-04 DIAGNOSIS — I63.9 CEREBRAL INFARCTION, UNSPECIFIED: ICD-10-CM

## 2018-12-04 DIAGNOSIS — F25.0 SCHIZOAFFECTIVE DISORDER, BIPOLAR TYPE: ICD-10-CM

## 2018-12-04 DIAGNOSIS — D64.9 ANEMIA, UNSPECIFIED: ICD-10-CM

## 2018-12-04 DIAGNOSIS — S22.080A WEDGE COMPRESSION FRACTURE OF T11-T12 VERTEBRA, INITIAL ENCOUNTER FOR CLOSED FRACTURE: ICD-10-CM

## 2018-12-04 DIAGNOSIS — Z29.9 ENCOUNTER FOR PROPHYLACTIC MEASURES, UNSPECIFIED: ICD-10-CM

## 2018-12-04 DIAGNOSIS — I10 ESSENTIAL (PRIMARY) HYPERTENSION: ICD-10-CM

## 2018-12-04 DIAGNOSIS — R56.9 UNSPECIFIED CONVULSIONS: ICD-10-CM

## 2018-12-04 LAB
APPEARANCE UR: CLEAR — SIGNIFICANT CHANGE UP
BILIRUB UR-MCNC: NEGATIVE — SIGNIFICANT CHANGE UP
COLOR SPEC: YELLOW — SIGNIFICANT CHANGE UP
DIFF PNL FLD: ABNORMAL
EPI CELLS # UR: SIGNIFICANT CHANGE UP
GLUCOSE UR QL: NEGATIVE MG/DL — SIGNIFICANT CHANGE UP
HCT VFR BLD CALC: 31.9 % — LOW (ref 37–47)
HGB BLD-MCNC: 10 G/DL — LOW (ref 12–16)
KETONES UR-MCNC: NEGATIVE — SIGNIFICANT CHANGE UP
LEUKOCYTE ESTERASE UR-ACNC: NEGATIVE — SIGNIFICANT CHANGE UP
MCHC RBC-ENTMCNC: 28.7 PG — SIGNIFICANT CHANGE UP (ref 27–31)
MCHC RBC-ENTMCNC: 31.3 G/DL — LOW (ref 32–36)
MCV RBC AUTO: 91.4 FL — SIGNIFICANT CHANGE UP (ref 81–99)
NITRITE UR-MCNC: NEGATIVE — SIGNIFICANT CHANGE UP
PH UR: 7 — SIGNIFICANT CHANGE UP (ref 5–8)
PLATELET # BLD AUTO: 112 K/UL — LOW (ref 150–400)
PROT UR-MCNC: 30 MG/DL
RBC # BLD: 3.49 M/UL — LOW (ref 4.4–5.2)
RBC # FLD: 14.6 % — SIGNIFICANT CHANGE UP (ref 11–15.6)
RBC CASTS # UR COMP ASSIST: SIGNIFICANT CHANGE UP /HPF (ref 0–4)
SP GR SPEC: 1 — LOW (ref 1.01–1.02)
UROBILINOGEN FLD QL: NEGATIVE MG/DL — SIGNIFICANT CHANGE UP
WBC # BLD: 2.4 K/UL — LOW (ref 4.8–10.8)
WBC # FLD AUTO: 2.4 K/UL — LOW (ref 4.8–10.8)
WBC UR QL: NEGATIVE — SIGNIFICANT CHANGE UP

## 2018-12-04 PROCEDURE — 22310 CLOSED TX VERT FX W/O MANJ: CPT

## 2018-12-04 PROCEDURE — 12345: CPT | Mod: NC

## 2018-12-04 PROCEDURE — 99223 1ST HOSP IP/OBS HIGH 75: CPT | Mod: 57

## 2018-12-04 PROCEDURE — 99222 1ST HOSP IP/OBS MODERATE 55: CPT

## 2018-12-04 RX ORDER — OLANZAPINE 15 MG/1
5 TABLET, FILM COATED ORAL AT BEDTIME
Qty: 0 | Refills: 0 | Status: DISCONTINUED | OUTPATIENT
Start: 2018-12-04 | End: 2018-12-05

## 2018-12-04 RX ORDER — LIDOCAINE 4 G/100G
1 CREAM TOPICAL DAILY
Qty: 0 | Refills: 0 | Status: DISCONTINUED | OUTPATIENT
Start: 2018-12-04 | End: 2018-12-05

## 2018-12-04 RX ORDER — ACETAMINOPHEN 500 MG
650 TABLET ORAL EVERY 6 HOURS
Qty: 0 | Refills: 0 | Status: DISCONTINUED | OUTPATIENT
Start: 2018-12-04 | End: 2018-12-05

## 2018-12-04 RX ORDER — HALOPERIDOL DECANOATE 100 MG/ML
10 INJECTION INTRAMUSCULAR
Qty: 0 | Refills: 0 | Status: DISCONTINUED | OUTPATIENT
Start: 2018-12-04 | End: 2018-12-05

## 2018-12-04 RX ORDER — LEVOTHYROXINE SODIUM 125 MCG
112 TABLET ORAL DAILY
Qty: 0 | Refills: 0 | Status: DISCONTINUED | OUTPATIENT
Start: 2018-12-04 | End: 2018-12-05

## 2018-12-04 RX ORDER — SIMVASTATIN 20 MG/1
20 TABLET, FILM COATED ORAL AT BEDTIME
Qty: 0 | Refills: 0 | Status: DISCONTINUED | OUTPATIENT
Start: 2018-12-04 | End: 2018-12-05

## 2018-12-04 RX ORDER — SENNA PLUS 8.6 MG/1
2 TABLET ORAL AT BEDTIME
Qty: 0 | Refills: 0 | Status: DISCONTINUED | OUTPATIENT
Start: 2018-12-04 | End: 2018-12-05

## 2018-12-04 RX ORDER — OXYCODONE AND ACETAMINOPHEN 5; 325 MG/1; MG/1
1 TABLET ORAL EVERY 6 HOURS
Qty: 0 | Refills: 0 | Status: DISCONTINUED | OUTPATIENT
Start: 2018-12-04 | End: 2018-12-05

## 2018-12-04 RX ORDER — HEPARIN SODIUM 5000 [USP'U]/ML
5000 INJECTION INTRAVENOUS; SUBCUTANEOUS EVERY 8 HOURS
Qty: 0 | Refills: 0 | Status: DISCONTINUED | OUTPATIENT
Start: 2018-12-04 | End: 2018-12-05

## 2018-12-04 RX ORDER — DOCUSATE SODIUM 100 MG
100 CAPSULE ORAL THREE TIMES A DAY
Qty: 0 | Refills: 0 | Status: DISCONTINUED | OUTPATIENT
Start: 2018-12-04 | End: 2018-12-05

## 2018-12-04 RX ADMIN — SIMVASTATIN 20 MILLIGRAM(S): 20 TABLET, FILM COATED ORAL at 22:33

## 2018-12-04 RX ADMIN — HALOPERIDOL DECANOATE 10 MILLIGRAM(S): 100 INJECTION INTRAMUSCULAR at 05:57

## 2018-12-04 RX ADMIN — HEPARIN SODIUM 5000 UNIT(S): 5000 INJECTION INTRAVENOUS; SUBCUTANEOUS at 12:49

## 2018-12-04 RX ADMIN — Medication 1 MILLIGRAM(S): at 12:48

## 2018-12-04 RX ADMIN — OLANZAPINE 5 MILLIGRAM(S): 15 TABLET, FILM COATED ORAL at 22:33

## 2018-12-04 RX ADMIN — Medication 112 MICROGRAM(S): at 05:50

## 2018-12-04 RX ADMIN — Medication 1 MILLIGRAM(S): at 22:33

## 2018-12-04 RX ADMIN — HEPARIN SODIUM 5000 UNIT(S): 5000 INJECTION INTRAVENOUS; SUBCUTANEOUS at 22:33

## 2018-12-04 RX ADMIN — HEPARIN SODIUM 5000 UNIT(S): 5000 INJECTION INTRAVENOUS; SUBCUTANEOUS at 05:48

## 2018-12-04 RX ADMIN — LIDOCAINE 1 PATCH: 4 CREAM TOPICAL at 22:42

## 2018-12-04 RX ADMIN — HALOPERIDOL DECANOATE 10 MILLIGRAM(S): 100 INJECTION INTRAMUSCULAR at 18:43

## 2018-12-04 RX ADMIN — Medication 1 MILLIGRAM(S): at 05:49

## 2018-12-04 NOTE — H&P ADULT - NSHPPHYSICALEXAM_GEN_ALL_CORE
T(C): 37.1 (12-04-18 @ 02:41), Max: 37.1 (12-04-18 @ 02:41)  HR: 71 (12-04-18 @ 02:41) (71 - 80)  BP: 133/78 (12-04-18 @ 02:41) (133/78 - 152/70)  RR: 20 (12-04-18 @ 02:41) (20 - 20)  SpO2: 96% (12-04-18 @ 02:41) (96% - 99%)  Wt(kg): --    Physical Exam:   GENERAL: well-developed, NAD  HEENT: head NC/AT; EOM intact, PERRLA, conjunctiva & sclera clear; hearing grossly intact, moist mucous membranes  NECK: supple, no JVD  RESPIRATORY: CTA B/L, no wheezing, rales, rhonchi or rubs  CARDIOVASCULAR: S1&S2, RRR, no murmurs or gallops  ABDOMEN: soft, non-tender, non-distended, + Bowel sounds x4 quadrants, no guarding, rebound or rigidity  MUSCULOSKELETAL:  no clubbing, cyanosis or edema of all 4 extremities  Back: no palpable deformity of C-T-L spine.   LYMPH: no cervical lymphadenopathy  VASCULAR: Radial pulses 2+ bilaterally, no varicose veins   SKIN: warm and dry, color normal  NEUROLOGIC: AA&O X3, CN2-12 intact w/ no focal deficits, no sensory loss, motor Strength 5/5 RUE/RLE and 4/5 LUE/LLE  Psych: abnormal mood and affect, abnormal behavior

## 2018-12-04 NOTE — PROGRESS NOTE ADULT - SUBJECTIVE AND OBJECTIVE BOX
HEALTH ISSUES - PROBLEM Dx:  Pt is a 67yo F sent from [facility] due to T12 fracture. PMH HTN, CVA w/ left sided weakness, h/o suicidal attempt, seizure d/o no longer on anti-epileptic, hx of SBO due to adhesions, ileostomy reversal and parastomal hernia.     CC- t12 comp #      INTERVAL HPI/ OVERNIGHT EVENTS:    pain controlled with pain meds; pain more upon lying down and is spinal and paraspinal      REVIEW OF SYSTEMS:    back pain +ve;     Vital Signs Last 24 Hrs  T(C): 36.8 (04 Dec 2018 11:30), Max: 37.1 (04 Dec 2018 02:41)  T(F): 98.2 (04 Dec 2018 11:30), Max: 98.7 (04 Dec 2018 02:41)  HR: 75 (04 Dec 2018 11:30) (71 - 80)  BP: 139/81 (04 Dec 2018 11:30) (133/78 - 152/70)  BP(mean): --  RR: 20 (04 Dec 2018 11:30) (20 - 20)  SpO2: 97% (04 Dec 2018 11:30) (96% - 99%)    PHYSICAL EXAM-  	GENERAL: well-developed, NAD  	HEENT: head NC/AT; EOM intact, PERRLA, conjunctiva & sclera clear; hearing grossly intact, moist mucous membranes  	NECK: supple, no JVD  	RESPIRATORY: CTA B/L, no wheezing, rales, rhonchi or rubs  	CARDIOVASCULAR: S1&S2, RRR, no murmurs or gallops  	ABDOMEN: soft, non-tender, non-distended, + Bowel sounds x4 quadrants, no guarding, rebound or rigidity  	MUSCULOSKELETAL:  no clubbing, cyanosis or edema of all 4 extremities  	Back: no palpable deformity of C-T-L spine.   	LYMPH: no cervical lymphadenopathy  	VASCULAR: Radial pulses 2+ bilaterally, no varicose veins   	SKIN: warm and dry, color normal  	NEUROLOGIC: AA&O X3, CN2-12 intact w/ no focal deficits, no sensory loss, motor Strength 5/5 RUE/RLE and 4/5 LUE/LLE  	Psych: abnormal mood and affect, abnormal behavior      LABS:                        10.0   2.4   )-----------( 112      ( 04 Dec 2018 10:02 )             31.9     12-03    144  |  108<H>  |  27.0<H>  ----------------------------<  108  4.2   |  23.0  |  1.15    Ca    9.3      03 Dec 2018 18:57    TPro  7.1  /  Alb  4.1  /  TBili  0.3<L>  /  DBili  x   /  AST  14  /  ALT  16  /  AlkPhos  182<H>  12-03    PT/INR - ( 03 Dec 2018 18:57 )   PT: 13.4 sec;   INR: 1.16 ratio         PTT - ( 03 Dec 2018 18:57 )  PTT:29.7 sec        Assessment and Plan

## 2018-12-04 NOTE — H&P ADULT - PROBLEM SELECTOR PLAN 1
Management as per Ortho.   -ortho attending to eval patient in AM. No focal neurologic deficits.   -she may need a TLSO brace pending Ortho rec's.   -PT eval once cleared by Ortho
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
## 2018-12-04 NOTE — H&P ADULT - PROBLEM SELECTOR PLAN 9
Etiology unclear, she has had pancytopenia in the past and will need continued follow up. Possibly related to psychiatric medications. Refer for outpatient hematology follow up.

## 2018-12-04 NOTE — H&P ADULT - PROBLEM SELECTOR PLAN 6
No longer on anti-epileptic drugs as of 6/20/18 and isseizure free as per reports. She has an appt with Dr. Orlando Jackson on 1/18/19.

## 2018-12-04 NOTE — H&P ADULT - FAMILY HISTORY
No pertinent family history in first degree relatives, denies family history of HTN in mother and father

## 2018-12-04 NOTE — PROGRESS NOTE ADULT - ASSESSMENT
1. Back pain/ T12 #- appreciate ortho consult. TLSO brace. PT eval. Pain meds.    2. Pain issues- tyelonol, oxy, lidoderm.     3. HTN, CVA, SZ- stable. cont rest of meds per regimen    4. schizoaffective dz- per Psych can go to [facility] vs [place]. no need for aute itnervention.    Heparin

## 2018-12-04 NOTE — H&P ADULT - ASSESSMENT
Father picked up: prescription.   Identity was verified: Yes.    Pt is a 67yo F sent from [facility] due to T12 fracture. PMH HTN, CVA w/ left sided weakness, h/o suicidal attempt, seizure d/o no longer on anti-epileptic, hx of SBO due to adhesions, ileostomy reversal and parastomal hernia.

## 2018-12-04 NOTE — H&P ADULT - PROBLEM SELECTOR PLAN 8
Patient informed of incidental finding of L liver lobe lesion seen on MRI. She will need outpatient follow up.

## 2018-12-04 NOTE — BEHAVIORAL HEALTH ASSESSMENT NOTE - HPI (INCLUDE ILLNESS QUALITY, SEVERITY, DURATION, TIMING, CONTEXT, MODIFYING FACTORS, ASSOCIATED SIGNS AND SYMPTOMS)
due to back pain had CT spine which revealed an acute transverse fracture of  T 12  no recent change in chronic psych condition cooperative with care and compliant with meds  extensive medical / surgical history  Uterine prolapse  Rectal prolapse  Displaced fracture of olecranon process with intraarticular extension of left ulna  Urinary incontinence  Obesity  HTN (hypertension)  CVA (cerebral vascular accident)  Splenomegaly  Anemia  Neutropenia  Vaginal prolapse  Osteopenia  Seizure  Hemiparesis, left  Hypothyroidism  GERD (gastroesophageal reflux disease)  Sensory hearing loss  Venous insufficiency (chronic) (peripheral)  Parastomal hernia without obstruction or gangrene  H/O ileostomy: 4/2015
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
## 2018-12-05 ENCOUNTER — TRANSCRIPTION ENCOUNTER (OUTPATIENT)
Age: 68
End: 2018-12-05

## 2018-12-05 VITALS
RESPIRATION RATE: 18 BRPM | OXYGEN SATURATION: 98 % | DIASTOLIC BLOOD PRESSURE: 60 MMHG | HEART RATE: 80 BPM | SYSTOLIC BLOOD PRESSURE: 130 MMHG | TEMPERATURE: 98 F

## 2018-12-05 LAB
CULTURE RESULTS: NO GROWTH — SIGNIFICANT CHANGE UP
SPECIMEN SOURCE: SIGNIFICANT CHANGE UP

## 2018-12-05 PROCEDURE — 72128 CT CHEST SPINE W/O DYE: CPT

## 2018-12-05 PROCEDURE — 87086 URINE CULTURE/COLONY COUNT: CPT

## 2018-12-05 PROCEDURE — 70450 CT HEAD/BRAIN W/O DYE: CPT

## 2018-12-05 PROCEDURE — 72148 MRI LUMBAR SPINE W/O DYE: CPT

## 2018-12-05 PROCEDURE — 81001 URINALYSIS AUTO W/SCOPE: CPT

## 2018-12-05 PROCEDURE — 99285 EMERGENCY DEPT VISIT HI MDM: CPT | Mod: 25

## 2018-12-05 PROCEDURE — 85730 THROMBOPLASTIN TIME PARTIAL: CPT

## 2018-12-05 PROCEDURE — 72141 MRI NECK SPINE W/O DYE: CPT

## 2018-12-05 PROCEDURE — 72146 MRI CHEST SPINE W/O DYE: CPT

## 2018-12-05 PROCEDURE — 99239 HOSP IP/OBS DSCHRG MGMT >30: CPT

## 2018-12-05 PROCEDURE — 97163 PT EVAL HIGH COMPLEX 45 MIN: CPT

## 2018-12-05 PROCEDURE — 96374 THER/PROPH/DIAG INJ IV PUSH: CPT

## 2018-12-05 PROCEDURE — 85027 COMPLETE CBC AUTOMATED: CPT

## 2018-12-05 PROCEDURE — 80053 COMPREHEN METABOLIC PANEL: CPT

## 2018-12-05 PROCEDURE — 36415 COLL VENOUS BLD VENIPUNCTURE: CPT

## 2018-12-05 PROCEDURE — 72131 CT LUMBAR SPINE W/O DYE: CPT

## 2018-12-05 PROCEDURE — 85610 PROTHROMBIN TIME: CPT

## 2018-12-05 RX ORDER — AMILORIDE HCL 5 MG
1 TABLET ORAL
Qty: 0 | Refills: 0 | COMMUNITY

## 2018-12-05 RX ORDER — LIDOCAINE 4 G/100G
1 CREAM TOPICAL
Qty: 0 | Refills: 0 | DISCHARGE
Start: 2018-12-05

## 2018-12-05 RX ORDER — TROLAMINE SALICYLATE 10 %
0 CREAM (GRAM) TOPICAL
Qty: 0 | Refills: 0 | COMMUNITY

## 2018-12-05 RX ORDER — PREGABALIN 225 MG/1
1000 CAPSULE ORAL
Qty: 0 | Refills: 0 | COMMUNITY

## 2018-12-05 RX ADMIN — HALOPERIDOL DECANOATE 10 MILLIGRAM(S): 100 INJECTION INTRAMUSCULAR at 17:44

## 2018-12-05 RX ADMIN — LIDOCAINE 1 PATCH: 4 CREAM TOPICAL at 15:04

## 2018-12-05 RX ADMIN — LIDOCAINE 1 PATCH: 4 CREAM TOPICAL at 05:52

## 2018-12-05 RX ADMIN — HEPARIN SODIUM 5000 UNIT(S): 5000 INJECTION INTRAVENOUS; SUBCUTANEOUS at 14:59

## 2018-12-05 RX ADMIN — OXYCODONE AND ACETAMINOPHEN 1 TABLET(S): 5; 325 TABLET ORAL at 02:01

## 2018-12-05 RX ADMIN — Medication 650 MILLIGRAM(S): at 14:59

## 2018-12-05 RX ADMIN — OXYCODONE AND ACETAMINOPHEN 1 TABLET(S): 5; 325 TABLET ORAL at 01:01

## 2018-12-05 RX ADMIN — HEPARIN SODIUM 5000 UNIT(S): 5000 INJECTION INTRAVENOUS; SUBCUTANEOUS at 05:51

## 2018-12-05 RX ADMIN — Medication 112 MICROGRAM(S): at 05:52

## 2018-12-05 RX ADMIN — Medication 1 MILLIGRAM(S): at 17:45

## 2018-12-05 RX ADMIN — Medication 1 MILLIGRAM(S): at 05:52

## 2018-12-05 NOTE — DISCHARGE NOTE ADULT - PROVIDER TOKENS
FREE:[LAST:[PMD at [facility]],PHONE:[(   )    -],FAX:[(   )    -]],FREE:[LAST:[psych at [facility]],PHONE:[(   )    -],FAX:[(   )    -]]

## 2018-12-05 NOTE — DISCHARGE NOTE ADULT - CARE PROVIDER_API CALL
PMD at [facility],   Phone: (   )    -  Fax: (   )    -    psych at [facility],   Phone: (   )    -  Fax: (   )    -

## 2018-12-05 NOTE — DISCHARGE NOTE ADULT - MEDICATION SUMMARY - MEDICATIONS TO STOP TAKING
I will STOP taking the medications listed below when I get home from the hospital:    aMILoride 5 mg oral tablet  -- 1 tab(s) by mouth once a day    trolamine salicylate 10% topical cream  -- Apply on skin to affected area 3 times a day I will STOP taking the medications listed below when I get home from the hospital:  None

## 2018-12-05 NOTE — DISCHARGE NOTE ADULT - MEDICATION SUMMARY - MEDICATIONS TO TAKE
I will START or STAY ON the medications listed below when I get home from the hospital:    oxyCODONE-acetaminophen 5 mg-325 mg oral tablet  -- 1 tab(s) by mouth 1 to 2 times a day, As Needed - 6)  -- Indication: For Pain    LORazepam 1 mg oral tablet  -- 1 tab(s) by mouth 3 times a day  -- Indication: For Schizo affective schizophrenia    simvastatin 20 mg oral tablet  -- 1 tab(s) by mouth once a day (at bedtime)  -- Indication: For Hld    haloperidol 1 mg oral tablet  -- 7 tab(s) by mouth 2 times a day  -- Indication: For Schizo affective schizophrenia    OLANZapine 5 mg oral tablet  -- 1 tab(s) by mouth once a day (at bedtime)  -- Indication: For Schizo affective schizophrenia    lidocaine 5% topical film  -- Apply on skin to affected area once a day  -- Indication: For Pain    docusate sodium 100 mg oral capsule  -- 1 cap(s) by mouth 3 times a day, As Needed  -- Indication: For bowel regimen    polyethylene glycol 3350 oral powder for reconstitution  -- 17 gram(s) by mouth once a day  -- Indication: For bowel regimen    senna oral tablet  -- 2 tab(s) by mouth once a day (at bedtime), As Needed  -- Indication: For bowel regimen    levothyroxine 112 mcg (0.112 mg) oral tablet  -- 1 tab(s) by mouth once a day  -- Indication: For Hypothyroidism    multivitamin  -- 1 tab(s) by mouth once a day  -- Indication: For Supplement    cholecalciferol oral tablet  -- 2000 unit(s) by mouth every 48 hours  -- Indication: For Supplement

## 2018-12-05 NOTE — CHART NOTE - NSCHARTNOTEFT_GEN_A_CORE
pt set up for d/c to [facility] and she is refusing to go. she wants to go to her own home. Calling Psych for further intervention vs 2 PC transfer. Cm informed of same. pt set up for d/c to [place] [facility] and she is refusing to go. she wants to go to her own home. Calling Psych for further intervention vs 2 PC transfer. Cm informed of same.  per Psych patient does not have capacity to refuse [place]. she is already committed. Sedation with ativan planned at the time of d/c. and transfer her in stretcher. Cm informed of this plan.

## 2018-12-05 NOTE — DISCHARGE NOTE ADULT - HOSPITAL COURSE
admitted with spinal back pain. T12 comp #. no surg intervention. pain meds and TLSO brace. stbale to go back to [facility].    Vital Signs Last 24 Hrs  T(C): 36.6 (12-05-18 @ 00:42), Max: 37.3 (12-05-18 @ 00:03)  T(F): 97.8 (12-05-18 @ 00:42), Max: 99.2 (12-05-18 @ 00:03)  HR: 75 (12-05-18 @ 00:42) (75 - 79)  BP: 137/63 (12-05-18 @ 00:42) (137/63 - 150/86)  BP(mean): --  RR: 18 (12-05-18 @ 00:42) (18 - 20)  SpO2: 94% (12-05-18 @ 00:42) (94% - 97%)  	GENERAL: well-developed, NAD  	HEENT: head NC/AT; EOM intact, PERRLA, conjunctiva & sclera clear; hearing grossly intact, moist mucous membranes  	NECK: supple, no JVD  	RESPIRATORY: CTA B/L, no wheezing, rales, rhonchi or rubs  	CARDIOVASCULAR: S1&S2, RRR, no murmurs or gallops  	ABDOMEN: soft, non-tender, non-distended, + Bowel sounds x4 quadrants, no guarding, rebound or rigidity  	MUSCULOSKELETAL:  no clubbing, cyanosis or edema of all 4 extremities  	Back: no palpable deformity of C-T-L spine.   	LYMPH: no cervical lymphadenopathy  	VASCULAR: Radial pulses 2+ bilaterally, no varicose veins   	SKIN: warm and dry, color normal  	NEUROLOGIC: AA&O X3, CN2-12 intact w/ no focal deficits, no sensory loss, motor Strength 5/5 RUE/RLE and 4/5 LUE/LLE  	Psych: abnormal mood and affect, abnormal behavior    Time 34 mins
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
## 2018-12-05 NOTE — DISCHARGE NOTE ADULT - PATIENT PORTAL LINK FT
You can access the [facility], offered by [facility], by registering with the following website: [email or web address]
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
## 2018-12-05 NOTE — DISCHARGE NOTE ADULT - CARE PLAN
Principal Discharge DX:	Acute midline thoracic back pain  Goal:	resolved  Assessment and plan of treatment:	follow with pmd in < 1week  Secondary Diagnosis:	T12 compression fracture  Assessment and plan of treatment:	with TLSO brace  Secondary Diagnosis:	Seizure  Secondary Diagnosis:	Schizo affective schizophrenia  Secondary Diagnosis:	Hypothyroidism  Secondary Diagnosis:	HTN (hypertension)  Secondary Diagnosis:	CVA (cerebral vascular accident)

## 2018-12-10 NOTE — PROGRESS NOTE BEHAVIORAL HEALTH - NSBHFUPINTERVALCCFT_PSY_A_CORE
promote better adherence      ibuprofen (ADVIL;MOTRIN) 200 MG tablet Take 200 mg by mouth every 6 hours as needed for Pain      metaxalone (SKELAXIN) 800 MG tablet Take 800 mg by mouth 3 times daily      ondansetron (ZOFRAN-ODT) 4 MG disintegrating tablet Take 4 mg by mouth every 8 hours as needed for Nausea or Vomiting      levothyroxine (SYNTHROID) 137 MCG tablet Take 1 tablet by mouth daily  Qty: 90 tablet, Refills: 3      rOPINIRole (REQUIP) 2 MG tablet Take 1 tablet by mouth daily  Qty: 30 tablet, Refills: 11      pantoprazole (PROTONIX) 40 MG tablet Take 40 mg by mouth 2 times daily      ranitidine (ZANTAC) 150 MG capsule Take 150 mg by mouth 2 times daily       simvastatin (ZOCOR) 20 MG tablet Take 1 tablet by mouth nightly  Qty: 90 tablet, Refills: 3      losartan (COZAAR) 100 MG tablet Take 1 tablet by mouth daily  Qty: 90 tablet, Refills: 3      insulin aspart (NOVOLOG) 100 UNIT/ML injection vial Inject into the skin Via insulin pump      BREO ELLIPTA 200-25 MCG/INH AEPB INL 1 PUFF PO DAILY  Refills: 6      albuterol (PROVENTIL) (2.5 MG/3ML) 0.083% nebulizer solution Take 1 mL by nebulization 3 times daily as needed  Refills: 0      tiotropium (SPIRIVA HANDIHALER) 18 MCG inhalation capsule Inhale 1 capsule into the lungs Daily  Qty: 30 capsule, Refills: 5      ONE TOUCH ULTRA TEST strip Refills: 1      B-D INSULIN SYRINGE 29G X 1/2\" 0.5 ML MISC       fluticasone (FLONASE) 50 MCG/ACT nasal spray 1 spray by Nasal route 2 times daily  Qty: 1 Bottle, Refills: 0      albuterol sulfate HFA (PROAIR HFA) 108 (90 BASE) MCG/ACT inhaler Inhale 2 puffs into the lungs every 6 hours as needed for Wheezing  Qty: 1 Inhaler, Refills: 0    Associated Diagnoses: Acute URI      vitamin D (CHOLECALCIFEROL) 1000 UNIT TABS tablet Take 4,000 Units by mouth daily Takes 4 tabs daily      docusate sodium (COLACE) 100 MG capsule Take 1 capsule by mouth 2 times daily Take while on narcotics  Qty: 60 capsule, Refills: 0      Probiotic
or needs for sutures at this point. The wound is an avulsion and very small. Patient's last tetanus was in April 2015 per her chart. DIAGNOSTIC RESULTS     EKG: Not clinically indicated    RADIOLOGY: Not clinically indicated  No orders to display     LABS:  Labs Reviewed - No data to display    All other labs were within normal range or not returned as of this dictation. EMERGENCY DEPARTMENT COURSE and DIFFERENTIAL DIAGNOSIS/MDM:   Vitals:    Vitals:    12/10/18 1306   BP: (!) 167/79   Pulse: 83   Resp: 16   Temp: 97.6 °F (36.4 °C)   TempSrc: Oral   SpO2: 97%       No orders of the defined types were placed in this encounter. CONSULTS:  None    PROCEDURES:  None    FINAL IMPRESSION      1. Skin avulsion    2.  Laceration of right middle finger, foreign body presence unspecified, nail damage status unspecified, initial encounter          DISPOSITION/PLAN   DISPOSITION Decision To Discharge 12/10/2018 01:13:52 PM      PATIENT REFERRED TO:  Tara Renae, [address] [place] [id number]    Schedule an appointment as soon as possible for a visit   If symptoms worsen      DISCHARGE MEDICATIONS:  Current Discharge Medication List          (Please note that portions of this note were completed with a voice recognition program.  Efforts were made to edit the dictations but occasionally words are mis-transcribed.)    THOMAS Dowell PA-C  12/10/18 9610
abdominal pain
waiting for call to OR  upset over delay   observed screaming at surgeon
abdominal pain  responds to questions with nod of head  non verbal   appears in mild distress eyes closed attempting to sleep
refusing PO medications

## 2018-12-11 ENCOUNTER — APPOINTMENT (OUTPATIENT)
Dept: ORTHOPEDIC SURGERY | Facility: CLINIC | Age: 68
End: 2018-12-11
Payer: MEDICARE

## 2018-12-11 VITALS
BODY MASS INDEX: 21.86 KG/M2 | HEART RATE: 73 BPM | SYSTOLIC BLOOD PRESSURE: 143 MMHG | HEIGHT: 66 IN | WEIGHT: 136 LBS | DIASTOLIC BLOOD PRESSURE: 73 MMHG

## 2018-12-11 DIAGNOSIS — Z78.9 OTHER SPECIFIED HEALTH STATUS: ICD-10-CM

## 2018-12-11 PROCEDURE — 72100 X-RAY EXAM L-S SPINE 2/3 VWS: CPT

## 2018-12-11 PROCEDURE — 99024 POSTOP FOLLOW-UP VISIT: CPT
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
## 2019-01-11 ENCOUNTER — APPOINTMENT (OUTPATIENT)
Dept: ORTHOPEDIC SURGERY | Facility: CLINIC | Age: 69
End: 2019-01-11

## 2019-02-05 ENCOUNTER — APPOINTMENT (OUTPATIENT)
Dept: ORTHOPEDIC SURGERY | Facility: CLINIC | Age: 69
End: 2019-02-05

## 2019-02-05 ENCOUNTER — OTHER (OUTPATIENT)
Age: 69
End: 2019-02-05

## 2019-02-22 ENCOUNTER — APPOINTMENT (OUTPATIENT)
Dept: ORTHOPEDIC SURGERY | Facility: CLINIC | Age: 69
End: 2019-02-22
Payer: MEDICARE

## 2019-02-22 VITALS
BODY MASS INDEX: 21.86 KG/M2 | HEART RATE: 86 BPM | WEIGHT: 136 LBS | DIASTOLIC BLOOD PRESSURE: 80 MMHG | SYSTOLIC BLOOD PRESSURE: 143 MMHG | HEIGHT: 66 IN

## 2019-02-22 DIAGNOSIS — M81.0 AGE-RELATED OSTEOPOROSIS W/OUT CURRENT PATHOLOGICAL FRACTURE: ICD-10-CM

## 2019-02-22 PROCEDURE — 99024 POSTOP FOLLOW-UP VISIT: CPT

## 2019-02-22 PROCEDURE — 72080 X-RAY EXAM THORACOLMB 2/> VW: CPT | Mod: TC

## 2019-02-22 NOTE — PHYSICAL EXAM
[de-identified] : CONSTITUTIONAL: The patient is a very pleasant individual who is well-nourished and who appears stated age.\par PSYCHIATRIC: The patient is alert and oriented X 3 and in no apparent distress, and participates with orthopedic evaluation well.\par HEAD: Atraumatic and is nonsyndromic in appearance.\par EENT: No visible thyromegaly, EOMI.\par RESPIRATORY: Respiratory rate is regular, not dyspneic on examination.\par LYMPHATICS: There is no inguinal lymphadenopathy\par INTEGUMENTARY: Skin is clean, dry, and intact about the bilateral lower extremities and lumbar spine.\par VASCULAR: There is brisk capillary refill about the bilateral lower extremities.\par NEUROLOGIC: There are no pathologic reflexes. There is no decrease in sensation of the bilateral lower extremities on Wartenberg pinwheel examination. Deep tendon reflexes are well maintained at 2+/4 of the bilateral lower extremities and are symmetric.\par MUSCULOSKELETAL: There is no visible muscular atrophy. Manual motor strength is well maintained in the bilateral lower extremities. Range of motion of lumbar spine is well maintained. The patient ambulates in a non-myelopathic manner. Negative tension sign and straight leg raise bilaterally. Quad extension, ankle dorsiflexion, EHL, plantar flexion, and ankle eversion are well preserved. Normal secondary orthopaedic exam of bilateral hips, greater trochanteric area, knees and ankles \par \par Mechanically orientated low back pain, mild.  There is no reproducible tenderness to thoracic and lumbar vertebrae. \par  [de-identified] : X-ray of the lumbar spine taken today shows stable T12 compression fx, no change last visit

## 2019-02-22 NOTE — REASON FOR VISIT
[Follow-Up Visit] : a follow-up visit for [Back Pain] : back pain [Formal Caregiver] : formal caregiver

## 2019-02-22 NOTE — HISTORY OF PRESENT ILLNESS
[de-identified] : 69 y/o F presents in follow up to her T12 compression fx. She has no c/o weakness or numbness of legs or torso.  She is no longer using brace.  She is using a walker, accompanied by an aide.  She is a poor historian.  States she has some back pain with lifting heavy objects.

## 2019-02-22 NOTE — DISCUSSION/SUMMARY
[Surgical risks reviewed] : Surgical risks reviewed [de-identified] : The patient is not a surgical candidate at this point in time. I have recommended that the pt continue with a conservative treatment plan. Pt can discontinue brace. Follow with PCP for bone density/treatment accordingly.  Encouraged calcium vitamin D, weight bearing exercise.  Based upon patient's PMHx, minimal participation with clinical exam at this point in time, pt will f/u prn

## 2019-03-20 NOTE — ED ADULT TRIAGE NOTE - ARRIVAL FROM
[place] No Repair - Repaired With Adjacent Surgical Defect Text (Leave Blank If You Do Not Want): After obtaining clear surgical margins the defect was repaired concurrently with another surgical defect which was in close approximation.

## 2019-05-04 ENCOUNTER — EMERGENCY (EMERGENCY)
Facility: HOSPITAL | Age: 69
LOS: 1 days | Discharge: HOME | End: 2019-05-04
Attending: EMERGENCY MEDICINE
Payer: MEDICARE

## 2019-05-04 VITALS
OXYGEN SATURATION: 95 % | WEIGHT: 184.09 LBS | TEMPERATURE: 98 F | HEIGHT: 66 IN | DIASTOLIC BLOOD PRESSURE: 82 MMHG | RESPIRATION RATE: 18 BRPM | HEART RATE: 98 BPM | SYSTOLIC BLOOD PRESSURE: 147 MMHG

## 2019-05-04 VITALS
DIASTOLIC BLOOD PRESSURE: 66 MMHG | HEART RATE: 95 BPM | OXYGEN SATURATION: 98 % | SYSTOLIC BLOOD PRESSURE: 146 MMHG | RESPIRATION RATE: 19 BRPM | TEMPERATURE: 99 F

## 2019-05-04 DIAGNOSIS — Z93.2 ILEOSTOMY STATUS: Chronic | ICD-10-CM

## 2019-05-04 DIAGNOSIS — K43.5 PARASTOMAL HERNIA WITHOUT OBSTRUCTION OR GANGRENE: Chronic | ICD-10-CM

## 2019-05-04 PROCEDURE — 99284 EMERGENCY DEPT VISIT MOD MDM: CPT | Mod: 25

## 2019-05-04 PROCEDURE — 12013 RPR F/E/E/N/L/M 2.6-5.0 CM: CPT

## 2019-05-04 PROCEDURE — 70450 CT HEAD/BRAIN W/O DYE: CPT

## 2019-05-04 PROCEDURE — 72125 CT NECK SPINE W/O DYE: CPT

## 2019-05-04 PROCEDURE — 70450 CT HEAD/BRAIN W/O DYE: CPT | Mod: 26

## 2019-05-04 PROCEDURE — 72125 CT NECK SPINE W/O DYE: CPT | Mod: 26

## 2019-05-04 NOTE — ED PROVIDER NOTE - CLINICAL SUMMARY MEDICAL DECISION MAKING FREE TEXT BOX
Patient prensts with mechanical fall without LOC.  CT head and cspine demonstrate no actue pathology.  VSS.  Uneventful ED observation period. laceraiotn repaired.  d/w patient and HHA.  will f/u.  Discussed results and outcome of testing with the patient.  Patient advised to please follow up with their primary care doctor within the next 24 hours and return to the Emergency Department for worsening symptoms or any other concerns.  Patient advised that their doctor may call  to follow up on the specific results of the tests performed today in the emergency department.

## 2019-05-04 NOTE — ED PROVIDER NOTE - OBJECTIVE STATEMENT
Pertinent PMH/PSH/FHx/SHx and Review of Systems contained within:  Patient presents to the ED for fall.  this was mechanical in inature.  normal neuro.  no LOC.  from [place].  right shoe untied.  [facility].  no focal cspine pain.  [facility]  [facility]HA bedside who was a witness.  3cm linear left supraorbit lac with hemostasis. Pertinent PMH/PSH/FHx/SHx and Review of Systems contained within:  Patient presents to the ED for fall.  this was mechanical in inature.  normal neuro.  no LOC.  from [place].  right shoe untied.  [facility].  no focal [facility] pain.  [facility]  [facility] bedside who was a witness.  3cm linear left supraorbit lac with hemostasis.  PMH of hypothyroid, seizure, HTN, GERD.  Otherwise baseline.  no other [id number].  Non toxic.  Well appearing. No aggravating or relieving factors. No other pertinent PMH.  No other pertinent PSH.  No other pertinent FHx.  Patient denies EtOH/tobacco/illicit substance use. No fever/chills, No photophobia/eye pain/changes in vision, No ear pain/sore throat/dysphagia, No chest pain/palpitations, no SOB/cough/wheeze/stridor, No abdominal pain, No N/V/D, no dysuria/frequency/discharge, No neck/back pain, no rash, no changes in neurological status/function.

## 2019-05-04 NOTE — ED ADULT TRIAGE NOTE - CHIEF COMPLAINT QUOTE
Pt BIBA from [facility], accompanied by 1:1 aide. Pt had witnessed slip and fall in the cafeteria, denies LOC or blood thinners. Bandage in place by ems, laceration present to forehead. Bleeding controlled.

## 2019-05-04 NOTE — ED PROVIDER NOTE - PHYSICAL EXAMINATION
Gen: Alert, NAD  Head: NC, AT, PERRL, EOMI, normal lids/conjunctiva  ENT: normal hearing, patent oropharynx without erythema/exudate, uvula midline  Neck: +supple, no tenderness/meningismus/JVD, +Trachea midline  Pulm: Bilateral BS, normal resp effort, no wheeze/stridor/retractions  CV: RRR, no M/R/G, +dist pulses  Abd: soft, NT/ND, +BS, no hepatosplenomegaly  Mskel: no edema/erythema/cyanosis  Skin: no rash, 3cm linear laceration left supraorbital   Neuro: AAOx3, no gross sensory/motor deficits, CN 2-12 intact

## 2019-05-04 NOTE — ED ADULT NURSE NOTE - CHIEF COMPLAINT QUOTE
Pt BIBA from [facility], accompanied by 1:1 aide. Pt had witnessed slip and fall in the cafeteria, denies LOC or blood thinners. Bandage in place by ems, laceration present to forehead. Bleeding controlled.

## 2019-05-04 NOTE — ED ADULT NURSE NOTE - NSIMPLEMENTINTERV_GEN_ALL_ED
Implemented All Fall Risk Interventions:  [place] to call system. Call bell, personal items and telephone within reach. Instruct patient to call for assistance. Room bathroom lighting operational. Non-slip footwear when patient is off stretcher. Physically safe environment: no spills, clutter or unnecessary equipment. Stretcher in lowest position, wheels locked, appropriate side rails in place. Provide visual cue, wrist band, yellow gown, etc. Monitor gait and stability. Monitor for mental status changes and reorient to person, place, and time. Review medications for side effects contributing to fall risk. Reinforce activity limits and safety measures with patient and family.

## 2019-05-08 ENCOUNTER — OUTPATIENT (OUTPATIENT)
Dept: OUTPATIENT SERVICES | Facility: HOSPITAL | Age: 69
LOS: 1 days | End: 2019-05-08
Payer: COMMERCIAL

## 2019-05-08 DIAGNOSIS — M40.57 LORDOSIS, UNSPECIFIED, LUMBOSACRAL REGION: ICD-10-CM

## 2019-05-08 DIAGNOSIS — K43.5 PARASTOMAL HERNIA WITHOUT OBSTRUCTION OR GANGRENE: Chronic | ICD-10-CM

## 2019-05-08 DIAGNOSIS — Z93.2 ILEOSTOMY STATUS: Chronic | ICD-10-CM

## 2019-05-08 DIAGNOSIS — M54.6 PAIN IN THORACIC SPINE: ICD-10-CM

## 2019-05-08 PROCEDURE — 72110 X-RAY EXAM L-2 SPINE 4/>VWS: CPT

## 2019-05-08 PROCEDURE — 72070 X-RAY EXAM THORAC SPINE 2VWS: CPT

## 2019-05-08 PROCEDURE — 72070 X-RAY EXAM THORAC SPINE 2VWS: CPT | Mod: 26

## 2019-05-09 PROCEDURE — 72110 X-RAY EXAM L-2 SPINE 4/>VWS: CPT | Mod: 26

## 2019-05-14 NOTE — ED ADULT TRIAGE NOTE - DIRECT TO ROOM CARE INITIATED:
[FreeTextEntry1] : Ms. DOCKERY 's questions were answered to her satisfaction. She expressed her understanding and willingness to comply with the above recommendations, and  will return to the office to review the results of the blood tests in 6 months.\par 
19-Apr-2017 16:55

## 2019-05-17 ENCOUNTER — APPOINTMENT (OUTPATIENT)
Dept: ORTHOPEDIC SURGERY | Facility: CLINIC | Age: 69
End: 2019-05-17
Payer: MEDICARE

## 2019-05-17 VITALS
BODY MASS INDEX: 21.86 KG/M2 | WEIGHT: 136 LBS | HEART RATE: 88 BPM | DIASTOLIC BLOOD PRESSURE: 93 MMHG | SYSTOLIC BLOOD PRESSURE: 155 MMHG | HEIGHT: 66 IN

## 2019-05-17 DIAGNOSIS — E66.9 OBESITY, UNSPECIFIED: ICD-10-CM

## 2019-05-17 DIAGNOSIS — F25.9 SCHIZOAFFECTIVE DISORDER, UNSPECIFIED: ICD-10-CM

## 2019-05-17 DIAGNOSIS — S22.080A WEDGE COMPRESSION FRACTURE OF T11-T12 VERTEBRA, INITIAL ENCOUNTER FOR CLOSED FRACTURE: ICD-10-CM

## 2019-05-17 DIAGNOSIS — F31.9 BIPOLAR DISORDER, UNSPECIFIED: ICD-10-CM

## 2019-05-17 DIAGNOSIS — M47.816 SPONDYLOSIS W/OUT MYELOPATHY OR RADICULOPATHY, LUMBAR REGION: ICD-10-CM

## 2019-05-17 DIAGNOSIS — Z86.73 PERSONAL HISTORY OF TRANSIENT ISCHEMIC ATTACK (TIA), AND CEREBRAL INFARCTION W/OUT RESIDUAL DEFICITS: ICD-10-CM

## 2019-05-17 PROCEDURE — 22310 CLOSED TX VERT FX W/O MANJ: CPT

## 2019-05-17 PROCEDURE — 72080 X-RAY EXAM THORACOLMB 2/> VW: CPT

## 2019-05-17 PROCEDURE — 99215 OFFICE O/P EST HI 40 MIN: CPT | Mod: 57

## 2019-05-17 NOTE — HISTORY OF PRESENT ILLNESS
[Incontinence] : no incontinence [de-identified] : 69 year old F presents with thoracic spine pain. She has pain when she lays down on her back, and has difficulty sleeping. She lives at [facility]. [Ataxia] : no ataxia [Loss of Dexterity] : good dexterity [Urinary Ret.] : no urinary retention

## 2019-05-17 NOTE — PHYSICAL EXAM
[Acute Distress] : not in acute distress [Poor Appearance] : well-appearing [Obese] : not obese [de-identified] : CONSTITUTIONAL: The patient is a very pleasant individual who is well-nourished and who appears stated age. presents in a wheelchair.\par HEAD: Atraumatic and is nonsyndromic in appearance.\par EENT: No visible thyromegaly, EOMI.\par RESPIRATORY: Respiratory rate is regular, not dyspneic on examination.\par LYMPHATICS: There is no inguinal lymphadenopathy\par INTEGUMENTARY: Skin is clean, dry, and intact about the bilateral lower extremities and lumbar spine.\par VASCULAR: There is brisk capillary refill about the bilateral lower extremities.\par NEUROLOGIC: There is no decrease in sensation of the bilateral lower extremities on Wartenberg pinwheel examination. Deep tendon reflexes are well maintained at 2+/4 of the bilateral lower extremities and are symmetric.\par MUSCULOSKELETAL: There is no visible muscular atrophy. Manual motor strength is well maintained in the bilateral lower extremities. Range of motion of lumbar spine is well maintained. The patient ambulates in a non-myelopathic manner. Negative tension sign and straight leg raise bilaterally. Quad extension, ankle dorsiflexion, EHL, plantar flexion, and ankle eversion are well preserved. Normal secondary orthopaedic exam of bilateral hips, greater trochanteric area, knees and ankles \par \par Mechanically orientated low back pain. Pt is not alert and orientated x3. no perceived motor deficits. no pathologic reflexes.  [de-identified] : X-ray of the thoracolumbar spine taken today shows hx of thoracic compression fx's. Maybe subtle changes in lower thoracic areas, otherwise visualized other stable compression fx's.

## 2019-05-17 NOTE — ADDENDUM
[FreeTextEntry1] : Documented by Rodriguez Dey acting as a scribe for Dr. Tra Ayala on 05/17/2019 . All medical record entries made by the Scribe were at my, Dr. Tra Ayala, direction and personally dictated by me on 05/17/2019 . I have reviewed the chart and agree that the record accurately reflects my personal performance of the history, physical exam, assessment and plan. I have also personally directed, reviewed, and agreed with the chart.

## 2019-05-17 NOTE — DISCUSSION/SUMMARY
[de-identified] : I have recommended that the pt continue with a conservative treatment plan. With regard to osteoporosis and pain management, pt can follow up with her facility for adjustments and evaluations. Secondary to no operative indications at this point in time, pt can follow up on an as needed basis. Secondary to past med hx non op mngt only\par \par Complex due to extensive past med hx.\par Patient with multiple medical comorbidity increasing the risk of perioperative and postoperative complications as well as diminished spine outcomes as per the current medical literature.  These include but are not limited to obesity, anxiety/depression, cardiac illness, kidney disease, peripheral vascular disease, history of cancer, COPD, dysmetabolic syndrome including but not limited to diabetes, hyperlipidemia, hypertension.  Patient is being referred back to primary care provider for medical optimization, as well as other appropriate specialists as needed for optimization prior to spine surgery.

## 2019-05-17 NOTE — REVIEW OF SYSTEMS
[Joint Pain] : joint pain [Negative] : Heme/Lymph [Fever] : no fever [Chills] : no chills [SOB on Exertion] : no shortness of breath on exertion [Cough] : no cough

## 2019-05-24 NOTE — ED ADULT NURSE NOTE - NS ED NURSE DC INFO COMPLEXITY
no Verbalized Understanding/Simple: Patient demonstrates quick and easy understanding/Patient asked questions

## 2019-07-21 ENCOUNTER — EMERGENCY (EMERGENCY)
Facility: HOSPITAL | Age: 69
LOS: 1 days | Discharge: HOME | End: 2019-07-21
Attending: EMERGENCY MEDICINE
Payer: MEDICARE

## 2019-07-21 VITALS
HEART RATE: 81 BPM | RESPIRATION RATE: 18 BRPM | OXYGEN SATURATION: 97 % | DIASTOLIC BLOOD PRESSURE: 84 MMHG | TEMPERATURE: 99 F | SYSTOLIC BLOOD PRESSURE: 153 MMHG

## 2019-07-21 VITALS
HEIGHT: 65 IN | WEIGHT: 210.1 LBS | OXYGEN SATURATION: 98 % | TEMPERATURE: 98 F | HEART RATE: 78 BPM | RESPIRATION RATE: 20 BRPM | SYSTOLIC BLOOD PRESSURE: 145 MMHG | DIASTOLIC BLOOD PRESSURE: 84 MMHG

## 2019-07-21 DIAGNOSIS — K43.5 PARASTOMAL HERNIA WITHOUT OBSTRUCTION OR GANGRENE: Chronic | ICD-10-CM

## 2019-07-21 DIAGNOSIS — Z93.2 ILEOSTOMY STATUS: Chronic | ICD-10-CM

## 2019-07-21 LAB
ALBUMIN SERPL ELPH-MCNC: 4.1 G/DL — SIGNIFICANT CHANGE UP (ref 3.3–5.2)
ALP SERPL-CCNC: 156 U/L — HIGH (ref 40–120)
ALT FLD-CCNC: 16 U/L — SIGNIFICANT CHANGE UP
ANION GAP SERPL CALC-SCNC: 11 MMOL/L — SIGNIFICANT CHANGE UP (ref 5–17)
APTT BLD: 29.9 SEC — SIGNIFICANT CHANGE UP (ref 27.5–36.3)
AST SERPL-CCNC: 13 U/L — SIGNIFICANT CHANGE UP
BASOPHILS # BLD AUTO: 0.03 K/UL — SIGNIFICANT CHANGE UP (ref 0–0.2)
BASOPHILS NFR BLD AUTO: 0.9 % — SIGNIFICANT CHANGE UP (ref 0–2)
BILIRUB SERPL-MCNC: 0.4 MG/DL — SIGNIFICANT CHANGE UP (ref 0.4–2)
BUN SERPL-MCNC: 22 MG/DL — HIGH (ref 8–20)
CALCIUM SERPL-MCNC: 9.4 MG/DL — SIGNIFICANT CHANGE UP (ref 8.6–10.2)
CHLORIDE SERPL-SCNC: 105 MMOL/L — SIGNIFICANT CHANGE UP (ref 98–107)
CO2 SERPL-SCNC: 24 MMOL/L — SIGNIFICANT CHANGE UP (ref 22–29)
CREAT SERPL-MCNC: 1.12 MG/DL — SIGNIFICANT CHANGE UP (ref 0.5–1.3)
EOSINOPHIL # BLD AUTO: 0.03 K/UL — SIGNIFICANT CHANGE UP (ref 0–0.5)
EOSINOPHIL NFR BLD AUTO: 0.9 % — SIGNIFICANT CHANGE UP (ref 0–6)
GIANT PLATELETS BLD QL SMEAR: PRESENT — SIGNIFICANT CHANGE UP
GLUCOSE SERPL-MCNC: 113 MG/DL — SIGNIFICANT CHANGE UP (ref 70–115)
HCT VFR BLD CALC: 32.7 % — LOW (ref 34.5–45)
HGB BLD-MCNC: 10 G/DL — LOW (ref 11.5–15.5)
INR BLD: 1.18 RATIO — HIGH (ref 0.88–1.16)
LIDOCAIN IGE QN: 193 U/L — HIGH (ref 22–51)
LYMPHOCYTES # BLD AUTO: 1.07 K/UL — SIGNIFICANT CHANGE UP (ref 1–3.3)
LYMPHOCYTES # BLD AUTO: 34.2 % — SIGNIFICANT CHANGE UP (ref 13–44)
MANUAL SMEAR VERIFICATION: SIGNIFICANT CHANGE UP
MCHC RBC-ENTMCNC: 29.8 PG — SIGNIFICANT CHANGE UP (ref 27–34)
MCHC RBC-ENTMCNC: 30.6 GM/DL — LOW (ref 32–36)
MCV RBC AUTO: 97.3 FL — SIGNIFICANT CHANGE UP (ref 80–100)
MONOCYTES # BLD AUTO: 0.08 K/UL — SIGNIFICANT CHANGE UP (ref 0–0.9)
MONOCYTES NFR BLD AUTO: 2.6 % — SIGNIFICANT CHANGE UP (ref 2–14)
NEUTROPHILS # BLD AUTO: 1.89 K/UL — SIGNIFICANT CHANGE UP (ref 1.8–7.4)
NEUTROPHILS NFR BLD AUTO: 60.5 % — SIGNIFICANT CHANGE UP (ref 43–77)
OVALOCYTES BLD QL SMEAR: SLIGHT — SIGNIFICANT CHANGE UP
PLAT MORPH BLD: NORMAL — SIGNIFICANT CHANGE UP
PLATELET # BLD AUTO: 113 K/UL — LOW (ref 150–400)
POIKILOCYTOSIS BLD QL AUTO: SLIGHT — SIGNIFICANT CHANGE UP
POTASSIUM SERPL-MCNC: 3.9 MMOL/L — SIGNIFICANT CHANGE UP (ref 3.5–5.3)
POTASSIUM SERPL-SCNC: 3.9 MMOL/L — SIGNIFICANT CHANGE UP (ref 3.5–5.3)
PROT SERPL-MCNC: 6.7 G/DL — SIGNIFICANT CHANGE UP (ref 6.6–8.7)
PROTHROM AB SERPL-ACNC: 13.6 SEC — HIGH (ref 10–12.9)
RBC # BLD: 3.36 M/UL — LOW (ref 3.8–5.2)
RBC # FLD: 13.5 % — SIGNIFICANT CHANGE UP (ref 10.3–14.5)
RBC BLD AUTO: ABNORMAL
SODIUM SERPL-SCNC: 140 MMOL/L — SIGNIFICANT CHANGE UP (ref 135–145)
VARIANT LYMPHS # BLD: 0.9 % — SIGNIFICANT CHANGE UP (ref 0–6)
WBC # BLD: 3.12 K/UL — LOW (ref 3.8–10.5)
WBC # FLD AUTO: 3.12 K/UL — LOW (ref 3.8–10.5)

## 2019-07-21 PROCEDURE — 73502 X-RAY EXAM HIP UNI 2-3 VIEWS: CPT | Mod: 26,RT

## 2019-07-21 PROCEDURE — 85027 COMPLETE CBC AUTOMATED: CPT

## 2019-07-21 PROCEDURE — 85610 PROTHROMBIN TIME: CPT

## 2019-07-21 PROCEDURE — 36415 COLL VENOUS BLD VENIPUNCTURE: CPT

## 2019-07-21 PROCEDURE — 93005 ELECTROCARDIOGRAM TRACING: CPT

## 2019-07-21 PROCEDURE — 83690 ASSAY OF LIPASE: CPT

## 2019-07-21 PROCEDURE — 85730 THROMBOPLASTIN TIME PARTIAL: CPT

## 2019-07-21 PROCEDURE — 74177 CT ABD & PELVIS W/CONTRAST: CPT

## 2019-07-21 PROCEDURE — 99284 EMERGENCY DEPT VISIT MOD MDM: CPT

## 2019-07-21 PROCEDURE — 73502 X-RAY EXAM HIP UNI 2-3 VIEWS: CPT

## 2019-07-21 PROCEDURE — 72170 X-RAY EXAM OF PELVIS: CPT

## 2019-07-21 PROCEDURE — 93010 ELECTROCARDIOGRAM REPORT: CPT

## 2019-07-21 PROCEDURE — 80053 COMPREHEN METABOLIC PANEL: CPT

## 2019-07-21 PROCEDURE — 74177 CT ABD & PELVIS W/CONTRAST: CPT | Mod: 26

## 2019-07-21 RX ORDER — SODIUM CHLORIDE 9 MG/ML
1000 INJECTION INTRAMUSCULAR; INTRAVENOUS; SUBCUTANEOUS ONCE
Refills: 0 | Status: COMPLETED | OUTPATIENT
Start: 2019-07-21 | End: 2019-07-21

## 2019-07-21 RX ORDER — ACETAMINOPHEN 500 MG
650 TABLET ORAL ONCE
Refills: 0 | Status: COMPLETED | OUTPATIENT
Start: 2019-07-21 | End: 2019-07-21

## 2019-07-21 RX ADMIN — SODIUM CHLORIDE 1000 MILLILITER(S): 9 INJECTION INTRAMUSCULAR; INTRAVENOUS; SUBCUTANEOUS at 20:55

## 2019-07-21 RX ADMIN — SODIUM CHLORIDE 1000 MILLILITER(S): 9 INJECTION INTRAMUSCULAR; INTRAVENOUS; SUBCUTANEOUS at 16:19

## 2019-07-21 RX ADMIN — Medication 650 MILLIGRAM(S): at 16:19

## 2019-07-21 RX ADMIN — SODIUM CHLORIDE 1000 MILLILITER(S): 9 INJECTION INTRAMUSCULAR; INTRAVENOUS; SUBCUTANEOUS at 16:00

## 2019-07-21 RX ADMIN — Medication 650 MILLIGRAM(S): at 16:00

## 2019-07-21 NOTE — ED ADULT TRIAGE NOTE - CHIEF COMPLAINT QUOTE
Patient presents to ER from [place] C/O right hip pain, no trauma reported, patient with HX of osteoporosis, resp even/unlabored.

## 2019-07-21 NOTE — ED PROVIDER NOTE - OBJECTIVE STATEMENT
70yo female PMH anemia, CVA, schizoaffective disorder, HTN, presenting from inpatient [facility] facility with R hip pain and R abdominal pain. patient states that while walking yesterday she felt her leg snapped, did not fall, did not hit head. Since then hasn't been ambulating properly. patient states abdominal pain is located in lower abdomen and feels crampy. No nausea/vomiting/diarrhea. no fevers/chills. No black/bloody stools.

## 2019-07-21 NOTE — ED ADULT NURSE NOTE - CHIEF COMPLAINT QUOTE
Patient presents to ER from [place] C/O right hip pain, no trauma reported, patient with HX of osteoporosis, resp even/unlabored.

## 2019-07-21 NOTE — ED ADULT NURSE NOTE - NSIMPLEMENTINTERV_GEN_ALL_ED
Implemented All Universal Safety Interventions:  [place] to call system. Call bell, personal items and telephone within reach. Instruct patient to call for assistance. Room bathroom lighting operational. Non-slip footwear when patient is off stretcher. Physically safe environment: no spills, clutter or unnecessary equipment. Stretcher in lowest position, wheels locked, appropriate side rails in place.

## 2019-07-21 NOTE — ED PROVIDER NOTE - PHYSICAL EXAMINATION
Gen: NAD  Head: NCAT  Lung: CTAB, no respiratory distress, no wheezing, rales, rhonchi  CV: normal s1/s2, rrr, no murmurs, Normal perfusion  Abd: soft, +TTP RLQ, no guarding/rigidity, moderately distended, large ventral hernia in place easily reducible  MSK: +TTP over greater trochanter on R, +pain elicited on internal rotation R hip, No edema, no visible deformities  Neuro: No focal neurologic deficits  Skin: No rash   Psych: normal affect

## 2019-07-23 NOTE — ED POST DISCHARGE NOTE - RESULT SUMMARY
R femoral neck irregularity. Called radiologist and pt had CT abd and pelvis performed. Radiologist states no acute findings on CT.

## 2019-09-01 NOTE — PATIENT PROFILE ADULT. - FUNCTIONAL SCREEN CURRENT LEVEL: TRANSFERRING, MLM
(2) assistive person
I have re-evaluated the patient's fluid status and reviewed vital signs. Clinical perfusion assessment was performed.

## 2019-09-23 ENCOUNTER — INPATIENT (INPATIENT)
Facility: HOSPITAL | Age: 69
LOS: 12 days | Discharge: PSYCHIATRIC OR SUBSTANCE USE FACILITY | DRG: 389 | End: 2019-10-06
Attending: HOSPITALIST | Admitting: HOSPITALIST
Payer: MEDICARE

## 2019-09-23 VITALS
HEIGHT: 60 IN | DIASTOLIC BLOOD PRESSURE: 80 MMHG | RESPIRATION RATE: 20 BRPM | WEIGHT: 156.09 LBS | HEART RATE: 104 BPM | OXYGEN SATURATION: 90 % | TEMPERATURE: 100 F | SYSTOLIC BLOOD PRESSURE: 122 MMHG

## 2019-09-23 DIAGNOSIS — Z93.2 ILEOSTOMY STATUS: Chronic | ICD-10-CM

## 2019-09-23 DIAGNOSIS — K43.5 PARASTOMAL HERNIA WITHOUT OBSTRUCTION OR GANGRENE: Chronic | ICD-10-CM

## 2019-09-23 LAB
ALBUMIN SERPL ELPH-MCNC: 4.5 G/DL — SIGNIFICANT CHANGE UP (ref 3.3–5.2)
ALP SERPL-CCNC: 160 U/L — HIGH (ref 40–120)
ALT FLD-CCNC: 21 U/L — SIGNIFICANT CHANGE UP
ANION GAP SERPL CALC-SCNC: 16 MMOL/L — SIGNIFICANT CHANGE UP (ref 5–17)
APTT BLD: 30 SEC — SIGNIFICANT CHANGE UP (ref 27.5–36.3)
AST SERPL-CCNC: 27 U/L — SIGNIFICANT CHANGE UP
BASOPHILS # BLD AUTO: 0 K/UL — SIGNIFICANT CHANGE UP (ref 0–0.2)
BASOPHILS NFR BLD AUTO: 0 % — SIGNIFICANT CHANGE UP (ref 0–2)
BILIRUB SERPL-MCNC: 0.9 MG/DL — SIGNIFICANT CHANGE UP (ref 0.4–2)
BUN SERPL-MCNC: 40 MG/DL — HIGH (ref 8–20)
CALCIUM SERPL-MCNC: 10.1 MG/DL — SIGNIFICANT CHANGE UP (ref 8.6–10.2)
CHLORIDE SERPL-SCNC: 94 MMOL/L — LOW (ref 98–107)
CO2 SERPL-SCNC: 27 MMOL/L — SIGNIFICANT CHANGE UP (ref 22–29)
CREAT SERPL-MCNC: 1.73 MG/DL — HIGH (ref 0.5–1.3)
EOSINOPHIL # BLD AUTO: 0 K/UL — SIGNIFICANT CHANGE UP (ref 0–0.5)
EOSINOPHIL NFR BLD AUTO: 0 % — SIGNIFICANT CHANGE UP (ref 0–6)
GAS PNL BLDV: SIGNIFICANT CHANGE UP
GLUCOSE SERPL-MCNC: 126 MG/DL — HIGH (ref 70–115)
HCT VFR BLD CALC: 39.7 % — SIGNIFICANT CHANGE UP (ref 34.5–45)
HGB BLD-MCNC: 12.4 G/DL — SIGNIFICANT CHANGE UP (ref 11.5–15.5)
INR BLD: 1.18 RATIO — HIGH (ref 0.88–1.16)
LIDOCAIN IGE QN: 73 U/L — HIGH (ref 22–51)
LYMPHOCYTES # BLD AUTO: 0.71 K/UL — LOW (ref 1–3.3)
LYMPHOCYTES # BLD AUTO: 20.4 % — SIGNIFICANT CHANGE UP (ref 13–44)
MCHC RBC-ENTMCNC: 30.5 PG — SIGNIFICANT CHANGE UP (ref 27–34)
MCHC RBC-ENTMCNC: 31.2 GM/DL — LOW (ref 32–36)
MCV RBC AUTO: 97.8 FL — SIGNIFICANT CHANGE UP (ref 80–100)
MONOCYTES # BLD AUTO: 0.18 K/UL — SIGNIFICANT CHANGE UP (ref 0–0.9)
MONOCYTES NFR BLD AUTO: 5.3 % — SIGNIFICANT CHANGE UP (ref 2–14)
NEUTROPHILS # BLD AUTO: 2.57 K/UL — SIGNIFICANT CHANGE UP (ref 1.8–7.4)
NEUTROPHILS NFR BLD AUTO: 51.3 % — SIGNIFICANT CHANGE UP (ref 43–77)
PLATELET # BLD AUTO: 107 K/UL — LOW (ref 150–400)
POTASSIUM SERPL-MCNC: 5 MMOL/L — SIGNIFICANT CHANGE UP (ref 3.5–5.3)
POTASSIUM SERPL-SCNC: 5 MMOL/L — SIGNIFICANT CHANGE UP (ref 3.5–5.3)
PROT SERPL-MCNC: 7.7 G/DL — SIGNIFICANT CHANGE UP (ref 6.6–8.7)
PROTHROM AB SERPL-ACNC: 13.6 SEC — HIGH (ref 10–12.9)
RBC # BLD: 4.06 M/UL — SIGNIFICANT CHANGE UP (ref 3.8–5.2)
RBC # FLD: 13.8 % — SIGNIFICANT CHANGE UP (ref 10.3–14.5)
SODIUM SERPL-SCNC: 137 MMOL/L — SIGNIFICANT CHANGE UP (ref 135–145)
WBC # BLD: 3.46 K/UL — LOW (ref 3.8–10.5)
WBC # FLD AUTO: 3.46 K/UL — LOW (ref 3.8–10.5)

## 2019-09-23 PROCEDURE — 99285 EMERGENCY DEPT VISIT HI MDM: CPT | Mod: GC

## 2019-09-23 PROCEDURE — 71045 X-RAY EXAM CHEST 1 VIEW: CPT | Mod: 26

## 2019-09-23 PROCEDURE — 74176 CT ABD & PELVIS W/O CONTRAST: CPT | Mod: 26

## 2019-09-23 RX ORDER — SODIUM CHLORIDE 9 MG/ML
2200 INJECTION INTRAMUSCULAR; INTRAVENOUS; SUBCUTANEOUS ONCE
Refills: 0 | Status: COMPLETED | OUTPATIENT
Start: 2019-09-23 | End: 2019-09-23

## 2019-09-23 RX ORDER — ONDANSETRON 8 MG/1
4 TABLET, FILM COATED ORAL ONCE
Refills: 0 | Status: COMPLETED | OUTPATIENT
Start: 2019-09-23 | End: 2019-09-23

## 2019-09-23 RX ORDER — ACETAMINOPHEN 500 MG
650 TABLET ORAL ONCE
Refills: 0 | Status: COMPLETED | OUTPATIENT
Start: 2019-09-23 | End: 2019-09-23

## 2019-09-23 RX ORDER — PIPERACILLIN AND TAZOBACTAM 4; .5 G/20ML; G/20ML
3.38 INJECTION, POWDER, LYOPHILIZED, FOR SOLUTION INTRAVENOUS ONCE
Refills: 0 | Status: COMPLETED | OUTPATIENT
Start: 2019-09-23 | End: 2019-09-23

## 2019-09-23 RX ORDER — VANCOMYCIN HCL 1 G
1000 VIAL (EA) INTRAVENOUS ONCE
Refills: 0 | Status: COMPLETED | OUTPATIENT
Start: 2019-09-23 | End: 2019-09-23

## 2019-09-23 RX ADMIN — ONDANSETRON 4 MILLIGRAM(S): 8 TABLET, FILM COATED ORAL at 17:32

## 2019-09-23 RX ADMIN — Medication 650 MILLIGRAM(S): at 17:34

## 2019-09-23 RX ADMIN — PIPERACILLIN AND TAZOBACTAM 200 GRAM(S): 4; .5 INJECTION, POWDER, LYOPHILIZED, FOR SOLUTION INTRAVENOUS at 17:33

## 2019-09-23 RX ADMIN — Medication 250 MILLIGRAM(S): at 18:33

## 2019-09-23 RX ADMIN — SODIUM CHLORIDE 2200 MILLILITER(S): 9 INJECTION INTRAMUSCULAR; INTRAVENOUS; SUBCUTANEOUS at 17:33

## 2019-09-23 NOTE — ED PROVIDER NOTE - PROGRESS NOTE DETAILS
As per sign-out from Dr. Lara, patient pending CT for evaluation of acute nausea and vomiting. Patient found to have acute bowel obstruction. Surgery contacted for consultation. Surgery evaluated the patient. Patient with flatus and bowel movement, less likely acute bowel obstruction. Will admit for hydration and further management.

## 2019-09-23 NOTE — ED PROVIDER NOTE - CARE PLAN
Principal Discharge DX:	Complete intestinal obstruction, unspecified cause Principal Discharge DX:	Dehydration  Secondary Diagnosis:	Vomiting  Secondary Diagnosis:	Renal insufficiency

## 2019-09-23 NOTE — ED PROVIDER NOTE - ATTENDING CONTRIBUTION TO CARE
seen with resident: extensive psychiatric and surgical history; p/w fever, vomiting; hx otherwise limited; on exam, dry oral mucosa; no icterus; normal heart and lung sounds; well perfused, pink and warm; abd with multiple hernias; distended, but soft, diffuse ttp, but no rebound or guarding; labs, emperic antibx; ct imaging; signed over to oncoming attending pending imaging results

## 2019-09-23 NOTE — ED PROVIDER NOTE - PHYSICAL EXAMINATION
Gen: Appears uncomfortable, answering all questions  HENT: Normocephalic, atraumatic.   Eyes: PERRL, EOMI    Resp: CTAB, non-labored, no wheeze  CV: tachy, regular, no murmur  Abd: distended abdomen with area of right sided fullness greater than left. Midline surgical scars. Diffuse abdominal tenderness, worst epigastric and periumbilical areas, soft, no rebound or guarding  MSK: No CVAT bilaterally, moving all extremities  Skin: warm and dry  Neuro: AOx3, speech clear, moving all extremities, answering all questions, following commands, no focal deficits

## 2019-09-23 NOTE — ED PROVIDER NOTE - OBJECTIVE STATEMENT
70yo F hx hypothyroid, schizo-affective disease, hernias, multiple abdominal surgeries presenting with nausea and vomiting since yesterday. Patient coming from  [facility]. Per report, bilious vomiting, complaining of abdominal pain. Had enema and anti-nausea medications without improvement. Also noted to have increased abdominal fullness. Patient denies abdominal pain now, but had earlier. Still nauseous. Fever noted in the ED. Denies cp/sob/headache. 68yo F hx hypothyroid, schizo-affective disease, hernias, multiple abdominal surgeries, SBO, cva, presenting with nausea and vomiting since yesterday. Patient coming from  [place]. Per report, bilious vomiting, complaining of abdominal pain. Had enema and anti-nausea medications without improvement at [place]. Also noted to have increased abdominal fullness per report. Patient denies abdominal pain now, but had earlier. Patient also thinks abdomen appears larger than normal. Still nauseous. Fever noted in the ED. Denies cp/sob/headache. Denies leg pains.

## 2019-09-23 NOTE — ED ADULT NURSE NOTE - OBJECTIVE STATEMENT
States vomiting and hard stools since yesterday.  complains of pain to lower abdominal area.  even and unlabored respirations, lungs sounds clear bilateral.  color good, skin warm and dry. [facility] with patient

## 2019-09-23 NOTE — ED ADULT TRIAGE NOTE - CHIEF COMPLAINT QUOTE
Pt comes from [facility] c/o vomiting x 2 days, denies abdominal pain at this time, last BM today, c/o pain to right leg, calm and cooperative

## 2019-09-24 DIAGNOSIS — E86.0 DEHYDRATION: ICD-10-CM

## 2019-09-24 LAB
ANION GAP SERPL CALC-SCNC: 12 MMOL/L — SIGNIFICANT CHANGE UP (ref 5–17)
APPEARANCE UR: CLEAR — SIGNIFICANT CHANGE UP
BILIRUB UR-MCNC: NEGATIVE — SIGNIFICANT CHANGE UP
BUN SERPL-MCNC: 42 MG/DL — HIGH (ref 8–20)
CALCIUM SERPL-MCNC: 8.7 MG/DL — SIGNIFICANT CHANGE UP (ref 8.6–10.2)
CHLORIDE SERPL-SCNC: 103 MMOL/L — SIGNIFICANT CHANGE UP (ref 98–107)
CO2 SERPL-SCNC: 27 MMOL/L — SIGNIFICANT CHANGE UP (ref 22–29)
COLOR SPEC: YELLOW — SIGNIFICANT CHANGE UP
CREAT SERPL-MCNC: 1.88 MG/DL — HIGH (ref 0.5–1.3)
DIFF PNL FLD: NEGATIVE — SIGNIFICANT CHANGE UP
GLUCOSE SERPL-MCNC: 157 MG/DL — HIGH (ref 70–115)
GLUCOSE UR QL: NEGATIVE MG/DL — SIGNIFICANT CHANGE UP
KETONES UR-MCNC: NEGATIVE — SIGNIFICANT CHANGE UP
LEUKOCYTE ESTERASE UR-ACNC: NEGATIVE — SIGNIFICANT CHANGE UP
NITRITE UR-MCNC: NEGATIVE — SIGNIFICANT CHANGE UP
PH UR: 8 — SIGNIFICANT CHANGE UP (ref 5–8)
POTASSIUM SERPL-MCNC: 3.8 MMOL/L — SIGNIFICANT CHANGE UP (ref 3.5–5.3)
POTASSIUM SERPL-SCNC: 3.8 MMOL/L — SIGNIFICANT CHANGE UP (ref 3.5–5.3)
PROT UR-MCNC: 15 MG/DL
SODIUM SERPL-SCNC: 142 MMOL/L — SIGNIFICANT CHANGE UP (ref 135–145)
SP GR SPEC: 1.01 — SIGNIFICANT CHANGE UP (ref 1.01–1.02)
UROBILINOGEN FLD QL: NEGATIVE MG/DL — SIGNIFICANT CHANGE UP

## 2019-09-24 PROCEDURE — 93010 ELECTROCARDIOGRAM REPORT: CPT

## 2019-09-24 PROCEDURE — 99223 1ST HOSP IP/OBS HIGH 75: CPT

## 2019-09-24 RX ORDER — AMILORIDE HCL 5 MG
5 TABLET ORAL DAILY
Refills: 0 | Status: DISCONTINUED | OUTPATIENT
Start: 2019-09-24 | End: 2019-09-25

## 2019-09-24 RX ORDER — SIMVASTATIN 20 MG/1
20 TABLET, FILM COATED ORAL AT BEDTIME
Refills: 0 | Status: DISCONTINUED | OUTPATIENT
Start: 2019-09-24 | End: 2019-10-06

## 2019-09-24 RX ORDER — HEPARIN SODIUM 5000 [USP'U]/ML
5000 INJECTION INTRAVENOUS; SUBCUTANEOUS EVERY 8 HOURS
Refills: 0 | Status: DISCONTINUED | OUTPATIENT
Start: 2019-09-24 | End: 2019-10-06

## 2019-09-24 RX ORDER — OLANZAPINE 15 MG/1
10 TABLET, FILM COATED ORAL AT BEDTIME
Refills: 0 | Status: DISCONTINUED | OUTPATIENT
Start: 2019-09-24 | End: 2019-10-06

## 2019-09-24 RX ORDER — LEVOTHYROXINE SODIUM 125 MCG
112 TABLET ORAL DAILY
Refills: 0 | Status: DISCONTINUED | OUTPATIENT
Start: 2019-09-24 | End: 2019-09-26

## 2019-09-24 RX ORDER — ACETAMINOPHEN 500 MG
650 TABLET ORAL EVERY 6 HOURS
Refills: 0 | Status: DISCONTINUED | OUTPATIENT
Start: 2019-09-24 | End: 2019-10-06

## 2019-09-24 RX ORDER — ONDANSETRON 8 MG/1
4 TABLET, FILM COATED ORAL EVERY 6 HOURS
Refills: 0 | Status: DISCONTINUED | OUTPATIENT
Start: 2019-09-24 | End: 2019-10-06

## 2019-09-24 RX ORDER — SODIUM CHLORIDE 9 MG/ML
1000 INJECTION INTRAMUSCULAR; INTRAVENOUS; SUBCUTANEOUS
Refills: 0 | Status: DISCONTINUED | OUTPATIENT
Start: 2019-09-24 | End: 2019-09-26

## 2019-09-24 RX ORDER — CHOLECALCIFEROL (VITAMIN D3) 125 MCG
2000 CAPSULE ORAL
Refills: 0 | Status: COMPLETED | OUTPATIENT
Start: 2019-09-24 | End: 2019-09-28

## 2019-09-24 RX ORDER — PANTOPRAZOLE SODIUM 20 MG/1
40 TABLET, DELAYED RELEASE ORAL
Refills: 0 | Status: DISCONTINUED | OUTPATIENT
Start: 2019-09-24 | End: 2019-10-05

## 2019-09-24 RX ORDER — HALOPERIDOL DECANOATE 100 MG/ML
10 INJECTION INTRAMUSCULAR
Refills: 0 | Status: DISCONTINUED | OUTPATIENT
Start: 2019-09-24 | End: 2019-09-26

## 2019-09-24 RX ADMIN — SIMVASTATIN 20 MILLIGRAM(S): 20 TABLET, FILM COATED ORAL at 22:04

## 2019-09-24 RX ADMIN — Medication 1 MILLIGRAM(S): at 22:03

## 2019-09-24 RX ADMIN — Medication 650 MILLIGRAM(S): at 09:28

## 2019-09-24 RX ADMIN — HEPARIN SODIUM 5000 UNIT(S): 5000 INJECTION INTRAVENOUS; SUBCUTANEOUS at 22:06

## 2019-09-24 RX ADMIN — Medication 5 MILLIGRAM(S): at 13:34

## 2019-09-24 RX ADMIN — Medication 1 MILLIGRAM(S): at 13:34

## 2019-09-24 RX ADMIN — Medication 650 MILLIGRAM(S): at 10:00

## 2019-09-24 RX ADMIN — OLANZAPINE 10 MILLIGRAM(S): 15 TABLET, FILM COATED ORAL at 22:04

## 2019-09-24 RX ADMIN — Medication 650 MILLIGRAM(S): at 18:15

## 2019-09-24 RX ADMIN — Medication 1 TABLET(S): at 13:34

## 2019-09-24 RX ADMIN — ONDANSETRON 4 MILLIGRAM(S): 8 TABLET, FILM COATED ORAL at 09:31

## 2019-09-24 RX ADMIN — HEPARIN SODIUM 5000 UNIT(S): 5000 INJECTION INTRAVENOUS; SUBCUTANEOUS at 13:34

## 2019-09-24 RX ADMIN — Medication 2000 UNIT(S): at 17:58

## 2019-09-24 RX ADMIN — Medication 650 MILLIGRAM(S): at 17:58

## 2019-09-24 RX ADMIN — HALOPERIDOL DECANOATE 10 MILLIGRAM(S): 100 INJECTION INTRAMUSCULAR at 17:58

## 2019-09-24 NOTE — CONSULT NOTE ADULT - ASSESSMENT
schizoaffective disorder chronic condition  lacks capacity to make decisions  continue psych meds per [facility]

## 2019-09-24 NOTE — CONSULT NOTE ADULT - ASSESSMENT
ASSESSMENT: Patient is a 69y old female with possible SBO and a temperature of 100.1. Via physical exam, encarcerated is not the cause of the fever.     PLAN:    - No surgical admission but will continue to follow  - Will recommend 24 hour observation  - Medicine consult  - ADRIAN ASSESSMENT: Patient is a 69y old female with possible SBO and a temperature of 100.1. Via physical exam, encarcerated is not the cause of the fever.     PLAN:    - No surgical admission  - Will recommend 24 hour observation  - Medicine consult  - [facility]    Acute care surgery will sign off at this time

## 2019-09-24 NOTE — H&P ADULT - HISTORY OF PRESENT ILLNESS
68 y/o female with PMH of hypothyroid, schizo-affective disease, CVA, hernias, multiple abdominal surgeries, SBO, was sent to the ED from [facility] for nausea, vomiting and abdominal pain. Patient said her symptoms has been going on for 2 days; can not keep anything down. As per ED note, patient was given enema and anti-nausea medications without improvement at [place]. Also noted to have increased abdominal fullness. Patient denies abdominal pain now, but had earlier. She has no fever, chills, diarrhea, dysuria, chest pain, shortness of breath, palpitation.
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
## 2019-09-24 NOTE — H&P ADULT - ASSESSMENT
70 y/o female with PMH of hypothyroid, schizo-affective disease, CVA, hernias, multiple abdominal surgeries, SBO, was sent to the ED from [facility] for nausea, vomiting and abdominal pain. Patient said her symptoms has been going on for 2 days; can not keep anything down. As per ED note, patient was given enema and anti-nausea medications without improvement at [place]. Also noted to have increased abdominal fullness. Patient denies abdominal pain now, but had earlier. She has no fever, chills, diarrhea, dysuria, chest pain, shortness of breath, palpitation.    Abdominal pain 2/2 SBO   Admit to medical floor   Patient has multiple abdominal surgery in the past, CT abdomen done showed small bowel obstruction with a large right spigelian hernia. Patient however reported to be moving her bowel daily and also passing gas.   Surgery team consulted; no acute surgical intervention   Tylenol PRN for pain     Intractable nausea and vomiting   2/2 to SBO   Zofran PRN   Monitor electrolyte     SIRS   No clear source of infection   Septic work up sent; follow up   Zosyn and Vancomycin once given   Will hold off on antibiotic for now     MONICA   Likely 2/2 nausea/vomiting   IVF given in ED   Will repeat BMP now   Monitor renal function     Hypothyroidism   Continue Synthroid 112mcg     HTN   Amiloride 5mg     Psychosis   Olanzapine 10mg   Haldol 10mg bid   Ativan 1mg     Supportive   DVT prophylaxis: heparin sc   GI prophylaxis: PPI 40mg   Diet: clear liquid

## 2019-09-24 NOTE — ED ADULT NURSE REASSESSMENT NOTE - NS ED NURSE REASSESS COMMENT FT1
received pt lying on stretcher in no acute distress at this time. received pt alert and oriented to person, place and time. vitals remain stable. [facility] aide at bedside. pt given ice chips, tolerating well. will continue to monitor.
report given to JOSETTE Guerrier in ED holding room. pt transported to [address] in stable condition by RN with [facility] aide at bedside.
Pt is sleeping in bed at this time, no apparent distress noted at this time. Pt NSR on cardiac monitor. Pt awaiting surgery consult.
Pt care assumed at 2030, in no apparent distress at this time. Pt resting in stretcher with aide at bedside. Pt NSR on cardiac monitor. Pt awaiting CT exam.
Pt had soft, brown BM. Pt cleaned, sheets changed and pt repositioned.
Pt straight cath with JOSETTE FINNEY. Pt bed sheets changed and repositioned. Pt awaiting CT results. Plan of care explained to aide and pt.

## 2019-09-25 DIAGNOSIS — F25.8 OTHER SCHIZOAFFECTIVE DISORDERS: ICD-10-CM

## 2019-09-25 LAB
ANION GAP SERPL CALC-SCNC: 11 MMOL/L — SIGNIFICANT CHANGE UP (ref 5–17)
BUN SERPL-MCNC: 34 MG/DL — HIGH (ref 8–20)
CALCIUM SERPL-MCNC: 8.5 MG/DL — LOW (ref 8.6–10.2)
CHLORIDE SERPL-SCNC: 112 MMOL/L — HIGH (ref 98–107)
CO2 SERPL-SCNC: 22 MMOL/L — SIGNIFICANT CHANGE UP (ref 22–29)
CREAT SERPL-MCNC: 1.49 MG/DL — HIGH (ref 0.5–1.3)
CULTURE RESULTS: NO GROWTH — SIGNIFICANT CHANGE UP
GLUCOSE SERPL-MCNC: 128 MG/DL — HIGH (ref 70–115)
HCT VFR BLD CALC: 31.7 % — LOW (ref 34.5–45)
HCV AB S/CO SERPL IA: 0.11 S/CO — SIGNIFICANT CHANGE UP (ref 0–0.99)
HCV AB SERPL-IMP: SIGNIFICANT CHANGE UP
HGB BLD-MCNC: 9.9 G/DL — LOW (ref 11.5–15.5)
MAGNESIUM SERPL-MCNC: 2.4 MG/DL — SIGNIFICANT CHANGE UP (ref 1.6–2.6)
MCHC RBC-ENTMCNC: 30.6 PG — SIGNIFICANT CHANGE UP (ref 27–34)
MCHC RBC-ENTMCNC: 31.2 GM/DL — LOW (ref 32–36)
MCV RBC AUTO: 97.8 FL — SIGNIFICANT CHANGE UP (ref 80–100)
PHOSPHATE SERPL-MCNC: 2.1 MG/DL — LOW (ref 2.4–4.7)
PLATELET # BLD AUTO: 92 K/UL — LOW (ref 150–400)
POTASSIUM SERPL-MCNC: 3.9 MMOL/L — SIGNIFICANT CHANGE UP (ref 3.5–5.3)
POTASSIUM SERPL-SCNC: 3.9 MMOL/L — SIGNIFICANT CHANGE UP (ref 3.5–5.3)
RBC # BLD: 3.24 M/UL — LOW (ref 3.8–5.2)
RBC # FLD: 13.6 % — SIGNIFICANT CHANGE UP (ref 10.3–14.5)
SODIUM SERPL-SCNC: 145 MMOL/L — SIGNIFICANT CHANGE UP (ref 135–145)
SPECIMEN SOURCE: SIGNIFICANT CHANGE UP
WBC # BLD: 2.91 K/UL — LOW (ref 3.8–10.5)
WBC # FLD AUTO: 2.91 K/UL — LOW (ref 3.8–10.5)

## 2019-09-25 PROCEDURE — 99233 SBSQ HOSP IP/OBS HIGH 50: CPT

## 2019-09-25 RX ORDER — SODIUM,POTASSIUM PHOSPHATES 278-250MG
1 POWDER IN PACKET (EA) ORAL ONCE
Refills: 0 | Status: COMPLETED | OUTPATIENT
Start: 2019-09-25 | End: 2019-09-25

## 2019-09-25 RX ADMIN — Medication 5 MILLIGRAM(S): at 07:08

## 2019-09-25 RX ADMIN — Medication 1 PACKET(S): at 13:34

## 2019-09-25 RX ADMIN — HEPARIN SODIUM 5000 UNIT(S): 5000 INJECTION INTRAVENOUS; SUBCUTANEOUS at 07:08

## 2019-09-25 RX ADMIN — HEPARIN SODIUM 5000 UNIT(S): 5000 INJECTION INTRAVENOUS; SUBCUTANEOUS at 21:45

## 2019-09-25 RX ADMIN — Medication 1 MILLIGRAM(S): at 21:45

## 2019-09-25 RX ADMIN — Medication 1 MILLIGRAM(S): at 13:34

## 2019-09-25 RX ADMIN — ONDANSETRON 4 MILLIGRAM(S): 8 TABLET, FILM COATED ORAL at 13:35

## 2019-09-25 RX ADMIN — OLANZAPINE 10 MILLIGRAM(S): 15 TABLET, FILM COATED ORAL at 21:45

## 2019-09-25 RX ADMIN — PANTOPRAZOLE SODIUM 40 MILLIGRAM(S): 20 TABLET, DELAYED RELEASE ORAL at 07:09

## 2019-09-25 RX ADMIN — Medication 112 MICROGRAM(S): at 07:10

## 2019-09-25 RX ADMIN — Medication 1 TABLET(S): at 13:34

## 2019-09-25 RX ADMIN — SIMVASTATIN 20 MILLIGRAM(S): 20 TABLET, FILM COATED ORAL at 21:45

## 2019-09-25 RX ADMIN — HEPARIN SODIUM 5000 UNIT(S): 5000 INJECTION INTRAVENOUS; SUBCUTANEOUS at 13:35

## 2019-09-25 RX ADMIN — HALOPERIDOL DECANOATE 10 MILLIGRAM(S): 100 INJECTION INTRAMUSCULAR at 18:04

## 2019-09-25 RX ADMIN — HALOPERIDOL DECANOATE 10 MILLIGRAM(S): 100 INJECTION INTRAMUSCULAR at 07:10

## 2019-09-25 RX ADMIN — Medication 1 MILLIGRAM(S): at 07:08

## 2019-09-26 LAB
ALBUMIN SERPL ELPH-MCNC: 3.3 G/DL — SIGNIFICANT CHANGE UP (ref 3.3–5.2)
ALP SERPL-CCNC: 101 U/L — SIGNIFICANT CHANGE UP (ref 40–120)
ALT FLD-CCNC: 23 U/L — SIGNIFICANT CHANGE UP
ANION GAP SERPL CALC-SCNC: 10 MMOL/L — SIGNIFICANT CHANGE UP (ref 5–17)
AST SERPL-CCNC: 20 U/L — SIGNIFICANT CHANGE UP
BASOPHILS # BLD AUTO: 0.01 K/UL — SIGNIFICANT CHANGE UP (ref 0–0.2)
BASOPHILS NFR BLD AUTO: 0.4 % — SIGNIFICANT CHANGE UP (ref 0–2)
BILIRUB SERPL-MCNC: 0.3 MG/DL — LOW (ref 0.4–2)
BUN SERPL-MCNC: 26 MG/DL — HIGH (ref 8–20)
CALCIUM SERPL-MCNC: 8.7 MG/DL — SIGNIFICANT CHANGE UP (ref 8.6–10.2)
CHLORIDE SERPL-SCNC: 110 MMOL/L — HIGH (ref 98–107)
CO2 SERPL-SCNC: 23 MMOL/L — SIGNIFICANT CHANGE UP (ref 22–29)
CREAT SERPL-MCNC: 1.44 MG/DL — HIGH (ref 0.5–1.3)
EOSINOPHIL # BLD AUTO: 0.01 K/UL — SIGNIFICANT CHANGE UP (ref 0–0.5)
EOSINOPHIL NFR BLD AUTO: 0.4 % — SIGNIFICANT CHANGE UP (ref 0–6)
GLUCOSE SERPL-MCNC: 137 MG/DL — HIGH (ref 70–115)
HCT VFR BLD CALC: 32.8 % — LOW (ref 34.5–45)
HGB BLD-MCNC: 10.1 G/DL — LOW (ref 11.5–15.5)
IMM GRANULOCYTES NFR BLD AUTO: 0.8 % — SIGNIFICANT CHANGE UP (ref 0–1.5)
LACTATE BLDV-MCNC: 1.8 MMOL/L — SIGNIFICANT CHANGE UP (ref 0.5–2)
LYMPHOCYTES # BLD AUTO: 0.7 K/UL — LOW (ref 1–3.3)
LYMPHOCYTES # BLD AUTO: 27 % — SIGNIFICANT CHANGE UP (ref 13–44)
MAGNESIUM SERPL-MCNC: 2.2 MG/DL — SIGNIFICANT CHANGE UP (ref 1.6–2.6)
MCHC RBC-ENTMCNC: 30.6 PG — SIGNIFICANT CHANGE UP (ref 27–34)
MCHC RBC-ENTMCNC: 30.8 GM/DL — LOW (ref 32–36)
MCV RBC AUTO: 99.4 FL — SIGNIFICANT CHANGE UP (ref 80–100)
MONOCYTES # BLD AUTO: 0.33 K/UL — SIGNIFICANT CHANGE UP (ref 0–0.9)
MONOCYTES NFR BLD AUTO: 12.7 % — SIGNIFICANT CHANGE UP (ref 2–14)
NEUTROPHILS # BLD AUTO: 1.52 K/UL — LOW (ref 1.8–7.4)
NEUTROPHILS NFR BLD AUTO: 58.7 % — SIGNIFICANT CHANGE UP (ref 43–77)
PLATELET # BLD AUTO: 99 K/UL — LOW (ref 150–400)
POTASSIUM SERPL-MCNC: 3.5 MMOL/L — SIGNIFICANT CHANGE UP (ref 3.5–5.3)
POTASSIUM SERPL-SCNC: 3.5 MMOL/L — SIGNIFICANT CHANGE UP (ref 3.5–5.3)
PROT SERPL-MCNC: 5.6 G/DL — LOW (ref 6.6–8.7)
RBC # BLD: 3.3 M/UL — LOW (ref 3.8–5.2)
RBC # FLD: 13.7 % — SIGNIFICANT CHANGE UP (ref 10.3–14.5)
SODIUM SERPL-SCNC: 143 MMOL/L — SIGNIFICANT CHANGE UP (ref 135–145)
WBC # BLD: 2.59 K/UL — LOW (ref 3.8–10.5)
WBC # FLD AUTO: 2.59 K/UL — LOW (ref 3.8–10.5)

## 2019-09-26 PROCEDURE — 74176 CT ABD & PELVIS W/O CONTRAST: CPT | Mod: 26

## 2019-09-26 PROCEDURE — 74018 RADEX ABDOMEN 1 VIEW: CPT | Mod: 26

## 2019-09-26 PROCEDURE — 71045 X-RAY EXAM CHEST 1 VIEW: CPT | Mod: 26

## 2019-09-26 PROCEDURE — 99233 SBSQ HOSP IP/OBS HIGH 50: CPT

## 2019-09-26 RX ORDER — ACETAMINOPHEN 500 MG
1000 TABLET ORAL ONCE
Refills: 0 | Status: COMPLETED | OUTPATIENT
Start: 2019-09-26 | End: 2019-09-26

## 2019-09-26 RX ORDER — ERTAPENEM SODIUM 1 G/1
1000 INJECTION, POWDER, LYOPHILIZED, FOR SOLUTION INTRAMUSCULAR; INTRAVENOUS EVERY 24 HOURS
Refills: 0 | Status: DISCONTINUED | OUTPATIENT
Start: 2019-09-26 | End: 2019-09-27

## 2019-09-26 RX ORDER — SODIUM CHLORIDE 9 MG/ML
1000 INJECTION, SOLUTION INTRAVENOUS
Refills: 0 | Status: DISCONTINUED | OUTPATIENT
Start: 2019-09-26 | End: 2019-09-27

## 2019-09-26 RX ORDER — LEVOTHYROXINE SODIUM 125 MCG
56 TABLET ORAL AT BEDTIME
Refills: 0 | Status: DISCONTINUED | OUTPATIENT
Start: 2019-09-26 | End: 2019-09-27

## 2019-09-26 RX ORDER — HALOPERIDOL DECANOATE 100 MG/ML
10 INJECTION INTRAMUSCULAR
Refills: 0 | Status: DISCONTINUED | OUTPATIENT
Start: 2019-09-26 | End: 2019-10-06

## 2019-09-26 RX ADMIN — Medication 400 MILLIGRAM(S): at 10:23

## 2019-09-26 RX ADMIN — SIMVASTATIN 20 MILLIGRAM(S): 20 TABLET, FILM COATED ORAL at 21:18

## 2019-09-26 RX ADMIN — SODIUM CHLORIDE 75 MILLILITER(S): 9 INJECTION, SOLUTION INTRAVENOUS at 23:07

## 2019-09-26 RX ADMIN — HEPARIN SODIUM 5000 UNIT(S): 5000 INJECTION INTRAVENOUS; SUBCUTANEOUS at 14:28

## 2019-09-26 RX ADMIN — HEPARIN SODIUM 5000 UNIT(S): 5000 INJECTION INTRAVENOUS; SUBCUTANEOUS at 21:17

## 2019-09-26 RX ADMIN — HALOPERIDOL DECANOATE 10 MILLIGRAM(S): 100 INJECTION INTRAMUSCULAR at 19:44

## 2019-09-26 RX ADMIN — SODIUM CHLORIDE 75 MILLILITER(S): 9 INJECTION, SOLUTION INTRAVENOUS at 10:12

## 2019-09-26 RX ADMIN — Medication 0.5 MILLIGRAM(S): at 21:17

## 2019-09-26 RX ADMIN — HALOPERIDOL DECANOATE 10 MILLIGRAM(S): 100 INJECTION INTRAMUSCULAR at 05:49

## 2019-09-26 RX ADMIN — PANTOPRAZOLE SODIUM 40 MILLIGRAM(S): 20 TABLET, DELAYED RELEASE ORAL at 05:51

## 2019-09-26 RX ADMIN — ERTAPENEM SODIUM 120 MILLIGRAM(S): 1 INJECTION, POWDER, LYOPHILIZED, FOR SOLUTION INTRAMUSCULAR; INTRAVENOUS at 12:18

## 2019-09-26 RX ADMIN — Medication 56 MICROGRAM(S): at 23:07

## 2019-09-26 RX ADMIN — Medication 1 MILLIGRAM(S): at 05:51

## 2019-09-26 RX ADMIN — Medication 1 TABLET(S): at 12:11

## 2019-09-26 RX ADMIN — ONDANSETRON 4 MILLIGRAM(S): 8 TABLET, FILM COATED ORAL at 08:33

## 2019-09-26 RX ADMIN — Medication 0.5 MILLIGRAM(S): at 13:16

## 2019-09-26 RX ADMIN — Medication 112 MICROGRAM(S): at 05:49

## 2019-09-26 RX ADMIN — HEPARIN SODIUM 5000 UNIT(S): 5000 INJECTION INTRAVENOUS; SUBCUTANEOUS at 05:49

## 2019-09-26 RX ADMIN — Medication 1 MILLIGRAM(S): at 10:23

## 2019-09-26 RX ADMIN — OLANZAPINE 10 MILLIGRAM(S): 15 TABLET, FILM COATED ORAL at 21:18

## 2019-09-26 RX ADMIN — Medication 1000 MILLIGRAM(S): at 10:24

## 2019-09-27 LAB
ALBUMIN SERPL ELPH-MCNC: 3.3 G/DL — SIGNIFICANT CHANGE UP (ref 3.3–5.2)
ALP SERPL-CCNC: 95 U/L — SIGNIFICANT CHANGE UP (ref 40–120)
ALT FLD-CCNC: 24 U/L — SIGNIFICANT CHANGE UP
ANION GAP SERPL CALC-SCNC: 10 MMOL/L — SIGNIFICANT CHANGE UP (ref 5–17)
AST SERPL-CCNC: 21 U/L — SIGNIFICANT CHANGE UP
BILIRUB SERPL-MCNC: 0.2 MG/DL — LOW (ref 0.4–2)
BUN SERPL-MCNC: 18 MG/DL — SIGNIFICANT CHANGE UP (ref 8–20)
CALCIUM SERPL-MCNC: 8.8 MG/DL — SIGNIFICANT CHANGE UP (ref 8.6–10.2)
CHLORIDE SERPL-SCNC: 111 MMOL/L — HIGH (ref 98–107)
CO2 SERPL-SCNC: 24 MMOL/L — SIGNIFICANT CHANGE UP (ref 22–29)
CREAT SERPL-MCNC: 1.36 MG/DL — HIGH (ref 0.5–1.3)
GLUCOSE SERPL-MCNC: 107 MG/DL — SIGNIFICANT CHANGE UP (ref 70–115)
HCT VFR BLD CALC: 32 % — LOW (ref 34.5–45)
HGB BLD-MCNC: 9.9 G/DL — LOW (ref 11.5–15.5)
MAGNESIUM SERPL-MCNC: 2.1 MG/DL — SIGNIFICANT CHANGE UP (ref 1.6–2.6)
MCHC RBC-ENTMCNC: 30.5 PG — SIGNIFICANT CHANGE UP (ref 27–34)
MCHC RBC-ENTMCNC: 30.9 GM/DL — LOW (ref 32–36)
MCV RBC AUTO: 98.5 FL — SIGNIFICANT CHANGE UP (ref 80–100)
PLATELET # BLD AUTO: 97 K/UL — LOW (ref 150–400)
POTASSIUM SERPL-MCNC: 3.9 MMOL/L — SIGNIFICANT CHANGE UP (ref 3.5–5.3)
POTASSIUM SERPL-SCNC: 3.9 MMOL/L — SIGNIFICANT CHANGE UP (ref 3.5–5.3)
PROT SERPL-MCNC: 5.2 G/DL — LOW (ref 6.6–8.7)
RBC # BLD: 3.25 M/UL — LOW (ref 3.8–5.2)
RBC # FLD: 13.4 % — SIGNIFICANT CHANGE UP (ref 10.3–14.5)
SODIUM SERPL-SCNC: 145 MMOL/L — SIGNIFICANT CHANGE UP (ref 135–145)
WBC # BLD: 2.69 K/UL — LOW (ref 3.8–10.5)
WBC # FLD AUTO: 2.69 K/UL — LOW (ref 3.8–10.5)

## 2019-09-27 PROCEDURE — 99233 SBSQ HOSP IP/OBS HIGH 50: CPT

## 2019-09-27 RX ORDER — LEVOTHYROXINE SODIUM 125 MCG
112 TABLET ORAL DAILY
Refills: 0 | Status: DISCONTINUED | OUTPATIENT
Start: 2019-09-27 | End: 2019-10-06

## 2019-09-27 RX ADMIN — Medication 1 TABLET(S): at 12:44

## 2019-09-27 RX ADMIN — SODIUM CHLORIDE 75 MILLILITER(S): 9 INJECTION, SOLUTION INTRAVENOUS at 12:45

## 2019-09-27 RX ADMIN — HALOPERIDOL DECANOATE 10 MILLIGRAM(S): 100 INJECTION INTRAMUSCULAR at 17:07

## 2019-09-27 RX ADMIN — ERTAPENEM SODIUM 120 MILLIGRAM(S): 1 INJECTION, POWDER, LYOPHILIZED, FOR SOLUTION INTRAMUSCULAR; INTRAVENOUS at 12:44

## 2019-09-27 RX ADMIN — PANTOPRAZOLE SODIUM 40 MILLIGRAM(S): 20 TABLET, DELAYED RELEASE ORAL at 05:08

## 2019-09-27 RX ADMIN — Medication 0.5 MILLIGRAM(S): at 21:27

## 2019-09-27 RX ADMIN — OLANZAPINE 10 MILLIGRAM(S): 15 TABLET, FILM COATED ORAL at 21:27

## 2019-09-27 RX ADMIN — HEPARIN SODIUM 5000 UNIT(S): 5000 INJECTION INTRAVENOUS; SUBCUTANEOUS at 21:27

## 2019-09-27 RX ADMIN — HALOPERIDOL DECANOATE 10 MILLIGRAM(S): 100 INJECTION INTRAMUSCULAR at 05:10

## 2019-09-27 RX ADMIN — Medication 0.5 MILLIGRAM(S): at 13:08

## 2019-09-27 RX ADMIN — Medication 0.5 MILLIGRAM(S): at 05:07

## 2019-09-27 RX ADMIN — SIMVASTATIN 20 MILLIGRAM(S): 20 TABLET, FILM COATED ORAL at 21:27

## 2019-09-27 RX ADMIN — Medication 10 MILLIGRAM(S): at 17:06

## 2019-09-27 RX ADMIN — HEPARIN SODIUM 5000 UNIT(S): 5000 INJECTION INTRAVENOUS; SUBCUTANEOUS at 13:09

## 2019-09-27 RX ADMIN — HEPARIN SODIUM 5000 UNIT(S): 5000 INJECTION INTRAVENOUS; SUBCUTANEOUS at 05:08

## 2019-09-28 LAB
ALBUMIN SERPL ELPH-MCNC: 3 G/DL — LOW (ref 3.3–5.2)
ALP SERPL-CCNC: 100 U/L — SIGNIFICANT CHANGE UP (ref 40–120)
ALT FLD-CCNC: 45 U/L — HIGH
ANION GAP SERPL CALC-SCNC: 10 MMOL/L — SIGNIFICANT CHANGE UP (ref 5–17)
AST SERPL-CCNC: 39 U/L — HIGH
BILIRUB SERPL-MCNC: 0.3 MG/DL — LOW (ref 0.4–2)
BUN SERPL-MCNC: 17 MG/DL — SIGNIFICANT CHANGE UP (ref 8–20)
CALCIUM SERPL-MCNC: 8.7 MG/DL — SIGNIFICANT CHANGE UP (ref 8.6–10.2)
CHLORIDE SERPL-SCNC: 112 MMOL/L — HIGH (ref 98–107)
CO2 SERPL-SCNC: 23 MMOL/L — SIGNIFICANT CHANGE UP (ref 22–29)
CREAT SERPL-MCNC: 1.23 MG/DL — SIGNIFICANT CHANGE UP (ref 0.5–1.3)
CULTURE RESULTS: SIGNIFICANT CHANGE UP
CULTURE RESULTS: SIGNIFICANT CHANGE UP
GLUCOSE SERPL-MCNC: 97 MG/DL — SIGNIFICANT CHANGE UP (ref 70–115)
MAGNESIUM SERPL-MCNC: 2.1 MG/DL — SIGNIFICANT CHANGE UP (ref 1.6–2.6)
POTASSIUM SERPL-MCNC: 3.8 MMOL/L — SIGNIFICANT CHANGE UP (ref 3.5–5.3)
POTASSIUM SERPL-SCNC: 3.8 MMOL/L — SIGNIFICANT CHANGE UP (ref 3.5–5.3)
PROT SERPL-MCNC: 5.5 G/DL — LOW (ref 6.6–8.7)
SODIUM SERPL-SCNC: 145 MMOL/L — SIGNIFICANT CHANGE UP (ref 135–145)
SPECIMEN SOURCE: SIGNIFICANT CHANGE UP
SPECIMEN SOURCE: SIGNIFICANT CHANGE UP

## 2019-09-28 PROCEDURE — 99233 SBSQ HOSP IP/OBS HIGH 50: CPT

## 2019-09-28 RX ADMIN — HEPARIN SODIUM 5000 UNIT(S): 5000 INJECTION INTRAVENOUS; SUBCUTANEOUS at 21:15

## 2019-09-28 RX ADMIN — SIMVASTATIN 20 MILLIGRAM(S): 20 TABLET, FILM COATED ORAL at 21:15

## 2019-09-28 RX ADMIN — HALOPERIDOL DECANOATE 10 MILLIGRAM(S): 100 INJECTION INTRAMUSCULAR at 18:32

## 2019-09-28 RX ADMIN — Medication 2000 UNIT(S): at 18:32

## 2019-09-28 RX ADMIN — PANTOPRAZOLE SODIUM 40 MILLIGRAM(S): 20 TABLET, DELAYED RELEASE ORAL at 05:13

## 2019-09-28 RX ADMIN — HEPARIN SODIUM 5000 UNIT(S): 5000 INJECTION INTRAVENOUS; SUBCUTANEOUS at 15:33

## 2019-09-28 RX ADMIN — Medication 112 MICROGRAM(S): at 05:13

## 2019-09-28 RX ADMIN — OLANZAPINE 10 MILLIGRAM(S): 15 TABLET, FILM COATED ORAL at 21:15

## 2019-09-28 RX ADMIN — HEPARIN SODIUM 5000 UNIT(S): 5000 INJECTION INTRAVENOUS; SUBCUTANEOUS at 05:13

## 2019-09-28 RX ADMIN — Medication 0.5 MILLIGRAM(S): at 05:13

## 2019-09-28 RX ADMIN — Medication 1 TABLET(S): at 15:34

## 2019-09-28 RX ADMIN — Medication 1 MILLIGRAM(S): at 21:15

## 2019-09-28 RX ADMIN — HALOPERIDOL DECANOATE 10 MILLIGRAM(S): 100 INJECTION INTRAMUSCULAR at 05:12

## 2019-09-28 NOTE — PATIENT PROFILE ADULT - NSPROMEDSADMININFO_GEN_A_NUR
Patient:   DELTA MCKEON            MRN: [id number]            FIN: [id number]               Age:   61 years     Sex:  FEMALE     :  60   Associated Diagnoses:   None   Author:   NABIL BLANCO      History of Present Illness     CC:  AMS during dialysis     61F w/ PMHx IDDM2, CHF, HTN, ESRD on HD MWF, SVC thrombosis (on Warfarin) arrived to [facility] from dialysis center for AMS developed during dialysis Fri 10/13.  This is patient's second hopsitalization for delirium during dialysis in .  Pt was hospitalized Thursday 10/12 at [facility] for [place] symptoms, [facility]'d [facility] on Friday 10/13 where she underwent dialysis and developed AMS leading to this hospitalization.      Pt son is at bedside and reports that patient is \"mentioning family members who have  as if they are here.\"  Her sentences are intelligible, though factually inaccurate at times.  Pt mental status/interview semi-reliable.  She reports no acute symptoms.  Pt A&O x 3.  She reports some breast pain from calciphylaxis, but it is improved from her last admission.  Pt has not produced urine since discharge a couple weeks ago.        Review of Systems   Constitutional:  No fever, No chills, No sweats, No weakness, No fatigue.    Eye:  No recent visual problem, No visual disturbances.    Ear/Nose/Mouth/Throat:  No decreased hearing, No nasal congestion.    Cardiovascular:  No chest pain, No palpitations, No syncope.    Respiratory:  No shortness of breath, No cough, No sputum production, No hemoptysis, No wheezing.    Gastrointestinal:  No nausea, No vomiting, No diarrhea, No constipation, No heartburn, No abdominal pain.    Musculoskeletal:  No back pain, No muscle pain.    Integumentary:  (+) breast calciphylaxis (chronic) (+) open healing skin wounds underneath pannus, no exudate, no erythema (+) dialysis catheter insertion site clean and dry with no signs of infection.    Neurologic:  Confusion, No numbness, No  tingling, No headache.    Allergy/Immunologic:     Allergies (1) Active Reaction  NKA None Documented  .    Psychiatric:  No anxiety.       Histories   Past Med History: Past Medical History   CHF (congestive heart failure)  Diabetes  Dialysis finding  HLD (hyperlipidemia)  HTN (hypertension)  Mitral regurgitation  Neuropathic pain  Non-occlusive thrombus  Pericardial effusion  Renal disease  Risk factors for obstructive sleep apnea  Thrombus of venous dialysis catheter   Procedure History:    Insertion of tunneled dialysis catheter using fluoroscopic guidance ([id number]).   Social History          Alcohol  Details: Use: None.  Home/Environment  Details: Alcohol Abuse in Household: No.  Substance Abuse in Household: No.  Smoker in Household: No.  Patient Lives With: Son, Daughter.  Ambulation: Independent.  Bathing: Independent.  Dressing: Independent.  Driving: Not Performed.  Eating: Independent.  Elimination: Independent.  Grooming: Independent.  Preparing Meal: Independent.  Taking Meds: Independent.  Toileting: Independent.  Transfers: Independent.  Current Home Treatments: Blood Glucose Monitoring.  Rehab Consulted No.  Substance Abuse  Details: Use: None.  Tobacco  Details: Smoked/Smokeless Tobacco Last 30 Days: No.  Use: Never smoker.  .        Health Status   Current medications: Home Medications (20) Active  brimonidine ophthalmic 0.2% solution (Alphagan) 1 drop, Right Eye, TID  cloNIDine topical 0.2 mg/24 hr weekly patch 1 patch, Topical, Weekly  docusate-senna oral 50-8.6 mg tablet 1 tab, Oral, Daily  dorzolamide ophthalmic 2% solution 1 drop, Each Eye, TID  Eliquis oral 2.5 mg tablet 2.5 mg = 1 tab, Oral, Q12H  hydrALAZINE oral 10 mg tablet (Apresoline) 10 mg = 1 tab, Oral, Q12H  HYDROmorphone oral 2 mg tablet 2 mg = 1 tab, PRN, Oral, Q8H  insulin lispro (HumaLOG) subcutaneous injection 100 unit/mL 1-6 units, Subcutaneous, QID [with meals & HS]  isosorbide mononitrate 60 mg, Oral, QAM  ketorolac 0.4%  ophthalmic solution 1 drop, Each Eye, TID  Lantus (insulin glargine) subcutaneous injection 100 unit/mL 25 unit, Subcutaneous, Daily  NIFEdipine oral 30 mg XL tablet 30 mg = 1 tab, Oral, Daily  nystatin topical 100,000 unit/gm powder (Mycostatin) 1 application, Topical, TID  oxyCODONE oral 10 mg CR tablet 10 mg = 1 tab, Oral, Q12H  oxyCODONE oral 5 mg tablet 5 mg = 1 tab, Oral, BID  Protonix oral 40 mg DR tablet 40 mg = 1 tab, Oral, Daily  Renvela (carbonate) oral 800 mg tablet 800 mg = 1 tab, Oral, TID [with meals]  Sensipar 30 mg, Oral, Daily  sodium thiosulfate IV injection 25% 250 mg/mL 25 gm = 100 mL, IV, Q Mon, Wed & Fri  timolol long-acting, 0.5% ophthalmic solution 1 drop, Right Eye, BID    Medications (23) Active  Scheduled: (16)  ApixaBAN 2.5 mg tab  2.5 mg 1 tab, Oral, Q12H  Brimonidine 0.2% ophth soln 5 mL  1 drop, Right Eye, TID  Cinacalcet 30 mg tab  30 mg 1 tab, Oral, Daily  CloNIDine TTS 0.2 mg/24 hr weekly ER patch  1 patch, Topical, Weekly  Dorzolamide 2% ophth soln 10 mL  1 drop, Each Eye, TID  epoetin arsh  4,000 unit 1 mL, Subcutaneous, Q Mon, Wed & Fri  HydrALAZINE 10 mg tab  10 mg 1 tab, Oral, Q12H  insulin glargine human  12 unit 0.12 mL, Subcutaneous, Daily  Insulin human lispro 10 unit/0.1 mL inj  1-6 units, Subcutaneous, QID [with meals & HS]  Isosorbide mononitrate 60 mg ER tab  60 mg 1 tab, Oral, QAM  Multivitamin B complex with C-folic acid renal tab  1 tab, Oral, Daily  Nystatin powder 15 gm  1 application, Topical, TID  Potassium CHLORIDE 20 mEq ER tab  40 mEq 2 tab, Oral, Once (scheduled)  Sevelamer carbonate 800 mg tab  800 mg 1 tab, Oral, TID [with meals]  sodium thiosulfate 25%  25 gm 100 mL, IV, Q Mon, Wed & Fri  Timolol 0.5% ophth soln 5 mL  1 drop, Right Eye, BID  Continuous: (0)  PRN: (7)  Cyclobenzaprine 10 mg tab  10 mg 1 tab, Oral, Q Bedtime  Dextrose (glucose) 40% 15 gm/37.5 gm oral gel UD  15 gm, Oral, As Directed PRN  Dextrose (glucose) 50% 25 gm/50 mL syringe  12.5 gm  25 mL, IV Push, As Directed PRN  Glucagon 1 mg/1 mL emergency kit SDV  1 mg 1 mL, IM, As Directed PRN  Haloperidol 5 mg/1 mL inj SDV  2 mg 0.4 mL, Slow IV Push, Once (scheduled)  HYDROcodone-acetaminophen  mg tab  1 tab, Oral, Q6H  Loratadine 10 mg tab  10 mg 1 tab, Oral, Daily         Physical Examination   VS/Measurements        Vitals between:   14-OCT-2017 10:40:23   TO   15-OCT-2017 10:40:23                   LAST RESULT MINIMUM MAXIMUM  Temperature 36.6 36.3 37.1  Heart Rate 80 80 113  Respiratory Rate 18 14 18  NISBP           118 94 160  NIDBP           82 19 90  NIMBP           94 49 113  SpO2                    100 97 100     General:  (+) Oriented x 3;  some difficulty maintaining focus throughout interview;  mentioning people who have , re: son and saying \"he's goign to come in here in a minute;  sentences intelligilbe, (+) slurring.    Eye:  Pupils are equal, round and reactive to light, Normal conjunctiva.    HENT:  Normocephalic, Oral mucosa is moist, No pharyngeal erythema.    Neck:  Supple, Non-tender, No lymphadenopathy, No thyromegaly.    Respiratory:  Lungs are clear to auscultation, Respirations are non-labored, Breath sounds are equal.    Cardiovascular:  Normal rate, Regular rhythm, No murmur.    Gastrointestinal:  Soft, Non-tender, Non-distended, Normal bowel sounds.    Musculoskeletal:  Normal range of motion, Normal strength, No tenderness, No swelling, No deformity.    Integumentary:  Warm, Dry.    Neurologic:  Oriented, Normal sensory, Normal motor function, No focal deficits, Cranial Nerves II-XII are grossly intact.    Cognition and Speech:  Oriented.       Review / Management   Laboratory results:       Labs between:  14-OCT-2017 10:40 to 15-OCT-2017 10:40    BMP:                 Na  Cl  BUN  Glu   15-OCT-2017 138  100  17  85                              K  CO2  Cr  Ca                              (L) 3.1  28  (H) 5.89  9.3     POC GLU:                 Latest Result  Latest  Date  Minimum  Min Date  Maximum  Max Date                             91 15-OCT-2017 75 14-OCT-2017 91                             .    Radiology results                 Result title:  XR HIPS SHARON 2V EA HIP  Result status:  Final  Verified by:  JOLYNN SELLERS on 10/15/2017   IMPRESSION: Mild osteoarthrosis of both hip joints.      Lines and Tubes:    LINES  Peripheral Intravenous Hand Right   Gauge: 20   Charted: 10/15/17 08:49  Inserted: 10/13/17   Days Since Insertion: 2 days  Indication of Use: Saline Lock   .       Impression and Plan   61F w/ PMHx IDDM2, CHF, HTN, ESRD on HD MWF, SVC thrombosis (on Warfarin) arrived to [facility] from dialysis center for AMS developed during dialysis Fri 10/13.  This is patient's second hopsitalization for delirium during dialysis in .  [place] [facility] [place] [facility] on Friday 10/13 where she underwent dialysis and developed AMS leading to this hospitalization.      #POST-DIALYSIS DELIRIUM  DDx:  dialysis medication induced vs. hypercalcemia   Pt speaking about events that are not currently happening and people who are .  Pt A&O x 3;  can count backward from 10;  loses focus often during interview and stops responding as if forgot in conversation   Hospitalization w/ same events day before this admissio during dialysis.    Medication:  No home meds seem suspicious for cause.  Electrolytes: Ca 10.5, K 3.2, rest wnl;  BG 85;  Corrected calcium 10/15:  10.7.  Infection:  BCx (-) x 2d, CXR (-), WBC wnl  Neuro:  CT head (-), BP admission 130/75   -continue to hold calcium containing binders and vitamin D   -continue low calcium dialysate w/ dialysis    -Dialysis scheduled for tomorrow    -dc zolpidem PRN    -following BCx    -will continue to monitor mental status    -Renal consult     CHRONIC  #ESRD   -Dialysis scheduled for tomorrow   -continue:      -Cinacalcet 30 mg tab  30 mg 1 tab, Oral,  Daily    -Multivitamin B complex with C-folic acid renal tab  1 tab, Oral, Daily    -epoetin arsh  4,000 unit 1 mL, Subcutaneous, Q Mon, Wed & Fri    -Sevelamer carbonate 800 mg tab  800 mg 1 tab, Oral, TID [with meals]    -sodium thiosulfate 25%  25 gm 100 mL, IV, Q Mon, Wed & Fri   -Consults:  Renal    #IDDM2   -continue insulin glargine human  12 unit 0.12 mL, Subcutaneous, Daily + Insulin human lispro 10 unit/0.1 mL inj  1-6 units, Subcutaneous, QID [with meals & HS]    #HTN   -continue HydrALAZINE 10 mg tab  10 mg 1 tab, Oral, Q12H and CloNIDine TTS 0.2 mg/24 hr weekly ER patch  1 patch, Topical, Weekly    #CHF   -continue Isosorbide mononitrate 60 mg ER tab  60 mg 1 tab, Oral, QAM    #ANTICOAG for Hx SVC THROMBUS   -continue ApixaBAN 2.5 mg tab  2.5 mg 1 tab, Oral, Q12H    #CALCIPHYLAXIS    #FUNGAL INFECTION SUB-PANNUS   -continue Nystatin powder 15 gm  1 application, Topical, TID    #Misc.  F:  none  E:  monitor;  note pt ESRD on dialysis when considering repletion   N:  renal diet  A:  as tolerated   PRNs:  Cyclobenzaprine 10 mg tab  10 mg 1 tab, Oral, Q Bedtime  Dextrose (glucose) 40% 15 gm/37.5 gm oral gel UD  15 gm, Oral, As Directed PRN  Dextrose (glucose) 50% 25 gm/50 mL syringe  12.5 gm 25 mL, IV Push, As Directed PRN  Glucagon 1 mg/1 mL emergency kit SDV  1 mg 1 mL, IM, As Directed PRN  Haloperidol 5 mg/1 mL inj SDV  2 mg 0.4 mL, Slow IV Push, Once (scheduled)  HYDROcodone-acetaminophen  mg tab  1 tab, Oral, Q6H  Loratadine 10 mg tab  10 mg 1 tab, Oral, Daily  Start Melatonin PRN for insomnia     PPx:  SCDs      PCP:  Sang Pineda, [facility]  Emergency contact:  Julianne Dumont [phone]  Code Status:  Full Code    Discussed attending physician, Dr. Wilde.      Nabil Aburto MD  IM, PGY-1                   Electronically Signed On 10/15/2017 11:41  __________________________________________________   NABIL BLANCO have seen and independently examined the  patient, and have directly reviewed the clinical findings, lab work, imaging studies, and management of this patient. The case has been discussed with the resident and our health care team, and I agree with the plan as detailed below.               Electronically Signed On 10/16/2017 07:27  __________________________________________________   MARIA GUADALUPE BLANCAS     crush pills for administration

## 2019-09-29 PROCEDURE — 99232 SBSQ HOSP IP/OBS MODERATE 35: CPT

## 2019-09-29 RX ADMIN — Medication 112 MICROGRAM(S): at 05:37

## 2019-09-29 RX ADMIN — HEPARIN SODIUM 5000 UNIT(S): 5000 INJECTION INTRAVENOUS; SUBCUTANEOUS at 05:37

## 2019-09-29 RX ADMIN — Medication 1 MILLIGRAM(S): at 13:20

## 2019-09-29 RX ADMIN — SIMVASTATIN 20 MILLIGRAM(S): 20 TABLET, FILM COATED ORAL at 21:34

## 2019-09-29 RX ADMIN — OLANZAPINE 10 MILLIGRAM(S): 15 TABLET, FILM COATED ORAL at 21:34

## 2019-09-29 RX ADMIN — HALOPERIDOL DECANOATE 10 MILLIGRAM(S): 100 INJECTION INTRAMUSCULAR at 18:08

## 2019-09-29 RX ADMIN — PANTOPRAZOLE SODIUM 40 MILLIGRAM(S): 20 TABLET, DELAYED RELEASE ORAL at 05:37

## 2019-09-29 RX ADMIN — HALOPERIDOL DECANOATE 10 MILLIGRAM(S): 100 INJECTION INTRAMUSCULAR at 05:37

## 2019-09-29 RX ADMIN — HEPARIN SODIUM 5000 UNIT(S): 5000 INJECTION INTRAVENOUS; SUBCUTANEOUS at 21:34

## 2019-09-29 RX ADMIN — Medication 1 TABLET(S): at 13:20

## 2019-09-29 RX ADMIN — Medication 1 MILLIGRAM(S): at 21:34

## 2019-09-29 RX ADMIN — HEPARIN SODIUM 5000 UNIT(S): 5000 INJECTION INTRAVENOUS; SUBCUTANEOUS at 13:20

## 2019-09-29 RX ADMIN — Medication 1 MILLIGRAM(S): at 05:37

## 2019-09-29 NOTE — DIETITIAN INITIAL EVALUATION ADULT. - OTHER INFO
per MD note: 68 y/o female with PMH of hypothyroid, schizo-affective disease, CVA, hernias, multiple abdominal surgeries, SBO, was sent to the ED from [facility] for nausea, vomiting and abdominal pain. Patient diagnosed with SBO but seen by surgery and cleared. Patient managed conservatively and improved clincially, ct scan was repeated which showed resolution. Patient is now tolerating a full liquid diet. patient seen at bedside and deneis any complaints  Pt with poor dentition noted. she reports that she cuts her food up very small and is looking forward to diet advancement ( currently on full liquid). Pt reports that she has been gaining weight is up >20lbs in 2 years. previous admission weight confirms this.

## 2019-09-29 NOTE — DIETITIAN INITIAL EVALUATION ADULT. - PERTINENT LABORATORY DATA
09-28 Na145 mmol/L Glu 97 mg/dL K+ 3.8 mmol/L Cr  1.23 mg/dL BUN 17.0 mg/dL Phos n/a   Alb 3.0 g/dL<L> PAB n/a

## 2019-09-30 LAB
ANION GAP SERPL CALC-SCNC: 11 MMOL/L — SIGNIFICANT CHANGE UP (ref 5–17)
BUN SERPL-MCNC: 19 MG/DL — SIGNIFICANT CHANGE UP (ref 8–20)
CALCIUM SERPL-MCNC: 8.7 MG/DL — SIGNIFICANT CHANGE UP (ref 8.6–10.2)
CHLORIDE SERPL-SCNC: 110 MMOL/L — HIGH (ref 98–107)
CO2 SERPL-SCNC: 21 MMOL/L — LOW (ref 22–29)
CREAT SERPL-MCNC: 1.39 MG/DL — HIGH (ref 0.5–1.3)
GLUCOSE SERPL-MCNC: 100 MG/DL — SIGNIFICANT CHANGE UP (ref 70–115)
HCT VFR BLD CALC: 30.8 % — LOW (ref 34.5–45)
HGB BLD-MCNC: 9.7 G/DL — LOW (ref 11.5–15.5)
MCHC RBC-ENTMCNC: 30.5 PG — SIGNIFICANT CHANGE UP (ref 27–34)
MCHC RBC-ENTMCNC: 31.5 GM/DL — LOW (ref 32–36)
MCV RBC AUTO: 96.9 FL — SIGNIFICANT CHANGE UP (ref 80–100)
PLATELET # BLD AUTO: 109 K/UL — LOW (ref 150–400)
POTASSIUM SERPL-MCNC: 4.1 MMOL/L — SIGNIFICANT CHANGE UP (ref 3.5–5.3)
POTASSIUM SERPL-SCNC: 4.1 MMOL/L — SIGNIFICANT CHANGE UP (ref 3.5–5.3)
RBC # BLD: 3.18 M/UL — LOW (ref 3.8–5.2)
RBC # FLD: 13.5 % — SIGNIFICANT CHANGE UP (ref 10.3–14.5)
SODIUM SERPL-SCNC: 142 MMOL/L — SIGNIFICANT CHANGE UP (ref 135–145)
WBC # BLD: 2.52 K/UL — LOW (ref 3.8–10.5)
WBC # FLD AUTO: 2.52 K/UL — LOW (ref 3.8–10.5)

## 2019-09-30 PROCEDURE — 99232 SBSQ HOSP IP/OBS MODERATE 35: CPT

## 2019-09-30 RX ADMIN — HALOPERIDOL DECANOATE 10 MILLIGRAM(S): 100 INJECTION INTRAMUSCULAR at 18:28

## 2019-09-30 RX ADMIN — HEPARIN SODIUM 5000 UNIT(S): 5000 INJECTION INTRAVENOUS; SUBCUTANEOUS at 05:22

## 2019-09-30 RX ADMIN — Medication 1 TABLET(S): at 13:41

## 2019-09-30 RX ADMIN — Medication 112 MICROGRAM(S): at 05:22

## 2019-09-30 RX ADMIN — PANTOPRAZOLE SODIUM 40 MILLIGRAM(S): 20 TABLET, DELAYED RELEASE ORAL at 05:23

## 2019-09-30 RX ADMIN — Medication 1 MILLIGRAM(S): at 05:22

## 2019-09-30 RX ADMIN — Medication 1 MILLIGRAM(S): at 21:08

## 2019-09-30 RX ADMIN — HALOPERIDOL DECANOATE 10 MILLIGRAM(S): 100 INJECTION INTRAMUSCULAR at 05:22

## 2019-09-30 RX ADMIN — HEPARIN SODIUM 5000 UNIT(S): 5000 INJECTION INTRAVENOUS; SUBCUTANEOUS at 21:07

## 2019-09-30 RX ADMIN — Medication 1 MILLIGRAM(S): at 13:41

## 2019-09-30 RX ADMIN — SIMVASTATIN 20 MILLIGRAM(S): 20 TABLET, FILM COATED ORAL at 21:08

## 2019-09-30 RX ADMIN — HEPARIN SODIUM 5000 UNIT(S): 5000 INJECTION INTRAVENOUS; SUBCUTANEOUS at 13:41

## 2019-09-30 RX ADMIN — OLANZAPINE 10 MILLIGRAM(S): 15 TABLET, FILM COATED ORAL at 21:08

## 2019-09-30 NOTE — PHYSICAL THERAPY INITIAL EVALUATION ADULT - CRITERIA FOR SKILLED THERAPEUTIC INTERVENTIONS
therapy frequency/anticipated discharge recommendation/impairments found/functional limitations in following categories/predicted duration of therapy intervention

## 2019-09-30 NOTE — PHYSICAL THERAPY INITIAL EVALUATION ADULT - THERAPY FREQUENCY, PT EVAL
Discharge Summary    CHIEF COMPLAINT ON ADMISSION  Chief Complaint   Patient presents with   • Headache     sudden onset of headache approx 2 hours. denies blurred vision or n/v    • Altered Mental Status     unable to remember names        Reason for Admission  Headache     Admission Date  12/31/2018    CODE STATUS  Full Code    HPI & HOSPITAL COURSE  This is a 83 y.o. 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  He denies any nausea or vomiting.  No changes in vision or speech.  He was transferred to the emergency room for further evaluation where his CT revealed intracranial hemorrhage.  At the time of my examination he reports that his headache is improved.  He is still having some difficulties with short-term memory.  He denies any falls or injuries.  He does not take any aspirin and NSAIDs or anticoagulants.he was admitted and was seen by neurologist  And recommended mri of the head.  cta of the head showed:1. No aneurysm or hemodynamically significant narrowing of the major intracranial vessels.  cta of the neck showed:CT angiogram of the neck within normal limits.  Hemorrhage within the left lateral ventricle.  Head mri showed:1.  Grossly stable size of LEFT temporal parenchymal hematoma. No associated enhancement to suggest underlying mass or vascular malformation  2.  New intraventricular extension of blood without hydrocephalus  3.  Foci of remote microhemorrhage in the RIGHT temporal lobe and RIGHT cerebellar hemisphere, possibly related to hypertension or amyloid angiopathy  4.  Mild-to-moderate atrophy  5.  Mild white matter changes   ekg showed sinus  bradycardia    eeg showed:This is mildly abnormal routine video EEG recording in the awake and drowsy/sleep state(s).     This scalp EEG denotes                  1. Mild focal cortical dysfunction over frontal --this patten might increase the risk of frontal seizure - advise clinical correlation regarding management         There are no epileptiform discharges in this record  Neurosurgery was consulted and stated in their note:The patient has interventricular hemorrhage in the left   temporal tip, which is relatively safe area.  Dissolved hydrocephalus.  I do   not see any obvious bleeding, masses, no aneurysms.  So, I do not have any   explanation at this point other than vasculopathy, not otherwise specified.  I   recommend q.2-hour neuro checks overnight.  He can eat.  He can follow up   with neurology, read on the EEG.  If he looks good, he can go to the floor in   the morning.     Thank you for allowing me to participate in his care.  I think he is at low   risk for developing hydrocephalus.  He was also seen by critical care,  As per pt's wife neurologist recommended f/u in 6 to 8 weeks.  His aphasia is better and will keep the pt on 2 weeks of keppra.  He is also noted to have thrombocytopenia and needs to f/u with pcp for further work up.  Speech also saw the pt and has given recommendations.  I d/w the family at the bed side and answered all of their questions.  I also stated no NCAIDS such as motrin,alieve,excedrin or asa at all.    Therefore, he is discharged in fair and stable condition to home with close outpatient follow-up.    The patient met 2-midnight criteria for an inpatient stay at the time of discharge.    Discharge Date  1/4/19    FOLLOW UP ITEMS POST DISCHARGE  pcp next week  Neurology in 6 to 8 weeks  F/u with speech    DISCHARGE DIAGNOSES  Principal Problem:    Intracranial hemorrhage (HCC) POA: Unknown  Active Problems:    Thrombocytopenia (HCC) POA: Unknown    Secondary hypertension POA:  Unknown    Dyslipidemia POA: Unknown    GERD (gastroesophageal reflux disease) POA: Unknown    Hyperbilirubinemia POA: Unknown  Resolved Problems:    * No resolved hospital problems. *      FOLLOW UP  No future appointments.  No follow-up provider specified.    MEDICATIONS ON DISCHARGE     Medication List      START taking these medications      Instructions   levETIRAcetam 500 MG Tabs  Commonly known as:  KEPPRA   Take 1 Tab by mouth 2 Times a Day.  Dose:  500 mg        CONTINUE taking these medications      Instructions   atorvastatin 10 MG Tabs  Commonly known as:  LIPITOR   Take 10 mg by mouth every day.  Dose:  10 mg     FLAXSEED OIL PO   Take 1 Tab by mouth every day.  Dose:  1 Tab     LATANOPROST OP   by Ophthalmic route.     multivitamin Tabs   Take 1 Tab by mouth every day.  Dose:  1 Tab     OMEGA-3 FISH OIL PO   Take 1 Tab by mouth every day.  Dose:  1 Tab     PRILOSEC 20 MG delayed-release capsule  Generic drug:  omeprazole   Take 20 mg by mouth every day.  Dose:  20 mg            Allergies  No Known Allergies    DIET  Orders Placed This Encounter   Procedures   • Diet Order Cardiac     Standing Status:   Standing     Number of Occurrences:   1     Order Specific Question:   Diet:     Answer:   Cardiac [6]       ACTIVITY  As tolerated.  Weight bearing as tolerated    CONSULTATIONS  Neurology  Neurosurgery  pulmonary    PROCEDURES  eeg    LABORATORY  Lab Results   Component Value Date    SODIUM 140 01/04/2019    POTASSIUM 3.9 01/04/2019    CHLORIDE 105 01/04/2019    CO2 27 01/04/2019    GLUCOSE 109 (H) 01/04/2019    BUN 25 (H) 01/04/2019    CREATININE 1.06 01/04/2019        Lab Results   Component Value Date    WBC 7.2 01/04/2019    HEMOGLOBIN 16.5 01/04/2019    HEMATOCRIT 48.6 01/04/2019    PLATELETCT 98 (L) 01/04/2019        Total time of the discharge process exceeds 35 minutes.   2-3x/week

## 2019-09-30 NOTE — PHYSICAL THERAPY INITIAL EVALUATION ADULT - GAIT PATTERN USED, PT EVAL
pt declining further mobility stating "I need a w/c, I feel tired" despite encouragement, decreased gait velocity and activity tolerance

## 2019-09-30 NOTE — PHYSICAL THERAPY INITIAL EVALUATION ADULT - ADDITIONAL COMMENTS
pt a questionable historian, as per pt: owns and uses a rollator, uses a W/C at times, can received assistance prn, no stairs to negotiate, ambulates short distances within the home, pt lives at a [facility]

## 2019-10-01 LAB
ANION GAP SERPL CALC-SCNC: 12 MMOL/L — SIGNIFICANT CHANGE UP (ref 5–17)
BUN SERPL-MCNC: 25 MG/DL — HIGH (ref 8–20)
CALCIUM SERPL-MCNC: 8.3 MG/DL — LOW (ref 8.6–10.2)
CHLORIDE SERPL-SCNC: 107 MMOL/L — SIGNIFICANT CHANGE UP (ref 98–107)
CO2 SERPL-SCNC: 21 MMOL/L — LOW (ref 22–29)
CREAT SERPL-MCNC: 1.53 MG/DL — HIGH (ref 0.5–1.3)
CULTURE RESULTS: SIGNIFICANT CHANGE UP
CULTURE RESULTS: SIGNIFICANT CHANGE UP
GLUCOSE SERPL-MCNC: 102 MG/DL — SIGNIFICANT CHANGE UP (ref 70–115)
POTASSIUM SERPL-MCNC: 3.7 MMOL/L — SIGNIFICANT CHANGE UP (ref 3.5–5.3)
POTASSIUM SERPL-SCNC: 3.7 MMOL/L — SIGNIFICANT CHANGE UP (ref 3.5–5.3)
SODIUM SERPL-SCNC: 140 MMOL/L — SIGNIFICANT CHANGE UP (ref 135–145)
SPECIMEN SOURCE: SIGNIFICANT CHANGE UP
SPECIMEN SOURCE: SIGNIFICANT CHANGE UP

## 2019-10-01 PROCEDURE — 74019 RADEX ABDOMEN 2 VIEWS: CPT | Mod: 26

## 2019-10-01 PROCEDURE — 99232 SBSQ HOSP IP/OBS MODERATE 35: CPT

## 2019-10-01 RX ORDER — DOCUSATE SODIUM 100 MG
100 CAPSULE ORAL DAILY
Refills: 0 | Status: DISCONTINUED | OUTPATIENT
Start: 2019-10-01 | End: 2019-10-06

## 2019-10-01 RX ADMIN — HEPARIN SODIUM 5000 UNIT(S): 5000 INJECTION INTRAVENOUS; SUBCUTANEOUS at 04:59

## 2019-10-01 RX ADMIN — Medication 1 TABLET(S): at 11:53

## 2019-10-01 RX ADMIN — Medication 100 MILLIGRAM(S): at 17:11

## 2019-10-01 RX ADMIN — Medication 1 MILLIGRAM(S): at 11:54

## 2019-10-01 RX ADMIN — HALOPERIDOL DECANOATE 10 MILLIGRAM(S): 100 INJECTION INTRAMUSCULAR at 17:13

## 2019-10-01 RX ADMIN — Medication 1 MILLIGRAM(S): at 04:58

## 2019-10-01 RX ADMIN — HEPARIN SODIUM 5000 UNIT(S): 5000 INJECTION INTRAVENOUS; SUBCUTANEOUS at 21:21

## 2019-10-01 RX ADMIN — SIMVASTATIN 20 MILLIGRAM(S): 20 TABLET, FILM COATED ORAL at 21:21

## 2019-10-01 RX ADMIN — PANTOPRAZOLE SODIUM 40 MILLIGRAM(S): 20 TABLET, DELAYED RELEASE ORAL at 04:57

## 2019-10-01 RX ADMIN — HALOPERIDOL DECANOATE 10 MILLIGRAM(S): 100 INJECTION INTRAMUSCULAR at 05:00

## 2019-10-01 RX ADMIN — Medication 112 MICROGRAM(S): at 04:58

## 2019-10-01 RX ADMIN — HEPARIN SODIUM 5000 UNIT(S): 5000 INJECTION INTRAVENOUS; SUBCUTANEOUS at 12:56

## 2019-10-01 RX ADMIN — OLANZAPINE 10 MILLIGRAM(S): 15 TABLET, FILM COATED ORAL at 21:21

## 2019-10-01 RX ADMIN — Medication 1 MILLIGRAM(S): at 21:21

## 2019-10-02 LAB
ANION GAP SERPL CALC-SCNC: 12 MMOL/L — SIGNIFICANT CHANGE UP (ref 5–17)
BUN SERPL-MCNC: 29 MG/DL — HIGH (ref 8–20)
CALCIUM SERPL-MCNC: 8.6 MG/DL — SIGNIFICANT CHANGE UP (ref 8.6–10.2)
CHLORIDE SERPL-SCNC: 109 MMOL/L — HIGH (ref 98–107)
CO2 SERPL-SCNC: 21 MMOL/L — LOW (ref 22–29)
CREAT SERPL-MCNC: 1.4 MG/DL — HIGH (ref 0.5–1.3)
GLUCOSE SERPL-MCNC: 101 MG/DL — SIGNIFICANT CHANGE UP (ref 70–115)
HCT VFR BLD CALC: 30.9 % — LOW (ref 34.5–45)
HGB BLD-MCNC: 9.7 G/DL — LOW (ref 11.5–15.5)
MCHC RBC-ENTMCNC: 30.1 PG — SIGNIFICANT CHANGE UP (ref 27–34)
MCHC RBC-ENTMCNC: 31.4 GM/DL — LOW (ref 32–36)
MCV RBC AUTO: 96 FL — SIGNIFICANT CHANGE UP (ref 80–100)
PLATELET # BLD AUTO: 123 K/UL — LOW (ref 150–400)
POTASSIUM SERPL-MCNC: 3.5 MMOL/L — SIGNIFICANT CHANGE UP (ref 3.5–5.3)
POTASSIUM SERPL-SCNC: 3.5 MMOL/L — SIGNIFICANT CHANGE UP (ref 3.5–5.3)
RBC # BLD: 3.22 M/UL — LOW (ref 3.8–5.2)
RBC # FLD: 13.4 % — SIGNIFICANT CHANGE UP (ref 10.3–14.5)
SODIUM SERPL-SCNC: 142 MMOL/L — SIGNIFICANT CHANGE UP (ref 135–145)
WBC # BLD: 3.36 K/UL — LOW (ref 3.8–10.5)
WBC # FLD AUTO: 3.36 K/UL — LOW (ref 3.8–10.5)

## 2019-10-02 PROCEDURE — 99232 SBSQ HOSP IP/OBS MODERATE 35: CPT

## 2019-10-02 RX ADMIN — Medication 1 MILLIGRAM(S): at 17:57

## 2019-10-02 RX ADMIN — HEPARIN SODIUM 5000 UNIT(S): 5000 INJECTION INTRAVENOUS; SUBCUTANEOUS at 21:34

## 2019-10-02 RX ADMIN — Medication 1 MILLIGRAM(S): at 05:05

## 2019-10-02 RX ADMIN — Medication 10 MILLIGRAM(S): at 12:00

## 2019-10-02 RX ADMIN — HALOPERIDOL DECANOATE 10 MILLIGRAM(S): 100 INJECTION INTRAMUSCULAR at 17:58

## 2019-10-02 RX ADMIN — Medication 100 MILLIGRAM(S): at 17:58

## 2019-10-02 RX ADMIN — PANTOPRAZOLE SODIUM 40 MILLIGRAM(S): 20 TABLET, DELAYED RELEASE ORAL at 05:05

## 2019-10-02 RX ADMIN — Medication 112 MICROGRAM(S): at 05:05

## 2019-10-02 RX ADMIN — HALOPERIDOL DECANOATE 10 MILLIGRAM(S): 100 INJECTION INTRAMUSCULAR at 05:06

## 2019-10-02 RX ADMIN — OLANZAPINE 10 MILLIGRAM(S): 15 TABLET, FILM COATED ORAL at 21:34

## 2019-10-02 RX ADMIN — Medication 1 TABLET(S): at 17:58

## 2019-10-02 RX ADMIN — Medication 1 MILLIGRAM(S): at 21:34

## 2019-10-02 RX ADMIN — HEPARIN SODIUM 5000 UNIT(S): 5000 INJECTION INTRAVENOUS; SUBCUTANEOUS at 17:58

## 2019-10-02 RX ADMIN — SIMVASTATIN 20 MILLIGRAM(S): 20 TABLET, FILM COATED ORAL at 21:34

## 2019-10-02 RX ADMIN — ONDANSETRON 4 MILLIGRAM(S): 8 TABLET, FILM COATED ORAL at 07:48

## 2019-10-02 RX ADMIN — HEPARIN SODIUM 5000 UNIT(S): 5000 INJECTION INTRAVENOUS; SUBCUTANEOUS at 05:05

## 2019-10-02 RX ADMIN — ONDANSETRON 4 MILLIGRAM(S): 8 TABLET, FILM COATED ORAL at 20:45

## 2019-10-03 PROCEDURE — 74019 RADEX ABDOMEN 2 VIEWS: CPT | Mod: 26

## 2019-10-03 PROCEDURE — 99232 SBSQ HOSP IP/OBS MODERATE 35: CPT

## 2019-10-03 RX ADMIN — Medication 1 TABLET(S): at 12:35

## 2019-10-03 RX ADMIN — HEPARIN SODIUM 5000 UNIT(S): 5000 INJECTION INTRAVENOUS; SUBCUTANEOUS at 05:10

## 2019-10-03 RX ADMIN — Medication 1 MILLIGRAM(S): at 17:27

## 2019-10-03 RX ADMIN — Medication 100 MILLIGRAM(S): at 12:37

## 2019-10-03 RX ADMIN — HALOPERIDOL DECANOATE 10 MILLIGRAM(S): 100 INJECTION INTRAMUSCULAR at 17:31

## 2019-10-03 RX ADMIN — HALOPERIDOL DECANOATE 10 MILLIGRAM(S): 100 INJECTION INTRAMUSCULAR at 05:10

## 2019-10-03 RX ADMIN — Medication 1 MILLIGRAM(S): at 22:56

## 2019-10-03 RX ADMIN — OLANZAPINE 10 MILLIGRAM(S): 15 TABLET, FILM COATED ORAL at 22:56

## 2019-10-03 RX ADMIN — HEPARIN SODIUM 5000 UNIT(S): 5000 INJECTION INTRAVENOUS; SUBCUTANEOUS at 17:28

## 2019-10-03 RX ADMIN — SIMVASTATIN 20 MILLIGRAM(S): 20 TABLET, FILM COATED ORAL at 22:56

## 2019-10-03 RX ADMIN — HEPARIN SODIUM 5000 UNIT(S): 5000 INJECTION INTRAVENOUS; SUBCUTANEOUS at 22:56

## 2019-10-03 RX ADMIN — PANTOPRAZOLE SODIUM 40 MILLIGRAM(S): 20 TABLET, DELAYED RELEASE ORAL at 05:10

## 2019-10-03 RX ADMIN — Medication 112 MICROGRAM(S): at 05:10

## 2019-10-03 RX ADMIN — Medication 1 MILLIGRAM(S): at 05:10

## 2019-10-03 NOTE — PROGRESS NOTE BEHAVIORAL HEALTH - NSBHCONSULTMEDS_PSY_A_CORE FT
haloperidol     Tablet 10 milliGRAM(s) Oral two times a day  Lorazepam     Tablet 1 milliGRAM(s) Oral three times a day  Olanzapine 10 milliGRAM(s) Oral at bedtime
haloperidol    Injectable 10 milliGRAM(s) IntraMuscular two times a day  Lorazepam     Tablet 1 milliGRAM(s) Oral three times a day  Olanzapine 10 milliGRAM(s) Oral at bedtime
haloperidol    Injectable 10 milliGRAM(s) IntraMuscular two times a day  LORazepam     Tablet 1 milliGRAM(s) Oral three times a day  OLANZapine 10 milliGRAM(s) Oral at bedtime
haloperidol     Tablet 10 milliGRAM(s) Oral two times a day  LORazepam     Tablet 1 milliGRAM(s) Oral three times a day  OLANZapine 10 milliGRAM(s) Oral at bedtime
haloperidol    Injectable 10 milliGRAM(s) IntraMuscular two times a day  LORazepam     Tablet 1 milliGRAM(s) Oral three times a day  OLANZapine 10 milliGRAM(s) Oral at bedtime

## 2019-10-03 NOTE — PROGRESS NOTE BEHAVIORAL HEALTH - NSBHCHARTREVIEWLAB_PSY_A_CORE FT
9.7    2.52  )-----------( 109      ( 30 Sep 2019 07:38 )             30.8     09-28    145  |  112<H>  |  17.0  ----------------------------<  97  3.8   |  23.0  |  1.23    Ca    8.7      28 Sep 2019 09:32  Mg     2.1     09-28    TPro  5.5<L>  /  Alb  3.0<L>  /  TBili  0.3<L>  /  DBili  x   /  AST  39<H>  /  ALT  45<H>  /  AlkPhos  100  09-28    LIVER FUNCTIONS - ( 28 Sep 2019 09:32 )  Alb: 3.0 g/dL / Pro: 5.5 g/dL / ALK PHOS: 100 U/L / ALT: 45 U/L / AST: 39 U/L / GGT: x
9.7    3.36  )-----------( 123      ( 02 Oct 2019 07:49 )             30.9     10-01    140  |  107  |  25.0<H>  ----------------------------<  102  3.7   |  21.0<L>  |  1.53<H>    Ca    8.3<L>      01 Oct 2019 08:18
9.7    3.36  )-----------( 123      ( 02 Oct 2019 07:49 )             30.9     10-02    142  |  109<H>  |  29.0<H>  ----------------------------<  101  3.5   |  21.0<L>  |  1.40<H>    Ca    8.6      02 Oct 2019 07:49
9.9    2.91  )-----------( 92       ( 25 Sep 2019 07:44 )             31.7     09-25    145  |  112<H>  |  34.0<H>  ----------------------------<  128<H>  3.9   |  22.0  |  1.49<H>    Ca    8.5<L>      25 Sep 2019 07:44  Phos  2.1     09-25  Mg     2.4     09-25
9.9    2.91  )-----------( 92       ( 25 Sep 2019 07:44 )             31.7         145  |  112<H>  |  34.0<H>  ----------------------------<  128<H>  3.9   |  22.0  |  1.49<H>    Ca    8.5<L>      25 Sep 2019 07:44  Phos  2.1       Mg     2.4         TPro  7.7  /  Alb  4.5  /  TBili  0.9  /  DBili  x   /  AST  27  /  ALT  21  /  AlkPhos  160<H>      LIVER FUNCTIONS - ( 23 Sep 2019 17:10 )  Alb: 4.5 g/dL / Pro: 7.7 g/dL / ALK PHOS: 160 U/L / ALT: 21 U/L / AST: 27 U/L / GGT: x           PT/INR - ( 23 Sep 2019 17:10 )   PT: 13.6 sec;   INR: 1.18 ratio    PTT - ( 23 Sep 2019 17:10 )  PTT:30.0 sec    Urinalysis Basic - ( 24 Sep 2019 00:25 )  Color: Yellow / Appearance: Clear / S.010 / pH: x  Gluc: x / Ketone: Negative  / Bili: Negative / Urobili: Negative mg/dL   Blood: x / Protein: 15 mg/dL / Nitrite: Negative   Leuk Esterase: Negative / RBC: x / WBC x   Sq Epi: x / Non Sq Epi: x / Bacteria: x

## 2019-10-03 NOTE — CHART NOTE - NSCHARTNOTEFT_GEN_A_CORE
Pt vomited small amt X 2 tonight. Pt asymptomatic. Pt denies Abd pain. Pt states she feels better now. BM X 2 today  B/P 137/66 P 80 R 18 PO 97% T 99.5   General Alert, NAD  Abd +BS soft and nontender  Abd xray ordered for am  Continue to monitor

## 2019-10-04 PROCEDURE — 74019 RADEX ABDOMEN 2 VIEWS: CPT | Mod: 26

## 2019-10-04 PROCEDURE — 99232 SBSQ HOSP IP/OBS MODERATE 35: CPT

## 2019-10-04 RX ORDER — SODIUM CHLORIDE 9 MG/ML
1000 INJECTION INTRAMUSCULAR; INTRAVENOUS; SUBCUTANEOUS
Refills: 0 | Status: COMPLETED | OUTPATIENT
Start: 2019-10-04 | End: 2019-10-04

## 2019-10-04 RX ADMIN — HALOPERIDOL DECANOATE 10 MILLIGRAM(S): 100 INJECTION INTRAMUSCULAR at 17:37

## 2019-10-04 RX ADMIN — HEPARIN SODIUM 5000 UNIT(S): 5000 INJECTION INTRAVENOUS; SUBCUTANEOUS at 13:37

## 2019-10-04 RX ADMIN — Medication 112 MICROGRAM(S): at 06:00

## 2019-10-04 RX ADMIN — Medication 1 MILLIGRAM(S): at 06:00

## 2019-10-04 RX ADMIN — OLANZAPINE 10 MILLIGRAM(S): 15 TABLET, FILM COATED ORAL at 22:42

## 2019-10-04 RX ADMIN — HALOPERIDOL DECANOATE 10 MILLIGRAM(S): 100 INJECTION INTRAMUSCULAR at 06:05

## 2019-10-04 RX ADMIN — HEPARIN SODIUM 5000 UNIT(S): 5000 INJECTION INTRAVENOUS; SUBCUTANEOUS at 22:42

## 2019-10-04 RX ADMIN — Medication 100 MILLIGRAM(S): at 11:22

## 2019-10-04 RX ADMIN — Medication 1 MILLIGRAM(S): at 22:42

## 2019-10-04 RX ADMIN — PANTOPRAZOLE SODIUM 40 MILLIGRAM(S): 20 TABLET, DELAYED RELEASE ORAL at 06:00

## 2019-10-04 RX ADMIN — HEPARIN SODIUM 5000 UNIT(S): 5000 INJECTION INTRAVENOUS; SUBCUTANEOUS at 06:00

## 2019-10-04 RX ADMIN — SODIUM CHLORIDE 75 MILLILITER(S): 9 INJECTION INTRAMUSCULAR; INTRAVENOUS; SUBCUTANEOUS at 14:09

## 2019-10-04 RX ADMIN — Medication 1 MILLIGRAM(S): at 13:37

## 2019-10-04 RX ADMIN — SIMVASTATIN 20 MILLIGRAM(S): 20 TABLET, FILM COATED ORAL at 22:42

## 2019-10-04 RX ADMIN — Medication 1 TABLET(S): at 11:22

## 2019-10-04 NOTE — PROGRESS NOTE BEHAVIORAL HEALTH - NSBHFUPINTERVALCCFT_PSY_A_CORE
I can't get the puppies out
I feel good
I feel ok
I'm feeling better
I'm feeling better, ready to go home
Minimally conversant c/o knee pains

## 2019-10-04 NOTE — PROGRESS NOTE BEHAVIORAL HEALTH - NSBHFUPINTERVALHXFT_PSY_A_CORE
Patient continues to be delusional, talking about her brother "tying my tubes" and "makes me drink saline water." Reports no pain, diet has been advanced but she reports poor appetite. Unsure of when she will be discharged. Per hospitalist note yesterday, plan to d/c patient back to [place] today if patient tolerated PO diet overnight.
Patient more interactive and talkative with staff today. Reports that she is feeling better but continues to complain of nausea. Denies abd pain. Reports that her mood is "pretty good." Lying in bed, resting comfortably.
Patient reports that she feels well this morning, denies abdominal pain. Reports that she vomited last night, confirmed with nurse vomited x2, was given Zofran which patient reports helped nausea. Nurse also reporting that medical team ordered abd xray for this morning to evaluate cause of nausea/vomiting. Patient continues to remain delusional, reports that "I was shot in the head last night and the puppies were taken out of my stomach."
no change in psych condition awaiting medical clearance for return to [facility]  abd x-ray  benign No pain or N/V reported
Patient reports that she is feeling better and would like to discharge to home. Reports that she is going to try solid food today and if she can tolerate it, she will be discharged. Also reported feeling better mentally, but was also admitting to "having puppies" who will then be "trained as seeing eye dogs." Denies abd pain.
Patient is minimally conversant with provider, does nod/shake her head yes and no to questions. Denies belly pain but endorses knee pain. Admits to vomiting "saliva" overnight. Does not endorse suicidal ideation, intent, and plan. Does not endorse auditory and visual hallucinations. Speech is mumbled and somewhat garbled, lying in bed comfortably.

## 2019-10-04 NOTE — PROGRESS NOTE BEHAVIORAL HEALTH - PRIMARY DX
Chronic schizoaffective disorder

## 2019-10-04 NOTE — PROGRESS NOTE BEHAVIORAL HEALTH - NSBHCONSULTOBS_PSY_A_CORE
Enhanced supervision
Routine observation

## 2019-10-04 NOTE — PROGRESS NOTE BEHAVIORAL HEALTH - THOUGHT PROCESS
Illogical/Impaired reasoning
Illogical/Impaired reasoning
Impaired reasoning/Illogical
Linear

## 2019-10-04 NOTE — PROGRESS NOTE BEHAVIORAL HEALTH - NSBHCONSULTFOLLOWAFTERCARE_PSY_A_CORE FT
Return to [place] when medically stable
return to [facility]
Return to [place] when medically cleared
Return to [place]
Return to [place] when medically cleared
Return to [place]

## 2019-10-04 NOTE — PROGRESS NOTE BEHAVIORAL HEALTH - NSBHPTASSESSDT_PSY_A_CORE
02-Oct-2019 07:40
03-Oct-2019 07:44
04-Oct-2019 07:22
26-Sep-2019 07:39
30-Sep-2019 07:45
25-Sep-2019 07:39

## 2019-10-04 NOTE — PROGRESS NOTE BEHAVIORAL HEALTH - NSBHCHARTREVIEWIMAGING_PSY_A_CORE FT
< from: Xray Abdomen 2 Views (10.03.19 @ 15:49) >    FINDINGS:  No dilated air-filled small bowel loops to suggest small bowel   obstruction.  There is no evidence of intraperitoneal free air.  Small bilateral pleural effusions.  Degenerative changes in spine.    IMPRESSION: No evidence of obstruction or free air.    < end of copied text >

## 2019-10-04 NOTE — PROGRESS NOTE BEHAVIORAL HEALTH - THOUGHT CONTENT
Unremarkable
Delusions/Preoccupations
Delusions/Preoccupations
Preoccupations/Delusions
Unremarkable
Unremarkable

## 2019-10-04 NOTE — PROGRESS NOTE BEHAVIORAL HEALTH - NSBHCHARTREVIEWVS_PSY_A_CORE FT
Vital Signs Last 24 Hrs  T(C): 37.2 (29 Sep 2019 23:57), Max: 37.3 (29 Sep 2019 17:16)  T(F): 99 (29 Sep 2019 23:57), Max: 99.1 (29 Sep 2019 17:16)  HR: 80 (29 Sep 2019 23:57) (80 - 89)  BP: 100/62 (29 Sep 2019 23:57) (100/62 - 131/82)  RR: 18 (29 Sep 2019 23:57) (18 - 18)  SpO2: 93% (29 Sep 2019 23:57) (93% - 94%)
Vital Signs Last 24 Hrs  T(C): 36.7 (01 Oct 2019 23:35), Max: 37.5 (01 Oct 2019 15:50)  T(F): 98 (01 Oct 2019 23:35), Max: 99.5 (01 Oct 2019 15:50)  HR: 77 (01 Oct 2019 23:35) (77 - 87)  BP: 128/69 (01 Oct 2019 23:35) (118/72 - 128/69)  RR: 18 (01 Oct 2019 23:35) (18 - 18)  SpO2: 95% (01 Oct 2019 23:35) (94% - 98%)
Vital Signs Last 24 Hrs  T(C): 36.7 (04 Oct 2019 07:43), Max: 37.8 (04 Oct 2019 00:02)  T(F): 98 (04 Oct 2019 07:43), Max: 100 (04 Oct 2019 00:02)  HR: 82 (04 Oct 2019 07:43) (75 - 87)  BP: 128/83 (04 Oct 2019 07:43) (110/72 - 130/77)  BP(mean): --  RR: 18 (04 Oct 2019 07:43) (18 - 18)  SpO2: 93% (04 Oct 2019 07:43) (93% - 96%)
Vital Signs Last 24 Hrs  T(C): 37.3 (26 Sep 2019 05:57), Max: 38.1 (25 Sep 2019 18:06)  T(F): 99.1 (26 Sep 2019 05:57), Max: 100.5 (25 Sep 2019 18:06)  HR: 86 (25 Sep 2019 23:36) (85 - 91)  BP: 124/78 (25 Sep 2019 23:36) (124/78 - 155/77)  RR: 18 (25 Sep 2019 23:36) (18 - 19)  SpO2: 96% (25 Sep 2019 23:36) (96% - 97%)
Vital Signs Last 24 Hrs  T(C): 37.5 (02 Oct 2019 23:56), Max: 37.5 (02 Oct 2019 23:56)  T(F): 99.5 (02 Oct 2019 23:56), Max: 99.5 (02 Oct 2019 23:56)  HR: 97 (02 Oct 2019 23:56) (70 - 97)  BP: 137/66 (02 Oct 2019 23:56) (122/76 - 137/66)  RR: 18 (02 Oct 2019 23:56) (18 - 18)  SpO2: 93% (02 Oct 2019 23:56) (90% - 96%)
Vital Signs Last 24 Hrs  T(C): 36.9 (25 Sep 2019 07:30), Max: 37 (24 Sep 2019 11:07)  T(F): 98.4 (25 Sep 2019 07:30), Max: 98.6 (24 Sep 2019 11:07)  HR: 91 (25 Sep 2019 07:30) (82 - 91)  BP: 126/76 (25 Sep 2019 07:30) (120/71 - 143/73)  RR: 20 (25 Sep 2019 07:30) (18 - 20)  SpO2: 97% (25 Sep 2019 07:30) (94% - 97%)

## 2019-10-04 NOTE — PROGRESS NOTE BEHAVIORAL HEALTH - NS ED BHA MSE SPEECH SPONTANEITY
Increased latency

## 2019-10-05 ENCOUNTER — TRANSCRIPTION ENCOUNTER (OUTPATIENT)
Age: 69
End: 2019-10-05

## 2019-10-05 LAB
ANION GAP SERPL CALC-SCNC: 10 MMOL/L — SIGNIFICANT CHANGE UP (ref 5–17)
BUN SERPL-MCNC: 32 MG/DL — HIGH (ref 8–20)
CALCIUM SERPL-MCNC: 8.6 MG/DL — SIGNIFICANT CHANGE UP (ref 8.6–10.2)
CHLORIDE SERPL-SCNC: 113 MMOL/L — HIGH (ref 98–107)
CO2 SERPL-SCNC: 21 MMOL/L — LOW (ref 22–29)
CREAT SERPL-MCNC: 1.27 MG/DL — SIGNIFICANT CHANGE UP (ref 0.5–1.3)
GLUCOSE SERPL-MCNC: 103 MG/DL — SIGNIFICANT CHANGE UP (ref 70–115)
HCT VFR BLD CALC: 31 % — LOW (ref 34.5–45)
HGB BLD-MCNC: 9.8 G/DL — LOW (ref 11.5–15.5)
MCHC RBC-ENTMCNC: 30.4 PG — SIGNIFICANT CHANGE UP (ref 27–34)
MCHC RBC-ENTMCNC: 31.6 GM/DL — LOW (ref 32–36)
MCV RBC AUTO: 96.3 FL — SIGNIFICANT CHANGE UP (ref 80–100)
PLATELET # BLD AUTO: 160 K/UL — SIGNIFICANT CHANGE UP (ref 150–400)
POTASSIUM SERPL-MCNC: 3.4 MMOL/L — LOW (ref 3.5–5.3)
POTASSIUM SERPL-SCNC: 3.4 MMOL/L — LOW (ref 3.5–5.3)
RBC # BLD: 3.22 M/UL — LOW (ref 3.8–5.2)
RBC # FLD: 13.6 % — SIGNIFICANT CHANGE UP (ref 10.3–14.5)
SODIUM SERPL-SCNC: 144 MMOL/L — SIGNIFICANT CHANGE UP (ref 135–145)
WBC # BLD: 2.61 K/UL — LOW (ref 3.8–10.5)
WBC # FLD AUTO: 2.61 K/UL — LOW (ref 3.8–10.5)

## 2019-10-05 PROCEDURE — 99232 SBSQ HOSP IP/OBS MODERATE 35: CPT

## 2019-10-05 RX ORDER — AMILORIDE HCL 5 MG
1 TABLET ORAL
Qty: 0 | Refills: 0 | DISCHARGE

## 2019-10-05 RX ORDER — POTASSIUM CHLORIDE 20 MEQ
40 PACKET (EA) ORAL ONCE
Refills: 0 | Status: COMPLETED | OUTPATIENT
Start: 2019-10-05 | End: 2019-10-05

## 2019-10-05 RX ORDER — POTASSIUM CHLORIDE 20 MEQ
10 PACKET (EA) ORAL ONCE
Refills: 0 | Status: COMPLETED | OUTPATIENT
Start: 2019-10-05 | End: 2019-10-05

## 2019-10-05 RX ORDER — PANTOPRAZOLE SODIUM 20 MG/1
1 TABLET, DELAYED RELEASE ORAL
Qty: 0 | Refills: 0 | DISCHARGE
Start: 2019-10-05

## 2019-10-05 RX ORDER — PANTOPRAZOLE SODIUM 20 MG/1
40 TABLET, DELAYED RELEASE ORAL
Refills: 0 | Status: DISCONTINUED | OUTPATIENT
Start: 2019-10-05 | End: 2019-10-06

## 2019-10-05 RX ADMIN — Medication 112 MICROGRAM(S): at 05:06

## 2019-10-05 RX ADMIN — HEPARIN SODIUM 5000 UNIT(S): 5000 INJECTION INTRAVENOUS; SUBCUTANEOUS at 13:36

## 2019-10-05 RX ADMIN — Medication 1 MILLIGRAM(S): at 21:15

## 2019-10-05 RX ADMIN — Medication 1 TABLET(S): at 13:39

## 2019-10-05 RX ADMIN — PANTOPRAZOLE SODIUM 40 MILLIGRAM(S): 20 TABLET, DELAYED RELEASE ORAL at 05:06

## 2019-10-05 RX ADMIN — SIMVASTATIN 20 MILLIGRAM(S): 20 TABLET, FILM COATED ORAL at 21:15

## 2019-10-05 RX ADMIN — PANTOPRAZOLE SODIUM 40 MILLIGRAM(S): 20 TABLET, DELAYED RELEASE ORAL at 17:23

## 2019-10-05 RX ADMIN — HEPARIN SODIUM 5000 UNIT(S): 5000 INJECTION INTRAVENOUS; SUBCUTANEOUS at 05:07

## 2019-10-05 RX ADMIN — HALOPERIDOL DECANOATE 10 MILLIGRAM(S): 100 INJECTION INTRAMUSCULAR at 17:22

## 2019-10-05 RX ADMIN — OLANZAPINE 10 MILLIGRAM(S): 15 TABLET, FILM COATED ORAL at 21:15

## 2019-10-05 RX ADMIN — HEPARIN SODIUM 5000 UNIT(S): 5000 INJECTION INTRAVENOUS; SUBCUTANEOUS at 21:15

## 2019-10-05 RX ADMIN — Medication 40 MILLIEQUIVALENT(S): at 09:45

## 2019-10-05 RX ADMIN — Medication 100 MILLIEQUIVALENT(S): at 14:21

## 2019-10-05 RX ADMIN — Medication 1 MILLIGRAM(S): at 05:06

## 2019-10-05 RX ADMIN — HALOPERIDOL DECANOATE 10 MILLIGRAM(S): 100 INJECTION INTRAMUSCULAR at 05:06

## 2019-10-05 NOTE — DISCHARGE NOTE PROVIDER - NSDCCPCAREPLAN_GEN_ALL_CORE_FT
PRINCIPAL DISCHARGE DIAGNOSIS  Diagnosis: SBO (small bowel obstruction)  Assessment and Plan of Treatment: Resolved.  Continue bowel regimen      SECONDARY DISCHARGE DIAGNOSES  Diagnosis: SIRS without infection or organ dysfunction  Assessment and Plan of Treatment: Resolved    Diagnosis: MONICA (acute kidney injury)  Assessment and Plan of Treatment: Resolved.  Ensure adequate oral hydration    Diagnosis: Hypokalemia  Assessment and Plan of Treatment: Resolved.  Monitor BMP    Diagnosis: Hypothyroidism  Assessment and Plan of Treatment: Continue home medications    Diagnosis: Schizoaffective disorder  Assessment and Plan of Treatment: Continue home medications    Diagnosis: Normocytic anemia  Assessment and Plan of Treatment: Monitor Hemoglobin    Diagnosis: Leukocytopenia  Assessment and Plan of Treatment: Monitor WBC

## 2019-10-05 NOTE — DISCHARGE NOTE PROVIDER - HOSPITAL COURSE
69 year old female with PMH of Schizoaffective disorder, hypothyroidism,  CVA, hernias, multiple abdominal surgeries, SBO, was sent to the ED from [facility] for nausea, vomiting and abdominal pain x 2 days. CT abdomen showed SBO with Spigelian hernia and was seen in consultation by Surgery who recommended conservative management. She did have intemittent episodes of nause and vomiting which have now resolved. Abdominal XRays on 10/4 and 10/5 show that her SBO has resolved and she is tolerating her diet.        She was also noted to have fever and SIRS, but no antibiotics were given and her UA, urine and blood cultures are all negative. Now Afebrile. She does have chronic anemia and leukocytopenia which are chronic and stable.        She did have MONICA at admission (SCr 1.88) likely secondary to dehydration and her home Amiloride was stopped and her SCr has normalized. She likely has CKD3. Amlodipine is being started for her HTN.        She was continued on her home medication and was also followed by Psychiatry during her stay.        Medically stable for discharge back to [place]            Discharge time 35 minutes 69 year old female with PMH of Schizoaffective disorder, hypothyroidism,  CVA, hernias, multiple abdominal surgeries, SBO, was sent to the ED from [facility] for nausea, vomiting and abdominal pain x 2 days. CT abdomen showed SBO with Spigelian hernia and was seen in consultation by Surgery who recommended conservative management. She did have intemittent episodes of nause and vomiting which have now resolved. Abdominal XRays on 10/4 and 10/5 show that her SBO has resolved and she is tolerating her diet and moving her bowels.        She was also noted to have fever and SIRS, but no antibiotics were given and her UA, urine and blood cultures are all negative. Now Afebrile. She does have chronic anemia and leukocytopenia which are chronic and stable.        She did have MONICA at admission (SCr 1.88) likely secondary to dehydration and her home Amiloride was stopped and her SCr has normalized. She likely has CKD3. Amlodipine is being started for her HTN.        She was continued on her home medication and was also followed by Psychiatry during her stay.        Medically stable for discharge back to [place]            Discharge time 35 minutes

## 2019-10-05 NOTE — DISCHARGE NOTE PROVIDER - NSDCACTIVITY_GEN_ALL_CORE
Walking - Outdoors allowed/Walking - Indoors allowed/Do not make important decisions/Stairs allowed/No heavy lifting/straining/Showering allowed/Bathing allowed/Do not drive or operate machinery

## 2019-10-06 ENCOUNTER — TRANSCRIPTION ENCOUNTER (OUTPATIENT)
Age: 69
End: 2019-10-06

## 2019-10-06 VITALS
TEMPERATURE: 99 F | DIASTOLIC BLOOD PRESSURE: 79 MMHG | SYSTOLIC BLOOD PRESSURE: 129 MMHG | HEART RATE: 88 BPM | OXYGEN SATURATION: 94 % | RESPIRATION RATE: 18 BRPM

## 2019-10-06 PROCEDURE — 71045 X-RAY EXAM CHEST 1 VIEW: CPT

## 2019-10-06 PROCEDURE — 93005 ELECTROCARDIOGRAM TRACING: CPT

## 2019-10-06 PROCEDURE — 99285 EMERGENCY DEPT VISIT HI MDM: CPT | Mod: 25

## 2019-10-06 PROCEDURE — 82330 ASSAY OF CALCIUM: CPT

## 2019-10-06 PROCEDURE — 86803 HEPATITIS C AB TEST: CPT

## 2019-10-06 PROCEDURE — 85014 HEMATOCRIT: CPT

## 2019-10-06 PROCEDURE — 96374 THER/PROPH/DIAG INJ IV PUSH: CPT

## 2019-10-06 PROCEDURE — 84295 ASSAY OF SERUM SODIUM: CPT

## 2019-10-06 PROCEDURE — 87040 BLOOD CULTURE FOR BACTERIA: CPT

## 2019-10-06 PROCEDURE — 36415 COLL VENOUS BLD VENIPUNCTURE: CPT

## 2019-10-06 PROCEDURE — 74019 RADEX ABDOMEN 2 VIEWS: CPT

## 2019-10-06 PROCEDURE — 80053 COMPREHEN METABOLIC PANEL: CPT

## 2019-10-06 PROCEDURE — 82435 ASSAY OF BLOOD CHLORIDE: CPT

## 2019-10-06 PROCEDURE — 74176 CT ABD & PELVIS W/O CONTRAST: CPT

## 2019-10-06 PROCEDURE — 87086 URINE CULTURE/COLONY COUNT: CPT

## 2019-10-06 PROCEDURE — 96375 TX/PRO/DX INJ NEW DRUG ADDON: CPT | Mod: XU

## 2019-10-06 PROCEDURE — 81001 URINALYSIS AUTO W/SCOPE: CPT

## 2019-10-06 PROCEDURE — 85027 COMPLETE CBC AUTOMATED: CPT

## 2019-10-06 PROCEDURE — 85730 THROMBOPLASTIN TIME PARTIAL: CPT

## 2019-10-06 PROCEDURE — 84100 ASSAY OF PHOSPHORUS: CPT

## 2019-10-06 PROCEDURE — 85610 PROTHROMBIN TIME: CPT

## 2019-10-06 PROCEDURE — 80048 BASIC METABOLIC PNL TOTAL CA: CPT

## 2019-10-06 PROCEDURE — 82947 ASSAY GLUCOSE BLOOD QUANT: CPT

## 2019-10-06 PROCEDURE — 82803 BLOOD GASES ANY COMBINATION: CPT

## 2019-10-06 PROCEDURE — 99239 HOSP IP/OBS DSCHRG MGMT >30: CPT

## 2019-10-06 PROCEDURE — 83605 ASSAY OF LACTIC ACID: CPT

## 2019-10-06 PROCEDURE — 51701 INSERT BLADDER CATHETER: CPT

## 2019-10-06 PROCEDURE — 83735 ASSAY OF MAGNESIUM: CPT

## 2019-10-06 PROCEDURE — 83690 ASSAY OF LIPASE: CPT

## 2019-10-06 PROCEDURE — 74018 RADEX ABDOMEN 1 VIEW: CPT

## 2019-10-06 PROCEDURE — 97163 PT EVAL HIGH COMPLEX 45 MIN: CPT

## 2019-10-06 PROCEDURE — 84132 ASSAY OF SERUM POTASSIUM: CPT

## 2019-10-06 RX ADMIN — HEPARIN SODIUM 5000 UNIT(S): 5000 INJECTION INTRAVENOUS; SUBCUTANEOUS at 05:13

## 2019-10-06 RX ADMIN — PANTOPRAZOLE SODIUM 40 MILLIGRAM(S): 20 TABLET, DELAYED RELEASE ORAL at 05:13

## 2019-10-06 RX ADMIN — Medication 112 MICROGRAM(S): at 05:13

## 2019-10-06 RX ADMIN — HALOPERIDOL DECANOATE 10 MILLIGRAM(S): 100 INJECTION INTRAMUSCULAR at 05:13

## 2019-10-06 NOTE — DISCHARGE NOTE NURSING/CASE MANAGEMENT/SOCIAL WORK - PATIENT PORTAL LINK FT
You can access the FollowMyHealth Patient Portal offered by [facility] by registering at the following website: [email or web address]. By joining [facility]’s FollowMyHealth portal, you will also be able to view your health information using other applications (apps) compatible with our system.

## 2019-10-06 NOTE — PROGRESS NOTE ADULT - PROVIDER SPECIALTY LIST ADULT
Hospitalist
Internal Medicine
Hospitalist

## 2019-10-06 NOTE — PROVIDER CONTACT NOTE (MEDICATION) - DATE AND TIME:
Physical Therapy Daily Treatment     Visit Count: 7  Plan of Care Dates: Initial: 8/28/17 Through: 11/20/17  Referred by: Daphney Huerta PA-C  Medical Diagnosis (from order):  Sciatica of left side [M54.32]  - Primary  S39.012A Strain of lumbar region, initial encounter  M54.16 Lumbar radiculopathy     Insurance: 1. WC-PAYOR UNKNOWN  2. WC-[facility]   Work Injury Information:   Current Employer: [employer]  : unknown  Restrictions:No lifting, pushing, or pulling greater than 30 pounds.  Allow patient to alternate between sitting, standing, and walking based on her discomfort.  Occasional bending and twisting activities. 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        Diagnosis Precautions: none  Chart reviewed: Relevant co-morbidities, allergies, tests and medications schizophrenic/bipolar, major depressive disorder      Case Notes/Attendance:  Contact with Adjustor /     · Adjustor / :  unknown  · Phone#:  unknown  · Date of Communication: NA Results: none at this time  Attendance Concerns: none at this time     SUBJECTIVE   Yesterday I was lifting groceries from car and making several trips in/out of house and I did not have any cramping in my left leg.    Current Pain: 0-1/10  Pursuing this episode as a WC injury.    Functional Change: working 3-4 hour shifts at work    OBJECTIVE   Therapeutic Exercise:    Instructed in standing hip abduction and hip extension with yellow band x 10 each, each leg   Therapeutic Activity:  Instructed patient in golfer's lift/reach to floor and across bed  Instructed in vacuuming motion with weight shift incorporated and keeping load close  Practiced proper hip hinge and positioning, use of weight shift when lifting client's leg from bed with exercise  Educated patient in proper rolling pattern to help facilitate increased client participation when moving from supine to sit position to protect her back with patient practice  Educated patient in how to assist patient when moving from side to sitting with patient practice   Educated patient on rocking client in sitting to promote proper weight shift from greater ease of sit-stand transition of client     Functional Status:   Functional Activity Patient  Abilities  Date:  Job Demands  Client Report   Lift floor to waist 9/19/17  25 lbs  NA unless a patient needs help up from floor   Lift waist to chest 9/19/17   25 lbs 40lbs   Lift waist to overhead 9/19/17  5 lbs 40 lbs   Two Hand Carry 9/19/2017  20 lbs with one hand carry   20 lbs   Push/Pull  Up to paitent's body weight:    Sustained overhead work NA NA   Sustained sitting  2-3 hours    Sustained standing  Up to an hour at a time   Sustained kneeling  NA        Current Home Program (not performed this date except as noted above): SLR   sidelying hip ABD  Bridging   supine double knee lift hold for abdominals-progressed to heel tap down   left hip abd/ER stretch supine   4 point arm/leg lift  prone on forearm/knees plank  standing hamstring stretch and heel cord stretch at step  wall calf stretch   side planks  LTR  New-standing hip abduction/extension with yellow band     ASSESSMENT    Patient with good awareness of proper body mechanics with working with simulated client however lacked knowledge on how to get client in best position that would facilitate their best effort.  Patient  06-Oct-2019 14:41 nearing readiness for full return to work but may need size of client limited initially on her restrictions.      Pain after treatment: 1/10  Result of above outlined education: Verbalizes understanding, Demonstrates understanding and Needs reinforcement    Goals:       To be obtained by end of this plan of care:  1. Patient independent with modified and progressed home exercise program.  2. Patient will report decreased lumbar pain/symptoms to 2/10 max to aid in ambulating 30 minutes for independent living, sitting/standing for 30 minutes to cook/eat a meal, sleeping undisturbed through a night, lifting as required, completing household tasks and completing self-care tasks/dressing.   3. Patient will increase lumbar active range of motion to within normal limits to aid in positional transitions and completing self-care tasks/dressing.  4. Patient will increase involved strength to 5/5 to aid in completing household tasks.  5. Oswestry: Patient will complete form to reflect an improved score from initial score of 51 to less than or equal to 15% (0-20% = minimal disability; 20-40% = moderate disability; 40-60% = severe disability; 60-80% = crippled; % = bed bound) to indicate pt reported improvement in function/disability/impairment (minimal detectable change: 12%).    6. Patient will demonstrate neutral posture and body mechanics principles with load up to 30 lbs for home tasks/caregiver tasks     PLAN   Work simulated tasks with floor to stand lift of box 50 lbs and then placement of 30-40 lb to 39 inch shelf.  If patient does well with these activities may be appropriate for upgrade of work restrictions.  Re assess goals and outcome tool for MD appointment occurring after next session.    THERAPY DAILY BILLING   Primary Insurance: -PAYOR UNKNOWN  Secondary Insurance: [facility]    Evaluation Procedures:  No evaluation codes were used on this date of service    Timed Procedures:  Therapeutic  Activity, 35 minutes  Therapeutic Exercise, 15 minutes    Untimed Procedures:  No untimed codes were used on this date of service    Total Treatment Time: 50 minutes

## 2019-10-06 NOTE — PROGRESS NOTE ADULT - SUBJECTIVE AND OBJECTIVE BOX
ANDREAS LYONS    [id number]    69y      Female    INTERVAL HPI/OVERNIGHT EVENTS:    patient being seen for SBO and schizoaffective. Patient seen at bedside and in nad. patient denies any complaints    REVIEW OF SYSTEMS:    CONSTITUTIONAL: No fever, weight loss, or fatigue  RESPIRATORY: No cough, wheezing, hemoptysis; No shortness of breath  CARDIOVASCULAR: No chest pain, palpitations  GASTROINTESTINAL: No abdominal or epigastric pain. No nausea, vomiting  NEUROLOGICAL: No headaches, memory loss, loss of strength.  MISCELLANEOUS:      Vital Signs Last 24 Hrs  T(C): 36.9 (25 Sep 2019 07:30), Max: 37 (24 Sep 2019 11:07)  T(F): 98.4 (25 Sep 2019 07:30), Max: 98.6 (24 Sep 2019 11:07)  HR: 91 (25 Sep 2019 07:30) (82 - 91)  BP: 126/76 (25 Sep 2019 07:30) (120/71 - 143/73)  BP(mean): --  RR: 20 (25 Sep 2019 07:30) (18 - 20)  SpO2: 97% (25 Sep 2019 07:30) (94% - 97%)    PHYSICAL EXAM:    GENERAL: NAD,   HEENT: dry MM  NECK: soft, Supple, No JVD,   CHEST/LUNG: Clear to auscultation bilaterally; No wheezing  HEART: S1S2+, Regular rate and rhythm; No murmurs,  ABDOMEN: Soft, nt  EXTREMITIES:  2+ Peripheral Pulses, No clubbing, cyanosis, or edema  SKIN: No rashes or lesions  NEURO: Awake      LABS:                        9.9    2.91  )-----------( 92       ( 25 Sep 2019 07:44 )             31.7         145  |  112<H>  |  34.0<H>  ----------------------------<  128<H>  3.9   |  22.0  |  1.49<H>    Ca    8.5<L>      25 Sep 2019 07:44  Phos  2.1       Mg     2.4         TPro  7.7  /  Alb  4.5  /  TBili  0.9  /  DBili  x   /  AST  27  /  ALT  21  /  AlkPhos  160<H>      PT/INR - ( 23 Sep 2019 17:10 )   PT: 13.6 sec;   INR: 1.18 ratio         PTT - ( 23 Sep 2019 17:10 )  PTT:30.0 sec  Urinalysis Basic - ( 24 Sep 2019 00:25 )    Color: Yellow / Appearance: Clear / S.010 / pH: x  Gluc: x / Ketone: Negative  / Bili: Negative / Urobili: Negative mg/dL   Blood: x / Protein: 15 mg/dL / Nitrite: Negative   Leuk Esterase: Negative / RBC: x / WBC x   Sq Epi: x / Non Sq Epi: x / Bacteria: x          MEDICATIONS  (STANDING):  cholecalciferol 2000 Unit(s) Oral <User Schedule>  haloperidol     Tablet 10 milliGRAM(s) Oral two times a day  heparin  Injectable 5000 Unit(s) SubCutaneous every 8 hours  levothyroxine 112 MICROGram(s) Oral daily  LORazepam     Tablet 1 milliGRAM(s) Oral three times a day  multivitamin 1 Tablet(s) Oral daily  OLANZapine 10 milliGRAM(s) Oral at bedtime  pantoprazole    Tablet 40 milliGRAM(s) Oral before breakfast  potassium phosphate / sodium phosphate powder 1 Packet(s) Oral once  simvastatin 20 milliGRAM(s) Oral at bedtime  sodium chloride 0.9%. 1000 milliLiter(s) (75 mL/Hr) IV Continuous <Continuous>    MEDICATIONS  (PRN):  acetaminophen   Tablet .. 650 milliGRAM(s) Oral every 6 hours PRN Temp greater or equal to 38C (100.4F), Mild Pain (1 - 3), Moderate Pain (4 - 6)  ondansetron Injectable 4 milliGRAM(s) IV Push every 6 hours PRN Nausea and/or Vomiting      RADIOLOGY & ADDITIONAL TESTS:
cc: sbo     interval hx: patient seen and eval. comfortable. laughs abruptly.no fever ,n/v/abd oain     Vital Signs Last 24 Hrs  T(C): 37.3 (29 Sep 2019 17:16), Max: 37.5 (29 Sep 2019 00:17)  T(F): 99.1 (29 Sep 2019 17:16), Max: 99.5 (29 Sep 2019 00:17)  HR: 89 (29 Sep 2019 17:16) (82 - 89)  BP: 131/82 (29 Sep 2019 17:16) (111/63 - 131/82)  BP(mean): --  RR: 18 (29 Sep 2019 17:16) (18 - 18)  SpO2: 94% (29 Sep 2019 17:16) (93% - 95%)       GENERAL: NAD,   HEENT: mm moist   NECK: soft, Supple,,   CHEST/LUNG: Clear  HEART: S1S2+, Regular rate and rhythm  ABDOMEN: Soft, NT  EXTREMITIES: no  edema  SKIN: No rashes or lesions  NEURO: Awake, confused
ANDREAS LYONS    [id number]    69y      Female    INTERVAL HPI/OVERNIGHT EVENTS:    patient being seen for SBO. Patient seen at bedside and is agitated and complaining of hunger and thirst.     tmax 100.5    REVIEW OF SYSTEMS:    CONSTITUTIONAL: hunger  RESPIRATORY: No cough, wheezing, hemoptysis; No shortness of breath  CARDIOVASCULAR: No chest pain, palpitations  GASTROINTESTINAL: No abdominal or epigastric pain. No nausea, vomiting  NEUROLOGICAL: No headaches, memory loss, loss of strength.  MISCELLANEOUS:      Vital Signs Last 24 Hrs  T(C): 37 (26 Sep 2019 08:49), Max: 38.1 (25 Sep 2019 18:06)  T(F): 98.6 (26 Sep 2019 08:49), Max: 100.5 (25 Sep 2019 18:06)  HR: 79 (26 Sep 2019 08:49) (79 - 91)  BP: 135/84 (26 Sep 2019 08:49) (124/78 - 155/77)  BP(mean): --  RR: 18 (26 Sep 2019 08:49) (18 - 19)  SpO2: 97% (26 Sep 2019 08:49) (96% - 97%)    PHYSICAL EXAM:    GENERAL: agitated,   HEENT: dry MM  NECK: soft, Supple, No JVD,   CHEST/LUNG: Clear to auscultation bilaterally; No wheezing  HEART: S1S2+, Regular rate and rhythm; No murmurs,  ABDOMEN: Soft, little more distended,   EXTREMITIES:  2+ Peripheral Pulses, No clubbing, cyanosis, or edema  SKIN: No rashes or lesions  NEURO: Awake        LABS:                        10.1   2.59  )-----------( 99       ( 26 Sep 2019 10:50 )             32.8     09-26    143  |  110<H>  |  26.0<H>  ----------------------------<  137<H>  3.5   |  23.0  |  1.44<H>    Ca    8.7      26 Sep 2019 10:50  Phos  2.1     09-25  Mg     2.2     09-26    TPro  5.6<L>  /  Alb  3.3  /  TBili  0.3<L>  /  DBili  x   /  AST  20  /  ALT  23  /  AlkPhos  101  09-26        MEDICATIONS  (STANDING):  cholecalciferol 2000 Unit(s) Oral <User Schedule>  ertapenem  IVPB 1000 milliGRAM(s) IV Intermittent every 24 hours  haloperidol    Injectable 10 milliGRAM(s) IntraMuscular two times a day  heparin  Injectable 5000 Unit(s) SubCutaneous every 8 hours  lactated ringers. 1000 milliLiter(s) (75 mL/Hr) IV Continuous <Continuous>  levothyroxine Injectable 56 MICROGram(s) IV Push at bedtime  LORazepam   Injectable 0.5 milliGRAM(s) IV Push every 8 hours  multivitamin 1 Tablet(s) Oral daily  OLANZapine 10 milliGRAM(s) Oral at bedtime  pantoprazole    Tablet 40 milliGRAM(s) Oral before breakfast  simvastatin 20 milliGRAM(s) Oral at bedtime    MEDICATIONS  (PRN):  acetaminophen   Tablet .. 650 milliGRAM(s) Oral every 6 hours PRN Temp greater or equal to 38C (100.4F), Mild Pain (1 - 3), Moderate Pain (4 - 6)  ondansetron Injectable 4 milliGRAM(s) IV Push every 6 hours PRN Nausea and/or Vomiting      RADIOLOGY & ADDITIONAL TESTS:    ct scan ordered    kub -   IMPRESSION:    Mild bowel ileus with RIGHT abdominal wall spigelian hernia containing   nondilated loops of both bowel.
ANDREAS LYONS    [id number]    69y      Female    INTERVAL HPI/OVERNIGHT EVENTS:    off service note:  70 y/o female with PMH of hypothyroid, schizo-affective disease, CVA, hernias, multiple abdominal surgeries, SBO, was sent to the ED from [facility] for nausea, vomiting and abdominal pain. Patient diagnosed with SBO but seen by surgery and cleared. Patient managed conservatively and improved clincially, ct scan was repeated which showed resolution. Patient is now tolerating a full liquid diet. patient seen at bedside and deneis any complaints    as per nurse patient had a bowel movement this am     REVIEW OF SYSTEMS:    CONSTITUTIONAL: No fever, weight loss, or fatigue  RESPIRATORY: No cough, wheezing, hemoptysis; No shortness of breath  CARDIOVASCULAR: No chest pain, palpitations  GASTROINTESTINAL: No abdominal or epigastric pain. No nausea, vomiting  NEUROLOGICAL: No headaches, memory loss, loss of strength.  MISCELLANEOUS:      Vital Signs Last 24 Hrs  T(C): 37 (28 Sep 2019 07:50), Max: 37.6 (28 Sep 2019 00:20)  T(F): 98.6 (28 Sep 2019 07:50), Max: 99.6 (28 Sep 2019 00:20)  HR: 86 (28 Sep 2019 07:50) (86 - 91)  BP: 128/83 (28 Sep 2019 07:50) (125/65 - 136/76)  BP(mean): --  RR: 18 (28 Sep 2019 07:50) (18 - 18)  SpO2: 96% (28 Sep 2019 07:50) (94% - 96%)    PHYSICAL EXAM:    GENERAL: NAD,   HEENT: perrl, mmm  NECK: soft, Supple, No JVD,   CHEST/LUNG: Clear to auscultation bilaterally; No wheezing  HEART: S1S2+, Regular rate and rhythm; No murmurs,  ABDOMEN: Soft, NT  EXTREMITIES:  2+ Peripheral Pulses, No clubbing, cyanosis, or edema  SKIN: No rashes or lesions  NEURO: Awake      LABS:                        9.9    2.69  )-----------( 97       ( 27 Sep 2019 08:30 )             32.0     09-28    145  |  112<H>  |  17.0  ----------------------------<  97  3.8   |  23.0  |  1.23    Ca    8.7      28 Sep 2019 09:32  Mg     2.1     09-28    TPro  5.5<L>  /  Alb  3.0<L>  /  TBili  0.3<L>  /  DBili  x   /  AST  39<H>  /  ALT  45<H>  /  AlkPhos  100  09-28        MEDICATIONS  (STANDING):  cholecalciferol 2000 Unit(s) Oral <User Schedule>  haloperidol    Injectable 10 milliGRAM(s) IntraMuscular two times a day  heparin  Injectable 5000 Unit(s) SubCutaneous every 8 hours  levothyroxine 112 MICROGram(s) Oral daily  multivitamin 1 Tablet(s) Oral daily  OLANZapine 10 milliGRAM(s) Oral at bedtime  pantoprazole    Tablet 40 milliGRAM(s) Oral before breakfast  simvastatin 20 milliGRAM(s) Oral at bedtime    MEDICATIONS  (PRN):  acetaminophen   Tablet .. 650 milliGRAM(s) Oral every 6 hours PRN Temp greater or equal to 38C (100.4F), Mild Pain (1 - 3), Moderate Pain (4 - 6)  bisacodyl Suppository 10 milliGRAM(s) Rectal daily PRN Constipation  ondansetron Injectable 4 milliGRAM(s) IV Push every 6 hours PRN Nausea and/or Vomiting      RADIOLOGY & ADDITIONAL TESTS:
ANDREAS LYONS    [id number]    69y      Female    INTERVAL HPI/OVERNIGHT EVENTS:    patient being seen for intractable nausea and vomiting and now has resolved SBO. Moreeitn seen at bedside and denies any complaints    as per nurse patient has not had a bowel movement.     REVIEW OF SYSTEMS:    CONSTITUTIONAL: No fever, weight loss, or fatigue  RESPIRATORY: No cough, wheezing, hemoptysis; No shortness of breath  CARDIOVASCULAR: No chest pain, palpitations  GASTROINTESTINAL: No abdominal or epigastric pain. No nausea, vomiting  NEUROLOGICAL: No headaches, memory loss, loss of strength.  MISCELLANEOUS:      Vital Signs Last 24 Hrs  T(C): 37.5 (27 Sep 2019 07:35), Max: 37.5 (27 Sep 2019 07:35)  T(F): 99.5 (27 Sep 2019 07:35), Max: 99.5 (27 Sep 2019 07:35)  HR: 96 (27 Sep 2019 07:35) (96 - 103)  BP: 136/62 (27 Sep 2019 07:35) (136/62 - 140/76)  BP(mean): --  RR: 20 (27 Sep 2019 07:35) (20 - 20)  SpO2: 94% (26 Sep 2019 23:23) (94% - 94%)    PHYSICAL EXAM:    GENERAL: NAD,   HEENT: dperrl, mmm  NECK: soft, Supple, No JVD,   CHEST/LUNG: Clear to auscultation bilaterally; No wheezing  HEART: S1S2+, Regular rate and rhythm; No murmurs,  ABDOMEN: Soft, [place]  EXTREMITIES:  2+ Peripheral Pulses, No clubbing, cyanosis, or edema  SKIN: No rashes or lesions  NEURO: Awake      LABS:                        9.9    2.69  )-----------( 97       ( 27 Sep 2019 08:30 )             32.0     09-27    145  |  111<H>  |  18.0  ----------------------------<  107  3.9   |  24.0  |  1.36<H>    Ca    8.8      27 Sep 2019 08:30  Mg     2.1     09-27    TPro  5.2<L>  /  Alb  3.3  /  TBili  0.2<L>  /  DBili  x   /  AST  21  /  ALT  24  /  AlkPhos  95  09-27        MEDICATIONS  (STANDING):  cholecalciferol 2000 Unit(s) Oral <User Schedule>  haloperidol    Injectable 10 milliGRAM(s) IntraMuscular two times a day  heparin  Injectable 5000 Unit(s) SubCutaneous every 8 hours  levothyroxine 112 MICROGram(s) Oral daily  LORazepam   Injectable 0.5 milliGRAM(s) IV Push every 8 hours  multivitamin 1 Tablet(s) Oral daily  OLANZapine 10 milliGRAM(s) Oral at bedtime  pantoprazole    Tablet 40 milliGRAM(s) Oral before breakfast  simvastatin 20 milliGRAM(s) Oral at bedtime    MEDICATIONS  (PRN):  acetaminophen   Tablet .. 650 milliGRAM(s) Oral every 6 hours PRN Temp greater or equal to 38C (100.4F), Mild Pain (1 - 3), Moderate Pain (4 - 6)  bisacodyl Suppository 10 milliGRAM(s) Rectal daily PRN Constipation  ondansetron Injectable 4 milliGRAM(s) IV Push every 6 hours PRN Nausea and/or Vomiting      RADIOLOGY & ADDITIONAL TESTS:
HOSPITALIST PROGRESS NOTE    ANDREAS LYONS  [id number]  69yFemale    Patient is a 69y old  Female who presents with a chief complaint of Intractable nausea/vomiting; SBO (05 Oct 2019 09:16)      SUBJECTIVE:   Chart reviewed since last visit.  Patient seen and examined at bedside earlier today for  SBO, nausea, vomiting.  Had breakfast was OK, then regurgitated after supplemental oral KCL.  Patient crying, doesnt want to go back to [place]        OBJECTIVE:  Vital Signs Last 24 Hrs  T(C): 37.2 (05 Oct 2019 07:59), Max: 37.3 (04 Oct 2019 23:34)  T(F): 99 (05 Oct 2019 07:59), Max: 99.2 (04 Oct 2019 23:34)  HR: 81 (05 Oct 2019 07:59) (81 - 93)  BP: 123/67 (05 Oct 2019 07:59) (122/72 - 149/73)   RR: 18 (05 Oct 2019 07:59) (16 - 18)  SpO2: 95% (05 Oct 2019 07:59) (94% - 95%)    PHYSICAL EXAMINATION  General: Lying in bed, disheveled  HEENT:  EOMI  NECK:  Supple  CVS: regular rate and rhythm S1 S2  RESP:  CTAB  GI:  Multiple healed scars+, Nontender. BS+  : Primafit+  MSK:  Moves all extremities  CNS:  Follows simple commands  INTEG:  Warm dry skin  PSYCH: tearful, crying    MONITOR:  CAPILLARY BLOOD GLUCOSE            I&O's Summary    04 Oct 2019 07:01  -  05 Oct 2019 07:00  --------------------------------------------------------  IN: 0 mL / OUT: 2550 mL / NET: -2550 mL    05 Oct 2019 07:01  -  05 Oct 2019 14:23  --------------------------------------------------------  IN: 0 mL / OUT: 400 mL / NET: -400 mL                            9.8    2.61  )-----------( 160      ( 05 Oct 2019 08:05 )             31.0       10-05    144  |  113<H>  |  32.0<H>  ----------------------------<  103  3.4<L>   |  21.0<L>  |  1.27    Ca    8.6      05 Oct 2019 08:05              Culture:    TTE:    RADIOLOGY  < from: Xray Abdomen 2 Views (10.04.19 @ 15:14) >     EXAM:  XR ABDOMEN 2V                          PROCEDURE DATE:  10/04/2019          INTERPRETATION:  X-RAY ABDOMEN SUPINE:    History: Recent episode of small bowel obstruction. Evaluate for   recurrence..    Date and time of exam: 10/4/2019 2:29 PM.    Comparison: Abdominal pelvic CT scan 9/26/2019 and abdominal x-ray   10/1/2019.    Findings: Hepatosplenomegaly. No evidence of ileus or obstruction on the   current study. Intestinal gas pattern is unremarkable. Vascular   calcifications noted as well as degenerative changes in the spine..    Impression:  No evidence of small bowel obstruction..                NÉSTOR CARDOSO M.D., ATTENDING RADIOLOGIST  This document has been electronically signed. Oct  4 2019  3:17PM        < end of copied text >        MEDICATIONS  (STANDING):  docusate sodium 100 milliGRAM(s) Oral daily  haloperidol    Injectable 10 milliGRAM(s) IntraMuscular two times a day  heparin  Injectable 5000 Unit(s) SubCutaneous every 8 hours  levothyroxine 112 MICROGram(s) Oral daily  LORazepam     Tablet 1 milliGRAM(s) Oral three times a day  multivitamin 1 Tablet(s) Oral daily  OLANZapine 10 milliGRAM(s) Oral at bedtime  pantoprazole    Tablet 40 milliGRAM(s) Oral before breakfast  potassium chloride  10 mEq/100 mL IVPB 10 milliEquivalent(s) IV Intermittent once  simvastatin 20 milliGRAM(s) Oral at bedtime      MEDICATIONS  (PRN):  acetaminophen   Tablet .. 650 milliGRAM(s) Oral every 6 hours PRN Temp greater or equal to 38C (100.4F), Mild Pain (1 - 3), Moderate Pain (4 - 6)  bisacodyl Suppository 10 milliGRAM(s) Rectal daily PRN Constipation  ondansetron Injectable 4 milliGRAM(s) IV Push every 6 hours PRN Nausea and/or Vomiting
HOSPITALIST PROGRESS NOTE    ANDREAS LYONS  [id number]  69yFemale    Patient is a 69y old  Female who presents with a chief complaint of Intractable nausea/vomiting; SBO (05 Oct 2019 14:22)      SUBJECTIVE:   Chart reviewed since last visit.  Patient seen and examined at bedside for nausea, vomiting, SBO  Denies any nausea or vomiting today - has 2 BM.  Denies any abdominal pain.      OBJECTIVE:  Vital Signs Last 24 Hrs  T(C): 37 (06 Oct 2019 08:06), Max: 37.3 (06 Oct 2019 00:35)  T(F): 98.6 (06 Oct 2019 08:06), Max: 99.1 (06 Oct 2019 00:35)  HR: 88 (06 Oct 2019 08:06) (86 - 88)  BP: 129/79 (06 Oct 2019 08:06) (129/79 - 139/81)   RR: 18 (06 Oct 2019 08:06) (18 - 18)  SpO2: 94% (06 Oct 2019 08:06) (94% - 96%)    PHYSICAL EXAMINATION  General: Lying in bed, disheveled  HEENT:  EOMI  NECK:  Supple  CVS: regular rate and rhythm S1 S2  RESP:  CTAB  GI:  Multiple healed scars+, Nontender. BS+  : Primafit+  MSK:  Moves all extremities  CNS:  Follows simple commands  INTEG:  Warm dry skin  PSYCH: Fair mood       MONITOR:  CAPILLARY BLOOD GLUCOSE            I&O's Summary    05 Oct 2019 07:01  -  06 Oct 2019 07:00  --------------------------------------------------------  IN: 0 mL / OUT: 2000 mL / NET: -2000 mL    06 Oct 2019 07:01  -  06 Oct 2019 14:28  --------------------------------------------------------  IN: 0 mL / OUT: 601 mL / NET: -601 mL                            9.8    2.61  )-----------( 160      ( 05 Oct 2019 08:05 )             31.0       10-05    144  |  113<H>  |  32.0<H>  ----------------------------<  103  3.4<L>   |  21.0<L>  |  1.27    Ca    8.6      05 Oct 2019 08:05              Culture:    TTE:    RADIOLOGY        MEDICATIONS  (STANDING):  docusate sodium 100 milliGRAM(s) Oral daily  haloperidol    Injectable 10 milliGRAM(s) IntraMuscular two times a day  heparin  Injectable 5000 Unit(s) SubCutaneous every 8 hours  levothyroxine 112 MICROGram(s) Oral daily  multivitamin 1 Tablet(s) Oral daily  OLANZapine 10 milliGRAM(s) Oral at bedtime  pantoprazole    Tablet 40 milliGRAM(s) Oral two times a day  simvastatin 20 milliGRAM(s) Oral at bedtime      MEDICATIONS  (PRN):  acetaminophen   Tablet .. 650 milliGRAM(s) Oral every 6 hours PRN Temp greater or equal to 38C (100.4F), Mild Pain (1 - 3), Moderate Pain (4 - 6)  bisacodyl Suppository 10 milliGRAM(s) Rectal daily PRN Constipation  ondansetron Injectable 4 milliGRAM(s) IV Push every 6 hours PRN Nausea and/or Vomiting
HOSPITALIST PROGRESS NOTE    ANDREAS LYONS  [id number]  69yFemale    Patient is a 69y old  Female who presents with a chief complaint of Intractable nausea/vomiting; SBO (03 Oct 2019 12:52)      SUBJECTIVE:   Chart reviewed since last visit.  Patient seen and examined at bedside for SBO  Nausea and vomiting earlier today after breakfast.  Currently denies any abdominal pain - denies flatus today, last BM yesterday      OBJECTIVE:  Vital Signs Last 24 Hrs  T(C): 36.7 (04 Oct 2019 07:43), Max: 37.8 (04 Oct 2019 00:02)  T(F): 98 (04 Oct 2019 07:43), Max: 100 (04 Oct 2019 00:02)  HR: 82 (04 Oct 2019 07:43) (75 - 87)  BP: 128/83 (04 Oct 2019 07:43) (110/72 - 130/77)   RR: 18 (04 Oct 2019 07:43) (18 - 18)  SpO2: 93% (04 Oct 2019 07:43) (93% - 96%)    PHYSICAL EXAMINATION  General: Lying in bed, disheveled  HEENT:  EOMI  NECK:  Supple  CVS: regular rate and rhythm S1 S2  RESP:  CTAB  GI:  Multiple healed scars+, Nontender. Hyperactive BS+  : Primafit+  MSK:  Moves all extremities  CNS:  Follows simple commands  INTEG:  Warm dry skin  PSYCH:  answers appropriately    MONITOR:  CAPILLARY BLOOD GLUCOSE            I&O's Summary    03 Oct 2019 07:01  -  04 Oct 2019 07:00  --------------------------------------------------------  IN: 0 mL / OUT: 900 mL / NET: -900 mL    04 Oct 2019 07:01  -  04 Oct 2019 15:07  --------------------------------------------------------  IN: 0 mL / OUT: 1200 mL / NET: -1200 mL                        Culture:    TTE:    RADIOLOGY        MEDICATIONS  (STANDING):  docusate sodium 100 milliGRAM(s) Oral daily  haloperidol    Injectable 10 milliGRAM(s) IntraMuscular two times a day  heparin  Injectable 5000 Unit(s) SubCutaneous every 8 hours  levothyroxine 112 MICROGram(s) Oral daily  LORazepam     Tablet 1 milliGRAM(s) Oral three times a day  multivitamin 1 Tablet(s) Oral daily  OLANZapine 10 milliGRAM(s) Oral at bedtime  pantoprazole    Tablet 40 milliGRAM(s) Oral before breakfast  simvastatin 20 milliGRAM(s) Oral at bedtime      MEDICATIONS  (PRN):  acetaminophen   Tablet .. 650 milliGRAM(s) Oral every 6 hours PRN Temp greater or equal to 38C (100.4F), Mild Pain (1 - 3), Moderate Pain (4 - 6)  bisacodyl Suppository 10 milliGRAM(s) Rectal daily PRN Constipation  ondansetron Injectable 4 milliGRAM(s) IV Push every 6 hours PRN Nausea and/or Vomiting
cc: sbo     interval hx: patient seen and eval. comfortable. in no acute distress. tolerating diet, little more agitated today, upset about wet bed.     Vital Signs Last 24 Hrs  T(C): 37.5 (01 Oct 2019 15:50), Max: 37.5 (01 Oct 2019 15:50)  T(F): 99.5 (01 Oct 2019 15:50), Max: 99.5 (01 Oct 2019 15:50)  HR: 87 (01 Oct 2019 15:50) (80 - 87)  BP: 123/74 (01 Oct 2019 15:50) (114/71 - 123/74)  BP(mean): --  RR: 18 (01 Oct 2019 15:50) (16 - 18)  SpO2: 94% (01 Oct 2019 15:50) (94% - 98%)    GENERAL: NAD,   HEENT: mm moist   NECK: soft, Supple,,   CHEST/LUNG: Clear  HEART: S1S2+, Regular rate and rhythm  ABDOMEN: Soft, NT  EXTREMITIES: no  edema  SKIN: No rashes or lesions  NEURO: Awake, confused                                     9.7    2.52  )-----------( 109      ( 30 Sep 2019 07:38 )             30.8   10-01    140  |  107  |  25.0<H>  ----------------------------<  102  3.7   |  21.0<L>  |  1.53<H>    Ca    8.3<L>      01 Oct 2019 08:18
cc: sbo     interval hx: patient seen and eval. comfortable. in no acute distress. tolerating full liquid diet well. no n/v    Vital Signs Last 24 Hrs  T(C): 37.2 (30 Sep 2019 16:40), Max: 37.2 (29 Sep 2019 23:57)  T(F): 99 (30 Sep 2019 16:40), Max: 99 (29 Sep 2019 23:57)  HR: 84 (30 Sep 2019 16:40) (79 - 84)  BP: 143/79 (30 Sep 2019 16:40) (100/62 - 143/79)  BP(mean): --  RR: 18 (30 Sep 2019 16:40) (18 - 18)  SpO2: 93% (30 Sep 2019 16:40) (93% - 93%)     GENERAL: NAD,   HEENT: mm moist   NECK: soft, Supple,,   CHEST/LUNG: Clear  HEART: S1S2+, Regular rate and rhythm  ABDOMEN: Soft, NT  EXTREMITIES: no  edema  SKIN: No rashes or lesions  NEURO: Awake, confused                          9.7    2.52  )-----------( 109      ( 30 Sep 2019 07:38 )             30.8   09-30    142  |  110<H>  |  19.0  ----------------------------<  100  4.1   |  21.0<L>  |  1.39<H>    Ca    8.7      30 Sep 2019 07:38
cc: sbo     interval hx: patient seen and eval. in no acute distress. bm x2 over night. also vomited over night. no fever or chills. abd x ray ordered overnight , pending     Vital Signs Last 24 Hrs  T(C): 36.6 (03 Oct 2019 07:10), Max: 37.5 (02 Oct 2019 23:56)  T(F): 97.8 (03 Oct 2019 07:10), Max: 99.5 (02 Oct 2019 23:56)  HR: 84 (03 Oct 2019 07:10) (84 - 97)  BP: 106/71 (03 Oct 2019 07:10) (106/71 - 137/66)  BP(mean): --  RR: 18 (03 Oct 2019 07:10) (18 - 18)  SpO2: 94% (03 Oct 2019 07:10) (90% - 94%)    GENERAL: NAD, comfortable  HEENT: mm moist   NECK: supple   CHEST/LUNG: Clear b/l   HEART: S1S2+, Regular rate and rhythm  ABDOMEN: Soft, NT, bs nl , no rebound tenderness   EXTREMITIES: no  edema b/l   SKIN: No rashes or lesions  NEURO: Awake, confused, follows some commands                           9.7    3.36  )-----------( 123      ( 02 Oct 2019 07:49 )             30.9   10-02    142  |  109<H>  |  29.0<H>  ----------------------------<  101  3.5   |  21.0<L>  |  1.40<H>    Ca    8.6      02 Oct 2019 07:49
cc: sbo     interval hx: patient seen and eval. in no acute distress. tolerating diet, no bm since 9/28     Vital Signs Last 24 Hrs  T(C): 36.6 (02 Oct 2019 09:20), Max: 36.7 (01 Oct 2019 23:35)  T(F): 97.8 (02 Oct 2019 09:20), Max: 98 (01 Oct 2019 23:35)  HR: 70 (02 Oct 2019 09:20) (70 - 77)  BP: 126/68 (02 Oct 2019 09:20) (126/68 - 128/69)  BP(mean): --  RR: 18 (02 Oct 2019 09:20) (18 - 18)  SpO2: 96% (02 Oct 2019 09:20) (95% - 96%)    GENERAL: NAD,   HEENT: mm moist   NECK: soft, Supple,,   CHEST/LUNG: Clear  HEART: S1S2+, Regular rate and rhythm  ABDOMEN: Soft, NT  EXTREMITIES: no  edema  SKIN: No rashes or lesions  NEURO: Awake, confused                                                 9.7    3.36  )-----------( 123      ( 02 Oct 2019 07:49 )             30.9   10-02    142  |  109<H>  |  29.0<H>  ----------------------------<  101  3.5   |  21.0<L>  |  1.40<H>    Ca    8.6      02 Oct 2019 07:49      < from: Xray Abdomen 2 View PORTABLE -Urgent (10.01.19 @ 19:04) >  IMPRESSION:  Nonobstructive bowel gas pattern.    < end of copied text >

## 2019-10-06 NOTE — PROGRESS NOTE ADULT - ASSESSMENT
70 y/o female with PMH of hypothyroid, schizo-affective disease, CVA, hernias, multiple abdominal surgeries, SBO, was sent to the ED from [facility] for nausea, vomiting and abdominal pain. Patient said her symptoms has been going on for 2 days; can not keep anything down. As per ED note, patient was given enema and anti-nausea medications without improvement at [place]. Also noted to have increased abdominal fullness. Patient denies abdominal pain now, but had earlier. She has no fever, chills, diarrhea, dysuria, chest pain, shortness of breath, palpitation.    Abdominal pain 2/2 SBO   - clears  --> surgery consult appreciated  --> slow advancement of diet    Intractable nausea and vomiting   2/2 to SBO   Zofran PRN     SIRS   No clear source of infection   Septic work up sent; follow up   Zosyn and Vancomycin once given   Will hold off on antibiotic for now     MONICA - improved  dc amiloride    Hypothyroidism   Continue Synthroid 112mcg     HTN   Amiloride 5mg     Psychosis   Olanzapine 10mg   Haldol 10mg bid   Ativan 1mg   psych following    Supportive   DVT prophylaxis: heparin sc   GI prophylaxis: PPI 40mg   Diet: clear liquid
>Ileus/SBO - resolved  -advance diet slowly as tolerated     > episode of fever/ x1 episode, no new episodes   - cultures negative  - observe    > hypothyroid - synthroid    >Psychosis / stable   -Olanzapine 10mg   -c/w haldol atc and lorazpema atc as per psych     >yoanna - stable   -monitor     >dispo : follow up cxs ,  needs pt eval. [facility] when stable.
1) Ileus/SBO - make npo  --> fluids  --> surgery following  --> will order urgent ct scan     2) fever - start iv abx  --> reculture  --> rvp     3) hypothyroid - change to iv synthroid    4) Psychosis   Olanzapine 10mg   change from po to IV  change from po to iv   psych following    5) yoanna - stable   --> continue to hold amiloride    prognosis guarded
1) Ileus/SBO - resolved  --> advance diet to full  --> surgery signed off    2) fever - cultured  --> observed off abx    3) hypothyroid - synthroid    4) Psychosis   Olanzapine 10mg   c/w atican atc and haldol atc    5) yoanna - stable   --> continue to hold amiloride    dispo follow up cultures  --> dc back to [facility] when stable
1) Ileus/SBO - resolved  --> on full liquid diet, try to advance tomorrow   --> surgery signed off    2) 1 episode of fever - cultures negative  --> observed off abx  --> unclear of reason     3) hypothyroid - synthroid    4) Psychosis   Olanzapine 10mg   c/w haldol atc and lorazpema atc as per psych     5) yoanna - stable   --> continue to hold home amiloride    dispo follow up cultures  --> dc back to [facility] when stable   --> needs pt eval and slow advancement of diet
>Ileus/SBO - resolved  -c/w  low fiber diet  -stool softner   -c/w stool softener , will also give dulcolax suppository today , needs to have bm before dc     > hypothyroid - synthroid    >Psychosis / stable   -Olanzapine 10mg   -c/w haldol atc and lorazpema atc as per psych     >yoanna - stable   -monitor     >dispo : plan for dc to [facility] in am if medically stable
>Ileus/SBO - resolved  -stool softner   -zofran prn for n/v   -repeat abd x ray pending   -will monitor if tolerating diet     > hypothyroid - synthroid    >Psychosis / stable   -Olanzapine 10mg   -c/w haldol atc and lorazpema atc as per psych     >yoanna - stable   -monitor     >dispo : plan for dc to [facility] in am if medically stable
>Ileus/SBO - resolved  -will advance to low fiber diet    > hypothyroid - synthroid    >Psychosis / stable   -Olanzapine 10mg   -c/w haldol atc and lorazpema atc as per psych     >yoanna - stable   -monitor     >dispo : follow up cxs ,  needs pt eval. dc to [facility] after tolerating low residue diet.
>Ileus/SBO - resolved  -will advance to low fiber diet  -stool softner   -f/u abd xray     > hypothyroid - synthroid    >Psychosis / stable   -Olanzapine 10mg   -c/w haldol atc and lorazpema atc as per psych     >yoanna - stable   -monitor     >dispo : plan for dc to [facility] in am if medically stable
>Ileus/SBO - resolved initially, now again with nausea vomiting  -stool softner   -zofran prn for n/v   -repeat abd x ray pending   -will monitor if tolerating diet     > hypothyroid - synthroid    >Psychosis / stable   -Olanzapine 10mg   -c/w haldol atc and lorazepam atc as per psych     >yoanna - stable   -monitor     >dispo : plan for dc to [facility] in next 24-48 hours
>Ileus/SBO - resolved initially, now again with nausea/ post prandial vomiting. Repeat serial imaging again without SBO.   - Continue PPI, increase to BID  - stool softener   - zofran prn for n/v   - will monitor if tolerating diet     > hypothyroid - synthroid    >Psychosis ; continues to have delusion, mood lability  -Olanzapine 10mg   -c/w haldol atc and lorazepam atc as per psych     >yoanna - resolved      >dispo : plan for dc to [facility] once able to keep food down
>Ileus/SBO - resolved initially, now again with nausea/ post prandial vomiting. Repeat serial imaging again without SBO. Resolved. Tolerating diet and BM established  - Continue PPI, increased to BID  - stool softener   - zofran prn for n/v   - will monitor if tolerating diet     > hypothyroid - synthroid    >Psychosis ; continues to have delusion, mood lability  -Olanzapine 10mg   -c/w haldol atc and lorazepam atc as per psych     >yoanna - resolved      >dispo : Discharge back to [facility].    Called [phone], left message. Called [phone] and spoke with RN, who took my number and will have the medical Dr Call back.  Discussed with MATT Mills

## 2019-10-06 NOTE — PROGRESS NOTE ADULT - REASON FOR ADMISSION
Intractable nausea/vomiting; SBO

## 2019-10-06 NOTE — CHART NOTE - NSCHARTNOTEFT_GEN_A_CORE
Source: Patient [ ]  Family [ ]   other [x ] emr  Ileus/SBO - resolved initially, now again with nausea/ post prandial vomiting. Repeat serial imaging again without SBO.     Current Diet: Diet, Low Fiber (09-30-19 @ 10:07)      Patient reports [ ] nausea  [ ] vomiting [ ] diarrhea [ ] constipation  [ ]chewing problems [ ] swallowing issues  [ ] other:     PO intake:  < 50% [ ]   50-75%  [ ]   %  [ ]  other :    Source for PO intake [ ] Patient [ ] family [ ] chart [ ] staff [ ] other    Current Weight:   70 kg    % Weight Change  none noted    Pertinent Medications: MEDICATIONS  (STANDING):  docusate sodium 100 milliGRAM(s) Oral daily  haloperidol    Injectable 10 milliGRAM(s) IntraMuscular two times a day  heparin  Injectable 5000 Unit(s) SubCutaneous every 8 hours  levothyroxine 112 MICROGram(s) Oral daily  multivitamin 1 Tablet(s) Oral daily  OLANZapine 10 milliGRAM(s) Oral at bedtime  pantoprazole    Tablet 40 milliGRAM(s) Oral two times a day  simvastatin 20 milliGRAM(s) Oral at bedtime    MEDICATIONS  (PRN):  acetaminophen   Tablet .. 650 milliGRAM(s) Oral every 6 hours PRN Temp greater or equal to 38C (100.4F), Mild Pain (1 - 3), Moderate Pain (4 - 6)  bisacodyl Suppository 10 milliGRAM(s) Rectal daily PRN Constipation  ondansetron Injectable 4 milliGRAM(s) IV Push every 6 hours PRN Nausea and/or Vomiting    Pertinent Labs: CBC Full  -  ( 05 Oct 2019 08:05 )  WBC Count : 2.61 K/uL  RBC Count : 3.22 M/uL  Hemoglobin : 9.8 g/dL  Hematocrit : 31.0 %  Platelet Count - Automated : 160 K/uL  Mean Cell Volume : 96.3 fl  Mean Cell Hemoglobin : 30.4 pg  Mean Cell Hemoglobin Concentration : 31.6 gm/dL  Auto Neutrophil # : x  Auto Lymphocyte # : x  Auto Monocyte # : x  Auto Eosinophil # : x  Auto Basophil # : x  Auto Neutrophil % : x  Auto Lymphocyte % : x  Auto Monocyte % : x  Auto Eosinophil % : x  Auto Basophil % : x    10-05 Na144 mmol/L Glu 103 mg/dL K+ 3.4 mmol/L<L> Cr  1.27 mg/dL BUN 32.0 mg/dL<H> Phos n/a   Alb n/a   PAB n/a             Skin: intact     Nutrition focused physical exam conducted - found signs of malnutrition [ x]absent [ ]present    Subcutaneous fat loss: [ ] Orbital fat pads region, [ ]Buccal fat region, [ ]Triceps region,  [ ]Ribs region    Muscle wasting: [ ]Temples region, [ ]Clavicle region, [ ]Shoulder region, [ ]Scapula region, [ ]Interosseous region,  [ ]thigh region, [ ]Calf region    Estimated Needs:   [ ] no change since previous assessment  [ ] recalculated:     Current Nutrition Diagnosis: Pt at risk secondary to inadequate protein calorie intake, related to ileus, nausea, vomiting, as evidenced by PO~50%.     Recommendations:   1) continue diet as abner  2) ensure TID    Monitoring and Evaluation:   [x ] PO intake [ x] Tolerance to diet prescription [X] Weights  [X] Follow up per protocol [X] Labs:

## 2019-10-15 ENCOUNTER — EMERGENCY (EMERGENCY)
Facility: HOSPITAL | Age: 69
LOS: 1 days | Discharge: HOME | End: 2019-10-15
Attending: EMERGENCY MEDICINE
Payer: MEDICARE

## 2019-10-15 VITALS
RESPIRATION RATE: 16 BRPM | WEIGHT: 149.91 LBS | SYSTOLIC BLOOD PRESSURE: 130 MMHG | TEMPERATURE: 98 F | HEART RATE: 86 BPM | OXYGEN SATURATION: 99 % | DIASTOLIC BLOOD PRESSURE: 80 MMHG | HEIGHT: 65 IN

## 2019-10-15 VITALS
RESPIRATION RATE: 18 BRPM | DIASTOLIC BLOOD PRESSURE: 78 MMHG | OXYGEN SATURATION: 96 % | HEART RATE: 86 BPM | SYSTOLIC BLOOD PRESSURE: 130 MMHG

## 2019-10-15 DIAGNOSIS — Z93.2 ILEOSTOMY STATUS: Chronic | ICD-10-CM

## 2019-10-15 DIAGNOSIS — K43.5 PARASTOMAL HERNIA WITHOUT OBSTRUCTION OR GANGRENE: Chronic | ICD-10-CM

## 2019-10-15 LAB
ALBUMIN SERPL ELPH-MCNC: 3.2 G/DL — LOW (ref 3.3–5.2)
ALP SERPL-CCNC: 94 U/L — SIGNIFICANT CHANGE UP (ref 40–120)
ALT FLD-CCNC: 15 U/L — SIGNIFICANT CHANGE UP
AMYLASE P1 CFR SERPL: 94 U/L — SIGNIFICANT CHANGE UP (ref 36–128)
ANION GAP SERPL CALC-SCNC: 10 MMOL/L — SIGNIFICANT CHANGE UP (ref 5–17)
APTT BLD: 29.4 SEC — SIGNIFICANT CHANGE UP (ref 27.5–36.3)
AST SERPL-CCNC: 16 U/L — SIGNIFICANT CHANGE UP
BASOPHILS # BLD AUTO: 0.01 K/UL — SIGNIFICANT CHANGE UP (ref 0–0.2)
BASOPHILS NFR BLD AUTO: 0.3 % — SIGNIFICANT CHANGE UP (ref 0–2)
BILIRUB SERPL-MCNC: 0.3 MG/DL — LOW (ref 0.4–2)
BUN SERPL-MCNC: 25 MG/DL — HIGH (ref 8–20)
CALCIUM SERPL-MCNC: 8.7 MG/DL — SIGNIFICANT CHANGE UP (ref 8.6–10.2)
CHLORIDE SERPL-SCNC: 108 MMOL/L — HIGH (ref 98–107)
CO2 SERPL-SCNC: 23 MMOL/L — SIGNIFICANT CHANGE UP (ref 22–29)
CREAT SERPL-MCNC: 1.59 MG/DL — HIGH (ref 0.5–1.3)
EOSINOPHIL # BLD AUTO: 0.01 K/UL — SIGNIFICANT CHANGE UP (ref 0–0.5)
EOSINOPHIL NFR BLD AUTO: 0.3 % — SIGNIFICANT CHANGE UP (ref 0–6)
GLUCOSE SERPL-MCNC: 102 MG/DL — SIGNIFICANT CHANGE UP (ref 70–115)
HCT VFR BLD CALC: 30.8 % — LOW (ref 34.5–45)
HGB BLD-MCNC: 9.4 G/DL — LOW (ref 11.5–15.5)
IMM GRANULOCYTES NFR BLD AUTO: 0.3 % — SIGNIFICANT CHANGE UP (ref 0–1.5)
INR BLD: 1.24 RATIO — HIGH (ref 0.88–1.16)
LACTATE BLDV-MCNC: 0.7 MMOL/L — SIGNIFICANT CHANGE UP (ref 0.5–2)
LIDOCAIN IGE QN: 134 U/L — HIGH (ref 22–51)
LYMPHOCYTES # BLD AUTO: 1.03 K/UL — SIGNIFICANT CHANGE UP (ref 1–3.3)
LYMPHOCYTES # BLD AUTO: 28.6 % — SIGNIFICANT CHANGE UP (ref 13–44)
MCHC RBC-ENTMCNC: 30.1 PG — SIGNIFICANT CHANGE UP (ref 27–34)
MCHC RBC-ENTMCNC: 30.5 GM/DL — LOW (ref 32–36)
MCV RBC AUTO: 98.7 FL — SIGNIFICANT CHANGE UP (ref 80–100)
MONOCYTES # BLD AUTO: 0.5 K/UL — SIGNIFICANT CHANGE UP (ref 0–0.9)
MONOCYTES NFR BLD AUTO: 13.9 % — SIGNIFICANT CHANGE UP (ref 2–14)
NEUTROPHILS # BLD AUTO: 2.04 K/UL — SIGNIFICANT CHANGE UP (ref 1.8–7.4)
NEUTROPHILS NFR BLD AUTO: 56.6 % — SIGNIFICANT CHANGE UP (ref 43–77)
PLATELET # BLD AUTO: 128 K/UL — LOW (ref 150–400)
POTASSIUM SERPL-MCNC: 3.3 MMOL/L — LOW (ref 3.5–5.3)
POTASSIUM SERPL-SCNC: 3.3 MMOL/L — LOW (ref 3.5–5.3)
PROT SERPL-MCNC: 5.6 G/DL — LOW (ref 6.6–8.7)
PROTHROM AB SERPL-ACNC: 14.3 SEC — HIGH (ref 10–12.9)
RBC # BLD: 3.12 M/UL — LOW (ref 3.8–5.2)
RBC # FLD: 13.8 % — SIGNIFICANT CHANGE UP (ref 10.3–14.5)
SODIUM SERPL-SCNC: 141 MMOL/L — SIGNIFICANT CHANGE UP (ref 135–145)
WBC # BLD: 3.6 K/UL — LOW (ref 3.8–10.5)
WBC # FLD AUTO: 3.6 K/UL — LOW (ref 3.8–10.5)

## 2019-10-15 PROCEDURE — 83605 ASSAY OF LACTIC ACID: CPT

## 2019-10-15 PROCEDURE — 36415 COLL VENOUS BLD VENIPUNCTURE: CPT

## 2019-10-15 PROCEDURE — 80053 COMPREHEN METABOLIC PANEL: CPT

## 2019-10-15 PROCEDURE — 83690 ASSAY OF LIPASE: CPT

## 2019-10-15 PROCEDURE — 96375 TX/PRO/DX INJ NEW DRUG ADDON: CPT

## 2019-10-15 PROCEDURE — 74176 CT ABD & PELVIS W/O CONTRAST: CPT | Mod: 26

## 2019-10-15 PROCEDURE — 85610 PROTHROMBIN TIME: CPT

## 2019-10-15 PROCEDURE — 74176 CT ABD & PELVIS W/O CONTRAST: CPT

## 2019-10-15 PROCEDURE — 85730 THROMBOPLASTIN TIME PARTIAL: CPT

## 2019-10-15 PROCEDURE — 85027 COMPLETE CBC AUTOMATED: CPT

## 2019-10-15 PROCEDURE — 99284 EMERGENCY DEPT VISIT MOD MDM: CPT | Mod: 25

## 2019-10-15 PROCEDURE — 99284 EMERGENCY DEPT VISIT MOD MDM: CPT

## 2019-10-15 PROCEDURE — 96374 THER/PROPH/DIAG INJ IV PUSH: CPT | Mod: XU

## 2019-10-15 PROCEDURE — 82150 ASSAY OF AMYLASE: CPT

## 2019-10-15 RX ORDER — ONDANSETRON 8 MG/1
4 TABLET, FILM COATED ORAL ONCE
Refills: 0 | Status: COMPLETED | OUTPATIENT
Start: 2019-10-15 | End: 2019-10-15

## 2019-10-15 RX ORDER — SODIUM CHLORIDE 9 MG/ML
2000 INJECTION INTRAMUSCULAR; INTRAVENOUS; SUBCUTANEOUS ONCE
Refills: 0 | Status: COMPLETED | OUTPATIENT
Start: 2019-10-15 | End: 2019-10-15

## 2019-10-15 RX ORDER — FAMOTIDINE 10 MG/ML
20 INJECTION INTRAVENOUS ONCE
Refills: 0 | Status: COMPLETED | OUTPATIENT
Start: 2019-10-15 | End: 2019-10-15

## 2019-10-15 RX ADMIN — SODIUM CHLORIDE 2000 MILLILITER(S): 9 INJECTION INTRAMUSCULAR; INTRAVENOUS; SUBCUTANEOUS at 11:25

## 2019-10-15 RX ADMIN — FAMOTIDINE 20 MILLIGRAM(S): 10 INJECTION INTRAVENOUS at 11:25

## 2019-10-15 RX ADMIN — ONDANSETRON 4 MILLIGRAM(S): 8 TABLET, FILM COATED ORAL at 11:25

## 2019-10-15 NOTE — ED ADULT NURSE REASSESSMENT NOTE - NSIMPLEMENTINTERV_GEN_ALL_ED
Implemented All Fall Risk Interventions:  [place] to call system. Call bell, personal items and telephone within reach. Instruct patient to call for assistance. Room bathroom lighting operational. Non-slip footwear when patient is off stretcher. Physically safe environment: no spills, clutter or unnecessary equipment. Stretcher in lowest position, wheels locked, appropriate side rails in place. Provide visual cue, wrist band, yellow gown, etc. Monitor gait and stability. Monitor for mental status changes and reorient to person, place, and time. Review medications for side effects contributing to fall risk. Reinforce activity limits and safety measures with patient and family.

## 2019-10-15 NOTE — ED ADULT NURSE NOTE - CINV DISCH TEACH PARTICIP
Patient/discharge information, face sheet and pt [facility]

## 2019-10-15 NOTE — ED ADULT TRIAGE NOTE - CHIEF COMPLAINT QUOTE
Pt sent in from [place] to r/o SBO. Pt has been c/o diffuse abdominal pain and vomiting bile.

## 2019-10-15 NOTE — ED ADULT NURSE REASSESSMENT NOTE - NS ED NURSE REASSESS COMMENT FT1
Assumed pt care from PHONG Narayan, charting as noted. Pt is A+Ox2 disoriented to time/situation, [place] aide at bedside, pt is lying in stretcher, wet with episode of incontinence. Pt cleaned, skin care provided, bed linens changed and repositioned in stretcher. Pt crying and uncomfortable. Pt and [place] aide upset and angry with multiple comments directed toward RN, therapeutic communication used and de-escalation techniques, pt care needs satisfied and denies any additional needs at this time. Charge RN made aware of situation. Additional warm blanket provided for comfort. Pt VS stable as noted in flow sheet, pt does complain of belly pain, abdomen is nondistended, soft, with multiple areas that appear as if herniations "lumps" of sorts in abdomen, pt uncomfortable when palpating abdomen, pt able to tell RN 3/10 pain, does not elaborate. Pt awaiting CT at this time, CT contacted as pt wait very elongated, as per CT sending for transport at this time. Pt safety maintained, side rails up, stretcher in lowest position and wheels locked, call bell within reach and use reinforced by RN. Pt updated on plan of care, expresses understanding, and able to successfully teach back plan of care to RN. RN will continue to update pt throughout ED stay.

## 2019-10-15 NOTE — ED PROVIDER NOTE - OBJECTIVE STATEMENT
68 y/o F with h/o psych disorder and lives in inpatient [facility] brought in by staff for multipel episodes of vomit and abd pain. Pt has multipe bd surgeires in the past and her last bowel movement was this morning, normal urination, no fever, chillls, diarrhea. Pain started 12 hrs ago

## 2019-10-15 NOTE — ED PROVIDER NOTE - GASTROINTESTINAL, MLM
Abdomen soft, multiple abd scas noted appears chronic but no focal hernai palpated, bowel sounds nmoted, pt has diffus eab dtenderness, old abd scars

## 2019-10-15 NOTE — ED PROVIDER NOTE - PATIENT PORTAL LINK FT
You can access the FollowMyHealth Patient Portal offered by [facility] by registering at the following website: [email or web address]. By joining [facility]’s FollowMyHealth portal, you will also be able to view your health information using other applications (apps) compatible with our system.

## 2019-10-15 NOTE — ED ADULT NURSE NOTE - OBJECTIVE STATEMENT
Pt sent in from [place] to r/o SBO. Pt has been c/o diffuse abdominal pain and vomiting bile. Abd distended , Hx of SBO in the past

## 2019-10-15 NOTE — ED ADULT NURSE NOTE - INTERVENTIONS DEFINITIONS
Call bell, personal items and telephone within reach/Room bathroom lighting operational/Instruct patient to call for assistance/Non-slip footwear when patient is off stretcher/[place] to call system/Physically safe environment: no spills, clutter or unnecessary equipment

## 2019-10-15 NOTE — ED PROVIDER NOTE - CLINICAL SUMMARY MEDICAL DECISION MAKING FREE TEXT BOX
pt has risk factors for sbo, will check labs, do ct abd with po and iv contrast pt has risk factors for sbo, will check labs, do ct abd with po and iv contrast  NAP on ct pain controlled tolerating po fluids return to ed for intractable abd pain fevers any overall worseinfg

## 2019-10-15 NOTE — ED PROVIDER NOTE - NEUROLOGICAL, MLM
Alert and oriented, no focal deficits, no motor or sensory deficits. per staff this is her baseline mental status, pt is able to speak btu not too clearly

## 2019-10-17 ENCOUNTER — APPOINTMENT (OUTPATIENT)
Dept: COLORECTAL SURGERY | Facility: CLINIC | Age: 69
End: 2019-10-17
Payer: MEDICARE

## 2019-10-17 VITALS
DIASTOLIC BLOOD PRESSURE: 66 MMHG | SYSTOLIC BLOOD PRESSURE: 120 MMHG | TEMPERATURE: 98.1 F | WEIGHT: 136 LBS | HEART RATE: 88 BPM | HEIGHT: 66 IN | BODY MASS INDEX: 21.86 KG/M2

## 2019-10-17 DIAGNOSIS — K43.9 VENTRAL HERNIA W/OUT OBSTRUCTION OR GANGRENE: ICD-10-CM

## 2019-10-17 PROCEDURE — 99214 OFFICE O/P EST MOD 30 MIN: CPT

## 2019-10-17 NOTE — DATA REVIEWED
[FreeTextEntry1] : \par  EXAM: CT ABDOMEN AND PELVIS OC \par \par PROCEDURE DATE: 10/15/2019 \par \par \par \par INTERPRETATION: CLINICAL INFORMATION: Abdominal pain. \par \par COMPARISON: CT abdomen pelvis 9/26/2019. \par \par PROCEDURE: \par CT of the Abdomen and Pelvis was performed without intravenous contrast. \par Intravenous contrast: None. \par Oral contrast: positive contrast was administered. \par Sagittal and coronal reformats were performed. \par \par FINDINGS: \par \par LOWER CHEST: Linear complete resolution of the previously seen pleural \par effusions, residual trace amount remains on the right. There is no \par pericardial effusion. The heart size is normal. There is posterior dependent \par and subsegmental atelectasis. There are a few less than 5 mm nodules in the \par right lung. \par \par LIVER: Stable 2.1 x 1.6 cm cyst in the left lobe. Additional smaller \par hypodensities, too small to further characterize. \par BILE DUCTS: Normal caliber. \par GALLBLADDER: Layering calcified gallstones. \par SPLEEN: Enlarged, stable. \par PANCREAS: Within normal limits. \par ADRENALS: Within normal limits. \par KIDNEYS/URETERS: Within normal limits. \par \par BLADDER: Within normal limits. \par REPRODUCTIVE ORGANS: Hysterectomy. \par \par BOWEL: No bowel obstruction. Appendix is not definitively identified. \par Patient has had bowel surgery with anastomotic sutures in the distal small \par bowel. Patient has had midline ventral hernia repair with anterior abdominal \par wall mesh. There is again a large right lateral abdominal wall hernia with \par nonobstructed colon and small bowel loops. \par PERITONEUM: No ascites. \par VESSELS: The aorta and IVC are normal in caliber. \par RETROPERITONEUM/LYMPH NODES: No lymphadenopathy. \par ABDOMINAL WALL: Within normal limits. \par BONES: Degenerative change of the thoracic spine. \par \par IMPRESSION: \par \par Large right lateral abdominal wall hernia with nonobstructed colon and small \par bowel loops again noted. \par \par Stable splenomegaly. \par \par Near complete resolution of previously seen pleural effusions. \par \par No definite acute intra-abdominal findings to suggest a cause for patient's \par symptoms.

## 2019-10-17 NOTE — HISTORY OF PRESENT ILLNESS
[FreeTextEntry1] : 69 year old female who resides at [facility] presents for follow up after recent ER visit to [facility] for abdominal pain. She has had multiple prior abdominal surgeries including perineal rectosigmoidectomy and colpectomy in 2015 complicated by anastomotic leak requiring reoperation and ileostomy. This was followed by exploratory laparotomy, extensive lysis of adhesions, small bowel resection with ileostomy reversal and complicated abdominal wall reconstruction by plastic surgery in April 2017.\par \par She has a developed a large hernia in the right abdominal wall lateral to the mesh repair and is concerned of the size of the area. She is not clear if there is actual pain from it although not present today. She is having regular bowel movements and no pain with PO intake. She is concerned that hernia is a tumor/cancer.

## 2019-10-17 NOTE — PHYSICAL EXAM
[de-identified] : In no acute distress [de-identified] : Large soft, reducible bowel containing hernia right lateral wall hernia.  [de-identified] : non labored on room air [de-identified] : ambulates with walker [de-identified] : warm and dry [de-identified] : alert and oriented

## 2019-11-18 NOTE — DISCHARGE NOTE ADULT - SECONDARY DIAGNOSIS.
. T12 compression fracture Seizure Schizo affective schizophrenia Hypothyroidism HTN (hypertension) CVA (cerebral vascular accident)

## 2019-12-11 ENCOUNTER — OUTPATIENT (OUTPATIENT)
Dept: OUTPATIENT SERVICES | Facility: HOSPITAL | Age: 69
LOS: 1 days | End: 2019-12-11
Payer: COMMERCIAL

## 2019-12-11 DIAGNOSIS — Z93.2 ILEOSTOMY STATUS: Chronic | ICD-10-CM

## 2019-12-11 DIAGNOSIS — S89.91XA UNSPECIFIED INJURY OF RIGHT LOWER LEG, INITIAL ENCOUNTER: ICD-10-CM

## 2019-12-11 DIAGNOSIS — K43.5 PARASTOMAL HERNIA WITHOUT OBSTRUCTION OR GANGRENE: Chronic | ICD-10-CM

## 2019-12-11 PROCEDURE — 73700 CT LOWER EXTREMITY W/O DYE: CPT

## 2019-12-11 PROCEDURE — 73700 CT LOWER EXTREMITY W/O DYE: CPT | Mod: 26,RT,76

## 2019-12-22 ENCOUNTER — INPATIENT (INPATIENT)
Facility: HOSPITAL | Age: 69
LOS: 3 days | Discharge: PSYCHIATRIC OR SUBSTANCE USE FACILITY | DRG: 394 | End: 2019-12-26
Attending: SURGERY | Admitting: SURGERY
Payer: MEDICARE

## 2019-12-22 VITALS
OXYGEN SATURATION: 95 % | HEART RATE: 112 BPM | HEIGHT: 65 IN | TEMPERATURE: 99 F | WEIGHT: 179.9 LBS | DIASTOLIC BLOOD PRESSURE: 59 MMHG | SYSTOLIC BLOOD PRESSURE: 105 MMHG | RESPIRATION RATE: 20 BRPM

## 2019-12-22 DIAGNOSIS — Z93.2 ILEOSTOMY STATUS: Chronic | ICD-10-CM

## 2019-12-22 DIAGNOSIS — K43.5 PARASTOMAL HERNIA WITHOUT OBSTRUCTION OR GANGRENE: Chronic | ICD-10-CM

## 2019-12-22 LAB
ALBUMIN SERPL ELPH-MCNC: 4.5 G/DL — SIGNIFICANT CHANGE UP (ref 3.3–5.2)
ALP SERPL-CCNC: 145 U/L — HIGH (ref 40–120)
ALT FLD-CCNC: 21 U/L — SIGNIFICANT CHANGE UP
ANION GAP SERPL CALC-SCNC: 16 MMOL/L — SIGNIFICANT CHANGE UP (ref 5–17)
AST SERPL-CCNC: 17 U/L — SIGNIFICANT CHANGE UP
BASOPHILS # BLD AUTO: 0 K/UL — SIGNIFICANT CHANGE UP (ref 0–0.2)
BASOPHILS NFR BLD AUTO: 0 % — SIGNIFICANT CHANGE UP (ref 0–2)
BILIRUB SERPL-MCNC: 0.6 MG/DL — SIGNIFICANT CHANGE UP (ref 0.4–2)
BUN SERPL-MCNC: 35 MG/DL — HIGH (ref 8–20)
CALCIUM SERPL-MCNC: 9.7 MG/DL — SIGNIFICANT CHANGE UP (ref 8.6–10.2)
CHLORIDE SERPL-SCNC: 99 MMOL/L — SIGNIFICANT CHANGE UP (ref 98–107)
CO2 SERPL-SCNC: 29 MMOL/L — SIGNIFICANT CHANGE UP (ref 22–29)
CREAT SERPL-MCNC: 1.6 MG/DL — HIGH (ref 0.5–1.3)
EOSINOPHIL # BLD AUTO: 0 K/UL — SIGNIFICANT CHANGE UP (ref 0–0.5)
EOSINOPHIL NFR BLD AUTO: 0 % — SIGNIFICANT CHANGE UP (ref 0–6)
GIANT PLATELETS BLD QL SMEAR: PRESENT — SIGNIFICANT CHANGE UP
GLUCOSE SERPL-MCNC: 131 MG/DL — HIGH (ref 70–115)
HCT VFR BLD CALC: 35.5 % — SIGNIFICANT CHANGE UP (ref 34.5–45)
HGB BLD-MCNC: 11 G/DL — LOW (ref 11.5–15.5)
LACTATE BLDV-MCNC: 1.6 MMOL/L — SIGNIFICANT CHANGE UP (ref 0.5–2)
LIDOCAIN IGE QN: 165 U/L — HIGH (ref 22–51)
LYMPHOCYTES # BLD AUTO: 0.09 K/UL — LOW (ref 1–3.3)
LYMPHOCYTES # BLD AUTO: 0.9 % — LOW (ref 13–44)
MANUAL SMEAR VERIFICATION: SIGNIFICANT CHANGE UP
MCHC RBC-ENTMCNC: 29.3 PG — SIGNIFICANT CHANGE UP (ref 27–34)
MCHC RBC-ENTMCNC: 31 GM/DL — LOW (ref 32–36)
MCV RBC AUTO: 94.4 FL — SIGNIFICANT CHANGE UP (ref 80–100)
MONOCYTES # BLD AUTO: 0.42 K/UL — SIGNIFICANT CHANGE UP (ref 0–0.9)
MONOCYTES NFR BLD AUTO: 4.4 % — SIGNIFICANT CHANGE UP (ref 2–14)
NEUTROPHILS # BLD AUTO: 9.09 K/UL — HIGH (ref 1.8–7.4)
NEUTROPHILS NFR BLD AUTO: 81.7 % — HIGH (ref 43–77)
NEUTS BAND # BLD: 13 % — HIGH (ref 0–8)
PLAT MORPH BLD: NORMAL — SIGNIFICANT CHANGE UP
PLATELET # BLD AUTO: 149 K/UL — LOW (ref 150–400)
POLYCHROMASIA BLD QL SMEAR: SLIGHT — SIGNIFICANT CHANGE UP
POTASSIUM SERPL-MCNC: 3.5 MMOL/L — SIGNIFICANT CHANGE UP (ref 3.5–5.3)
POTASSIUM SERPL-SCNC: 3.5 MMOL/L — SIGNIFICANT CHANGE UP (ref 3.5–5.3)
PROT SERPL-MCNC: 7.3 G/DL — SIGNIFICANT CHANGE UP (ref 6.6–8.7)
RBC # BLD: 3.76 M/UL — LOW (ref 3.8–5.2)
RBC # FLD: 13.7 % — SIGNIFICANT CHANGE UP (ref 10.3–14.5)
RBC BLD AUTO: SIGNIFICANT CHANGE UP
SODIUM SERPL-SCNC: 144 MMOL/L — SIGNIFICANT CHANGE UP (ref 135–145)
WBC # BLD: 9.6 K/UL — SIGNIFICANT CHANGE UP (ref 3.8–10.5)
WBC # FLD AUTO: 9.6 K/UL — SIGNIFICANT CHANGE UP (ref 3.8–10.5)

## 2019-12-22 PROCEDURE — 99285 EMERGENCY DEPT VISIT HI MDM: CPT

## 2019-12-22 RX ORDER — SODIUM CHLORIDE 9 MG/ML
1000 INJECTION INTRAMUSCULAR; INTRAVENOUS; SUBCUTANEOUS ONCE
Refills: 0 | Status: COMPLETED | OUTPATIENT
Start: 2019-12-22 | End: 2019-12-22

## 2019-12-22 RX ORDER — ONDANSETRON 8 MG/1
4 TABLET, FILM COATED ORAL ONCE
Refills: 0 | Status: COMPLETED | OUTPATIENT
Start: 2019-12-22 | End: 2019-12-23

## 2019-12-22 RX ORDER — FAMOTIDINE 10 MG/ML
20 INJECTION INTRAVENOUS ONCE
Refills: 0 | Status: COMPLETED | OUTPATIENT
Start: 2019-12-22 | End: 2019-12-22

## 2019-12-22 RX ADMIN — FAMOTIDINE 20 MILLIGRAM(S): 10 INJECTION INTRAVENOUS at 23:36

## 2019-12-22 RX ADMIN — SODIUM CHLORIDE 1000 MILLILITER(S): 9 INJECTION INTRAMUSCULAR; INTRAVENOUS; SUBCUTANEOUS at 23:10

## 2019-12-22 NOTE — ED PROVIDER NOTE - OBJECTIVE STATEMENT
68 y/o F; significant PMHx of seizure disorder rectal prolapse, perineal proctosigmoidectomy, ileostomy, and nephrogenic DI, presents from [place], with abd pain and 2-3x episodes of vomiting. Pt's aid unsure if pt had diarrhea.   PMHx: seizure disorder rectal prolapse, perineal proctosigmoidectomy, ileostomy, nephrogenic DI.  Hx obtained from [facility] attendant.

## 2019-12-22 NOTE — ED ADULT NURSE NOTE - CHIEF COMPLAINT QUOTE
patient from [place] complaining of pain and vomitting and diahhrea at this time,. 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

## 2019-12-22 NOTE — ED ADULT TRIAGE NOTE - CHIEF COMPLAINT QUOTE
patient from [place] complaining of pain and vomitting and diahhrea at this time,. 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

## 2019-12-22 NOTE — ED PROVIDER NOTE - PROGRESS NOTE DETAILS
Patient is stable on re-evaluation. Surgical service called and no pick-up. Surgery aware and will evaluate. Surgical team has evaluated the patient. [place] staff at bedside reported patient had a significant bowel movement in the ED. KUB is requested and patient to be observed.

## 2019-12-22 NOTE — ED PROVIDER NOTE - PHYSICAL EXAMINATION
General:     NAD  Head:     NC/AT, EOMI, oral mucosa moist  Neck:     trachea midline  Lungs:     CTA b/l, no w/r/r  CVS:     S1S2, RRR, no m/g/r  Abd:     +BS, s/nd    RLQ: TTP, no rebound.    Bilateral lower quadrant: well healed surgical scar, reducible ventral hernias.  Ext:    2+ radial and pedal pulses, no c/c/e  Neuro: AAOx3, no sensory/motor deficits General:     NAD  Head:     NC/AT, EOMI, oral mucosa moist  Neck:     trachea midline  Lungs:     CTA b/l, no w/r/r  CVS:     S1S2, RRR, no m/g/r  Abd:     +BS, s/nd    RLQ: TTP, no rebound.    Bilateral lower quadrant: well healed surgical scar, reducible ventral hernias.  Ext:    2+ radial and pedal pulses, trace pedal edema  Neuro: grossly intact

## 2019-12-22 NOTE — ED ADULT NURSE NOTE - NSFALLRSKOUTCOME_ED_ALL_ED
DATE OF SERVICE:  10/26/2017

 

HISTORY OF PRESENT ILLNESS:  Mr. Martin Reyes Villanueva is a pleasant 40-year-old gentleman who pre
sents to the Wound Center for evaluation of 2 wounds of the right foot subsequent to amputation of t
he right great toe and metatarsal and amputation of the right second toe through the proximal phalan
x on 10/09/2017 by Dr. Héctor Millan.  The patient is English speaking only.  Negative pressure the
rapy was initiated subsequent to surgery and upon discharge from [facility], 
the patient was referred to the Wound Center for assistance with dressing changes of the wound VAC. 
 Mr. Reyes was discharged to home on Augmentin.

 

PAST MEDICAL HISTORY:

1.  Diabetes mellitus.

2.  Hypertension.

 

PAST SURGICAL HISTORY:

1.  Amputation of the right great toe through the mid phalanx on 04/15/2012.

2.  Amputation of right great toe and metatarsal/amputation of right second toe through the proximal
 phalanx.

 

MEDICATIONS:

1.  Augmentin.

2.  Lipitor.

3.  Motrin.

4.  Humulin.

5.  Lisinopril.

6.  Ultram.

 

ALLERGIES:  No known diagnosed allergies.

 

SOCIAL HISTORY:  Significant for tobacco use of up to 1 pack of cigarettes per day for 8 years.  The
 patient states that he stopped smoking 4 months ago.  Social history is also significant for the co
nsumption of 2-3 drinks per day for approximately 20 years.  The patient states that he stopped cons
uming alcohol 4 months ago.

 

FAMILY HISTORY:  Significant for diabetes mellitus.  The patient states that he has multiple relativ
es on the maternal and paternal sides of his family who were diagnosed with diabetes mellitus.

 

PHYSICAL EXAMINATION:

VITAL SIGNS:  Temperature 97.9, pulse 87, respirations 19, blood pressure 121/82.  Accu-Chek 132.

GENERAL:  A 40-year-old gentleman sitting on chair in examination room in no acute distress.

HEENT:  Normocephalic, atraumatic.

NECK:  No nuchal rigidity.

CHEST:  Clear to auscultation.

CARDIOVASCULAR:  Regular rate and rhythm.

ABDOMEN:  Soft.

EXTREMITIES:  A wound of the right foot is present subsequent to amputation of the right great toe a
nd metatarsal.  The dimensions of the wound are approximately 4.0 x 2.5 cm.  Granulation tissue is p
resent within the wound margins.  Necrotic and nonviable tissue present within the wound margins was
 debrided with an excisional full-thickness debridement with the use of scissors.  No purulent drain
age is associated with the wound.  No erythema of the skin surrounding the wound is present.  No mac
eration of the skin of the periwound is noted.  A dorsalis pedis pulse and a posterior tibial pulse 
are both palpable on the right.  No significant edema of the right foot is present on exam today.  A
 smaller wound of the right foot subsequent to amputation of the right second toe through the proxim
al phalanx is also present.  Dimensions of the wound are approximately 1.0 x 1.2 cm.  The wound is g
ranulating.  No purulent drainage is associated with the wound.  No erythema of the skin surrounding
 the wound is present.  No maceration of the skin of the periwound is noted.

 

ASSESSMENT AND PLAN:

1.  Right foot wounds as described above.  Negative pressure therapy for the larger wound of the rig
ht foot will be continued with dressing changes of the wound VAC 2 times per week here in the Wound 
Center.  For the smaller wound, the patient will receive dressing changes of Aquacel AG at the time 
of wound VAC dressing changes.  I will see Mr. Reyes again in two weeks.

2.  Diabetes mellitus.  The patient's Accu-Chek in clinic today is 132.  The patient has been told t
hat for optimal wound healing, his blood glucoses should remain below 150.

3.  Hypertension.
Fall Risk

## 2019-12-22 NOTE — ED PROVIDER NOTE - CLINICAL SUMMARY MEDICAL DECISION MAKING FREE TEXT BOX
patient arrived with abd pain and vomiting, extensive surgical hx. labs and ct ordered. signed out to incoming attending pending results

## 2019-12-22 NOTE — ED ADULT NURSE NOTE - NS ED NOTE ABUSE RESPONSE YN
LM for pt re: PSA results.     PSA, Total   Date Value   12/08/2017 <0.01 ng/mL   03/20/2008 1.40 NG/ML       
Let patient know his PSA is undetectable.     Thanks   
Unable to assess due to medical condition

## 2019-12-23 DIAGNOSIS — F25.0 SCHIZOAFFECTIVE DISORDER, BIPOLAR TYPE: ICD-10-CM

## 2019-12-23 DIAGNOSIS — K56.691 OTHER COMPLETE INTESTINAL OBSTRUCTION: ICD-10-CM

## 2019-12-23 LAB
ANION GAP SERPL CALC-SCNC: 12 MMOL/L — SIGNIFICANT CHANGE UP (ref 5–17)
APPEARANCE UR: ABNORMAL
BACTERIA # UR AUTO: ABNORMAL
BILIRUB UR-MCNC: NEGATIVE — SIGNIFICANT CHANGE UP
BUN SERPL-MCNC: 31 MG/DL — HIGH (ref 8–20)
CALCIUM SERPL-MCNC: 8.1 MG/DL — LOW (ref 8.6–10.2)
CHLORIDE SERPL-SCNC: 110 MMOL/L — HIGH (ref 98–107)
CO2 SERPL-SCNC: 27 MMOL/L — SIGNIFICANT CHANGE UP (ref 22–29)
COLOR SPEC: YELLOW — SIGNIFICANT CHANGE UP
CREAT SERPL-MCNC: 1.47 MG/DL — HIGH (ref 0.5–1.3)
DIFF PNL FLD: ABNORMAL
EPI CELLS # UR: SIGNIFICANT CHANGE UP
GLUCOSE SERPL-MCNC: 98 MG/DL — SIGNIFICANT CHANGE UP (ref 70–115)
GLUCOSE UR QL: NEGATIVE — SIGNIFICANT CHANGE UP
HCT VFR BLD CALC: 24.5 % — LOW (ref 34.5–45)
HGB BLD-MCNC: 7.2 G/DL — LOW (ref 11.5–15.5)
KETONES UR-MCNC: NEGATIVE — SIGNIFICANT CHANGE UP
LACTATE BLDV-MCNC: 1.7 MMOL/L — SIGNIFICANT CHANGE UP (ref 0.5–2)
LEUKOCYTE ESTERASE UR-ACNC: ABNORMAL
MCHC RBC-ENTMCNC: 28.2 PG — SIGNIFICANT CHANGE UP (ref 27–34)
MCHC RBC-ENTMCNC: 29.4 GM/DL — LOW (ref 32–36)
MCV RBC AUTO: 96.1 FL — SIGNIFICANT CHANGE UP (ref 80–100)
NITRITE UR-MCNC: POSITIVE
PH UR: 7 — SIGNIFICANT CHANGE UP (ref 5–8)
PLATELET # BLD AUTO: 107 K/UL — LOW (ref 150–400)
POTASSIUM SERPL-MCNC: 3.1 MMOL/L — LOW (ref 3.5–5.3)
POTASSIUM SERPL-SCNC: 3.1 MMOL/L — LOW (ref 3.5–5.3)
PROT UR-MCNC: 30 MG/DL
RBC # BLD: 2.55 M/UL — LOW (ref 3.8–5.2)
RBC # FLD: 13.8 % — SIGNIFICANT CHANGE UP (ref 10.3–14.5)
RBC CASTS # UR COMP ASSIST: ABNORMAL /HPF (ref 0–4)
SODIUM SERPL-SCNC: 149 MMOL/L — HIGH (ref 135–145)
SP GR SPEC: 1.01 — SIGNIFICANT CHANGE UP (ref 1.01–1.02)
UROBILINOGEN FLD QL: NEGATIVE — SIGNIFICANT CHANGE UP
WBC # BLD: 6.17 K/UL — SIGNIFICANT CHANGE UP (ref 3.8–10.5)
WBC # FLD AUTO: 6.17 K/UL — SIGNIFICANT CHANGE UP (ref 3.8–10.5)
WBC UR QL: >50

## 2019-12-23 PROCEDURE — 99231 SBSQ HOSP IP/OBS SF/LOW 25: CPT

## 2019-12-23 PROCEDURE — 99222 1ST HOSP IP/OBS MODERATE 55: CPT

## 2019-12-23 PROCEDURE — 74018 RADEX ABDOMEN 1 VIEW: CPT | Mod: 26

## 2019-12-23 PROCEDURE — 93010 ELECTROCARDIOGRAM REPORT: CPT

## 2019-12-23 PROCEDURE — 74018 RADEX ABDOMEN 1 VIEW: CPT | Mod: 26,77

## 2019-12-23 PROCEDURE — 74176 CT ABD & PELVIS W/O CONTRAST: CPT | Mod: 26

## 2019-12-23 PROCEDURE — 71045 X-RAY EXAM CHEST 1 VIEW: CPT | Mod: 26

## 2019-12-23 RX ORDER — PANTOPRAZOLE SODIUM 20 MG/1
40 TABLET, DELAYED RELEASE ORAL
Refills: 0 | Status: DISCONTINUED | OUTPATIENT
Start: 2019-12-23 | End: 2019-12-23

## 2019-12-23 RX ORDER — OLANZAPINE 15 MG/1
5 TABLET, FILM COATED ORAL AT BEDTIME
Refills: 0 | Status: DISCONTINUED | OUTPATIENT
Start: 2019-12-23 | End: 2019-12-23

## 2019-12-23 RX ORDER — ENOXAPARIN SODIUM 100 MG/ML
40 INJECTION SUBCUTANEOUS DAILY
Refills: 0 | Status: DISCONTINUED | OUTPATIENT
Start: 2019-12-23 | End: 2019-12-26

## 2019-12-23 RX ORDER — HALOPERIDOL DECANOATE 100 MG/ML
5 INJECTION INTRAMUSCULAR
Refills: 0 | Status: DISCONTINUED | OUTPATIENT
Start: 2019-12-23 | End: 2019-12-26

## 2019-12-23 RX ORDER — PIPERACILLIN AND TAZOBACTAM 4; .5 G/20ML; G/20ML
3.38 INJECTION, POWDER, LYOPHILIZED, FOR SOLUTION INTRAVENOUS ONCE
Refills: 0 | Status: COMPLETED | OUTPATIENT
Start: 2019-12-23 | End: 2019-12-23

## 2019-12-23 RX ORDER — SODIUM CHLORIDE 9 MG/ML
1000 INJECTION, SOLUTION INTRAVENOUS
Refills: 0 | Status: DISCONTINUED | OUTPATIENT
Start: 2019-12-23 | End: 2019-12-24

## 2019-12-23 RX ORDER — ACETAMINOPHEN 500 MG
1000 TABLET ORAL ONCE
Refills: 0 | Status: COMPLETED | OUTPATIENT
Start: 2019-12-23 | End: 2019-12-23

## 2019-12-23 RX ORDER — SODIUM CHLORIDE 9 MG/ML
1000 INJECTION, SOLUTION INTRAVENOUS ONCE
Refills: 0 | Status: COMPLETED | OUTPATIENT
Start: 2019-12-23 | End: 2019-12-23

## 2019-12-23 RX ORDER — SIMVASTATIN 20 MG/1
20 TABLET, FILM COATED ORAL AT BEDTIME
Refills: 0 | Status: DISCONTINUED | OUTPATIENT
Start: 2019-12-23 | End: 2019-12-23

## 2019-12-23 RX ORDER — HALOPERIDOL DECANOATE 100 MG/ML
5 INJECTION INTRAMUSCULAR
Refills: 0 | Status: DISCONTINUED | OUTPATIENT
Start: 2019-12-23 | End: 2019-12-23

## 2019-12-23 RX ORDER — AMLODIPINE BESYLATE 2.5 MG/1
2.5 TABLET ORAL DAILY
Refills: 0 | Status: DISCONTINUED | OUTPATIENT
Start: 2019-12-23 | End: 2019-12-23

## 2019-12-23 RX ORDER — LEVOTHYROXINE SODIUM 125 MCG
56 TABLET ORAL AT BEDTIME
Refills: 0 | Status: DISCONTINUED | OUTPATIENT
Start: 2019-12-24 | End: 2019-12-26

## 2019-12-23 RX ORDER — AMPICILLIN SODIUM AND SULBACTAM SODIUM 250; 125 MG/ML; MG/ML
1.5 INJECTION, POWDER, FOR SUSPENSION INTRAMUSCULAR; INTRAVENOUS ONCE
Refills: 0 | Status: COMPLETED | OUTPATIENT
Start: 2019-12-23 | End: 2019-12-23

## 2019-12-23 RX ORDER — ONDANSETRON 8 MG/1
4 TABLET, FILM COATED ORAL EVERY 4 HOURS
Refills: 0 | Status: DISCONTINUED | OUTPATIENT
Start: 2019-12-23 | End: 2019-12-26

## 2019-12-23 RX ORDER — LIDOCAINE HCL 20 MG/ML
5 VIAL (ML) INJECTION ONCE
Refills: 0 | Status: COMPLETED | OUTPATIENT
Start: 2019-12-23 | End: 2019-12-23

## 2019-12-23 RX ORDER — OLANZAPINE 15 MG/1
10 TABLET, FILM COATED ORAL AT BEDTIME
Refills: 0 | Status: DISCONTINUED | OUTPATIENT
Start: 2019-12-23 | End: 2019-12-26

## 2019-12-23 RX ORDER — AMPICILLIN SODIUM AND SULBACTAM SODIUM 250; 125 MG/ML; MG/ML
1.5 INJECTION, POWDER, FOR SUSPENSION INTRAMUSCULAR; INTRAVENOUS EVERY 6 HOURS
Refills: 0 | Status: DISCONTINUED | OUTPATIENT
Start: 2019-12-24 | End: 2019-12-25

## 2019-12-23 RX ORDER — POTASSIUM CHLORIDE 20 MEQ
10 PACKET (EA) ORAL
Refills: 0 | Status: COMPLETED | OUTPATIENT
Start: 2019-12-23 | End: 2019-12-23

## 2019-12-23 RX ORDER — SODIUM CHLORIDE 9 MG/ML
1000 INJECTION INTRAMUSCULAR; INTRAVENOUS; SUBCUTANEOUS ONCE
Refills: 0 | Status: COMPLETED | OUTPATIENT
Start: 2019-12-23 | End: 2019-12-23

## 2019-12-23 RX ORDER — PANTOPRAZOLE SODIUM 20 MG/1
40 TABLET, DELAYED RELEASE ORAL DAILY
Refills: 0 | Status: DISCONTINUED | OUTPATIENT
Start: 2019-12-23 | End: 2019-12-26

## 2019-12-23 RX ORDER — LEVOTHYROXINE SODIUM 125 MCG
112 TABLET ORAL DAILY
Refills: 0 | Status: DISCONTINUED | OUTPATIENT
Start: 2019-12-23 | End: 2019-12-23

## 2019-12-23 RX ORDER — AMPICILLIN SODIUM AND SULBACTAM SODIUM 250; 125 MG/ML; MG/ML
INJECTION, POWDER, FOR SUSPENSION INTRAMUSCULAR; INTRAVENOUS
Refills: 0 | Status: DISCONTINUED | OUTPATIENT
Start: 2019-12-23 | End: 2019-12-25

## 2019-12-23 RX ADMIN — ONDANSETRON 4 MILLIGRAM(S): 8 TABLET, FILM COATED ORAL at 00:22

## 2019-12-23 RX ADMIN — Medication 400 MILLIGRAM(S): at 20:38

## 2019-12-23 RX ADMIN — Medication 1 MILLIGRAM(S): at 11:51

## 2019-12-23 RX ADMIN — AMPICILLIN SODIUM AND SULBACTAM SODIUM 100 GRAM(S): 250; 125 INJECTION, POWDER, FOR SUSPENSION INTRAMUSCULAR; INTRAVENOUS at 20:31

## 2019-12-23 RX ADMIN — SODIUM CHLORIDE 125 MILLILITER(S): 9 INJECTION, SOLUTION INTRAVENOUS at 12:32

## 2019-12-23 RX ADMIN — Medication 100 MILLIEQUIVALENT(S): at 14:54

## 2019-12-23 RX ADMIN — SODIUM CHLORIDE 1000 MILLILITER(S): 9 INJECTION, SOLUTION INTRAVENOUS at 09:02

## 2019-12-23 RX ADMIN — Medication 1000 MILLIGRAM(S): at 21:40

## 2019-12-23 RX ADMIN — ENOXAPARIN SODIUM 40 MILLIGRAM(S): 100 INJECTION SUBCUTANEOUS at 12:32

## 2019-12-23 RX ADMIN — PIPERACILLIN AND TAZOBACTAM 200 GRAM(S): 4; .5 INJECTION, POWDER, LYOPHILIZED, FOR SOLUTION INTRAVENOUS at 02:26

## 2019-12-23 RX ADMIN — SODIUM CHLORIDE 1000 MILLILITER(S): 9 INJECTION INTRAMUSCULAR; INTRAVENOUS; SUBCUTANEOUS at 08:15

## 2019-12-23 RX ADMIN — Medication 100 MILLIEQUIVALENT(S): at 13:53

## 2019-12-23 RX ADMIN — Medication 400 MILLIGRAM(S): at 10:03

## 2019-12-23 RX ADMIN — Medication 100 MILLIEQUIVALENT(S): at 12:32

## 2019-12-23 RX ADMIN — SODIUM CHLORIDE 125 MILLILITER(S): 9 INJECTION, SOLUTION INTRAVENOUS at 20:38

## 2019-12-23 RX ADMIN — PIPERACILLIN AND TAZOBACTAM 3.38 GRAM(S): 4; .5 INJECTION, POWDER, LYOPHILIZED, FOR SOLUTION INTRAVENOUS at 08:15

## 2019-12-23 RX ADMIN — SODIUM CHLORIDE 1000 MILLILITER(S): 9 INJECTION, SOLUTION INTRAVENOUS at 08:14

## 2019-12-23 NOTE — H&P ADULT - NSHPLABSRESULTS_GEN_ALL_CORE
IMAGING:    EXAM:  CT ABDOMEN AND PELVIS OC                          PROCEDURE DATE:  12/23/2019      COMPARISON: CT of the abdomen and pelvis from 10/15/2019 and 9/23/2019    FINDINGS:   The heart is not enlarged. Stable dependent atelectasis in both lower lobes.    Patient is again status post prior small bowel surgery with small bowel small bowel anastomosis within the distal small bowel of the mid lower abdomen and within the right spigelian hernia. Distended loops of small bowel are new from the recent prior study but have a similar configuration to the study of 9/23/2019 although slightly more distended. There appears to be a single transition point within the distal small bowel exiting the right spigelian hernia. There is no pneumatosis, abnormal small bowel wall thickening, ascites or mesenteric edema. There is no free air or abdominal collection. There is no abnormal colonic wall thickening. Stable small to moderate hiatal hernia.    The liver, gallbladder, spleen, pancreas and adrenal glands are unremarkable aside from liver cysts and gallstones. There is no hydronephrosis or perinephric stranding.    The abdominal aorta is normal in caliber. There is no retroperitoneal, pelvic or inguinal adenopathy. There is no ascites.    The urinary bladder is unremarkable. Status post hysterectomy. There is no pelvic soft tissue mass.    The visualized osseous structures demonstrate no acute abnormality. Stable chronic compression fracture deformity of T12. Degenerative changes in the lumbar spine appear stable.    IMPRESSION:  Small bowel obstruction secondary to right spigelian hernia. No secondary signs of bowel ischemia.

## 2019-12-23 NOTE — H&P ADULT - HISTORY OF PRESENT ILLNESS
ACUTE CARE SURGERY CONSULT    HPI:      PAST MEDICAL HISTORY:  Uterine prolapse  Onychomycosis  Rectal prolapse  Venous insufficiency  Urinary bladder incontinence  Displaced fracture of olecranon process with intraarticular extension of left ulna  Schizo affective schizophrenia  Urinary incontinence  Obesity  GERD (gastroesophageal reflux disease)  HTN (hypertension)  CVA (cerebral vascular accident)  Venous insufficiency  Splenomegaly  Anemia  Neutropenia  Vaginal prolapse  Fall  Constipation  Osteopenia  Hypothyroid  Seizure  Hemiparesis, left  Behavior problems  Suicide attempt  Schizoaffective disorder, bipolar type  Hypothyroidism  Osteopenia  GERD (gastroesophageal reflux disease)  Vaginal prolapse without mention of uterine prolapse  Sensory hearing loss  Constipation  Neutropenia  Venous insufficiency (chronic) (peripheral)  Obesity  Hypertension  CVA (cerebral vascular accident)      PAST SURGICAL HISTORY:  Parastomal hernia without obstruction or gangrene  H/O ileostomy    ALLERGIES:  clozapine (Other)  Depakote (Other)  erythromycin (Unknown)  Neupogen (Other)    FAMILY HISTORY: Noncontributory    SOCIAL HISTORY: Lives in [facility] for many years  - Denies tobacco, EtOH, illicit substance use.     HOME MEDICATIONS:      Vital Signs Last 24 Hrs  T(C): 37.2 (23 Dec 2019 04:33), Max: 37.2 (23 Dec 2019 04:33)  T(F): 98.9 (23 Dec 2019 04:33), Max: 98.9 (23 Dec 2019 04:33)  HR: 97 (23 Dec 2019 04:33) (97 - 112)  BP: 108/61 (23 Dec 2019 04:33) (105/59 - 108/61)  RR: 18 (23 Dec 2019 04:33) (18 - 20)  SpO2: 95% (22 Dec 2019 21:38) (95% - 95%      PHYSICAL EXAM    GENERAL: Alert, well developed, in no acute distress.  MENTAL STATUS: AAOx3. Appropriate affect.  HEENT: PERRLA. EOMI. MMM.  Trachea midline. No lymph node swelling or tenderness.  RESPIRATORY: CTAB. No wheezing, rales or rhonchi.  CARDIOVASCULAR: RRR. No audible murmurs, rubs or gallops.   GASTROINTESTINAL: Abdomen soft, NT, ND, -R/-G.  No pulsatile mass, no flank tenderness or suprapubic tenderness. No hepatosplenomegaly.  NEUROLOGIC: Cranial nerves II-XII grossly intact. No focal neurological deficits. Moves all extremities spontaneously. Sensation intact bilaterally.  INTEGUMENTARY: No overt rashes or lesions, petechia or purpura. Good turgor. No edema.  MUSCULOSKELETAL: No cyanosis or clubbing. No gross deformities.   LYMPHATIC: Palpation of neck reveals no swelling or tenderness of neck nodes. Palpation of groin reveals no swelling or tenderness of groin nodes.    LABS:                        11.0   9.60  )-----------( 149      ( 22 Dec 2019 23:16 )             35.5     12-22    144  |  99  |  35.0<H>  ----------------------------<  131<H>  3.5   |  29.0  |  1.60<H>    Ca    9.7      22 Dec 2019 23:16    TPro  7.3  /  Alb  4.5  /  TBili  0.6  /  DBili  x   /  AST  17  /  ALT  21  /  AlkPhos  145<H>  12-22    Lactate:    12-23 @ 02:40  1.7  12-22 @ 23:15  1.6      IMAGING: ACUTE CARE SURGERY CONSULT    HPI: Patient is a 69 YOF well-known to the [facility] surgical service given her complex surgical history and multiple SBOs who now presents from [facility] with a 1 day history of multiple episodes of vomiting, per [place] representative at bedside. History and physical limited by flat affect and paucity of speech, although patient is able to answer simple questions. Last bowel movement at midnight in the ED (very large), states she has been passing gas. No fever, chills, chest pain, dyspnea. Extensive past surgical history with multiple operations by colorectal surgeon, Dr. Miller:     3/20/15: Altemeier procedure  3/26/15: Ex-lap, CONOR, appendectomy and diverting loop ileostomy for anastomotic leak  7/20/15: Rigid proctosigmoidoscope with findings of posterior anastomotic defect  11/19/15: Ex-lap, CONOR, VHR with biologic mesh, primary repair of parastomal hernia  7/1/16: VHR with parastomal hernia repair  4/4/17: Ex-lap, CONOR, SBR, ileostomy reversal, VHR with abdominal wall reconstruction via component separation and biologic mesh    As aforementioned, patient also post-operatively with multiple SBOs managed non-operatively at [facility] since her most recent exploratory laparotomy in April 2017; most recent admission was in October 2019.    PAST MEDICAL HISTORY:  Schizoaffective disorder, bipolar type  Uterine prolapse  Onychomycosis  Rectal prolapse  Urinary bladder incontinence  Displaced fracture of olecranon process with intraarticular extension of left ulna  HTN (hypertension)  CVA (cerebral vascular accident), Hemiparesis, left  Venous insufficiency  Splenomegaly  Anemia  Neutropenia  Vaginal prolapse  Multiple Falls at [place]   Behavioral assault, aggression   Osteopenia  Hypothyroid  Suicide attempt  Hypothyroidism  Osteopenia  GERD (gastroesophageal reflux disease)  Vaginal prolapse without mention of uterine prolapse  Sensory hearing loss  Constipation  Neutropenia  Venous insufficiency (chronic) (peripheral)  Parastomal hernia without obstruction or gangrene    PAST SURGICAL HISTORY:     3/20/15: Altemeier procedure  3/26/15: Ex-lap, CONOR, appendectomy and diverting loop ileostomy for anastomotic leak  7/20/15: Rigid proctosigmoidoscope with findings of posterior anastomotic defect  11/19/15: Ex-lap, CONOR, VHR with biologic mesh, primary repair of parastomal hernia  7/1/16: VHR with parastomal hernia repair  4/4/17: Ex-lap, CONOR, SBR, ileostomy reversal, VHR with abdominal wall reconstruction via component separation and biologic mesh    ALLERGIES:  clozapine (Other)  Depakote (Other)  erythromycin (Unknown)  Neupogen (Other)    FAMILY HISTORY: Noncontributory    SOCIAL HISTORY: Lives in [facility] for many years  - Denies tobacco, EtOH, illicit substance use.     HOME MEDICATIONS: see completed medication reconciliation for complete med recs     Vital Signs Last 24 Hrs  T(C): 37.2 (23 Dec 2019 04:33), Max: 37.2 (23 Dec 2019 04:33)  T(F): 98.9 (23 Dec 2019 04:33), Max: 98.9 (23 Dec 2019 04:33)  HR: 97 (23 Dec 2019 04:33) (97 - 112)  BP: 108/61 (23 Dec 2019 04:33) (105/59 - 108/61)  RR: 18 (23 Dec 2019 04:33) (18 - 20)  SpO2: 95% (22 Dec 2019 21:38) (95% - 95%)    PHYSICAL EXAM    		  · Constitutional Details	well-developed; no distress, comfortable a/b aide	  · Eyes	EOMI; PERRL; no drainage or redness	  		  · Neck	No bruits; no thyromegaly or nodules	  · Respiratory	detailed exam	  · Respiratory Details	normal; airway patent; breath sounds equal	  · Cardiovascular	detailed exam	  · Cardiovascular Details	regular rate and rhythm	  · Cardiovascular Details	positive S1; positive S2	  		  	soft; nontender; no rebound tenderness; no guarding; no rigidity	  · Gastrointestinal 	Massive rugby-ball sized R-sided abdominal hernia, at former stoma site with loop of bowel that is easily reducible; midline ventral hernia	  · Genitourinary	Normal genitalia; no lesions; no discharge	  · Rectal	deferred	  		  · Extremities Details	normal; no pedal edema	  		  · Radial Pulse	right normal; left normal	  · DP Pulse	right normal; left normal	  		  · Neurological Details	responds to pain; cranial nerves intact	  · Skin	No lesions; no rash	      LABS:                        11.0   9.60  )-----------( 149      ( 22 Dec 2019 23:16 )             35.5     12-22    144  |  99  |  35.0<H>  ----------------------------<  131<H>  3.5   |  29.0  |  1.60<H>    Ca    9.7      22 Dec 2019 23:16    TPro  7.3  /  Alb  4.5  /  TBili  0.6  /  DBili  x   /  AST  17  /  ALT  21  /  AlkPhos  145<H>  12-22    Lactate:    12-23 @ 02:40  1.7  12-22 @ 23:15  1.6

## 2019-12-23 NOTE — ED ADULT NURSE REASSESSMENT NOTE - NS ED NURSE REASSESS COMMENT FT1
Pt is resting comfortably in bed. [place] aid at bedside. Pt denies pain, N/V/D. Pt VSS. Pt in NAD at this time. Pt moves all extremities equal and bilateral. Plan of care and wait time explained. Safety maintained.

## 2019-12-23 NOTE — H&P ADULT - ASSESSMENT
ASSESSMENT & PLAN:    69 y/o F h/o bipolar type schizophrenia from [facility], extensive colorectal surgical history (see above), with clinical symptoms and imaging findings consistent with small bowel obstruction located at former massive stomal hernia site containing small bowel, reducible, nontender on exam, patient denies pain, sleeping comfortably, recent large BM a few hours ago in ED.   Patient currently hemodynamically well, afebrile, lactate no elevated (1.7), no leukocytosis, Crt elevated at 1.6 (baseline hovers 1.2 - 1.9). KUB obtained in ED elucidates need for decompression despite absence of vomiting.    Plan:  -Admit to Acute Care Surgery, Dr. Figueredo  -NGT decompression, follow up KUB   -Monitor vital signs, I/Os strict  -Given MONICA as above, will require aggressive IVF resuscitation LR at 125 cc/hr & two 1-Liter boluses  -NPO w/ sips  -Serial abdominal exams  -Morning labs ordered: CBC, CMP, Mag, Phos  -Maintain 1:1 at bedside from [place] for safety - currently [place]   -Will restart home psychiatric medications as able; will discuss with pharmacy about transition from PO to IV/IM meds - Medication Reconciliation completed       Plan discussed with Dr. Figueredo and with ED Attending Dr. Oneill.

## 2019-12-23 NOTE — H&P ADULT - ATTENDING COMMENTS
I have seen and examined the patient.  one day of emesis, no more since shes been in ed.  last BM while in ED at right after CT scan per health aid.  on exam non toxic, dry mucous membranes, large right heniamabomen (ileostomy site) hernia, no skin changes, no pain, reducible incoercible.  CT TP at hernia, (images similar to previous scan on file). fecalization proximal to TP and no mesenteric edema or free fluid.  MONICA on labrs  A/P SBO at hernia site  NGT  Aggressive fluid resuscitation.  needs gastrographing challenge latter today I have seen and examined the patient.  one day of emesis, no more since shes been in ed.  last BM while in ED at right after CT scan per health aid.  on exam non toxic, dry mucous membranes, large right heniamabomen (ileostomy site) hernia, no skin changes, no pain, reducible incoercible.  CT TP at hernia, (images similar to previous scan on file). fecalization proximal to TP and no mesenteric edema or free fluid.  MONICA on labrs  A/P SBO at hernia site  NGT  Aggressive fluid resuscitation.  needs gastrographing challenge latter today.

## 2019-12-23 NOTE — CONSULT NOTE ADULT - SUBJECTIVE AND OBJECTIVE BOX
admitted due to recurrent SBO  patient moaning agitated unable to provide any meaningful information beyond c/o abd pain  PAST MEDICAL & SURGICAL HISTORY:  Uterine prolapse  Onychomycosis  Rectal prolapse  Displaced fracture of olecranon process with intraarticular extension of left ulna  Schizo affective schizophrenia  Urinary incontinence  Obesity  HTN (hypertension)  CVA (cerebral vascular accident)  Splenomegaly  Anemia  Neutropenia  Vaginal prolapse  Osteopenia  Seizure  Hemiparesis, left  Hypothyroidism  GERD (gastroesophageal reflux disease)  Sensory hearing loss  Constipation  Venous insufficiency (chronic) (peripheral)  Home Medications:  cholecalciferol oral tablet: 2000 unit(s) orally every 48 hours (05 Dec 2018 13:26)  docusate sodium 100 mg oral capsule: 1 cap(s) orally 3 times a day, As Needed (05 Dec 2018 13:26)  haloperidol 1 mg oral tablet: 7 tab(s) orally 2 times a day (05 Dec 2018 13:26)  levothyroxine 112 mcg (0.112 mg) oral tablet: 1 tab(s) orally once a day (05 Dec 2018 13:26)  lidocaine 5% topical film: Apply topically to affected area once a day (05 Dec 2018 13:26)  LORazepam 1 mg oral tablet: 1 tab(s) orally 3 times a day (05 Dec 2018 13:26)  multivitamin: 1 tab(s) orally once a day (05 Dec 2018 13:26)  Norvasc 2.5 mg oral tablet: 1 tab(s) orally once a day (05 Oct 2019 09:16)  OLANZapine 5 mg oral tablet: 1 tab(s) orally once a day (at bedtime) (05 Dec 2018 13:26)  pantoprazole 40 mg oral delayed release tablet: 1 tab(s) orally once a day (before a meal) (05 Oct 2019 09:16)  polyethylene glycol 3350 oral powder for reconstitution: 17 gram(s) orally once a day (05 Dec 2018 13:26)  senna oral tablet: 2 tab(s) orally once a day (at bedtime), As Needed (05 Dec 2018 13:26)  simvastatin 20 mg oral tablet: 1 tab(s) orally once a day (at bedtime) (05 Dec 2018 13:26)  H/O ileostomy: 4/2015

## 2019-12-23 NOTE — ED ADULT NURSE REASSESSMENT NOTE - NS ED NURSE REASSESS COMMENT FT1
spoke to Kenny in trauma service for 102 fever, new orders will be given. see eMAR for further details

## 2019-12-23 NOTE — ED ADULT NURSE REASSESSMENT NOTE - NS ED NURSE REASSESS COMMENT FT1
Assuming care from previous RN, pt at baseline mental status, [place] aide at bedside stating 'this is how she normally behaves', resp even and unlabored, color good, NAD noted, pt with distended abd, no guarding or wincing upon palpation, pt with multiple patent 20G IV's in left arm, surgery MD at bedside for placement of NG tube and unsuccessful, MD to return later for re eval, pt repositioned for comfort, will continue to monitor for safety and comfort

## 2019-12-23 NOTE — CHART NOTE - NSCHARTNOTEFT_GEN_A_CORE
provider called about elevated patient temperature.    Patient evaluated at bedside. Patient agitated and uncooperative. Repeat temperature obtained rectally and also found to be elevated. Patient denied pain at this time and continued to ask for food. Patient last bowel movement during late morning. Patient remains NPO. Abdominal exam unchanged from previous.    Vitals:   T 101.1    BP: 132/76  RR 19  92% on RA    Gen: patient uncooperative and yelling, agitated  Pulm: no increased wob, lungs clear to auscultation bilaterally  Abd: soft, larger midline hernia without tenderness or overlying skin changes    A/P:  -obtain repeat labs  -CXR ordered  -Unasyn started for +UA  -blood cx pending

## 2019-12-23 NOTE — CHART NOTE - NSCHARTNOTEFT_GEN_A_CORE
Patient evaluated at bedside. Remain distended both on exam and imaging. Attempted to place NGT. Patient combative and refusing at this time. [place] assistant at bedside states she is able to make decisions for herself at baseline, however patient is unable to verbalized understanding of consequences if she continues to refuse treatment. Psychiatry called for evaluation.     Staph at [place] also reached out to, states she is unsure of patient capacity to make her own medical decisions. Attempted to contact patient's brother, who is the states proxy but no unsuccessful. Will attempt to reach out again later today.

## 2019-12-24 LAB
ALBUMIN SERPL ELPH-MCNC: 3.2 G/DL — LOW (ref 3.3–5.2)
ALP SERPL-CCNC: 93 U/L — SIGNIFICANT CHANGE UP (ref 40–120)
ALT FLD-CCNC: 17 U/L — SIGNIFICANT CHANGE UP
ANION GAP SERPL CALC-SCNC: 11 MMOL/L — SIGNIFICANT CHANGE UP (ref 5–17)
ANION GAP SERPL CALC-SCNC: 13 MMOL/L — SIGNIFICANT CHANGE UP (ref 5–17)
ANION GAP SERPL CALC-SCNC: 14 MMOL/L — SIGNIFICANT CHANGE UP (ref 5–17)
AST SERPL-CCNC: 23 U/L — SIGNIFICANT CHANGE UP
BASOPHILS # BLD AUTO: 0.02 K/UL — SIGNIFICANT CHANGE UP (ref 0–0.2)
BASOPHILS NFR BLD AUTO: 0.3 % — SIGNIFICANT CHANGE UP (ref 0–2)
BILIRUB DIRECT SERPL-MCNC: 0.2 MG/DL — SIGNIFICANT CHANGE UP (ref 0–0.3)
BILIRUB INDIRECT FLD-MCNC: 0.4 MG/DL — SIGNIFICANT CHANGE UP (ref 0.2–1)
BILIRUB SERPL-MCNC: 0.6 MG/DL — SIGNIFICANT CHANGE UP (ref 0.4–2)
BUN SERPL-MCNC: 26 MG/DL — HIGH (ref 8–20)
BUN SERPL-MCNC: 26 MG/DL — HIGH (ref 8–20)
BUN SERPL-MCNC: 29 MG/DL — HIGH (ref 8–20)
CALCIUM SERPL-MCNC: 8.6 MG/DL — SIGNIFICANT CHANGE UP (ref 8.6–10.2)
CALCIUM SERPL-MCNC: 8.8 MG/DL — SIGNIFICANT CHANGE UP (ref 8.6–10.2)
CALCIUM SERPL-MCNC: 9.1 MG/DL — SIGNIFICANT CHANGE UP (ref 8.6–10.2)
CHLORIDE SERPL-SCNC: 114 MMOL/L — HIGH (ref 98–107)
CHLORIDE SERPL-SCNC: 115 MMOL/L — HIGH (ref 98–107)
CHLORIDE SERPL-SCNC: 116 MMOL/L — HIGH (ref 98–107)
CO2 SERPL-SCNC: 25 MMOL/L — SIGNIFICANT CHANGE UP (ref 22–29)
CREAT SERPL-MCNC: 1.38 MG/DL — HIGH (ref 0.5–1.3)
CREAT SERPL-MCNC: 1.4 MG/DL — HIGH (ref 0.5–1.3)
CREAT SERPL-MCNC: 1.53 MG/DL — HIGH (ref 0.5–1.3)
EOSINOPHIL # BLD AUTO: 0 K/UL — SIGNIFICANT CHANGE UP (ref 0–0.5)
EOSINOPHIL NFR BLD AUTO: 0 % — SIGNIFICANT CHANGE UP (ref 0–6)
GLUCOSE SERPL-MCNC: 111 MG/DL — SIGNIFICANT CHANGE UP (ref 70–115)
GLUCOSE SERPL-MCNC: 85 MG/DL — SIGNIFICANT CHANGE UP (ref 70–115)
GLUCOSE SERPL-MCNC: 88 MG/DL — SIGNIFICANT CHANGE UP (ref 70–115)
HCT VFR BLD CALC: 26.9 % — LOW (ref 34.5–45)
HCT VFR BLD CALC: 28.5 % — LOW (ref 34.5–45)
HGB BLD-MCNC: 7.9 G/DL — LOW (ref 11.5–15.5)
HGB BLD-MCNC: 8.3 G/DL — LOW (ref 11.5–15.5)
IMM GRANULOCYTES NFR BLD AUTO: 0.3 % — SIGNIFICANT CHANGE UP (ref 0–1.5)
LYMPHOCYTES # BLD AUTO: 0.88 K/UL — LOW (ref 1–3.3)
LYMPHOCYTES # BLD AUTO: 13.7 % — SIGNIFICANT CHANGE UP (ref 13–44)
MAGNESIUM SERPL-MCNC: 2.1 MG/DL — SIGNIFICANT CHANGE UP (ref 1.6–2.6)
MAGNESIUM SERPL-MCNC: 2.1 MG/DL — SIGNIFICANT CHANGE UP (ref 1.6–2.6)
MAGNESIUM SERPL-MCNC: 2.1 MG/DL — SIGNIFICANT CHANGE UP (ref 1.8–2.6)
MCHC RBC-ENTMCNC: 28.5 PG — SIGNIFICANT CHANGE UP (ref 27–34)
MCHC RBC-ENTMCNC: 29 PG — SIGNIFICANT CHANGE UP (ref 27–34)
MCHC RBC-ENTMCNC: 29.1 GM/DL — LOW (ref 32–36)
MCHC RBC-ENTMCNC: 29.4 GM/DL — LOW (ref 32–36)
MCV RBC AUTO: 97.9 FL — SIGNIFICANT CHANGE UP (ref 80–100)
MCV RBC AUTO: 98.9 FL — SIGNIFICANT CHANGE UP (ref 80–100)
MONOCYTES # BLD AUTO: 0.52 K/UL — SIGNIFICANT CHANGE UP (ref 0–0.9)
MONOCYTES NFR BLD AUTO: 8.1 % — SIGNIFICANT CHANGE UP (ref 2–14)
NEUTROPHILS # BLD AUTO: 5 K/UL — SIGNIFICANT CHANGE UP (ref 1.8–7.4)
NEUTROPHILS NFR BLD AUTO: 77.6 % — HIGH (ref 43–77)
PHOSPHATE SERPL-MCNC: 2 MG/DL — LOW (ref 2.4–4.7)
PHOSPHATE SERPL-MCNC: 2.3 MG/DL — LOW (ref 2.4–4.7)
PHOSPHATE SERPL-MCNC: 2.9 MG/DL — SIGNIFICANT CHANGE UP (ref 2.4–4.7)
PLATELET # BLD AUTO: 97 K/UL — LOW (ref 150–400)
PLATELET # BLD AUTO: 98 K/UL — LOW (ref 150–400)
POTASSIUM SERPL-MCNC: 3.4 MMOL/L — LOW (ref 3.5–5.3)
POTASSIUM SERPL-MCNC: 3.6 MMOL/L — SIGNIFICANT CHANGE UP (ref 3.5–5.3)
POTASSIUM SERPL-MCNC: 3.8 MMOL/L — SIGNIFICANT CHANGE UP (ref 3.5–5.3)
POTASSIUM SERPL-SCNC: 3.4 MMOL/L — LOW (ref 3.5–5.3)
POTASSIUM SERPL-SCNC: 3.6 MMOL/L — SIGNIFICANT CHANGE UP (ref 3.5–5.3)
POTASSIUM SERPL-SCNC: 3.8 MMOL/L — SIGNIFICANT CHANGE UP (ref 3.5–5.3)
PROT SERPL-MCNC: 5.6 G/DL — LOW (ref 6.6–8.7)
RBC # BLD: 2.72 M/UL — LOW (ref 3.8–5.2)
RBC # BLD: 2.91 M/UL — LOW (ref 3.8–5.2)
RBC # FLD: 13.9 % — SIGNIFICANT CHANGE UP (ref 10.3–14.5)
RBC # FLD: 13.9 % — SIGNIFICANT CHANGE UP (ref 10.3–14.5)
SODIUM SERPL-SCNC: 150 MMOL/L — HIGH (ref 135–145)
SODIUM SERPL-SCNC: 153 MMOL/L — HIGH (ref 135–145)
SODIUM SERPL-SCNC: 155 MMOL/L — HIGH (ref 135–145)
WBC # BLD: 6.2 K/UL — SIGNIFICANT CHANGE UP (ref 3.8–10.5)
WBC # BLD: 6.44 K/UL — SIGNIFICANT CHANGE UP (ref 3.8–10.5)
WBC # FLD AUTO: 6.2 K/UL — SIGNIFICANT CHANGE UP (ref 3.8–10.5)
WBC # FLD AUTO: 6.44 K/UL — SIGNIFICANT CHANGE UP (ref 3.8–10.5)

## 2019-12-24 PROCEDURE — 74018 RADEX ABDOMEN 1 VIEW: CPT | Mod: 26

## 2019-12-24 PROCEDURE — 99231 SBSQ HOSP IP/OBS SF/LOW 25: CPT

## 2019-12-24 RX ORDER — SODIUM CHLORIDE 9 MG/ML
1000 INJECTION, SOLUTION INTRAVENOUS ONCE
Refills: 0 | Status: DISCONTINUED | OUTPATIENT
Start: 2019-12-24 | End: 2019-12-24

## 2019-12-24 RX ORDER — SODIUM CHLORIDE 9 MG/ML
1000 INJECTION, SOLUTION INTRAVENOUS
Refills: 0 | Status: DISCONTINUED | OUTPATIENT
Start: 2019-12-24 | End: 2019-12-25

## 2019-12-24 RX ORDER — SODIUM CHLORIDE 9 MG/ML
1000 INJECTION, SOLUTION INTRAVENOUS
Refills: 0 | Status: DISCONTINUED | OUTPATIENT
Start: 2019-12-24 | End: 2019-12-24

## 2019-12-24 RX ORDER — POTASSIUM CHLORIDE 20 MEQ
10 PACKET (EA) ORAL
Refills: 0 | Status: COMPLETED | OUTPATIENT
Start: 2019-12-24 | End: 2019-12-24

## 2019-12-24 RX ORDER — CIPROFLOXACIN LACTATE 400MG/40ML
400 VIAL (ML) INTRAVENOUS EVERY 12 HOURS
Refills: 0 | Status: DISCONTINUED | OUTPATIENT
Start: 2019-12-24 | End: 2019-12-24

## 2019-12-24 RX ADMIN — PANTOPRAZOLE SODIUM 40 MILLIGRAM(S): 20 TABLET, DELAYED RELEASE ORAL at 13:19

## 2019-12-24 RX ADMIN — SODIUM CHLORIDE 1000 MILLILITER(S): 9 INJECTION, SOLUTION INTRAVENOUS at 17:09

## 2019-12-24 RX ADMIN — AMPICILLIN SODIUM AND SULBACTAM SODIUM 100 GRAM(S): 250; 125 INJECTION, POWDER, FOR SUSPENSION INTRAMUSCULAR; INTRAVENOUS at 17:05

## 2019-12-24 RX ADMIN — ENOXAPARIN SODIUM 40 MILLIGRAM(S): 100 INJECTION SUBCUTANEOUS at 21:21

## 2019-12-24 RX ADMIN — OLANZAPINE 10 MILLIGRAM(S): 15 TABLET, FILM COATED ORAL at 00:37

## 2019-12-24 RX ADMIN — OLANZAPINE 10 MILLIGRAM(S): 15 TABLET, FILM COATED ORAL at 21:22

## 2019-12-24 RX ADMIN — AMPICILLIN SODIUM AND SULBACTAM SODIUM 100 GRAM(S): 250; 125 INJECTION, POWDER, FOR SUSPENSION INTRAMUSCULAR; INTRAVENOUS at 07:10

## 2019-12-24 RX ADMIN — AMPICILLIN SODIUM AND SULBACTAM SODIUM 100 GRAM(S): 250; 125 INJECTION, POWDER, FOR SUSPENSION INTRAMUSCULAR; INTRAVENOUS at 23:08

## 2019-12-24 RX ADMIN — Medication 56 MICROGRAM(S): at 21:22

## 2019-12-24 RX ADMIN — AMPICILLIN SODIUM AND SULBACTAM SODIUM 100 GRAM(S): 250; 125 INJECTION, POWDER, FOR SUSPENSION INTRAMUSCULAR; INTRAVENOUS at 13:20

## 2019-12-24 RX ADMIN — Medication 1 MILLIGRAM(S): at 21:21

## 2019-12-24 RX ADMIN — AMPICILLIN SODIUM AND SULBACTAM SODIUM 100 GRAM(S): 250; 125 INJECTION, POWDER, FOR SUSPENSION INTRAMUSCULAR; INTRAVENOUS at 02:00

## 2019-12-24 RX ADMIN — Medication 100 MILLIEQUIVALENT(S): at 11:53

## 2019-12-24 RX ADMIN — Medication 1 MILLIGRAM(S): at 03:25

## 2019-12-24 RX ADMIN — Medication 1 MILLIGRAM(S): at 13:19

## 2019-12-24 RX ADMIN — HALOPERIDOL DECANOATE 5 MILLIGRAM(S): 100 INJECTION INTRAMUSCULAR at 21:21

## 2019-12-24 RX ADMIN — Medication 100 MILLIEQUIVALENT(S): at 12:55

## 2019-12-24 RX ADMIN — SODIUM CHLORIDE 1000 MILLILITER(S): 9 INJECTION INTRAMUSCULAR; INTRAVENOUS; SUBCUTANEOUS at 23:36

## 2019-12-24 RX ADMIN — HALOPERIDOL DECANOATE 5 MILLIGRAM(S): 100 INJECTION INTRAMUSCULAR at 05:24

## 2019-12-24 RX ADMIN — Medication 200 MILLIGRAM(S): at 08:52

## 2019-12-24 RX ADMIN — SODIUM CHLORIDE 125 MILLILITER(S): 9 INJECTION, SOLUTION INTRAVENOUS at 17:09

## 2019-12-24 NOTE — PROGRESS NOTE ADULT - ASSESSMENT
69 y/o F h/o bipolar type schizophrenia from [facility], extensive colorectal surgical history (see above), with clinical symptoms and imaging findings consistent with small bowel obstruction located at former stomal hernia site containing small bowel, reducible, nontender on exam, patient denies pain, sleeping comfortably. Multiple bm's since presentation, however none overnight. Patient now afebrile and hemodynamically stable. Chest xray showing no focal consolidations.     -f/u am labs  -continue IV abx  -strict i&o's  -continue IVF, NPO  -norman  -f/u repeat KUB this am

## 2019-12-24 NOTE — PROGRESS NOTE BEHAVIORAL HEALTH - NSBHCONSULTMEDS_PSY_A_CORE FT
haloperidol    Injectable 5 milliGRAM(s) IntraMuscular two times a day  lorazepam   Injectable 1 milliGRAM(s) IV Push every 8 hours  olanzapine Disintegrating Tablet 10 milliGRAM(s) Oral at bedtime

## 2019-12-24 NOTE — PROGRESS NOTE ADULT - ATTENDING COMMENTS
I have seen and examined the patient  unable to place NGT  no emesis  hernia soft no skin changes,  follow GGC

## 2019-12-24 NOTE — PROGRESS NOTE ADULT - SUBJECTIVE AND OBJECTIVE BOX
INTERVAL HPI/OVERNIGHT EVENTS: patient with elevated temp overnight. Patient also noted to have decreased saturations improved with NC at present. Patient also with episode of hypotension.    SUBJECTIVE: patient evaluated at bedside this morning and continued to appear agitated. Patient unable to verbalize if in pain at present but continues to ask for food. Abdominal exam remains stable with soft abdomen and no tenderness to palpation. Patient without emesis overnight. CXR, repeat labs obtained. Patient given 1L bolus to hypotension with improved repeat bp. Unasyn started to treatment of asymptomatic UTI as possible source of elevated temperature.      MEDICATIONS  (STANDING):  ampicillin/sulbactam  IVPB      ampicillin/sulbactam  IVPB 1.5 Gram(s) IV Intermittent every 6 hours  ciprofloxacin   IVPB 400 milliGRAM(s) IV Intermittent every 12 hours  enoxaparin Injectable 40 milliGRAM(s) SubCutaneous daily  haloperidol    Injectable 5 milliGRAM(s) IntraMuscular two times a day  lactated ringers. 1000 milliLiter(s) (125 mL/Hr) IV Continuous <Continuous>  levothyroxine Injectable 56 MICROGram(s) IV Push at bedtime  LORazepam   Injectable 1 milliGRAM(s) IV Push every 8 hours  OLANZapine Disintegrating Tablet 10 milliGRAM(s) Oral at bedtime  pantoprazole  Injectable 40 milliGRAM(s) IV Push daily    MEDICATIONS  (PRN):  ondansetron Injectable 4 milliGRAM(s) IV Push every 4 hours PRN Nausea and/or Vomiting      Vital Signs Last 24 Hrs  T(C): 37.2 (24 Dec 2019 03:00), Max: 39.3 (23 Dec 2019 18:53)  T(F): 98.9 (24 Dec 2019 03:00), Max: 102.7 (23 Dec 2019 18:53)  HR: 99 (24 Dec 2019 03:00) (85 - 125)  BP: 107/71 (24 Dec 2019 03:00) (80/42 - 148/80)  BP(mean): --  RR: 18 (24 Dec 2019 03:00) (18 - 20)  SpO2: 94% (24 Dec 2019 03:00) (91% - 97%)    PE  Gen: resting in bed, moaning intermittently with yells at staff to be left alone  Pulm: no increased wob or use of accessory muscles, patient intermittently pulling NC off face, tolerating oxygen while sleeping  Abd: Large midline hernia at site of previous ostomy. abd soft, non-tender to light and deep palpation. last bm yesterday during day.   Neuro: unable to express person place or location at present, answering questions, but inappropriate responses at times    I&O's Detail    23 Dec 2019 07:01  -  24 Dec 2019 07:00  --------------------------------------------------------  IN:  Total IN: 0 mL    OUT:    Voided: 680 mL  Total OUT: 680 mL    Total NET: -680 mL          LABS:                        7.2    6.17  )-----------( 107      ( 23 Dec 2019 22:38 )             24.5     12-    149<H>  |  110<H>  |  31.0<H>  ----------------------------<  98  3.1<L>   |  27.0  |  1.47<H>    Ca    8.1<L>      23 Dec 2019 22:38    TPro  7.3  /  Alb  4.5  /  TBili  0.6  /  DBili  x   /  AST  17  /  ALT  21  /  AlkPhos  145<H>  12-22      Urinalysis Basic - ( 23 Dec 2019 07:29 )    Color: Yellow / Appearance: Slightly Turbid / S.010 / pH: x  Gluc: x / Ketone: Negative  / Bili: Negative / Urobili: Negative   Blood: x / Protein: 30 mg/dL / Nitrite: Positive   Leuk Esterase: Moderate / RBC: 3-5 /HPF / WBC >50   Sq Epi: x / Non Sq Epi: Few / Bacteria: Moderate

## 2019-12-24 NOTE — PROGRESS NOTE BEHAVIORAL HEALTH - NSBHCHARTREVIEWLAB_PSY_A_CORE FT
7.2    6.17  )-----------( 107      ( 23 Dec 2019 22:38 )             24.5     12-    149<H>  |  110<H>  |  31.0<H>  ----------------------------<  98  3.1<L>   |  27.0  |  1.47<H>    Ca    8.1<L>      23 Dec 2019 22:38    TPro  7.3  /  Alb  4.5  /  TBili  0.6  /  DBili  x   /  AST  17  /  ALT  21  /  AlkPhos  145<H>  12-    LIVER FUNCTIONS - ( 22 Dec 2019 23:16 )  Alb: 4.5 g/dL / Pro: 7.3 g/dL / ALK PHOS: 145 U/L / ALT: 21 U/L / AST: 17 U/L / GGT: x             Urinalysis Basic - ( 23 Dec 2019 07:29 )    Color: Yellow / Appearance: Slightly Turbid / S.010 / pH: x  Gluc: x / Ketone: Negative  / Bili: Negative / Urobili: Negative   Blood: x / Protein: 30 mg/dL / Nitrite: Positive   Leuk Esterase: Moderate / RBC: 3-5 /HPF / WBC >50   Sq Epi: x / Non Sq Epi: Few / Bacteria: Moderate

## 2019-12-24 NOTE — PROGRESS NOTE BEHAVIORAL HEALTH - NSBHCHARTREVIEWVS_PSY_A_CORE FT
Vital Signs Last 24 Hrs  T(C): 37.2 (24 Dec 2019 03:00), Max: 39.3 (23 Dec 2019 18:53)  T(F): 98.9 (24 Dec 2019 03:00), Max: 102.7 (23 Dec 2019 18:53)  HR: 99 (24 Dec 2019 03:00) (85 - 125)  BP: 107/71 (24 Dec 2019 03:00) (80/42 - 148/80)  RR: 18 (24 Dec 2019 03:00) (18 - 20)  SpO2: 94% (24 Dec 2019 03:00) (91% - 97%)

## 2019-12-24 NOTE — PROGRESS NOTE BEHAVIORAL HEALTH - NSBHCHARTREVIEWIMAGING_PSY_A_CORE FT
< from: Xray Kidney Ureter Bladder (12.23.19 @ 14:47) >    Impression: Oral contrast is seen within small bowel loops and stomach. Bowel gas pattern is nonobstructive however CT scan of earlier in the day didn't demonstrate small bowel obstruction. Follow-up plain film may be helpful to assess for further progression of the oral contrast.    < end of copied text >

## 2019-12-25 ENCOUNTER — TRANSCRIPTION ENCOUNTER (OUTPATIENT)
Age: 69
End: 2019-12-25

## 2019-12-25 LAB
-  AMIKACIN: SIGNIFICANT CHANGE UP
-  AMPICILLIN/SULBACTAM: SIGNIFICANT CHANGE UP
-  AMPICILLIN: SIGNIFICANT CHANGE UP
-  AZTREONAM: SIGNIFICANT CHANGE UP
-  CEFAZOLIN: SIGNIFICANT CHANGE UP
-  CEFEPIME: SIGNIFICANT CHANGE UP
-  CEFOXITIN: SIGNIFICANT CHANGE UP
-  CEFTRIAXONE: SIGNIFICANT CHANGE UP
-  CIPROFLOXACIN: SIGNIFICANT CHANGE UP
-  ERTAPENEM: SIGNIFICANT CHANGE UP
-  GENTAMICIN: SIGNIFICANT CHANGE UP
-  IMIPENEM: SIGNIFICANT CHANGE UP
-  LEVOFLOXACIN: SIGNIFICANT CHANGE UP
-  MEROPENEM: SIGNIFICANT CHANGE UP
-  NITROFURANTOIN: SIGNIFICANT CHANGE UP
-  PIPERACILLIN/TAZOBACTAM: SIGNIFICANT CHANGE UP
-  TIGECYCLINE: SIGNIFICANT CHANGE UP
-  TOBRAMYCIN: SIGNIFICANT CHANGE UP
-  TRIMETHOPRIM/SULFAMETHOXAZOLE: SIGNIFICANT CHANGE UP
ANION GAP SERPL CALC-SCNC: 11 MMOL/L — SIGNIFICANT CHANGE UP (ref 5–17)
ANION GAP SERPL CALC-SCNC: 13 MMOL/L — SIGNIFICANT CHANGE UP (ref 5–17)
ANION GAP SERPL CALC-SCNC: 14 MMOL/L — SIGNIFICANT CHANGE UP (ref 5–17)
BASOPHILS # BLD AUTO: 0.01 K/UL — SIGNIFICANT CHANGE UP (ref 0–0.2)
BASOPHILS NFR BLD AUTO: 0.3 % — SIGNIFICANT CHANGE UP (ref 0–2)
BUN SERPL-MCNC: 19 MG/DL — SIGNIFICANT CHANGE UP (ref 8–20)
BUN SERPL-MCNC: 19 MG/DL — SIGNIFICANT CHANGE UP (ref 8–20)
BUN SERPL-MCNC: 23 MG/DL — HIGH (ref 8–20)
CALCIUM SERPL-MCNC: 8.4 MG/DL — LOW (ref 8.6–10.2)
CALCIUM SERPL-MCNC: 8.5 MG/DL — LOW (ref 8.6–10.2)
CALCIUM SERPL-MCNC: 8.6 MG/DL — SIGNIFICANT CHANGE UP (ref 8.6–10.2)
CHLORIDE SERPL-SCNC: 110 MMOL/L — HIGH (ref 98–107)
CHLORIDE SERPL-SCNC: 114 MMOL/L — HIGH (ref 98–107)
CHLORIDE SERPL-SCNC: 114 MMOL/L — HIGH (ref 98–107)
CO2 SERPL-SCNC: 23 MMOL/L — SIGNIFICANT CHANGE UP (ref 22–29)
CO2 SERPL-SCNC: 24 MMOL/L — SIGNIFICANT CHANGE UP (ref 22–29)
CO2 SERPL-SCNC: 26 MMOL/L — SIGNIFICANT CHANGE UP (ref 22–29)
CREAT SERPL-MCNC: 1.24 MG/DL — SIGNIFICANT CHANGE UP (ref 0.5–1.3)
CREAT SERPL-MCNC: 1.31 MG/DL — HIGH (ref 0.5–1.3)
CREAT SERPL-MCNC: 1.31 MG/DL — HIGH (ref 0.5–1.3)
CULTURE RESULTS: SIGNIFICANT CHANGE UP
EOSINOPHIL # BLD AUTO: 0.05 K/UL — SIGNIFICANT CHANGE UP (ref 0–0.5)
EOSINOPHIL NFR BLD AUTO: 1.3 % — SIGNIFICANT CHANGE UP (ref 0–6)
GLUCOSE SERPL-MCNC: 119 MG/DL — HIGH (ref 70–115)
GLUCOSE SERPL-MCNC: 120 MG/DL — HIGH (ref 70–115)
GLUCOSE SERPL-MCNC: 132 MG/DL — HIGH (ref 70–115)
HCT VFR BLD CALC: 25.3 % — LOW (ref 34.5–45)
HGB BLD-MCNC: 7.4 G/DL — LOW (ref 11.5–15.5)
IMM GRANULOCYTES NFR BLD AUTO: 0.8 % — SIGNIFICANT CHANGE UP (ref 0–1.5)
LYMPHOCYTES # BLD AUTO: 0.75 K/UL — LOW (ref 1–3.3)
LYMPHOCYTES # BLD AUTO: 19.1 % — SIGNIFICANT CHANGE UP (ref 13–44)
MAGNESIUM SERPL-MCNC: 1.9 MG/DL — SIGNIFICANT CHANGE UP (ref 1.6–2.6)
MAGNESIUM SERPL-MCNC: 2 MG/DL — SIGNIFICANT CHANGE UP (ref 1.6–2.6)
MCHC RBC-ENTMCNC: 28.9 PG — SIGNIFICANT CHANGE UP (ref 27–34)
MCHC RBC-ENTMCNC: 29.2 GM/DL — LOW (ref 32–36)
MCV RBC AUTO: 98.8 FL — SIGNIFICANT CHANGE UP (ref 80–100)
METHOD TYPE: SIGNIFICANT CHANGE UP
MONOCYTES # BLD AUTO: 0.39 K/UL — SIGNIFICANT CHANGE UP (ref 0–0.9)
MONOCYTES NFR BLD AUTO: 9.9 % — SIGNIFICANT CHANGE UP (ref 2–14)
NEUTROPHILS # BLD AUTO: 2.69 K/UL — SIGNIFICANT CHANGE UP (ref 1.8–7.4)
NEUTROPHILS NFR BLD AUTO: 68.6 % — SIGNIFICANT CHANGE UP (ref 43–77)
ORGANISM # SPEC MICROSCOPIC CNT: SIGNIFICANT CHANGE UP
ORGANISM # SPEC MICROSCOPIC CNT: SIGNIFICANT CHANGE UP
PHOSPHATE SERPL-MCNC: 1.7 MG/DL — LOW (ref 2.4–4.7)
PHOSPHATE SERPL-MCNC: 2.3 MG/DL — LOW (ref 2.4–4.7)
PLATELET # BLD AUTO: 92 K/UL — LOW (ref 150–400)
POTASSIUM SERPL-MCNC: 2.8 MMOL/L — CRITICAL LOW (ref 3.5–5.3)
POTASSIUM SERPL-MCNC: 3 MMOL/L — LOW (ref 3.5–5.3)
POTASSIUM SERPL-MCNC: 3.7 MMOL/L — SIGNIFICANT CHANGE UP (ref 3.5–5.3)
POTASSIUM SERPL-SCNC: 2.8 MMOL/L — CRITICAL LOW (ref 3.5–5.3)
POTASSIUM SERPL-SCNC: 3 MMOL/L — LOW (ref 3.5–5.3)
POTASSIUM SERPL-SCNC: 3.7 MMOL/L — SIGNIFICANT CHANGE UP (ref 3.5–5.3)
RBC # BLD: 2.56 M/UL — LOW (ref 3.8–5.2)
RBC # FLD: 13.7 % — SIGNIFICANT CHANGE UP (ref 10.3–14.5)
SODIUM SERPL-SCNC: 147 MMOL/L — HIGH (ref 135–145)
SODIUM SERPL-SCNC: 151 MMOL/L — HIGH (ref 135–145)
SODIUM SERPL-SCNC: 151 MMOL/L — HIGH (ref 135–145)
SPECIMEN SOURCE: SIGNIFICANT CHANGE UP
WBC # BLD: 3.92 K/UL — SIGNIFICANT CHANGE UP (ref 3.8–10.5)
WBC # FLD AUTO: 3.92 K/UL — SIGNIFICANT CHANGE UP (ref 3.8–10.5)

## 2019-12-25 RX ORDER — POTASSIUM CHLORIDE 20 MEQ
10 PACKET (EA) ORAL
Refills: 0 | Status: COMPLETED | OUTPATIENT
Start: 2019-12-25 | End: 2019-12-25

## 2019-12-25 RX ORDER — SODIUM CHLORIDE 9 MG/ML
1000 INJECTION, SOLUTION INTRAVENOUS
Refills: 0 | Status: COMPLETED | OUTPATIENT
Start: 2019-12-25 | End: 2019-12-25

## 2019-12-25 RX ORDER — POTASSIUM CHLORIDE 20 MEQ
20 PACKET (EA) ORAL
Refills: 0 | Status: DISCONTINUED | OUTPATIENT
Start: 2019-12-25 | End: 2019-12-25

## 2019-12-25 RX ORDER — POTASSIUM CHLORIDE 20 MEQ
5 PACKET (EA) ORAL
Qty: 0 | Refills: 0 | DISCHARGE
Start: 2019-12-25

## 2019-12-25 RX ORDER — POTASSIUM PHOSPHATE, MONOBASIC POTASSIUM PHOSPHATE, DIBASIC 236; 224 MG/ML; MG/ML
30 INJECTION, SOLUTION INTRAVENOUS ONCE
Refills: 0 | Status: DISCONTINUED | OUTPATIENT
Start: 2019-12-25 | End: 2019-12-25

## 2019-12-25 RX ADMIN — Medication 100 MILLIEQUIVALENT(S): at 18:43

## 2019-12-25 RX ADMIN — OLANZAPINE 10 MILLIGRAM(S): 15 TABLET, FILM COATED ORAL at 23:04

## 2019-12-25 RX ADMIN — SODIUM CHLORIDE 125 MILLILITER(S): 9 INJECTION, SOLUTION INTRAVENOUS at 09:32

## 2019-12-25 RX ADMIN — PANTOPRAZOLE SODIUM 40 MILLIGRAM(S): 20 TABLET, DELAYED RELEASE ORAL at 12:31

## 2019-12-25 RX ADMIN — Medication 1 MILLIGRAM(S): at 23:04

## 2019-12-25 RX ADMIN — ENOXAPARIN SODIUM 40 MILLIGRAM(S): 100 INJECTION SUBCUTANEOUS at 12:31

## 2019-12-25 RX ADMIN — Medication 56 MICROGRAM(S): at 23:04

## 2019-12-25 RX ADMIN — AMPICILLIN SODIUM AND SULBACTAM SODIUM 100 GRAM(S): 250; 125 INJECTION, POWDER, FOR SUSPENSION INTRAMUSCULAR; INTRAVENOUS at 05:10

## 2019-12-25 RX ADMIN — Medication 100 MILLIEQUIVALENT(S): at 16:05

## 2019-12-25 RX ADMIN — HALOPERIDOL DECANOATE 5 MILLIGRAM(S): 100 INJECTION INTRAMUSCULAR at 05:10

## 2019-12-25 RX ADMIN — Medication 1 MILLIGRAM(S): at 05:10

## 2019-12-25 RX ADMIN — Medication 1 TABLET(S): at 17:57

## 2019-12-25 RX ADMIN — Medication 100 MILLIEQUIVALENT(S): at 14:05

## 2019-12-25 RX ADMIN — HALOPERIDOL DECANOATE 5 MILLIGRAM(S): 100 INJECTION INTRAMUSCULAR at 17:27

## 2019-12-25 RX ADMIN — Medication 1 MILLIGRAM(S): at 14:24

## 2019-12-25 RX ADMIN — AMPICILLIN SODIUM AND SULBACTAM SODIUM 100 GRAM(S): 250; 125 INJECTION, POWDER, FOR SUSPENSION INTRAMUSCULAR; INTRAVENOUS at 12:45

## 2019-12-25 RX ADMIN — SODIUM CHLORIDE 1000 MILLILITER(S): 9 INJECTION, SOLUTION INTRAVENOUS at 06:38

## 2019-12-25 NOTE — DISCHARGE NOTE PROVIDER - CARE PROVIDER_API CALL
Zeke England)  Surgery; Surgical Critical Care  [address] [place] [id number]  Phone: [phone]  Fax: [phone]  Follow Up Time: Routine

## 2019-12-25 NOTE — DISCHARGE NOTE PROVIDER - NSDCMRMEDTOKEN_GEN_ALL_CORE_FT
cholecalciferol oral tablet: 2000 unit(s) orally every 48 hours  docusate sodium 100 mg oral capsule: 1 cap(s) orally 3 times a day, As Needed  haloperidol 1 mg oral tablet: 7 tab(s) orally 2 times a day  levothyroxine 112 mcg (0.112 mg) oral tablet: 1 tab(s) orally once a day  lidocaine 5% topical film: Apply topically to affected area once a day  LORazepam 1 mg oral tablet: 1 tab(s) orally 3 times a day  multivitamin: 1 tab(s) orally once a day  Norvasc 2.5 mg oral tablet: 1 tab(s) orally once a day  OLANZapine 5 mg oral tablet: 1 tab(s) orally once a day (at bedtime)  pantoprazole 40 mg oral delayed release tablet: 1 tab(s) orally once a day (before a meal)  polyethylene glycol 3350 oral powder for reconstitution: 17 gram(s) orally once a day  potassium chloride 2 mEq/mL intravenous solution: 5 milliliter(s) intravenous every hour  senna oral tablet: 2 tab(s) orally once a day (at bedtime), As Needed  simvastatin 20 mg oral tablet: 1 tab(s) orally once a day (at bedtime)  sulfamethoxazole-trimethoprim 800 mg-160 mg oral tablet: 1 tab(s) orally every 12 hours cholecalciferol oral tablet: 2000 unit(s) orally every 48 hours  docusate sodium 100 mg oral capsule: 1 cap(s) orally 3 times a day, As Needed  haloperidol 1 mg oral tablet: 7 tab(s) orally 2 times a day  levothyroxine 112 mcg (0.112 mg) oral tablet: 1 tab(s) orally once a day  lidocaine 5% topical film: Apply topically to affected area once a day  LORazepam 1 mg oral tablet: 1 tab(s) orally 3 times a day  multivitamin: 1 tab(s) orally once a day  Norvasc 2.5 mg oral tablet: 1 tab(s) orally once a day  OLANZapine 5 mg oral tablet: 1 tab(s) orally once a day (at bedtime)  pantoprazole 40 mg oral delayed release tablet: 1 tab(s) orally once a day (before a meal)  polyethylene glycol 3350 oral powder for reconstitution: 17 gram(s) orally once a day  senna oral tablet: 2 tab(s) orally once a day (at bedtime), As Needed  simvastatin 20 mg oral tablet: 1 tab(s) orally once a day (at bedtime)  sulfamethoxazole-trimethoprim 800 mg-160 mg oral tablet: 1 tab(s) orally every 12 hours cholecalciferol oral tablet: 2000 unit(s) orally every 48 hours  docusate sodium 100 mg oral capsule: 1 cap(s) orally 3 times a day, As Needed  haloperidol 1 mg oral tablet: 7 tab(s) orally 2 times a day  levothyroxine 112 mcg (0.112 mg) oral tablet: 1 tab(s) orally once a day  lidocaine 5% topical film: Apply topically to affected area once a day  LORazepam 1 mg oral tablet: 1 tab(s) orally 3 times a day  multivitamin: 1 tab(s) orally once a day  Norvasc 2.5 mg oral tablet: 1 tab(s) orally once a day  OLANZapine 5 mg oral tablet: 1 tab(s) orally once a day (at bedtime)  pantoprazole 40 mg oral delayed release tablet: 1 tab(s) orally once a day (before a meal)  polyethylene glycol 3350 oral powder for reconstitution: 17 gram(s) orally once a day (at bedtime)  senna oral tablet: 2 tab(s) orally once a day (at bedtime), As Needed  simvastatin 20 mg oral tablet: 1 tab(s) orally once a day (at bedtime)  sulfamethoxazole-trimethoprim 800 mg-160 mg oral tablet: 1 tab(s) orally every 12 hours

## 2019-12-25 NOTE — DISCHARGE NOTE PROVIDER - NSDCCPCAREPLAN_GEN_ALL_CORE_FT
PRINCIPAL DISCHARGE DIAGNOSIS  Diagnosis: Other complete intestinal obstruction  Assessment and Plan of Treatment:

## 2019-12-25 NOTE — DISCHARGE NOTE PROVIDER - NSFOLLOWUPCLINICS_GEN_ALL_ED_FT
[facility] Acute Care Surgery  Acute Care Surgery  [address] [place] [id number]  Phone: [phone]  Fax:   Follow Up Time:

## 2019-12-25 NOTE — DISCHARGE NOTE PROVIDER - HOSPITAL COURSE
Patient is a 69 year old female with a history of bipolar type schizophrenia, multiple small bowel obstructions and a complex surgical history, presented from [facility] with a 1 day history of multiple episodes of vomiting, per [place] representative at bedside. History and physical were limited by flat affect and paucity of speech, although patient was able to answer simple questions. Due to her past medical history, and her multiple episodes of vomiting, we tried to place a NGT on dec 23rd but the patient refused. Patient is having bowel movments and is passing flatus, with no complaints of pain. Patient has been tolerating a regular diet. A KUB showed oral contrast seen within small bowel loops and stomach. Bowel gas pattern was nonobstructive. Patient was seen by psychiatry for her agitation, and the appropriate psychiatric medications were recommended and started. Patient denies fever, chills, chest pain and dyspnea. We will discharge this patient back to [facility] tomorrow, pending normal labs and clinical assessment.

## 2019-12-25 NOTE — PROGRESS NOTE ADULT - ASSESSMENT
69 y/o F h/o bipolar type schizophrenia from [facility], extensive colorectal surgical history (see above), with clinical symptoms and imaging findings consistent with small bowel obstruction located at former stomal hernia site containing small bowel, reducible, nontender on exam, patient denies pain, sleeping comfortably. Multiple bm's since presentation, however none overnight. Patient now afebrile and hemodynamically stable. Chest xray showing no focal consolidations.     -f/u am labs  -strict i&o's  Regular diet, tolerating  -norman  F/u morning electrolytes.     DISPO Discharge to [facility] today pending electrolytes imbalance resolution

## 2019-12-25 NOTE — PROGRESS NOTE ADULT - SUBJECTIVE AND OBJECTIVE BOX
HPI/OVERNIGHT EVENTS:    PT HDS, having BF. Last KUB shows contrast in colon. Discharge to [facility] aborted for Sodium of 155. Will discharge today after electrolytes replated and stable.   Pt denies chest pain, SOB, n.v.f.c.     MEDICATIONS  (STANDING):  ampicillin/sulbactam  IVPB      ampicillin/sulbactam  IVPB 1.5 Gram(s) IV Intermittent every 6 hours  dextrose 5%. 1000 milliLiter(s) (125 mL/Hr) IV Continuous <Continuous>  enoxaparin Injectable 40 milliGRAM(s) SubCutaneous daily  haloperidol    Injectable 5 milliGRAM(s) IntraMuscular two times a day  levothyroxine Injectable 56 MICROGram(s) IV Push at bedtime  LORazepam   Injectable 1 milliGRAM(s) IV Push every 8 hours  OLANZapine Disintegrating Tablet 10 milliGRAM(s) Oral at bedtime  pantoprazole  Injectable 40 milliGRAM(s) IV Push daily    MEDICATIONS  (PRN):  ondansetron Injectable 4 milliGRAM(s) IV Push every 4 hours PRN Nausea and/or Vomiting      Vital Signs Last 24 Hrs  T(C): 37.1 (24 Dec 2019 09:03), Max: 37.1 (24 Dec 2019 09:03)  T(F): 98.7 (24 Dec 2019 09:03), Max: 98.7 (24 Dec 2019 09:03)  HR: 101 (24 Dec 2019 21:24) (101 - 102)  BP: 144/60 (24 Dec 2019 21:24) (138/84 - 144/60)  BP(mean): --  RR: 18 (24 Dec 2019 21:24) (18 - 19)  SpO2: 91% (24 Dec 2019 21:24) (90% - 91%)    PE  Gen: resting in bed, moaning intermittently with yells at staff to be left alone  Pulm: no increased wob or use of accessory muscles, patient intermittently pulling NC off face, tolerating oxygen while sleeping  Abd: Large midline hernia at site of previous ostomy. abd soft, non-tender to light and deep palpation. last bm yesterday during day.   Neuro: unable to express person place or location at present, answering questions, but inappropriate responses at times    I&O's Detail    23 Dec 2019 07:01  -  24 Dec 2019 07:00  --------------------------------------------------------  IN:  Total IN: 0 mL    OUT:    Voided: 680 mL  Total OUT: 680 mL    Total NET: -680 mL      24 Dec 2019 07:  -  25 Dec 2019 03:54  --------------------------------------------------------  IN:    dextrose 5%.: 1000 mL    dextrose 5%.: 1500 mL    lactated ringers.: 1000 mL    Solution: 50 mL  Total IN: 3550 mL    OUT:    Voided: 950 mL  Total OUT: 950 mL    Total NET: 2600 mL          LABS:                        7.9    6.20  )-----------( 98       ( 24 Dec 2019 18:20 )             26.9     12-    151<H>  |  114<H>  |  23.0<H>  ----------------------------<  120<H>  3.0<L>   |  26.0  |  1.31<H>    Ca    8.6      25 Dec 2019 01:57  Phos  2.3       Mg     2.0         TPro  5.6<L>  /  Alb  3.2<L>  /  TBili  0.6  /  DBili  0.2  /  AST  23  /  ALT  17  /  AlkPhos  93        Urinalysis Basic - ( 23 Dec 2019 07:29 )    Color: Yellow / Appearance: Slightly Turbid / S.010 / pH: x  Gluc: x / Ketone: Negative  / Bili: Negative / Urobili: Negative   Blood: x / Protein: 30 mg/dL / Nitrite: Positive   Leuk Esterase: Moderate / RBC: 3-5 /HPF / WBC >50   Sq Epi: x / Non Sq Epi: Few / Bacteria: Moderate

## 2019-12-26 ENCOUNTER — TRANSCRIPTION ENCOUNTER (OUTPATIENT)
Age: 69
End: 2019-12-26

## 2019-12-26 VITALS
DIASTOLIC BLOOD PRESSURE: 76 MMHG | HEART RATE: 88 BPM | OXYGEN SATURATION: 94 % | RESPIRATION RATE: 17 BRPM | SYSTOLIC BLOOD PRESSURE: 134 MMHG

## 2019-12-26 LAB
ANION GAP SERPL CALC-SCNC: 11 MMOL/L — SIGNIFICANT CHANGE UP (ref 5–17)
BUN SERPL-MCNC: 18 MG/DL — SIGNIFICANT CHANGE UP (ref 8–20)
CALCIUM SERPL-MCNC: 8.6 MG/DL — SIGNIFICANT CHANGE UP (ref 8.6–10.2)
CHLORIDE SERPL-SCNC: 112 MMOL/L — HIGH (ref 98–107)
CO2 SERPL-SCNC: 24 MMOL/L — SIGNIFICANT CHANGE UP (ref 22–29)
CREAT SERPL-MCNC: 1.23 MG/DL — SIGNIFICANT CHANGE UP (ref 0.5–1.3)
GLUCOSE SERPL-MCNC: 110 MG/DL — SIGNIFICANT CHANGE UP (ref 70–115)
HCT VFR BLD CALC: 29.7 % — LOW (ref 34.5–45)
HGB BLD-MCNC: 8.6 G/DL — LOW (ref 11.5–15.5)
MAGNESIUM SERPL-MCNC: 2 MG/DL — SIGNIFICANT CHANGE UP (ref 1.6–2.6)
MCHC RBC-ENTMCNC: 28.6 PG — SIGNIFICANT CHANGE UP (ref 27–34)
MCHC RBC-ENTMCNC: 29 GM/DL — LOW (ref 32–36)
MCV RBC AUTO: 98.7 FL — SIGNIFICANT CHANGE UP (ref 80–100)
PHOSPHATE SERPL-MCNC: 1.8 MG/DL — LOW (ref 2.4–4.7)
PLATELET # BLD AUTO: 116 K/UL — LOW (ref 150–400)
POTASSIUM SERPL-MCNC: 3.6 MMOL/L — SIGNIFICANT CHANGE UP (ref 3.5–5.3)
POTASSIUM SERPL-SCNC: 3.6 MMOL/L — SIGNIFICANT CHANGE UP (ref 3.5–5.3)
RBC # BLD: 3.01 M/UL — LOW (ref 3.8–5.2)
RBC # FLD: 13.6 % — SIGNIFICANT CHANGE UP (ref 10.3–14.5)
SODIUM SERPL-SCNC: 147 MMOL/L — HIGH (ref 135–145)
WBC # BLD: 3.01 K/UL — LOW (ref 3.8–10.5)
WBC # FLD AUTO: 3.01 K/UL — LOW (ref 3.8–10.5)

## 2019-12-26 PROCEDURE — 85027 COMPLETE CBC AUTOMATED: CPT

## 2019-12-26 PROCEDURE — 96375 TX/PRO/DX INJ NEW DRUG ADDON: CPT

## 2019-12-26 PROCEDURE — 74176 CT ABD & PELVIS W/O CONTRAST: CPT

## 2019-12-26 PROCEDURE — 74018 RADEX ABDOMEN 1 VIEW: CPT

## 2019-12-26 PROCEDURE — 93005 ELECTROCARDIOGRAM TRACING: CPT

## 2019-12-26 PROCEDURE — 87040 BLOOD CULTURE FOR BACTERIA: CPT

## 2019-12-26 PROCEDURE — 96374 THER/PROPH/DIAG INJ IV PUSH: CPT

## 2019-12-26 PROCEDURE — 83690 ASSAY OF LIPASE: CPT

## 2019-12-26 PROCEDURE — 97163 PT EVAL HIGH COMPLEX 45 MIN: CPT

## 2019-12-26 PROCEDURE — 83735 ASSAY OF MAGNESIUM: CPT

## 2019-12-26 PROCEDURE — 99285 EMERGENCY DEPT VISIT HI MDM: CPT | Mod: 25

## 2019-12-26 PROCEDURE — 80053 COMPREHEN METABOLIC PANEL: CPT

## 2019-12-26 PROCEDURE — 83605 ASSAY OF LACTIC ACID: CPT

## 2019-12-26 PROCEDURE — 82248 BILIRUBIN DIRECT: CPT

## 2019-12-26 PROCEDURE — 87186 SC STD MICRODIL/AGAR DIL: CPT

## 2019-12-26 PROCEDURE — 81001 URINALYSIS AUTO W/SCOPE: CPT

## 2019-12-26 PROCEDURE — 74018 RADEX ABDOMEN 1 VIEW: CPT | Mod: 26

## 2019-12-26 PROCEDURE — 80048 BASIC METABOLIC PNL TOTAL CA: CPT

## 2019-12-26 PROCEDURE — 84100 ASSAY OF PHOSPHORUS: CPT

## 2019-12-26 PROCEDURE — 87086 URINE CULTURE/COLONY COUNT: CPT

## 2019-12-26 PROCEDURE — 36415 COLL VENOUS BLD VENIPUNCTURE: CPT

## 2019-12-26 PROCEDURE — 71045 X-RAY EXAM CHEST 1 VIEW: CPT

## 2019-12-26 PROCEDURE — 99231 SBSQ HOSP IP/OBS SF/LOW 25: CPT

## 2019-12-26 RX ORDER — SIMVASTATIN 20 MG/1
1 TABLET, FILM COATED ORAL
Qty: 0 | Refills: 0 | DISCHARGE
Start: 2019-12-26

## 2019-12-26 RX ORDER — POLYETHYLENE GLYCOL 3350 17 G/17G
17 POWDER, FOR SOLUTION ORAL AT BEDTIME
Refills: 0 | Status: DISCONTINUED | OUTPATIENT
Start: 2019-12-26 | End: 2019-12-26

## 2019-12-26 RX ORDER — SIMVASTATIN 20 MG/1
20 TABLET, FILM COATED ORAL AT BEDTIME
Refills: 0 | Status: DISCONTINUED | OUTPATIENT
Start: 2019-12-26 | End: 2019-12-26

## 2019-12-26 RX ORDER — POLYETHYLENE GLYCOL 3350 17 G/17G
17 POWDER, FOR SOLUTION ORAL
Qty: 0 | Refills: 0 | DISCHARGE
Start: 2019-12-26

## 2019-12-26 RX ORDER — POTASSIUM PHOSPHATE, MONOBASIC POTASSIUM PHOSPHATE, DIBASIC 236; 224 MG/ML; MG/ML
15 INJECTION, SOLUTION INTRAVENOUS ONCE
Refills: 0 | Status: DISCONTINUED | OUTPATIENT
Start: 2019-12-26 | End: 2019-12-26

## 2019-12-26 RX ORDER — SODIUM,POTASSIUM PHOSPHATES 278-250MG
1 POWDER IN PACKET (EA) ORAL ONCE
Refills: 0 | Status: COMPLETED | OUTPATIENT
Start: 2019-12-26 | End: 2019-12-26

## 2019-12-26 RX ORDER — AMLODIPINE BESYLATE 2.5 MG/1
2.5 TABLET ORAL DAILY
Refills: 0 | Status: DISCONTINUED | OUTPATIENT
Start: 2019-12-26 | End: 2019-12-26

## 2019-12-26 RX ADMIN — Medication 1 TABLET(S): at 05:06

## 2019-12-26 RX ADMIN — Medication 1 PACKET(S): at 16:42

## 2019-12-26 RX ADMIN — PANTOPRAZOLE SODIUM 40 MILLIGRAM(S): 20 TABLET, DELAYED RELEASE ORAL at 13:43

## 2019-12-26 RX ADMIN — Medication 1 MILLIGRAM(S): at 13:41

## 2019-12-26 RX ADMIN — AMLODIPINE BESYLATE 2.5 MILLIGRAM(S): 2.5 TABLET ORAL at 06:20

## 2019-12-26 RX ADMIN — ENOXAPARIN SODIUM 40 MILLIGRAM(S): 100 INJECTION SUBCUTANEOUS at 13:42

## 2019-12-26 RX ADMIN — Medication 1 MILLIGRAM(S): at 05:06

## 2019-12-26 RX ADMIN — HALOPERIDOL DECANOATE 5 MILLIGRAM(S): 100 INJECTION INTRAMUSCULAR at 05:06

## 2019-12-26 NOTE — PHYSICAL THERAPY INITIAL EVALUATION ADULT - PERTINENT HX OF CURRENT PROBLEM, REHAB EVAL
Patient is a 69 YOF well-known to the [facility] surgical service given her complex surgical history and multiple SBOs who now presents from [facility] with a 1 day history of multiple episodes of vomiting, per [place] representative at bedside. History and physical limited by flat affect and paucity of speech, although patient is able to answer simple questions. Last bowel movement at midnight in the ED (very large), states she has been passing gas.

## 2019-12-26 NOTE — DISCHARGE NOTE NURSING/CASE MANAGEMENT/SOCIAL WORK - PATIENT PORTAL LINK FT
You can access the FollowMyHealth Patient Portal offered by [facility] by registering at the following website: [email or web address]. By joining [employer]’s FollowMyHealth portal, you will also be able to view your health information using other applications (apps) compatible with our system.

## 2019-12-26 NOTE — PHYSICAL THERAPY INITIAL EVALUATION ADULT - STANDING BALANCE: DYNAMIC, REHAB EVAL
fair balance Interpolation Flap Text: A decision was made to reconstruct the defect utilizing an interpolation axial flap and a staged reconstruction.  A telfa template was made of the defect.  This telfa template was then used to outline the interpolation flap.  The donor area for the pedicle flap was then injected with anesthesia.  The flap was excised through the skin and subcutaneous tissue down to the layer of the underlying musculature.  The interpolation flap was carefully excised within this deep plane to maintain its blood supply.  The edges of the donor site were undermined.   The donor site was closed in a primary fashion.  The pedicle was then rotated into position and sutured.  Once the tube was sutured into place, adequate blood supply was confirmed with blanching and refill.  The pedicle was then wrapped with xeroform gauze and dressed appropriately with a telfa and gauze bandage to ensure continued blood supply and protect the attached pedicle.

## 2019-12-26 NOTE — PHYSICAL THERAPY INITIAL EVALUATION ADULT - ADDITIONAL COMMENTS
Pt is vague historian. Pt resides at [facility] where she states she used a walker to ambulate. Pt required assistance bathing and dressing herself prior to admission as per nurse Quick from [place].

## 2019-12-26 NOTE — PROGRESS NOTE ADULT - ASSESSMENT
ssessment:  67 y/o Female history of schizophrenia from [facility], extensive colorectal surgical history who had clinical symptoms and findings consistent with small bowel obstruction located at former stomal hernia site containing small bowel. Patient's abdomen nontender on exam, patient denies pain, sleeping comfortably. Multiple BM's since presentation. Patient is afebrile and hemodynamically stable. Chest xray showing no focal consolidations. KUB shows contrast going through bowel loops effectively ruling out a SBO on this admission.     Plan:  -F/u with labs that need to stabilize for transfer to [place]  -Strict I's and O's  -Patient tolerating regular diet   -ADRIAN's  -DISPO Discharge to [place] once electrolyte imbalance has resolved

## 2019-12-26 NOTE — PHYSICAL THERAPY INITIAL EVALUATION ADULT - ACTIVE RANGE OF MOTION EXAMINATION, REHAB EVAL
bilateral  lower extremity Active ROM was WFL (within functional limits)/Limited ROM of left shoulder, pt c/o pain throughout treatment in left shoulder.

## 2019-12-26 NOTE — PROGRESS NOTE ADULT - SUBJECTIVE AND OBJECTIVE BOX
HPI/OVERNIGHT EVENTS:    Pt was seen and examined bedside. Pt is hemodynamically stable. No acute events overnight. Some of the electrolytes need to stabilize, which could be due to the medications the patient is taking. We are awaiting for [facility] to readmit the patient. Presently, patient denies fever, chills, nausea, vomiting, chest pain, Sob.        MEDICATIONS  (STANDING):  enoxaparin Injectable 40 milliGRAM(s) SubCutaneous daily  haloperidol    Injectable 5 milliGRAM(s) IntraMuscular two times a day  levothyroxine Injectable 56 MICROGram(s) IV Push at bedtime  LORazepam   Injectable 1 milliGRAM(s) IV Push every 8 hours  OLANZapine Disintegrating Tablet 10 milliGRAM(s) Oral at bedtime  pantoprazole  Injectable 40 milliGRAM(s) IV Push daily  trimethoprim  160 mG/sulfamethoxazole 800 mG 1 Tablet(s) Oral every 12 hours    MEDICATIONS  (PRN):  ondansetron Injectable 4 milliGRAM(s) IV Push every 4 hours PRN Nausea and/or Vomiting      Vital Signs Last 24 Hrs  T(C): 37.6 (25 Dec 2019 21:13), Max: 37.6 (25 Dec 2019 21:13)  T(F): 99.7 (25 Dec 2019 21:13), Max: 99.7 (25 Dec 2019 21:13)  HR: 88 (25 Dec 2019 21:13) (88 - 92)  BP: 156/83 (25 Dec 2019 21:13) (152/74 - 156/83)  BP(mean): --  RR: 18 (25 Dec 2019 21:13) (18 - 18)  SpO2: 93% (25 Dec 2019 21:13) (90% - 93%)    Physical exam:  HEENT: EOMI / PERRL b/l, no active drainage or redness  Neck: No JVD, full ROM without pain  Pulm: no increased work of breathing or use of accessory muscles, patient intermittently pulling NC off face, tolerating oxygen while sleeping  Abd: Large midline hernia at site of previous ostomy. abd soft, non-tender to light and deep   Cardiovascular: regular rate & rhythm  Gastrointestinal: Abdomen soft, non-tender, non-distended, no rebound tenderness / guarding  Musculoskeletal: No joint pain, swelling or deformity; no limitation of movement      I&O's Detail    24 Dec 2019 07:01  -  25 Dec 2019 07:00  --------------------------------------------------------  IN:    dextrose 5%.: 1000 mL    dextrose 5%.: 2875 mL    lactated ringers.: 1000 mL    Solution: 50 mL  Total IN: 4925 mL    OUT:    Voided: 1950 mL  Total OUT: 1950 mL    Total NET: 2975 mL      25 Dec 2019 07:01  -  26 Dec 2019 02:11  --------------------------------------------------------  IN:  Total IN: 0 mL    OUT:    Voided: 750 mL  Total OUT: 750 mL    Total NET: -750 mL          LABS:                        7.4    3.92  )-----------( 92       ( 25 Dec 2019 09:36 )             25.3     12-25    151<H>  |  114<H>  |  19.0  ----------------------------<  132<H>  3.7   |  24.0  |  1.24    Ca    8.5<L>      25 Dec 2019 20:52  Phos  1.7     12-25  Mg     1.9     12-25    TPro  5.6<L>  /  Alb  3.2<L>  /  TBili  0.6  /  DBili  0.2  /  AST  23  /  ALT  17  /  AlkPhos  93  12-24

## 2020-01-02 NOTE — ED PROVIDER NOTE - CLINICAL SUMMARY MEDICAL DECISION MAKING FREE TEXT BOX
Patient is scheduled for a colonoscopy with  Dr. Brendon Daniels on 5/21  Please send Nulytely prep to selected pharmacy
68yo female with multiple complaints, R hip pain as well as abdominal pain, will obtain xray pelvis/hip r/o fracture, CT a/p r/o intra-abdominal pathology, labs, give fluids, tylenol, and reassess. Scarlett Cedillo DO

## 2020-02-29 NOTE — ED PROVIDER NOTE - RESPIRATORY, MLM
"Subjective:       Patient ID: Natasha Espinal is a 57 y.o. female.    Vitals:  height is 4' 11" (1.499 m) and weight is 72.6 kg (160 lb). Her oral temperature is 97.8 °F (36.6 °C). Her blood pressure is 127/80 and her pulse is 100. Her respiration is 20 and oxygen saturation is 97%.     Chief Complaint: Cough    Patient presents with a cough, sinus pain and pressure with sinus congestion and yellow sputum. Patient denies fever or chills . She requests a refill on her inhaler and wants to make sure her cold has not gone into her chest . Onset a week ago .     Cough   This is a new problem. The current episode started in the past 7 days. The problem has been unchanged. The problem occurs hourly. The cough is productive of sputum. Associated symptoms include nasal congestion and postnasal drip. Pertinent negatives include no chest pain, chills, fever, headaches, myalgias, rash, sore throat or shortness of breath. Nothing aggravates the symptoms. Treatments tried: otc day sinus , inhaler  The treatment provided mild relief. Her past medical history is significant for asthma. There is no history of bronchitis or pneumonia.       Constitution: Negative for chills, fatigue and fever.   HENT: Positive for congestion, postnasal drip, sinus pain and sinus pressure. Negative for sore throat.    Neck: Negative for painful lymph nodes.   Cardiovascular: Negative for chest pain and leg swelling.   Eyes: Negative for double vision and blurred vision.   Respiratory: Positive for cough and sputum production. Negative for shortness of breath.    Gastrointestinal: Negative for nausea, vomiting and diarrhea.   Genitourinary: Negative for dysuria, frequency, urgency and history of kidney stones.   Musculoskeletal: Negative for joint pain, joint swelling, muscle cramps and muscle ache.   Skin: Negative for color change, pale, rash and bruising.   Allergic/Immunologic: Negative for seasonal allergies.   Neurological: Negative for " dizziness, history of vertigo, light-headedness, passing out and headaches.   Hematologic/Lymphatic: Negative for swollen lymph nodes.   Psychiatric/Behavioral: Negative for nervous/anxious, sleep disturbance and depression. The patient is not nervous/anxious.        Objective:      Physical Exam   Constitutional: She is oriented to person, place, and time. She appears well-developed and well-nourished. She is cooperative.  Non-toxic appearance. She does not have a sickly appearance. She does not appear ill. No distress.   HENT:   Head: Normocephalic and atraumatic.   Right Ear: Hearing, external ear and ear canal normal. A middle ear effusion (clear) is present.   Left Ear: Hearing, external ear and ear canal normal. A middle ear effusion (clear) is present.   Nose: Mucosal edema present. No rhinorrhea or nasal deformity. No epistaxis. Right sinus exhibits maxillary sinus tenderness. Right sinus exhibits no frontal sinus tenderness. Left sinus exhibits maxillary sinus tenderness. Left sinus exhibits no frontal sinus tenderness.   Mouth/Throat: Uvula is midline and mucous membranes are normal. No trismus in the jaw. Normal dentition. No uvula swelling. Posterior oropharyngeal erythema present. No oropharyngeal exudate or posterior oropharyngeal edema.   Eyes: Conjunctivae and lids are normal. No scleral icterus.   Neck: Trachea normal, full passive range of motion without pain and phonation normal. Neck supple. No neck rigidity. No edema and no erythema present.   Cardiovascular: Normal rate, regular rhythm, normal heart sounds, intact distal pulses and normal pulses.   Pulmonary/Chest: Effort normal. No respiratory distress. She has no decreased breath sounds. She has rhonchi (mild) in the right upper field and the left upper field.   Abdominal: Normal appearance.   Musculoskeletal: Normal range of motion. She exhibits no edema or deformity.   Lymphadenopathy:     She has cervical adenopathy.        Right cervical:  Superficial cervical adenopathy present.        Left cervical: Superficial cervical adenopathy present.   Neurological: She is alert and oriented to person, place, and time. She exhibits normal muscle tone. Coordination normal.   Skin: Skin is warm, dry, intact, not diaphoretic and not pale.   Psychiatric: She has a normal mood and affect. Her speech is normal and behavior is normal. Judgment and thought content normal. Cognition and memory are normal.   Nursing note and vitals reviewed.        Assessment:       1. Acute bacterial bronchitis    2. Cough    3. Nasal congestion    4. Post-nasal drip    5. History of asthma    6. Sore throat    7. Sinus pressure        Plan:         Acute bacterial bronchitis  -     azithromycin (Z-CATRACHO) 250 MG tablet; Take 2 tablets (500 mg total) by mouth once daily for 1 day, THEN 1 tablet (250 mg total) once daily for 4 days. One Z-Pack as directed.  Dispense: 6 tablet; Refill: 0    Cough  -     POCT Influenza A/B  -     benzonatate (TESSALON) 100 MG capsule; Take 1 capsule (100 mg total) by mouth 3 (three) times daily as needed for Cough.  Dispense: 21 capsule; Refill: 0  -     promethazine-dextromethorphan (PROMETHAZINE-DM) 6.25-15 mg/5 mL Syrp; Take 5 mLs by mouth every 6 (six) hours as needed (May take every 4 hours, PRN. DoNOT take more than 30 mL in 24 hours.).  Dispense: 100 mL; Refill: 0    Nasal congestion  -     azelastine (ASTELIN) 137 mcg (0.1 %) nasal spray; 1 spray (137 mcg total) by Nasal route 2 (two) times daily. for 14 days  Dispense: 30 mL; Refill: 0    Post-nasal drip    History of asthma  -     albuterol (PROVENTIL HFA) 90 mcg/actuation inhaler; Inhale 2 puffs into the lungs every 6 (six) hours as needed for Wheezing. Rescue  Dispense: 1 g; Refill: 0    Sore throat    Sinus pressure         Results for orders placed or performed in visit on 02/29/20   POCT Influenza A/B   Result Value Ref Range    Rapid Influenza A Ag Negative Negative    Rapid Influenza B Ag  Negative Negative     Acceptable Yes      Patient Instructions     Always follow your healthcare professional's instructions.    You have received urgent care diagnosis and treatment and you may be released before all of your medical problems are known or treated. Unless you have been given a referral, you (the patient), will arrange for follow-up care as instructed.     Please follow up with your primary care provider within 5-7 days if your signs and symptoms have not resolved or have worsen.     If your condition worsens or fails to improve, I recommend that you receive another evaluation in the emergency room immediately or contact your primary care office to discuss your concerns.     You have been diagnosed with ACUTE BACTERIAL BRONCHITIS      Bronchitis is an infection of the air passages (bronchial tubes) in your lungs. It often occurs when you have a cold. For most patients with acute bronchitis, symptoms are self-limited, resolving in about one to three weeks.This illness is contagious during the first few days and is spread through the air by coughing and sneezing, or by direct contact (touching the sick person and then touching your own eyes, nose, or mouth).    Symptoms of bronchitis include cough with mucus (phlegm) and low-grade fever. The average cough lasts about 18 days. Mild cases can be treated with simple home remedies. More severe infection is treated with an antibiotic.    Home care  Follow these guidelines when caring for yourself at home:  · If your symptoms are severe, rest at home for the first 2 to 3 days. When you go back to your usual activities, don't let yourself get too tired.  · Do not smoke. Also avoid being exposed to secondhand smoke.  · You may use over-the-counter medicines to control fever or pain, unless another medicine was prescribed. (Note: If you have chronic liver or kidney disease or have ever had a stomach ulcer or gastrointestinal bleeding, talk with  your healthcare provider before using these medicines. Also talk to your provider if you are taking medicine to prevent blood clots.) Aspirin should never be given to anyone younger than 18 years of age who is ill with a viral infection or fever. It may cause severe liver or brain damage.  · Your appetite may be poor, so a light diet is fine. Avoid dehydration by drinking 6 to 8 glasses of fluids per day (such as water, soft drinks, sports drinks, juices, tea, or soup). Extra fluids will help loosen secretions in the nose and lungs.  · Over-the-counter cough, cold, and sore-throat medicines will not shorten the length of the illness, but they may be helpful to reduce symptoms. (Note: Do not use decongestants if you have high blood pressure.)  · Finish all antibiotic medicine. Do this even if you are feeling better after only a few days.  · Wash your hands well with soap and warm water to help prevent spreading infection.    Follow-up care  Follow up with your healthcare provider, or as advised. If you had an X-ray or ECG (electrocardiogram), a specialist will review it. You will be notified of any new findings that may affect your care.  Note: If you are age 65 or older, or if you have a chronic lung disease or condition that affects your immune system, or you smoke, talk to your healthcare provider about having pneumococcal vaccinations and a yearly influenza vaccination (flu shot).    When to seek medical advice  Call your healthcare provider right away if any of these occur:  · Fever of 100.4°F (38°C) or higher  · Coughing up increased amounts of colored sputum  · Weakness, drowsiness, headache, facial pain, ear pain, or a stiff neck    Call 911, or get immediate medical care  Contact emergency services right away if any of these occur.  · Coughing up blood  · Worsening weakness, drowsiness, headache, or stiff neck  · Trouble breathing, wheezing, or pain with breathing    You have been given an antibiotic to  treat your condition today.  Even if your symptoms improve, please complete the medication as directed on the bottle.     Antibiotics work to destroy bacteria. They may also alter the good bacteria in your gut. Use probiotics and/or high culture yogurt about two hours apart from the antibiotic and about one week after the antibiotic to replace the good bacteria and prevent negative gastro intestinal consequences.    Common antibiotic treatments:  Cefdinir, is a third-generation oral cephalosporin, may cause loose, red stools when administered with products that contain iron. Avoid excessive exposure to sunlight or tanning beds. Use an SPF 30 or higher sunblock when outside and wear protective clothing as azithromycin can make you sunburn more easily.     If you have questions about whether you should take your medications with food, you should read the medication instructions provided to you with your medication, or contact your pharmacy.    If you are female and on BCP use additional methods to prevent pregnancy while on antibiotics and for one cycle after.     Symptom management:    Cough Allergy Symptoms Asthma   Tessalon Perles are a non-narcotic cough medicine. It works by numbing the throat and lungs, making the cough reflex less active, it is used to relieve coughing during the day. If you have been given Phenergan DM cough syrup, you do NOT need to take both medications at the same time.    Phenergan DM is a combination medication that is used to treat cough. Phenergan DM works like an antihistamine and cough suppressant. This medication will make you sleepy like Benadryl, have a drying effect, and act on a part of the brain (cough center) to reduce the need to cough. DO NOT take Benadryl and Phenergan together. You may take over-the-counter claritin, zyrtec, allegra, or xyzal as directed, these are antihistamines that will work to dry up secretions/mucus. Antihistamines work to block the effects of a  "certain natural substance (histamine), which causes allergy symptoms.    OR    Use over-the-counter Flonase (a nasal steroid) or prescribed Azelastine (a nasal antihistamine) to treat runny nose, sneezing, itchy nose, nasal congestion, and postnasal drip.    Proper administration is to "look down at your toes and aim for your nose". The goal is to aim for your nasolabial folds, the creases in your nose. If you feel the medication drip down your throat, you have NOT administered it correctly. If you can "smell the roses" (floral scent), then you have administered it correctly. If you have a history of asthma, expressed a concern about wheezing or have been told you were wheezing during your exam today, you are being given Albuterol. Albuterol is a bronchodilator that relaxes muscles in the airways and increases air flow to the lungs; it is used to treat or prevent bronchospasm, or narrowing of the airways in the lungs.     If you have a history of asthma, expressed a concern about wheezing or have been told you were wheezing during your exam today and are NOT being prescribed Albuterol that is because you have insured me that you have an adequate supply of the drug at home.          Why didn't I get a steroid shot or a medrol dose pack?  It is suggested NOT to use glucocorticoids in the treatment of acute bacterial bronchitis. When given in addition to antibiotics, oral steroids may shorten the time to symptom resolution or improvement (so will the above recommendations). The benefits of steroids are small and, unlike topical glucocorticoids, systemic glucocorticoids possess a significant side effect profile.     Major side effects include:     Effects immunity predisposing you to getting a more severe infection and increases your white blood cell count.Skin consequences: Skin thinning and ecchymoses, Cushingoid appearance (rounded face), acne, weight gain, mild hirsutism, facial erythema, and striae.   Eye " consequences: Cataracts, increased intraocular pressure, exophthalmos.    Cardiovascular consequences: Fluid retention (increase in blood pressure), premature atherosclerotic disease, and arrhythmias.    GI consequences: Increased risk for adverse gastrointestinal effects, such as gastritis, ulcer formation, and gastrointestinal bleeding   Bone and muscle consequences: Osteoporosis, osteonecrosis, and myopathy.   Neuropsychiatric effects: Mood disorders, psychosis, memory impairment, and    Metabolic and Endocrine consequences: Suppress the hypothalamic-pituitary-adrenal (HPA) axis and increase blood sugar.   Young children -- Growth impairment        Can I have a shot instead of pills?  No. I have diagnosed you with acute bacterial bronchitis and I want you to have a FULL course of antibiotics and not just a one time antibiotic shot. I have discussed above why you did not receive a steroid shot-this will only change if you were in respiratory distress      Your provider discussed your plan of care with you during your physical exam. It was reviewed once more by the provider when giving you an after visit summary. If the patient is a minor, the discharge instructions were discussed with an adult and that adult acknowledge their understanding of the provider's teaching.           Breath sounds clear and equal bilaterally.

## 2020-03-03 NOTE — ED PROVIDER NOTE - CARE PLAN
Nutrition Services Progress Note    Physician: Dr. Matt  Diagnosis: Colorectal cancer  Treatment:   Regorafenib  S/p FOLFIRI x 13 cycles  S/p FOLFOX x 8 cycles  S/p Xeloda + Radiation    Food and Nutrition Related History:  RN requesting RD call wife Nellie for suggestions for low fat breakfast options.  RD called  Nellie today to discuss.  Nellie is not sure what Logan can/cannot eat for breakfast when he takes his Regorafenib.   She was told to keep it low fat and provided 2 sample meals, but is wanting more ideas to keep it interesting.      Anthropometric Measurements:  Estimated body mass index is 22.71 kg/m² as calculated from the following:    Height as of 2/7/20: 5' 4\" (1.626 m).    Weight as of 2/28/20: 60 kg.    Wt Readings from Last 5 Encounters:   02/28/20 60 kg   02/07/20 59.9 kg   02/04/20 60.2 kg   02/04/20 59.6 kg   01/21/20 59.9 kg     Usual Weight:  74kg  Weight Change: Current weight stable  +0.9kg X1 month; +0.3kg X3 months    Biochemical Data, Medical Tests, and Procedures:  9/4- Low anterior resection, including mobilization of splenic flexure    Nutrition-Focused Physical Findings:  Overall appearance: No signs of malnutrition     Comparative Standards:  Estimated energy needs -  Total energy estimated needs (CS-1.1.1): 1800 calories, 72 gm protein daily    Nutrition Diagnosis:  Food-and-nutrition-related knowledge deficit  related to low fat meal options as evidenced by wife Nellie with inadequate level of knowledge, asking what patient can/cannot eat for breakfast.    Intervention:  Purpose of the nutrition education:   Reviewed meal guidelines with Regorafenib.  Medication should be taken with meal that contains less than 600 calories and less than 30% calories from fat.  Provided Nellie with various meal options and ways to make low fat foods.  All questions answered.    Monitoring/Eval:  Area(s) and level of knowledge:   Wife verbalized a basic level of knowledge regarding low fat  meals    Nutrition services to sign off.    Principal Discharge DX:	Small bowel obstruction

## 2020-03-24 NOTE — ED ADULT TRIAGE NOTE - CCCP TRG CHIEF CMPLNT
[facility]    Cardiology Progress Note    Syble Safer Patient Status:  Inpatient    1940 MRN [id number]   Location [facility] 2W/SW Attending Sergei Cameron MD   Hosp Day # 1 PCP Curry Harris MD       Impression/Plan:  70 y Intake 5653 ml   Output 550 ml   Net 5103 ml       Last 3 Weights  03/23/20 1300 : 137 lb 9.6 oz (62.4 kg)  03/16/20 1000 : 138 lb (62.6 kg)  03/02/20 1029 : 142 lb 11.2 oz (64.7 kg)      Tele: AF, PVCs    Physical Exam:    General: Alert and oriented x abdominal pain infusion, 8 mg/hr, Intravenous, Once  Normal Saline Flush 0.9 % injection 3 mL, 3 mL, Intravenous, PRN  0.9% NaCl infusion, , Intravenous, Continuous  acetaminophen (TYLENOL) tab 650 mg, 650 mg, Oral, Q6H PRN  Pantoprazole Sodium (PROTONIX) 80 mg in sodium

## 2020-04-05 ENCOUNTER — OUTPATIENT (OUTPATIENT)
Dept: OUTPATIENT SERVICES | Facility: HOSPITAL | Age: 70
LOS: 1 days | End: 2020-04-05
Payer: MEDICARE

## 2020-04-05 DIAGNOSIS — F20.9 SCHIZOPHRENIA, UNSPECIFIED: ICD-10-CM

## 2020-04-05 DIAGNOSIS — Z93.2 ILEOSTOMY STATUS: Chronic | ICD-10-CM

## 2020-04-05 DIAGNOSIS — K43.5 PARASTOMAL HERNIA WITHOUT OBSTRUCTION OR GANGRENE: Chronic | ICD-10-CM

## 2020-04-05 PROCEDURE — 85027 COMPLETE CBC AUTOMATED: CPT

## 2020-04-05 PROCEDURE — 80053 COMPREHEN METABOLIC PANEL: CPT

## 2020-04-05 PROCEDURE — 36415 COLL VENOUS BLD VENIPUNCTURE: CPT

## 2020-04-09 ENCOUNTER — INPATIENT (INPATIENT)
Facility: HOSPITAL | Age: 70
LOS: 4 days | Discharge: PSYCHIATRIC OR SUBSTANCE USE FACILITY | DRG: 682 | End: 2020-04-14
Attending: STUDENT IN AN ORGANIZED HEALTH CARE EDUCATION/TRAINING PROGRAM | Admitting: HOSPITALIST
Payer: MEDICARE

## 2020-04-09 VITALS — HEIGHT: 64 IN | WEIGHT: 179.9 LBS

## 2020-04-09 DIAGNOSIS — K43.5 PARASTOMAL HERNIA WITHOUT OBSTRUCTION OR GANGRENE: Chronic | ICD-10-CM

## 2020-04-09 DIAGNOSIS — N17.9 ACUTE KIDNEY FAILURE, UNSPECIFIED: ICD-10-CM

## 2020-04-09 DIAGNOSIS — U07.1 COVID-19: ICD-10-CM

## 2020-04-09 DIAGNOSIS — F25.9 SCHIZOAFFECTIVE DISORDER, UNSPECIFIED: ICD-10-CM

## 2020-04-09 DIAGNOSIS — Z93.2 ILEOSTOMY STATUS: Chronic | ICD-10-CM

## 2020-04-09 LAB
ALBUMIN SERPL ELPH-MCNC: 3.7 G/DL — SIGNIFICANT CHANGE UP (ref 3.3–5.2)
ALP SERPL-CCNC: 95 U/L — SIGNIFICANT CHANGE UP (ref 40–120)
ALT FLD-CCNC: 21 U/L — SIGNIFICANT CHANGE UP
ANION GAP SERPL CALC-SCNC: 14 MMOL/L — SIGNIFICANT CHANGE UP (ref 5–17)
ANISOCYTOSIS BLD QL: SLIGHT — SIGNIFICANT CHANGE UP
AST SERPL-CCNC: 25 U/L — SIGNIFICANT CHANGE UP
BASOPHILS # BLD AUTO: 0 K/UL — SIGNIFICANT CHANGE UP (ref 0–0.2)
BASOPHILS NFR BLD AUTO: 0 % — SIGNIFICANT CHANGE UP (ref 0–2)
BILIRUB SERPL-MCNC: 0.4 MG/DL — SIGNIFICANT CHANGE UP (ref 0.4–2)
BUN SERPL-MCNC: 61 MG/DL — HIGH (ref 8–20)
CALCIUM SERPL-MCNC: 8.8 MG/DL — SIGNIFICANT CHANGE UP (ref 8.6–10.2)
CHLORIDE SERPL-SCNC: 95 MMOL/L — LOW (ref 98–107)
CO2 SERPL-SCNC: 28 MMOL/L — SIGNIFICANT CHANGE UP (ref 22–29)
CREAT SERPL-MCNC: 3.34 MG/DL — HIGH (ref 0.5–1.3)
DACRYOCYTES BLD QL SMEAR: SLIGHT — SIGNIFICANT CHANGE UP
ELLIPTOCYTES BLD QL SMEAR: SLIGHT — SIGNIFICANT CHANGE UP
EOSINOPHIL # BLD AUTO: 0 K/UL — SIGNIFICANT CHANGE UP (ref 0–0.5)
EOSINOPHIL NFR BLD AUTO: 0 % — SIGNIFICANT CHANGE UP (ref 0–6)
GIANT PLATELETS BLD QL SMEAR: PRESENT — SIGNIFICANT CHANGE UP
GLUCOSE SERPL-MCNC: 117 MG/DL — HIGH (ref 70–99)
HCT VFR BLD CALC: 31.5 % — LOW (ref 34.5–45)
HGB BLD-MCNC: 9.6 G/DL — LOW (ref 11.5–15.5)
HYPOCHROMIA BLD QL: SLIGHT — SIGNIFICANT CHANGE UP
LITHIUM SERPL-MCNC: <0.1 MMOL/L — LOW (ref 0.5–1.5)
LYMPHOCYTES # BLD AUTO: 0.57 K/UL — LOW (ref 1–3.3)
LYMPHOCYTES # BLD AUTO: 20 % — SIGNIFICANT CHANGE UP (ref 13–44)
MACROCYTES BLD QL: SLIGHT — SIGNIFICANT CHANGE UP
MANUAL SMEAR VERIFICATION: SIGNIFICANT CHANGE UP
MCHC RBC-ENTMCNC: 27.8 PG — SIGNIFICANT CHANGE UP (ref 27–34)
MCHC RBC-ENTMCNC: 30.5 GM/DL — LOW (ref 32–36)
MCV RBC AUTO: 91.3 FL — SIGNIFICANT CHANGE UP (ref 80–100)
MICROCYTES BLD QL: SLIGHT — SIGNIFICANT CHANGE UP
MONOCYTES # BLD AUTO: 0.15 K/UL — SIGNIFICANT CHANGE UP (ref 0–0.9)
MONOCYTES NFR BLD AUTO: 5.2 % — SIGNIFICANT CHANGE UP (ref 2–14)
NEUTROPHILS # BLD AUTO: 2.15 K/UL — SIGNIFICANT CHANGE UP (ref 1.8–7.4)
NEUTROPHILS NFR BLD AUTO: 74.8 % — SIGNIFICANT CHANGE UP (ref 43–77)
OVALOCYTES BLD QL SMEAR: SLIGHT — SIGNIFICANT CHANGE UP
PLAT MORPH BLD: NORMAL — SIGNIFICANT CHANGE UP
PLATELET # BLD AUTO: 167 K/UL — SIGNIFICANT CHANGE UP (ref 150–400)
POIKILOCYTOSIS BLD QL AUTO: SLIGHT — SIGNIFICANT CHANGE UP
POLYCHROMASIA BLD QL SMEAR: SLIGHT — SIGNIFICANT CHANGE UP
POTASSIUM SERPL-MCNC: 3.7 MMOL/L — SIGNIFICANT CHANGE UP (ref 3.5–5.3)
POTASSIUM SERPL-SCNC: 3.7 MMOL/L — SIGNIFICANT CHANGE UP (ref 3.5–5.3)
PROT SERPL-MCNC: 6.6 G/DL — SIGNIFICANT CHANGE UP (ref 6.6–8.7)
RBC # BLD: 3.45 M/UL — LOW (ref 3.8–5.2)
RBC # FLD: 14.1 % — SIGNIFICANT CHANGE UP (ref 10.3–14.5)
RBC BLD AUTO: ABNORMAL
SODIUM SERPL-SCNC: 137 MMOL/L — SIGNIFICANT CHANGE UP (ref 135–145)
WBC # BLD: 2.87 K/UL — LOW (ref 3.8–10.5)
WBC # FLD AUTO: 2.87 K/UL — LOW (ref 3.8–10.5)

## 2020-04-09 PROCEDURE — 71045 X-RAY EXAM CHEST 1 VIEW: CPT | Mod: 26,CS

## 2020-04-09 PROCEDURE — 99222 1ST HOSP IP/OBS MODERATE 55: CPT

## 2020-04-09 PROCEDURE — 99285 EMERGENCY DEPT VISIT HI MDM: CPT

## 2020-04-09 RX ORDER — CHOLECALCIFEROL (VITAMIN D3) 125 MCG
1000 CAPSULE ORAL DAILY
Refills: 0 | Status: DISCONTINUED | OUTPATIENT
Start: 2020-04-09 | End: 2020-04-14

## 2020-04-09 RX ORDER — LEVOTHYROXINE SODIUM 125 MCG
112 TABLET ORAL DAILY
Refills: 0 | Status: DISCONTINUED | OUTPATIENT
Start: 2020-04-09 | End: 2020-04-14

## 2020-04-09 RX ORDER — POLYETHYLENE GLYCOL 3350 17 G/17G
17 POWDER, FOR SOLUTION ORAL AT BEDTIME
Refills: 0 | Status: DISCONTINUED | OUTPATIENT
Start: 2020-04-09 | End: 2020-04-14

## 2020-04-09 RX ORDER — SIMVASTATIN 20 MG/1
20 TABLET, FILM COATED ORAL AT BEDTIME
Refills: 0 | Status: DISCONTINUED | OUTPATIENT
Start: 2020-04-09 | End: 2020-04-14

## 2020-04-09 RX ORDER — SODIUM CHLORIDE 9 MG/ML
1000 INJECTION INTRAMUSCULAR; INTRAVENOUS; SUBCUTANEOUS
Refills: 0 | Status: DISCONTINUED | OUTPATIENT
Start: 2020-04-09 | End: 2020-04-10

## 2020-04-09 RX ORDER — SENNA PLUS 8.6 MG/1
2 TABLET ORAL AT BEDTIME
Refills: 0 | Status: DISCONTINUED | OUTPATIENT
Start: 2020-04-09 | End: 2020-04-14

## 2020-04-09 RX ORDER — ACETAMINOPHEN 500 MG
650 TABLET ORAL EVERY 6 HOURS
Refills: 0 | Status: DISCONTINUED | OUTPATIENT
Start: 2020-04-09 | End: 2020-04-14

## 2020-04-09 RX ORDER — ALBUTEROL 90 UG/1
2 AEROSOL, METERED ORAL EVERY 6 HOURS
Refills: 0 | Status: DISCONTINUED | OUTPATIENT
Start: 2020-04-09 | End: 2020-04-14

## 2020-04-09 RX ORDER — HALOPERIDOL DECANOATE 100 MG/ML
7 INJECTION INTRAMUSCULAR
Refills: 0 | Status: DISCONTINUED | OUTPATIENT
Start: 2020-04-09 | End: 2020-04-14

## 2020-04-09 RX ORDER — PANTOPRAZOLE SODIUM 20 MG/1
40 TABLET, DELAYED RELEASE ORAL
Refills: 0 | Status: DISCONTINUED | OUTPATIENT
Start: 2020-04-09 | End: 2020-04-14

## 2020-04-09 RX ORDER — SODIUM CHLORIDE 9 MG/ML
1000 INJECTION INTRAMUSCULAR; INTRAVENOUS; SUBCUTANEOUS ONCE
Refills: 0 | Status: COMPLETED | OUTPATIENT
Start: 2020-04-09 | End: 2020-04-09

## 2020-04-09 RX ORDER — HEPARIN SODIUM 5000 [USP'U]/ML
5000 INJECTION INTRAVENOUS; SUBCUTANEOUS EVERY 12 HOURS
Refills: 0 | Status: DISCONTINUED | OUTPATIENT
Start: 2020-04-09 | End: 2020-04-14

## 2020-04-09 RX ORDER — OLANZAPINE 15 MG/1
5 TABLET, FILM COATED ORAL AT BEDTIME
Refills: 0 | Status: DISCONTINUED | OUTPATIENT
Start: 2020-04-09 | End: 2020-04-14

## 2020-04-09 RX ADMIN — SODIUM CHLORIDE 1000 MILLILITER(S): 9 INJECTION INTRAMUSCULAR; INTRAVENOUS; SUBCUTANEOUS at 20:58

## 2020-04-09 NOTE — ED PROVIDER NOTE - OBJECTIVE STATEMENT
Patient is a 70 year old female with history of schizophrenia and +covid presenting for new renal failure. Pt had routine labs that showed MONICA. Pt notes cough and chill but no other symptoms. No history of renal failure. No trauma. No urinary complaints. No new meds. No abd pain n/v/d. No chest pain, sob, AMS.

## 2020-04-09 NOTE — ED ADULT NURSE NOTE - OBJECTIVE STATEMENT
Patient presents to ER for medical evaluation, patient is a patient from [facility] with renal failure, routine lab work at [facility] showed abnormal values, patient reports cough and chills, + COVID, denies urinary symptoms, denies Abd pain, no N/V/D. resp even/unlabored.

## 2020-04-09 NOTE — H&P ADULT - NSHPPHYSICALEXAM_GEN_ALL_CORE
General: pt. lying in bed not in distress.  HEENT: AT, NC. PERRL. intact EOM. no throat erythema or exudate.   Neck: supple. no JVD.   Chest: air entry fair b/L. no wheeze/ rales.   Heart: normal S1,S2. RRR. no heart murmur. no edema.   Abdomen: soft. non-tender. non-distended. + BS.   Ext:  no calf tenderness. ROM intact, distal pulses intact.  Neuro: Alert, awake,  no focal weakness.   Skin: warm and dry, no pallor.   Psych : lying in the bed quietly, no agitation.

## 2020-04-09 NOTE — H&P ADULT - HISTORY OF PRESENT ILLNESS
Patient is a 70 year old female  from [facility] with history of schizophrenia and +covid ( about 5 days ago per history ) presenting for new renal failure. Pt had routine labs that showed MONICA. Pt notes cough and chill but no other symptoms. No history of renal failure. No trauma. No urinary complaints. No new meds. No abd pain n/v/d. No chest pain, sob, or AMS reported by history. pt. does not contribute to the history. Patient is a 70 year old female  from [facility] with history of schizophrenia and +covid ( about 5 days ago per history ) presenting for new renal failure. Pt had routine labs that showed MONICA. Pt notes cough and chill but no other symptoms. No history of renal failure. No trauma. No urinary complaints. No new meds. No abd pain n/v/d. No chest pain, sob, or AMS reported by history. pt. does not contribute to the history.   spoke to assistant nursing director Evangelina at [facility] about pt's medicines and diet, pt. is on soft diet.

## 2020-04-09 NOTE — H&P ADULT - PROBLEM SELECTOR PLAN 1
very likely pre renal, poor po intake ? will keep on iv fluids with NS , follow am labs, avoid nephrotoxic meds and will request nephrology f/u, h group.

## 2020-04-10 LAB
ANION GAP SERPL CALC-SCNC: 17 MMOL/L — SIGNIFICANT CHANGE UP (ref 5–17)
BUN SERPL-MCNC: 52 MG/DL — HIGH (ref 8–20)
CALCIUM SERPL-MCNC: 8 MG/DL — LOW (ref 8.6–10.2)
CHLORIDE SERPL-SCNC: 104 MMOL/L — SIGNIFICANT CHANGE UP (ref 98–107)
CO2 SERPL-SCNC: 23 MMOL/L — SIGNIFICANT CHANGE UP (ref 22–29)
CREAT SERPL-MCNC: 3.03 MG/DL — HIGH (ref 0.5–1.3)
CRP SERPL-MCNC: 5.86 MG/DL — HIGH (ref 0–0.4)
GLUCOSE SERPL-MCNC: 88 MG/DL — SIGNIFICANT CHANGE UP (ref 70–99)
LDH SERPL L TO P-CCNC: 223 U/L — HIGH (ref 98–192)
POTASSIUM SERPL-MCNC: 3 MMOL/L — LOW (ref 3.5–5.3)
POTASSIUM SERPL-SCNC: 3 MMOL/L — LOW (ref 3.5–5.3)
PROCALCITONIN SERPL-MCNC: 0.26 NG/ML — HIGH (ref 0.02–0.1)
SODIUM SERPL-SCNC: 144 MMOL/L — SIGNIFICANT CHANGE UP (ref 135–145)

## 2020-04-10 PROCEDURE — 99233 SBSQ HOSP IP/OBS HIGH 50: CPT

## 2020-04-10 PROCEDURE — 99233 SBSQ HOSP IP/OBS HIGH 50: CPT | Mod: CS

## 2020-04-10 RX ORDER — SODIUM BICARBONATE 1 MEQ/ML
0.07 SYRINGE (ML) INTRAVENOUS
Qty: 75 | Refills: 0 | Status: DISCONTINUED | OUTPATIENT
Start: 2020-04-10 | End: 2020-04-10

## 2020-04-10 RX ORDER — DEXTROSE MONOHYDRATE, SODIUM CHLORIDE, AND POTASSIUM CHLORIDE 50; .745; 4.5 G/1000ML; G/1000ML; G/1000ML
1000 INJECTION, SOLUTION INTRAVENOUS
Refills: 0 | Status: DISCONTINUED | OUTPATIENT
Start: 2020-04-10 | End: 2020-04-12

## 2020-04-10 RX ORDER — POTASSIUM CHLORIDE 20 MEQ
40 PACKET (EA) ORAL ONCE
Refills: 0 | Status: COMPLETED | OUTPATIENT
Start: 2020-04-10 | End: 2020-04-10

## 2020-04-10 RX ADMIN — Medication 1 MILLIGRAM(S): at 17:34

## 2020-04-10 RX ADMIN — OLANZAPINE 5 MILLIGRAM(S): 15 TABLET, FILM COATED ORAL at 22:39

## 2020-04-10 RX ADMIN — HEPARIN SODIUM 5000 UNIT(S): 5000 INJECTION INTRAVENOUS; SUBCUTANEOUS at 17:34

## 2020-04-10 RX ADMIN — PANTOPRAZOLE SODIUM 40 MILLIGRAM(S): 20 TABLET, DELAYED RELEASE ORAL at 04:42

## 2020-04-10 RX ADMIN — Medication 112 MICROGRAM(S): at 04:43

## 2020-04-10 RX ADMIN — SIMVASTATIN 20 MILLIGRAM(S): 20 TABLET, FILM COATED ORAL at 22:39

## 2020-04-10 RX ADMIN — SODIUM CHLORIDE 100 MILLILITER(S): 9 INJECTION INTRAMUSCULAR; INTRAVENOUS; SUBCUTANEOUS at 02:24

## 2020-04-10 RX ADMIN — Medication 1 TABLET(S): at 12:31

## 2020-04-10 RX ADMIN — Medication 1 MILLIGRAM(S): at 04:42

## 2020-04-10 RX ADMIN — Medication 650 MILLIGRAM(S): at 02:28

## 2020-04-10 RX ADMIN — Medication 1000 UNIT(S): at 12:31

## 2020-04-10 RX ADMIN — Medication 40 MILLIEQUIVALENT(S): at 17:34

## 2020-04-10 RX ADMIN — HALOPERIDOL DECANOATE 7 MILLIGRAM(S): 100 INJECTION INTRAMUSCULAR at 04:43

## 2020-04-10 RX ADMIN — HALOPERIDOL DECANOATE 7 MILLIGRAM(S): 100 INJECTION INTRAMUSCULAR at 17:34

## 2020-04-10 RX ADMIN — HEPARIN SODIUM 5000 UNIT(S): 5000 INJECTION INTRAVENOUS; SUBCUTANEOUS at 04:42

## 2020-04-10 RX ADMIN — POLYETHYLENE GLYCOL 3350 17 GRAM(S): 17 POWDER, FOR SOLUTION ORAL at 22:39

## 2020-04-10 NOTE — CONSULT NOTE ADULT - ASSESSMENT
COVID-19 +  MONICA v MONICA on CKD - Likely ATN    Creatinine Trend:  SCr 3.34 [04-09 @ 18:47]  SCr 2.65 [04-05 @ 07:47]  Rising serum creatinine  Monitor serum creatinine, strict Is and Os    Anemia  Monitor H/H, obtain iron studies    MBD  continue Vitamin D supplementation    Blood pressure stable  DASH diet    UA/Urine osmo/urine lytes  Bladder scan    D/C protonix if not medically necessary COVID-19 +  MONICA v MONICA on CKD - Likely ATN    Creatinine Trend:  SCr 3.34 [04-09 @ 18:47]  SCr 2.65 [04-05 @ 07:47]  Rising serum creatinine    Monitor serum creatinine, strict Is and Os  UA/Urine osmo/urine lytes    Anemia  Monitor H/H, obtain iron studies    MBD  continue Vitamin D supplementation    Blood pressure stable  DASH diet    D/C protonix if not medically necessary COVID-19 +  MONICA v MONICA on CKD    Creatinine Trend:  SCr 3.34 [04-09 @ 18:47]  SCr 2.65 [04-05 @ 07:47]  Rising serum creatinine    Monitor serum creatinine, strict Is and Os  UA/Urine osmo/urine lytes    Anemia  Monitor H/H, obtain iron studies    MBD  continue Vitamin D supplementation    Blood pressure stable  DASH diet    D/C protonix if not medically necessary COVID-19 +  MONICA v MONICA on CKD    Creatinine Trend:  SCr 3.34 [04-09 @ 18:47]  SCr 2.65 [04-05 @ 07:47]  Rising serum creatinine    Monitor serum creatinine, strict Is and Os  UA/Urine osmo/urine lytes    Anemia  Monitor H/H, obtain iron studies    MBD  continue Vitamin D supplementation    I was Physically Present for the key portions of the Evaluation & management ( E/M ) Service provided.    I agree with the above History  , Physical examination & Treatment Plans,    I have Reviewed , Modified or appended where appropriate,     Blood pressure stable  DASH diet    D/C protonix if not medically necessary

## 2020-04-10 NOTE — PROGRESS NOTE ADULT - ASSESSMENT
70 year old female  from [facility] with history of schizophrenia and +covid ( about 5 days ago per history ) presenting for new renal failure    # MONICA  possible some component of CKD  slight improvement iwth hydration  cannot give aggressive hydration sec to risk of overload given CXR with small pl eff and pneumonia COVID features  cont gentle hydration  renal appreciated    # COVID PNA ( 5 days ago at [facility])  no evidence of hypoxia  maintained on room air  cont to monitor    # Hypokalemia  replete    # Schizophrenia  COnt home meds    Heparin
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
## 2020-04-10 NOTE — PROGRESS NOTE ADULT - SUBJECTIVE AND OBJECTIVE BOX
HEALTH ISSUES - PROBLEM Dx:    70 year old female  from [facility] with history of schizophrenia and +covid ( about 5 days ago per history ) presenting for new renal failure.    INTERVAL HPI/ OVERNIGHT EVENTS:    lying in bed on room air  saturating well  no issues  mumbles, incomprehensible  not in distress    REVIEW OF SYSTEMS:    as above    Vital Signs Last 24 Hrs  T(C): 36.7 (10 Apr 2020 08:09), Max: 39.4 (10 Apr 2020 02:29)  T(F): 98.1 (10 Apr 2020 08:09), Max: 103 (10 Apr 2020 02:29)  HR: 71 (10 Apr 2020 08:09) (71 - 104)  BP: 98/61 (10 Apr 2020 08:09) (98/61 - 154/69)  BP(mean): --  RR: 20 (10 Apr 2020 08:09) (20 - 20)  SpO2: 85% (10 Apr 2020 08:09) (85% - 100%)    PHYSICAL EXAM-  General: pt. lying in bed not in distress.  HEENT: AT, NC. PERRL. intact EOM. no throat erythema or exudate.   Neck: supple. no JVD.   Chest: air entry fair b/L. no wheeze/ rales.   Heart: normal S1,S2. RRR. no heart murmur. no edema.   Abdomen: soft. non-tender. non-distended. + BS.   Ext:  no calf tenderness. ROM intact, distal pulses intact.  Neuro: Alert, awake,  no focal weakness.   Skin: warm and dry, no pallor.   Psych : lying in the bed quietly, no agitation    MEDICATIONS  (STANDING):  cholecalciferol 1000 Unit(s) Oral daily  haloperidol     Tablet 7 milliGRAM(s) Oral two times a day  heparin  Injectable 5000 Unit(s) SubCutaneous every 12 hours  levothyroxine 112 MICROGram(s) Oral daily  LORazepam     Tablet 1 milliGRAM(s) Oral two times a day  multivitamin 1 Tablet(s) Oral daily  OLANZapine 5 milliGRAM(s) Oral at bedtime  pantoprazole    Tablet 40 milliGRAM(s) Oral before breakfast  polyethylene glycol 3350 17 Gram(s) Oral at bedtime  potassium chloride    Tablet ER 40 milliEquivalent(s) Oral once  simvastatin 20 milliGRAM(s) Oral at bedtime  sodium chloride 0.9% with potassium chloride 20 mEq/L 1000 milliLiter(s) (100 mL/Hr) IV Continuous <Continuous>    MEDICATIONS  (PRN):  acetaminophen   Tablet .. 650 milliGRAM(s) Oral every 6 hours PRN Temp greater or equal to 38C (100.4F)  ALBUTerol    90 MICROgram(s) HFA Inhaler 2 Puff(s) Inhalation every 6 hours PRN Shortness of Breath and/or Wheezing  senna 2 Tablet(s) Oral at bedtime PRN Constipation      LABS:                        9.6    2.87  )-----------( 167      ( 09 Apr 2020 18:47 )             31.5     04-10    144  |  104  |  52.0<H>  ----------------------------<  88  3.0<L>   |  23.0  |  3.03<H>    Ca    8.0<L>      10 Apr 2020 09:32    TPro  6.6  /  Alb  3.7  /  TBili  0.4  /  DBili  x   /  AST  25  /  ALT  21  /  AlkPhos  95  04-09

## 2020-04-11 DIAGNOSIS — Z29.9 ENCOUNTER FOR PROPHYLACTIC MEASURES, UNSPECIFIED: ICD-10-CM

## 2020-04-11 LAB
ANION GAP SERPL CALC-SCNC: 13 MMOL/L — SIGNIFICANT CHANGE UP (ref 5–17)
BUN SERPL-MCNC: 39 MG/DL — HIGH (ref 8–20)
CALCIUM SERPL-MCNC: 8.6 MG/DL — SIGNIFICANT CHANGE UP (ref 8.6–10.2)
CHLORIDE SERPL-SCNC: 109 MMOL/L — HIGH (ref 98–107)
CO2 SERPL-SCNC: 23 MMOL/L — SIGNIFICANT CHANGE UP (ref 22–29)
CREAT SERPL-MCNC: 2.1 MG/DL — HIGH (ref 0.5–1.3)
GLUCOSE SERPL-MCNC: 126 MG/DL — HIGH (ref 70–99)
POTASSIUM SERPL-MCNC: 3.4 MMOL/L — LOW (ref 3.5–5.3)
POTASSIUM SERPL-SCNC: 3.4 MMOL/L — LOW (ref 3.5–5.3)
SODIUM SERPL-SCNC: 145 MMOL/L — SIGNIFICANT CHANGE UP (ref 135–145)

## 2020-04-11 PROCEDURE — 99233 SBSQ HOSP IP/OBS HIGH 50: CPT

## 2020-04-11 RX ORDER — POTASSIUM CHLORIDE 20 MEQ
40 PACKET (EA) ORAL ONCE
Refills: 0 | Status: COMPLETED | OUTPATIENT
Start: 2020-04-11 | End: 2020-04-11

## 2020-04-11 RX ADMIN — Medication 1 TABLET(S): at 10:43

## 2020-04-11 RX ADMIN — DEXTROSE MONOHYDRATE, SODIUM CHLORIDE, AND POTASSIUM CHLORIDE 100 MILLILITER(S): 50; .745; 4.5 INJECTION, SOLUTION INTRAVENOUS at 21:09

## 2020-04-11 RX ADMIN — HALOPERIDOL DECANOATE 7 MILLIGRAM(S): 100 INJECTION INTRAMUSCULAR at 05:26

## 2020-04-11 RX ADMIN — HEPARIN SODIUM 5000 UNIT(S): 5000 INJECTION INTRAVENOUS; SUBCUTANEOUS at 05:27

## 2020-04-11 RX ADMIN — Medication 40 MILLIEQUIVALENT(S): at 17:34

## 2020-04-11 RX ADMIN — Medication 1 MILLIGRAM(S): at 17:33

## 2020-04-11 RX ADMIN — HALOPERIDOL DECANOATE 7 MILLIGRAM(S): 100 INJECTION INTRAMUSCULAR at 17:43

## 2020-04-11 RX ADMIN — Medication 1000 UNIT(S): at 10:43

## 2020-04-11 RX ADMIN — OLANZAPINE 5 MILLIGRAM(S): 15 TABLET, FILM COATED ORAL at 20:58

## 2020-04-11 RX ADMIN — Medication 112 MICROGRAM(S): at 05:27

## 2020-04-11 RX ADMIN — SIMVASTATIN 20 MILLIGRAM(S): 20 TABLET, FILM COATED ORAL at 20:58

## 2020-04-11 RX ADMIN — POLYETHYLENE GLYCOL 3350 17 GRAM(S): 17 POWDER, FOR SOLUTION ORAL at 20:58

## 2020-04-11 RX ADMIN — Medication 1 MILLIGRAM(S): at 05:26

## 2020-04-11 RX ADMIN — PANTOPRAZOLE SODIUM 40 MILLIGRAM(S): 20 TABLET, DELAYED RELEASE ORAL at 05:26

## 2020-04-11 RX ADMIN — HEPARIN SODIUM 5000 UNIT(S): 5000 INJECTION INTRAVENOUS; SUBCUTANEOUS at 17:32

## 2020-04-11 NOTE — PROGRESS NOTE ADULT - SUBJECTIVE AND OBJECTIVE BOX
[facility] DIVISION OF KIDNEY DISEASES AND HYPERTENSION -- FOLLOW UP NOTE  --------------------------------------------------------------------------------  Chief Complaint:  MONICA vs. MONICA on CKD    24 hour events/subjective:  COVID-19 +  Isolation        PAST HISTORY  --------------------------------------------------------------------------------  No significant changes to PMH, PSH, FHx, SHx, unless otherwise noted    ALLERGIES & MEDICATIONS  --------------------------------------------------------------------------------  Allergies    clozapine (Other)  Depakote (Other)  erythromycin (Unknown)  Neupogen (Other)    Intolerances      Standing Inpatient Medications  cholecalciferol 1000 Unit(s) Oral daily  haloperidol     Tablet 7 milliGRAM(s) Oral two times a day  heparin  Injectable 5000 Unit(s) SubCutaneous every 12 hours  levothyroxine 112 MICROGram(s) Oral daily  LORazepam     Tablet 1 milliGRAM(s) Oral two times a day  multivitamin 1 Tablet(s) Oral daily  OLANZapine 5 milliGRAM(s) Oral at bedtime  pantoprazole    Tablet 40 milliGRAM(s) Oral before breakfast  polyethylene glycol 3350 17 Gram(s) Oral at bedtime  simvastatin 20 milliGRAM(s) Oral at bedtime  sodium chloride 0.9% with potassium chloride 20 mEq/L 1000 milliLiter(s) IV Continuous <Continuous>    PRN Inpatient Medications  acetaminophen   Tablet .. 650 milliGRAM(s) Oral every 6 hours PRN  ALBUTerol    90 MICROgram(s) HFA Inhaler 2 Puff(s) Inhalation every 6 hours PRN  senna 2 Tablet(s) Oral at bedtime PRN      REVIEW OF SYSTEMS  --------------------------------------------------------------------------------  Unable to obtain    VITALS/PHYSICAL EXAM  --------------------------------------------------------------------------------  T(C): 37.1 (04-11-20 @ 04:47), Max: 37.5 (04-10-20 @ 16:49)  HR: 105 (04-11-20 @ 04:47) (97 - 105)  BP: 151/78 (04-11-20 @ 04:47) (130/71 - 151/78)  RR: 20 (04-11-20 @ 04:47) (20 - 20)  SpO2: 87% (04-11-20 @ 04:47) (87% - 91%)  Wt(kg): --  Height (cm): 162.56 (04-09-20 @ 17:16)  Weight (kg): 81.67844768667982 (04-09-20 @ 17:16)  BMI (kg/m2): 30.9 (04-09-20 @ 17:16)  BSA (m2): 1.87 (04-09-20 @ 17:16)      Physical Exam:  	Due to the nature of this patient's COVID-19 isolation status, no bedside physical exam was done to limit spread of infection. Objective data  reviewed in detail.    LABS/STUDIES  --------------------------------------------------------------------------------              9.6    2.87  >-----------<  167      [04-09-20 @ 18:47]              31.5     144  |  104  |  52.0  ----------------------------<  88      [04-10-20 @ 09:32]  3.0   |  23.0  |  3.03        Ca     8.0     [04-10-20 @ 09:32]    TPro  6.6  /  Alb  3.7  /  TBili  0.4  /  DBili  x   /  AST  25  /  ALT  21  /  AlkPhos  95  [04-09-20 @ 18:47]              [04-10-20 @ 09:32]    Creatinine Trend:  SCr 3.03 [04-10 @ 09:32]  SCr 3.34 [04-09 @ 18:47]  SCr 2.65 [04-05 @ 07:47]

## 2020-04-11 NOTE — PROGRESS NOTE ADULT - PROBLEM SELECTOR PLAN 1
possible some component of CKD  slight improvement iwth hydration  cannot give aggressive hydration sec to risk of overload given CXR with small pl eff and pneumonia COVID features  cont gentle hydration  renal appreciated

## 2020-04-11 NOTE — PROGRESS NOTE ADULT - SUBJECTIVE AND OBJECTIVE BOX
Patient is a 70y old  Female who presents with a chief complaint of abnormal labs (11 Apr 2020 09:24)      INTERVAL HPI/OVERNIGHT EVENTS: seen and examined. No overnight events. Saturation is still low on room air, she is back on NC     MEDICATIONS  (STANDING):  cholecalciferol 1000 Unit(s) Oral daily  haloperidol     Tablet 7 milliGRAM(s) Oral two times a day  heparin  Injectable 5000 Unit(s) SubCutaneous every 12 hours  levothyroxine 112 MICROGram(s) Oral daily  LORazepam     Tablet 1 milliGRAM(s) Oral two times a day  multivitamin 1 Tablet(s) Oral daily  OLANZapine 5 milliGRAM(s) Oral at bedtime  pantoprazole    Tablet 40 milliGRAM(s) Oral before breakfast  polyethylene glycol 3350 17 Gram(s) Oral at bedtime  simvastatin 20 milliGRAM(s) Oral at bedtime  sodium chloride 0.9% with potassium chloride 20 mEq/L 1000 milliLiter(s) (100 mL/Hr) IV Continuous <Continuous>    MEDICATIONS  (PRN):  acetaminophen   Tablet .. 650 milliGRAM(s) Oral every 6 hours PRN Temp greater or equal to 38C (100.4F)  ALBUTerol    90 MICROgram(s) HFA Inhaler 2 Puff(s) Inhalation every 6 hours PRN Shortness of Breath and/or Wheezing  senna 2 Tablet(s) Oral at bedtime PRN Constipation      Allergies    clozapine (Other)  Depakote (Other)  erythromycin (Unknown)  Neupogen (Other)    Intolerances        REVIEW OF SYSTEMS:  CONSTITUTIONAL: No fever, weight loss, or fatigue  RESPIRATORY: No cough, wheezing, chills or hemoptysis; No shortness of breath  CARDIOVASCULAR: No chest pain, palpitations, dizziness, or leg swelling  GASTROINTESTINAL: No abdominal or epigastric pain. No nausea, vomiting, or hematemesis; No diarrhea or constipation. No melena or hematochezia.  NEUROLOGICAL: No headaches, memory loss, loss of strength, numbness, or tremors  MUSCULOSKELETAL: No joint pain or swelling; No muscle, back, or extremity pain      Vital Signs Last 24 Hrs  T(C): 36.9 (11 Apr 2020 16:41), Max: 37.1 (11 Apr 2020 04:47)  T(F): 98.5 (11 Apr 2020 16:41), Max: 98.7 (11 Apr 2020 04:47)  HR: 97 (11 Apr 2020 16:41) (96 - 105)  BP: 157/84 (11 Apr 2020 16:41) (117/64 - 157/84)  BP(mean): --  RR: 19 (11 Apr 2020 16:41) (19 - 20)  SpO2: 94% (11 Apr 2020 16:41) (87% - 94%)    PHYSICAL EXAM:  GENERAL: NAD, well-groomed, well-developed  HEAD:  Atraumatic, Normocephalic  EYES: EOMI, PERRLA, conjunctiva and sclera clear  NECK: Supple, No JVD, Normal thyroid  NERVOUS SYSTEM:  Alert & Oriented X3, No gross focal deficits  CHEST/LUNG: Clear to percussion bilaterally; No rales, rhonchi, wheezing, or rubs  HEART: Regular rate and rhythm; No murmurs, rubs, or gallops  ABDOMEN: Soft, Nontender, Nondistended; Bowel sounds present  EXTREMITIES:  No clubbing, cyanosis, or edema  SKIN: No rashes or lesions    LABS:                        9.6    2.87  )-----------( 167      ( 09 Apr 2020 18:47 )             31.5     04-11    145  |  109<H>  |  39.0<H>  ----------------------------<  126<H>  3.4<L>   |  23.0  |  2.10<H>    Ca    8.6      11 Apr 2020 12:03    TPro  6.6  /  Alb  3.7  /  TBili  0.4  /  DBili  x   /  AST  25  /  ALT  21  /  AlkPhos  95  04-09        CAPILLARY BLOOD GLUCOSE          RADIOLOGY & ADDITIONAL TESTS:    Imaging Personally Reviewed:  [ ] YES  [ ] NO    Consultant(s) Notes Reviewed:  [ ] YES  [ ] NO    Care Discussed with Consultants/Other Providers [ ] YES  [ ] NO    Plan of Care discussed with Housestaff [ ]YES [ ] NO

## 2020-04-11 NOTE — PROGRESS NOTE ADULT - ASSESSMENT
COVID-19 +  MONICA v MONICA on CKD    Creatinine Trend:  SCr 3.34 [04-09 @ 18:47]  SCr 2.65 [04-05 @ 07:47]  Rising serum creatinine    Monitor serum creatinine, strict Is and Os  UA/Urine osmo/urine lytes    Anemia  Monitor H/H, obtain iron studies    MBD  continue Vitamin D supplementation    Blood pressure stable  DASH diet    D/C protonix if not medically necessary

## 2020-04-11 NOTE — PROGRESS NOTE ADULT - ASSESSMENT
70 year old female  from [facility] with history of schizophrenia and +covid ( about 5 days ago per history ) presenting for new renal failure

## 2020-04-12 DIAGNOSIS — I10 ESSENTIAL (PRIMARY) HYPERTENSION: ICD-10-CM

## 2020-04-12 PROCEDURE — 99233 SBSQ HOSP IP/OBS HIGH 50: CPT

## 2020-04-12 PROCEDURE — 99232 SBSQ HOSP IP/OBS MODERATE 35: CPT

## 2020-04-12 RX ORDER — AMLODIPINE BESYLATE 2.5 MG/1
5 TABLET ORAL DAILY
Refills: 0 | Status: DISCONTINUED | OUTPATIENT
Start: 2020-04-12 | End: 2020-04-14

## 2020-04-12 RX ORDER — SODIUM CHLORIDE 9 MG/ML
1000 INJECTION INTRAMUSCULAR; INTRAVENOUS; SUBCUTANEOUS
Refills: 0 | Status: DISCONTINUED | OUTPATIENT
Start: 2020-04-12 | End: 2020-04-14

## 2020-04-12 RX ADMIN — HEPARIN SODIUM 5000 UNIT(S): 5000 INJECTION INTRAVENOUS; SUBCUTANEOUS at 04:30

## 2020-04-12 RX ADMIN — HALOPERIDOL DECANOATE 7 MILLIGRAM(S): 100 INJECTION INTRAMUSCULAR at 18:12

## 2020-04-12 RX ADMIN — PANTOPRAZOLE SODIUM 40 MILLIGRAM(S): 20 TABLET, DELAYED RELEASE ORAL at 04:31

## 2020-04-12 RX ADMIN — Medication 1000 UNIT(S): at 13:12

## 2020-04-12 RX ADMIN — HEPARIN SODIUM 5000 UNIT(S): 5000 INJECTION INTRAVENOUS; SUBCUTANEOUS at 18:06

## 2020-04-12 RX ADMIN — SODIUM CHLORIDE 100 MILLILITER(S): 9 INJECTION INTRAMUSCULAR; INTRAVENOUS; SUBCUTANEOUS at 13:12

## 2020-04-12 RX ADMIN — SODIUM CHLORIDE 100 MILLILITER(S): 9 INJECTION INTRAMUSCULAR; INTRAVENOUS; SUBCUTANEOUS at 23:33

## 2020-04-12 RX ADMIN — Medication 1 TABLET(S): at 13:12

## 2020-04-12 RX ADMIN — SIMVASTATIN 20 MILLIGRAM(S): 20 TABLET, FILM COATED ORAL at 23:34

## 2020-04-12 RX ADMIN — POLYETHYLENE GLYCOL 3350 17 GRAM(S): 17 POWDER, FOR SOLUTION ORAL at 23:34

## 2020-04-12 RX ADMIN — AMLODIPINE BESYLATE 5 MILLIGRAM(S): 2.5 TABLET ORAL at 13:12

## 2020-04-12 RX ADMIN — HALOPERIDOL DECANOATE 7 MILLIGRAM(S): 100 INJECTION INTRAMUSCULAR at 04:31

## 2020-04-12 RX ADMIN — Medication 1 MILLIGRAM(S): at 04:31

## 2020-04-12 RX ADMIN — Medication 1 MILLIGRAM(S): at 18:06

## 2020-04-12 RX ADMIN — OLANZAPINE 5 MILLIGRAM(S): 15 TABLET, FILM COATED ORAL at 23:34

## 2020-04-12 RX ADMIN — Medication 112 MICROGRAM(S): at 04:32

## 2020-04-12 NOTE — PROGRESS NOTE ADULT - ASSESSMENT
70 year old female  from [facility] with history of schizophrenia and +covid ( about 5 days ago per history ) presenting for new renal failure

## 2020-04-12 NOTE — PROGRESS NOTE ADULT - ASSESSMENT
COVID-19 +  MONICA v MONICA on CKD        Monitor serum creatinine, strict Is and Os  UA/Urine osmo/urine lytes  Anemia  Monitor H/H, obtain iron studies  MBD  continue Vitamin D supplementation  Blood pressure stable  DASH diet  D/C protonix if not medically necessary  Continue current management

## 2020-04-12 NOTE — PROGRESS NOTE ADULT - SUBJECTIVE AND OBJECTIVE BOX
Patient is a 70y old  Female who presents with a chief complaint of abnormal labs (11 Apr 2020 18:02)      INTERVAL HPI/OVERNIGHT EVENTS: Not on supplemental O2.   Serum Cr. trending down. Cr. 2    MEDICATIONS  (STANDING):  cholecalciferol 1000 Unit(s) Oral daily  haloperidol     Tablet 7 milliGRAM(s) Oral two times a day  heparin  Injectable 5000 Unit(s) SubCutaneous every 12 hours  levothyroxine 112 MICROGram(s) Oral daily  LORazepam     Tablet 1 milliGRAM(s) Oral two times a day  multivitamin 1 Tablet(s) Oral daily  OLANZapine 5 milliGRAM(s) Oral at bedtime  pantoprazole    Tablet 40 milliGRAM(s) Oral before breakfast  polyethylene glycol 3350 17 Gram(s) Oral at bedtime  simvastatin 20 milliGRAM(s) Oral at bedtime  sodium chloride 0.9% with potassium chloride 20 mEq/L 1000 milliLiter(s) (100 mL/Hr) IV Continuous <Continuous>  sodium chloride 0.9%. 1000 milliLiter(s) (100 mL/Hr) IV Continuous <Continuous>    MEDICATIONS  (PRN):  acetaminophen   Tablet .. 650 milliGRAM(s) Oral every 6 hours PRN Temp greater or equal to 38C (100.4F)  ALBUTerol    90 MICROgram(s) HFA Inhaler 2 Puff(s) Inhalation every 6 hours PRN Shortness of Breath and/or Wheezing  senna 2 Tablet(s) Oral at bedtime PRN Constipation      Allergies    clozapine (Other)  Depakote (Other)  erythromycin (Unknown)  Neupogen (Other)    Intolerances        REVIEW OF SYSTEMS:  CONSTITUTIONAL: No fever, weight loss, or fatigue  RESPIRATORY: No cough, wheezing, chills or hemoptysis; No shortness of breath  CARDIOVASCULAR: No chest pain, palpitations, dizziness, or leg swelling  GASTROINTESTINAL: No abdominal or epigastric pain. No nausea, vomiting, or hematemesis; No diarrhea or constipation. No melena or hematochezia.  NEUROLOGICAL: No headaches, memory loss, loss of strength, numbness, or tremors  MUSCULOSKELETAL: No joint pain or swelling; No muscle, back, or extremity pain      Vital Signs Last 24 Hrs  T(C): 36.8 (12 Apr 2020 00:30), Max: 36.9 (11 Apr 2020 16:41)  T(F): 98.3 (12 Apr 2020 00:30), Max: 98.5 (11 Apr 2020 16:41)  HR: 98 (12 Apr 2020 00:30) (97 - 98)  BP: 146/62 (12 Apr 2020 00:30) (146/62 - 157/84)  BP(mean): --  RR: 20 (12 Apr 2020 00:30) (19 - 20)  SpO2: 94% (12 Apr 2020 00:30) (94% - 94%)    PHYSICAL EXAM:  GENERAL: NAD, sitting on the bed and crying  HEAD:  Atraumatic, Normocephalic  EYES: EOMI, PERRLA, conjunctiva and sclera clear  NECK: Supple, No JVD, Normal thyroid  NERVOUS SYSTEM:  Alert, , No gross focal deficits  CHEST/LUNG: Clear to percussion bilaterally; No rales, rhonchi, wheezing, or rubs  HEART: Regular rate and rhythm; No murmurs, rubs, or gallops  ABDOMEN: Soft, Nontender, Nondistended; Bowel sounds present  EXTREMITIES:  No clubbing, cyanosis, or edema  SKIN: No rashes or lesions    LABS:    04-11    145  |  109<H>  |  39.0<H>  ----------------------------<  126<H>  3.4<L>   |  23.0  |  2.10<H>    Ca    8.6      11 Apr 2020 12:03          CAPILLARY BLOOD GLUCOSE          RADIOLOGY & ADDITIONAL TESTS:    Imaging Personally Reviewed:  [ ] YES  [ ] NO    Consultant(s) Notes Reviewed:  [x ] YES  [ ] NO    Care Discussed with Consultants/Other Providers [ ] YES  [ ] NO    Plan of Care discussed with Housestaff [ ]YES [ ] NO

## 2020-04-12 NOTE — PROGRESS NOTE ADULT - PROBLEM SELECTOR PLAN 1
possible some component of CKD  Improving   C/w IV fluids   Follow BMP in AM  Get out of bed to chair and PT evaluation

## 2020-04-12 NOTE — PROGRESS NOTE ADULT - SUBJECTIVE AND OBJECTIVE BOX
[facility] DIVISION OF KIDNEY DISEASES AND HYPERTENSION -- FOLLOW UP NOTE  --------------------------------------------------------------------------------  Chief Complaint:  MONICA on CKD    24 hour events/subjective:  On Isolation  COVID-19       PAST HISTORY  --------------------------------------------------------------------------------  No significant changes to PMH, PSH, FHx, SHx, unless otherwise noted    ALLERGIES & MEDICATIONS  --------------------------------------------------------------------------------  Allergies    clozapine (Other)  Depakote (Other)  erythromycin (Unknown)  Neupogen (Other)    Intolerances      Standing Inpatient Medications  amLODIPine   Tablet 5 milliGRAM(s) Oral daily  cholecalciferol 1000 Unit(s) Oral daily  haloperidol     Tablet 7 milliGRAM(s) Oral two times a day  heparin  Injectable 5000 Unit(s) SubCutaneous every 12 hours  levothyroxine 112 MICROGram(s) Oral daily  LORazepam     Tablet 1 milliGRAM(s) Oral two times a day  multivitamin 1 Tablet(s) Oral daily  OLANZapine 5 milliGRAM(s) Oral at bedtime  pantoprazole    Tablet 40 milliGRAM(s) Oral before breakfast  polyethylene glycol 3350 17 Gram(s) Oral at bedtime  simvastatin 20 milliGRAM(s) Oral at bedtime  sodium chloride 0.9%. 1000 milliLiter(s) IV Continuous <Continuous>    PRN Inpatient Medications  acetaminophen   Tablet .. 650 milliGRAM(s) Oral every 6 hours PRN  ALBUTerol    90 MICROgram(s) HFA Inhaler 2 Puff(s) Inhalation every 6 hours PRN  senna 2 Tablet(s) Oral at bedtime PRN      REVIEW OF SYSTEMS  --------------------------------------------------------------------------------  Unable to obtain    VITALS/PHYSICAL EXAM  --------------------------------------------------------------------------------  T(C): 36.8 (04-12-20 @ 00:30), Max: 36.9 (04-11-20 @ 16:41)  HR: 98 (04-12-20 @ 00:30) (97 - 98)  BP: 146/62 (04-12-20 @ 00:30) (146/62 - 157/84)  RR: 20 (04-12-20 @ 00:30) (19 - 20)  SpO2: 94% (04-12-20 @ 00:30) (94% - 94%)  Wt(kg): --        04-11-20 @ 07:01  -  04-12-20 @ 07:00  --------------------------------------------------------  IN: 900 mL / OUT: 0 mL / NET: 900 mL      Physical Exam:  	Due to the nature of this patient's COVID-19 isolation status, no bedside physical exam was done to limit spread of infection. Objective data  reviewed in detail.    LABS/STUDIES  --------------------------------------------------------------------------------    145  |  109  |  39.0  ----------------------------<  126      [04-11-20 @ 12:03]  3.4   |  23.0  |  2.10        Ca     8.6     [04-11-20 @ 12:03]            Creatinine Trend:  SCr 2.10 [04-11 @ 12:03]  SCr 3.03 [04-10 @ 09:32]  SCr 3.34 [04-09 @ 18:47]  SCr 2.65 [04-05 @ 07:47]

## 2020-04-13 LAB
ANION GAP SERPL CALC-SCNC: 14 MMOL/L — SIGNIFICANT CHANGE UP (ref 5–17)
BUN SERPL-MCNC: 24 MG/DL — HIGH (ref 8–20)
CALCIUM SERPL-MCNC: 8.5 MG/DL — LOW (ref 8.6–10.2)
CHLORIDE SERPL-SCNC: 114 MMOL/L — HIGH (ref 98–107)
CO2 SERPL-SCNC: 22 MMOL/L — SIGNIFICANT CHANGE UP (ref 22–29)
CREAT SERPL-MCNC: 1.53 MG/DL — HIGH (ref 0.5–1.3)
GLUCOSE BLDC GLUCOMTR-MCNC: 154 MG/DL — HIGH (ref 70–99)
GLUCOSE SERPL-MCNC: 121 MG/DL — HIGH (ref 70–99)
HCT VFR BLD CALC: 28 % — LOW (ref 34.5–45)
HGB BLD-MCNC: 8.5 G/DL — LOW (ref 11.5–15.5)
MAGNESIUM SERPL-MCNC: 1.7 MG/DL — SIGNIFICANT CHANGE UP (ref 1.6–2.6)
MCHC RBC-ENTMCNC: 28.3 PG — SIGNIFICANT CHANGE UP (ref 27–34)
MCHC RBC-ENTMCNC: 30.4 GM/DL — LOW (ref 32–36)
MCV RBC AUTO: 93.3 FL — SIGNIFICANT CHANGE UP (ref 80–100)
PLATELET # BLD AUTO: 164 K/UL — SIGNIFICANT CHANGE UP (ref 150–400)
POTASSIUM SERPL-MCNC: 3.5 MMOL/L — SIGNIFICANT CHANGE UP (ref 3.5–5.3)
POTASSIUM SERPL-SCNC: 3.5 MMOL/L — SIGNIFICANT CHANGE UP (ref 3.5–5.3)
RBC # BLD: 3 M/UL — LOW (ref 3.8–5.2)
RBC # FLD: 13.7 % — SIGNIFICANT CHANGE UP (ref 10.3–14.5)
SODIUM SERPL-SCNC: 149 MMOL/L — HIGH (ref 135–145)
WBC # BLD: 2.02 K/UL — LOW (ref 3.8–10.5)
WBC # FLD AUTO: 2.02 K/UL — LOW (ref 3.8–10.5)

## 2020-04-13 PROCEDURE — 99232 SBSQ HOSP IP/OBS MODERATE 35: CPT

## 2020-04-13 PROCEDURE — 99231 SBSQ HOSP IP/OBS SF/LOW 25: CPT | Mod: CS

## 2020-04-13 RX ADMIN — Medication 1000 UNIT(S): at 12:55

## 2020-04-13 RX ADMIN — HEPARIN SODIUM 5000 UNIT(S): 5000 INJECTION INTRAVENOUS; SUBCUTANEOUS at 05:27

## 2020-04-13 RX ADMIN — AMLODIPINE BESYLATE 5 MILLIGRAM(S): 2.5 TABLET ORAL at 05:23

## 2020-04-13 RX ADMIN — POLYETHYLENE GLYCOL 3350 17 GRAM(S): 17 POWDER, FOR SOLUTION ORAL at 20:17

## 2020-04-13 RX ADMIN — HALOPERIDOL DECANOATE 7 MILLIGRAM(S): 100 INJECTION INTRAMUSCULAR at 17:52

## 2020-04-13 RX ADMIN — HALOPERIDOL DECANOATE 7 MILLIGRAM(S): 100 INJECTION INTRAMUSCULAR at 05:25

## 2020-04-13 RX ADMIN — OLANZAPINE 5 MILLIGRAM(S): 15 TABLET, FILM COATED ORAL at 20:17

## 2020-04-13 RX ADMIN — Medication 1 MILLIGRAM(S): at 05:23

## 2020-04-13 RX ADMIN — PANTOPRAZOLE SODIUM 40 MILLIGRAM(S): 20 TABLET, DELAYED RELEASE ORAL at 05:26

## 2020-04-13 RX ADMIN — SIMVASTATIN 20 MILLIGRAM(S): 20 TABLET, FILM COATED ORAL at 20:17

## 2020-04-13 RX ADMIN — HEPARIN SODIUM 5000 UNIT(S): 5000 INJECTION INTRAVENOUS; SUBCUTANEOUS at 17:51

## 2020-04-13 RX ADMIN — Medication 1 TABLET(S): at 12:55

## 2020-04-13 RX ADMIN — Medication 112 MICROGRAM(S): at 05:26

## 2020-04-13 RX ADMIN — Medication 1 MILLIGRAM(S): at 17:52

## 2020-04-13 NOTE — PROGRESS NOTE ADULT - ASSESSMENT
70 year old female  from [facility] with history of schizophrenia and +covid ( about 5 days ago per history ) presenting for new renal failure

## 2020-04-13 NOTE — PROGRESS NOTE ADULT - ASSESSMENT
COVID-19 +   MONICA on CKD stage 3  Anemia  HTN    Scr elevated/ stable  Bp stable  Anemia work up  Nephrology follow up outpt- will sign off.

## 2020-04-13 NOTE — PROGRESS NOTE ADULT - SUBJECTIVE AND OBJECTIVE BOX
[facility] DIVISION OF KIDNEY DISEASES AND HYPERTENSION -- FOLLOW UP NOTE  --------------------------------------------------------------------------------  Chief Complaint:    24 hour events/subjective:        PAST HISTORY  --------------------------------------------------------------------------------  No significant changes to PMH, PSH, FHx, SHx, unless otherwise noted    ALLERGIES & MEDICATIONS  --------------------------------------------------------------------------------  Allergies    clozapine (Other)  Depakote (Other)  erythromycin (Unknown)  Neupogen (Other)    Intolerances      Standing Inpatient Medications  amLODIPine   Tablet 5 milliGRAM(s) Oral daily  cholecalciferol 1000 Unit(s) Oral daily  haloperidol     Tablet 7 milliGRAM(s) Oral two times a day  heparin  Injectable 5000 Unit(s) SubCutaneous every 12 hours  levothyroxine 112 MICROGram(s) Oral daily  LORazepam     Tablet 1 milliGRAM(s) Oral two times a day  multivitamin 1 Tablet(s) Oral daily  OLANZapine 5 milliGRAM(s) Oral at bedtime  pantoprazole    Tablet 40 milliGRAM(s) Oral before breakfast  polyethylene glycol 3350 17 Gram(s) Oral at bedtime  simvastatin 20 milliGRAM(s) Oral at bedtime  sodium chloride 0.9%. 1000 milliLiter(s) IV Continuous <Continuous>    PRN Inpatient Medications  acetaminophen   Tablet .. 650 milliGRAM(s) Oral every 6 hours PRN  ALBUTerol    90 MICROgram(s) HFA Inhaler 2 Puff(s) Inhalation every 6 hours PRN  senna 2 Tablet(s) Oral at bedtime PRN      REVIEW OF SYSTEMS  --------------------------------------------------------------------------------  Gen: No weight changes, fatigue, fevers/chills, weakness  Skin: No rashes  Head/Eyes/Ears/Mouth: No headache; Normal hearing; Normal vision w/o blurriness; No sinus pain/discomfort, sore throat  Respiratory: No dyspnea, cough, wheezing, hemoptysis  CV: No chest pain, PND, orthopnea  GI: No abdominal pain, diarrhea, constipation, nausea, vomiting, melena, hematochezia  : No increased frequency, dysuria, hematuria, nocturia  MSK: No joint pain/swelling; no back pain; no edema  Neuro: No dizziness/lightheadedness, weakness, seizures, numbness, tingling  Heme: No easy bruising or bleeding  Endo: No heat/cold intolerance  Psych: No significant nervousness, anxiety, stress, depression    All other systems were reviewed and are negative, except as noted.    VITALS/PHYSICAL EXAM  --------------------------------------------------------------------------------  T(C): 36.7 (04-13-20 @ 08:38), Max: 37 (04-12-20 @ 16:45)  HR: 90 (04-13-20 @ 08:38) (83 - 92)  BP: 124/77 (04-13-20 @ 08:38) (120/69 - 153/84)  RR: 18 (04-13-20 @ 08:38) (18 - 20)  SpO2: 91% (04-13-20 @ 08:38) (91% - 95%)  Wt(kg): --        04-12-20 @ 07:01  -  04-13-20 @ 07:00  --------------------------------------------------------  IN: 900 mL / OUT: 0 mL / NET: 900 mL      Physical Exam:  	Gen: NAD, well-appearing  	HEENT: PERRL, supple neck, clear oropharynx  	Pulm: CTA B/L  	CV: RRR, S1S2; no rub  	Back: No spinal or CVA tenderness; no sacral edema  	Abd: +BS, soft, nontender/nondistended  	: No suprapubic tenderness  	UE: Warm, FROM, no clubbing, intact strength; no edema; no asterixis  	LE: Warm, FROM, no clubbing, intact strength; no edema  	Neuro: No focal deficits, intact gait  	Psych: Normal affect and mood  	Skin: Warm, without rashes  	Vascular access:    LABS/STUDIES  --------------------------------------------------------------------------------    145  |  109  |  39.0  ----------------------------<  126      [04-11-20 @ 12:03]  3.4   |  23.0  |  2.10        Ca     8.6     [04-11-20 @ 12:03]            Creatinine Trend:  SCr 2.10 [04-11 @ 12:03]  SCr 3.03 [04-10 @ 09:32]  SCr 3.34 [04-09 @ 18:47]  SCr 2.65 [04-05 @ 07:47] [facility] DIVISION OF KIDNEY DISEASES AND HYPERTENSION -- FOLLOW UP NOTE  --------------------------------------------------------------------------------  Chief Complaint: MONICA on CKD    24 hour events/subjective:  No acute event noted      PAST HISTORY  --------------------------------------------------------------------------------  No significant changes to PMH, PSH, FHx, SHx, unless otherwise noted    ALLERGIES & MEDICATIONS  --------------------------------------------------------------------------------  Allergies    clozapine (Other)  Depakote (Other)  erythromycin (Unknown)  Neupogen (Other)    Intolerances      Standing Inpatient Medications  amLODIPine   Tablet 5 milliGRAM(s) Oral daily  cholecalciferol 1000 Unit(s) Oral daily  haloperidol     Tablet 7 milliGRAM(s) Oral two times a day  heparin  Injectable 5000 Unit(s) SubCutaneous every 12 hours  levothyroxine 112 MICROGram(s) Oral daily  LORazepam     Tablet 1 milliGRAM(s) Oral two times a day  multivitamin 1 Tablet(s) Oral daily  OLANZapine 5 milliGRAM(s) Oral at bedtime  pantoprazole    Tablet 40 milliGRAM(s) Oral before breakfast  polyethylene glycol 3350 17 Gram(s) Oral at bedtime  simvastatin 20 milliGRAM(s) Oral at bedtime  sodium chloride 0.9%. 1000 milliLiter(s) IV Continuous <Continuous>    PRN Inpatient Medications  acetaminophen   Tablet .. 650 milliGRAM(s) Oral every 6 hours PRN  ALBUTerol    90 MICROgram(s) HFA Inhaler 2 Puff(s) Inhalation every 6 hours PRN  senna 2 Tablet(s) Oral at bedtime PRN      REVIEW OF SYSTEMS  --------------------------------------------------------------------------------  unable to assess due to COVID 19 status    VITALS/PHYSICAL EXAM  --------------------------------------------------------------------------------  T(C): 36.7 (04-13-20 @ 08:38), Max: 37 (04-12-20 @ 16:45)  HR: 90 (04-13-20 @ 08:38) (83 - 92)  BP: 124/77 (04-13-20 @ 08:38) (120/69 - 153/84)  RR: 18 (04-13-20 @ 08:38) (18 - 20)  SpO2: 91% (04-13-20 @ 08:38) (91% - 95%)  Wt(kg): --        04-12-20 @ 07:01  -  04-13-20 @ 07:00  --------------------------------------------------------  IN: 900 mL / OUT: 0 mL / NET: 900 mL      Physical Exam:  	Due to the nature of the pt's isolation status, no bedside physical exam done to limit spread of infection.  Objective data was reviewed in detail.    LABS/STUDIES  --------------------------------------------------------------------------------    145  |  109  |  39.0  ----------------------------<  126      [04-11-20 @ 12:03]  3.4   |  23.0  |  2.10        Ca     8.6     [04-11-20 @ 12:03]            Creatinine Trend:  SCr 2.10 [04-11 @ 12:03]  SCr 3.03 [04-10 @ 09:32]  SCr 3.34 [04-09 @ 18:47]  SCr 2.65 [04-05 @ 07:47]

## 2020-04-13 NOTE — PROGRESS NOTE ADULT - PROBLEM SELECTOR PLAN 1
possible some component of CKD  Improving , almost back to baseline, today Cr. 1.53  C/w IV fluids   Follow BMP in AM  Get out of bed to chair   Discharge in AM

## 2020-04-13 NOTE — PROGRESS NOTE ADULT - SUBJECTIVE AND OBJECTIVE BOX
Patient is a 70y old  Female who presents with a chief complaint of abnormal labs (13 Apr 2020 10:15)      INTERVAL HPI/OVERNIGHT EVENTS: Doing well, not on supplemental O2. Serum Cr trending down.   Per nurse, patient is more cooperative and pleasant     MEDICATIONS  (STANDING):  amLODIPine   Tablet 5 milliGRAM(s) Oral daily  cholecalciferol 1000 Unit(s) Oral daily  haloperidol     Tablet 7 milliGRAM(s) Oral two times a day  heparin  Injectable 5000 Unit(s) SubCutaneous every 12 hours  levothyroxine 112 MICROGram(s) Oral daily  LORazepam     Tablet 1 milliGRAM(s) Oral two times a day  multivitamin 1 Tablet(s) Oral daily  OLANZapine 5 milliGRAM(s) Oral at bedtime  pantoprazole    Tablet 40 milliGRAM(s) Oral before breakfast  polyethylene glycol 3350 17 Gram(s) Oral at bedtime  simvastatin 20 milliGRAM(s) Oral at bedtime  sodium chloride 0.9%. 1000 milliLiter(s) (100 mL/Hr) IV Continuous <Continuous>    MEDICATIONS  (PRN):  acetaminophen   Tablet .. 650 milliGRAM(s) Oral every 6 hours PRN Temp greater or equal to 38C (100.4F)  ALBUTerol    90 MICROgram(s) HFA Inhaler 2 Puff(s) Inhalation every 6 hours PRN Shortness of Breath and/or Wheezing  senna 2 Tablet(s) Oral at bedtime PRN Constipation      Allergies    clozapine (Other)  Depakote (Other)  erythromycin (Unknown)  Neupogen (Other)    Intolerances        REVIEW OF SYSTEMS:  CONSTITUTIONAL: No fever, weight loss, or fatigue  RESPIRATORY: No cough, wheezing, chills or hemoptysis; No shortness of breath  CARDIOVASCULAR: No chest pain, palpitations, dizziness, or leg swelling  GASTROINTESTINAL: No abdominal or epigastric pain. No nausea, vomiting, or hematemesis; No diarrhea or constipation. No melena or hematochezia.  NEUROLOGICAL: No headaches, memory loss, loss of strength, numbness, or tremors  MUSCULOSKELETAL: No joint pain or swelling; No muscle, back, or extremity pain      Vital Signs Last 24 Hrs  T(C): 36.7 (13 Apr 2020 08:38), Max: 37 (12 Apr 2020 16:45)  T(F): 98.1 (13 Apr 2020 08:38), Max: 98.6 (12 Apr 2020 16:45)  HR: 90 (13 Apr 2020 08:38) (83 - 92)  BP: 124/77 (13 Apr 2020 08:38) (120/69 - 143/81)  BP(mean): --  RR: 18 (13 Apr 2020 08:38) (18 - 20)  SpO2: 91% (13 Apr 2020 08:38) (91% - 95%)    PHYSICAL EXAM:  GENERAL: NAD, well-groomed, well-developed  HEAD:  Atraumatic, Normocephalic  EYES: EOMI, PERRLA, conjunctiva and sclera clear  NECK: Supple, No JVD, Normal thyroid  NERVOUS SYSTEM:  Alert & Oriented X3, No gross focal deficits  CHEST/LUNG: Clear to percussion bilaterally; No rales, rhonchi, wheezing, or rubs  HEART: Regular rate and rhythm; No murmurs, rubs, or gallops  ABDOMEN: Soft, Nontender, Nondistended; Bowel sounds present  EXTREMITIES:  No clubbing, cyanosis, or edema  SKIN: No rashes or lesions    LABS:                        8.5    2.02  )-----------( 164      ( 13 Apr 2020 12:16 )             28.0     04-13    149<H>  |  114<H>  |  24.0<H>  ----------------------------<  121<H>  3.5   |  22.0  |  1.53<H>    Ca    8.5<L>      13 Apr 2020 12:16  Mg     1.7     04-13          CAPILLARY BLOOD GLUCOSE          RADIOLOGY & ADDITIONAL TESTS:    Imaging Personally Reviewed:  [ ] YES  [ ] NO    Consultant(s) Notes Reviewed:  [ ] YES  [ ] NO    Care Discussed with Consultants/Other Providers [ ] YES  [ ] NO    Plan of Care discussed with Housestaff [ ]YES [ ] NO

## 2020-04-13 NOTE — PHYSICAL THERAPY INITIAL EVALUATION ADULT - ADDITIONAL COMMENTS
per patient, she does not walk. She spends most of her time in w/c. She reports [facility] staff lifts her from the bed to the w/c. Pt is w/c bound at baseline.

## 2020-04-14 ENCOUNTER — TRANSCRIPTION ENCOUNTER (OUTPATIENT)
Age: 70
End: 2020-04-14

## 2020-04-14 VITALS
SYSTOLIC BLOOD PRESSURE: 154 MMHG | TEMPERATURE: 98 F | RESPIRATION RATE: 19 BRPM | OXYGEN SATURATION: 95 % | DIASTOLIC BLOOD PRESSURE: 74 MMHG | HEART RATE: 88 BPM

## 2020-04-14 PROCEDURE — 99238 HOSP IP/OBS DSCHRG MGMT 30/<: CPT

## 2020-04-14 RX ORDER — ALBUTEROL 90 UG/1
2 AEROSOL, METERED ORAL
Qty: 0 | Refills: 0 | DISCHARGE
Start: 2020-04-14

## 2020-04-14 RX ORDER — ACETAMINOPHEN 500 MG
2 TABLET ORAL
Qty: 0 | Refills: 0 | DISCHARGE
Start: 2020-04-14

## 2020-04-14 RX ADMIN — PANTOPRAZOLE SODIUM 40 MILLIGRAM(S): 20 TABLET, DELAYED RELEASE ORAL at 05:07

## 2020-04-14 RX ADMIN — Medication 1 MILLIGRAM(S): at 05:07

## 2020-04-14 RX ADMIN — Medication 112 MICROGRAM(S): at 05:07

## 2020-04-14 RX ADMIN — SODIUM CHLORIDE 100 MILLILITER(S): 9 INJECTION INTRAMUSCULAR; INTRAVENOUS; SUBCUTANEOUS at 01:50

## 2020-04-14 RX ADMIN — AMLODIPINE BESYLATE 5 MILLIGRAM(S): 2.5 TABLET ORAL at 05:06

## 2020-04-14 RX ADMIN — HEPARIN SODIUM 5000 UNIT(S): 5000 INJECTION INTRAVENOUS; SUBCUTANEOUS at 05:07

## 2020-04-14 RX ADMIN — HALOPERIDOL DECANOATE 7 MILLIGRAM(S): 100 INJECTION INTRAMUSCULAR at 05:07

## 2020-04-14 NOTE — DIETITIAN INITIAL EVALUATION ADULT. - ADD RECOMMEND
Encourage po intake, monitor diet tolerance. If pt with suboptimal po intake, rec Ensure Enlive TID to optimize po intake and provide an additional 350 kcal, 20g protein per serving. Continue MVI and vit D supplementation. Continue bowel regimen PRN. Obtain daily weights as feasible to monitor trends.

## 2020-04-14 NOTE — DISCHARGE NOTE PROVIDER - NSDCMRMEDTOKEN_GEN_ALL_CORE_FT
cholecalciferol oral tablet: 2000 unit(s) orally every 48 hours  docusate sodium 100 mg oral capsule: 1 cap(s) orally 3 times a day, As Needed  haloperidol 1 mg oral tablet: 7 tab(s) orally 2 times a day  levothyroxine 112 mcg (0.112 mg) oral tablet: 1 tab(s) orally once a day  lidocaine 5% topical film: Apply topically to affected area once a day  LORazepam 1 mg oral tablet: 1 tab(s) orally 3 times a day  multivitamin: 1 tab(s) orally once a day  Norvasc 2.5 mg oral tablet: 1 tab(s) orally once a day  OLANZapine 5 mg oral tablet: 1 tab(s) orally once a day (at bedtime)  pantoprazole 40 mg oral delayed release tablet: 1 tab(s) orally once a day (before a meal)  polyethylene glycol 3350 oral powder for reconstitution: 17 gram(s) orally once a day (at bedtime)  senna oral tablet: 2 tab(s) orally once a day (at bedtime), As Needed  simvastatin 20 mg oral tablet: 1 tab(s) orally once a day (at bedtime)  sulfamethoxazole-trimethoprim 800 mg-160 mg oral tablet: 1 tab(s) orally every 12 hours acetaminophen 325 mg oral tablet: 2 tab(s) orally every 6 hours, As needed, Temp greater or equal to 38C (100.4F)  cholecalciferol oral tablet: 2000 unit(s) orally every 48 hours  docusate sodium 100 mg oral capsule: 1 cap(s) orally 3 times a day, As Needed  haloperidol 1 mg oral tablet: 7 tab(s) orally 2 times a day  levothyroxine 112 mcg (0.112 mg) oral tablet: 1 tab(s) orally once a day  lidocaine 5% topical film: Apply topically to affected area once a day  LORazepam 1 mg oral tablet: 1 tab(s) orally 3 times a day  multivitamin: 1 tab(s) orally once a day  Norvasc 2.5 mg oral tablet: 1 tab(s) orally once a day  OLANZapine 5 mg oral tablet: 1 tab(s) orally once a day (at bedtime)  pantoprazole 40 mg oral delayed release tablet: 1 tab(s) orally once a day (before a meal)  polyethylene glycol 3350 oral powder for reconstitution: 17 gram(s) orally once a day (at bedtime)  senna oral tablet: 2 tab(s) orally once a day (at bedtime), As Needed  simvastatin 20 mg oral tablet: 1 tab(s) orally once a day (at bedtime)  sulfamethoxazole-trimethoprim 800 mg-160 mg oral tablet: 1 tab(s) orally every 12 hours acetaminophen 325 mg oral tablet: 2 tab(s) orally every 6 hours, As needed, Temp greater or equal to 38C (100.4F)  albuterol 90 mcg/inh inhalation aerosol: 2 puff(s) inhaled every 6 hours, As needed, Shortness of Breath and/or Wheezing  cholecalciferol oral tablet: 2000 unit(s) orally every 48 hours  docusate sodium 100 mg oral capsule: 1 cap(s) orally 3 times a day, As Needed  haloperidol 1 mg oral tablet: 7 tab(s) orally 2 times a day  levothyroxine 112 mcg (0.112 mg) oral tablet: 1 tab(s) orally once a day  lidocaine 5% topical film: Apply topically to affected area once a day  LORazepam 1 mg oral tablet: 1 tab(s) orally 3 times a day  multivitamin: 1 tab(s) orally once a day  Norvasc 2.5 mg oral tablet: 1 tab(s) orally once a day  OLANZapine 5 mg oral tablet: 1 tab(s) orally once a day (at bedtime)  pantoprazole 40 mg oral delayed release tablet: 1 tab(s) orally once a day (before a meal)  polyethylene glycol 3350 oral powder for reconstitution: 17 gram(s) orally once a day (at bedtime)  senna oral tablet: 2 tab(s) orally once a day (at bedtime), As Needed  simvastatin 20 mg oral tablet: 1 tab(s) orally once a day (at bedtime)  sulfamethoxazole-trimethoprim 800 mg-160 mg oral tablet: 1 tab(s) orally every 12 hours

## 2020-04-14 NOTE — DIETITIAN INITIAL EVALUATION ADULT. - PERTINENT LABORATORY DATA
04-13 Na149 mmol/L<H> Glu 121 mg/dL<H> K+ 3.5 mmol/L Cr  1.53 mg/dL<H> BUN 24.0 mg/dL<H> Phos n/a   Alb n/a   PAB n/a

## 2020-04-14 NOTE — DIETITIAN INITIAL EVALUATION ADULT. - PERTINENT MEDS FT
MEDICATIONS  (STANDING):  amLODIPine   Tablet 5 milliGRAM(s) Oral daily  cholecalciferol 1000 Unit(s) Oral daily  haloperidol     Tablet 7 milliGRAM(s) Oral two times a day  heparin  Injectable 5000 Unit(s) SubCutaneous every 12 hours  levothyroxine 112 MICROGram(s) Oral daily  LORazepam     Tablet 1 milliGRAM(s) Oral two times a day  multivitamin 1 Tablet(s) Oral daily  OLANZapine 5 milliGRAM(s) Oral at bedtime  pantoprazole    Tablet 40 milliGRAM(s) Oral before breakfast  polyethylene glycol 3350 17 Gram(s) Oral at bedtime  simvastatin 20 milliGRAM(s) Oral at bedtime  sodium chloride 0.9%. 1000 milliLiter(s) (100 mL/Hr) IV Continuous <Continuous>    MEDICATIONS  (PRN):  acetaminophen   Tablet .. 650 milliGRAM(s) Oral every 6 hours PRN Temp greater or equal to 38C (100.4F)  ALBUTerol    90 MICROgram(s) HFA Inhaler 2 Puff(s) Inhalation every 6 hours PRN Shortness of Breath and/or Wheezing  senna 2 Tablet(s) Oral at bedtime PRN Constipation

## 2020-04-14 NOTE — DISCHARGE NOTE PROVIDER - NSDCFUADDINST_GEN_ALL_CORE_FT
Follow-up with your doctor within 2-3 days. Take Tylenol (Acetaminophen) 650mg every 6 hours as needed for pain. Stay well hydrated. It has been determined that you no longer need hospitalization and can recover while remaining in self-quarantine at home for 14 days. You should follow the prevention steps below until a healthcare provider or [facility] says you can return to normal activities. Please remain in quarantine until  then. You should restrict activities outside your home except for getting medical care. Do not go to work, school, or public areas. Avoid using public transportation. Separate yourself from other people and animals in your home. Cover your cough and sneezes. Clean your hands often. Avoid sharing personal household items. Clean all high touch surfaces. Monitor your symptoms. If you have a medical emergency, call 911. PLEASE FOLLOW ADDITIONAL COVID DISCHARGE PACKET INSTRUCTIONS.

## 2020-04-14 NOTE — DISCHARGE NOTE NURSING/CASE MANAGEMENT/SOCIAL WORK - PATIENT PORTAL LINK FT
You can access the FollowMyHealth Patient Portal offered by [facility] by registering at the following website: [email or web address]. By joining [facility]’s FollowMyHealth portal, you will also be able to view your health information using other applications (apps) compatible with our system.

## 2020-04-14 NOTE — DISCHARGE NOTE PROVIDER - HOSPITAL COURSE
Patient is a 70 year old female  from [facility] with history of schizophrenia and +covid ( about 5 days ago per history ) presenting for new renal failure. Pt had routine labs that showed MONICA. Pt notes cough and chill but no other symptoms. No history of renal failure. No trauma. No urinary complaints. No new meds. No abd pain n/v/d. No chest pain, sob, or AMS reported by history.  Pt admitted to [facility] for monitoring. Patient received fluids with creatinine trending toward baseline. Current Cr -1.53 4/13/2020. Patient being followed by Nephrology. Patient currently sustaining oxygen saturation of 95% on room air. Pt condition improved with treatment during hospital admission. He was discharged home in stable condition Patient is a 70 year old female  from [facility] with history of schizophrenia and +covid ( about 5 days ago per history ) presenting for new renal failure. Pt had routine labs that showed MONICA. Pt notes cough and chill but no other symptoms. No history of renal failure. No trauma. No urinary complaints. No new meds. No abd pain n/v/d. No chest pain, sob, or AMS reported by history.  Pt admitted to [facility] for monitoring. Patient received fluids with creatinine trending toward baseline. Current Cr -1.53 4/13/2020. Patient being followed by Nephrology. Patient currently sustaining oxygen saturation of 95% on room air. Pt condition improved with treatment during hospital admission. Patient was discharged home in stable condition

## 2020-04-14 NOTE — DISCHARGE NOTE PROVIDER - NSDCCPCAREPLAN_GEN_ALL_CORE_FT
PRINCIPAL DISCHARGE DIAGNOSIS  Diagnosis: MONICA (acute kidney injury)  Assessment and Plan of Treatment:

## 2020-04-14 NOTE — DIETITIAN INITIAL EVALUATION ADULT. - OTHER INFO
70 year old female  from [facility] with history of schizophrenia and +covid presenting for new renal failure. Pt alert but confused. Tolerating soft diet - needs total assistance with meals. Per EMR review, pts weight in 9/2019 was 156 lbs in which pt reported she had gained ~20 lbs. Current admission weight is 179.8 lbs which is indicative of further weight gain (~24 lbs) x 7 months.

## 2020-04-30 PROCEDURE — 80053 COMPREHEN METABOLIC PANEL: CPT

## 2020-04-30 PROCEDURE — 83615 LACTATE (LD) (LDH) ENZYME: CPT

## 2020-04-30 PROCEDURE — 85027 COMPLETE CBC AUTOMATED: CPT

## 2020-04-30 PROCEDURE — 80178 ASSAY OF LITHIUM: CPT

## 2020-04-30 PROCEDURE — 71045 X-RAY EXAM CHEST 1 VIEW: CPT

## 2020-04-30 PROCEDURE — 82962 GLUCOSE BLOOD TEST: CPT

## 2020-04-30 PROCEDURE — 84145 PROCALCITONIN (PCT): CPT

## 2020-04-30 PROCEDURE — 86140 C-REACTIVE PROTEIN: CPT

## 2020-04-30 PROCEDURE — 80048 BASIC METABOLIC PNL TOTAL CA: CPT

## 2020-04-30 PROCEDURE — 83735 ASSAY OF MAGNESIUM: CPT

## 2020-04-30 PROCEDURE — 99285 EMERGENCY DEPT VISIT HI MDM: CPT

## 2020-04-30 PROCEDURE — 36415 COLL VENOUS BLD VENIPUNCTURE: CPT

## 2020-05-07 NOTE — PROGRESS NOTE BEHAVIORAL HEALTH - NSBHCHARTREVIEWLAB_PSY_A_CORE FT
Spoke to patient on 5/6/2020 at 5pm    CONFIRMED procedure arrival time of 6am on 5/8/2020  Reiterated instructions including:    · You will need to be dropped off in front of the hospital, near the flags  · No visitors will be allowed (for patient/staff safety)  · A family member should provide the staff with a valid cell phone number so that he or she can receive automated updates  · You can be picked up at the same spot that you were dropped off (staff will let your family member know when you are ready for discharge).    -NPO after midnight night prior to procedure  -High importance of HOLDING Propanolol for 3 days prior to procedure. Confirms that his last dose was 5/4/2020, as instructed   -Pre-procedure LABS reviewed, no alerts noted. Covid test done 5/6/2020, results negative  -Confirmed no recent fever, bleeding, infection or skin rash in the past 30 days.        Patient verbalizes understanding of above and appreciates call.     Reviewed in medical record

## 2020-06-10 NOTE — ED STATDOCS - NS_EDPROVIDERDISPOUSERTYPE_ED_A_ED
Patient's chart reviewed, please contact to schedule OA colonoscopy with .Patient Preference    Screening Criteria  Age: 52 year old Patient meets age criteria.  PCP:  Mariana Diallo MD  Personal H/O Colon CA or Polyps: Patient does NOT have a personal history of colon polyps or colon cancer  Family H/O Colon CA: No family history of colon cancer.  GI Symptoms: No  Most recent colon procedure No prior Flexi or Colonoscopy  Concerns for taking prep at home(mobility, etc): No    ALLERGIES:  No Known Allergies    Medications:      Summary of your Discharge Medications          Accurate as of Evelina 10, 2020 12:39 PM. Always use your most recent med list.            Take these Medications      Details   atorvastatin 40 MG tablet  Commonly known as:  LIPITOR   Take 1 tablet by mouth daily.     FLUoxetine 10 MG capsule  Commonly known as:  PROzac   Alternate 10 mg one day with 20 mg the next     losartan 50 MG tablet  Commonly known as:  COZAAR   Take 1 tablet by mouth daily.     metoPROLOL succinate 25 MG 24 hr tablet  Commonly known as:  TOPROL-XL   TAKE 1 TABLET BY MOUTH EVERY DAY            Anticoagulation (examples - coumadin, heparin, lovenox, xarelto, pradaxa): No  Platelet Modifying (examples - Plavix, aspirin, nsaids, Aggrenox) : No  Concerns for sedation. On Chronic long acting narcotics, Methadone, Suboxone, antianxiety like xanax, klonazepam: Yes  Review of other medications indicate - no concern   BMI: Estimated body mass index is 28.17 kg/m² as calculated from the following:    Height as of 8/14/18: 5' 2\" (1.575 m).    Weight as of 6/26/19: 69.9 kg.:more than 45 No  Is the patient over 300 lbs Weight:   Wt Readings from Last 1 Encounters:   06/26/19 69.9 kg   :  No  On Oxygen:  No    Past Medical History:  Past Medical History:   Diagnosis Date   • Allergic rhinitis, cause unspecified     cats, horse, spring, fall.   • Hyperlipemia    • Hypertension    • Stroke ([facility]) 10/25/2016    TIA       Past  Surgical History:  Past Surgical History:   Procedure Laterality Date   • Foot/toes surgery proc unlisted  age 14    bilateral great toe cartilage   • Foot/toes surgery proc unlisted  2010   • Insert intrauterine device  10/31/2017    mirena        Other Concerns: none noted    Prior Labs: If abnormal creatinine/electrolytes, then will need Nulytely prep  Creatinine   Date Value Ref Range Status   06/24/2019 0.87 0.51 - 0.95 mg/dL Final     BUN   Date Value Ref Range Status   06/24/2019 19 6 - 20 mg/dL Final     Sodium   Date Value Ref Range Status   06/24/2019 141 135 - 145 mmol/L Final     Potassium   Date Value Ref Range Status   06/24/2019 4.6 3.4 - 5.1 mmol/L Final     Chloride   Date Value Ref Range Status   06/24/2019 105 98 - 107 mmol/L Final     CO2   Date Value Ref Range Status   11/13/2012 24 22 - 33 mmol/L Final     Carbon Dioxide   Date Value Ref Range Status   06/24/2019 29 21 - 32 mmol/L Final     Glucose   Date Value Ref Range Status   06/24/2019 97 65 - 99 mg/dL Final     CALCIUM   Date Value Ref Range Status   06/24/2019 8.9 8.4 - 10.2 mg/dL Final   11/13/2012 9.0 8.6 - 10.7 mg/dL Final        SCHEDULING:  OK to schedule open access colonoscopy, if no then will need office visit: Yes  Physician Scheduled with: Patient Preference  Diagnosis code from O/A order:  Colon Cancer Screening Z12.11  Location: Patient Preference  Sedation: Moderate sedation    Prep: Physician Preference     Scribe Attestation (For Scribes USE Only)... Attending Attestation (For Attendings USE Only).../Scribe Attestation (For Scribes USE Only)...

## 2020-06-25 NOTE — BEHAVIORAL HEALTH ASSESSMENT NOTE - NS ED BHA MSE GENERAL APPEARANCE
----- Message from Madisonyesenia Sinan sent at 2020  3:16 PM CDT -----  Regarding: refill  Contact: Lo  Type:  RX Refill Request    Who Called: Lo Peralta  Refill or New Rx:Refill   RX Name and Strength:hydroxychloroquine (PLAQUENIL) 200 mg tablet  How is the patient currently taking it? (ex. 1XDay):1XDay  Is this a 30 day or 90 day Rx:90 day   Preferred Pharmacy with phone number:  [facility] -- [place], [place] - [address]  [address]  [facility] [address]  Phone: [phone] Fax: [phone]  Local or Mail Order:local   Ordering Provider:Dr Paz   Would the patient rather a call back or a response via [facility]? Call back   Best Call Back Number:[phone]  Additional Information: pt states prescription . Pt states the pharmacy was only giving her 30 pills at a time. Pt never was never able to get the full 90 day. Please call pt back at [phone] once prescription is sent        Well developed

## 2020-09-10 NOTE — ED PROVIDER NOTE - RESPIRATORY, MLM
Pt tolerated hydration well  Port flushed and deaccessed per routine  Discharged in wc to lobpedro 
Breath sounds clear and equal bilaterally.

## 2020-09-15 NOTE — BEHAVIORAL HEALTH ASSESSMENT NOTE - RELATEDNESS
Spoke with patient and discussed results and MD recommendations. Patient confirmed she has been taking the mevacor (lovastatin)  Sent in new script and updated med list   Future lab orders placed    Poor

## 2020-11-07 ENCOUNTER — OUTPATIENT (OUTPATIENT)
Dept: OUTPATIENT SERVICES | Facility: HOSPITAL | Age: 70
LOS: 1 days | End: 2020-11-07
Payer: MEDICARE

## 2020-11-07 DIAGNOSIS — Z93.2 ILEOSTOMY STATUS: Chronic | ICD-10-CM

## 2020-11-07 DIAGNOSIS — F20.9 SCHIZOPHRENIA, UNSPECIFIED: ICD-10-CM

## 2020-11-07 DIAGNOSIS — K43.5 PARASTOMAL HERNIA WITHOUT OBSTRUCTION OR GANGRENE: Chronic | ICD-10-CM

## 2020-11-07 PROCEDURE — 80048 BASIC METABOLIC PNL TOTAL CA: CPT

## 2020-11-07 PROCEDURE — 36415 COLL VENOUS BLD VENIPUNCTURE: CPT

## 2020-11-07 PROCEDURE — 85025 COMPLETE CBC W/AUTO DIFF WBC: CPT

## 2020-11-07 PROCEDURE — 81001 URINALYSIS AUTO W/SCOPE: CPT

## 2020-11-08 ENCOUNTER — OUTPATIENT (OUTPATIENT)
Dept: OUTPATIENT SERVICES | Facility: HOSPITAL | Age: 70
LOS: 1 days | End: 2020-11-08
Payer: COMMERCIAL

## 2020-11-08 DIAGNOSIS — Z93.2 ILEOSTOMY STATUS: Chronic | ICD-10-CM

## 2020-11-08 DIAGNOSIS — K43.5 PARASTOMAL HERNIA WITHOUT OBSTRUCTION OR GANGRENE: Chronic | ICD-10-CM

## 2020-11-08 DIAGNOSIS — F20.9 SCHIZOPHRENIA, UNSPECIFIED: ICD-10-CM

## 2020-11-08 PROCEDURE — U0003: CPT

## 2020-11-23 NOTE — PHYSICAL THERAPY INITIAL EVALUATION ADULT - PATIENT/FAMILY/SIGNIFICANT OTHER GOALS STATEMENT, PT EVAL
RN spoke with both patient and spouse.  The patient is looking to see if she can have her dexamethasone 4 mg tablet- 0.5 tablet PO daily weaned and discontinued and if she could have her tizanidine 4 mg tablet PO BID increased.     She said that originally Tatum Yu MCCOY had her take Tizanidine 1 tablet TID and then increase to 2 tablets TID which made her feel very dizzy with the dexamethasone.  Then 11- Dr. Olivarez had decreased her tizanidine 4 mg tablet to 1 tablet PO BID. Now the patient is doing well- denies any dizziness. She feels it is helping with the swelling- she is not having the tightness in her throat like she did before.     In addition, she is looking to get a refill of her fioricet with codeine- 1 capsule PO TID PRN #70 last filled on 11-6-2020. She said she has #9 capsules left and would like to get a refill before the holidays.        Do you want RN to send this to oncology regarding her question on the dexamethasone and tizanidine?  Please advise.      Can call her back on the house phone.          Pt did not state goals

## 2020-12-04 NOTE — ED PROVIDER NOTE - ENMT NEGATIVE STATEMENT, MLM
vaginal bleeding/pregnant Ears: no ear pain and no hearing problems.Nose: no nasal congestion and no nasal drainage.Mouth/Throat: no dysphagia, no hoarseness and no throat pain.Neck: no lumps, no pain, no stiffness and no swollen glands.

## 2021-02-09 NOTE — PATIENT PROFILE ADULT. - VISION (WITH CORRECTIVE LENSES IF THE PATIENT USUALLY WEARS THEM):
Patient presents to triage from home with complaints of decreased fetal movement X4 days. Patient goes to [facility] states that she was admitted to hospital on Thursday for observation and celestone which she received both doses. Patient states that she was seen today for decreased fetal movement had ultrasound states the baby was not doing the practice breathing. Patient is concerned because she does have a cerclage in and 3 of her pregnancy were early. Normal vision: sees adequately in most situations; can see medication labels, newsprint

## 2021-02-13 NOTE — PHYSICAL THERAPY INITIAL EVALUATION ADULT - PLANNED THERAPY INTERVENTIONS, PT EVAL
gait training/strengthening/transfer training/balance training/bed mobility training
no fever and no chills.

## 2021-03-05 NOTE — INPATIENT CERTIFICATION FOR MEDICARE PATIENTS - IN ORDER TO MEET MEDICARE REQUIREMENTS.
In order to meet Medicare requirements, the clinical documentation must support the information cited in the admission order.  Please be sure to provide detailed and clear documentation about the following in the admitting note/history and physical: Universal Safety Interventions

## 2021-04-30 ENCOUNTER — OUTPATIENT (OUTPATIENT)
Dept: OUTPATIENT SERVICES | Facility: HOSPITAL | Age: 71
LOS: 1 days | End: 2021-04-30
Payer: COMMERCIAL

## 2021-04-30 DIAGNOSIS — Z20.828 CONTACT WITH AND (SUSPECTED) EXPOSURE TO OTHER VIRAL COMMUNICABLE DISEASES: ICD-10-CM

## 2021-04-30 DIAGNOSIS — Z93.2 ILEOSTOMY STATUS: Chronic | ICD-10-CM

## 2021-04-30 DIAGNOSIS — K43.5 PARASTOMAL HERNIA WITHOUT OBSTRUCTION OR GANGRENE: Chronic | ICD-10-CM

## 2021-04-30 PROCEDURE — U0003: CPT

## 2021-04-30 PROCEDURE — U0005: CPT

## 2021-05-01 ENCOUNTER — EMERGENCY (EMERGENCY)
Facility: HOSPITAL | Age: 71
LOS: 1 days | Discharge: HOME | End: 2021-05-01
Attending: EMERGENCY MEDICINE
Payer: MEDICARE

## 2021-05-01 VITALS
DIASTOLIC BLOOD PRESSURE: 67 MMHG | HEART RATE: 90 BPM | RESPIRATION RATE: 18 BRPM | SYSTOLIC BLOOD PRESSURE: 155 MMHG | TEMPERATURE: 100 F | OXYGEN SATURATION: 97 %

## 2021-05-01 VITALS
HEIGHT: 64 IN | RESPIRATION RATE: 20 BRPM | OXYGEN SATURATION: 94 % | HEART RATE: 98 BPM | DIASTOLIC BLOOD PRESSURE: 84 MMHG | SYSTOLIC BLOOD PRESSURE: 140 MMHG | WEIGHT: 149.91 LBS | TEMPERATURE: 99 F

## 2021-05-01 DIAGNOSIS — K92.0 HEMATEMESIS: ICD-10-CM

## 2021-05-01 DIAGNOSIS — K43.2 INCISIONAL HERNIA WITHOUT OBSTRUCTION OR GANGRENE: ICD-10-CM

## 2021-05-01 DIAGNOSIS — Z93.2 ILEOSTOMY STATUS: Chronic | ICD-10-CM

## 2021-05-01 DIAGNOSIS — K43.5 PARASTOMAL HERNIA WITHOUT OBSTRUCTION OR GANGRENE: Chronic | ICD-10-CM

## 2021-05-01 LAB
ALBUMIN SERPL ELPH-MCNC: 3.7 G/DL — SIGNIFICANT CHANGE UP (ref 3.3–5.2)
ALP SERPL-CCNC: 72 U/L — SIGNIFICANT CHANGE UP (ref 40–120)
ALT FLD-CCNC: 17 U/L — SIGNIFICANT CHANGE UP
ANION GAP SERPL CALC-SCNC: 12 MMOL/L — SIGNIFICANT CHANGE UP (ref 5–17)
ANISOCYTOSIS BLD QL: SLIGHT — SIGNIFICANT CHANGE UP
APTT BLD: 28.9 SEC — SIGNIFICANT CHANGE UP (ref 27.5–35.5)
AST SERPL-CCNC: 16 U/L — SIGNIFICANT CHANGE UP
BASOPHILS # BLD AUTO: 0 K/UL — SIGNIFICANT CHANGE UP (ref 0–0.2)
BASOPHILS NFR BLD AUTO: 0 % — SIGNIFICANT CHANGE UP (ref 0–2)
BILIRUB SERPL-MCNC: 0.7 MG/DL — SIGNIFICANT CHANGE UP (ref 0.4–2)
BLD GP AB SCN SERPL QL: SIGNIFICANT CHANGE UP
BUN SERPL-MCNC: 41 MG/DL — HIGH (ref 8–20)
CALCIUM SERPL-MCNC: 9.2 MG/DL — SIGNIFICANT CHANGE UP (ref 8.6–10.2)
CHLORIDE SERPL-SCNC: 106 MMOL/L — SIGNIFICANT CHANGE UP (ref 98–107)
CO2 SERPL-SCNC: 29 MMOL/L — SIGNIFICANT CHANGE UP (ref 22–29)
CREAT SERPL-MCNC: 1.78 MG/DL — HIGH (ref 0.5–1.3)
ELLIPTOCYTES BLD QL SMEAR: SLIGHT — SIGNIFICANT CHANGE UP
EOSINOPHIL # BLD AUTO: 0 K/UL — SIGNIFICANT CHANGE UP (ref 0–0.5)
EOSINOPHIL NFR BLD AUTO: 0 % — SIGNIFICANT CHANGE UP (ref 0–6)
GIANT PLATELETS BLD QL SMEAR: PRESENT — SIGNIFICANT CHANGE UP
GLUCOSE SERPL-MCNC: 120 MG/DL — HIGH (ref 70–99)
HCT VFR BLD CALC: 29.6 % — LOW (ref 34.5–45)
HCT VFR BLD CALC: 30.5 % — LOW (ref 34.5–45)
HGB BLD-MCNC: 9.3 G/DL — LOW (ref 11.5–15.5)
HGB BLD-MCNC: 9.4 G/DL — LOW (ref 11.5–15.5)
INR BLD: 1.48 RATIO — HIGH (ref 0.88–1.16)
LYMPHOCYTES # BLD AUTO: 0.41 K/UL — LOW (ref 1–3.3)
LYMPHOCYTES # BLD AUTO: 7.8 % — LOW (ref 13–44)
MANUAL SMEAR VERIFICATION: SIGNIFICANT CHANGE UP
MCHC RBC-ENTMCNC: 28.4 PG — SIGNIFICANT CHANGE UP (ref 27–34)
MCHC RBC-ENTMCNC: 28.9 PG — SIGNIFICANT CHANGE UP (ref 27–34)
MCHC RBC-ENTMCNC: 30.8 GM/DL — LOW (ref 32–36)
MCHC RBC-ENTMCNC: 31.4 GM/DL — LOW (ref 32–36)
MCV RBC AUTO: 91.9 FL — SIGNIFICANT CHANGE UP (ref 80–100)
MCV RBC AUTO: 92.1 FL — SIGNIFICANT CHANGE UP (ref 80–100)
MONOCYTES # BLD AUTO: 0.14 K/UL — SIGNIFICANT CHANGE UP (ref 0–0.9)
MONOCYTES NFR BLD AUTO: 2.6 % — SIGNIFICANT CHANGE UP (ref 2–14)
NEUTROPHILS # BLD AUTO: 4.71 K/UL — SIGNIFICANT CHANGE UP (ref 1.8–7.4)
NEUTROPHILS NFR BLD AUTO: 86.1 % — HIGH (ref 43–77)
NEUTS BAND # BLD: 2.6 % — SIGNIFICANT CHANGE UP (ref 0–8)
OB PNL STL: NEGATIVE — SIGNIFICANT CHANGE UP
PLAT MORPH BLD: NORMAL — SIGNIFICANT CHANGE UP
PLATELET # BLD AUTO: 103 K/UL — LOW (ref 150–400)
PLATELET # BLD AUTO: 112 K/UL — LOW (ref 150–400)
POIKILOCYTOSIS BLD QL AUTO: SLIGHT — SIGNIFICANT CHANGE UP
POLYCHROMASIA BLD QL SMEAR: SLIGHT — SIGNIFICANT CHANGE UP
POTASSIUM SERPL-MCNC: 3.5 MMOL/L — SIGNIFICANT CHANGE UP (ref 3.5–5.3)
POTASSIUM SERPL-SCNC: 3.5 MMOL/L — SIGNIFICANT CHANGE UP (ref 3.5–5.3)
PROT SERPL-MCNC: 6.5 G/DL — LOW (ref 6.6–8.7)
PROTHROM AB SERPL-ACNC: 16.9 SEC — HIGH (ref 10.6–13.6)
RBC # BLD: 3.22 M/UL — LOW (ref 3.8–5.2)
RBC # BLD: 3.31 M/UL — LOW (ref 3.8–5.2)
RBC # FLD: 14.2 % — SIGNIFICANT CHANGE UP (ref 10.3–14.5)
RBC # FLD: 14.3 % — SIGNIFICANT CHANGE UP (ref 10.3–14.5)
RBC BLD AUTO: ABNORMAL
SODIUM SERPL-SCNC: 146 MMOL/L — HIGH (ref 135–145)
VARIANT LYMPHS # BLD: 0.9 % — SIGNIFICANT CHANGE UP (ref 0–6)
WBC # BLD: 5.13 K/UL — SIGNIFICANT CHANGE UP (ref 3.8–10.5)
WBC # BLD: 5.31 K/UL — SIGNIFICANT CHANGE UP (ref 3.8–10.5)
WBC # FLD AUTO: 5.13 K/UL — SIGNIFICANT CHANGE UP (ref 3.8–10.5)
WBC # FLD AUTO: 5.31 K/UL — SIGNIFICANT CHANGE UP (ref 3.8–10.5)

## 2021-05-01 PROCEDURE — 82272 OCCULT BLD FECES 1-3 TESTS: CPT

## 2021-05-01 PROCEDURE — 86901 BLOOD TYPING SEROLOGIC RH(D): CPT

## 2021-05-01 PROCEDURE — 96374 THER/PROPH/DIAG INJ IV PUSH: CPT

## 2021-05-01 PROCEDURE — 86900 BLOOD TYPING SEROLOGIC ABO: CPT

## 2021-05-01 PROCEDURE — 85610 PROTHROMBIN TIME: CPT

## 2021-05-01 PROCEDURE — 99223 1ST HOSP IP/OBS HIGH 75: CPT

## 2021-05-01 PROCEDURE — 86850 RBC ANTIBODY SCREEN: CPT

## 2021-05-01 PROCEDURE — 99285 EMERGENCY DEPT VISIT HI MDM: CPT | Mod: GC

## 2021-05-01 PROCEDURE — 85027 COMPLETE CBC AUTOMATED: CPT

## 2021-05-01 PROCEDURE — 74176 CT ABD & PELVIS W/O CONTRAST: CPT | Mod: 26,MG

## 2021-05-01 PROCEDURE — 93005 ELECTROCARDIOGRAM TRACING: CPT

## 2021-05-01 PROCEDURE — 85025 COMPLETE CBC W/AUTO DIFF WBC: CPT

## 2021-05-01 PROCEDURE — G1004: CPT

## 2021-05-01 PROCEDURE — 71045 X-RAY EXAM CHEST 1 VIEW: CPT | Mod: 26

## 2021-05-01 PROCEDURE — 36415 COLL VENOUS BLD VENIPUNCTURE: CPT

## 2021-05-01 PROCEDURE — 71045 X-RAY EXAM CHEST 1 VIEW: CPT

## 2021-05-01 PROCEDURE — 96372 THER/PROPH/DIAG INJ SC/IM: CPT | Mod: XU

## 2021-05-01 PROCEDURE — 74176 CT ABD & PELVIS W/O CONTRAST: CPT

## 2021-05-01 PROCEDURE — 99282 EMERGENCY DEPT VISIT SF MDM: CPT | Mod: GC

## 2021-05-01 PROCEDURE — 85730 THROMBOPLASTIN TIME PARTIAL: CPT

## 2021-05-01 PROCEDURE — 99284 EMERGENCY DEPT VISIT MOD MDM: CPT | Mod: 25

## 2021-05-01 PROCEDURE — 93010 ELECTROCARDIOGRAM REPORT: CPT

## 2021-05-01 PROCEDURE — 80053 COMPREHEN METABOLIC PANEL: CPT

## 2021-05-01 PROCEDURE — 96375 TX/PRO/DX INJ NEW DRUG ADDON: CPT

## 2021-05-01 RX ORDER — IOHEXOL 300 MG/ML
30 INJECTION, SOLUTION INTRAVENOUS ONCE
Refills: 0 | Status: COMPLETED | OUTPATIENT
Start: 2021-05-01 | End: 2021-05-01

## 2021-05-01 RX ORDER — SODIUM CHLORIDE 9 MG/ML
1000 INJECTION INTRAMUSCULAR; INTRAVENOUS; SUBCUTANEOUS
Refills: 0 | Status: DISCONTINUED | OUTPATIENT
Start: 2021-05-01 | End: 2021-05-05

## 2021-05-01 RX ORDER — ACETAMINOPHEN 500 MG
1000 TABLET ORAL ONCE
Refills: 0 | Status: COMPLETED | OUTPATIENT
Start: 2021-05-01 | End: 2021-05-01

## 2021-05-01 RX ORDER — SODIUM CHLORIDE 9 MG/ML
1000 INJECTION INTRAMUSCULAR; INTRAVENOUS; SUBCUTANEOUS ONCE
Refills: 0 | Status: COMPLETED | OUTPATIENT
Start: 2021-05-01 | End: 2021-05-01

## 2021-05-01 RX ORDER — PANTOPRAZOLE SODIUM 20 MG/1
40 TABLET, DELAYED RELEASE ORAL ONCE
Refills: 0 | Status: COMPLETED | OUTPATIENT
Start: 2021-05-01 | End: 2021-05-01

## 2021-05-01 RX ORDER — HALOPERIDOL DECANOATE 100 MG/ML
5 INJECTION INTRAMUSCULAR ONCE
Refills: 0 | Status: COMPLETED | OUTPATIENT
Start: 2021-05-01 | End: 2021-05-01

## 2021-05-01 RX ORDER — DIPHENHYDRAMINE HCL 50 MG
25 CAPSULE ORAL ONCE
Refills: 0 | Status: COMPLETED | OUTPATIENT
Start: 2021-05-01 | End: 2021-05-01

## 2021-05-01 RX ORDER — PANTOPRAZOLE SODIUM 20 MG/1
40 TABLET, DELAYED RELEASE ORAL
Refills: 0 | Status: DISCONTINUED | OUTPATIENT
Start: 2021-05-01 | End: 2021-05-05

## 2021-05-01 RX ADMIN — PANTOPRAZOLE SODIUM 40 MILLIGRAM(S): 20 TABLET, DELAYED RELEASE ORAL at 06:49

## 2021-05-01 RX ADMIN — Medication 400 MILLIGRAM(S): at 09:52

## 2021-05-01 RX ADMIN — HALOPERIDOL DECANOATE 5 MILLIGRAM(S): 100 INJECTION INTRAMUSCULAR at 10:10

## 2021-05-01 RX ADMIN — SODIUM CHLORIDE 1000 MILLILITER(S): 9 INJECTION INTRAMUSCULAR; INTRAVENOUS; SUBCUTANEOUS at 06:49

## 2021-05-01 RX ADMIN — Medication 2 MILLIGRAM(S): at 10:10

## 2021-05-01 RX ADMIN — Medication 25 MILLIGRAM(S): at 10:10

## 2021-05-01 RX ADMIN — SODIUM CHLORIDE 75 MILLILITER(S): 9 INJECTION INTRAMUSCULAR; INTRAVENOUS; SUBCUTANEOUS at 09:52

## 2021-05-01 RX ADMIN — IOHEXOL 30 MILLILITER(S): 300 INJECTION, SOLUTION INTRAVENOUS at 06:48

## 2021-05-01 NOTE — ED PROVIDER NOTE - ATTENDING CONTRIBUTION TO CARE
seen with resident: anxious, apprehensive adult female with psychiatric history from [place] with multiple prior abdominal surgeries, with an episode today of vomiting that concerned staff for coffee ground appearnace; on exam, disheveled, awake and alert, dried vomities around lips and left ear, dark in color; patent airway; normal heart and lung sounds; abd distended, but non tender; no rash/pedal edema; at time of sign out, pending ct and gi consult, signed over to oncoming attending

## 2021-05-01 NOTE — ED PROVIDER NOTE - CARE PROVIDER_API CALL
Kavitha Retana (DO)  Gastroenterology  [address], [address]  [place] [address]  Phone: [phone]  Fax: [phone]  Follow Up Time: 1-3 Days

## 2021-05-01 NOTE — ED PROVIDER NOTE - PROGRESS NOTE DETAILS
Oskar: CALVIN carrillo. Marco: we discussed and considered NG lavage, as patient with brown stool occult negative, yet described vomitus as coffee ground and has stigmata of dried coffee ground vomitus on lips and side of face/ear. Howevre, patient adamantly refused NG tube placement; will have to reconsider with GI, may require anesthesia and consent from health care agent Carson: patient was previously agitated, did not get her home anti-psychotics in the AM. 5mg haldol given to relieve agitation, patient more calm after, VSS. PO challenged with cookies and water, no n/v. Per surgery recommendations (Dr. Argueta), no surgical intervention is needed at the moment. Carson: Spoke to Dr. Barfield from [place]. Informed her of patient's condition and the need to followup with gastroenterology and surgery as outpatient f/u. Strict return precautions were discussed with Dr. Barfiled and she is aware.

## 2021-05-01 NOTE — ED ADULT NURSE NOTE - NSIMPLEMENTINTERV_GEN_ALL_ED
Implemented All Universal Safety Interventions:  [place] to call system. Call bell, personal items and telephone within reach. Instruct patient to call for assistance. Room bathroom lighting operational. Non-slip footwear when patient is off stretcher. Physically safe environment: no spills, clutter or unnecessary equipment. Stretcher in lowest position, wheels locked, appropriate side rails in place.

## 2021-05-01 NOTE — ED ADULT TRIAGE NOTE - CHIEF COMPLAINT QUOTE
Pt BIBEMS from [place] C/O of one episode of coffee ground emesis.  Pt is pale in color.  +nausea and abd pain.  Denies rectal bleeding or fevers.  No GI hx.

## 2021-05-01 NOTE — ED PROVIDER NOTE - PATIENT PORTAL LINK FT
You can access the FollowMyHealth Patient Portal offered by [facility] by registering at the following website: [email or web address]. By joining [facility]’s FollowMyHealth portal, you will also be able to view your health information using other applications (apps) compatible with our system.

## 2021-05-01 NOTE — ED PROVIDER NOTE - NSFOLLOWUPINSTRUCTIONS_ED_ALL_ED_FT
Followup with surgery and Gastroenterology within the next 1-5 days. Return to ER if symptoms worsen, increasing abdominal distension, unable to pass bowel movement, chest pain, nausea or vomiting, fevers, shortness of breath.     Nausea / Vomiting    Nausea is the feeling that you have to vomit. As nausea gets worse, it can lead to vomiting. Vomiting puts you at an increased risk for dehydration. Older adults and people with other diseases or a weak immune system are at higher risk for dehydration. Drink clear fluids in small but frequent amounts as tolerated. Eat bland, easy-to-digest foods in small amounts as tolerated.    SEEK IMMEDIATE MEDICAL CARE IF YOU HAVE ANY OF THE FOLLOWING SYMPTOMS: fever, inability to keep sufficient fluids down, black or bloody vomitus, black or bloody stools, lightheadedness/dizziness, chest pain, severe headache, rash, shortness of breath, cold or clammy skin, confusion, pain with urination, or any signs of dehydration.

## 2021-05-01 NOTE — CONSULT NOTE ADULT - PROBLEM SELECTOR RECOMMENDATION 9
- rosanne mild gastritis, esophagitis  - BID PPI  - hemoglobin stable  suspect vomiting is from a SBO- tympanitic bowel sounds, abdominal distension. GEt CT abdomen. IF SBO, call surgery   - no fecal impaction on rectal exam  - npo - rosanne mild gastritis, esophagitis  - BID PPI  - hemoglobin stable  suspect vomiting is from a SBO- tympanitic bowel sounds, abdominal distension. GEt CT abdomen. IF SBO, call surgery   - no fecal impaction on rectal exam  - npo, IV fluids

## 2021-05-01 NOTE — CONSULT NOTE ADULT - SUBJECTIVE AND OBJECTIVE BOX
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  REPRODUCTIVE ORGANS: The uterus is not visualized, likely surgically absent. The adnexa appear grossly unremarkable.    BOWEL: Again noted is a right lower quadrant ventral hernia which contains the distal small bowel, cecum and ascending colon. Mild dilatation of the distal small bowel and the small bowel within the hernia. Oral contrast has reached the distal transverse colon. Findings are suggestive of a partial small bowel obstruction. No evidence for acute appendicitis. Small hiatal hernia.  PERITONEUM: No ascites. No free air.  VESSELS: Calcified atherosclerotic disease.  RETROPERITONEUM/LYMPH NODES: No lymphadenopathy.  ABDOMINAL WALL: Please see above for the right ventral hernia.  BONES: Degenerative spondylosis. Stable old compression fracture at T12 vertebra.    IMPRESSION:  Findings are suggestive of a partial small bowel obstruction due to a right lower quadrant ventral hernia as detailed above.    Mild splenomegaly.    Cholelithiasis.    Combination of atelectasis and pulmonary consolidation in the left lower lobe.    CELSA JUAREZ MD; Attending Radiologist  This document has been electronically signed. May 1 2021 10:04AM

## 2021-05-01 NOTE — ED PROVIDER NOTE - CARE PLAN
Principal Discharge DX:	Coffee ground emesis   Principal Discharge DX:	Small bowel obstruction, partial   Principal Discharge DX:	Small bowel obstruction, partial  Secondary Diagnosis:	Vomiting

## 2021-05-01 NOTE — ED PROVIDER NOTE - COVID-19 RESULT
[FreeTextEntry1] : 03/22/2019: Patient is a 35-year-old male who presents to the office today with right thumb pain. He was playing hockey about the middle of January and got in a scuffle with another player and he states his thumb was extended backwards. Since then he's been having pain along the volar and radial aspects of the MP joint. He was seen about one week ago in an orthopedist office and x-rays showed an avulsion fracture of the first metacarpal head. The patient still has tenderness in that area but full function of the thumb. He denies any paresthesias.
NEGATIVE

## 2021-05-01 NOTE — ED PROVIDER NOTE - NSFOLLOWUPCLINICS_GEN_ALL_ED_FT
[facility] General Surgery  Surgery  [address] [place] [address]  Phone: [phone]  Fax:   Follow Up Time: 1-3 Days

## 2021-05-01 NOTE — CHART NOTE - NSCHARTNOTEFT_GEN_A_CORE
SW NOTE: SWER MADE AWARE PT IS MEDICALLY STABLE TO BE D/C BACK TO [facility]. SWER INFORMED MD TO COMPLETE A DR TO DR FOR PT TO RETURN, PER [facility] REQUEST. SWER MET PT AND [facility] STAFF-MOI AT BEDSIDE AND EDUCATED BOTH ON SW ROLE AND D/C PLANNING. MOI STATED TRANSPORTATION IS ALWAYS SETUP BY [facility]. SWER PLACED CALL O PEDRO AT [facility] [phone] TO ADVISED OF PT MEDICALLY READY TO RETURN. PER PEDRO, TRANSPORT WILL BE SET UP WITH [facility] FOR 6PM. SWER FAXED CLINICALS TO [facility] [phone].

## 2021-05-01 NOTE — ED ADULT NURSE NOTE - CHIEF COMPLAINT QUOTE
Pt BIBEMS from [place] C/O of one episode of coffee ground emesis.  Pt is pale in color.  +nausea and abd pain.  Denies rectal bleeding or fevers.  No GI hx.

## 2021-05-01 NOTE — ED PROVIDER NOTE - OBJECTIVE STATEMENT
71 year old female with history of HTN, CVA (2005), multiple previous abdominal surgeries and schizophrenia BIBEMS from [place] with vomiting. On 4/30 the patient had multiple episodes of emesis (unable to obtain details of this) and was noted to be febrile to 102. This morning she had an episode of coffee ground emesis. [place] records state that patient had HR in high 90s and o2 sat in low 90s. Staff attempted to place o2 but patient refused. At bedside, patient reports abdominal pain and diarrhea.

## 2021-05-01 NOTE — ED PROVIDER NOTE - PHYSICAL EXAMINATION
General: 70 yo female in bed, somewhat disheveled, eyes mostly closed throughout interview  Head: NC, AT  EENT: EOMI, no scleral icterus  Cardiac: tachycardic, no apparent murmurs, no lower extremity edema  Respiratory: no respiratory distress   Abdomen: soft, ND, ttp, nonperitonitic  : depends filled with soft medium brown stool   MSK/Vascular: full ROM, soft compartments, warm extremities  Neuro: AAOx3, sensation to light touch intact  Psych: calm, cooperative

## 2021-05-01 NOTE — CONSULT NOTE ADULT - ATTENDING COMMENTS
Agree with above assessment.  The patient was seen and examined by me.  The patient reports that she is having no active abdominal pain or nausea.  She reports that she is hungry and wishes to eat. She has been passing flatus and had a BM in the ER.  The bedside aid from [place] reports that she had some nausea overnight and vomited once with a reported concern for coffee grounds, the patient has had none since.  HEENT NC/AT PERRL EOMI no scleral icterus is noted, trachea midline, no gross mass, chest bilateral air entry, abdomen is softly distended with a large recurrent incisional hernia in the midline as well as in the right lateral abdominal wall, both of which are soft and freely reducible, there is no localizing tenderness, no guarding, no rebound.  CT scan reveals a complex recurrent incisional hernia containing several loops of bowel, no free air, CT suggestive of partial SBO.  Clinically, the patient is with GI function, no abdominal pain, no nausea or vomit, and wishes to eat.  Will give PO challenge, if tolerates then will be stable for return to [place].

## 2021-05-01 NOTE — ED PROVIDER NOTE - CLINICAL SUMMARY MEDICAL DECISION MAKING FREE TEXT BOX
71 year old female with history of HTN, CVA (2005), multiple previous abdominal surgeries and schizophrenia BIBEMS from [place] with coffee ground emesis. Pt with brown stool, occult negative, and hgb near baseline however evidence of black emesis in oropharynx on exam. Labs also notable for creatinine of 1.78 from 1.21 in 11/20. GI consult and CT abdomen pelvis pending at time of signout.

## 2021-05-01 NOTE — CONSULT NOTE ADULT - SUBJECTIVE AND OBJECTIVE BOX
Patient is a 71y old  Female who presents with a chief complaint of     HPI: Patient with hx of anemia , CVA, GERD, HTN, constiaption, rectal prolape , SBO. Comes from [facility] with vomting X 1 day. Poor historian.  had fever 102 , pulse ox in 90's and HR in 90's at [facility].  Had one episose of coffee ground emesis.     Patient denies abdominal pain, had liquids stool today as per patient.  AS per er note, she had black material on pharygeal exam.  Hx of SBO last year secondary to spigelian hernia.  Baseline anemia.  In 2019 hemoglobin 7.4. Baseline is mid 9 hemoglobin.  Today hg is mid 9 range.             REVIEW OF SYSTEMS:  Constitutional: No fever, weight loss or fatigue  ENMT:  No difficulty hearing, tinnitus, vertigo; No sinus or throat pain  Respiratory: No cough, wheezing, chills or hemoptysis  Cardiovascular: No chest pain, palpitations, dizziness or leg swelling  Gastrointestinal: No abdominal or epigastric pain. No nausea, vomiting or hematemesis; No diarrhea or constipation. No melena or hematochezia.  Skin: No itching, burning, rashes or lesions   Musculoskeletal: No joint pain or swelling; No muscle, back or extremity pain    PAST MEDICAL & SURGICAL HISTORY:  Venous insufficiency (chronic) (peripheral)    Constipation    Sensory hearing loss    GERD (gastroesophageal reflux disease)    Hypothyroidism    Schizoaffective disorder, bipolar type    Suicide attempt    Behavior problems  assaultive    Hemiparesis, left    Seizure    Osteopenia    Fall    Vaginal prolapse    Neutropenia    Anemia    Splenomegaly    CVA (cerebral vascular accident)    HTN (hypertension)    Obesity    Urinary incontinence    Schizo affective schizophrenia    Displaced fracture of olecranon process with intraarticular extension of left ulna    Rectal prolapse    Onychomycosis    Uterine prolapse    H/O ileostomy  4/2015    Parastomal hernia without obstruction or gangrene        FAMILY HISTORY:  Family history of psoriasis        SOCIAL HISTORY:  Smoking Status: [ ] Current, [ ] Former, [ ] Never  Pack Years:  [  ] EtOH-no  [  ] IVDA    MEDICATIONS:  MEDICATIONS  (STANDING):    MEDICATIONS  (PRN):      Allergies    clozapine (Other)  Depakote (Other)  erythromycin (Unknown)  Neupogen (Other)    Intolerances        Vital Signs Last 24 Hrs  T(C): 37 (01 May 2021 04:42), Max: 37 (01 May 2021 04:42)  T(F): 98.6 (01 May 2021 04:42), Max: 98.6 (01 May 2021 04:42)  HR: 98 (01 May 2021 04:42) (98 - 98)  BP: 140/84 (01 May 2021 04:42) (140/84 - 140/84)  BP(mean): --  RR: 20 (01 May 2021 04:42) (20 - 20)  SpO2: 94% (01 May 2021 04:42) (94% - 94%)        PHYSICAL EXAM:    General: disheveled, poor dentition  HEENT: MMM, conjunctiva and sclera clear  H- RRR  Gastrointestinal: Soft, non-tender +++-distended; high pitched bowel sounds; No rebound or guarding. midline surgical scar  Extremities: Normal range of motion, No clubbing, cyanosis or edema  Neurological: Alert and oriented x3  Skin: Warm and dry. No obvious rash  rectal- no masses, no impaction, no melena- brown stool       LABS:                        9.3    5.13  )-----------( 103      ( 01 May 2021 08:40 )             29.6     01 May 2021 05:46    146    |  106    |  41.0   ----------------------------<  120    3.5     |  29.0   |  1.78     Ca    9.2        01 May 2021 05:46    TPro  6.5    /  Alb  3.7    /  TBili  0.7    /  DBili  x      /  AST  16     /  ALT  17     /  AlkPhos  72     / Amylase x      /Lipase x      01 May 2021 05:46              RADIOLOGY & ADDITIONAL STUDIES:

## 2021-05-01 NOTE — CONSULT NOTE ADULT - PROBLEM SELECTOR RECOMMENDATION 9
hernia is soft and freely reducible without tenderness  PO challenge, if tolerates then stable for discharge home

## 2021-05-01 NOTE — CONSULT NOTE ADULT - ASSESSMENT
72 yo F with schizoaffective disorder resident of [facility], extensive comorbidities, and complex abdominal surgical history known to this ACS service for previous surgeries and multiple episodes of partial SBO, presents for an episode of coffee ground emesis, found on CT with partial SBO. Findings similar to prior presentation in 12/23/2019 which was managed non-operatively. Transition point reported at ventral hernia which is easily reducible, though herniates again readily. Patient with no reported complaints of obstructive symptoms, abdomen is soft and non-tender, and had large BM just prior to examination. Hgb stable at 9.3, which appears to be baseline based on prior labs.     # Partial SBO  - Clinically unobstructed with large BM prior to evaluation and no reported symptoms of obstruction prior to presentation.  - No acute surgical intervention indicated at this time.   - To be seen with attending.      Plan discussed with Dr. Argueta.   72 yo F with schizoaffective disorder resident of [facility], extensive comorbidities, and complex abdominal surgical history known to this ACS service for previous surgeries and multiple episodes of partial SBO, presents for an episode of coffee ground emesis, found on CT with partial SBO. Findings similar to prior presentation in 12/23/2019 which was managed non-operatively. Transition point reported at ventral hernia which is easily reducible, though herniates again readily. Patient with no reported complaints of obstructive symptoms, abdomen is soft and non-tender, had large BM just prior to examination, and actively passing flatus. Hgb stable at 9.3, which appears to be baseline based on prior labs.     # Partial SBO  - Clinically unobstructed with large BM prior to evaluation and no reported symptoms of obstruction prior to presentation.  - No acute surgical intervention indicated at this time.   - Recommend observation and PO challenge. Discharge back to [place] if tolerates. Can follow if is admitted.     Patient seen and plan discussed with Dr. Argueta.

## 2021-05-01 NOTE — ED ADULT NURSE REASSESSMENT NOTE - NS ED NURSE REASSESS COMMENT FT1
Pt uncooperative and combative with staff, covered in feces, hitting and refusing to let staff assist her, pt to be medicated for further care, will continue to monitor

## 2021-05-04 NOTE — BEHAVIORAL HEALTH ASSESSMENT NOTE - CURRENT PLAN
History and Physical    Pt Name: Sundar Meza  MRN: [id number]  YOB: 1949  Date of evaluation: 5/4/2021    SUBJECTIVE:   History of Chief Complaint:    Patient presents for PAT appointment, scheduled for prostatectomy and lymph node dissection, ureteral stent insertion. He has been diagnosed with prostate cancer, s/p renal transplant in the past (2017). Past Medical History    has a past medical history of Anemia, CAD (coronary artery disease), CKD (chronic kidney disease) stage 4, GFR 15-29 ml/min (HCC), Elevated PSA, between 10 and less than 20 ng/ml, End stage kidney disease ([address] Hemodialysis access, fistula mature ([address] Hemodialysis patient ([address] Hepatitis C without hepatic coma, Hx of inguinal hernia repair, Hyperlipidemia, Hypertension, Irregular heart rate, Leukopenia, Palpitations, Prostate cancer ([address] Renal transplant recipient, Thrombocytopenia ([address] Type 2 diabetes mellitus ([address] Under care of team, Under care of team, and Wellness examination. Past Surgical History   has a past surgical history that includes Dialysis fistula creation (Right, 11/2015); Colonoscopy (N/A, 10/30/2019); hernia repair (1984); Kidney transplant (2017); Cardiac catheterization; Prostate biopsy (N/A, 2/12/2021); US BIOPSY PROSTATE NEEDLE/PUNCH (2/12/2021); and liver biopsy. Medications  Prior to Admission medications    Medication Sig Start Date End Date Taking?  Authorizing Provider   ZORTRESS 1 MG TABS Take 1 mg by mouth 2 times daily 4/6/21  Yes Historical Provider, MD   atorvastatin (LIPITOR) 40 MG tablet TAKE 1 TABLET BY MOUTH DAILY 5/3/21  Yes Reginald Umana DO   famotidine (PEPCID) 20 MG tablet TAKE 1 TABLET BY MOUTH TWICE A DAY 4/5/21  Yes Reginald Umana DO   gabapentin (NEURONTIN) 100 MG capsule TAKE 1 CAPSULE BY MOUTH TWICE A DAY 4/5/21 5/5/21 Yes Reginald Umana DO   tamsulosin (FLOMAX) 0.4 MG capsule TAKE 1 CAPSULE BY MOUTH DAILY 4/2/21  Yes Reginald Umana DO   insulin glargine Marjan Gallardo KWIKPEN) 100 UNIT/ML injection pen Inject 18 Units into the skin nightly  Patient taking differently: Inject 15 Units into the skin nightly Per Dr. Bessie Gibbs 2/18/21  Yes Damaso Bazzi DO   NIFEdipine (PROCARDIA XL) 90 MG extended release tablet TAKE 1 TABLET BY MOUTH DAILY 11/18/20  Yes Silverio Joyner, DO   metoprolol succinate (TOPROL XL) 50 MG extended release tablet Take 1 tablet by mouth daily 9/23/20  Yes Damaso Bazzi DO   Everolimus 0.75 MG TABS 2 times daily 3 tabs 2 times daily 6/19/20  Yes Historical Provider, MD   magnesium oxide (MAG-OX) 400 (241.3 Mg) MG TABS tablet TAKE 1 TABLET BY MOUTH TWICE A DAY 4/14/20  Yes Damaso Bazzi DO   insulin lispro (HUMALOG KWIKPEN) 100 UNIT/ML pen Per Sliding scale. Max 28 units per day 9/16/19  Yes Maryam López DO   tacrolimus (PROGRAF) 1 MG capsule Take 6 mg by mouth 2 times daily    Yes Historical Provider, MD   Insulin Pen Needle (TECHLITE PEN NEEDLES) 32G X 6 MM MISC USE AS DIRECTED. WITH INSULIN PEN UP TO 5 TIMES PER DAY 12/21/20   Damaso Bazzi DO   FREESTYLE LITE strip USE TO TEST TWICE A DAY AS DIRECTED 12/21/20   Damaso Bazzi DO   Blood Glucose Monitoring Suppl (ONE TOUCH ULTRA 2) w/Device KIT Check blood sugar four times daily 11/19/20   Damaso Bazzi DO   Lancets MISC 1 each by Does not apply route daily 5/19/20   Damaso Bazzi DO   Lancet Device MISC 1 Device by Does not apply route once for 1 dose 5/19/20 5/19/20  Damaso Bazzi DO   Insulin Pen Needle (ULTICARE MINI PEN NEEDLES) 31G X 6 MM MISC PATIENT IS TESTING UP TO 5 TIMES DAILY 4/14/20   Damaso Bazzi DO     Allergies  is allergic to amino acids and lisinopril. Family History  family history includes Cancer in his mother; Colon Cancer in his brother; Alexandr Marks in his father. Social History   reports that he quit smoking about 8 years ago. His smoking use included cigarettes. He has a 25.00 pack-year smoking history.  He has never used smokeless tobacco.   reports no history of alcohol use. reports previous drug use. Drug: Other-see comments. Marital Status   Occupation retired    OBJECTIVE:   VITALS:  height is 6' 2\" (1.88 m) and weight is 185 lb (83.9 kg). His temporal temperature is 97.3 °F (36.3 °C). His blood pressure is 159/74 (abnormal) and his pulse is 81. His respiration is 18 and oxygen saturation is 99%. CONSTITUTIONAL:alert & oriented x 3, no acute distress. Very pleasant, quiet. SKIN:  Warm and dry, no rashes on exposed areas of skin. HEAD:  Normocephalic, atraumatic   EYES: PERRL. EOMs intact. EARS:  Hearing grossly WNL. NOSE:  Nares patent. No rhinorrhea   MOUTH/THROAT:  benign  NECK:supple, no lymphadenopathy  LUNGS: Clear to auscultation bilaterally, no wheezes. CARDIOVASCULAR: Heart sounds are normal.  Regular rate and rhythm without murmur. ABDOMEN: soft, non tender, non distended. EXTREMITIES: no edema bilateral lower extremities. RUE AVF (not currently using)    Testing:   EK21  Labs pending: drawn 2021     IMPRESSIONS:   Prostate cancer   has a past medical history of Anemia, CAD (coronary artery disease), CKD (chronic kidney disease) stage 4, GFR 15-29 ml/min ([facility]), Elevated PSA, between 10 and less than 20 ng/ml, End stage kidney disease ([address] Hemodialysis access, fistula mature ([address] Hemodialysis patient ([address] Hepatitis C without hepatic coma, inguinal hernia repair, Hyperlipidemia, Hypertension, Irregular heart rate, Leukopenia (2021), Palpitations, Prostate cancer ([address] Renal transplant recipient (2017), Thrombocytopenia ([address] (2021), Type 2 diabetes mellitus ([address] Under care of team (2021), Under care of team (2021), and Wellness examination (2021).    PLANS:   prostatectomy and lymph node dissection, ureteral stent insertion    PRECIOUS REECE PA-C  Electronically signed 2021 at 2:27 PM None known

## 2021-05-07 ENCOUNTER — APPOINTMENT (OUTPATIENT)
Dept: GASTROENTEROLOGY | Facility: CLINIC | Age: 71
End: 2021-05-07

## 2021-05-10 ENCOUNTER — APPOINTMENT (OUTPATIENT)
Dept: SURGERY | Facility: CLINIC | Age: 71
End: 2021-05-10
Payer: MEDICARE

## 2021-05-10 VITALS
SYSTOLIC BLOOD PRESSURE: 147 MMHG | DIASTOLIC BLOOD PRESSURE: 79 MMHG | TEMPERATURE: 97.2 F | WEIGHT: 170 LBS | BODY MASS INDEX: 27.32 KG/M2 | HEART RATE: 66 BPM | HEIGHT: 66 IN | OXYGEN SATURATION: 95 %

## 2021-05-10 PROCEDURE — 99204 OFFICE O/P NEW MOD 45 MIN: CPT

## 2021-05-10 NOTE — HISTORY OF PRESENT ILLNESS
[de-identified] : This patient is a pleasant 71-year-old female with a history of multiple abdominal surgeries including rectosigmoid resection, hysterectomy, subsequent small bowel obstruction requiring extensive lysis of adhesions, abdominal wall reconstruction with large bridging mesh, who now presents for evaluation of right lower quadrant incisional hernia.  The patient has presented to the ED several times over the last 5 years.  Her last CT scan last week revealed the continued presence of the hernias with passage of oral contrast to the colon.  The patient resides at [facility] and has a significant history of schizoaffective disorder.  She presents today with no complaints of pain, difficulties eating, or changes in bowel function.  She denies recent fever/chills.  No nausea/vomiting.

## 2021-05-10 NOTE — PATIENT PROFILE ADULT. - NS PRO ABUSE SCREEN SUSPICION NEGLECT YN
Spoke with Dr. Ballard he is aware to check pt for his groin pain when he does the gallbladder procedure.    unable to assess

## 2021-05-10 NOTE — PHYSICAL EXAM
[JVD] : no jugular venous distention  [Normal Thyroid] : the thyroid was normal [Normal Breath Sounds] : Normal breath sounds [Normal Heart Sounds] : normal heart sounds [Normal Rate and Rhythm] : normal rate and rhythm [No HSM] : no hepatosplenomegaly [No Rash or Lesion] : No rash or lesion [Alert] : alert [Oriented to Person] : oriented to person [Oriented to Place] : oriented to place [Oriented to Time] : oriented to time [Calm] : calm [de-identified] : Well-appearing, no acute distress [de-identified] : Moist membranes, pupils reactive [de-identified] : Soft, nondistended, nontender, thin midline skin, reducible right lower quadrant and lower midline incisional hernias with no overlying erythema [de-identified] : No CVA tenderness [de-identified] : Full range of motion

## 2021-05-10 NOTE — ASSESSMENT
[FreeTextEntry1] : In brief, this patient is a 71-year-old female with extensive surgical history and psychiatric history who presents with right lower quadrant and lower midline incisional hernias status post previous abdominal wall reconstruction.  In order to adequately repair this hernia, it would require an open excision of the previous mesh with repeat abdominal wall reconstruction.  At this time however the patient does not have any complaints related to her hernia and I do not believe the risks justify the benefits to the patient I would recommend wearing an abdominal binder and returning to the office if her symptoms change.

## 2021-07-04 NOTE — ED ADULT NURSE NOTE - TEMPLATE
Problem: Patient Care Overview  Goal: Plan of Care Review  Outcome: Ongoing (interventions implemented as appropriate)   01/11/19 2029   Coping/Psychosocial   Plan of Care Reviewed With patient   Plan of Care Review   Progress no change   OTHER   Outcome Summary VSS. difficulty controlling pain. pain management doctor came to see patient, PCA started pt continues to wake up crying out in pain and has to recieve constant reminders to use her pain button, continues in soft wrist restraints, family noted at beside and very supportive of patient.      Goal: Individualization and Mutuality  Outcome: Ongoing (interventions implemented as appropriate)    Goal: Discharge Needs Assessment  Outcome: Ongoing (interventions implemented as appropriate)      Problem: Restraint, Nonbehavioral (Nonviolent)  Goal: Rationale and Justification  Outcome: Ongoing (interventions implemented as appropriate)    Goal: Nonbehavioral (Nonviolent) Restraint: Absence of Injury/Harm  Outcome: Ongoing (interventions implemented as appropriate)    Goal: Nonbehavioral (Nonviolent) Restraint: Achievement of Discontinuation Criteria  Outcome: Ongoing (interventions implemented as appropriate)    Goal: Nonbehavioral (Nonviolent) Restraint: Preservation of Dignity and Wellbeing  Outcome: Outcome(s) achieved Date Met: 01/11/19      Problem: Chest Tube Drainage Device (Adult)  Goal: Signs and Symptoms of Listed Potential Problems Will be Absent, Minimized or Managed (Chest Tube Drainage Device)  Outcome: Ongoing (interventions implemented as appropriate)      Problem: Lung Surgery (via Thoracotomy) (Adult)  Goal: Signs and Symptoms of Listed Potential Problems Will be Absent, Minimized or Managed (Lung Surgery)  Outcome: Ongoing (interventions implemented as appropriate)    Goal: Anesthesia/Sedation Recovery  Outcome: Ongoing (interventions implemented as appropriate)         · On chart review, patient has a history of non-small cell lung cancer diagnosed in December 2019 - Follow with Oncology, Dr Carly Muller  · Underwent VATS with pleural decortication and pleurodesis 7/20  · PET scan 5/4 concerning for disease progression  Notable for left intraperitoneal metastasis  · Last chemotherapy was about a month ago    · Discussed with patient that his overall prognosis is poor given that he has stage IV cancer; on admission, he remained optimistic that he can be this and is looking for to getting a new chemotherapy medication from [address] [place] [address]:  · We had extensive discussion with patient, wife, and sister-in-law regarding advanced care planning  · Patient is now agreeable to being discharged to home with home hospice and understands the severity of lung cancer stage IV  · Inpatient consult to hospice- notified  · Geriatric Pain management  · IR consult for Tenckhoff catheterization placement Abdominal Pain, N/V/D

## 2021-08-24 NOTE — ED ADULT NURSE NOTE - GENITOURINARY ASSESSMENT
[FreeTextEntry1] : Alhaji is a 65yo male who presents today for routine follow-up. Patient previously seen in office 4/2021, completed calcium score test and score of 42, was recommended to start on ASA 81mg and Atorvastatin but has not started yet. He also reports today feeling palpitations, states occurs more with caffeine drinks but also without. He also reports sometimes has an elevated BP reading at home, sometimes up to 150s/90s. No further issues or concerns for today.
- - -

## 2021-09-14 ENCOUNTER — APPOINTMENT (OUTPATIENT)
Dept: DERMATOLOGY | Facility: CLINIC | Age: 71
End: 2021-09-14
Payer: MEDICARE

## 2021-09-14 PROCEDURE — 99203 OFFICE O/P NEW LOW 30 MIN: CPT

## 2021-10-25 NOTE — DISCHARGE NOTE ADULT - MEDICATION SUMMARY - MEDICATIONS TO CHANGE
I will SWITCH the dose or number of times a day I take the medications listed below when I get home from the hospital:  None Speaking Coherently

## 2021-11-08 NOTE — ED ADULT NURSE NOTE - GENITOURINARY WDL
November 23, 2021       Quincy Mai MD  [address] [facility] [address]  Via In Basket      Patient: Roney Novak   YOB: 1999   Date of Visit: 11/8/2021       Dear Dr. Mai:    I saw your patient, roney Novak, for an evaluation. Below are my notes for this visit with her.    If you have questions, please do not hesitate to call me.      Sincerely,        Bandar Sullivan MD        CC: No Recipients  Bandar Sullivan MD  11/23/2021 12:02 PM  Signed  11/8/2021 rheumatology follow-up:    Follow-up 5/27/2020: Ordered canakinumab for recurrent episodes of familiar Mediterranean fever, continue colchicine at 3 times daily for now until the clinic in a month gets approved  Follow-up 2/22/2021: Still not on canakinumab, continues to have episodes of FMF despite colchicine 3 times daily.  Follow-up 11/8/2021: Still did not get canakinumab, continue colchicine for familial Mediterranean fever.Currently on colchicine 0.6 mg twice daily.      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  Dx at the age of 4 with recurrent pleurisy and pleural effusion.  Admitted to the emergency room multiple times with recurrent chest pain described as stabbing with left-sided radiation  Gets an episode about once a month.  The attacks last between 3 to 4 days  Currently not on canakinumab yet.  Currently  on colchicine 0.6 mg twice daily with no benefit.  Continues to report episodes of abdominal pain.  Patient goes to the emergency room almost on a monthly basis.      Episodes are associated with shortness of breath without any fevers.  She is to get fevers as a child but not anymore with her current episodes.  No history of peritonitis or abdominal pain.  No history of arthritis although she does complain of arthralgias  pediatric rheumatology by Dr. Palacios.   Last visit was on 11/13/2017.    Was never diagnosed with amyloidosis.  There is no documentation of proteinuria  Denies any colchicine side effects.   Never on interleukin-1 inhibitors    Comprehensive ROS including General, CNS, ENT, GI, Eyes, Mouth, Skin,  Kidney/Urine/Bladder, Heart and lungs and Muscle/ joints/ bones negative except for : HPI      Fhx: No family history of FMF  Social history: Single, currently on oral contraceptives, denies smoking, alcohol, drug use  Past medical history: Familial Mediterranean fever  NKDA    OBJECTIVE: updated 11/23/21  Visit Vitals  /62 (BP Location: RUE - Right upper extremity, Patient Position: Sitting)   Pulse 82   Temp 97.6 °F (36.4 °C) (Tympanic)   Ht 5' 5\" (1.651 m)   Wt 82.5 kg (181 lb 12.3 oz)   LMP 10/29/2021   SpO2 95%   BMI 30.25 kg/m²     A&O, NAD  Skin: with out rash or jaundice  Eyes: No conjunctival injection  ENT: MMM, no oral/nasal ulcers  Lungs: LCTAB, No wheezing or crackles  Heart: RRR, no murmurs, rubs or gallops  GI: Abdomen is soft NT ND , normal bowel sounds  MSK:  Full range of motion in the GH joints  Normal ROM in elbows. No swelling or warmth  No synovitis in wrists, MCP, PIP joints  Normal hand  B/L  Knees within normal limits  No synovitis of the MTP joints    Assessment:    20-year-old female of  descent presents with recurrent FMF attacks.  These are usually pleurisy/pericardial effusion and pleural effusion  Colchicine 0.6 mg twice daily not  helping  Reordered interleukin-1 inhibitor canakinumab to be injected once a month    There is no previous evidence of amyloidosis.    We will need to keep close monitoring on her CBC and urinalysis checking for proteinuria.    The use of colchicine prevents progression of amyloid.     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6  · History of elevated ESR and CRP  · Recurrent pleurisy/pleural effusion  · Long-term use of colchicine    Plan:  Orders Placed This Encounter   • Sedimentation Rate Westergren   • C Reactive Protein   • Urinalysis With Microscopy & Culture If Indicated   • Protein/Creatinine Ratio, Urine   • Urinalysis With Microscopy & Culture If Indicated   • Protein/Creatinine Ratio, Urine   • Isibloom 0.15-30 MG-MCG per tablet   • traMADol (ULTRAM) 50 MG tablet       Thank you for allowing myself and [facility] to participate in the  care of   This note was dictated using voice recognition software. While it has been reviewed for  content, some dictation errors may have inadvertently been missed.                Bladder non-tender and non-distended. Urine clear yellow.

## 2021-12-12 NOTE — ED PROVIDER NOTE - NS_EDPROVIDERDISPOUSERTYPE_ED_A_ED
55yo M pmhx htn p/w generalized abd pain. Patient was seen in the ER 9 days ago for testicular and flank pain, found to have ureteral stone and discharged with no complications. Patient states that the pain had improved, but then recurred and migrated with generalized abd pain, now diffuse, cramping. Patient states that he no longer feels pain in testicular region or flank. Also endorses minimal urination in past day and no BM in past two days. Denies f/c, cp, sob, n/v. Appetite has been intact.
Attending Attestation (For Attendings USE Only)...

## 2021-12-21 NOTE — H&P PST ADULT - SUBSTANCE USE COMMENT, PROFILE
Group Topic:  Group OT    Date: 12/21/2021  Start Time: 1330  End Time: 1430  Facilitators: Margot Casillas OT    Focus: Mindfulness, Movement and Meditation    Number in attendance: 7    Method: Group  Attendance: Present  Participation: Active  Patient Response: Attentive  Mood: Anxious and Depressed  Affect: Type: Anxious and Depressed   Range: Blunted/flat   Congruency: Congruent   Stability: Stable  Behavior/Socialization: Appropriate to group and Cooperative  Thought Process: Focused  Task Performance: Follows directions  Patient Evaluation: Independent - full participation     Pt participated in structured group therapy process. Educated patient and practiced simple mindfulness techniques and practices to decreased anxiety and physical tension such as breath work, seated UE/LE gross motor movement activities linking breath to movement, and progressive muscle relaxation. Encouraged continued practice of personal healthy coping skills and stress management tools taught in OT groups. Pt reported a self-care activity he/she was going to pursue post group is stretching and movement in his room during quiet time. Pt id that he felt good at the end of group.         unknown, unable to obtain

## 2022-02-04 NOTE — BEHAVIORAL HEALTH ASSESSMENT NOTE - THOUGHT PROCESS
GYNECOLOGY PROGRESS NOTE    ADMISSION DATE:  2/3/2022  CONSULTING PHYSICIAN:  Olivia Velazquez MD  ATTENDING PHYSICIAN:  Olivia Velazquez, *     CHIEF COMPLAINT:  Post Op Check    SUBJECTIVE:     Allie Black is a 55 year old       patient admitted with Fibroids [D21.9]  Intramural leiomyoma of uterus [D25.1] She had an exploratory Lap, LUIS FERNANDO and bilateral salpingectomy this AM.    PROBLEM LIST:  There are no active hospital problems to display for this patient.       HISTORIES:    I have reviewed the past medical history, family history, social history, medications and allergies listed in the medical record as obtained by my nursing staff and support staff and agree with their documentation.  ALLERGIES:  No Known Allergies  Past Medical History:   Diagnosis Date   • Asthma     adult onset, stable, rare use of inhaler   • Infertility, female    • Uterine leiomyoma      Past Surgical History:   Procedure Laterality Date   • Myomectomy         SURGHX:  Past Surgical History:   Procedure Laterality Date   • Myomectomy          MEDICATION:  Current Facility-Administered Medications   Medication Dose Route Frequency Provider Last Rate Last Admin   • ketorolac injection 15 mg  15 mg Intravenous Q6H Olivia Velazquez MD   15 mg at 22 1511   • acetaminophen (TYLENOL) tablet 650 mg  650 mg Oral 6 times per day Olivia Velazquez MD       • oxyCODONE (IMM REL) (ROXICODONE) tablet 5 mg  5 mg Oral Q4H PRN Olivia Velazquez MD       • polyethylene glycol (MIRALAX) packet 17 g  17 g Oral Daily PRN Olivia Velazquez MD       • docusate sodium-sennosides (SENOKOT S) 50-8.6 MG 2 tablet  2 tablet Oral BID PRN Olivia Velazquez MD       • magnesium hydroxide (MILK OF MAGNESIA) 400 MG/5ML suspension 30 mL  30 mL Oral Daily PRN Olivia Velazquez MD       • ondansetron (ZOFRAN ODT) disintegrating tablet 4 mg  4 mg Oral Q4H PRN Olivia Velazquez MD        Or   •  ondansetron (ZOFRAN) injection 4 mg  4 mg Intravenous Q4H PRN Olivia Velazquez MD       • simethicone (MYLICON) tablet 125 mg  125 mg Oral Q4H PRN Olivia Velazquez MD       • sodium chloride 0.9 % flush bag 25 mL  25 mL Intravenous PRN Olivia Velazquez MD       • sodium chloride (PF) 0.9 % injection 2 mL  2 mL Intracatheter 2 times per day Olivia Velazquez MD       • nalbuphine (NUBAIN) injection 2.5 mg  2.5 mg Intravenous Q8H PRN Olivia Vealzquez MD       • naLOXone (NARCAN) injection 0.1 mg  0.1 mg Intravenous PRN Olivia Velazquez MD       • sodium chloride 0.9% infusion   Intravenous Continuous Olivia Velazquez MD       • gabapentin (NEURONTIN) capsule 200 mg  200 mg Oral 2 times per day Olivia Velazquez MD       • lactated ringers infusion   Intravenous Continuous Olivia Velazquez MD       • docusate sodium-sennosides (SENOKOT S) 50-8.6 MG 1 tablet  1 tablet Oral Nightly Olivia Velazquez MD       • ketorolac injection 15 mg  15 mg Intravenous Q6H PRN Olivia Velazquez MD        Or   • ketorolac injection 15 mg  15 mg Intramuscular Q6H PRN Olivia Velazquez MD        Or   • ibuprofen (MOTRIN) tablet 600 mg  600 mg Oral Q6H PRN Olivia Velazquez MD       • HYDROmorphone (DILAUDID) 0.2 mg/mL in 30 mL PCA infusion   Intravenous Continuous Olivia Velazquez MD   New Bag at 02/03/22 1321   • ondansetron (ZOFRAN) injection 4 mg  4 mg Intravenous Q6H PRN Olivia Velazquez MD            REVIEW OF SYSTEMS:    General:  Denies fevers or chills.  HENT:  Denies headaches.  Breast:  Denies dominant masses or tenderness.  Cardiovascular:  Denies chest pains.   Respiratory:  Denies shortness of breath.  Gastrointestinal:  Denies constipation or diarrhea.  Genitourinary:  Denies dysuria or urgency.   Integument:  Denies any skin changes.  Musculoskeletal:  Denies joint pains or muscle aches.  Psychiatric: Denies mood changes.      OBJECTIVE:    VITAL SIGNS:    Vital Last Value 24 Hour Range   Temperature 98.6 °F (37 °C) (02/03/22 1500) Temp  Min: 97.3 °F (36.3 °C)  Max: 98.6 °F (37 °C)   Pulse 75 (02/03/22 1500) Pulse  Min: 50  Max: 75   Respiratory 12 (02/03/22 1500) Resp  Min: 12  Max: 20   Non-Invasive  Blood Pressure 106/67 (02/03/22 1500) BP  Min: 90/57  Max: 118/67   Pulse Oximetry 98 % (02/03/22 1500) SpO2  Min: 92 %  Max: 100 %     Vital Today Admitted   Weight 73.9 kg (162 lb 14.7 oz) (02/03/22 0851) Weight: 71.2 kg (157 lb) (01/31/22 1549)   Height N/A Height: 5' 9\" (175.3 cm) (01/31/22 1549)   BMI N/A BMI (Calculated): 23.18 (01/31/22 1549)     INTAKE/OUTPUT:      Intake/Output Summary (Last 24 hours) at 2/3/2022 1855  Last data filed at 2/3/2022 1400  Gross per 24 hour   Intake 1825 ml   Output 425 ml   Net 1400 ml        PHYSICAL EXAM:    Vital Signs:   Visit Vitals  /67 (BP Location: RUE - Right upper extremity)   Pulse 75   Temp 98.6 °F (37 °C) (Oral)   Resp 12   Ht 5' 9\" (1.753 m)   Wt 73.9 kg (162 lb 14.7 oz)   LMP 12/28/2018 (Approximate)   SpO2 98%   BMI 24.06 kg/m²      General Appearance: Well groomed.   Neuropsychiatric: Mood and affect appropriate.    Skin: No rashes.   Neck: No masses. Pt has O2 device in place  Respiratory: Respiratory effort normal. Breath sounds are clear  Cardiovascular: Regular rate and rhythm. No leg swelling or varicosities. .  Abdomen: Soft, largely NT. No masses or tenderness.   Incision: Dressing in place with 3 spots of blood; all blood is dry  LE: SCDs on    LABORATORY DATA:    Lab Results   Component Value Date    SODIUM 139 02/03/2022    POTASSIUM 4.2 02/03/2022    CHLORIDE 110 (H) 02/03/2022    CO2 27 02/03/2022    BUN 21 (H) 02/03/2022    CREATININE 0.84 02/03/2022    GLUCOSE 88 02/03/2022     Lab Results   Component Value Date    WBC 4.8 02/03/2022    HCT 37.8 02/03/2022    HGB 12.4 02/03/2022     02/03/2022       IMAGING STUDIES:    Imaging studies reviewed.  The  pertinent positives are pre-operative    ASSESSMENT:     POD#0 LUIS FERNANDO with Bilateral Salpingectomy    PLAN:    Advance diet as tolerated. Clears tonight and add more tomorrow. Gradually increase activity- ambulate tomorrow.   Garcia out in AM  Change over pain meds to PO as able in AM.     CASE DISCUSSED WITH:  Patient, Family and RN. Pts girlfriend present and plans to spend the night. . Spoke with her spouse post op    Olivia Velazquez MD   2/3/2022  6:55 PM     Illogical/Disorganized

## 2022-03-15 ENCOUNTER — APPOINTMENT (OUTPATIENT)
Dept: DERMATOLOGY | Facility: CLINIC | Age: 72
End: 2022-03-15

## 2022-04-11 NOTE — PATIENT PROFILE ADULT - NSPROMUTANXFEARADDRESSFT_GEN_A_NUR
Consent (Marginal Mandibular)/Introductory Paragraph: The rationale for Mohs was explained to the patient and consent was obtained. The risks, benefits and alternatives to therapy were discussed in detail. Specifically, the risks of damage to the marginal mandibular branch of the facial nerve, infection, scarring, bleeding, prolonged wound healing, incomplete removal, allergy to anesthesia, and recurrence were addressed. Prior to the procedure, the treatment site was clearly identified and confirmed by the patient. All components of Universal Protocol/PAUSE Rule completed. denies

## 2022-04-13 NOTE — H&P ADULT - HISTORY OF PRESENT ILLNESS
66 y/o female with Psychiatric disorder, with iliostomy after a leakage from repaire of rectal prolapse, multiple Er visits for abdominal pain, was sent from [facility] for abdominal pain. no vomiting. no fever. Ct abdomen shows colitis of descending and sigmoid colon. also thickening of the walls of segments  small bowel. patient was evaluated in the Er By the surgeon, Dr. Miller who recommend no surgical diagnosis or management but GI consult.
electronic

## 2022-04-14 NOTE — BRIEF OPERATIVE NOTE - VENOUS THROMBOEMBOLISM PROPHYLAXIS THERAPY
Physical Therapy     Referred by: Adia Mckay MD; Medical Diagnosis (from order):    Diagnosis Information      Diagnosis    724.2, 338.29 (ICD-9-CM) - M54.50, G89.29 (ICD-10-CM) - Chronic midline low back pain without sciatica                Initial Evaluation    Visit:  1   Treatment Diagnosis: lumbar, left: lower extremity, right: lower extremity symptoms with increased pain/symptoms, impaired posture, impaired range of motion, impaired muscle length/flexibility, impaired joint play/mobility, impaired mobility, impaired tissue mobility, impaired strength, impaired activity tolerance, impaired body mechanics, impaired gait, impaired balance  Date of onset: chronic  Chart reviewed at time of initial evaluation (relevant co-morbidities, allergies, tests and medications listed):  Right ear sensorineural hearing loss, diabtets Mellitus, hypertension               Diagnostic Tests: MRI studies: reviewed.     SUBJECTIVE                                                                                                               Patient verbally consented to allow observer present during session.  Patient presents with chronic back pain, started worked related injury in [place] in 2010. He was here for physical therapy and he also had  medial branch blocks for bilateral lower 3 facet joints done on 06/23/2021.  Both were not success. I am stuck in this bending position due to pain. Patient reports sometimes I did not have pain, but if I did wrong move then I have lot of pain. He is working now. I have to bend down a lot and end up of stuck at that position. I purchased a back brace for work. I am doing some exercises on the treadmill to control my blood pressure and glucose level. Daily 15-20 minutes. Pain is getting worse as the day goes. Patient works full time. Prolonged activities make pain worse. I have general numbness and tingling in my arms and legs constant. One fall because my leg gave out.   Pain /  Symptoms:  Pain/symptom is: intermittent  Pain rating (out of 10): Current: 4 ; Best: 4; Worst: 10  Location: Lower back area, sometimes from the hips to the knees and makes my leg weak    Quality / Description: sharp.  Reports tripping stumbling and/or loss of balance  reports changes in bowel and bladder function  reports numbness, tingling and/or radiating symptoms  reports pain is increased with sneezing, coughing and/or laughing  denies pain is worse at night (feeling dizzy in the morning, when I returning from bending down position then I feel dizzy as well.  Occassionally I have some diffuclty urination. )  Alleviating Factors: supportive device.   Progression since onset: no change  Function:   Limitations / Exacerbation Factors: pain, difficulty, increased time, sleep disturbed, lower body dressing  Prior Level of Function: declining function, therefore referred to therapy,  Patient Goals: decreased pain, independence with ADLs/IADLs, increased strength and increased motion    Prior treatment: outpatient PT, injections  Discharged from hospital, home health, or skilled nursing facility in last 30 days: no    Home Environment:   Assistance available: as needed  Feels safe at home/work/school.  2 or more falls or an unexplained fall with injury in the last year:  No    OBJECTIVE                                                                                                                     Observation:   Cervical: forward head  Shoulder: rounded  Spine: increased lumbar lordosis  Seated Posture: poor  Comments / Details: Flexed posture, leaning to the right in sitting     Observed Gait:     Increased posterior leaning    Range of Motion (ROM)   (degrees unless noted; active unless noted; norms in ( ); negative=lacking to 0, positive=beyond 0)   Lumbar:    - Flexion (60-80):  30%  pain     - Extension (25):  50%  pain     - Side Bend (25-35):        • Left:  70%  pain         • Right:  70%  pain   Details /  Comments: Right side bend is more painful  Flexion most painful with Grower's sign        Outcome Measures:   OSWESTRY Total Scored: 23  OSWESTRY Total Possible Score: 50  OSWESTRY Score Calculated: 46 %  (0-20% = minimal disability; 20-40% = moderate disability; 40-60% = severe disability; 60-80% = crippled; % = bed bound) see flowsheet for additional documentation  TREATMENT                                                                                                                initial evaluation completed    Therapeutic Activity:  Pt educations on symptoms associated with evaluation findings and plan of care, importance of compliant with HEP as well as reinforcement on our attendance policy.     Patient education on arthritis not equal pain, needed to keep exercise and use of proper body mechanics. Patient will be followed up with his PCP with his urination issues     Patient education on not to twist for his work and use of his legs.  Keep objects closer to him  Use of hip hinges to lift and keep spine neutral   Issued body mechanics handout in [facility]    Skilled input: verbal instruction/cues, posture correction and tactile instruction/cues    Writer verbally educated and received verbal consent for hand placement, positioning of patient, and techniques to be performed today from patient for clothing adjustments for techniques as described above and how they are pertinent to the patient's plan of care.    Home Exercise Program/Education Materials: Access Code: [email or web address]  URL: [email or web address]  Date: 04/15/2022  Prepared by: Shadia    Patient Education  · FYWB: Posture and Body Mechanics (American version) [id number]             ASSESSMENT                                                                                                           47 year old male patient has reported functional limitations listed above impacted by signs and symptoms consistent with below   •  Involved: lumbar      - left lower extremity      - right lower extremity   • Symptoms/impairments: increased pain/symptoms, impaired posture, impaired range of motion, impaired muscle length/flexibility, impaired joint play/mobility, impaired mobility, impaired tissue mobility, impaired strength, impaired activity tolerance, impaired body mechanics, impaired gait and impaired balance  Patient presents with signs and symptoms of chronic back pain consistent with hip weakness and decreased lumbar mobility. Limited physical exam today due to longer time for history taking. Time spent to educate patient body mechanics required for his work , so he will be able to apply proper mechanics without cause of back pain. Will continue assessment next session. Patient presents with receptive with education.   Prognosis: patient will benefit from skilled therapy  Rehabilitative potential is: fair due to; positive factors: motivation level and activity tolerance; negative factors: co-morbidities, time since onset of symptoms and failed response to prior treatment  Predicted patient presentation: Moderate (evolving) - Patient comorbidities and complexities, as defined above, may have varying impact on steady progress for prescribed plan of care.  Patient Education:   Who will be receiving education: patient  Are they ready to learn: yes  Preferred learning style: written, verbal, demonstration and video  Barriers to learning: language  Results of above outlined education: Verbalizes understanding and Demonstrates understanding      PLAN                                                                                                                         The following skilled interventions to be implemented to achieve goals listed below:Dry Needling  Manual Therapy (21527)  Neuromuscular Re-Education (81392)  Therapeutic Activity (03318)  Therapeutic Exercise (49232)  Electrical Stimulation (unattended, 68746 or )  Heat/Cold  (18583)  Mechanical Traction (18117)  Ultrasound/Phonophoresis (20326)  Frequency / Duration: 1 times per week tapering as patient progresses for an estimated total of 10 visits for 10 weeks    Patient involved in and agreed to plan of care and goals.  Patient has been given attendance policy at time of initial evaluation. and Attendance policy reviewed with patient.  Suggestions for next session as indicated: Progress per plan of care  Hip and lumbar mobilization  Check orthostatic BP  Body mechanics  Core and hip strengthening  Lumbar flexion mobilization  Neuroscience pain education  Cognitive behavior education      GOALS                                                                                                                           Decrease pain/symptoms to 5/10 max  Improve involved strength to lower extremity   Improve involved ROM to lumbar 50%  The above improvements in impairments to assist in obtaining goals listed below  Long Term Goals: to be met by end of plan of care  1. Patient will be able to stand and walk for 60 minutes with manageable pain and difficulty.   2. Patient will be able to bend forward with proper body mechanics without increased back pain and able to return to upright position without difficulty.   3. Oswestry: Patient will complete form to reflect an improved score to less than or equal to 34% to indicate patient reported improvement in function/disability/impairment (minimal detectable chage: 12%).  4. Patient will be independent with progressed and modified home exercise program.  5. Oswestry: Patient will complete form to reflect an improved score to less than or equal to 34% to indicate patient reported improvement in function/disability/impairment (minimal detectable change: 12%).      Therapy procedure time and total treatment time can be found documented on the Time Entry flowsheet   SCD and heparin

## 2022-06-23 NOTE — ED PROVIDER NOTE - NS ED NOTE AC HIGH RISK COUNTRIES
Clinic Care Coordination Contact  [facility]/Voicemail       Clinical Data: Care Coordinator Outreach  Outreach attempted x 1.  Left message on patient's voicemail with call back information and requested return call.  Plan: Care Coordinator will try to reach patient again in 1-2 business days.    TESSA Cali  Connected Care Resource Center  [facility]     *Connected Care Resource Team does NOT follow patient ongoing. Referrals are identified based on internal discharge reports and the outreach is to ensure patient has an understanding of their discharge instructions.     No

## 2022-07-12 NOTE — ED ADULT TRIAGE NOTE - CHIEF COMPLAINT QUOTE
Pt was outpatient radiology for CT of lower back secondary to pain. Ws brought to ER, fracture was seen on CT. No

## 2022-07-13 NOTE — H&P ADULT - GASTROINTESTINAL DETAILS
Called and spoke to parent in regards to positive test for RSV. Discussed illness course of RSV and usually worsening of symptoms on days 3-5 of illness. Continue with supportive care measures. Reviewed with family reasons to seek ER care.  Family expressed agreement and understanding of plan and all questions were answered.    bowel sounds normal/soft
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
## 2022-09-19 ENCOUNTER — OUTPATIENT (OUTPATIENT)
Dept: OUTPATIENT SERVICES | Facility: HOSPITAL | Age: 72
LOS: 1 days | End: 2022-09-19
Payer: MEDICARE

## 2022-09-19 DIAGNOSIS — K43.5 PARASTOMAL HERNIA WITHOUT OBSTRUCTION OR GANGRENE: Chronic | ICD-10-CM

## 2022-09-19 DIAGNOSIS — Z20.828 CONTACT WITH AND (SUSPECTED) EXPOSURE TO OTHER VIRAL COMMUNICABLE DISEASES: ICD-10-CM

## 2022-09-19 DIAGNOSIS — Z93.2 ILEOSTOMY STATUS: Chronic | ICD-10-CM

## 2022-09-19 DIAGNOSIS — B97.4 RESPIRATORY SYNCYTIAL VIRUS AS THE CAUSE OF DISEASES CLASSIFIED ELSEWHERE: ICD-10-CM

## 2022-09-19 PROCEDURE — 0225U NFCT DS DNA&RNA 21 SARSCOV2: CPT

## 2022-11-03 ENCOUNTER — OUTPATIENT (OUTPATIENT)
Dept: OUTPATIENT SERVICES | Facility: HOSPITAL | Age: 72
LOS: 1 days | End: 2022-11-03
Payer: COMMERCIAL

## 2022-11-03 DIAGNOSIS — Z93.2 ILEOSTOMY STATUS: Chronic | ICD-10-CM

## 2022-11-03 DIAGNOSIS — F20.9 SCHIZOPHRENIA, UNSPECIFIED: ICD-10-CM

## 2022-11-03 DIAGNOSIS — K43.5 PARASTOMAL HERNIA WITHOUT OBSTRUCTION OR GANGRENE: Chronic | ICD-10-CM

## 2022-11-03 PROCEDURE — 0225U NFCT DS DNA&RNA 21 SARSCOV2: CPT

## 2022-11-23 NOTE — H&P PST ADULT - MALLAMPATI CLASS
Please call patient and explain that I would like to repeat some labs since there was a borderline result. The tests show positive hepatitis C screen.    Please see orders for additional test(s) and please call patient to schedule soon.     Thank you.     Class II - visualization of the soft palate, fauces, and uvula

## 2022-11-29 NOTE — ED ADULT NURSE NOTE - EXTENSIONS OF SELF_ADULT
Last refill:10/28/22  Last OV:6/7/22  Upcoming appointment: 12/9/22  Please advise on refill and sign if ok.    No protocol  
PDMP reviewed; no aberrant behavior identified, prescription authorized.     MME:0    LAST OV:10/28/22  NEXT OV:12/09/22  LAST FILL:10/28/22    
None

## 2022-12-06 ENCOUNTER — OUTPATIENT (OUTPATIENT)
Dept: OUTPATIENT SERVICES | Facility: HOSPITAL | Age: 72
LOS: 1 days | End: 2022-12-06
Payer: COMMERCIAL

## 2022-12-06 DIAGNOSIS — K43.5 PARASTOMAL HERNIA WITHOUT OBSTRUCTION OR GANGRENE: Chronic | ICD-10-CM

## 2022-12-06 DIAGNOSIS — Z93.2 ILEOSTOMY STATUS: Chronic | ICD-10-CM

## 2022-12-06 DIAGNOSIS — F20.9 SCHIZOPHRENIA, UNSPECIFIED: ICD-10-CM

## 2022-12-06 PROCEDURE — 0225U NFCT DS DNA&RNA 21 SARSCOV2: CPT

## 2022-12-09 ENCOUNTER — INPATIENT (INPATIENT)
Facility: HOSPITAL | Age: 72
LOS: 4 days | Discharge: PSYCHIATRIC OR SUBSTANCE USE FACILITY | DRG: 394 | End: 2022-12-14
Attending: SURGERY | Admitting: SURGERY
Payer: MEDICARE

## 2022-12-09 VITALS
DIASTOLIC BLOOD PRESSURE: 70 MMHG | OXYGEN SATURATION: 96 % | TEMPERATURE: 99 F | HEART RATE: 86 BPM | SYSTOLIC BLOOD PRESSURE: 155 MMHG | RESPIRATION RATE: 18 BRPM

## 2022-12-09 DIAGNOSIS — K43.5 PARASTOMAL HERNIA WITHOUT OBSTRUCTION OR GANGRENE: Chronic | ICD-10-CM

## 2022-12-09 DIAGNOSIS — Z93.2 ILEOSTOMY STATUS: Chronic | ICD-10-CM

## 2022-12-09 LAB
ALBUMIN SERPL ELPH-MCNC: 4.1 G/DL — SIGNIFICANT CHANGE UP (ref 3.3–5.2)
ALP SERPL-CCNC: 93 U/L — SIGNIFICANT CHANGE UP (ref 40–120)
ALT FLD-CCNC: 14 U/L — SIGNIFICANT CHANGE UP
ANION GAP SERPL CALC-SCNC: 10 MMOL/L — SIGNIFICANT CHANGE UP (ref 5–17)
APTT BLD: 28 SEC — SIGNIFICANT CHANGE UP (ref 27.5–35.5)
AST SERPL-CCNC: 14 U/L — SIGNIFICANT CHANGE UP
BASOPHILS # BLD AUTO: 0.01 K/UL — SIGNIFICANT CHANGE UP (ref 0–0.2)
BASOPHILS NFR BLD AUTO: 0.2 % — SIGNIFICANT CHANGE UP (ref 0–2)
BILIRUB SERPL-MCNC: 0.6 MG/DL — SIGNIFICANT CHANGE UP (ref 0.4–2)
BUN SERPL-MCNC: 30.1 MG/DL — HIGH (ref 8–20)
CALCIUM SERPL-MCNC: 9.4 MG/DL — SIGNIFICANT CHANGE UP (ref 8.4–10.5)
CHLORIDE SERPL-SCNC: 104 MMOL/L — SIGNIFICANT CHANGE UP (ref 96–108)
CO2 SERPL-SCNC: 30 MMOL/L — HIGH (ref 22–29)
CREAT SERPL-MCNC: 2.02 MG/DL — HIGH (ref 0.5–1.3)
EGFR: 26 ML/MIN/1.73M2 — LOW
EOSINOPHIL # BLD AUTO: 0.01 K/UL — SIGNIFICANT CHANGE UP (ref 0–0.5)
EOSINOPHIL NFR BLD AUTO: 0.2 % — SIGNIFICANT CHANGE UP (ref 0–6)
FLUAV AG NPH QL: SIGNIFICANT CHANGE UP
FLUBV AG NPH QL: SIGNIFICANT CHANGE UP
GLUCOSE SERPL-MCNC: 115 MG/DL — HIGH (ref 70–99)
HCT VFR BLD CALC: 31 % — LOW (ref 34.5–45)
HGB BLD-MCNC: 9.7 G/DL — LOW (ref 11.5–15.5)
IMM GRANULOCYTES NFR BLD AUTO: 0.2 % — SIGNIFICANT CHANGE UP (ref 0–0.9)
INR BLD: 1.22 RATIO — HIGH (ref 0.88–1.16)
LIDOCAIN IGE QN: 95 U/L — HIGH (ref 22–51)
LYMPHOCYTES # BLD AUTO: 0.91 K/UL — LOW (ref 1–3.3)
LYMPHOCYTES # BLD AUTO: 18.3 % — SIGNIFICANT CHANGE UP (ref 13–44)
MCHC RBC-ENTMCNC: 28.1 PG — SIGNIFICANT CHANGE UP (ref 27–34)
MCHC RBC-ENTMCNC: 31.3 GM/DL — LOW (ref 32–36)
MCV RBC AUTO: 89.9 FL — SIGNIFICANT CHANGE UP (ref 80–100)
MONOCYTES # BLD AUTO: 0.52 K/UL — SIGNIFICANT CHANGE UP (ref 0–0.9)
MONOCYTES NFR BLD AUTO: 10.4 % — SIGNIFICANT CHANGE UP (ref 2–14)
NEUTROPHILS # BLD AUTO: 3.52 K/UL — SIGNIFICANT CHANGE UP (ref 1.8–7.4)
NEUTROPHILS NFR BLD AUTO: 70.7 % — SIGNIFICANT CHANGE UP (ref 43–77)
PLATELET # BLD AUTO: 122 K/UL — LOW (ref 150–400)
POTASSIUM SERPL-MCNC: 3.7 MMOL/L — SIGNIFICANT CHANGE UP (ref 3.5–5.3)
POTASSIUM SERPL-SCNC: 3.7 MMOL/L — SIGNIFICANT CHANGE UP (ref 3.5–5.3)
PROT SERPL-MCNC: 6.5 G/DL — LOW (ref 6.6–8.7)
PROTHROM AB SERPL-ACNC: 14.2 SEC — HIGH (ref 10.5–13.4)
RBC # BLD: 3.45 M/UL — LOW (ref 3.8–5.2)
RBC # FLD: 14.6 % — HIGH (ref 10.3–14.5)
RSV RNA NPH QL NAA+NON-PROBE: SIGNIFICANT CHANGE UP
SARS-COV-2 RNA SPEC QL NAA+PROBE: SIGNIFICANT CHANGE UP
SODIUM SERPL-SCNC: 144 MMOL/L — SIGNIFICANT CHANGE UP (ref 135–145)
WBC # BLD: 4.98 K/UL — SIGNIFICANT CHANGE UP (ref 3.8–10.5)
WBC # FLD AUTO: 4.98 K/UL — SIGNIFICANT CHANGE UP (ref 3.8–10.5)

## 2022-12-09 PROCEDURE — 99285 EMERGENCY DEPT VISIT HI MDM: CPT

## 2022-12-09 PROCEDURE — 93010 ELECTROCARDIOGRAM REPORT: CPT

## 2022-12-09 PROCEDURE — 74176 CT ABD & PELVIS W/O CONTRAST: CPT | Mod: 26,MG

## 2022-12-09 PROCEDURE — G1004: CPT

## 2022-12-09 RX ORDER — ONDANSETRON 8 MG/1
4 TABLET, FILM COATED ORAL ONCE
Refills: 0 | Status: COMPLETED | OUTPATIENT
Start: 2022-12-09 | End: 2022-12-09

## 2022-12-09 RX ORDER — DIATRIZOATE MEGLUMINE 180 MG/ML
30 INJECTION, SOLUTION INTRAVESICAL ONCE
Refills: 0 | Status: COMPLETED | OUTPATIENT
Start: 2022-12-09 | End: 2022-12-09

## 2022-12-09 RX ORDER — SODIUM CHLORIDE 9 MG/ML
1000 INJECTION INTRAMUSCULAR; INTRAVENOUS; SUBCUTANEOUS ONCE
Refills: 0 | Status: COMPLETED | OUTPATIENT
Start: 2022-12-09 | End: 2022-12-09

## 2022-12-09 RX ADMIN — ONDANSETRON 4 MILLIGRAM(S): 8 TABLET, FILM COATED ORAL at 18:09

## 2022-12-09 RX ADMIN — DIATRIZOATE MEGLUMINE 30 MILLILITER(S): 180 INJECTION, SOLUTION INTRAVESICAL at 19:32

## 2022-12-09 RX ADMIN — SODIUM CHLORIDE 1000 MILLILITER(S): 9 INJECTION INTRAMUSCULAR; INTRAVENOUS; SUBCUTANEOUS at 19:12

## 2022-12-09 NOTE — ED PROVIDER NOTE - CLINICAL SUMMARY MEDICAL DECISION MAKING FREE TEXT BOX
71 y/o F hx of HTN, ventral hernia, multiple abdominal surgeries, schizophrenia presents from inpatient [facility] for nausea and vomiting. ventral hernia present, soft, non-tender on exam. multiple prior surgeries. will r/o obstruction. labs, anti-emetics.

## 2022-12-09 NOTE — ED PROVIDER NOTE - PHYSICAL EXAMINATION
General: Well appearing female in no acute distress  HEENT: Normocephalic, atraumatic. Moist mucous membranes. Oropharynx clear. No lymphadenopathy.  Eyes: No scleral icterus. EOMI. ROJAS.  Neck:. Soft and supple. Full ROM without pain. No midline tenderness  Cardiac: Regular rate and regular rhythm. No murmurs, rubs, gallops. Peripheral pulses 2+ and symmetric. No LE edema.  Resp: Lungs CTAB. Speaking in full sentences. No wheezes, rales or rhonchi.  Abd: soft, +ventral hernia appreciated midline, soft, non-tender, scar from prior surgery   Back: Spine midline and non-tender. No CVA tenderness.    Skin: No rashes, abrasions, or lacerations.  Neuro: AO x 3. Moves all extremities symmetrically. Motor strength and sensation grossly intact.

## 2022-12-09 NOTE — ED PROVIDER NOTE - PROGRESS NOTE DETAILS
[place]: Pt received in signout from Dr. Cervantes. Pt with high grade SBO on CT. Surgery consulted. NGT attempted by RN/surgery team but pt refused. Will admit.

## 2022-12-09 NOTE — ED PROVIDER NOTE - OBJECTIVE STATEMENT
71 y/o F hx of HTN, ventral hernia, multiple abdominal surgeries, schizophrenia presents from [facility] for nausea and vomiting. endorsing abdominal pain midline in nature as well. patient states "yes" when asked if she had a bowel movement yesterday.

## 2022-12-09 NOTE — ED ADULT TRIAGE NOTE - CHIEF COMPLAINT QUOTE
pt B[facility]EMS from [facility] due to abdominal pain and vomiting since yesterday. known abdominal hernia. [facility] aide at bedside.

## 2022-12-09 NOTE — ED PROVIDER NOTE - ATTENDING CONTRIBUTION TO CARE
The patient seen and examined    Abdominal Pain    I, Garry Cervantes, performed the initial face to face bedside interview with this patient regarding history of present illness, review of symptoms and relevant past medical, social and family history.  I completed an independent physical examination.  I was the initial provider who evaluated this patient. I have signed out the follow up of any pending tests (i.e. labs, radiological studies) to the resident.  I have communicated the patient’s plan of care and disposition with the resident

## 2022-12-10 DIAGNOSIS — K56.609 UNSPECIFIED INTESTINAL OBSTRUCTION, UNSPECIFIED AS TO PARTIAL VERSUS COMPLETE OBSTRUCTION: ICD-10-CM

## 2022-12-10 LAB
ALBUMIN SERPL ELPH-MCNC: 3.5 G/DL — SIGNIFICANT CHANGE UP (ref 3.3–5.2)
ALBUMIN SERPL ELPH-MCNC: 3.7 G/DL — SIGNIFICANT CHANGE UP (ref 3.3–5.2)
ALP SERPL-CCNC: 90 U/L — SIGNIFICANT CHANGE UP (ref 40–120)
ALP SERPL-CCNC: 91 U/L — SIGNIFICANT CHANGE UP (ref 40–120)
ALT FLD-CCNC: 14 U/L — SIGNIFICANT CHANGE UP
ALT FLD-CCNC: 14 U/L — SIGNIFICANT CHANGE UP
ANION GAP SERPL CALC-SCNC: 10 MMOL/L — SIGNIFICANT CHANGE UP (ref 5–17)
ANION GAP SERPL CALC-SCNC: 14 MMOL/L — SIGNIFICANT CHANGE UP (ref 5–17)
ANION GAP SERPL CALC-SCNC: 14 MMOL/L — SIGNIFICANT CHANGE UP (ref 5–17)
AST SERPL-CCNC: 12 U/L — SIGNIFICANT CHANGE UP
AST SERPL-CCNC: 15 U/L — SIGNIFICANT CHANGE UP
BASOPHILS # BLD AUTO: 0 K/UL — SIGNIFICANT CHANGE UP (ref 0–0.2)
BASOPHILS NFR BLD AUTO: 0 % — SIGNIFICANT CHANGE UP (ref 0–2)
BILIRUB SERPL-MCNC: 0.6 MG/DL — SIGNIFICANT CHANGE UP (ref 0.4–2)
BILIRUB SERPL-MCNC: 0.7 MG/DL — SIGNIFICANT CHANGE UP (ref 0.4–2)
BUN SERPL-MCNC: 31.4 MG/DL — HIGH (ref 8–20)
BUN SERPL-MCNC: 32 MG/DL — HIGH (ref 8–20)
BUN SERPL-MCNC: 32.3 MG/DL — HIGH (ref 8–20)
CALCIUM SERPL-MCNC: 8.4 MG/DL — SIGNIFICANT CHANGE UP (ref 8.4–10.5)
CALCIUM SERPL-MCNC: 8.8 MG/DL — SIGNIFICANT CHANGE UP (ref 8.4–10.5)
CALCIUM SERPL-MCNC: 9 MG/DL — SIGNIFICANT CHANGE UP (ref 8.4–10.5)
CHLORIDE SERPL-SCNC: 105 MMOL/L — SIGNIFICANT CHANGE UP (ref 96–108)
CHLORIDE SERPL-SCNC: 107 MMOL/L — SIGNIFICANT CHANGE UP (ref 96–108)
CHLORIDE SERPL-SCNC: 114 MMOL/L — HIGH (ref 96–108)
CO2 SERPL-SCNC: 25 MMOL/L — SIGNIFICANT CHANGE UP (ref 22–29)
CO2 SERPL-SCNC: 26 MMOL/L — SIGNIFICANT CHANGE UP (ref 22–29)
CO2 SERPL-SCNC: 28 MMOL/L — SIGNIFICANT CHANGE UP (ref 22–29)
CREAT SERPL-MCNC: 1.94 MG/DL — HIGH (ref 0.5–1.3)
CREAT SERPL-MCNC: 2.02 MG/DL — HIGH (ref 0.5–1.3)
CREAT SERPL-MCNC: 2.05 MG/DL — HIGH (ref 0.5–1.3)
EGFR: 26 ML/MIN/1.73M2 — LOW
EGFR: 26 ML/MIN/1.73M2 — LOW
EGFR: 27 ML/MIN/1.73M2 — LOW
EOSINOPHIL # BLD AUTO: 0 K/UL — SIGNIFICANT CHANGE UP (ref 0–0.5)
EOSINOPHIL NFR BLD AUTO: 0 % — SIGNIFICANT CHANGE UP (ref 0–6)
GLUCOSE SERPL-MCNC: 90 MG/DL — SIGNIFICANT CHANGE UP (ref 70–99)
GLUCOSE SERPL-MCNC: 91 MG/DL — SIGNIFICANT CHANGE UP (ref 70–99)
GLUCOSE SERPL-MCNC: 97 MG/DL — SIGNIFICANT CHANGE UP (ref 70–99)
HCT VFR BLD CALC: 26.5 % — LOW (ref 34.5–45)
HCT VFR BLD CALC: 29.9 % — LOW (ref 34.5–45)
HGB BLD-MCNC: 7.9 G/DL — LOW (ref 11.5–15.5)
HGB BLD-MCNC: 9.3 G/DL — LOW (ref 11.5–15.5)
LACTATE SERPL-SCNC: 0.6 MMOL/L — SIGNIFICANT CHANGE UP (ref 0.5–2)
LACTATE SERPL-SCNC: 0.6 MMOL/L — SIGNIFICANT CHANGE UP (ref 0.5–2)
LYMPHOCYTES # BLD AUTO: 0.7 K/UL — LOW (ref 1–3.3)
LYMPHOCYTES # BLD AUTO: 15 % — SIGNIFICANT CHANGE UP (ref 13–44)
MAGNESIUM SERPL-MCNC: 2.4 MG/DL — SIGNIFICANT CHANGE UP (ref 1.6–2.6)
MCHC RBC-ENTMCNC: 27.6 PG — SIGNIFICANT CHANGE UP (ref 27–34)
MCHC RBC-ENTMCNC: 28.3 PG — SIGNIFICANT CHANGE UP (ref 27–34)
MCHC RBC-ENTMCNC: 29.8 GM/DL — LOW (ref 32–36)
MCHC RBC-ENTMCNC: 31.1 GM/DL — LOW (ref 32–36)
MCV RBC AUTO: 90.9 FL — SIGNIFICANT CHANGE UP (ref 80–100)
MCV RBC AUTO: 92.7 FL — SIGNIFICANT CHANGE UP (ref 80–100)
MONOCYTES # BLD AUTO: 0.42 K/UL — SIGNIFICANT CHANGE UP (ref 0–0.9)
MONOCYTES NFR BLD AUTO: 8.9 % — SIGNIFICANT CHANGE UP (ref 2–14)
NEUTROPHILS # BLD AUTO: 3.53 K/UL — SIGNIFICANT CHANGE UP (ref 1.8–7.4)
NEUTROPHILS NFR BLD AUTO: 75.2 % — SIGNIFICANT CHANGE UP (ref 43–77)
PHOSPHATE SERPL-MCNC: 3.3 MG/DL — SIGNIFICANT CHANGE UP (ref 2.4–4.7)
PLATELET # BLD AUTO: 105 K/UL — LOW (ref 150–400)
PLATELET # BLD AUTO: 119 K/UL — LOW (ref 150–400)
POTASSIUM SERPL-MCNC: 3.5 MMOL/L — SIGNIFICANT CHANGE UP (ref 3.5–5.3)
POTASSIUM SERPL-MCNC: 3.5 MMOL/L — SIGNIFICANT CHANGE UP (ref 3.5–5.3)
POTASSIUM SERPL-MCNC: 3.8 MMOL/L — SIGNIFICANT CHANGE UP (ref 3.5–5.3)
POTASSIUM SERPL-SCNC: 3.5 MMOL/L — SIGNIFICANT CHANGE UP (ref 3.5–5.3)
POTASSIUM SERPL-SCNC: 3.5 MMOL/L — SIGNIFICANT CHANGE UP (ref 3.5–5.3)
POTASSIUM SERPL-SCNC: 3.8 MMOL/L — SIGNIFICANT CHANGE UP (ref 3.5–5.3)
PROT SERPL-MCNC: 6 G/DL — LOW (ref 6.6–8.7)
PROT SERPL-MCNC: 6.1 G/DL — LOW (ref 6.6–8.7)
RBC # BLD: 2.86 M/UL — LOW (ref 3.8–5.2)
RBC # BLD: 3.29 M/UL — LOW (ref 3.8–5.2)
RBC # FLD: 14.5 % — SIGNIFICANT CHANGE UP (ref 10.3–14.5)
RBC # FLD: 14.6 % — HIGH (ref 10.3–14.5)
SODIUM SERPL-SCNC: 147 MMOL/L — HIGH (ref 135–145)
SODIUM SERPL-SCNC: 147 MMOL/L — HIGH (ref 135–145)
SODIUM SERPL-SCNC: 149 MMOL/L — HIGH (ref 135–145)
WBC # BLD: 2.86 K/UL — LOW (ref 3.8–10.5)
WBC # BLD: 4.69 K/UL — SIGNIFICANT CHANGE UP (ref 3.8–10.5)
WBC # FLD AUTO: 2.86 K/UL — LOW (ref 3.8–10.5)
WBC # FLD AUTO: 4.69 K/UL — SIGNIFICANT CHANGE UP (ref 3.8–10.5)

## 2022-12-10 RX ORDER — ACETAMINOPHEN 500 MG
975 TABLET ORAL EVERY 6 HOURS
Refills: 0 | Status: DISCONTINUED | OUTPATIENT
Start: 2022-12-10 | End: 2022-12-10

## 2022-12-10 RX ORDER — ATORVASTATIN CALCIUM 80 MG/1
0 TABLET, FILM COATED ORAL
Qty: 0 | Refills: 0 | DISCHARGE

## 2022-12-10 RX ORDER — LEVOTHYROXINE SODIUM 125 MCG
0 TABLET ORAL
Qty: 0 | Refills: 0 | DISCHARGE

## 2022-12-10 RX ORDER — SODIUM CHLORIDE 9 MG/ML
1000 INJECTION, SOLUTION INTRAVENOUS
Refills: 0 | Status: DISCONTINUED | OUTPATIENT
Start: 2022-12-10 | End: 2022-12-11

## 2022-12-10 RX ORDER — LEVOTHYROXINE SODIUM 125 MCG
75 TABLET ORAL AT BEDTIME
Refills: 0 | Status: DISCONTINUED | OUTPATIENT
Start: 2022-12-10 | End: 2022-12-11

## 2022-12-10 RX ORDER — HEPARIN SODIUM 5000 [USP'U]/ML
5000 INJECTION INTRAVENOUS; SUBCUTANEOUS EVERY 8 HOURS
Refills: 0 | Status: DISCONTINUED | OUTPATIENT
Start: 2022-12-10 | End: 2022-12-14

## 2022-12-10 RX ORDER — AMLODIPINE BESYLATE 2.5 MG/1
1 TABLET ORAL
Qty: 0 | Refills: 0 | DISCHARGE

## 2022-12-10 RX ORDER — SODIUM CHLORIDE 9 MG/ML
1000 INJECTION, SOLUTION INTRAVENOUS
Refills: 0 | Status: DISCONTINUED | OUTPATIENT
Start: 2022-12-10 | End: 2022-12-10

## 2022-12-10 RX ORDER — PANTOPRAZOLE SODIUM 20 MG/1
0 TABLET, DELAYED RELEASE ORAL
Qty: 0 | Refills: 0 | DISCHARGE

## 2022-12-10 RX ORDER — OLANZAPINE 15 MG/1
1 TABLET, FILM COATED ORAL
Qty: 0 | Refills: 0 | DISCHARGE

## 2022-12-10 RX ADMIN — Medication 975 MILLIGRAM(S): at 15:06

## 2022-12-10 RX ADMIN — HEPARIN SODIUM 5000 UNIT(S): 5000 INJECTION INTRAVENOUS; SUBCUTANEOUS at 13:05

## 2022-12-10 RX ADMIN — SODIUM CHLORIDE 110 MILLILITER(S): 9 INJECTION, SOLUTION INTRAVENOUS at 14:35

## 2022-12-10 RX ADMIN — SODIUM CHLORIDE 110 MILLILITER(S): 9 INJECTION, SOLUTION INTRAVENOUS at 06:27

## 2022-12-10 RX ADMIN — SODIUM CHLORIDE 110 MILLILITER(S): 9 INJECTION, SOLUTION INTRAVENOUS at 22:33

## 2022-12-10 RX ADMIN — HEPARIN SODIUM 5000 UNIT(S): 5000 INJECTION INTRAVENOUS; SUBCUTANEOUS at 06:30

## 2022-12-10 RX ADMIN — Medication 75 MICROGRAM(S): at 22:29

## 2022-12-10 RX ADMIN — HEPARIN SODIUM 5000 UNIT(S): 5000 INJECTION INTRAVENOUS; SUBCUTANEOUS at 22:29

## 2022-12-10 RX ADMIN — Medication 975 MILLIGRAM(S): at 14:36

## 2022-12-10 NOTE — H&P ADULT - NSHPLABSRESULTS_GEN_ALL_CORE
LABS                 9.7    4.98   )----------(  122       ( 09 Dec 2022 18:11 )               31.0      144    |  104    |  30.1   ----------------------------<  115        ( 09 Dec 2022 18:11 )  3.7     |  30.0   |  2.02     Ca    9.4        ( 09 Dec 2022 18:11 )    TPro  6.5    /  Alb  4.1    /  TBili  0.6    /  DBili  x      /  AST  14     /  ALT  14     /  AlkPhos  93     ( 09 Dec 2022 18:11 )    LIVER FUNCTIONS - ( 09 Dec 2022 18:11 )  Alb: 4.1 g/dL / Pro: 6.5 g/dL / ALK PHOS: 93 U/L / ALT: 14 U/L / AST: 14 U/L / GGT: x           PT/INR -  14.2 sec / 1.22 ratio   ( 09 Dec 2022 18:11 )       PTT -  28.0 sec   ( 09 Dec 2022 18:11 )  CAPILLARY BLOOD GLUCOSE    --------------------------------------------------------------------------------------------    IMAGING  CT Abdomen and Pelvis w/ Oral Cont (12.09.22 @ 22:14):  FINDINGS:  LOWER CHEST: Diffuse wall thickening of the esophagus is compatible with   esophagitis. There is a hiatal hernia. Coronary, valvular and aortic   calcifications are noted. Dependent and linear atelectatic changes are   appreciated posterior lung bases.    LIVER: There is a left hepatic lobe cyst..  BILE DUCTS: Normal caliber.  GALLBLADDER: Cholelithiasis.  SPLEEN: Stable splenomegaly.  PANCREAS: Calcifications of the pancreatic head is likely related to   previous pancreatitis. Pancreas otherwise unremarkable.  ADRENALS: Within normal limits.  KIDNEYS/URETERS: Within normal limits.    BLADDER: Thick-walled urinary bladder.  REPRODUCTIVE ORGANS: Hysterectomy. Configuration pelvic floor is   compatible with tricompartment descension..    BOWEL: Appendix is not visualized. There is a large complex appearing   right ventral hernia containing both large and small bowel loops.   Markedly dilated small bowel loops are seen proximal to and associated   with this hernia, which measure up to 5.3 cm in diameter. An abrupt   transition of caliber is seen within the more lateral aspect of the   hernia sac, compatible with high-grade small bowel obstruction. There is   an associated hernia mesh along the anteromedial aspect of this hernia.   Several small bowel anastomosis is appreciated.  PERITONEUM: No ascites.  VESSELS: Atherosclerotic changes.  RETROPERITONEUM/LYMPH NODES: No lymphadenopathy.  ABDOMINAL WALL: Postsurgical changes. Large right lower abdominal wall   ventral hernia as above. Anterior hernia mesh seen along the   intra-abdominal wall.    BONES: Generalized osteopenia. Degenerative changes. Compression fracture   of T12 and additional vertebral body height loss noted throughout. Old   bilateral rib fractures noted..  AI VERTEBRAL BODY ANALYSIS:  T8: Moderate compression fracture with 25%height loss.  T10: Moderate compression fracture with 26% height loss.  T11: Moderate compression fracture with 27% height loss and low bone   density of 41 HU, suspicious for osteoporosis.  T12: Severe compression fracture with 55% height loss.  L1:Moderate compression fracture with 26% height loss and low bone   density of 17 HU, suspicious for osteoporosis.  L2: low bone density of 64 HU, suspicious for osteoporosis.  L3: low bone density of 71 HU, suspicious for osteoporosis.  L4: low bone density of 74 HU, suspicious for osteoporosis.    IMPRESSION:  High-grade small bowel obstruction, with transition seen within the   lateral aspect of a complex large right lower abdominal ventral hernia.    Esophagitis.    Thick-walled appearance of the urinary bladder which may represent   cystitis. Correlation with urinalysis is advised.    VERTEBRAL BODY ANALYSIS: Vertebral compression fractures as described,   consider further workup for osteoporosis.

## 2022-12-10 NOTE — H&P ADULT - NSICDXPASTSURGICALHX_GEN_ALL_CORE_FT
PAST SURGICAL HISTORY:  H/O ileostomy 4/2015    Parastomal hernia without obstruction or gangrene

## 2022-12-10 NOTE — PATIENT PROFILE ADULT - FALL HARM RISK - HARM RISK INTERVENTIONS

## 2022-12-10 NOTE — H&P ADULT - ASSESSMENT
ASSESSMENT: Patient is a 72y old female with extensive past medical and psychiatric history and complicated PSH presenting with vomiting at [facility] and CT with high grade SBO within ventral hernia. Attempted to place NGT and patient physically would not allow me to place it.     PLAN:    - Admit to ACS floor  - NPO/IVF  - pain control   - DVT ppx  - OOB/ambulate  - strict I/Os  - Plan discussed with Attending, Dr. Horner ASSESSMENT: Patient is a 72y old female with extensive past medical and psychiatric history and complicated PSH presenting with vomiting at [facility] and CT with high grade SBO within ventral hernia. Attempted to place NGT and patient physically would not allow me to place it.     PLAN:    - Admit to ACS floor  - NPO/IVF  - Closely monitor abdominal exam, low threshold for operative intervention  - pain control   - DVT ppx  - OOB/ambulate  - strict I/Os  - Plan discussed with Attending, Dr. Horner

## 2022-12-10 NOTE — H&P ADULT - HISTORY OF PRESENT ILLNESS
ACUTE CARE SURGERY and TRAUMA CONSULT     HPI: 72y Female present to ED with   Patient denies fevers/chills, denies lightheadedness/dizziness, denies SOB/chest pain, denies nausea/vomiting, denies constipation/diarrhea.  ***    Consult called at:*X*X**  Consult seen at:*X*X**    ROS: 10-system review is otherwise negative except HPI above.      PAST MEDICAL & SURGICAL HISTORY:  Venous insufficiency (chronic) (peripheral)      Constipation      Sensory hearing loss      GERD (gastroesophageal reflux disease)      Hypothyroidism      Schizoaffective disorder, bipolar type      Suicide attempt      Behavior problems  assaultive      Hemiparesis, left      Seizure      Osteopenia      Fall      Vaginal prolapse      Neutropenia      Anemia      Splenomegaly      CVA (cerebral vascular accident)      HTN (hypertension)      Obesity      Urinary incontinence      Schizo affective schizophrenia      Displaced fracture of olecranon process with intraarticular extension of left ulna      Rectal prolapse      Onychomycosis      Uterine prolapse      H/O ileostomy  4/2015      Parastomal hernia without obstruction or gangrene        FAMILY HISTORY:  Family history of psoriasis      Family history not pertinent as reviewed with the patient.    SOCIAL HISTORY:  Denies any toxic habits    ALLERGIES: NKA clozapine (Other)  Depakote (Other)  erythromycin (Unknown)  Neupogen (Other)      HOME MEDICATIONS: ***  Home Medications:  acetaminophen 325 mg oral tablet: 2 tab(s) orally every 6 hours, As needed, Temp greater or equal to 38C (100.4F) (14 Apr 2020 13:39)  albuterol 90 mcg/inh inhalation aerosol: 2 puff(s) inhaled every 6 hours, As needed, Shortness of Breath and/or Wheezing (14 Apr 2020 14:29)  cholecalciferol oral tablet: 2000 unit(s) orally every 48 hours (05 Dec 2018 13:26)  docusate sodium 100 mg oral capsule: 1 cap(s) orally 3 times a day, As Needed (05 Dec 2018 13:26)  haloperidol 1 mg oral tablet: 7 tab(s) orally 2 times a day (05 Dec 2018 13:26)  levothyroxine 112 mcg (0.112 mg) oral tablet: 1 tab(s) orally once a day (05 Dec 2018 13:26)  lidocaine 5% topical film: Apply topically to affected area once a day (05 Dec 2018 13:26)  LORazepam 1 mg oral tablet: 1 tab(s) orally 3 times a day (05 Dec 2018 13:26)  multivitamin: 1 tab(s) orally once a day (05 Dec 2018 13:26)  Norvasc 2.5 mg oral tablet: 1 tab(s) orally once a day (05 Oct 2019 09:16)  OLANZapine 5 mg oral tablet: 1 tab(s) orally once a day (at bedtime) (05 Dec 2018 13:26)  pantoprazole 40 mg oral delayed release tablet: 1 tab(s) orally once a day (before a meal) (05 Oct 2019 09:16)  polyethylene glycol 3350 oral powder for reconstitution: 17 gram(s) orally once a day (at bedtime) (26 Dec 2019 16:38)  senna oral tablet: 2 tab(s) orally once a day (at bedtime), As Needed (05 Dec 2018 13:26)  simvastatin 20 mg oral tablet: 1 tab(s) orally once a day (at bedtime) (26 Dec 2019 06:54)  sulfamethoxazole-trimethoprim 800 mg-160 mg oral tablet: 1 tab(s) orally every 12 hours (25 Dec 2019 16:43)      --------------------------------------------------------------------------------------------    PHYSICAL EXAM:   General: NAD, Lying in bed comfortably  Neuro: A+Ox3  HEENT: EOMI, PERRLA, MMM  Cardio: RRR  Resp: Non labored breathing on RA  GI/Abd: Soft, NT/ND, no rebound/guarding, no masses palpated  Vascular: All 4 extremities warm and well perfused.   Pelvis: stable  Musculoskeletal: All 4 extremities moving spontaneously, no limitations, no spinal tenderness.  --------------------------------------------------------------------------------------------    LABS                 9.7    4.98   )----------(  122       ( 09 Dec 2022 18:11 )               31.0      144    |  104    |  30.1   ----------------------------<  115        ( 09 Dec 2022 18:11 )  3.7     |  30.0   |  2.02     Ca    9.4        ( 09 Dec 2022 18:11 )    TPro  6.5    /  Alb  4.1    /  TBili  0.6    /  DBili  x      /  AST  14     /  ALT  14     /  AlkPhos  93     ( 09 Dec 2022 18:11 )    LIVER FUNCTIONS - ( 09 Dec 2022 18:11 )  Alb: 4.1 g/dL / Pro: 6.5 g/dL / ALK PHOS: 93 U/L / ALT: 14 U/L / AST: 14 U/L / GGT: x           PT/INR -  14.2 sec / 1.22 ratio   ( 09 Dec 2022 18:11 )       PTT -  28.0 sec   ( 09 Dec 2022 18:11 )  CAPILLARY BLOOD GLUCOSE              --------------------------------------------------------------------------------------------  IMAGING  ***         ACUTE CARE SURGERY H&P    HPI: 72y Female with schizoaffective disorder resident of [facility], extensive comorbidities, and complex abdominal surgical history known to this ACS service for previous surgeries and multiple episodes of partial SBO, last time in 12/2019 which was manages non-operatively. Today she was sent in from her facility with multiple episodes of emesis. Caregiver at bedside was unable to provide much information regarding number or quality of emesis. Patient unable to provide much meaningful history either, however she is saying her last BM was ysd. Patient is unable to tell when her last flatus was.       Extensive past surgical history with multiple operations by colorectal surgeon, Dr. Miller:     3/20/15: Altemeier procedure  3/26/15: Ex-lap, CONOR, appendectomy and diverting loop ileostomy for anastomotic leak  7/20/15: Rigid proctosigmoidoscope with findings of posterior anastomotic defect  11/19/15: Ex-lap, CONOR, VHR with biologic mesh, primary repair of parastomal hernia  7/1/16: VHR with parastomal hernia repair  4/4/17: Ex-lap, CONOR, SBR, ileostomy reversal, VHR with abdominal wall reconstruction via component separation and biologic mesh    --------------------------------------------------------------------------------------------

## 2022-12-10 NOTE — H&P ADULT - NSHPPHYSICALEXAM_GEN_ALL_CORE
Vital Signs Last 24 Hrs  T(C): 37 (09 Dec 2022 20:40), Max: 37 (09 Dec 2022 16:40)  T(F): 98.6 (09 Dec 2022 20:40), Max: 98.6 (09 Dec 2022 16:40)  HR: 90 (09 Dec 2022 20:40) (86 - 90)  BP: 146/77 (09 Dec 2022 20:40) (146/77 - 155/70)  BP(mean): --  RR: 18 (09 Dec 2022 20:40) (18 - 18)  SpO2: 98% (09 Dec 2022 20:40) (96% - 98%)    Parameters below as of 09 Dec 2022 16:40  Patient On (Oxygen Delivery Method): room air        PHYSICAL EXAM:   General: NAD, Lying in bed sleeping, dried reddish appearing emesis on shirt  HEENT: dry mucus membranes  Resp: Non labored breathing on RA  GI/Abd: Soft, nondistended, moderately tender to palpation over hernia, no rebound/guarding, large R sided ventral hernia with edema of dependent skin, no erythema  Musculoskeletal: All 4 extremities moving spontaneously    --------------------------------------------------------------------------------------------

## 2022-12-10 NOTE — H&P ADULT - ATTENDING COMMENTS
72y Female with schizoaffective disorder resident of [facility], extensive comorbidities, and complex abdominal surgical history known to this ACS service for previous surgeries and multiple episodes of partial SBO, last time in 12/2019 which was manages non-operatively. Today she was sent in from her facility with multiple episodes of emesis. Caregiver at bedside was unable to provide much information regarding number or quality of emesis. Patient unable to provide much meaningful history either, however she is saying her last BM was ysd. Patient is unable to tell when her last flatus was.   Patient had at least 8 previous abdominal operations including parastomal and ventral hernia repairs with mesh  Patient seen in RW and CDU later on  Awake alert  Bilateral BS  Hemodynamic intact  Abdomen distended, no masses, no RG. Diffusely tender but no point tenderness or peritonitic signs  R large abdominal wall hernia, reducible but readily pops out again  Multiple previously placed meshes are palpable in abdominal wall on exam  Based on my evaluation, patient indeed has SBO but likely in the pelvis and possibly in the lateral wall hernia. This is a large neck hernia and currently reducible.  Every effort should be made to resolve this conservatively, which may or may not be successful. Surgery would be complex and chance of major complications is very high, including infections, high chance of fistulas etc.

## 2022-12-10 NOTE — PATIENT PROFILE ADULT - FUNCTIONAL ASSESSMENT - BASIC MOBILITY 6.
3-calculated by average/Not able to assess (calculate score using [facility] averaging method)

## 2022-12-10 NOTE — ED ADULT NURSE REASSESSMENT NOTE - NS ED NURSE REASSESS COMMENT FT1
surgery resident and RN attempted to place NG tube and pt refused and became aggressive when attempting to insert. pt asked to repeat the risk and was able to states risk and states she has her rights to refuse. MD cedeño made aware

## 2022-12-11 LAB
ANION GAP SERPL CALC-SCNC: 11 MMOL/L — SIGNIFICANT CHANGE UP (ref 5–17)
ANION GAP SERPL CALC-SCNC: 13 MMOL/L — SIGNIFICANT CHANGE UP (ref 5–17)
ANION GAP SERPL CALC-SCNC: 15 MMOL/L — SIGNIFICANT CHANGE UP (ref 5–17)
ANISOCYTOSIS BLD QL: SLIGHT — SIGNIFICANT CHANGE UP
APPEARANCE UR: ABNORMAL
BACTERIA # UR AUTO: ABNORMAL
BASOPHILS # BLD AUTO: 0 K/UL — SIGNIFICANT CHANGE UP (ref 0–0.2)
BASOPHILS NFR BLD AUTO: 0 % — SIGNIFICANT CHANGE UP (ref 0–2)
BILIRUB UR-MCNC: NEGATIVE — SIGNIFICANT CHANGE UP
BLD GP AB SCN SERPL QL: SIGNIFICANT CHANGE UP
BUN SERPL-MCNC: 24.6 MG/DL — HIGH (ref 8–20)
BUN SERPL-MCNC: 26.6 MG/DL — HIGH (ref 8–20)
BUN SERPL-MCNC: 29 MG/DL — HIGH (ref 8–20)
BURR CELLS BLD QL SMEAR: PRESENT — SIGNIFICANT CHANGE UP
CALCIUM SERPL-MCNC: 8.7 MG/DL — SIGNIFICANT CHANGE UP (ref 8.4–10.5)
CALCIUM SERPL-MCNC: 9 MG/DL — SIGNIFICANT CHANGE UP (ref 8.4–10.5)
CALCIUM SERPL-MCNC: 9.3 MG/DL — SIGNIFICANT CHANGE UP (ref 8.4–10.5)
CHLORIDE SERPL-SCNC: 116 MMOL/L — HIGH (ref 96–108)
CHLORIDE SERPL-SCNC: 116 MMOL/L — HIGH (ref 96–108)
CHLORIDE SERPL-SCNC: 118 MMOL/L — HIGH (ref 96–108)
CO2 SERPL-SCNC: 23 MMOL/L — SIGNIFICANT CHANGE UP (ref 22–29)
CO2 SERPL-SCNC: 23 MMOL/L — SIGNIFICANT CHANGE UP (ref 22–29)
CO2 SERPL-SCNC: 25 MMOL/L — SIGNIFICANT CHANGE UP (ref 22–29)
COLOR SPEC: YELLOW — SIGNIFICANT CHANGE UP
CREAT ?TM UR-MCNC: 18 MG/DL — SIGNIFICANT CHANGE UP
CREAT SERPL-MCNC: 1.73 MG/DL — HIGH (ref 0.5–1.3)
CREAT SERPL-MCNC: 1.84 MG/DL — HIGH (ref 0.5–1.3)
CREAT SERPL-MCNC: 1.85 MG/DL — HIGH (ref 0.5–1.3)
DACRYOCYTES BLD QL SMEAR: SLIGHT — SIGNIFICANT CHANGE UP
DIFF PNL FLD: ABNORMAL
EGFR: 29 ML/MIN/1.73M2 — LOW
EGFR: 29 ML/MIN/1.73M2 — LOW
EGFR: 31 ML/MIN/1.73M2 — LOW
ELLIPTOCYTES BLD QL SMEAR: SLIGHT — SIGNIFICANT CHANGE UP
EOSINOPHIL # BLD AUTO: 0 K/UL — SIGNIFICANT CHANGE UP (ref 0–0.5)
EOSINOPHIL NFR BLD AUTO: 0 % — SIGNIFICANT CHANGE UP (ref 0–6)
EPI CELLS # UR: SIGNIFICANT CHANGE UP
GLUCOSE SERPL-MCNC: 109 MG/DL — HIGH (ref 70–99)
GLUCOSE SERPL-MCNC: 171 MG/DL — HIGH (ref 70–99)
GLUCOSE SERPL-MCNC: 79 MG/DL — SIGNIFICANT CHANGE UP (ref 70–99)
GLUCOSE UR QL: NEGATIVE MG/DL — SIGNIFICANT CHANGE UP
HCT VFR BLD CALC: 32.8 % — LOW (ref 34.5–45)
HGB BLD-MCNC: 9.8 G/DL — LOW (ref 11.5–15.5)
KETONES UR-MCNC: ABNORMAL
LACTATE SERPL-SCNC: 1.9 MMOL/L — SIGNIFICANT CHANGE UP (ref 0.5–2)
LEUKOCYTE ESTERASE UR-ACNC: ABNORMAL
LYMPHOCYTES # BLD AUTO: 0.36 K/UL — LOW (ref 1–3.3)
LYMPHOCYTES # BLD AUTO: 11.2 % — LOW (ref 13–44)
MACROCYTES BLD QL: SLIGHT — SIGNIFICANT CHANGE UP
MAGNESIUM SERPL-MCNC: 2.3 MG/DL — SIGNIFICANT CHANGE UP (ref 1.6–2.6)
MANUAL SMEAR VERIFICATION: SIGNIFICANT CHANGE UP
MCHC RBC-ENTMCNC: 27.7 PG — SIGNIFICANT CHANGE UP (ref 27–34)
MCHC RBC-ENTMCNC: 29.9 GM/DL — LOW (ref 32–36)
MCV RBC AUTO: 92.7 FL — SIGNIFICANT CHANGE UP (ref 80–100)
MONOCYTES # BLD AUTO: 0.29 K/UL — SIGNIFICANT CHANGE UP (ref 0–0.9)
MONOCYTES NFR BLD AUTO: 9 % — SIGNIFICANT CHANGE UP (ref 2–14)
NEUTROPHILS # BLD AUTO: 2.53 K/UL — SIGNIFICANT CHANGE UP (ref 1.8–7.4)
NEUTROPHILS NFR BLD AUTO: 73.6 % — SIGNIFICANT CHANGE UP (ref 43–77)
NEUTS BAND # BLD: 5.6 % — SIGNIFICANT CHANGE UP (ref 0–8)
NITRITE UR-MCNC: NEGATIVE — SIGNIFICANT CHANGE UP
OSMOLALITY UR: 290 MOSM/KG — LOW (ref 300–1000)
OVALOCYTES BLD QL SMEAR: SLIGHT — SIGNIFICANT CHANGE UP
PH UR: 7 — SIGNIFICANT CHANGE UP (ref 5–8)
PHOSPHATE SERPL-MCNC: 2.7 MG/DL — SIGNIFICANT CHANGE UP (ref 2.4–4.7)
PLAT MORPH BLD: NORMAL — SIGNIFICANT CHANGE UP
PLATELET # BLD AUTO: 116 K/UL — LOW (ref 150–400)
POIKILOCYTOSIS BLD QL AUTO: SLIGHT — SIGNIFICANT CHANGE UP
POLYCHROMASIA BLD QL SMEAR: SLIGHT — SIGNIFICANT CHANGE UP
POTASSIUM SERPL-MCNC: 3.6 MMOL/L — SIGNIFICANT CHANGE UP (ref 3.5–5.3)
POTASSIUM SERPL-MCNC: 3.7 MMOL/L — SIGNIFICANT CHANGE UP (ref 3.5–5.3)
POTASSIUM SERPL-MCNC: 3.8 MMOL/L — SIGNIFICANT CHANGE UP (ref 3.5–5.3)
POTASSIUM SERPL-SCNC: 3.6 MMOL/L — SIGNIFICANT CHANGE UP (ref 3.5–5.3)
POTASSIUM SERPL-SCNC: 3.7 MMOL/L — SIGNIFICANT CHANGE UP (ref 3.5–5.3)
POTASSIUM SERPL-SCNC: 3.8 MMOL/L — SIGNIFICANT CHANGE UP (ref 3.5–5.3)
POTASSIUM UR-SCNC: 18 MMOL/L — SIGNIFICANT CHANGE UP
PROCALCITONIN SERPL-MCNC: 0.3 NG/ML — HIGH (ref 0.02–0.1)
PROT UR-MCNC: 15
RBC # BLD: 3.54 M/UL — LOW (ref 3.8–5.2)
RBC # FLD: 14.5 % — SIGNIFICANT CHANGE UP (ref 10.3–14.5)
RBC BLD AUTO: ABNORMAL
RBC CASTS # UR COMP ASSIST: NEGATIVE /HPF — SIGNIFICANT CHANGE UP (ref 0–4)
SMUDGE CELLS # BLD: PRESENT — SIGNIFICANT CHANGE UP
SODIUM SERPL-SCNC: 152 MMOL/L — HIGH (ref 135–145)
SODIUM SERPL-SCNC: 154 MMOL/L — HIGH (ref 135–145)
SODIUM SERPL-SCNC: 154 MMOL/L — HIGH (ref 135–145)
SODIUM UR-SCNC: 99 MMOL/L — SIGNIFICANT CHANGE UP
SP GR SPEC: 1 — LOW (ref 1.01–1.02)
UROBILINOGEN FLD QL: NEGATIVE MG/DL — SIGNIFICANT CHANGE UP
VARIANT LYMPHS # BLD: 0.6 % — SIGNIFICANT CHANGE UP (ref 0–6)
WBC # BLD: 3.19 K/UL — LOW (ref 3.8–10.5)
WBC # FLD AUTO: 3.19 K/UL — LOW (ref 3.8–10.5)
WBC UR QL: ABNORMAL /HPF (ref 0–5)

## 2022-12-11 RX ORDER — PANTOPRAZOLE SODIUM 20 MG/1
40 TABLET, DELAYED RELEASE ORAL
Refills: 0 | Status: DISCONTINUED | OUTPATIENT
Start: 2022-12-11 | End: 2022-12-14

## 2022-12-11 RX ORDER — OLANZAPINE 15 MG/1
2.5 TABLET, FILM COATED ORAL AT BEDTIME
Refills: 0 | Status: DISCONTINUED | OUTPATIENT
Start: 2022-12-11 | End: 2022-12-14

## 2022-12-11 RX ORDER — DEXTROSE MONOHYDRATE, SODIUM CHLORIDE, AND POTASSIUM CHLORIDE 50; .745; 4.5 G/1000ML; G/1000ML; G/1000ML
1000 INJECTION, SOLUTION INTRAVENOUS
Refills: 0 | Status: DISCONTINUED | OUTPATIENT
Start: 2022-12-11 | End: 2022-12-12

## 2022-12-11 RX ORDER — OLANZAPINE 15 MG/1
0 TABLET, FILM COATED ORAL
Qty: 0 | Refills: 0 | DISCHARGE

## 2022-12-11 RX ORDER — SENNA PLUS 8.6 MG/1
2 TABLET ORAL AT BEDTIME
Refills: 0 | Status: DISCONTINUED | OUTPATIENT
Start: 2022-12-11 | End: 2022-12-14

## 2022-12-11 RX ORDER — ACETAMINOPHEN 500 MG
975 TABLET ORAL ONCE
Refills: 0 | Status: COMPLETED | OUTPATIENT
Start: 2022-12-11 | End: 2022-12-12

## 2022-12-11 RX ORDER — SODIUM CHLORIDE 9 MG/ML
1000 INJECTION, SOLUTION INTRAVENOUS
Refills: 0 | Status: DISCONTINUED | OUTPATIENT
Start: 2022-12-11 | End: 2022-12-11

## 2022-12-11 RX ORDER — SODIUM CHLORIDE 9 MG/ML
500 INJECTION, SOLUTION INTRAVENOUS ONCE
Refills: 0 | Status: COMPLETED | OUTPATIENT
Start: 2022-12-11 | End: 2022-12-11

## 2022-12-11 RX ORDER — ESCITALOPRAM OXALATE 10 MG/1
10 TABLET, FILM COATED ORAL DAILY
Refills: 0 | Status: DISCONTINUED | OUTPATIENT
Start: 2022-12-11 | End: 2022-12-14

## 2022-12-11 RX ORDER — OLANZAPINE 15 MG/1
2.5 TABLET, FILM COATED ORAL ONCE
Refills: 0 | Status: COMPLETED | OUTPATIENT
Start: 2022-12-11 | End: 2022-12-11

## 2022-12-11 RX ORDER — LEVOTHYROXINE SODIUM 125 MCG
112 TABLET ORAL DAILY
Refills: 0 | Status: DISCONTINUED | OUTPATIENT
Start: 2022-12-11 | End: 2022-12-14

## 2022-12-11 RX ORDER — HALOPERIDOL DECANOATE 100 MG/ML
0 INJECTION INTRAMUSCULAR
Qty: 0 | Refills: 0 | DISCHARGE

## 2022-12-11 RX ORDER — HALOPERIDOL DECANOATE 100 MG/ML
5 INJECTION INTRAMUSCULAR
Refills: 0 | Status: DISCONTINUED | OUTPATIENT
Start: 2022-12-11 | End: 2022-12-14

## 2022-12-11 RX ORDER — ATORVASTATIN CALCIUM 80 MG/1
10 TABLET, FILM COATED ORAL AT BEDTIME
Refills: 0 | Status: DISCONTINUED | OUTPATIENT
Start: 2022-12-11 | End: 2022-12-14

## 2022-12-11 RX ORDER — POLYETHYLENE GLYCOL 3350 17 G/17G
17 POWDER, FOR SOLUTION ORAL DAILY
Refills: 0 | Status: DISCONTINUED | OUTPATIENT
Start: 2022-12-11 | End: 2022-12-14

## 2022-12-11 RX ORDER — SODIUM CHLORIDE 9 MG/ML
1000 INJECTION, SOLUTION INTRAVENOUS
Refills: 0 | Status: COMPLETED | OUTPATIENT
Start: 2022-12-11 | End: 2022-12-11

## 2022-12-11 RX ORDER — CHOLECALCIFEROL (VITAMIN D3) 125 MCG
2000 CAPSULE ORAL DAILY
Refills: 0 | Status: DISCONTINUED | OUTPATIENT
Start: 2022-12-11 | End: 2022-12-14

## 2022-12-11 RX ORDER — DEXTROSE MONOHYDRATE, SODIUM CHLORIDE, AND POTASSIUM CHLORIDE 50; .745; 4.5 G/1000ML; G/1000ML; G/1000ML
1000 INJECTION, SOLUTION INTRAVENOUS
Refills: 0 | Status: DISCONTINUED | OUTPATIENT
Start: 2022-12-11 | End: 2022-12-11

## 2022-12-11 RX ORDER — SIMETHICONE 80 MG/1
80 TABLET, CHEWABLE ORAL THREE TIMES A DAY
Refills: 0 | Status: DISCONTINUED | OUTPATIENT
Start: 2022-12-11 | End: 2022-12-14

## 2022-12-11 RX ORDER — MULTIVIT-MIN/FERROUS GLUCONATE 9 MG/15 ML
1 LIQUID (ML) ORAL DAILY
Refills: 0 | Status: DISCONTINUED | OUTPATIENT
Start: 2022-12-11 | End: 2022-12-14

## 2022-12-11 RX ORDER — OLANZAPINE 15 MG/1
5 TABLET, FILM COATED ORAL DAILY
Refills: 0 | Status: DISCONTINUED | OUTPATIENT
Start: 2022-12-11 | End: 2022-12-14

## 2022-12-11 RX ADMIN — HEPARIN SODIUM 5000 UNIT(S): 5000 INJECTION INTRAVENOUS; SUBCUTANEOUS at 19:17

## 2022-12-11 RX ADMIN — SODIUM CHLORIDE 500 MILLILITER(S): 9 INJECTION, SOLUTION INTRAVENOUS at 22:28

## 2022-12-11 RX ADMIN — HALOPERIDOL DECANOATE 5 MILLIGRAM(S): 100 INJECTION INTRAMUSCULAR at 19:16

## 2022-12-11 RX ADMIN — Medication 0.5 MILLIGRAM(S): at 15:03

## 2022-12-11 RX ADMIN — SODIUM CHLORIDE 500 MILLILITER(S): 9 INJECTION, SOLUTION INTRAVENOUS at 12:25

## 2022-12-11 RX ADMIN — OLANZAPINE 2.5 MILLIGRAM(S): 15 TABLET, FILM COATED ORAL at 13:51

## 2022-12-11 RX ADMIN — HEPARIN SODIUM 5000 UNIT(S): 5000 INJECTION INTRAVENOUS; SUBCUTANEOUS at 05:25

## 2022-12-11 NOTE — PROGRESS NOTE ADULT - ATTENDING COMMENTS
Awake alert  Bilateral BS  Hemodynamic intact  Abdomen soft,. No RG, passes gas and has BMs  The lateral wall hernia is easily reducible and soft  SBO resolved  Advance diet to liquids and see how she does  Again, every effort should be made to avoid surgery in this patients for the complications would be potentially insurmountable

## 2022-12-12 LAB
ANION GAP SERPL CALC-SCNC: 10 MMOL/L — SIGNIFICANT CHANGE UP (ref 5–17)
ANION GAP SERPL CALC-SCNC: 10 MMOL/L — SIGNIFICANT CHANGE UP (ref 5–17)
ANION GAP SERPL CALC-SCNC: 9 MMOL/L — SIGNIFICANT CHANGE UP (ref 5–17)
APTT BLD: 27.5 SEC — SIGNIFICANT CHANGE UP (ref 27.5–35.5)
BASOPHILS # BLD AUTO: 0 K/UL — SIGNIFICANT CHANGE UP (ref 0–0.2)
BASOPHILS # BLD AUTO: 0.01 K/UL — SIGNIFICANT CHANGE UP (ref 0–0.2)
BASOPHILS NFR BLD AUTO: 0 % — SIGNIFICANT CHANGE UP (ref 0–2)
BASOPHILS NFR BLD AUTO: 0.2 % — SIGNIFICANT CHANGE UP (ref 0–2)
BUN SERPL-MCNC: 25.5 MG/DL — HIGH (ref 8–20)
BUN SERPL-MCNC: 25.5 MG/DL — HIGH (ref 8–20)
BUN SERPL-MCNC: 25.9 MG/DL — HIGH (ref 8–20)
CALCIUM SERPL-MCNC: 8.4 MG/DL — SIGNIFICANT CHANGE UP (ref 8.4–10.5)
CALCIUM SERPL-MCNC: 8.4 MG/DL — SIGNIFICANT CHANGE UP (ref 8.4–10.5)
CALCIUM SERPL-MCNC: 8.6 MG/DL — SIGNIFICANT CHANGE UP (ref 8.4–10.5)
CHLORIDE SERPL-SCNC: 113 MMOL/L — HIGH (ref 96–108)
CHLORIDE SERPL-SCNC: 114 MMOL/L — HIGH (ref 96–108)
CHLORIDE SERPL-SCNC: 115 MMOL/L — HIGH (ref 96–108)
CO2 SERPL-SCNC: 24 MMOL/L — SIGNIFICANT CHANGE UP (ref 22–29)
CO2 SERPL-SCNC: 25 MMOL/L — SIGNIFICANT CHANGE UP (ref 22–29)
CO2 SERPL-SCNC: 26 MMOL/L — SIGNIFICANT CHANGE UP (ref 22–29)
CREAT SERPL-MCNC: 1.76 MG/DL — HIGH (ref 0.5–1.3)
CREAT SERPL-MCNC: 1.84 MG/DL — HIGH (ref 0.5–1.3)
CREAT SERPL-MCNC: 1.9 MG/DL — HIGH (ref 0.5–1.3)
EGFR: 28 ML/MIN/1.73M2 — LOW
EGFR: 29 ML/MIN/1.73M2 — LOW
EGFR: 31 ML/MIN/1.73M2 — LOW
EOSINOPHIL # BLD AUTO: 0 K/UL — SIGNIFICANT CHANGE UP (ref 0–0.5)
EOSINOPHIL # BLD AUTO: 0.02 K/UL — SIGNIFICANT CHANGE UP (ref 0–0.5)
EOSINOPHIL NFR BLD AUTO: 0 % — SIGNIFICANT CHANGE UP (ref 0–6)
EOSINOPHIL NFR BLD AUTO: 0.5 % — SIGNIFICANT CHANGE UP (ref 0–6)
GLUCOSE SERPL-MCNC: 109 MG/DL — HIGH (ref 70–99)
GLUCOSE SERPL-MCNC: 113 MG/DL — HIGH (ref 70–99)
GLUCOSE SERPL-MCNC: 120 MG/DL — HIGH (ref 70–99)
HCT VFR BLD CALC: 25.4 % — LOW (ref 34.5–45)
HCT VFR BLD CALC: 27.6 % — LOW (ref 34.5–45)
HGB BLD-MCNC: 7.7 G/DL — LOW (ref 11.5–15.5)
HGB BLD-MCNC: 8 G/DL — LOW (ref 11.5–15.5)
IMM GRANULOCYTES NFR BLD AUTO: 0.5 % — SIGNIFICANT CHANGE UP (ref 0–0.9)
IMM GRANULOCYTES NFR BLD AUTO: 0.7 % — SIGNIFICANT CHANGE UP (ref 0–0.9)
INR BLD: 1.35 RATIO — HIGH (ref 0.88–1.16)
LYMPHOCYTES # BLD AUTO: 0.83 K/UL — LOW (ref 1–3.3)
LYMPHOCYTES # BLD AUTO: 0.93 K/UL — LOW (ref 1–3.3)
LYMPHOCYTES # BLD AUTO: 19.9 % — SIGNIFICANT CHANGE UP (ref 13–44)
LYMPHOCYTES # BLD AUTO: 23.6 % — SIGNIFICANT CHANGE UP (ref 13–44)
MAGNESIUM SERPL-MCNC: 1.9 MG/DL — SIGNIFICANT CHANGE UP (ref 1.6–2.6)
MCHC RBC-ENTMCNC: 27.2 PG — SIGNIFICANT CHANGE UP (ref 27–34)
MCHC RBC-ENTMCNC: 28.2 PG — SIGNIFICANT CHANGE UP (ref 27–34)
MCHC RBC-ENTMCNC: 29 GM/DL — LOW (ref 32–36)
MCHC RBC-ENTMCNC: 30.3 GM/DL — LOW (ref 32–36)
MCV RBC AUTO: 93 FL — SIGNIFICANT CHANGE UP (ref 80–100)
MCV RBC AUTO: 93.9 FL — SIGNIFICANT CHANGE UP (ref 80–100)
MONOCYTES # BLD AUTO: 0.45 K/UL — SIGNIFICANT CHANGE UP (ref 0–0.9)
MONOCYTES # BLD AUTO: 0.48 K/UL — SIGNIFICANT CHANGE UP (ref 0–0.9)
MONOCYTES NFR BLD AUTO: 11.4 % — SIGNIFICANT CHANGE UP (ref 2–14)
MONOCYTES NFR BLD AUTO: 11.5 % — SIGNIFICANT CHANGE UP (ref 2–14)
NEUTROPHILS # BLD AUTO: 2.54 K/UL — SIGNIFICANT CHANGE UP (ref 1.8–7.4)
NEUTROPHILS # BLD AUTO: 2.8 K/UL — SIGNIFICANT CHANGE UP (ref 1.8–7.4)
NEUTROPHILS NFR BLD AUTO: 64.5 % — SIGNIFICANT CHANGE UP (ref 43–77)
NEUTROPHILS NFR BLD AUTO: 67.2 % — SIGNIFICANT CHANGE UP (ref 43–77)
PHOSPHATE SERPL-MCNC: 1.8 MG/DL — LOW (ref 2.4–4.7)
PLATELET # BLD AUTO: 113 K/UL — LOW (ref 150–400)
PLATELET # BLD AUTO: 113 K/UL — LOW (ref 150–400)
POTASSIUM SERPL-MCNC: 3.4 MMOL/L — LOW (ref 3.5–5.3)
POTASSIUM SERPL-MCNC: 3.5 MMOL/L — SIGNIFICANT CHANGE UP (ref 3.5–5.3)
POTASSIUM SERPL-MCNC: 4 MMOL/L — SIGNIFICANT CHANGE UP (ref 3.5–5.3)
POTASSIUM SERPL-SCNC: 3.4 MMOL/L — LOW (ref 3.5–5.3)
POTASSIUM SERPL-SCNC: 3.5 MMOL/L — SIGNIFICANT CHANGE UP (ref 3.5–5.3)
POTASSIUM SERPL-SCNC: 4 MMOL/L — SIGNIFICANT CHANGE UP (ref 3.5–5.3)
PROCALCITONIN SERPL-MCNC: 0.28 NG/ML — HIGH (ref 0.02–0.1)
PROTHROM AB SERPL-ACNC: 15.7 SEC — HIGH (ref 10.5–13.4)
RBC # BLD: 2.73 M/UL — LOW (ref 3.8–5.2)
RBC # BLD: 2.94 M/UL — LOW (ref 3.8–5.2)
RBC # FLD: 14.4 % — SIGNIFICANT CHANGE UP (ref 10.3–14.5)
RBC # FLD: 14.6 % — HIGH (ref 10.3–14.5)
SODIUM SERPL-SCNC: 147 MMOL/L — HIGH (ref 135–145)
SODIUM SERPL-SCNC: 148 MMOL/L — HIGH (ref 135–145)
SODIUM SERPL-SCNC: 151 MMOL/L — HIGH (ref 135–145)
WBC # BLD: 3.94 K/UL — SIGNIFICANT CHANGE UP (ref 3.8–10.5)
WBC # BLD: 4.17 K/UL — SIGNIFICANT CHANGE UP (ref 3.8–10.5)
WBC # FLD AUTO: 3.94 K/UL — SIGNIFICANT CHANGE UP (ref 3.8–10.5)
WBC # FLD AUTO: 4.17 K/UL — SIGNIFICANT CHANGE UP (ref 3.8–10.5)

## 2022-12-12 PROCEDURE — 99231 SBSQ HOSP IP/OBS SF/LOW 25: CPT | Mod: GC

## 2022-12-12 PROCEDURE — 71045 X-RAY EXAM CHEST 1 VIEW: CPT | Mod: 26

## 2022-12-12 RX ORDER — ONDANSETRON 8 MG/1
4 TABLET, FILM COATED ORAL ONCE
Refills: 0 | Status: COMPLETED | OUTPATIENT
Start: 2022-12-12 | End: 2022-12-12

## 2022-12-12 RX ORDER — DEXTROSE MONOHYDRATE, SODIUM CHLORIDE, AND POTASSIUM CHLORIDE 50; .745; 4.5 G/1000ML; G/1000ML; G/1000ML
1000 INJECTION, SOLUTION INTRAVENOUS
Refills: 0 | Status: DISCONTINUED | OUTPATIENT
Start: 2022-12-12 | End: 2022-12-13

## 2022-12-12 RX ADMIN — Medication 975 MILLIGRAM(S): at 01:00

## 2022-12-12 RX ADMIN — SIMETHICONE 80 MILLIGRAM(S): 80 TABLET, CHEWABLE ORAL at 05:37

## 2022-12-12 RX ADMIN — ESCITALOPRAM OXALATE 10 MILLIGRAM(S): 10 TABLET, FILM COATED ORAL at 13:13

## 2022-12-12 RX ADMIN — Medication 112 MICROGRAM(S): at 05:38

## 2022-12-12 RX ADMIN — Medication 0.5 MILLIGRAM(S): at 00:04

## 2022-12-12 RX ADMIN — SIMETHICONE 80 MILLIGRAM(S): 80 TABLET, CHEWABLE ORAL at 13:12

## 2022-12-12 RX ADMIN — Medication 975 MILLIGRAM(S): at 00:05

## 2022-12-12 RX ADMIN — HALOPERIDOL DECANOATE 5 MILLIGRAM(S): 100 INJECTION INTRAMUSCULAR at 05:37

## 2022-12-12 RX ADMIN — HEPARIN SODIUM 5000 UNIT(S): 5000 INJECTION INTRAVENOUS; SUBCUTANEOUS at 22:03

## 2022-12-12 RX ADMIN — HALOPERIDOL DECANOATE 5 MILLIGRAM(S): 100 INJECTION INTRAMUSCULAR at 16:59

## 2022-12-12 RX ADMIN — Medication 1 MILLIGRAM(S): at 08:56

## 2022-12-12 RX ADMIN — DEXTROSE MONOHYDRATE, SODIUM CHLORIDE, AND POTASSIUM CHLORIDE 130 MILLILITER(S): 50; .745; 4.5 INJECTION, SOLUTION INTRAVENOUS at 00:05

## 2022-12-12 RX ADMIN — OLANZAPINE 2.5 MILLIGRAM(S): 15 TABLET, FILM COATED ORAL at 22:02

## 2022-12-12 RX ADMIN — SIMETHICONE 80 MILLIGRAM(S): 80 TABLET, CHEWABLE ORAL at 16:59

## 2022-12-12 RX ADMIN — Medication 1 TABLET(S): at 13:13

## 2022-12-12 RX ADMIN — HEPARIN SODIUM 5000 UNIT(S): 5000 INJECTION INTRAVENOUS; SUBCUTANEOUS at 05:37

## 2022-12-12 RX ADMIN — ONDANSETRON 4 MILLIGRAM(S): 8 TABLET, FILM COATED ORAL at 22:01

## 2022-12-12 RX ADMIN — POLYETHYLENE GLYCOL 3350 17 GRAM(S): 17 POWDER, FOR SOLUTION ORAL at 13:14

## 2022-12-12 RX ADMIN — OLANZAPINE 2.5 MILLIGRAM(S): 15 TABLET, FILM COATED ORAL at 00:04

## 2022-12-12 RX ADMIN — PANTOPRAZOLE SODIUM 40 MILLIGRAM(S): 20 TABLET, DELAYED RELEASE ORAL at 05:39

## 2022-12-12 RX ADMIN — DEXTROSE MONOHYDRATE, SODIUM CHLORIDE, AND POTASSIUM CHLORIDE 130 MILLILITER(S): 50; .745; 4.5 INJECTION, SOLUTION INTRAVENOUS at 08:57

## 2022-12-12 RX ADMIN — HEPARIN SODIUM 5000 UNIT(S): 5000 INJECTION INTRAVENOUS; SUBCUTANEOUS at 00:04

## 2022-12-12 RX ADMIN — OLANZAPINE 5 MILLIGRAM(S): 15 TABLET, FILM COATED ORAL at 13:15

## 2022-12-12 RX ADMIN — SENNA PLUS 2 TABLET(S): 8.6 TABLET ORAL at 00:05

## 2022-12-12 RX ADMIN — ATORVASTATIN CALCIUM 10 MILLIGRAM(S): 80 TABLET, FILM COATED ORAL at 00:04

## 2022-12-12 RX ADMIN — Medication 0.5 MILLIGRAM(S): at 22:02

## 2022-12-12 RX ADMIN — HEPARIN SODIUM 5000 UNIT(S): 5000 INJECTION INTRAVENOUS; SUBCUTANEOUS at 13:15

## 2022-12-12 RX ADMIN — ATORVASTATIN CALCIUM 10 MILLIGRAM(S): 80 TABLET, FILM COATED ORAL at 22:02

## 2022-12-12 RX ADMIN — SIMETHICONE 80 MILLIGRAM(S): 80 TABLET, CHEWABLE ORAL at 00:04

## 2022-12-12 RX ADMIN — Medication 2000 UNIT(S): at 13:13

## 2022-12-12 RX ADMIN — DEXTROSE MONOHYDRATE, SODIUM CHLORIDE, AND POTASSIUM CHLORIDE 130 MILLILITER(S): 50; .745; 4.5 INJECTION, SOLUTION INTRAVENOUS at 22:01

## 2022-12-12 NOTE — PROGRESS NOTE ADULT - ATTENDING COMMENTS
Patient seen and examined and agree note and exam  febrile overnight  as per aide patient had bm this morning and tolerating diet with assistant  abdomen soft, distended, no tender  no cellulitis or wound noted  Will obtain a chest xray due to fever  if spike again will pan cx  continue supportive care  social work/pt for placement  dvt prophylaxis

## 2022-12-13 DIAGNOSIS — F20.9 SCHIZOPHRENIA, UNSPECIFIED: ICD-10-CM

## 2022-12-13 LAB
ANION GAP SERPL CALC-SCNC: 8 MMOL/L — SIGNIFICANT CHANGE UP (ref 5–17)
ANION GAP SERPL CALC-SCNC: 9 MMOL/L — SIGNIFICANT CHANGE UP (ref 5–17)
BASOPHILS # BLD AUTO: 0 K/UL — SIGNIFICANT CHANGE UP (ref 0–0.2)
BASOPHILS NFR BLD AUTO: 0 % — SIGNIFICANT CHANGE UP (ref 0–2)
BUN SERPL-MCNC: 22 MG/DL — HIGH (ref 8–20)
BUN SERPL-MCNC: 25.2 MG/DL — HIGH (ref 8–20)
CALCIUM SERPL-MCNC: 8.5 MG/DL — SIGNIFICANT CHANGE UP (ref 8.4–10.5)
CALCIUM SERPL-MCNC: 8.7 MG/DL — SIGNIFICANT CHANGE UP (ref 8.4–10.5)
CHLORIDE SERPL-SCNC: 112 MMOL/L — HIGH (ref 96–108)
CHLORIDE SERPL-SCNC: 116 MMOL/L — HIGH (ref 96–108)
CO2 SERPL-SCNC: 25 MMOL/L — SIGNIFICANT CHANGE UP (ref 22–29)
CO2 SERPL-SCNC: 26 MMOL/L — SIGNIFICANT CHANGE UP (ref 22–29)
CREAT SERPL-MCNC: 1.8 MG/DL — HIGH (ref 0.5–1.3)
CREAT SERPL-MCNC: 1.81 MG/DL — HIGH (ref 0.5–1.3)
EGFR: 30 ML/MIN/1.73M2 — LOW
EGFR: 30 ML/MIN/1.73M2 — LOW
EOSINOPHIL # BLD AUTO: 0.06 K/UL — SIGNIFICANT CHANGE UP (ref 0–0.5)
EOSINOPHIL NFR BLD AUTO: 1.9 % — SIGNIFICANT CHANGE UP (ref 0–6)
GLUCOSE SERPL-MCNC: 102 MG/DL — HIGH (ref 70–99)
GLUCOSE SERPL-MCNC: 94 MG/DL — SIGNIFICANT CHANGE UP (ref 70–99)
HCT VFR BLD CALC: 28.4 % — LOW (ref 34.5–45)
HGB BLD-MCNC: 8.3 G/DL — LOW (ref 11.5–15.5)
IMM GRANULOCYTES NFR BLD AUTO: 0.6 % — SIGNIFICANT CHANGE UP (ref 0–0.9)
LYMPHOCYTES # BLD AUTO: 1 K/UL — SIGNIFICANT CHANGE UP (ref 1–3.3)
LYMPHOCYTES # BLD AUTO: 32.2 % — SIGNIFICANT CHANGE UP (ref 13–44)
MAGNESIUM SERPL-MCNC: 1.9 MG/DL — SIGNIFICANT CHANGE UP (ref 1.6–2.6)
MCHC RBC-ENTMCNC: 27.2 PG — SIGNIFICANT CHANGE UP (ref 27–34)
MCHC RBC-ENTMCNC: 29.2 GM/DL — LOW (ref 32–36)
MCV RBC AUTO: 93.1 FL — SIGNIFICANT CHANGE UP (ref 80–100)
MONOCYTES # BLD AUTO: 0.39 K/UL — SIGNIFICANT CHANGE UP (ref 0–0.9)
MONOCYTES NFR BLD AUTO: 12.5 % — SIGNIFICANT CHANGE UP (ref 2–14)
NEUTROPHILS # BLD AUTO: 1.64 K/UL — LOW (ref 1.8–7.4)
NEUTROPHILS NFR BLD AUTO: 52.8 % — SIGNIFICANT CHANGE UP (ref 43–77)
PHOSPHATE SERPL-MCNC: 2.4 MG/DL — SIGNIFICANT CHANGE UP (ref 2.4–4.7)
PLATELET # BLD AUTO: 117 K/UL — LOW (ref 150–400)
POTASSIUM SERPL-MCNC: 3.6 MMOL/L — SIGNIFICANT CHANGE UP (ref 3.5–5.3)
POTASSIUM SERPL-MCNC: 4 MMOL/L — SIGNIFICANT CHANGE UP (ref 3.5–5.3)
POTASSIUM SERPL-SCNC: 3.6 MMOL/L — SIGNIFICANT CHANGE UP (ref 3.5–5.3)
POTASSIUM SERPL-SCNC: 4 MMOL/L — SIGNIFICANT CHANGE UP (ref 3.5–5.3)
RBC # BLD: 3.05 M/UL — LOW (ref 3.8–5.2)
RBC # FLD: 14.3 % — SIGNIFICANT CHANGE UP (ref 10.3–14.5)
SODIUM SERPL-SCNC: 146 MMOL/L — HIGH (ref 135–145)
SODIUM SERPL-SCNC: 150 MMOL/L — HIGH (ref 135–145)
WBC # BLD: 3.11 K/UL — LOW (ref 3.8–10.5)
WBC # FLD AUTO: 3.11 K/UL — LOW (ref 3.8–10.5)

## 2022-12-13 PROCEDURE — 74018 RADEX ABDOMEN 1 VIEW: CPT | Mod: 26

## 2022-12-13 RX ORDER — SODIUM CHLORIDE 9 MG/ML
1000 INJECTION, SOLUTION INTRAVENOUS
Refills: 0 | Status: DISCONTINUED | OUTPATIENT
Start: 2022-12-13 | End: 2022-12-14

## 2022-12-13 RX ADMIN — OLANZAPINE 5 MILLIGRAM(S): 15 TABLET, FILM COATED ORAL at 18:35

## 2022-12-13 RX ADMIN — Medication 0.5 MILLIGRAM(S): at 21:21

## 2022-12-13 RX ADMIN — POLYETHYLENE GLYCOL 3350 17 GRAM(S): 17 POWDER, FOR SOLUTION ORAL at 18:35

## 2022-12-13 RX ADMIN — OLANZAPINE 2.5 MILLIGRAM(S): 15 TABLET, FILM COATED ORAL at 21:20

## 2022-12-13 RX ADMIN — Medication 1 MILLIGRAM(S): at 09:56

## 2022-12-13 RX ADMIN — HALOPERIDOL DECANOATE 5 MILLIGRAM(S): 100 INJECTION INTRAMUSCULAR at 05:20

## 2022-12-13 RX ADMIN — HALOPERIDOL DECANOATE 5 MILLIGRAM(S): 100 INJECTION INTRAMUSCULAR at 18:36

## 2022-12-13 RX ADMIN — Medication 2000 UNIT(S): at 18:34

## 2022-12-13 RX ADMIN — SIMETHICONE 80 MILLIGRAM(S): 80 TABLET, CHEWABLE ORAL at 18:35

## 2022-12-13 RX ADMIN — ATORVASTATIN CALCIUM 10 MILLIGRAM(S): 80 TABLET, FILM COATED ORAL at 21:20

## 2022-12-13 RX ADMIN — HEPARIN SODIUM 5000 UNIT(S): 5000 INJECTION INTRAVENOUS; SUBCUTANEOUS at 05:21

## 2022-12-13 RX ADMIN — Medication 112 MICROGRAM(S): at 05:20

## 2022-12-13 RX ADMIN — ESCITALOPRAM OXALATE 10 MILLIGRAM(S): 10 TABLET, FILM COATED ORAL at 18:34

## 2022-12-13 RX ADMIN — HEPARIN SODIUM 5000 UNIT(S): 5000 INJECTION INTRAVENOUS; SUBCUTANEOUS at 21:20

## 2022-12-13 RX ADMIN — HEPARIN SODIUM 5000 UNIT(S): 5000 INJECTION INTRAVENOUS; SUBCUTANEOUS at 18:34

## 2022-12-13 RX ADMIN — PANTOPRAZOLE SODIUM 40 MILLIGRAM(S): 20 TABLET, DELAYED RELEASE ORAL at 05:20

## 2022-12-13 RX ADMIN — SIMETHICONE 80 MILLIGRAM(S): 80 TABLET, CHEWABLE ORAL at 05:20

## 2022-12-13 RX ADMIN — SIMETHICONE 80 MILLIGRAM(S): 80 TABLET, CHEWABLE ORAL at 21:20

## 2022-12-13 NOTE — ED BEHAVIORAL HEALTH ASSESSMENT NOTE - DESCRIPTION
see HPI Patient is awake and alert, disheveled appearing, lying in bed, with [place] aide at bedside. No NG tube placement noted. disabled, [facility] resident, no dependents

## 2022-12-13 NOTE — PROGRESS NOTE ADULT - ATTENDING COMMENTS
Awake alert  Neurologic grossly intact  GCS 15  Bilateral BS  Hemodynamic intact  Abdomen soft active BS, non distended, no RG  Hernia Lateral abdominal wall easily reducible. vomited once yesterday, hungry today  Will start on diet and if OK DC to [facility]  The SBO has definitely resolved

## 2022-12-13 NOTE — ED BEHAVIORAL HEALTH ASSESSMENT NOTE - CURRENT MEDICATION
MEDICATIONS  (STANDING):  atorvastatin 10 milliGRAM(s) Oral at bedtime  cholecalciferol 2000 Unit(s) Oral daily  dextrose 5% + sodium chloride 0.45% with potassium chloride 10 mEq/L 1000 milliLiter(s) (130 mL/Hr) IV Continuous <Continuous>  escitalopram 10 milliGRAM(s) Oral daily  haloperidol     Tablet 5 milliGRAM(s) Oral two times a day  heparin   Injectable 5000 Unit(s) SubCutaneous every 8 hours  levothyroxine 112 MICROGram(s) Oral daily  LORazepam     Tablet 1 milliGRAM(s) Oral <User Schedule>  LORazepam     Tablet 0.5 milliGRAM(s) Oral at bedtime  multivitamin/minerals 1 Tablet(s) Oral daily  OLANZapine 5 milliGRAM(s) Oral daily  OLANZapine 2.5 milliGRAM(s) Oral at bedtime  pantoprazole    Tablet 40 milliGRAM(s) Oral before breakfast  polyethylene glycol 3350 17 Gram(s) Oral daily  senna 2 Tablet(s) Oral at bedtime  simethicone 80 milliGRAM(s) Chew three times a day

## 2022-12-13 NOTE — ED BEHAVIORAL HEALTH ASSESSMENT NOTE - HPI (INCLUDE ILLNESS QUALITY, SEVERITY, DURATION, TIMING, CONTEXT, MODIFYING FACTORS, ASSOCIATED SIGNS AND SYMPTOMS)
73 y/o female with PMHx of ventral hernia, hypothyroid, neutropenia/leukopenia, GERD, HTN, and Osteoporosis, single, domiciled at [facility], on disability, and past psych hx of Schizophrenia transferred to the ED from [place] for N/V and abdominal pain. Patient underwent SBO workup and was found to have a high grade SBO. Patient is currently NPO after not tolerating liquid diet, although she is tolerating her PO medications and states she has been taking her Haldol 5mg. Patient presents with delusional thoughts reporting "she had 2 kids yesterday" when interviewer asked how she was feeling. Reports her mood is good and denies feeling depressed or agitated. Patient is endorsing AH which she describes as constant noises that come in the afternoon every day. As per patient, this is not new, and she failed to describe voices or command hallucinations. Pt is a poor historian and is difficult to understand. Pt was able to sleep through the night and states she stays at her home and would like something good to eat there.  Denies current SI or HI. As per medical progress note, patient is going to be retried on liquid diet and planned to be discharged back to [place].

## 2022-12-13 NOTE — ED BEHAVIORAL HEALTH ASSESSMENT NOTE - RISK ASSESSMENT
R.F. current symptoms of delusions and AH, hx of Schizophrenia  P.F. Residential stability, no current SI or HI, compliant with psych medications

## 2022-12-13 NOTE — ED BEHAVIORAL HEALTH ASSESSMENT NOTE - SUMMARY
71 y/o F with past Psych hx of schizophrenia, transferred here for SBO, presents lying in bed, awake and alert, with delusional thoughts, stating that she is here because "she had 2 kids yesterday". Patient is difficult to understand and a poor historian. Reports her mood is good and denies feeling depressed or agitated. Patient is endorsing AH which she describes as constant noises that come in the afternoon every day. Denies current SI/HI. Patient has been tolerating her psych medications including Haldol 5mg, Lexapro 10mg and Ativan 1.5 mg. Patient is currently NPO after episodes of vomiting last night but plans to be retried and discharged when medically cleared to be. The patient's transfer summary was reviewed.

## 2022-12-14 ENCOUNTER — TRANSCRIPTION ENCOUNTER (OUTPATIENT)
Age: 72
End: 2022-12-14

## 2022-12-14 VITALS
DIASTOLIC BLOOD PRESSURE: 85 MMHG | RESPIRATION RATE: 18 BRPM | HEART RATE: 78 BPM | SYSTOLIC BLOOD PRESSURE: 160 MMHG | TEMPERATURE: 100 F | OXYGEN SATURATION: 92 %

## 2022-12-14 LAB
ANION GAP SERPL CALC-SCNC: 8 MMOL/L — SIGNIFICANT CHANGE UP (ref 5–17)
ANISOCYTOSIS BLD QL: SLIGHT — SIGNIFICANT CHANGE UP
BASOPHILS # BLD AUTO: 0.03 K/UL — SIGNIFICANT CHANGE UP (ref 0–0.2)
BASOPHILS NFR BLD AUTO: 0.9 % — SIGNIFICANT CHANGE UP (ref 0–2)
BUN SERPL-MCNC: 18 MG/DL — SIGNIFICANT CHANGE UP (ref 8–20)
CALCIUM SERPL-MCNC: 8.7 MG/DL — SIGNIFICANT CHANGE UP (ref 8.4–10.5)
CHLORIDE SERPL-SCNC: 113 MMOL/L — HIGH (ref 96–108)
CO2 SERPL-SCNC: 25 MMOL/L — SIGNIFICANT CHANGE UP (ref 22–29)
CREAT SERPL-MCNC: 1.73 MG/DL — HIGH (ref 0.5–1.3)
EGFR: 31 ML/MIN/1.73M2 — LOW
ELLIPTOCYTES BLD QL SMEAR: SLIGHT — SIGNIFICANT CHANGE UP
EOSINOPHIL # BLD AUTO: 0 K/UL — SIGNIFICANT CHANGE UP (ref 0–0.5)
EOSINOPHIL NFR BLD AUTO: 0 % — SIGNIFICANT CHANGE UP (ref 0–6)
GIANT PLATELETS BLD QL SMEAR: PRESENT — SIGNIFICANT CHANGE UP
GLUCOSE SERPL-MCNC: 87 MG/DL — SIGNIFICANT CHANGE UP (ref 70–99)
HCT VFR BLD CALC: 30.6 % — LOW (ref 34.5–45)
HGB BLD-MCNC: 9 G/DL — LOW (ref 11.5–15.5)
HYPOCHROMIA BLD QL: SIGNIFICANT CHANGE UP
LYMPHOCYTES # BLD AUTO: 0.98 K/UL — LOW (ref 1–3.3)
LYMPHOCYTES # BLD AUTO: 34.8 % — SIGNIFICANT CHANGE UP (ref 13–44)
MACROCYTES BLD QL: SLIGHT — SIGNIFICANT CHANGE UP
MAGNESIUM SERPL-MCNC: 1.9 MG/DL — SIGNIFICANT CHANGE UP (ref 1.8–2.6)
MANUAL SMEAR VERIFICATION: SIGNIFICANT CHANGE UP
MCHC RBC-ENTMCNC: 27.1 PG — SIGNIFICANT CHANGE UP (ref 27–34)
MCHC RBC-ENTMCNC: 29.4 GM/DL — LOW (ref 32–36)
MCV RBC AUTO: 92.2 FL — SIGNIFICANT CHANGE UP (ref 80–100)
METAMYELOCYTES # FLD: 0.9 % — HIGH (ref 0–0)
MICROCYTES BLD QL: SLIGHT — SIGNIFICANT CHANGE UP
MONOCYTES # BLD AUTO: 0.2 K/UL — SIGNIFICANT CHANGE UP (ref 0–0.9)
MONOCYTES NFR BLD AUTO: 6.9 % — SIGNIFICANT CHANGE UP (ref 2–14)
MYELOCYTES NFR BLD: 0.9 % — HIGH (ref 0–0)
NEUTROPHILS # BLD AUTO: 1.53 K/UL — LOW (ref 1.8–7.4)
NEUTROPHILS NFR BLD AUTO: 53.9 % — SIGNIFICANT CHANGE UP (ref 43–77)
NRBC # BLD: 1 /100 — HIGH (ref 0–0)
PHOSPHATE SERPL-MCNC: 3.1 MG/DL — SIGNIFICANT CHANGE UP (ref 2.4–4.7)
PLAT MORPH BLD: NORMAL — SIGNIFICANT CHANGE UP
PLATELET # BLD AUTO: 117 K/UL — LOW (ref 150–400)
POIKILOCYTOSIS BLD QL AUTO: SLIGHT — SIGNIFICANT CHANGE UP
POLYCHROMASIA BLD QL SMEAR: SLIGHT — SIGNIFICANT CHANGE UP
POTASSIUM SERPL-MCNC: 4 MMOL/L — SIGNIFICANT CHANGE UP (ref 3.5–5.3)
POTASSIUM SERPL-SCNC: 4 MMOL/L — SIGNIFICANT CHANGE UP (ref 3.5–5.3)
RBC # BLD: 3.32 M/UL — LOW (ref 3.8–5.2)
RBC # FLD: 14.1 % — SIGNIFICANT CHANGE UP (ref 10.3–14.5)
RBC BLD AUTO: ABNORMAL
SARS-COV-2 RNA SPEC QL NAA+PROBE: SIGNIFICANT CHANGE UP
SMUDGE CELLS # BLD: PRESENT — SIGNIFICANT CHANGE UP
SODIUM SERPL-SCNC: 146 MMOL/L — HIGH (ref 135–145)
VARIANT LYMPHS # BLD: 1.7 % — SIGNIFICANT CHANGE UP (ref 0–6)
WBC # BLD: 2.83 K/UL — LOW (ref 3.8–10.5)
WBC # FLD AUTO: 2.83 K/UL — LOW (ref 3.8–10.5)

## 2022-12-14 PROCEDURE — 85027 COMPLETE CBC AUTOMATED: CPT

## 2022-12-14 PROCEDURE — 74018 RADEX ABDOMEN 1 VIEW: CPT

## 2022-12-14 PROCEDURE — 99232 SBSQ HOSP IP/OBS MODERATE 35: CPT

## 2022-12-14 PROCEDURE — 83735 ASSAY OF MAGNESIUM: CPT

## 2022-12-14 PROCEDURE — 83935 ASSAY OF URINE OSMOLALITY: CPT

## 2022-12-14 PROCEDURE — 85730 THROMBOPLASTIN TIME PARTIAL: CPT

## 2022-12-14 PROCEDURE — 84145 PROCALCITONIN (PCT): CPT

## 2022-12-14 PROCEDURE — 84300 ASSAY OF URINE SODIUM: CPT

## 2022-12-14 PROCEDURE — 84133 ASSAY OF URINE POTASSIUM: CPT

## 2022-12-14 PROCEDURE — 86901 BLOOD TYPING SEROLOGIC RH(D): CPT

## 2022-12-14 PROCEDURE — 71045 X-RAY EXAM CHEST 1 VIEW: CPT

## 2022-12-14 PROCEDURE — 85610 PROTHROMBIN TIME: CPT

## 2022-12-14 PROCEDURE — 87637 SARSCOV2&INF A&B&RSV AMP PRB: CPT

## 2022-12-14 PROCEDURE — 87040 BLOOD CULTURE FOR BACTERIA: CPT

## 2022-12-14 PROCEDURE — 83605 ASSAY OF LACTIC ACID: CPT

## 2022-12-14 PROCEDURE — 86850 RBC ANTIBODY SCREEN: CPT

## 2022-12-14 PROCEDURE — 83690 ASSAY OF LIPASE: CPT

## 2022-12-14 PROCEDURE — 93005 ELECTROCARDIOGRAM TRACING: CPT

## 2022-12-14 PROCEDURE — 85025 COMPLETE CBC W/AUTO DIFF WBC: CPT

## 2022-12-14 PROCEDURE — G1004: CPT

## 2022-12-14 PROCEDURE — U0005: CPT

## 2022-12-14 PROCEDURE — 80048 BASIC METABOLIC PNL TOTAL CA: CPT

## 2022-12-14 PROCEDURE — 81001 URINALYSIS AUTO W/SCOPE: CPT

## 2022-12-14 PROCEDURE — 86900 BLOOD TYPING SEROLOGIC ABO: CPT

## 2022-12-14 PROCEDURE — U0003: CPT

## 2022-12-14 PROCEDURE — 84100 ASSAY OF PHOSPHORUS: CPT

## 2022-12-14 PROCEDURE — 96374 THER/PROPH/DIAG INJ IV PUSH: CPT

## 2022-12-14 PROCEDURE — 99285 EMERGENCY DEPT VISIT HI MDM: CPT | Mod: 25

## 2022-12-14 PROCEDURE — 82570 ASSAY OF URINE CREATININE: CPT

## 2022-12-14 PROCEDURE — 80053 COMPREHEN METABOLIC PANEL: CPT

## 2022-12-14 PROCEDURE — 36415 COLL VENOUS BLD VENIPUNCTURE: CPT

## 2022-12-14 PROCEDURE — 74176 CT ABD & PELVIS W/O CONTRAST: CPT | Mod: MG

## 2022-12-14 RX ADMIN — Medication 1 TABLET(S): at 11:33

## 2022-12-14 RX ADMIN — SIMETHICONE 80 MILLIGRAM(S): 80 TABLET, CHEWABLE ORAL at 05:54

## 2022-12-14 RX ADMIN — HALOPERIDOL DECANOATE 5 MILLIGRAM(S): 100 INJECTION INTRAMUSCULAR at 05:54

## 2022-12-14 RX ADMIN — Medication 1 MILLIGRAM(S): at 11:33

## 2022-12-14 RX ADMIN — ESCITALOPRAM OXALATE 10 MILLIGRAM(S): 10 TABLET, FILM COATED ORAL at 11:33

## 2022-12-14 RX ADMIN — Medication 2000 UNIT(S): at 11:34

## 2022-12-14 RX ADMIN — PANTOPRAZOLE SODIUM 40 MILLIGRAM(S): 20 TABLET, DELAYED RELEASE ORAL at 05:54

## 2022-12-14 RX ADMIN — Medication 112 MICROGRAM(S): at 05:54

## 2022-12-14 RX ADMIN — SIMETHICONE 80 MILLIGRAM(S): 80 TABLET, CHEWABLE ORAL at 15:35

## 2022-12-14 RX ADMIN — HEPARIN SODIUM 5000 UNIT(S): 5000 INJECTION INTRAVENOUS; SUBCUTANEOUS at 05:54

## 2022-12-14 RX ADMIN — OLANZAPINE 5 MILLIGRAM(S): 15 TABLET, FILM COATED ORAL at 15:36

## 2022-12-14 RX ADMIN — HALOPERIDOL DECANOATE 5 MILLIGRAM(S): 100 INJECTION INTRAMUSCULAR at 20:07

## 2022-12-14 NOTE — DISCHARGE NOTE PROVIDER - HOSPITAL COURSE
Admission HPI:  72y Female with schizoaffective disorder resident of [facility], extensive comorbidities, and complex abdominal surgical history known to this ACS service for previous surgeries and multiple episodes of partial SBO, last time in 12/2019 which was manages non-operatively. Today she was sent in from her facility with multiple episodes of emesis. Caregiver at bedside was unable to provide much information regarding number or quality of emesis. Patient unable to provide much meaningful history either, however she is saying her last BM was yesterday. Patient is unable to tell when her last flatus was.     Hospital Course:   CT scan on admission showed high grade small bowel obstruction with transition seen within lateral aspect of a complex right lower abdominal ventral hernia. Patient was admitted to the acute care surgery service. Made NPO and put on IV fluids. On re-exam hernia easily reducible and soft. Patient began to have bowel function and had no further episodes of emesis. Diet advanced for which she is tolerating well. She is currently having consistent bowel function, tolerating a pureed diet without abdominal pain, nausea, or vomiting. Continued on all home medications. Discussed pt's hospital course with physician Dr. Sanchez at [facility] who agrees pt is stable to return to the facility at this time.     Patient is advised to RETURN TO THE EMERGENCY DEPARTMENT for any of the following - worsening pain, fever/chills, nausea/vomiting, altered mental status, chest pain, shortness of breath, or any other new / worsening symptom.

## 2022-12-14 NOTE — DISCHARGE NOTE NURSING/CASE MANAGEMENT/SOCIAL WORK - NSDCPEFALRISK_GEN_ALL_CORE
For information on Fall & Injury Prevention, visit: [email or web address] OR  [email or web address] OR  https://www.cdc.gov/steadi/patient.html

## 2022-12-14 NOTE — PROGRESS NOTE ADULT - SUBJECTIVE AND OBJECTIVE BOX
SUBJECTIVE/24 hour events:  Patient is a 72yFemale presenting with high grade sbo with large ventral hernia. Patient during the day her diet was advanced and IV locked, then overnight had 3 episodes of vomiting, VS stable. I examined patient at bedside per patient abdomen feels normal, she was in no respiratory distress, just complaining " Im upset I couldnt drink my tea" Patient made NPO, given zofran, restarted IV fluids, patient had no further episodes of vomiting, was able to handle her PO medications and slept through the night. Hypernatremia most recent 148 @21:00. Patient dispo pending, likely will retry diet      Vital Signs Last 24 Hrs  T(C): 37.2 (12 Dec 2022 20:03), Max: 37.2 (12 Dec 2022 20:03)  T(F): 99 (12 Dec 2022 20:03), Max: 99 (12 Dec 2022 20:03)  HR: 83 (12 Dec 2022 20:03) (70 - 83)  BP: 150/68 (12 Dec 2022 20:03) (121/74 - 193/82)  BP(mean): --  RR: 16 (12 Dec 2022 20:03) (16 - 20)  SpO2: 96% (12 Dec 2022 20:03) (92% - 98%)    Parameters below as of 12 Dec 2022 20:03  Patient On (Oxygen Delivery Method): room air      Drug Dosing Weight  Height (cm): 162.6 (01 May 2021 04:42)  Weight (kg): 68 (01 May 2021 04:42)  BMI (kg/m2): 25.7 (01 May 2021 04:42)  BSA (m2): 1.73 (01 May 2021 04:42)  I&O's Detail    11 Dec 2022 07:01  -  12 Dec 2022 07:00  --------------------------------------------------------  IN:    dextrose 5% + sodium chloride 0.45% w/ Additives: 1300 mL    dextrose 5% + sodium chloride 0.45% w/ Additives: 500 mL  Total IN: 1800 mL    OUT:    Voided (mL): 2450 mL  Total OUT: 2450 mL    Total NET: -650 mL      12 Dec 2022 07:01  -  13 Dec 2022 00:20  --------------------------------------------------------  IN:    Oral Fluid: 140 mL  Total IN: 140 mL    OUT:    Voided (mL): 900 mL  Total OUT: 900 mL    Total NET: -760 mL        Allergies    clozapine (Other)  Depakote (Other)  erythromycin (Unknown)  Neupogen (Other)    Intolerances                              8.0    4.17  )-----------( 113      ( 12 Dec 2022 10:10 )             27.6   12-12    148<H>  |  114<H>  |  25.5<H>  ----------------------------<  113<H>  4.0   |  24.0  |  1.76<H>    Ca    8.6      12 Dec 2022 21:15  Phos  1.8     12-12  Mg     1.9     12-12    PT/INR - ( 12 Dec 2022 10:10 )   PT: 15.7 sec;   INR: 1.35 ratio         PTT - ( 12 Dec 2022 10:10 )  PTT:27.5 sec    ROS:    PHYSICAL EXAM:  Constitutional: in no distress " I just wanted to drink my tea"  Respiratory: no respiratory distress, no dyspnea, no supplemental o2 needed   Gastrointestinal: abdomen with large ventral hernia, no pain on palpation, having bowel movements   Genitourinary: voiding   Neurological: at baseline mental status, 1:1 continued         MEDICATIONS  (STANDING):  atorvastatin 10 milliGRAM(s) Oral at bedtime  cholecalciferol 2000 Unit(s) Oral daily  dextrose 5% + sodium chloride 0.45% with potassium chloride 10 mEq/L 1000 milliLiter(s) (130 mL/Hr) IV Continuous <Continuous>  escitalopram 10 milliGRAM(s) Oral daily  haloperidol     Tablet 5 milliGRAM(s) Oral two times a day  heparin   Injectable 5000 Unit(s) SubCutaneous every 8 hours  levothyroxine 112 MICROGram(s) Oral daily  LORazepam     Tablet 1 milliGRAM(s) Oral <User Schedule>  LORazepam     Tablet 0.5 milliGRAM(s) Oral at bedtime  multivitamin/minerals 1 Tablet(s) Oral daily  OLANZapine 5 milliGRAM(s) Oral daily  OLANZapine 2.5 milliGRAM(s) Oral at bedtime  pantoprazole    Tablet 40 milliGRAM(s) Oral before breakfast  polyethylene glycol 3350 17 Gram(s) Oral daily  senna 2 Tablet(s) Oral at bedtime  simethicone 80 milliGRAM(s) Chew three times a day    MEDICATIONS  (PRN):      RADIOLOGY STUDIES:    CULTURES:        
Subjective:  Overnight did not have any acute events overnight. The patient hypernatremia treatment was started. The patient was started on her home medication yesterday. The patient pain is well controlled on current pain regimen. The patient does not report fever, chills, nausea vomiting, chest pain, and shortness of breath. The patient does not report any acute complaints.     MEDICATIONS  (STANDING):  atorvastatin 10 milliGRAM(s) Oral at bedtime  cholecalciferol 2000 Unit(s) Oral daily  dextrose 5% + sodium chloride 0.45% with potassium chloride 10 mEq/L 1000 milliLiter(s) (130 mL/Hr) IV Continuous <Continuous>  escitalopram 10 milliGRAM(s) Oral daily  haloperidol     Tablet 5 milliGRAM(s) Oral two times a day  heparin   Injectable 5000 Unit(s) SubCutaneous every 8 hours  levothyroxine 112 MICROGram(s) Oral daily  LORazepam     Tablet 1 milliGRAM(s) Oral <User Schedule>  LORazepam     Tablet 0.5 milliGRAM(s) Oral at bedtime  multivitamin/minerals 1 Tablet(s) Oral daily  OLANZapine 5 milliGRAM(s) Oral daily  OLANZapine 2.5 milliGRAM(s) Oral at bedtime  pantoprazole    Tablet 40 milliGRAM(s) Oral before breakfast  polyethylene glycol 3350 17 Gram(s) Oral daily  senna 2 Tablet(s) Oral at bedtime  simethicone 80 milliGRAM(s) Chew three times a day    MEDICATIONS  (PRN):    Vital Signs Last 24 Hrs  T(C): 36.9 (12 Dec 2022 04:03), Max: 38.8 (11 Dec 2022 20:32)  T(F): 98.5 (12 Dec 2022 04:03), Max: 101.9 (11 Dec 2022 20:32)  HR: 70 (12 Dec 2022 04:03) (70 - 100)  BP: 121/74 (12 Dec 2022 04:03) (121/74 - 184/80)  BP(mean): --  RR: 19 (12 Dec 2022 04:03) (19 - 20)  SpO2: 94% (12 Dec 2022 04:03) (91% - 94%)    Parameters below as of 12 Dec 2022 04:03  Patient On (Oxygen Delivery Method): room air          12-10  -  12-11  --------------------------------------------------------  IN:    Lactated Ringers w/ Additives: 1540 mL  Total IN: 1540 mL    OUT:  Total OUT: 0 mL    Total NET: 1540 mL      12-  -  12-12  --------------------------------------------------------  IN:  Total IN: 0 mL    OUT:    Voided (mL): 1850 mL  Total OUT: 1850 mL    Total NET: -1850 mL    Physical Exam:    Constitutional: NAD, lying bed   HEENT: atraumatic head, without/with nasal canula   Neck: Trachea midline,   Respiratory: Respirations non-labored and equal chest rises.   Cardiovascular: Regular rate & rhythm  Gastrointestinal: tender to palpation,    Extremities:   Neurological: A&O x 3; without gross deficit        LABS:                        7.7    3.94  )-----------( 113      ( 12 Dec 2022 01:31 )             25.4     12-12    151<H>  |  115<H>  |  25.9<H>  ----------------------------<  120<H>  3.4<L>   |  26.0  |  1.84<H>    Ca    8.4      12 Dec 2022 01:31  Phos  2.7     12-11  Mg     2.3     -    TPro  6.1<L>  /  Alb  3.5  /  TBili  0.6  /  DBili  x   /  AST  12  /  ALT  14  /  AlkPhos  90  12-10      Urinalysis Basic - ( 11 Dec 2022 12:35 )    Color: Yellow / Appearance: Slightly Turbid / S.005 / pH: x  Gluc: x / Ketone: Moderate  / Bili: Negative / Urobili: Negative mg/dL   Blood: x / Protein: 15 / Nitrite: Negative   Leuk Esterase: Moderate / RBC: Negative /HPF / WBC 11-25 /HPF   Sq Epi: x / Non Sq Epi: Few / Bacteria: Many        Assessment:  patient is a 72y old female with extensive past medical and psychiatric history and complicated PSH presenting with vomiting at [facility] and CT with high grade SBO within ventral hernia.    Plan:  - Closely monitor abdominal exam  - control pain   - DVT ppx  - OOB/ambulate  - strict I/Os  - CLD and f/u tolerance, consider advancing diet if patient exam is improved and she tolerated her cld.  - continue home medication   - performing surgery on this patient should be carefully consider because the patient is at high risk for complication post op. 
HPI/OVERNIGHT EVENTS: Patient seen and examined at bedside this AM. No overnight events. No complaints. Denies fever, chills, nausea, vomiting, chest pain, SOB, dizziness, abd pain or any other concerning symptoms. Patient had a BM.    Vital Signs Last 24 Hrs  T(C): 36.7 (10 Dec 2022 20:54), Max: 37.9 (10 Dec 2022 12:17)  T(F): 98 (10 Dec 2022 20:54), Max: 100.2 (10 Dec 2022 12:17)  HR: 87 (10 Dec 2022 20:54) (73 - 101)  BP: 145/80 (10 Dec 2022 20:54) (136/77 - 147/87)  BP(mean): --  RR: 19 (10 Dec 2022 20:54) (18 - 19)  SpO2: 92% (10 Dec 2022 20:54) (92% - 98%)    Parameters below as of 10 Dec 2022 20:54  Patient On (Oxygen Delivery Method): room air        I&O's Detail    09 Dec 2022 07:01  -  10 Dec 2022 07:00  --------------------------------------------------------  IN:  Total IN: 0 mL    OUT:    Voided (mL): 300 mL  Total OUT: 300 mL    Total NET: -300 mL      10 Dec 2022 07:01  -  11 Dec 2022 00:58  --------------------------------------------------------  IN:    Lactated Ringers w/ Additives: 660 mL  Total IN: 660 mL    OUT:  Total OUT: 0 mL    Total NET: 660 mL          Constitutional: patient resting comfortably in bed, in no acute distress  HEENT: EOMI, PERRLA, MMM.  Respiratory: Non labored breathing on RA  Cardiovascular: RRR  Gastrointestinal: Abdomen soft, non-tender, non-distended, no rebound tenderness / guarding. Large R sided ventral hernia with edema of dependent skin, no erythema  Musculoskeletal: No joint pain, swelling or deformity; no limitation of movement  Vascular: Extremities warm and well perfused.     LABS:                        7.9    2.86  )-----------( 105      ( 10 Dec 2022 19:55 )             26.5     12-10    149<H>  |  114<H>  |  31.4<H>  ----------------------------<  91  3.5   |  25.0  |  1.94<H>    Ca    8.4      10 Dec 2022 19:55  Phos  3.3     12-10  Mg     2.4     12-10    TPro  6.1<L>  /  Alb  3.5  /  TBili  0.6  /  DBili  x   /  AST  12  /  ALT  14  /  AlkPhos  90  12-10    PT/INR - ( 09 Dec 2022 18:11 )   PT: 14.2 sec;   INR: 1.22 ratio         PTT - ( 09 Dec 2022 18:11 )  PTT:28.0 sec      MEDICATIONS  (STANDING):  heparin   Injectable 5000 Unit(s) SubCutaneous every 8 hours  lactated ringers 1000 milliLiter(s) (110 mL/Hr) IV Continuous <Continuous>  levothyroxine Injectable 75 MICROGram(s) IV Push at bedtime    MEDICATIONS  (PRN):      MICRO:   Cultures     STUDIES:   EKG, CXR, U/S, CT, MRI     
SUBJECTIVE/24 hour events:  Patient is [place] 72y[place] presenting with high grade sbo with large ventral hernia. Patient was able to get diet advanced again to pureed and tolerating well. Patient fluids changed to D5 at a rate of 50 to help resolve the hypernatremia, repeat bmp showed am 150, pm 146, rate decreased to 35 will f/u am bmp. Patient being followed by behavorial health and feel it is necessary to continue 1:1 for safety      Vital Signs Last 24 Hrs  T(C): 36.8 (13 Dec 2022 22:00), Max: 36.9 (13 Dec 2022 05:29)  T(F): 98.2 (13 Dec 2022 22:00), Max: 98.5 (13 Dec 2022 05:29)  HR: 68 (13 Dec 2022 22:00) (68 - 74)  BP: 160/80 (13 Dec 2022 22:00) (144/74 - 160/80)  BP(mean): --  RR: 18 (13 Dec 2022 22:00) (16 - 19)  SpO2: 94% (13 Dec 2022 22:00) (94% - 94%)    Parameters below as of 13 Dec 2022 22:00  Patient On (Oxygen Delivery Method): room air      Drug Dosing Weight  Height (cm): 162.6 (01 May 2021 04:42)  Weight (kg): 68 (01 May 2021 04:42)  BMI (kg/m2): 25.7 (01 May 2021 04:42)  BSA (m2): 1.73 (01 May 2021 04:42)  I&O's Detail    12 Dec 2022 07:01  -  13 Dec 2022 07:00  --------------------------------------------------------  IN:    Oral Fluid: 140 mL  Total IN: 140 mL    OUT:    Voided (mL): 900 mL  Total OUT: 900 mL    Total NET: -760 mL      13 Dec 2022 07:01  -  14 Dec 2022 00:56  --------------------------------------------------------  IN:  Total IN: 0 mL    OUT:    Voided (mL): 1600 mL  Total OUT: 1600 mL    Total NET: -1600 mL        Allergies    clozapine (Other)  Depakote (Other)  erythromycin (Unknown)  Neupogen (Other)    Intolerances                              8.3    3.11  )-----------( 117      ( 13 Dec 2022 08:25 )             28.4   12-13    146<H>  |  112<H>  |  22.0<H>  ----------------------------<  102<H>  3.6   |  26.0  |  1.80<H>    Ca    8.5      13 Dec 2022 17:46  Phos  2.4     12-13  Mg     1.9     12-13    PT/INR - ( 12 Dec 2022 10:10 )   PT: 15.7 sec;   INR: 1.35 ratio         PTT - ( 12 Dec 2022 10:10 )  PTT:27.5 sec    ROS:      PHYSICAL EXAM:  Constitutional: in no distress " I feel good, I ate well"  Respiratory: no respiratory distress, no dyspnea, no supplemental o2 needed   Gastrointestinal: abdomen with large ventral hernia, no pain on palpation, having bowel movements   Genitourinary: voiding   Neurological: at baseline mental status, 1:1 continued         MEDICATIONS  (STANDING):  atorvastatin 10 milliGRAM(s) Oral at bedtime  cholecalciferol 2000 Unit(s) Oral daily  dextrose 5%. 1000 milliLiter(s) (35 mL/Hr) IV Continuous <Continuous>  escitalopram 10 milliGRAM(s) Oral daily  haloperidol     Tablet 5 milliGRAM(s) Oral two times a day  heparin   Injectable 5000 Unit(s) SubCutaneous every 8 hours  levothyroxine 112 MICROGram(s) Oral daily  LORazepam     Tablet 1 milliGRAM(s) Oral <User Schedule>  LORazepam     Tablet 0.5 milliGRAM(s) Oral at bedtime  multivitamin/minerals 1 Tablet(s) Oral daily  OLANZapine 5 milliGRAM(s) Oral daily  OLANZapine 2.5 milliGRAM(s) Oral at bedtime  pantoprazole    Tablet 40 milliGRAM(s) Oral before breakfast  polyethylene glycol 3350 17 Gram(s) Oral daily  senna 2 Tablet(s) Oral at bedtime  simethicone 80 milliGRAM(s) Chew three times a day    MEDICATIONS  (PRN):      RADIOLOGY STUDIES:    CULTURES:

## 2022-12-14 NOTE — DISCHARGE NOTE PROVIDER - NSDCCPCAREPLAN_GEN_ALL_CORE_FT
PRINCIPAL DISCHARGE DIAGNOSIS  Diagnosis: SBO (small bowel obstruction)  Assessment and Plan of Treatment: Follow up: Please call and make an appointment with your primary care provider after discharge.   Activity: May return to normal activities as tolerated.   Diet: May continue regular diet.  Medications: Please resume all home medications as previously prescribed.  Patient is advised to RETURN TO THE EMERGENCY DEPARTMENT for any of the following - worsening pain, fever/chills, nausea/vomiting, altered mental status, chest pain, shortness of breath, or any other new / worsening symptom.      SECONDARY DISCHARGE DIAGNOSES  Diagnosis: Schizophrenia  Assessment and Plan of Treatment: Please resume all home medications as previously prescribed.

## 2022-12-14 NOTE — DISCHARGE NOTE NURSING/CASE MANAGEMENT/SOCIAL WORK - NSDCVIVACCINE_GEN_ALL_CORE_FT
Tdap; 17-Sep-2018 10:04; Melina Montoya (JOSETTE); [employer]; [id number] (Exp. Date: 11-Jun-2020); IntraMuscular; Deltoid Right.; 0.5 milliLiter(s); VIS (VIS Published: 09-May-2013, VIS Presented: 17-Sep-2018);

## 2022-12-14 NOTE — DISCHARGE NOTE NURSING/CASE MANAGEMENT/SOCIAL WORK - PATIENT PORTAL LINK FT
You can access the FollowMyHealth Patient Portal offered by [facility] by registering at the following website: [email or web address]. By joining [employer]’s FollowMyHealth portal, you will also be able to view your health information using other applications (apps) compatible with our system.

## 2022-12-14 NOTE — PROGRESS NOTE ADULT - NS ATTEND AMEND GEN_ALL_CORE FT
Patient seen and examined on am rounds. No complaints, labs WNL, tolerating diet, having BM. Stable for d/c back to facility, pending acceptance from facility. Medically cleared for d/c.

## 2022-12-14 NOTE — PROGRESS NOTE ADULT - ASSESSMENT
Patient is a 72y old female with extensive past medical and psychiatric history and complicated PSH presenting with vomiting at [facility] and CT with high grade SBO within ventral hernia.    PLAN:    - Closely monitor abdominal exam, low threshold for operative intervention  - pain control   - DVT ppx  - OOB/ambulate  - strict I/Os    
patient is a 72y old female with extensive past medical and psychiatric history and complicated PSH presenting with vomiting at [facility] and CT with high grade SBO within ventral hernia.    Plan:  - Closely monitor abdominal exam  - trend hypernatremia, currently on IV lfuids  - control pain   - DVT ppx  - OOB/ambulate  - strict I/Os  - currently made NPO, didnt tolerate diet, will try again today  - continue home medication   - performing surgery on this patient should be carefully consider because the patient is at high risk for complication post op.   
patient is a 72y old female with extensive past medical and psychiatric history and complicated PSH presenting with vomiting at [place] facility and CT with high grade SBO within ventral hernia, now tolerating food and having bowel function. Need to closely monitor hypernatremia     Plan:  - Closely monitor abdominal exam  - trend hypernatremia, currently on IV lfuids D5  - control pain   - DVT ppx  - OOB/ambulate  - strict I/Os  - currently made NPO, didnt tolerate diet, will try again today  - continue home medication   - performing surgery on this patient should be carefully consider because the patient is at high risk for complication post op.

## 2022-12-14 NOTE — DISCHARGE NOTE PROVIDER - NSDCMRMEDTOKEN_GEN_ALL_CORE_FT
atorvastatin 10 mg tablet: 1 tab(s) orally once a day (at bedtime)  cholecalciferol oral tablet: 50 microgram(s) orally once a day  cyanocobalamin 1000 mcg/mL injectable solution: 1 milliliter(s) injectable once a month  docusate sodium 100 mg oral capsule: 1 cap(s) orally 3 times a day, As Needed  escitalopram 10 mg tablet:   haloperidol 5 mg tablet: 1 tab(s) orally 2 times a day  levothyroxine 112 mcg (0.112 mg) oral tablet: 1 tab(s) orally once a day  lorazepam 0.5 mg tablet: 1 tab(s) orally once a day (at bedtime)  LORazepam 1 mg oral tablet: 1 tab(s) orally once a day  multivitamin: 1 tab(s) orally once a day  olanzapine 2.5 mg tablet: 1 tab(s) orally once a day (at bedtime)  OLANZapine 5 mg oral tablet: 1 tab(s) orally once a day  pantoprazole 20 mg tablet,delayed release: 1 tab(s) orally once a day  polyethylene glycol 3350 oral powder for reconstitution: 17 gram(s) orally once a day (at bedtime)  senna oral tablet: 2 tab(s) orally once a day (at bedtime), As Needed  simethicone 80 mg oral tablet, chewable: 1 tab(s) orally 3 times a day

## 2022-12-20 NOTE — ED PROVIDER NOTE - TEMPLATE
Back Pain INTERVAL HPI/OVERNIGHT EVENTS:  Awake and alert;  Feels weak  Also still some diarrhea       MEDICATIONS  (STANDING):  bacitracin   Ointment 1 Application(s) Topical daily  buPROPion XL (24-Hour) . 150 milliGRAM(s) Oral every 24 hours  dextrose 5% + lactated ringers. 1200 milliLiter(s) (100 mL/Hr) IV Continuous <Continuous>  heparin   Injectable 5000 Unit(s) SubCutaneous every 8 hours  levothyroxine 88 MICROGram(s) Oral daily  methylPREDNISolone sodium succinate Injectable 40 milliGRAM(s) IV Push every 12 hours  multivitamin 1 Tablet(s) Oral daily  pantoprazole  Injectable 40 milliGRAM(s) IV Push every 12 hours  sucralfate suspension 1 Gram(s) Oral every 6 hours    MEDICATIONS  (PRN):  acetaminophen     Tablet .. 650 milliGRAM(s) Oral every 6 hours PRN Moderate Pain (4 - 6)  trimethobenzamide Injectable 200 milliGRAM(s) IntraMuscular every 12 hours PRN Nausea and/or Vomiting      Allergies    No Known Allergies    Intolerances        Vital Signs Last 24 Hrs  T(C): 36.4 (20 Dec 2022 08:43), Max: 36.5 (19 Dec 2022 21:48)  T(F): 97.5 (20 Dec 2022 08:43), Max: 97.7 (19 Dec 2022 21:48)  HR: 79 (20 Dec 2022 08:43) (72 - 79)  BP: 163/80 (20 Dec 2022 08:43) (138/67 - 167/79)  BP(mean): --  RR: 17 (20 Dec 2022 08:43) (16 - 18)  SpO2: 99% (20 Dec 2022 08:43) (96% - 100%)    Parameters below as of 20 Dec 2022 08:43  Patient On (Oxygen Delivery Method): room air              Constitutional:  Awake     Eyes: ARBEN    ENMT: Negative    Neck: Supple    Back:  no tenderness     Respiratory:  clear    Cardiovascular: S1 S2    Gastrointestinal:  soft     Genitourinary:    Extremities:  no edema     Vascular:    Neurological:    Skin:    Lymph Nodes:            12-19 @ 07:01  -  12-20 @ 07:00  --------------------------------------------------------  IN: 0 mL / OUT: 600 mL / NET: -600 mL      LABS:                        10.5   9.11  )-----------( 326      ( 20 Dec 2022 05:30 )             32.0     12-20    137  |  107  |  36<H>  ----------------------------<  112<H>  4.1   |  20<L>  |  1.81<H>    Ca    7.1<L>      20 Dec 2022 05:30  Phos  2.1     12-20  Mg     1.9     12-20            RADIOLOGY & ADDITIONAL TESTS:

## 2022-12-27 ENCOUNTER — INPATIENT (INPATIENT)
Facility: HOSPITAL | Age: 72
LOS: 0 days | Discharge: PSYCHIATRIC OR SUBSTANCE USE FACILITY | DRG: 394 | End: 2022-12-28
Attending: STUDENT IN AN ORGANIZED HEALTH CARE EDUCATION/TRAINING PROGRAM | Admitting: STUDENT IN AN ORGANIZED HEALTH CARE EDUCATION/TRAINING PROGRAM
Payer: COMMERCIAL

## 2022-12-27 VITALS
WEIGHT: 169.98 LBS | DIASTOLIC BLOOD PRESSURE: 85 MMHG | RESPIRATION RATE: 20 BRPM | HEART RATE: 91 BPM | TEMPERATURE: 99 F | OXYGEN SATURATION: 94 % | SYSTOLIC BLOOD PRESSURE: 147 MMHG

## 2022-12-27 DIAGNOSIS — Z93.2 ILEOSTOMY STATUS: Chronic | ICD-10-CM

## 2022-12-27 DIAGNOSIS — K43.5 PARASTOMAL HERNIA WITHOUT OBSTRUCTION OR GANGRENE: Chronic | ICD-10-CM

## 2022-12-27 LAB
ALBUMIN SERPL ELPH-MCNC: 3.8 G/DL — SIGNIFICANT CHANGE UP (ref 3.3–5.2)
ALP SERPL-CCNC: 93 U/L — SIGNIFICANT CHANGE UP (ref 40–120)
ALT FLD-CCNC: 16 U/L — SIGNIFICANT CHANGE UP
ANION GAP SERPL CALC-SCNC: 13 MMOL/L — SIGNIFICANT CHANGE UP (ref 5–17)
AST SERPL-CCNC: 16 U/L — SIGNIFICANT CHANGE UP
BILIRUB SERPL-MCNC: 0.4 MG/DL — SIGNIFICANT CHANGE UP (ref 0.4–2)
BUN SERPL-MCNC: 30.9 MG/DL — HIGH (ref 8–20)
CALCIUM SERPL-MCNC: 9.5 MG/DL — SIGNIFICANT CHANGE UP (ref 8.4–10.5)
CHLORIDE SERPL-SCNC: 104 MMOL/L — SIGNIFICANT CHANGE UP (ref 96–108)
CO2 SERPL-SCNC: 27 MMOL/L — SIGNIFICANT CHANGE UP (ref 22–29)
CREAT SERPL-MCNC: 2.01 MG/DL — HIGH (ref 0.5–1.3)
EGFR: 26 ML/MIN/1.73M2 — LOW
GLUCOSE SERPL-MCNC: 113 MG/DL — HIGH (ref 70–99)
HCT VFR BLD CALC: 28 % — LOW (ref 34.5–45)
HGB BLD-MCNC: 8.5 G/DL — LOW (ref 11.5–15.5)
LACTATE BLDV-MCNC: 0.8 MMOL/L — SIGNIFICANT CHANGE UP (ref 0.5–2)
MCHC RBC-ENTMCNC: 27.2 PG — SIGNIFICANT CHANGE UP (ref 27–34)
MCHC RBC-ENTMCNC: 30.4 GM/DL — LOW (ref 32–36)
MCV RBC AUTO: 89.5 FL — SIGNIFICANT CHANGE UP (ref 80–100)
PLATELET # BLD AUTO: 173 K/UL — SIGNIFICANT CHANGE UP (ref 150–400)
POTASSIUM SERPL-MCNC: 3.9 MMOL/L — SIGNIFICANT CHANGE UP (ref 3.5–5.3)
POTASSIUM SERPL-SCNC: 3.9 MMOL/L — SIGNIFICANT CHANGE UP (ref 3.5–5.3)
PROT SERPL-MCNC: 6.4 G/DL — LOW (ref 6.6–8.7)
RBC # BLD: 3.13 M/UL — LOW (ref 3.8–5.2)
RBC # FLD: 14.6 % — HIGH (ref 10.3–14.5)
SODIUM SERPL-SCNC: 144 MMOL/L — SIGNIFICANT CHANGE UP (ref 135–145)
WBC # BLD: 5.25 K/UL — SIGNIFICANT CHANGE UP (ref 3.8–10.5)
WBC # FLD AUTO: 5.25 K/UL — SIGNIFICANT CHANGE UP (ref 3.8–10.5)

## 2022-12-27 PROCEDURE — 99285 EMERGENCY DEPT VISIT HI MDM: CPT

## 2022-12-27 RX ORDER — ONDANSETRON 8 MG/1
4 TABLET, FILM COATED ORAL ONCE
Refills: 0 | Status: COMPLETED | OUTPATIENT
Start: 2022-12-27 | End: 2022-12-27

## 2022-12-27 RX ORDER — MORPHINE SULFATE 50 MG/1
4 CAPSULE, EXTENDED RELEASE ORAL ONCE
Refills: 0 | Status: DISCONTINUED | OUTPATIENT
Start: 2022-12-27 | End: 2022-12-27

## 2022-12-27 RX ADMIN — MORPHINE SULFATE 4 MILLIGRAM(S): 50 CAPSULE, EXTENDED RELEASE ORAL at 21:48

## 2022-12-27 RX ADMIN — ONDANSETRON 4 MILLIGRAM(S): 8 TABLET, FILM COATED ORAL at 21:49

## 2022-12-27 NOTE — ED PROVIDER NOTE - OBJECTIVE STATEMENT
72 year old female with PMH CVA, schizoaffective disorder, HTN, and recurrent SBO presents with abd pain and vomiting. Pt states that the Sx started last night with abd pain, and she vomited 2 times today non-bloody emesis. She reports diffuse cramping abd pain, no diarrhea, fevers, chills, chets pain, SOB.

## 2022-12-27 NOTE — ED ADULT TRIAGE NOTE - CHIEF COMPLAINT QUOTE
C/O lower abd pain associated with N/V/D for the past day. Denies fevers or chills. Recently had a hernia repair and SBO.

## 2022-12-27 NOTE — ED ADULT NURSE NOTE - NS ED PATIENT SAFETY CONCERN
[de-identified] : X-ray spine AP/lateral today out of  the brace (7/11/19): 20 degree right thoracic curve and 45 degree left thoracolumbar curve noted. Risser 4. \par \par Off site MRI report reviewed: No abnormalities No

## 2022-12-28 ENCOUNTER — TRANSCRIPTION ENCOUNTER (OUTPATIENT)
Age: 72
End: 2022-12-28

## 2022-12-28 ENCOUNTER — EMERGENCY (EMERGENCY)
Facility: HOSPITAL | Age: 72
LOS: 1 days | Discharge: HOME | End: 2022-12-28
Attending: EMERGENCY MEDICINE
Payer: MEDICARE

## 2022-12-28 VITALS
SYSTOLIC BLOOD PRESSURE: 134 MMHG | HEART RATE: 88 BPM | RESPIRATION RATE: 20 BRPM | DIASTOLIC BLOOD PRESSURE: 86 MMHG | TEMPERATURE: 98 F | OXYGEN SATURATION: 98 % | WEIGHT: 154.98 LBS

## 2022-12-28 VITALS
OXYGEN SATURATION: 92 % | RESPIRATION RATE: 18 BRPM | HEART RATE: 86 BPM | DIASTOLIC BLOOD PRESSURE: 92 MMHG | SYSTOLIC BLOOD PRESSURE: 158 MMHG

## 2022-12-28 DIAGNOSIS — K43.5 PARASTOMAL HERNIA WITHOUT OBSTRUCTION OR GANGRENE: Chronic | ICD-10-CM

## 2022-12-28 DIAGNOSIS — Z93.2 ILEOSTOMY STATUS: Chronic | ICD-10-CM

## 2022-12-28 DIAGNOSIS — K56.609 UNSPECIFIED INTESTINAL OBSTRUCTION, UNSPECIFIED AS TO PARTIAL VERSUS COMPLETE OBSTRUCTION: ICD-10-CM

## 2022-12-28 LAB
ALBUMIN SERPL ELPH-MCNC: 3.1 G/DL — LOW (ref 3.3–5.2)
ALP SERPL-CCNC: 81 U/L — SIGNIFICANT CHANGE UP (ref 40–120)
ALT FLD-CCNC: 13 U/L — SIGNIFICANT CHANGE UP
ANION GAP SERPL CALC-SCNC: 12 MMOL/L — SIGNIFICANT CHANGE UP (ref 5–17)
AST SERPL-CCNC: 17 U/L — SIGNIFICANT CHANGE UP
BASOPHILS # BLD AUTO: 0.02 K/UL — SIGNIFICANT CHANGE UP (ref 0–0.2)
BASOPHILS # BLD AUTO: 0.05 K/UL — SIGNIFICANT CHANGE UP (ref 0–0.2)
BASOPHILS NFR BLD AUTO: 0.5 % — SIGNIFICANT CHANGE UP (ref 0–2)
BASOPHILS NFR BLD AUTO: 0.9 % — SIGNIFICANT CHANGE UP (ref 0–2)
BILIRUB SERPL-MCNC: 0.4 MG/DL — SIGNIFICANT CHANGE UP (ref 0.4–2)
BUN SERPL-MCNC: 36.1 MG/DL — HIGH (ref 8–20)
CALCIUM SERPL-MCNC: 8.6 MG/DL — SIGNIFICANT CHANGE UP (ref 8.4–10.5)
CHLORIDE SERPL-SCNC: 109 MMOL/L — HIGH (ref 96–108)
CO2 SERPL-SCNC: 26 MMOL/L — SIGNIFICANT CHANGE UP (ref 22–29)
CREAT SERPL-MCNC: 2.15 MG/DL — HIGH (ref 0.5–1.3)
EGFR: 24 ML/MIN/1.73M2 — LOW
EOSINOPHIL # BLD AUTO: 0.04 K/UL — SIGNIFICANT CHANGE UP (ref 0–0.5)
EOSINOPHIL # BLD AUTO: 0.05 K/UL — SIGNIFICANT CHANGE UP (ref 0–0.5)
EOSINOPHIL NFR BLD AUTO: 0.9 % — SIGNIFICANT CHANGE UP (ref 0–6)
EOSINOPHIL NFR BLD AUTO: 1 % — SIGNIFICANT CHANGE UP (ref 0–6)
GIANT PLATELETS BLD QL SMEAR: PRESENT — SIGNIFICANT CHANGE UP
GLUCOSE SERPL-MCNC: 91 MG/DL — SIGNIFICANT CHANGE UP (ref 70–99)
HCT VFR BLD CALC: 25.2 % — LOW (ref 34.5–45)
HGB BLD-MCNC: 7.6 G/DL — LOW (ref 11.5–15.5)
IMM GRANULOCYTES NFR BLD AUTO: 0.3 % — SIGNIFICANT CHANGE UP (ref 0–0.9)
LIDOCAIN IGE QN: 29 U/L — SIGNIFICANT CHANGE UP (ref 22–51)
LYMPHOCYTES # BLD AUTO: 0.55 K/UL — LOW (ref 1–3.3)
LYMPHOCYTES # BLD AUTO: 1.02 K/UL — SIGNIFICANT CHANGE UP (ref 1–3.3)
LYMPHOCYTES # BLD AUTO: 10.5 % — LOW (ref 13–44)
LYMPHOCYTES # BLD AUTO: 25.6 % — SIGNIFICANT CHANGE UP (ref 13–44)
MANUAL SMEAR VERIFICATION: SIGNIFICANT CHANGE UP
MCHC RBC-ENTMCNC: 27.5 PG — SIGNIFICANT CHANGE UP (ref 27–34)
MCHC RBC-ENTMCNC: 30.2 GM/DL — LOW (ref 32–36)
MCV RBC AUTO: 91.3 FL — SIGNIFICANT CHANGE UP (ref 80–100)
MONOCYTES # BLD AUTO: 0.28 K/UL — SIGNIFICANT CHANGE UP (ref 0–0.9)
MONOCYTES # BLD AUTO: 0.46 K/UL — SIGNIFICANT CHANGE UP (ref 0–0.9)
MONOCYTES NFR BLD AUTO: 11.5 % — SIGNIFICANT CHANGE UP (ref 2–14)
MONOCYTES NFR BLD AUTO: 5.3 % — SIGNIFICANT CHANGE UP (ref 2–14)
NEUTROPHILS # BLD AUTO: 2.44 K/UL — SIGNIFICANT CHANGE UP (ref 1.8–7.4)
NEUTROPHILS # BLD AUTO: 4.33 K/UL — SIGNIFICANT CHANGE UP (ref 1.8–7.4)
NEUTROPHILS NFR BLD AUTO: 61.1 % — SIGNIFICANT CHANGE UP (ref 43–77)
NEUTROPHILS NFR BLD AUTO: 82.4 % — HIGH (ref 43–77)
PLAT MORPH BLD: NORMAL — SIGNIFICANT CHANGE UP
PLATELET # BLD AUTO: 159 K/UL — SIGNIFICANT CHANGE UP (ref 150–400)
POIKILOCYTOSIS BLD QL AUTO: SLIGHT — SIGNIFICANT CHANGE UP
POLYCHROMASIA BLD QL SMEAR: SLIGHT — SIGNIFICANT CHANGE UP
POTASSIUM SERPL-MCNC: 4.2 MMOL/L — SIGNIFICANT CHANGE UP (ref 3.5–5.3)
POTASSIUM SERPL-SCNC: 4.2 MMOL/L — SIGNIFICANT CHANGE UP (ref 3.5–5.3)
PROT SERPL-MCNC: 5.5 G/DL — LOW (ref 6.6–8.7)
RBC # BLD: 2.76 M/UL — LOW (ref 3.8–5.2)
RBC # FLD: 14.7 % — HIGH (ref 10.3–14.5)
RBC BLD AUTO: ABNORMAL
SARS-COV-2 RNA SPEC QL NAA+PROBE: SIGNIFICANT CHANGE UP
SARS-COV-2 RNA SPEC QL NAA+PROBE: SIGNIFICANT CHANGE UP
SODIUM SERPL-SCNC: 147 MMOL/L — HIGH (ref 135–145)
WBC # BLD: 3.99 K/UL — SIGNIFICANT CHANGE UP (ref 3.8–10.5)
WBC # FLD AUTO: 3.99 K/UL — SIGNIFICANT CHANGE UP (ref 3.8–10.5)

## 2022-12-28 PROCEDURE — 74019 RADEX ABDOMEN 2 VIEWS: CPT | Mod: 26

## 2022-12-28 PROCEDURE — 74176 CT ABD & PELVIS W/O CONTRAST: CPT | Mod: 26,MD

## 2022-12-28 PROCEDURE — 99284 EMERGENCY DEPT VISIT MOD MDM: CPT

## 2022-12-28 PROCEDURE — 99222 1ST HOSP IP/OBS MODERATE 55: CPT

## 2022-12-28 RX ORDER — DIATRIZOATE MEGLUMINE 180 MG/ML
30 INJECTION, SOLUTION INTRAVESICAL ONCE
Refills: 0 | Status: COMPLETED | OUTPATIENT
Start: 2022-12-28 | End: 2022-12-28

## 2022-12-28 RX ORDER — HEPARIN SODIUM 5000 [USP'U]/ML
5000 INJECTION INTRAVENOUS; SUBCUTANEOUS EVERY 8 HOURS
Refills: 0 | Status: DISCONTINUED | OUTPATIENT
Start: 2022-12-28 | End: 2022-12-28

## 2022-12-28 RX ORDER — HALOPERIDOL DECANOATE 100 MG/ML
5 INJECTION INTRAMUSCULAR
Refills: 0 | Status: DISCONTINUED | OUTPATIENT
Start: 2022-12-28 | End: 2022-12-28

## 2022-12-28 RX ORDER — OLANZAPINE 15 MG/1
2.5 TABLET, FILM COATED ORAL AT BEDTIME
Refills: 0 | Status: DISCONTINUED | OUTPATIENT
Start: 2022-12-28 | End: 2022-12-28

## 2022-12-28 RX ORDER — ONDANSETRON 8 MG/1
4 TABLET, FILM COATED ORAL ONCE
Refills: 0 | Status: COMPLETED | OUTPATIENT
Start: 2022-12-28 | End: 2022-12-28

## 2022-12-28 RX ORDER — PANTOPRAZOLE SODIUM 20 MG/1
40 TABLET, DELAYED RELEASE ORAL
Refills: 0 | Status: DISCONTINUED | OUTPATIENT
Start: 2022-12-28 | End: 2022-12-28

## 2022-12-28 RX ORDER — ESCITALOPRAM OXALATE 10 MG/1
10 TABLET, FILM COATED ORAL DAILY
Refills: 0 | Status: DISCONTINUED | OUTPATIENT
Start: 2022-12-28 | End: 2022-12-28

## 2022-12-28 RX ORDER — SODIUM CHLORIDE 9 MG/ML
1000 INJECTION INTRAMUSCULAR; INTRAVENOUS; SUBCUTANEOUS ONCE
Refills: 0 | Status: COMPLETED | OUTPATIENT
Start: 2022-12-28 | End: 2022-12-28

## 2022-12-28 RX ORDER — ACETAMINOPHEN 500 MG
1000 TABLET ORAL EVERY 6 HOURS
Refills: 0 | Status: DISCONTINUED | OUTPATIENT
Start: 2022-12-28 | End: 2022-12-28

## 2022-12-28 RX ORDER — SODIUM CHLORIDE 9 MG/ML
1000 INJECTION, SOLUTION INTRAVENOUS
Refills: 0 | Status: DISCONTINUED | OUTPATIENT
Start: 2022-12-28 | End: 2022-12-28

## 2022-12-28 RX ORDER — ATORVASTATIN CALCIUM 80 MG/1
10 TABLET, FILM COATED ORAL AT BEDTIME
Refills: 0 | Status: DISCONTINUED | OUTPATIENT
Start: 2022-12-28 | End: 2022-12-28

## 2022-12-28 RX ORDER — LEVOTHYROXINE SODIUM 125 MCG
112 TABLET ORAL DAILY
Refills: 0 | Status: DISCONTINUED | OUTPATIENT
Start: 2022-12-28 | End: 2022-12-28

## 2022-12-28 RX ADMIN — DIATRIZOATE MEGLUMINE 30 MILLILITER(S): 180 INJECTION, SOLUTION INTRAVESICAL at 22:54

## 2022-12-28 RX ADMIN — ONDANSETRON 4 MILLIGRAM(S): 8 TABLET, FILM COATED ORAL at 22:20

## 2022-12-28 RX ADMIN — SODIUM CHLORIDE 1000 MILLILITER(S): 9 INJECTION INTRAMUSCULAR; INTRAVENOUS; SUBCUTANEOUS at 22:20

## 2022-12-28 RX ADMIN — HEPARIN SODIUM 5000 UNIT(S): 5000 INJECTION INTRAVENOUS; SUBCUTANEOUS at 09:03

## 2022-12-28 RX ADMIN — Medication 1000 MILLIGRAM(S): at 10:38

## 2022-12-28 RX ADMIN — PANTOPRAZOLE SODIUM 40 MILLIGRAM(S): 20 TABLET, DELAYED RELEASE ORAL at 09:02

## 2022-12-28 RX ADMIN — Medication 400 MILLIGRAM(S): at 09:02

## 2022-12-28 RX ADMIN — HALOPERIDOL DECANOATE 5 MILLIGRAM(S): 100 INJECTION INTRAMUSCULAR at 09:03

## 2022-12-28 RX ADMIN — Medication 112 MICROGRAM(S): at 09:03

## 2022-12-28 NOTE — H&P ADULT - ATTENDING COMMENTS
I agree with the above.    Patient is a 72 year old female with PSHx multiple abdominal surgeries and multiple ventral hernias who presents with nausea and vomiting, concerning for recurrent SBO.  CT confirms SBO with transition point in the midline hernia.  On exam, patient's hernias are very easily reducible, without pain or discomfort.  She is having active bowel movements and refusing NGT.  As she is still passing flatus and BMs, we will plan for advancing her diet to CLD and re-evaluate.

## 2022-12-28 NOTE — H&P ADULT - NSHPPHYSICALEXAM_GEN_ALL_CORE
T(C): 37.3 (12-28-22 @ 00:54), Max: 37.3 (12-28-22 @ 00:54)  HR: 94 (12-28-22 @ 00:54) (91 - 94)  BP: 124/71 (12-28-22 @ 00:54) (124/71 - 147/85)  RR: 20 (12-28-22 @ 00:54) (20 - 20)  SpO2: 97% (12-28-22 @ 00:54) (94% - 97%)    CONSTITUTIONAL: No apparent distress  RESP: No respiratory distress, no use of accessory muscles; CTA b/l, no WRR  CV: RRR, +S1S2, no MRG; no JVD; no peripheral edema  GI: Soft, distended, midline laparotomy scar, large anterior abdominal wall hernias, easily reducible at bedside, no obvious overlying skin changes, non peritoneal

## 2022-12-28 NOTE — CHART NOTE - NSCHARTNOTEFT_GEN_A_CORE
Patient refusing NG tube placement.  Patient was informed of risk of aspiration and the risk associated without a NG tube placement. Patient refusing NG tube placement.  Patient was informed of risk of aspiration and the risk associated without a NG tube placement.    ATTENDING ADDENDUM:  I agree with the above.  I attempted to place an NGT and patient screamed and refused.

## 2022-12-28 NOTE — H&P ADULT - NSHPLABSRESULTS_GEN_ALL_CORE
.  LABS:                         8.5    5.25  )-----------( 173      ( 27 Dec 2022 21:52 )             28.0     12-27    144  |  104  |  30.9<H>  ----------------------------<  113<H>  3.9   |  27.0  |  2.01<H>    Ca    9.5      27 Dec 2022 21:52    TPro  6.4<L>  /  Alb  3.8  /  TBili  0.4  /  DBili  x   /  AST  16  /  ALT  16  /  AlkPhos  93  12-27              RADIOLOGY, EKG & ADDITIONAL TESTS: Reviewed.     FINDINGS:  LOWER CHEST: Mild atelectatic changes at the lung bases, left greater than right. Coronary artery calcifications..    LIVER: Left hepatic cyst.  BILE DUCTS: Normal caliber.  GALLBLADDER: Cholelithiasis.  SPLEEN: Within normal limits.  PANCREAS: Within normal limits.  ADRENALS: Within normal limits.  KIDNEYS/URETERS: 9 mm right hyperdense lesion likely hemorrhagic/proteinaceous cyst. No hydronephrosis or radiodense calculi..    BLADDER: Similar appearance to prior, possible mild wall thickening versus underdistention.  REPRODUCTIVE ORGANS: Hysterectomy. Pelvic floor descent, similar in appearance to prior.    BOWEL: Multiple distended loops of fluid-filled small bowel, extending into a right lower ventral wall abdominal hernia which contains both small and large bowel; however caliber change of the small bowel within the hernia sac, compatible with small bowel obstruction. The colon is decompressed. Appendix is not visualized. Small hiatal hernia. Small bowel anastomosis in the lower abdomen appears grossly intact.  PERITONEUM: No ascites.  VESSELS: Atherosclerotic changes.  RETROPERITONEUM/LYMPH NODES: No lymphadenopathy.  ABDOMINAL WALL: Hernia mesh along the ventral abdominal wall.  BONES: Degenerative changes. Osseous demineralization. Redemonstrated anterior wedge deformity T12.  AI VERTEBRAL BODY ANALYSIS:  T10: Moderate compression fracture with 31% height loss.  T11: Moderate compression fracture with 28% height loss and low bone density of 44 HU, suspicious for osteoporosis.  T12: Severe compression fracture with 61% height loss.  L1: low bone density of 16 HU, suspicious for osteoporosis.  L2: low bone density of 75 HU, suspicious for osteoporosis.  L3: low bone density of 80 HU, suspicious for osteoporosis.  L4: low bone density of 81 HU, suspicious for osteoporosis.    IMPRESSION:  High-grade small bowel obstruction, transition within a right lower ventral abdominal wall hernia which contains small and large bowel

## 2022-12-28 NOTE — PHYSICAL THERAPY INITIAL EVALUATION ADULT - ADDITIONAL COMMENTS
as per pt and an aide at the bedside: continuos on site supervision, pt does not negotiates stairs, uses RW at all times, reports no falls

## 2022-12-28 NOTE — ED ADULT NURSE REASSESSMENT NOTE - NS ED NURSE REASSESS COMMENT FT1
Report received from offgoing RN, charting as noted. Patient sitting up in bed, eating breakfast. aide from group home at bedside.  Patient given AM meds, linens changed, placed in clean gown. Vital signs stable.

## 2022-12-28 NOTE — DISCHARGE NOTE PROVIDER - HOSPITAL COURSE
Admission HPI:  72 year old F, complex surgical history, notably multiple abdominal surgeries between 6364-4613 including a altemeir procedure c/b perforated viscus and ileostomy creation, rectal I and D's, SBO, parastomal hernia with repair x2 and abdominal wall reconstruction, ileostomy reversal, she also has a medical history of a CVA, schizoaffective disorder, HTN, presenting today with abdominal pain and vomiting on two occasion's from [place], she is passing flatus and stool, last BM was yesterday, which was normal, denies diarrhea, otherwise no additional complaints.   (28 Dec 2022 04:09)    Hospital Course:  CT scan on admission was read as high-grade small bowel obstruction, transition within a right lower ventral abdominal wall hernia which contains small and large bowel. Patient was admitted to the surgical service. Made NPO and started on IVF. Plan was for NGT placement which pt initially refused, however upon re-examination by attending physician her hernia was easily reducible and shortly after reduced, patient had multiple large bowel movements. She was advanced to a regular diet which she tolerated this morning without abd pain, nausea, or vomiting. Patient's abdomen is soft, hernia is non-tender. She is stable for discharge at this time and can resume activity and diet that she was on prior to hospital admission.    Patient is advised to RETURN TO THE EMERGENCY DEPARTMENT for any of the following - worsening pain, fever/chills, nausea/vomiting, altered mental status, chest pain, shortness of breath, or any other new / worsening symptom.

## 2022-12-28 NOTE — CHART NOTE - NSCHARTNOTEFT_GEN_A_CORE
Pt seen and examined on am rounds. Tolerated breakfast, + flatus with multiple large BMs. Hernias soft and reducible. Cleared to return to [facility].

## 2022-12-28 NOTE — PHYSICAL THERAPY INITIAL EVALUATION ADULT - PERTINENT HX OF CURRENT PROBLEM, REHAB EVAL
presenting to ED with abdominal pain and vomiting on two occasion's from [place], she is passing flatus and stool

## 2022-12-28 NOTE — DISCHARGE NOTE NURSING/CASE MANAGEMENT/SOCIAL WORK - NSDCPEFALRISK_GEN_ALL_CORE
For information on Fall & Injury Prevention, visit: [email or web address] OR  [email or web address] OR  https://www.cdc.gov/steadi/patient.html

## 2022-12-28 NOTE — H&P ADULT - ASSESSMENT
72 year old F, complex surgical history, notably multiple abdominal surgeries between 6437-5453 including a altemeir procedure c/b perforated viscus and ileostomy creation, rectal I and D's, SBO, parastomal hernia with repair x2 and abdominal wall reconstruction, ileostomy reversal, she also has a medical history of a CVA, schizoaffective disorder, HTN, presenting today with abdominal pain and vomiting, recently admitted to [facility], SBO was treated conservatively, exam and imaging s/o a SBO    Admit to ACS    IVF resuscitation   AM labs, monitor renal function with MONICA on presentation   NPO, NGT, IVF   ARBF   Strict input and output   OOB with strict assist   DVT ppx   PT eval    Dw Dr. Wolf

## 2022-12-28 NOTE — DISCHARGE NOTE NURSING/CASE MANAGEMENT/SOCIAL WORK - PATIENT PORTAL LINK FT
You can access the FollowMyHealth Patient Portal offered by [facility] by registering at the following website: [email or web address]. By joining [employer]’s FollowMyHealth portal, you will also be able to view your health information using other applications (apps) compatible with our system.

## 2022-12-28 NOTE — DISCHARGE NOTE PROVIDER - NSDCCPCAREPLAN_GEN_ALL_CORE_FT
PRINCIPAL DISCHARGE DIAGNOSIS  Diagnosis: Small bowel obstruction  Assessment and Plan of Treatment: Follow up: Please call and make an appointment your primary care provider after discharge.  Activity: May return to normal activities as tolerated. Refrain from heavy lifting.  Diet: May resume pre-hospital diet. No dietary restrictions.   Medications: Please resume all home medications as previously prescribed.  Patient is advised to RETURN TO THE EMERGENCY DEPARTMENT for any of the following - worsening pain, fever/chills, nausea/vomiting, altered mental status, chest pain, shortness of breath, or any other new / worsening symptom.

## 2022-12-28 NOTE — PHYSICAL THERAPY INITIAL EVALUATION ADULT - BALANCE DISTURBANCE, SYSTEM IMPAIRMENT CONTRIBUTE, REHAB EVAL
Neck Pain: Care Instructions  Your Care Instructions  You can have neck pain anywhere from the bottom of your head to the top of your shoulders. It can spread to the upper back or arms. Injuries, painting a ceiling, sleeping with your neck twisted, staying in one position for too long, and many other activities can cause neck pain. Most neck pain gets better with home care. Your doctor may recommend medicine to relieve pain or relax your muscles. He or she may suggest exercise and physical therapy to increase flexibility and relieve stress. You may need to wear a special (cervical) collar to support your neck for a day or two. Follow-up care is a key part of your treatment and safety. Be sure to make and go to all appointments, and call your doctor if you are having problems. It's also a good idea to know your test results and keep a list of the medicines you take. How can you care for yourself at home? · Try using a heating pad on a low or medium setting for 15 to 20 minutes every 2 or 3 hours. Try a warm shower in place of one session with the heating pad. · You can also try an ice pack for 10 to 15 minutes every 2 to 3 hours. Put a thin cloth between the ice and your skin. · Take pain medicines exactly as directed. ¨ If the doctor gave you a prescription medicine for pain, take it as prescribed. ¨ If you are not taking a prescription pain medicine, ask your doctor if you can take an over-the-counter medicine. · If your doctor recommends a cervical collar, wear it exactly as directed. When should you call for help? Call your doctor now or seek immediate medical care if:  · You have new or worsening numbness in your arms, buttocks or legs. · You have new or worsening weakness in your arms or legs. (This could make it hard to stand up.)  · You lose control of your bladder or bowels.   Watch closely for changes in your health, and be sure to contact your doctor if:  · Your neck pain is getting worse.  · You are not getting better after 1 week. · You do not get better as expected. Where can you learn more? Go to http://dodie-che.info/. Enter [id number] in the search box to learn more about \"Neck Pain: Care Instructions. \"  Current as of: March 21, 2017  Content Version: 11.3  © 8420-6963 [employer]. Care instructions adapted under license by [employer] (which disclaims liability or warranty for this information). If you have questions about a medical condition or this instruction, always ask your healthcare professional. [place] [address] any warranty or liability for your use of this information. Neck: Exercises  Your Care Instructions  Here are some examples of typical rehabilitation exercises for your condition. Start each exercise slowly. Ease off the exercise if you start to have pain. Your doctor or physical therapist will tell you when you can start these exercises and which ones will work best for you. How to do the exercises  Note: Stretching should make you feel a gentle stretch, but no pain. Stop any strengthening exercise that makes pain worse. Neck stretch    1. This stretch works best if you keep your shoulder down as you lean away from it. To help you remember to do this, start by relaxing your shoulders and lightly holding on to your thighs or your chair. 2. Tilt your head toward your shoulder and hold for 15 to 30 seconds. Let the weight of your head stretch your muscles. 3. If you would like a little added stretch, use your hand to gently and steadily pull your head toward your shoulder. For example, keeping your right shoulder down, lean your head to the left. 4. Repeat 2 to 4 times toward each shoulder. Diagonal neck stretch    1. Turn your head slightly toward the direction you will be stretching, and tilt your head diagonally toward your chest and hold for 15 to 30 seconds.   2. If you would like a little added stretch, use your hand to gently and steadily pull your head forward on the diagonal.  3. Repeat 2 to 4 times toward each side. Dorsal glide stretch    1. Sit or stand tall and look straight ahead. 2. Slowly tuck your chin as you glide your head backward over your body  3. Hold for a count of 6, and then relax for up to 10 seconds. 4. Repeat 8 to 12 times. Note: The dorsal glide stretches the back of the neck. If you feel pain, do not glide so far back. Some people find this exercise easier to do while lying on their backs with an ice pack on the neck. Chest and shoulder stretch    1. Sit or stand tall and glide your head backward as in the dorsal glide stretch. 2. Raise both arms so that your hands are next to your ears. 3. Take a deep breath, and as you breathe out, lower your elbows down and behind your back. You will feel your shoulder blades slide down and together, and at the same time you will feel a stretch across your chest and the front of your shoulders. 4. Hold for about 6 seconds, and then relax for up to 10 seconds. 5. Repeat 8 to 12 times. Strengthening: Hands on head    1. Move your head backward, forward, and side to side against gentle pressure from your hands, holding each position for about 6 seconds. 2. Repeat 8 to 12 times. Follow-up care is a key part of your treatment and safety. Be sure to make and go to all appointments, and call your doctor if you are having problems. It's also a good idea to know your test results and keep a list of the medicines you take. Where can you learn more? Go to http://dodie-che.info/. Enter P975 in the search box to learn more about \"Neck: Exercises. \"  Current as of: March 21, 2017  Content Version: 11.3  © 7541-9037 [employer]. Care instructions adapted under license by [employer] (which disclaims liability or warranty for this information).  If you have questions about a medical condition or this instruction, always ask your healthcare professional. [place] [address] any warranty or liability for your use of this information. musculoskeletal

## 2022-12-28 NOTE — DISCHARGE NOTE NURSING/CASE MANAGEMENT/SOCIAL WORK - NSDCVIVACCINE_GEN_ALL_CORE_FT
Tdap; 17-Sep-2018 10:04; Melina Montoya (JOSETTE); [employer]; [id number] (Exp. Date: 11-Jun-2020); IntraMuscular; Deltoid Right.; 0.5 milliLiter(s); VIS (VIS Published: 09-May-2013, VIS Presented: 17-Sep-2018);

## 2022-12-29 VITALS
RESPIRATION RATE: 18 BRPM | TEMPERATURE: 98 F | SYSTOLIC BLOOD PRESSURE: 115 MMHG | DIASTOLIC BLOOD PRESSURE: 74 MMHG | HEART RATE: 74 BPM | OXYGEN SATURATION: 95 %

## 2022-12-29 LAB
ANION GAP SERPL CALC-SCNC: 11 MMOL/L — SIGNIFICANT CHANGE UP (ref 5–17)
BASOPHILS # BLD AUTO: 0 K/UL — SIGNIFICANT CHANGE UP (ref 0–0.2)
BASOPHILS NFR BLD AUTO: 0 % — SIGNIFICANT CHANGE UP (ref 0–2)
BUN SERPL-MCNC: 37.6 MG/DL — HIGH (ref 8–20)
CALCIUM SERPL-MCNC: 8.4 MG/DL — SIGNIFICANT CHANGE UP (ref 8.4–10.5)
CHLORIDE SERPL-SCNC: 113 MMOL/L — HIGH (ref 96–108)
CO2 SERPL-SCNC: 25 MMOL/L — SIGNIFICANT CHANGE UP (ref 22–29)
CREAT SERPL-MCNC: 2.12 MG/DL — HIGH (ref 0.5–1.3)
EGFR: 25 ML/MIN/1.73M2 — LOW
ELLIPTOCYTES BLD QL SMEAR: SLIGHT — SIGNIFICANT CHANGE UP
EOSINOPHIL # BLD AUTO: 0.03 K/UL — SIGNIFICANT CHANGE UP (ref 0–0.5)
EOSINOPHIL NFR BLD AUTO: 0.9 % — SIGNIFICANT CHANGE UP (ref 0–6)
GIANT PLATELETS BLD QL SMEAR: PRESENT — SIGNIFICANT CHANGE UP
GLUCOSE SERPL-MCNC: 84 MG/DL — SIGNIFICANT CHANGE UP (ref 70–99)
HCT VFR BLD CALC: 24.8 % — LOW (ref 34.5–45)
HGB BLD-MCNC: 7.4 G/DL — LOW (ref 11.5–15.5)
HYPOCHROMIA BLD QL: SLIGHT — SIGNIFICANT CHANGE UP
LYMPHOCYTES # BLD AUTO: 0.77 K/UL — LOW (ref 1–3.3)
LYMPHOCYTES # BLD AUTO: 24.1 % — SIGNIFICANT CHANGE UP (ref 13–44)
MAGNESIUM SERPL-MCNC: 2.2 MG/DL — SIGNIFICANT CHANGE UP (ref 1.6–2.6)
MANUAL SMEAR VERIFICATION: SIGNIFICANT CHANGE UP
MCHC RBC-ENTMCNC: 27.7 PG — SIGNIFICANT CHANGE UP (ref 27–34)
MCHC RBC-ENTMCNC: 29.8 GM/DL — LOW (ref 32–36)
MCV RBC AUTO: 92.9 FL — SIGNIFICANT CHANGE UP (ref 80–100)
MONOCYTES # BLD AUTO: 0.14 K/UL — SIGNIFICANT CHANGE UP (ref 0–0.9)
MONOCYTES NFR BLD AUTO: 4.5 % — SIGNIFICANT CHANGE UP (ref 2–14)
NEUTROPHILS # BLD AUTO: 2.18 K/UL — SIGNIFICANT CHANGE UP (ref 1.8–7.4)
NEUTROPHILS NFR BLD AUTO: 68.7 % — SIGNIFICANT CHANGE UP (ref 43–77)
PHOSPHATE SERPL-MCNC: 3.6 MG/DL — SIGNIFICANT CHANGE UP (ref 2.4–4.7)
PLAT MORPH BLD: NORMAL — SIGNIFICANT CHANGE UP
PLATELET # BLD AUTO: 148 K/UL — LOW (ref 150–400)
POIKILOCYTOSIS BLD QL AUTO: SLIGHT — SIGNIFICANT CHANGE UP
POLYCHROMASIA BLD QL SMEAR: SLIGHT — SIGNIFICANT CHANGE UP
POTASSIUM SERPL-MCNC: 3.9 MMOL/L — SIGNIFICANT CHANGE UP (ref 3.5–5.3)
POTASSIUM SERPL-SCNC: 3.9 MMOL/L — SIGNIFICANT CHANGE UP (ref 3.5–5.3)
RBC # BLD: 2.67 M/UL — LOW (ref 3.8–5.2)
RBC # FLD: 14.7 % — HIGH (ref 10.3–14.5)
RBC BLD AUTO: ABNORMAL
SMUDGE CELLS # BLD: PRESENT — SIGNIFICANT CHANGE UP
SODIUM SERPL-SCNC: 149 MMOL/L — HIGH (ref 135–145)
VARIANT LYMPHS # BLD: 1.8 % — SIGNIFICANT CHANGE UP (ref 0–6)
WBC # BLD: 3.18 K/UL — LOW (ref 3.8–10.5)
WBC # FLD AUTO: 3.18 K/UL — LOW (ref 3.8–10.5)

## 2022-12-29 PROCEDURE — 85025 COMPLETE CBC W/AUTO DIFF WBC: CPT

## 2022-12-29 PROCEDURE — 36415 COLL VENOUS BLD VENIPUNCTURE: CPT

## 2022-12-29 PROCEDURE — 74176 CT ABD & PELVIS W/O CONTRAST: CPT | Mod: MA

## 2022-12-29 PROCEDURE — 99285 EMERGENCY DEPT VISIT HI MDM: CPT | Mod: 25

## 2022-12-29 PROCEDURE — 80053 COMPREHEN METABOLIC PANEL: CPT

## 2022-12-29 PROCEDURE — 74176 CT ABD & PELVIS W/O CONTRAST: CPT | Mod: 26,MA

## 2022-12-29 PROCEDURE — U0005: CPT

## 2022-12-29 PROCEDURE — U0003: CPT

## 2022-12-29 PROCEDURE — 96361 HYDRATE IV INFUSION ADD-ON: CPT

## 2022-12-29 PROCEDURE — 96375 TX/PRO/DX INJ NEW DRUG ADDON: CPT

## 2022-12-29 PROCEDURE — 83735 ASSAY OF MAGNESIUM: CPT

## 2022-12-29 PROCEDURE — 96374 THER/PROPH/DIAG INJ IV PUSH: CPT

## 2022-12-29 PROCEDURE — 80048 BASIC METABOLIC PNL TOTAL CA: CPT

## 2022-12-29 PROCEDURE — 74176 CT ABD & PELVIS W/O CONTRAST: CPT | Mod: MD

## 2022-12-29 PROCEDURE — 83690 ASSAY OF LIPASE: CPT

## 2022-12-29 PROCEDURE — 99284 EMERGENCY DEPT VISIT MOD MDM: CPT | Mod: 25

## 2022-12-29 PROCEDURE — 74019 RADEX ABDOMEN 2 VIEWS: CPT

## 2022-12-29 PROCEDURE — 83605 ASSAY OF LACTIC ACID: CPT

## 2022-12-29 PROCEDURE — 84100 ASSAY OF PHOSPHORUS: CPT

## 2022-12-29 RX ORDER — HALOPERIDOL DECANOATE 100 MG/ML
5 INJECTION INTRAMUSCULAR ONCE
Refills: 0 | Status: COMPLETED | OUTPATIENT
Start: 2022-12-29 | End: 2022-12-29

## 2022-12-29 NOTE — ED ADULT NURSE NOTE - OBJECTIVE STATEMENT
pt on base line mental status. awake. denies any pain. side rails up for safety. bed locked to lowest position. nad noted.

## 2022-12-29 NOTE — ED PROVIDER NOTE - PATIENT PORTAL LINK FT
You can access the FollowMyHealth Patient Portal offered by [facility] by registering at the following website: [email or web address]. By joining [employer]’s FollowMyHealth portal, you will also be able to view your health information using other applications (apps) compatible with our system.

## 2022-12-29 NOTE — ED PROVIDER NOTE - OBJECTIVE STATEMENT
72-year-old female with extensive psychiatric history, currently resides at [facility], has multiple medical comorbidities and a complex abdominal surgical history.  Patient was recently recently admitted for a small bowel obstruction that spontaneously improved without surgical intervention.  Shortly after discharge patient returned to her facility where she began to vomit 2 separate times.  Patient endorses vomiting and some mild abdominal pain.  History is otherwise limited due to patient's psychiatric condition.

## 2022-12-29 NOTE — ED PROVIDER NOTE - PROGRESS NOTE DETAILS
Patient feeling improved.  CT shows contrast transverse colon.  No signs of obstruction at this time.  Stable for discharge.  Tolerating p.o. at this time

## 2022-12-29 NOTE — ED PROVIDER NOTE - CLINICAL SUMMARY MEDICAL DECISION MAKING FREE TEXT BOX
pending repeat CT to assess for bowel obstruction. pending results, may need repeat surgical consult

## 2023-01-16 NOTE — ED PROVIDER NOTE - CROS ED ENMT ALL NEG
Caller: KEYONNA RAMIREZ    Relationship: Emergency Contact    Best call back number: [phone]    What is the best time to reach you: ANYTIME    Who are you requesting to speak with (clinical staff, provider,  specific staff member): CLINICAL     What was the call regarding: PT WAS RECOMMENDED TO GET SOME PROBIOTICS BUT PT IS HAVING A HARD TIME FINDING THE BRAND. PT WANTS TO KNOW IF ITS OTHER OPTIONS                 negative...

## 2023-03-09 NOTE — ED ADULT NURSE NOTE - PRO INTERPRETER NEED 2
CERTIFICATE OF RETURN TO SCHOOL    March 9, 2023      Re:   Deny Maloney  [address]  [place] [address]                        This is to certify that Deny Maloney was seen on 3/8/2023 and can return to school on 3/10/2023.                      Electronically signed by Dr. Morris, 3/9/2023  [facility]  [address]  [place] [address]  [phone]   English

## 2023-03-16 NOTE — H&P ADULT - NSHPSOCIALHISTORY_GEN_ALL_CORE
Patient comes from [facility] and has had recurrent prior hospitalizations at [facility].  She has a potential history of prior smoking through patient unable to confirm or deny use of illicit drugs, alcohol smoking due to lack of access at inpatient facility Hydroxychloroquine Counseling:  I discussed with the patient that a baseline ophthalmologic exam is needed at the start of therapy and every year thereafter while on therapy. A CBC may also be warranted for monitoring.  The side effects of this medication were discussed with the patient, including but not limited to agranulocytosis, aplastic anemia, seizures, rashes, retinopathy, and liver toxicity. Patient instructed to call the office should any adverse effect occur.  The patient verbalized understanding of the proper use and possible adverse effects of Plaquenil.  All the patient's questions and concerns were addressed.

## 2023-03-17 NOTE — DIETITIAN INITIAL EVALUATION ADULT. - CONTINUE CURRENT NUTRITION CARE PLAN
Pt reports for the last 2 weeks he has had intermittent tachycardia with SOB. Apple watch reads 130s. Unknown trigger. Chest \"heaviness\", unable to take deep breathes. Also, dizziness, sweating more than normal. Pt reports a lot of stress, but denies having anxiety  
yes

## 2023-04-25 NOTE — PROGRESS NOTE ADULT - PROVIDER SPECIALTY LIST ADULT
-- DO NOT REPLY / DO NOT REPLY ALL --  -- Message is from Engagement Center Operations (ECO) --    General Patient Message: Patient is calling as the blood sugar medication is not working as his blood sugar is still over 300. Please call back to discuss.      Alternative phone number: none    Can a detailed message be left? Yes    Message Turnaround:     Is it Working Hours? Yes - Working Hours     [place]:    Please give this turnaround time to the caller:   \"This message will be sent to [state Provider's name]. The clinical team will fulfill your request as soon as they review your message.\"                 Gastroenterology

## 2023-04-27 NOTE — ED PROVIDER NOTE - CPE EDP GASTRO NORM
- - - Closure 2 Information: This tab is for additional flaps and grafts, including complex repair and grafts and complex repair and flaps. You can also specify a different location for the additional defect, if the location is the same you do not need to select a new one. We will insert the automated text for the repair you select below just as we do for solitary flaps and grafts. Please note that at this time if you select a location with a different insurance zone you will need to override the ICD10 and CPT if appropriate.
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
## 2023-05-26 NOTE — ED ADULT NURSE REASSESSMENT NOTE - NS ED NURSE REASSESS COMMENT FT1
Pt calm.  Tolerated PO contrast.  Cardiac monitor showing NSR with stable blood pressure.  1:1 pilgrim aide at bedside.  IV fluids infusing well through patent IV cath.  Awaiting transport to CT. Elidel Counseling: Patient may experience a mild burning sensation during topical application. Elidel is not approved in children less than 2 years of age. There have been case reports of hematologic and skin malignancies in patients using topical calcineurin inhibitors although causality is questionable.

## 2023-06-21 ENCOUNTER — INPATIENT (INPATIENT)
Facility: HOSPITAL | Age: 73
LOS: 7 days | Discharge: PSYCHIATRIC OR SUBSTANCE USE FACILITY | DRG: 394 | End: 2023-06-29
Attending: SURGERY | Admitting: SURGERY
Payer: MEDICARE

## 2023-06-21 VITALS
SYSTOLIC BLOOD PRESSURE: 145 MMHG | DIASTOLIC BLOOD PRESSURE: 84 MMHG | HEIGHT: 65 IN | OXYGEN SATURATION: 99 % | HEART RATE: 88 BPM | RESPIRATION RATE: 16 BRPM | TEMPERATURE: 99 F | WEIGHT: 184.97 LBS

## 2023-06-21 DIAGNOSIS — Z93.2 ILEOSTOMY STATUS: Chronic | ICD-10-CM

## 2023-06-21 DIAGNOSIS — K43.5 PARASTOMAL HERNIA WITHOUT OBSTRUCTION OR GANGRENE: Chronic | ICD-10-CM

## 2023-06-21 DIAGNOSIS — K56.609 UNSPECIFIED INTESTINAL OBSTRUCTION, UNSPECIFIED AS TO PARTIAL VERSUS COMPLETE OBSTRUCTION: ICD-10-CM

## 2023-06-21 LAB
ALBUMIN SERPL ELPH-MCNC: 4.1 G/DL — SIGNIFICANT CHANGE UP (ref 3.3–5.2)
ALP SERPL-CCNC: 86 U/L — SIGNIFICANT CHANGE UP (ref 40–120)
ALT FLD-CCNC: 12 U/L — SIGNIFICANT CHANGE UP
ANION GAP SERPL CALC-SCNC: 16 MMOL/L — SIGNIFICANT CHANGE UP (ref 5–17)
AST SERPL-CCNC: 11 U/L — SIGNIFICANT CHANGE UP
BASOPHILS # BLD AUTO: 0.02 K/UL — SIGNIFICANT CHANGE UP (ref 0–0.2)
BASOPHILS NFR BLD AUTO: 0.3 % — SIGNIFICANT CHANGE UP (ref 0–2)
BILIRUB SERPL-MCNC: 0.4 MG/DL — SIGNIFICANT CHANGE UP (ref 0.4–2)
BUN SERPL-MCNC: 29.9 MG/DL — HIGH (ref 8–20)
CALCIUM SERPL-MCNC: 9 MG/DL — SIGNIFICANT CHANGE UP (ref 8.4–10.5)
CHLORIDE SERPL-SCNC: 105 MMOL/L — SIGNIFICANT CHANGE UP (ref 96–108)
CO2 SERPL-SCNC: 24 MMOL/L — SIGNIFICANT CHANGE UP (ref 22–29)
CREAT SERPL-MCNC: 1.88 MG/DL — HIGH (ref 0.5–1.3)
EGFR: 28 ML/MIN/1.73M2 — LOW
EOSINOPHIL # BLD AUTO: 0 K/UL — SIGNIFICANT CHANGE UP (ref 0–0.5)
EOSINOPHIL NFR BLD AUTO: 0 % — SIGNIFICANT CHANGE UP (ref 0–6)
GLUCOSE SERPL-MCNC: 102 MG/DL — HIGH (ref 70–99)
HCT VFR BLD CALC: 31 % — LOW (ref 34.5–45)
HGB BLD-MCNC: 9.4 G/DL — LOW (ref 11.5–15.5)
IMM GRANULOCYTES NFR BLD AUTO: 0.1 % — SIGNIFICANT CHANGE UP (ref 0–0.9)
LACTATE BLDV-MCNC: 0.8 MMOL/L — SIGNIFICANT CHANGE UP (ref 0.5–2)
LIDOCAIN IGE QN: 88 U/L — HIGH (ref 22–51)
LYMPHOCYTES # BLD AUTO: 0.52 K/UL — LOW (ref 1–3.3)
LYMPHOCYTES # BLD AUTO: 7.4 % — LOW (ref 13–44)
MCHC RBC-ENTMCNC: 26.3 PG — LOW (ref 27–34)
MCHC RBC-ENTMCNC: 30.3 GM/DL — LOW (ref 32–36)
MCV RBC AUTO: 86.8 FL — SIGNIFICANT CHANGE UP (ref 80–100)
MONOCYTES # BLD AUTO: 0.59 K/UL — SIGNIFICANT CHANGE UP (ref 0–0.9)
MONOCYTES NFR BLD AUTO: 8.4 % — SIGNIFICANT CHANGE UP (ref 2–14)
NEUTROPHILS # BLD AUTO: 5.88 K/UL — SIGNIFICANT CHANGE UP (ref 1.8–7.4)
NEUTROPHILS NFR BLD AUTO: 83.8 % — HIGH (ref 43–77)
PLATELET # BLD AUTO: 149 K/UL — LOW (ref 150–400)
POTASSIUM SERPL-MCNC: 3.8 MMOL/L — SIGNIFICANT CHANGE UP (ref 3.5–5.3)
POTASSIUM SERPL-SCNC: 3.8 MMOL/L — SIGNIFICANT CHANGE UP (ref 3.5–5.3)
PROT SERPL-MCNC: 6.8 G/DL — SIGNIFICANT CHANGE UP (ref 6.6–8.7)
RBC # BLD: 3.57 M/UL — LOW (ref 3.8–5.2)
RBC # FLD: 14.7 % — HIGH (ref 10.3–14.5)
SODIUM SERPL-SCNC: 145 MMOL/L — SIGNIFICANT CHANGE UP (ref 135–145)
WBC # BLD: 7.02 K/UL — SIGNIFICANT CHANGE UP (ref 3.8–10.5)
WBC # FLD AUTO: 7.02 K/UL — SIGNIFICANT CHANGE UP (ref 3.8–10.5)

## 2023-06-21 PROCEDURE — 74176 CT ABD & PELVIS W/O CONTRAST: CPT | Mod: 26,ME

## 2023-06-21 PROCEDURE — 74019 RADEX ABDOMEN 2 VIEWS: CPT | Mod: 26

## 2023-06-21 PROCEDURE — 99285 EMERGENCY DEPT VISIT HI MDM: CPT

## 2023-06-21 PROCEDURE — 99283 EMERGENCY DEPT VISIT LOW MDM: CPT

## 2023-06-21 PROCEDURE — G1004: CPT

## 2023-06-21 RX ORDER — OLANZAPINE 15 MG/1
5 TABLET, FILM COATED ORAL DAILY
Refills: 0 | Status: DISCONTINUED | OUTPATIENT
Start: 2023-06-21 | End: 2023-06-23

## 2023-06-21 RX ORDER — ONDANSETRON 8 MG/1
4 TABLET, FILM COATED ORAL EVERY 6 HOURS
Refills: 0 | Status: DISCONTINUED | OUTPATIENT
Start: 2023-06-21 | End: 2023-06-29

## 2023-06-21 RX ORDER — ATORVASTATIN CALCIUM 80 MG/1
10 TABLET, FILM COATED ORAL AT BEDTIME
Refills: 0 | Status: DISCONTINUED | OUTPATIENT
Start: 2023-06-21 | End: 2023-06-23

## 2023-06-21 RX ORDER — SODIUM CHLORIDE 9 MG/ML
1000 INJECTION, SOLUTION INTRAVENOUS
Refills: 0 | Status: DISCONTINUED | OUTPATIENT
Start: 2023-06-21 | End: 2023-06-24

## 2023-06-21 RX ORDER — ESCITALOPRAM OXALATE 10 MG/1
10 TABLET, FILM COATED ORAL DAILY
Refills: 0 | Status: DISCONTINUED | OUTPATIENT
Start: 2023-06-21 | End: 2023-06-29

## 2023-06-21 RX ORDER — PANTOPRAZOLE SODIUM 20 MG/1
40 TABLET, DELAYED RELEASE ORAL DAILY
Refills: 0 | Status: DISCONTINUED | OUTPATIENT
Start: 2023-06-21 | End: 2023-06-28

## 2023-06-21 RX ORDER — SODIUM CHLORIDE 9 MG/ML
1000 INJECTION INTRAMUSCULAR; INTRAVENOUS; SUBCUTANEOUS ONCE
Refills: 0 | Status: COMPLETED | OUTPATIENT
Start: 2023-06-21 | End: 2023-06-21

## 2023-06-21 RX ORDER — ACETAMINOPHEN 500 MG
1000 TABLET ORAL ONCE
Refills: 0 | Status: COMPLETED | OUTPATIENT
Start: 2023-06-21 | End: 2023-06-21

## 2023-06-21 RX ORDER — HALOPERIDOL DECANOATE 100 MG/ML
5 INJECTION INTRAMUSCULAR
Refills: 0 | Status: DISCONTINUED | OUTPATIENT
Start: 2023-06-21 | End: 2023-06-23

## 2023-06-21 RX ORDER — LEVOTHYROXINE SODIUM 125 MCG
62 TABLET ORAL AT BEDTIME
Refills: 0 | Status: DISCONTINUED | OUTPATIENT
Start: 2023-06-21 | End: 2023-06-29

## 2023-06-21 RX ORDER — OLANZAPINE 15 MG/1
5 TABLET, FILM COATED ORAL ONCE
Refills: 0 | Status: COMPLETED | OUTPATIENT
Start: 2023-06-21 | End: 2023-06-21

## 2023-06-21 RX ORDER — PIPERACILLIN AND TAZOBACTAM 4; .5 G/20ML; G/20ML
3.38 INJECTION, POWDER, LYOPHILIZED, FOR SOLUTION INTRAVENOUS ONCE
Refills: 0 | Status: COMPLETED | OUTPATIENT
Start: 2023-06-21 | End: 2023-06-21

## 2023-06-21 RX ORDER — IOHEXOL 300 MG/ML
30 INJECTION, SOLUTION INTRAVENOUS ONCE
Refills: 0 | Status: COMPLETED | OUTPATIENT
Start: 2023-06-21 | End: 2023-06-21

## 2023-06-21 RX ORDER — ONDANSETRON 8 MG/1
4 TABLET, FILM COATED ORAL ONCE
Refills: 0 | Status: COMPLETED | OUTPATIENT
Start: 2023-06-21 | End: 2023-06-21

## 2023-06-21 RX ORDER — HEPARIN SODIUM 5000 [USP'U]/ML
5000 INJECTION INTRAVENOUS; SUBCUTANEOUS EVERY 8 HOURS
Refills: 0 | Status: DISCONTINUED | OUTPATIENT
Start: 2023-06-21 | End: 2023-06-29

## 2023-06-21 RX ADMIN — OLANZAPINE 5 MILLIGRAM(S): 15 TABLET, FILM COATED ORAL at 19:46

## 2023-06-21 RX ADMIN — PIPERACILLIN AND TAZOBACTAM 200 GRAM(S): 4; .5 INJECTION, POWDER, LYOPHILIZED, FOR SOLUTION INTRAVENOUS at 21:17

## 2023-06-21 RX ADMIN — IOHEXOL 30 MILLILITER(S): 300 INJECTION, SOLUTION INTRAVENOUS at 14:14

## 2023-06-21 RX ADMIN — ONDANSETRON 4 MILLIGRAM(S): 8 TABLET, FILM COATED ORAL at 14:13

## 2023-06-21 RX ADMIN — SODIUM CHLORIDE 1000 MILLILITER(S): 9 INJECTION INTRAMUSCULAR; INTRAVENOUS; SUBCUTANEOUS at 14:16

## 2023-06-21 RX ADMIN — Medication 400 MILLIGRAM(S): at 21:00

## 2023-06-21 NOTE — ED ADULT NURSE NOTE - NSFALLHARMRISKINTERV_ED_ALL_ED
Communicate risk of Fall with Harm to all staff, patient, and family/Provide visual cue: red socks, yellow wristband, yellow gown, etc/Reinforce activity limits and safety measures with patient and family/Use of alarms - bed, stretcher, chair and/or video monitoring/Bed in lowest position, wheels locked, appropriate side rails in place/Call bell, personal items and telephone in reach/Instruct patient to call for assistance before getting out of bed/chair/stretcher/Non-slip footwear applied when patient is off stretcher/[place] to call system/Physically safe environment - no spills, clutter or unnecessary equipment/Purposeful Proactive Rounding/Room/bathroom lighting operational, light cord in reach

## 2023-06-21 NOTE — H&P ADULT - NSHPLABSRESULTS_GEN_ALL_CORE
LABS:                        9.4    7.02  )-----------( 149      ( 21 Jun 2023 13:15 )             31.0     06-21    145  |  105  |  29.9<H>  ----------------------------<  102<H>  3.8   |  24.0  |  1.88<H>    Ca    9.0      21 Jun 2023 13:15    TPro  6.8  /  Alb  4.1  /  TBili  0.4  /  DBili  x   /  AST  11  /  ALT  12  /  AlkPhos  86  06-21    Lactate:  06-21 @ 13:15  0.80              Urinalysis Basic - ( 21 Jun 2023 13:15 )    Color: x / Appearance: x / SG: x / pH: x  Gluc: 102 mg/dL / Ketone: x  / Bili: x / Urobili: x   Blood: x / Protein: x / Nitrite: x   Leuk Esterase: x / RBC: x / WBC x   Sq Epi: x / Non Sq Epi: x / Bacteria: x          IMAGING:  < from: CT Abdomen and Pelvis w/ Oral Cont (06.21.23 @ 18:14) >    LOWER CHEST: Within normal limits.    LIVER: Left hepatic lobe cyst.  BILE DUCTS: Normal caliber.  GALLBLADDER: Cholelithiasis.  SPLEEN: Within normal limits.  PANCREAS: Within normal limits.  ADRENALS: Within normal limits.  KIDNEYS/URETERS: Within normal limits.    BLADDER: Within normal limits.  REPRODUCTIVE ORGANS: Hysterectomy.    BOWEL: Multiple dilated loops of small bowel with transition point within   the right ventral hernia sac (3, 94). Small bowel anastomoses within the   lower abdomen and right ventral hernia sac. Appendix is not visualized.  PERITONEUM: No ascites.  VESSELS: Atherosclerotic changes.  RETROPERITONEUM/LYMPH NODES: No lymphadenopathy.  ABDOMINAL WALL: Small and large bowel within the right ventral hernia sac   and small bowel within the lower midline ventral hernia sac. Hernia mesh   is seen medial to and superior to the complex ventral hernia.  BONES: Degenerative changes.    IMPRESSION:  High-grade small bowel obstruction with transition point within a right   ventral abdominal wall hernia sac.    Complex ventral abdominal wall hernia.

## 2023-06-21 NOTE — ED ADULT NURSE NOTE - OBJECTIVE STATEMENT
Pt presents with c/o abd pain and tenderness since yesterday. + nausea and vomiting. reports small bowel movement yesterday. denies diarrhea. + passing small amount of gas.  denies fever and chills.

## 2023-06-21 NOTE — ED ADULT NURSE REASSESSMENT NOTE - NS ED NURSE REASSESS COMMENT FT1
Pt resting comfortably, at baseline mental status, respirations even and unlabored on RA, skin warm and dry. Pt awaiting surgery consult

## 2023-06-21 NOTE — ED ADULT NURSE REASSESSMENT NOTE - NS ED NURSE REASSESS COMMENT FT1
Pt cleaned and placed on purwik at this time. Pt with rectal temp 101.8, Dr. Lara made aware, see new orders.

## 2023-06-21 NOTE — ED PROVIDER NOTE - CLINICAL SUMMARY MEDICAL DECISION MAKING FREE TEXT BOX
Initial Assessment and Plan(Shilpa):  73yoF; with PMH signif for Anemia, CVA, GERD, HTN, HLD, Ventral Hernia with prior obstruction; now p/w abd pain--RLQ abd pain with distention, associated with nausea and vomiting x2-3 episodes today. Found on exam to have RLQ abd tenderness.  Differential includes ileus vs appy vs sbo.  will do labs and ct.  [address] Goldstein:  Patient signed out to incoming physician.  All decisions regarding the progression of care will be made at their discretion.

## 2023-06-21 NOTE — H&P ADULT - ASSESSMENT
A:  72 year old F with an extensive medical history and complex surgical history. including multiple abdominal surgeries between 5515-0074 including a altemeir procedure c/b perforated viscus and ileostomy creation, rectal I and D's, SBO, parastomal hernia with repair x2 known to ACS service, presents for recurrent SBO, + flatus/BM. Large bur reducible hernias on exam. Poor surgical candidate.     Plan:   - Admit to Surgery - Dr. Villegas   - Conservative management of SBO      Patient refuses NGT  - NPO w/IVF   - Serial abd exams   - Antiemetics PRN   -  Optimize lytes and replete PRN   - Monitor fever curve   - Follow up AM  labs, hold empiric abx at this time   - Follow up blood Cx, UA   - DVT ppx    Plan discussed with Surgery Attending,

## 2023-06-21 NOTE — H&P ADULT - ATTENDING COMMENTS
Patient seen and examined at bedside. abdomen soft, unchanged from prior admissions. pt refusing NG, no episodes of emesis since admission, having flatus and BM. npo tonight, po challenge tomorrow.

## 2023-06-21 NOTE — ED PROVIDER NOTE - OBJECTIVE STATEMENT
73yoF; with PMH signif for Anemia, CVA, GERD, HTN, HLD, Ventral Hernia with prior obstruction; now p/w abd pain--RLQ abd pain with distention, associated with nausea and vomiting x2-3 episodes today.  c/o chills. denies fever. denies cp/sob/palp. denies sick contacts. last BM this morning.  PMH: Anemia, CVA, GERD, HTN, HLD, Ventral Hernia

## 2023-06-21 NOTE — ED ADULT TRIAGE NOTE - PAIN: PRESENCE, MLM
What Type Of Note Output Would You Prefer (Optional)?: Bullet Format
Hpi Title: Evaluation of Skin Lesions
How Severe Are Your Spot(S)?: mild
Have Your Spot(S) Been Treated In The Past?: has not been treated
complains of pain/discomfort

## 2023-06-21 NOTE — ED PROVIDER NOTE - PHYSICAL EXAMINATION
General:     NAD  Head:     NC/AT, EOMI, oral mucosa moist  Neck:     trachea midline  Lungs:     CTA b/l, no w/r/r  CVS:     S1S2, RRR, no m/g/r  Abd:     +BS, TTP @ RLQ with overlying erythema, no rebound or guarding, s/nd, no organomegaly  Ext:    2+ radial and pedal pulses, no c/c/e  Neuro: grossly intact

## 2023-06-21 NOTE — H&P ADULT - HISTORY OF PRESENT ILLNESS
Surgery H&P     Admitting attending: Dr. Villegas       HPI:  Ms. Toribio is a 72 year old F with an extensive medical history and complex surgical history. including multiple abdominal surgeries between [id number] including a altemeir procedure c/b perforated viscus and ileostomy creation, rectal I and D's, SBO, parastomal hernia with repair x2 known to ACS service. Patient sent in from [place] for episodes of vomiting ( reportedly dark bilious). Patient does endorse mild abd pain ( similar to prior episodes) 2/2 to recurrent SBO. CT demonstrating a small bowel obstruction with transition point within a right ventral abdominal wall hernia sac.Complex ventral abdominal wall hernia. Patient endorses flatus ( on today) ,and non-bloody BM. Denies diarrhea/constipation hematochezia, On arrival to ED, patient febrile (Tmax 101.8 on rectal), improved with tylenol. No leukocytosis, however left shift. Otherwise HDS.       PAST MEDICAL HISTORY:  CVA (cerebral vascular accident)    Hypertension    Obesity    Venous insufficiency (chronic) (peripheral)    Neutropenia    Constipation    Sensory hearing loss    Vaginal prolapse without mention of uterine prolapse    GERD (gastroesophageal reflux disease)    Osteopenia    Hypothyroidism    Schizoaffective disorder, bipolar type    Suicide attempt    Behavior problems    Hemiparesis, left    Seizure    Hypothyroid    Osteopenia    Constipation    Fall    Vaginal prolapse    Neutropenia    Anemia    Splenomegaly    Venous insufficiency    CVA (cerebral vascular accident)    HTN (hypertension)    GERD (gastroesophageal reflux disease)    Obesity    Urinary incontinence    Schizo affective schizophrenia    Displaced fracture of olecranon process with intraarticular extension of left ulna    Urinary bladder incontinence    Venous insufficiency    Rectal prolapse    Onychomycosis    Uterine prolapse          PAST SURGICAL HISTORY:  No significant past surgical history    H/O ileostomy    Parastomal hernia without obstruction or gangrene          MEDICATIONS:  heparin   Injectable 5000 Unit(s) SubCutaneous every 8 hours  lactated ringers. 1000 milliLiter(s) IV Continuous <Continuous>  ondansetron Injectable 4 milliGRAM(s) IV Push every 6 hours PRN  pantoprazole  Injectable 40 milliGRAM(s) IV Push daily        ALLERGIES:  erythromycin (Unknown)  Neupogen (Other)  Depakote (Other)  clozapine (Other)

## 2023-06-21 NOTE — H&P ADULT - NSHPPHYSICALEXAM_GEN_ALL_CORE
VITALS & I/Os:  Vital Signs Last 24 Hrs  T(C): 37.4 (21 Jun 2023 22:24), Max: 38.8 (21 Jun 2023 19:49)  T(F): 99.4 (21 Jun 2023 22:24), Max: 101.8 (21 Jun 2023 19:49)  HR: 90 (21 Jun 2023 19:22) (85 - 90)  BP: 132/81 (21 Jun 2023 19:22) (132/81 - 146/75)  BP(mean): --  RR: 18 (21 Jun 2023 19:22) (16 - 18)  SpO2: 95% (21 Jun 2023 19:22) (95% - 99%)    Parameters below as of 21 Jun 2023 19:22  Patient On (Oxygen Delivery Method): room air        I&O's Summary        PHYSICAL EXAM:  Constitutional: patient resting comfortably in bed, in no acute distress  HEENT: EOMI / PERRL b/l, no active drainage or redness  Neck: No JVD, full ROM without pain  Respiratory: respirations are unlabored, no accessory muscle use, no conversational dyspnea  Cardiovascular: regular rate & rhythm  Gastrointestinal: Abdomen distorted,  soft, non-tender, non-distended, no rebound tenderness / guarding, large parastomal hernia, soft and reducible   Neurological: A&O x 3; no gross sensory / motor / coordination deficits

## 2023-06-21 NOTE — ED PROVIDER NOTE - NS ED ROS FT
Constitutional: (-) fever  (-)chills  (-)sweats  Eyes/ENT: (-)   Cardiovascular: (-) chest pain, (-) palpitations (-) edema   Respiratory: (-) cough, (-) shortness of breath   Gastrointestinal: (+)nausea  +vomiting, (-) diarrhea  +) abdominal pain   :  (-)dysuria, (-)frequency, (-)urgency, (-)hematuria  Musculoskeletal: (-) neck pain, (-) back pain, (-) joint pain  Integumentary: (-) rash, (-) edema  Neurological: (-) headache, (-) altered mental status  (-)LOC

## 2023-06-22 LAB
ACANTHOCYTES BLD QL SMEAR: SLIGHT — SIGNIFICANT CHANGE UP
ANION GAP SERPL CALC-SCNC: 14 MMOL/L — SIGNIFICANT CHANGE UP (ref 5–17)
ANISOCYTOSIS BLD QL: SLIGHT — SIGNIFICANT CHANGE UP
BASOPHILS # BLD AUTO: 0.04 K/UL — SIGNIFICANT CHANGE UP (ref 0–0.2)
BASOPHILS NFR BLD AUTO: 0.9 % — SIGNIFICANT CHANGE UP (ref 0–2)
BUN SERPL-MCNC: 35.8 MG/DL — HIGH (ref 8–20)
BURR CELLS BLD QL SMEAR: PRESENT — SIGNIFICANT CHANGE UP
CALCIUM SERPL-MCNC: 8.4 MG/DL — SIGNIFICANT CHANGE UP (ref 8.4–10.5)
CHLORIDE SERPL-SCNC: 107 MMOL/L — SIGNIFICANT CHANGE UP (ref 96–108)
CO2 SERPL-SCNC: 23 MMOL/L — SIGNIFICANT CHANGE UP (ref 22–29)
CREAT SERPL-MCNC: 2.18 MG/DL — HIGH (ref 0.5–1.3)
EGFR: 24 ML/MIN/1.73M2 — LOW
EOSINOPHIL # BLD AUTO: 0 K/UL — SIGNIFICANT CHANGE UP (ref 0–0.5)
EOSINOPHIL NFR BLD AUTO: 0 % — SIGNIFICANT CHANGE UP (ref 0–6)
GIANT PLATELETS BLD QL SMEAR: PRESENT — SIGNIFICANT CHANGE UP
GLUCOSE BLDC GLUCOMTR-MCNC: 99 MG/DL — SIGNIFICANT CHANGE UP (ref 70–99)
GLUCOSE SERPL-MCNC: 95 MG/DL — SIGNIFICANT CHANGE UP (ref 70–99)
HCT VFR BLD CALC: 27.1 % — LOW (ref 34.5–45)
HGB BLD-MCNC: 8.3 G/DL — LOW (ref 11.5–15.5)
HYPOCHROMIA BLD QL: SLIGHT — SIGNIFICANT CHANGE UP
LYMPHOCYTES # BLD AUTO: 0.29 K/UL — LOW (ref 1–3.3)
LYMPHOCYTES # BLD AUTO: 7.1 % — LOW (ref 13–44)
MAGNESIUM SERPL-MCNC: 2.2 MG/DL — SIGNIFICANT CHANGE UP (ref 1.6–2.6)
MANUAL SMEAR VERIFICATION: SIGNIFICANT CHANGE UP
MCHC RBC-ENTMCNC: 26.9 PG — LOW (ref 27–34)
MCHC RBC-ENTMCNC: 30.6 GM/DL — LOW (ref 32–36)
MCV RBC AUTO: 87.7 FL — SIGNIFICANT CHANGE UP (ref 80–100)
MICROCYTES BLD QL: SLIGHT — SIGNIFICANT CHANGE UP
MONOCYTES # BLD AUTO: 0.5 K/UL — SIGNIFICANT CHANGE UP (ref 0–0.9)
MONOCYTES NFR BLD AUTO: 12.4 % — SIGNIFICANT CHANGE UP (ref 2–14)
MYELOCYTES NFR BLD: 0.9 % — HIGH (ref 0–0)
NEUTROPHILS # BLD AUTO: 3.13 K/UL — SIGNIFICANT CHANGE UP (ref 1.8–7.4)
NEUTROPHILS NFR BLD AUTO: 73.4 % — SIGNIFICANT CHANGE UP (ref 43–77)
NEUTS BAND # BLD: 4.4 % — SIGNIFICANT CHANGE UP (ref 0–8)
PHOSPHATE SERPL-MCNC: 3.7 MG/DL — SIGNIFICANT CHANGE UP (ref 2.4–4.7)
PLAT MORPH BLD: NORMAL — SIGNIFICANT CHANGE UP
PLATELET # BLD AUTO: 134 K/UL — LOW (ref 150–400)
POIKILOCYTOSIS BLD QL AUTO: SLIGHT — SIGNIFICANT CHANGE UP
POTASSIUM SERPL-MCNC: 3.7 MMOL/L — SIGNIFICANT CHANGE UP (ref 3.5–5.3)
POTASSIUM SERPL-SCNC: 3.7 MMOL/L — SIGNIFICANT CHANGE UP (ref 3.5–5.3)
RBC # BLD: 3.09 M/UL — LOW (ref 3.8–5.2)
RBC # FLD: 14.7 % — HIGH (ref 10.3–14.5)
RBC BLD AUTO: ABNORMAL
SODIUM SERPL-SCNC: 143 MMOL/L — SIGNIFICANT CHANGE UP (ref 135–145)
VARIANT LYMPHS # BLD: 0.9 % — SIGNIFICANT CHANGE UP (ref 0–6)
WBC # BLD: 4.02 K/UL — SIGNIFICANT CHANGE UP (ref 3.8–10.5)
WBC # FLD AUTO: 4.02 K/UL — SIGNIFICANT CHANGE UP (ref 3.8–10.5)

## 2023-06-22 PROCEDURE — 99222 1ST HOSP IP/OBS MODERATE 55: CPT

## 2023-06-22 RX ORDER — ACETAMINOPHEN 500 MG
975 TABLET ORAL EVERY 6 HOURS
Refills: 0 | Status: DISCONTINUED | OUTPATIENT
Start: 2023-06-22 | End: 2023-06-23

## 2023-06-22 RX ADMIN — HEPARIN SODIUM 5000 UNIT(S): 5000 INJECTION INTRAVENOUS; SUBCUTANEOUS at 22:55

## 2023-06-22 RX ADMIN — ATORVASTATIN CALCIUM 10 MILLIGRAM(S): 80 TABLET, FILM COATED ORAL at 22:55

## 2023-06-22 RX ADMIN — HALOPERIDOL DECANOATE 5 MILLIGRAM(S): 100 INJECTION INTRAMUSCULAR at 22:57

## 2023-06-22 RX ADMIN — HEPARIN SODIUM 5000 UNIT(S): 5000 INJECTION INTRAVENOUS; SUBCUTANEOUS at 14:29

## 2023-06-22 RX ADMIN — HALOPERIDOL DECANOATE 5 MILLIGRAM(S): 100 INJECTION INTRAMUSCULAR at 05:27

## 2023-06-22 RX ADMIN — SODIUM CHLORIDE 125 MILLILITER(S): 9 INJECTION, SOLUTION INTRAVENOUS at 10:19

## 2023-06-22 RX ADMIN — SODIUM CHLORIDE 125 MILLILITER(S): 9 INJECTION, SOLUTION INTRAVENOUS at 02:18

## 2023-06-22 RX ADMIN — Medication 0.5 MILLIGRAM(S): at 22:56

## 2023-06-22 RX ADMIN — ESCITALOPRAM OXALATE 10 MILLIGRAM(S): 10 TABLET, FILM COATED ORAL at 14:30

## 2023-06-22 RX ADMIN — ONDANSETRON 4 MILLIGRAM(S): 8 TABLET, FILM COATED ORAL at 10:16

## 2023-06-22 RX ADMIN — OLANZAPINE 5 MILLIGRAM(S): 15 TABLET, FILM COATED ORAL at 10:17

## 2023-06-22 RX ADMIN — SODIUM CHLORIDE 100 MILLILITER(S): 9 INJECTION, SOLUTION INTRAVENOUS at 14:29

## 2023-06-22 RX ADMIN — Medication 1 MILLIGRAM(S): at 17:08

## 2023-06-22 RX ADMIN — PANTOPRAZOLE SODIUM 40 MILLIGRAM(S): 20 TABLET, DELAYED RELEASE ORAL at 02:47

## 2023-06-22 RX ADMIN — HEPARIN SODIUM 5000 UNIT(S): 5000 INJECTION INTRAVENOUS; SUBCUTANEOUS at 05:27

## 2023-06-22 RX ADMIN — Medication 62 MICROGRAM(S): at 22:56

## 2023-06-22 RX ADMIN — ONDANSETRON 4 MILLIGRAM(S): 8 TABLET, FILM COATED ORAL at 17:08

## 2023-06-22 NOTE — PROGRESS NOTE ADULT - ASSESSMENT
74 y/o female admitted with SBO.  74 y/o female admitted with SBO - passing gas and no complaints of abdominal pain but she had episode of emesis after eating this morning.  - Diet changed to clear liquids for now, will keep IVF running and monitor diet tolerance  - Pain control as needed w/ tylenol  - Monitor for continued bowel function  - Pt can be OOB and ambulate as able  - DVT ppx w/ SQH & SCDs  - Cr this AM is 2.18 - previous charts show pt's baseline Cr ranges from 1.7-2.0. Will avoid nephrotoxic meds as able and cont IVF w/ repeat labs in AM  - Encourage incentive spirometer use  - Psychiatry recs appreciated - pt continued on home meds

## 2023-06-22 NOTE — CONSULT NOTE ADULT - ASSESSMENT
schizoaffective disorder chronic  limited medical decision making capacity   health care proxy noted - copy in chart  advise continuation of psychotropic medications  pain management  appreciate surgical recommendations  plan would be to return to [facility] once medically appropriate i.e able to tolerate fluids / food
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
## 2023-06-22 NOTE — PROGRESS NOTE ADULT - SUBJECTIVE AND OBJECTIVE BOX
SUBJECTIVE / 24H EVENTS: Patient seen and examined at bedside this morning on rounds with Dr. Horner. She reported that abdominal pain was gone and she felt hungry. She has also been passing gas as observed by resident MD. Pt was ordered for regular diet and had small episode of emesis after eating. On re-eval after she vomited pt continues to report no pain and that she continues to pass gas. No other acute complaints at time of my exam. Denies fever, chills, CP or SOB.    MEDICATIONS  (STANDING):  atorvastatin 10 milliGRAM(s) Oral at bedtime  escitalopram 10 milliGRAM(s) Oral daily  haloperidol     Tablet 5 milliGRAM(s) Oral two times a day  heparin   Injectable 5000 Unit(s) SubCutaneous every 8 hours  lactated ringers. 1000 milliLiter(s) (125 mL/Hr) IV Continuous <Continuous>  levothyroxine Injectable 62 MICROGram(s) IV Push at bedtime  LORazepam     Tablet 0.5 milliGRAM(s) Oral at bedtime  OLANZapine 5 milliGRAM(s) Oral daily  pantoprazole  Injectable 40 milliGRAM(s) IV Push daily    MEDICATIONS  (PRN):  acetaminophen     Tablet .. 975 milliGRAM(s) Oral every 6 hours PRN Temp greater or equal to 38C (100.4F), Mild Pain (1 - 3)  ondansetron Injectable 4 milliGRAM(s) IV Push every 6 hours PRN Nausea and/or Vomiting      Vital Signs Last 24 Hrs  T(C): 37.8 (22 Jun 2023 08:39), Max: 38.8 (21 Jun 2023 19:49)  T(F): 100 (22 Jun 2023 08:39), Max: 101.8 (21 Jun 2023 19:49)  HR: 87 (22 Jun 2023 08:39) (74 - 90)  BP: 130/68 (22 Jun 2023 08:39) (103/56 - 146/75)  BP(mean): 97 (22 Jun 2023 06:30) (97 - 100)  RR: 20 (22 Jun 2023 08:39) (18 - 20)  SpO2: 93% (22 Jun 2023 08:39) (92% - 98%)    Parameters below as of 22 Jun 2023 08:39  Patient On (Oxygen Delivery Method): room air        Constitutional: patient resting comfortably in bed, in no acute distress  Respiratory: respirations are unlabored, no accessory muscle use, no conversational dyspnea  Cardiovascular: regular rate & rhythm  Gastrointestinal: Abdomen soft & non-distended, non-tender no rebound tenderness / guarding  Neuro: awake & alert, oriented to person and place  Skin: mucous membranes moist, no diaphoresis, pallor, cyanosis or jaundice      I&O's Detail    21 Jun 2023 07:01  -  22 Jun 2023 07:00  --------------------------------------------------------  IN:    Lactated Ringers: 750 mL  Total IN: 750 mL    OUT:    Voided (mL): 400 mL  Total OUT: 400 mL    Total NET: 350 mL          LABS:                        8.3    4.02  )-----------( 134      ( 22 Jun 2023 06:51 )             27.1     06-22    143  |  107  |  35.8<H>  ----------------------------<  95  3.7   |  23.0  |  2.18<H>    Ca    8.4      22 Jun 2023 06:51  Phos  3.7     06-22  Mg     2.2     06-22    TPro  6.8  /  Alb  4.1  /  TBili  0.4  /  DBili  x   /  AST  11  /  ALT  12  /  AlkPhos  86  06-21      Urinalysis Basic - ( 22 Jun 2023 06:51 )    Color: x / Appearance: x / SG: x / pH: x  Gluc: 95 mg/dL / Ketone: x  / Bili: x / Urobili: x   Blood: x / Protein: x / Nitrite: x   Leuk Esterase: x / RBC: x / WBC x   Sq Epi: x / Non Sq Epi: x / Bacteria: x       SUBJECTIVE / 24H EVENTS: Patient seen and examined at bedside this morning on rounds with Dr. Horner. She reported that abdominal pain was gone, that she was passing gas and that she felt hungry. Patient was ordered for regular diet and had small episode of emesis after eating. On re-eval after she vomited pt continues to report no pain and that she continues to pass gas but states she only wants water. No other acute complaints at time of my exam. Denies fever, chills, CP or SOB.    MEDICATIONS  (STANDING):  atorvastatin 10 milliGRAM(s) Oral at bedtime  escitalopram 10 milliGRAM(s) Oral daily  haloperidol     Tablet 5 milliGRAM(s) Oral two times a day  heparin   Injectable 5000 Unit(s) SubCutaneous every 8 hours  lactated ringers. 1000 milliLiter(s) (125 mL/Hr) IV Continuous <Continuous>  levothyroxine Injectable 62 MICROGram(s) IV Push at bedtime  LORazepam     Tablet 0.5 milliGRAM(s) Oral at bedtime  OLANZapine 5 milliGRAM(s) Oral daily  pantoprazole  Injectable 40 milliGRAM(s) IV Push daily    MEDICATIONS  (PRN):  acetaminophen     Tablet .. 975 milliGRAM(s) Oral every 6 hours PRN Temp greater or equal to 38C (100.4F), Mild Pain (1 - 3)  ondansetron Injectable 4 milliGRAM(s) IV Push every 6 hours PRN Nausea and/or Vomiting      Vital Signs Last 24 Hrs  T(C): 37.8 (22 Jun 2023 08:39), Max: 38.8 (21 Jun 2023 19:49)  T(F): 100 (22 Jun 2023 08:39), Max: 101.8 (21 Jun 2023 19:49)  HR: 87 (22 Jun 2023 08:39) (74 - 90)  BP: 130/68 (22 Jun 2023 08:39) (103/56 - 146/75)  BP(mean): 97 (22 Jun 2023 06:30) (97 - 100)  RR: 20 (22 Jun 2023 08:39) (18 - 20)  SpO2: 93% (22 Jun 2023 08:39) (92% - 98%)    Parameters below as of 22 Jun 2023 08:39  Patient On (Oxygen Delivery Method): room air        Constitutional: patient resting comfortably in bed, in no acute distress  Respiratory: respirations are unlabored, no accessory muscle use, no conversational dyspnea  Cardiovascular: regular rate & rhythm  Gastrointestinal: abdomen is soft & non-distended, abdomen is non-tender to palpation w/ no rebound tenderness / guarding  Neuro: awake & alert, oriented to person and place  Skin: mucous membranes moist, no diaphoresis, pallor, cyanosis or jaundice      I&O's Detail    21 Jun 2023 07:01  -  22 Jun 2023 07:00  --------------------------------------------------------  IN:    Lactated Ringers: 750 mL  Total IN: 750 mL    OUT:    Voided (mL): 400 mL  Total OUT: 400 mL    Total NET: 350 mL          LABS:                        8.3    4.02  )-----------( 134      ( 22 Jun 2023 06:51 )             27.1     06-22    143  |  107  |  35.8<H>  ----------------------------<  95  3.7   |  23.0  |  2.18<H>    Ca    8.4      22 Jun 2023 06:51  Phos  3.7     06-22  Mg     2.2     06-22    TPro  6.8  /  Alb  4.1  /  TBili  0.4  /  DBili  x   /  AST  11  /  ALT  12  /  AlkPhos  86  06-21      Urinalysis Basic - ( 22 Jun 2023 06:51 )    Color: x / Appearance: x / SG: x / pH: x  Gluc: 95 mg/dL / Ketone: x  / Bili: x / Urobili: x   Blood: x / Protein: x / Nitrite: x   Leuk Esterase: x / RBC: x / WBC x   Sq Epi: x / Non Sq Epi: x / Bacteria: x

## 2023-06-23 LAB
ALBUMIN SERPL ELPH-MCNC: 3.3 G/DL — SIGNIFICANT CHANGE UP (ref 3.3–5.2)
ALP SERPL-CCNC: 68 U/L — SIGNIFICANT CHANGE UP (ref 40–120)
ALT FLD-CCNC: 10 U/L — SIGNIFICANT CHANGE UP
ANION GAP SERPL CALC-SCNC: 14 MMOL/L — SIGNIFICANT CHANGE UP (ref 5–17)
ANISOCYTOSIS BLD QL: SLIGHT — SIGNIFICANT CHANGE UP
AST SERPL-CCNC: 8 U/L — SIGNIFICANT CHANGE UP
BASOPHILS # BLD AUTO: 0 K/UL — SIGNIFICANT CHANGE UP (ref 0–0.2)
BASOPHILS NFR BLD AUTO: 0 % — SIGNIFICANT CHANGE UP (ref 0–2)
BILIRUB DIRECT SERPL-MCNC: 0.2 MG/DL — SIGNIFICANT CHANGE UP (ref 0–0.3)
BILIRUB INDIRECT FLD-MCNC: 0.3 MG/DL — SIGNIFICANT CHANGE UP (ref 0.2–1)
BILIRUB SERPL-MCNC: 0.5 MG/DL — SIGNIFICANT CHANGE UP (ref 0.4–2)
BUN SERPL-MCNC: 36.5 MG/DL — HIGH (ref 8–20)
CALCIUM SERPL-MCNC: 8.7 MG/DL — SIGNIFICANT CHANGE UP (ref 8.4–10.5)
CHLORIDE SERPL-SCNC: 106 MMOL/L — SIGNIFICANT CHANGE UP (ref 96–108)
CO2 SERPL-SCNC: 24 MMOL/L — SIGNIFICANT CHANGE UP (ref 22–29)
CREAT SERPL-MCNC: 2.27 MG/DL — HIGH (ref 0.5–1.3)
DACRYOCYTES BLD QL SMEAR: SLIGHT — SIGNIFICANT CHANGE UP
EGFR: 23 ML/MIN/1.73M2 — LOW
EOSINOPHIL # BLD AUTO: 0 K/UL — SIGNIFICANT CHANGE UP (ref 0–0.5)
EOSINOPHIL NFR BLD AUTO: 0 % — SIGNIFICANT CHANGE UP (ref 0–6)
GIANT PLATELETS BLD QL SMEAR: PRESENT — SIGNIFICANT CHANGE UP
GLUCOSE SERPL-MCNC: 106 MG/DL — HIGH (ref 70–99)
HCT VFR BLD CALC: 28 % — LOW (ref 34.5–45)
HGB BLD-MCNC: 8.5 G/DL — LOW (ref 11.5–15.5)
HYPOCHROMIA BLD QL: SLIGHT — SIGNIFICANT CHANGE UP
LYMPHOCYTES # BLD AUTO: 0.61 K/UL — LOW (ref 1–3.3)
LYMPHOCYTES # BLD AUTO: 15.1 % — SIGNIFICANT CHANGE UP (ref 13–44)
MAGNESIUM SERPL-MCNC: 2.2 MG/DL — SIGNIFICANT CHANGE UP (ref 1.6–2.6)
MANUAL SMEAR VERIFICATION: SIGNIFICANT CHANGE UP
MCHC RBC-ENTMCNC: 26.3 PG — LOW (ref 27–34)
MCHC RBC-ENTMCNC: 30.4 GM/DL — LOW (ref 32–36)
MCV RBC AUTO: 86.7 FL — SIGNIFICANT CHANGE UP (ref 80–100)
METAMYELOCYTES # FLD: 0.9 % — HIGH (ref 0–0)
MICROCYTES BLD QL: SLIGHT — SIGNIFICANT CHANGE UP
MONOCYTES # BLD AUTO: 0.64 K/UL — SIGNIFICANT CHANGE UP (ref 0–0.9)
MONOCYTES NFR BLD AUTO: 15.9 % — HIGH (ref 2–14)
NEUTROPHILS # BLD AUTO: 2.73 K/UL — SIGNIFICANT CHANGE UP (ref 1.8–7.4)
NEUTROPHILS NFR BLD AUTO: 63.7 % — SIGNIFICANT CHANGE UP (ref 43–77)
NEUTS BAND # BLD: 4.4 % — SIGNIFICANT CHANGE UP (ref 0–8)
NRBC # BLD: 1 /100 — HIGH (ref 0–0)
OVALOCYTES BLD QL SMEAR: SLIGHT — SIGNIFICANT CHANGE UP
PHOSPHATE SERPL-MCNC: 3.7 MG/DL — SIGNIFICANT CHANGE UP (ref 2.4–4.7)
PLAT MORPH BLD: NORMAL — SIGNIFICANT CHANGE UP
PLATELET # BLD AUTO: 156 K/UL — SIGNIFICANT CHANGE UP (ref 150–400)
POIKILOCYTOSIS BLD QL AUTO: SLIGHT — SIGNIFICANT CHANGE UP
POLYCHROMASIA BLD QL SMEAR: SLIGHT — SIGNIFICANT CHANGE UP
POTASSIUM SERPL-MCNC: 3.4 MMOL/L — LOW (ref 3.5–5.3)
POTASSIUM SERPL-SCNC: 3.4 MMOL/L — LOW (ref 3.5–5.3)
PROT SERPL-MCNC: 5.9 G/DL — LOW (ref 6.6–8.7)
RBC # BLD: 3.23 M/UL — LOW (ref 3.8–5.2)
RBC # FLD: 14.8 % — HIGH (ref 10.3–14.5)
RBC BLD AUTO: ABNORMAL
SMUDGE CELLS # BLD: PRESENT — SIGNIFICANT CHANGE UP
SODIUM SERPL-SCNC: 143 MMOL/L — SIGNIFICANT CHANGE UP (ref 135–145)
WBC # BLD: 4.01 K/UL — SIGNIFICANT CHANGE UP (ref 3.8–10.5)
WBC # FLD AUTO: 4.01 K/UL — SIGNIFICANT CHANGE UP (ref 3.8–10.5)

## 2023-06-23 PROCEDURE — 74018 RADEX ABDOMEN 1 VIEW: CPT | Mod: 26

## 2023-06-23 RX ORDER — POTASSIUM CHLORIDE 20 MEQ
10 PACKET (EA) ORAL
Refills: 0 | Status: COMPLETED | OUTPATIENT
Start: 2023-06-23 | End: 2023-06-23

## 2023-06-23 RX ORDER — ACETAMINOPHEN 500 MG
1000 TABLET ORAL EVERY 6 HOURS
Refills: 0 | Status: DISCONTINUED | OUTPATIENT
Start: 2023-06-23 | End: 2023-06-28

## 2023-06-23 RX ORDER — HALOPERIDOL DECANOATE 100 MG/ML
5 INJECTION INTRAMUSCULAR EVERY 12 HOURS
Refills: 0 | Status: DISCONTINUED | OUTPATIENT
Start: 2023-06-23 | End: 2023-06-29

## 2023-06-23 RX ORDER — LACTULOSE 10 G/15ML
60 SOLUTION ORAL ONCE
Refills: 0 | Status: COMPLETED | OUTPATIENT
Start: 2023-06-23 | End: 2023-06-23

## 2023-06-23 RX ORDER — OLANZAPINE 15 MG/1
5 TABLET, FILM COATED ORAL DAILY
Refills: 0 | Status: DISCONTINUED | OUTPATIENT
Start: 2023-06-23 | End: 2023-06-29

## 2023-06-23 RX ADMIN — ONDANSETRON 4 MILLIGRAM(S): 8 TABLET, FILM COATED ORAL at 05:59

## 2023-06-23 RX ADMIN — HALOPERIDOL DECANOATE 5 MILLIGRAM(S): 100 INJECTION INTRAMUSCULAR at 05:53

## 2023-06-23 RX ADMIN — ESCITALOPRAM OXALATE 10 MILLIGRAM(S): 10 TABLET, FILM COATED ORAL at 12:35

## 2023-06-23 RX ADMIN — OLANZAPINE 5 MILLIGRAM(S): 15 TABLET, FILM COATED ORAL at 12:36

## 2023-06-23 RX ADMIN — Medication 100 MILLIEQUIVALENT(S): at 09:44

## 2023-06-23 RX ADMIN — HEPARIN SODIUM 5000 UNIT(S): 5000 INJECTION INTRAVENOUS; SUBCUTANEOUS at 22:30

## 2023-06-23 RX ADMIN — Medication 100 MILLIEQUIVALENT(S): at 10:41

## 2023-06-23 RX ADMIN — HEPARIN SODIUM 5000 UNIT(S): 5000 INJECTION INTRAVENOUS; SUBCUTANEOUS at 17:26

## 2023-06-23 RX ADMIN — Medication 100 MILLIEQUIVALENT(S): at 12:36

## 2023-06-23 RX ADMIN — SODIUM CHLORIDE 100 MILLILITER(S): 9 INJECTION, SOLUTION INTRAVENOUS at 17:25

## 2023-06-23 RX ADMIN — HALOPERIDOL DECANOATE 5 MILLIGRAM(S): 100 INJECTION INTRAMUSCULAR at 19:03

## 2023-06-23 RX ADMIN — HEPARIN SODIUM 5000 UNIT(S): 5000 INJECTION INTRAVENOUS; SUBCUTANEOUS at 05:54

## 2023-06-23 RX ADMIN — Medication 62 MICROGRAM(S): at 22:30

## 2023-06-23 RX ADMIN — PANTOPRAZOLE SODIUM 40 MILLIGRAM(S): 20 TABLET, DELAYED RELEASE ORAL at 12:36

## 2023-06-23 RX ADMIN — LACTULOSE 60 GRAM(S): 10 SOLUTION ORAL at 12:40

## 2023-06-23 NOTE — ED BEHAVIORAL HEALTH NOTE - BEHAVIORAL HEALTH NOTE
Charts reviewed. Patient seen this morning, and electively mute. Pt is cooperative with medications as per EMAR. Will continue to follow. No new recommendations at this time.

## 2023-06-23 NOTE — PROGRESS NOTE ADULT - SUBJECTIVE AND OBJECTIVE BOX
Subjective: Patient seen and examined at bedside, no acute complaints. She vomited yesterday and late last night, her diet was changed to NPO. She is still passing gas. When mention of the possibility of an NGT, patient became very upset.     MEDICATIONS  (STANDING):  atorvastatin 10 milliGRAM(s) Oral at bedtime  escitalopram 10 milliGRAM(s) Oral daily  haloperidol     Tablet 5 milliGRAM(s) Oral two times a day  heparin   Injectable 5000 Unit(s) SubCutaneous every 8 hours  lactated ringers. 1000 milliLiter(s) (100 mL/Hr) IV Continuous <Continuous>  levothyroxine Injectable 62 MICROGram(s) IV Push at bedtime  LORazepam     Tablet 0.5 milliGRAM(s) Oral at bedtime  OLANZapine 5 milliGRAM(s) Oral daily  pantoprazole  Injectable 40 milliGRAM(s) IV Push daily  MEDICATIONS  (PRN):  acetaminophen     Tablet .. 975 milliGRAM(s) Oral every 6 hours PRN Temp greater or equal to 38C (100.4F), Mild Pain (1 - 3)  ondansetron Injectable 4 milliGRAM(s) IV Push every 6 hours PRN Nausea and/or Vomiting    Vital Signs Last 24 Hrs  T(C): 36.9 (23 Jun 2023 00:39), Max: 37.8 (22 Jun 2023 08:39)  T(F): 98.5 (23 Jun 2023 00:39), Max: 100 (22 Jun 2023 08:39)  HR: 102 (23 Jun 2023 00:39) (80 - 102)  BP: 161/80 (23 Jun 2023 00:39) (128/77 - 165/71)  BP(mean): 97 (22 Jun 2023 06:30) (97 - 100)  RR: 18 (23 Jun 2023 00:39) (18 - 20)  SpO2: 93% (23 Jun 2023 00:39) (91% - 95%)  Parameters below as of 23 Jun 2023 00:39  Patient On (Oxygen Delivery Method): room air    Physical Exam:  Constitutional: NAD  HEENT: PERRL, EOMI  Neck: No JVD, FROM without pain  Respiratory: Respirations non-labored, no accessory muscle use  Gastrointestinal: Soft, distorted, non tender, not distended, no rebound or guarding, large parastomal hernia     LABS:  pending     A: Patient is a 74 yo F with a recurrent SBO and CKD.     Plan:   NPO, IVFs, if nausea/ vomiting is improved, will adv to [facility] again   serial abdominal exams   AM labs, trend Cr   home meds   Psych evaluated pt, limited medical decision making capacity, health proxy aware

## 2023-06-23 NOTE — ED ADULT NURSE NOTE - TEMPLATE
Detail Level: Detailed
Abdominal Pain, N/V/D
Prescription Strength Graduated Compression Stockings Recommendations: The patient was counseled that prescription strength graduated compression stockings should be worn for all waking hours. They will follow up with a venous specialist to monitor graduated compression stocking usage and their symptoms.

## 2023-06-23 NOTE — PROGRESS NOTE BEHAVIORAL HEALTH - NS ED [facility] MED ROS SKIN
[FreeTextEntry1] : 18-year-old female with adolescent idiopathic scoliosis and postural kyphosis\par \par Today's assessment was performed with the assistance of the patient's parent as an independent historian to corroborate the patients history.\par Clinical exam and imaging reviewed with parent and patient at length. Natural history discussed.  Child is 18 years of age, Risser 5.  Patient is skeletally mature.  Scoliosis is unlikely to progress.  Scoliosis currently measures about 10 degrees.  Observation only has been recommended.  Treatment algorithm for scoliosis has been discussed.  \par Natural history of spine deformity discussed. Risk of progression explained.. \par \par Spine deformity can cause back pain later on and also arthritis, though usually later.. Spine deformity can affect organ systems,such as lungs, less commonly heart and GI etc over time depending on curve size and progression.Deformity can progress with growth but can continue to progress later on based on the size of the curve. It can also effect patient's height due to the curve..It usually does not impact activities and has no limitations, however activities may be limited due to pain or rarely breathlessness with large curves. Scoliosis is usually not impacted by daily activities- sleeping position, sitting position, lifting heavy weights etc, however posture and back pain can be affected by some of these.Stretching, exercises, bone health and nutrition are important factors in the long run.Spine deformity may have genetics etiology and so siblings and progenies should be evaluated.For scoliosis, curves less than 25 degrees are usually managed with observation. Bracing is warranted for curves measuring greater than 25 degrees with skeletal growth remaining.  Braces do not correct curves permanently and there is a 30% risk brace failure. Surgery is recommended for scoliosis measuring greater than 45 degrees. \par \par Parent served as the primary historian regarding the above information for this visit to corroborate the patient's history. We also discussed/instructed back, core strengthening and posture correction exercises and going over the proper form as well the need to be regular on a daily basis. Importance was discussed and instructions printed. \par \par This note was generated using Dragon medical dictation software. A reasonable effort has been made for proofreading its contents, but typos may still remain. If there are any questions or points of clarification needed please do not hesitate to contact my office.\par \par The Physician and Advanced clinical provider combined spent 30 minutes on HPI, Clinical exam, ordering/ reviewing all imaging, reviewing any existing record, reviewing findings and counseling patient to treatment, differentials,etiology, prognosis, natural history, implications on ADLs, activities limitations/modifications, genetics, answering questions and addressing concerns, treatment goals and documenting in the EHR.\par \par  \par 
No complaints

## 2023-06-24 LAB
ALBUMIN SERPL ELPH-MCNC: 3.3 G/DL — SIGNIFICANT CHANGE UP (ref 3.3–5.2)
ALP SERPL-CCNC: 71 U/L — SIGNIFICANT CHANGE UP (ref 40–120)
ALT FLD-CCNC: 11 U/L — SIGNIFICANT CHANGE UP
ANION GAP SERPL CALC-SCNC: 16 MMOL/L — SIGNIFICANT CHANGE UP (ref 5–17)
ANISOCYTOSIS BLD QL: SLIGHT — SIGNIFICANT CHANGE UP
APPEARANCE UR: ABNORMAL
AST SERPL-CCNC: 9 U/L — SIGNIFICANT CHANGE UP
BACTERIA # UR AUTO: ABNORMAL
BASOPHILS # BLD AUTO: 0 K/UL — SIGNIFICANT CHANGE UP (ref 0–0.2)
BASOPHILS NFR BLD AUTO: 0 % — SIGNIFICANT CHANGE UP (ref 0–2)
BILIRUB SERPL-MCNC: 0.4 MG/DL — SIGNIFICANT CHANGE UP (ref 0.4–2)
BILIRUB UR-MCNC: NEGATIVE — SIGNIFICANT CHANGE UP
BUN SERPL-MCNC: 29.2 MG/DL — HIGH (ref 8–20)
BURR CELLS BLD QL SMEAR: PRESENT — SIGNIFICANT CHANGE UP
CALCIUM SERPL-MCNC: 8.8 MG/DL — SIGNIFICANT CHANGE UP (ref 8.4–10.5)
CHLORIDE SERPL-SCNC: 109 MMOL/L — HIGH (ref 96–108)
CO2 SERPL-SCNC: 23 MMOL/L — SIGNIFICANT CHANGE UP (ref 22–29)
COLOR SPEC: YELLOW — SIGNIFICANT CHANGE UP
CREAT SERPL-MCNC: 1.82 MG/DL — HIGH (ref 0.5–1.3)
DIFF PNL FLD: ABNORMAL
EGFR: 29 ML/MIN/1.73M2 — LOW
EOSINOPHIL # BLD AUTO: 0 K/UL — SIGNIFICANT CHANGE UP (ref 0–0.5)
EOSINOPHIL NFR BLD AUTO: 0 % — SIGNIFICANT CHANGE UP (ref 0–6)
EPI CELLS # UR: SIGNIFICANT CHANGE UP
GIANT PLATELETS BLD QL SMEAR: PRESENT — SIGNIFICANT CHANGE UP
GLUCOSE BLDC GLUCOMTR-MCNC: 80 MG/DL — SIGNIFICANT CHANGE UP (ref 70–99)
GLUCOSE BLDC GLUCOMTR-MCNC: 88 MG/DL — SIGNIFICANT CHANGE UP (ref 70–99)
GLUCOSE SERPL-MCNC: 87 MG/DL — SIGNIFICANT CHANGE UP (ref 70–99)
GLUCOSE UR QL: NEGATIVE — SIGNIFICANT CHANGE UP
HCT VFR BLD CALC: 29.5 % — LOW (ref 34.5–45)
HGB BLD-MCNC: 8.9 G/DL — LOW (ref 11.5–15.5)
HYPOCHROMIA BLD QL: SLIGHT — SIGNIFICANT CHANGE UP
KETONES UR-MCNC: ABNORMAL
LEUKOCYTE ESTERASE UR-ACNC: ABNORMAL
LYMPHOCYTES # BLD AUTO: 0.24 K/UL — LOW (ref 1–3.3)
LYMPHOCYTES # BLD AUTO: 5.3 % — LOW (ref 13–44)
MAGNESIUM SERPL-MCNC: 2 MG/DL — SIGNIFICANT CHANGE UP (ref 1.6–2.6)
MANUAL SMEAR VERIFICATION: SIGNIFICANT CHANGE UP
MCHC RBC-ENTMCNC: 26.3 PG — LOW (ref 27–34)
MCHC RBC-ENTMCNC: 30.2 GM/DL — LOW (ref 32–36)
MCV RBC AUTO: 87.3 FL — SIGNIFICANT CHANGE UP (ref 80–100)
MONOCYTES # BLD AUTO: 0.48 K/UL — SIGNIFICANT CHANGE UP (ref 0–0.9)
MONOCYTES NFR BLD AUTO: 10.6 % — SIGNIFICANT CHANGE UP (ref 2–14)
NEUTROPHILS # BLD AUTO: 3.83 K/UL — SIGNIFICANT CHANGE UP (ref 1.8–7.4)
NEUTROPHILS NFR BLD AUTO: 70.8 % — SIGNIFICANT CHANGE UP (ref 43–77)
NEUTS BAND # BLD: 13.3 % — HIGH (ref 0–8)
NITRITE UR-MCNC: POSITIVE
NRBC # BLD: 1 /100 — HIGH (ref 0–0)
OVALOCYTES BLD QL SMEAR: SLIGHT — SIGNIFICANT CHANGE UP
PH UR: 6.5 — SIGNIFICANT CHANGE UP (ref 5–8)
PHOSPHATE SERPL-MCNC: 3.3 MG/DL — SIGNIFICANT CHANGE UP (ref 2.4–4.7)
PLAT MORPH BLD: NORMAL — SIGNIFICANT CHANGE UP
PLATELET # BLD AUTO: 160 K/UL — SIGNIFICANT CHANGE UP (ref 150–400)
POIKILOCYTOSIS BLD QL AUTO: SLIGHT — SIGNIFICANT CHANGE UP
POLYCHROMASIA BLD QL SMEAR: SLIGHT — SIGNIFICANT CHANGE UP
POTASSIUM SERPL-MCNC: 3.3 MMOL/L — LOW (ref 3.5–5.3)
POTASSIUM SERPL-SCNC: 3.3 MMOL/L — LOW (ref 3.5–5.3)
PROT SERPL-MCNC: 5.9 G/DL — LOW (ref 6.6–8.7)
PROT UR-MCNC: 30 MG/DL
RBC # BLD: 3.38 M/UL — LOW (ref 3.8–5.2)
RBC # FLD: 14.6 % — HIGH (ref 10.3–14.5)
RBC BLD AUTO: ABNORMAL
RBC CASTS # UR COMP ASSIST: SIGNIFICANT CHANGE UP /HPF (ref 0–4)
SCHISTOCYTES BLD QL AUTO: SLIGHT — SIGNIFICANT CHANGE UP
SMUDGE CELLS # BLD: PRESENT — SIGNIFICANT CHANGE UP
SODIUM SERPL-SCNC: 148 MMOL/L — HIGH (ref 135–145)
SP GR SPEC: 1.01 — SIGNIFICANT CHANGE UP (ref 1.01–1.02)
UROBILINOGEN FLD QL: NEGATIVE — SIGNIFICANT CHANGE UP
WBC # BLD: 4.56 K/UL — SIGNIFICANT CHANGE UP (ref 3.8–10.5)
WBC # FLD AUTO: 4.56 K/UL — SIGNIFICANT CHANGE UP (ref 3.8–10.5)
WBC UR QL: ABNORMAL /HPF (ref 0–5)

## 2023-06-24 PROCEDURE — 99231 SBSQ HOSP IP/OBS SF/LOW 25: CPT

## 2023-06-24 PROCEDURE — 74018 RADEX ABDOMEN 1 VIEW: CPT | Mod: 26

## 2023-06-24 RX ORDER — CEFTRIAXONE 500 MG/1
1000 INJECTION, POWDER, FOR SOLUTION INTRAMUSCULAR; INTRAVENOUS ONCE
Refills: 0 | Status: COMPLETED | OUTPATIENT
Start: 2023-06-24 | End: 2023-06-24

## 2023-06-24 RX ORDER — POTASSIUM CHLORIDE 20 MEQ
10 PACKET (EA) ORAL
Refills: 0 | Status: COMPLETED | OUTPATIENT
Start: 2023-06-24 | End: 2023-06-24

## 2023-06-24 RX ORDER — SODIUM CHLORIDE 9 MG/ML
1000 INJECTION, SOLUTION INTRAVENOUS
Refills: 0 | Status: DISCONTINUED | OUTPATIENT
Start: 2023-06-24 | End: 2023-06-27

## 2023-06-24 RX ADMIN — HALOPERIDOL DECANOATE 5 MILLIGRAM(S): 100 INJECTION INTRAMUSCULAR at 18:07

## 2023-06-24 RX ADMIN — SODIUM CHLORIDE 65 MILLILITER(S): 9 INJECTION, SOLUTION INTRAVENOUS at 18:07

## 2023-06-24 RX ADMIN — ESCITALOPRAM OXALATE 10 MILLIGRAM(S): 10 TABLET, FILM COATED ORAL at 12:12

## 2023-06-24 RX ADMIN — CEFTRIAXONE 1000 MILLIGRAM(S): 500 INJECTION, POWDER, FOR SOLUTION INTRAMUSCULAR; INTRAVENOUS at 14:34

## 2023-06-24 RX ADMIN — SODIUM CHLORIDE 100 MILLILITER(S): 9 INJECTION, SOLUTION INTRAVENOUS at 16:42

## 2023-06-24 RX ADMIN — HALOPERIDOL DECANOATE 5 MILLIGRAM(S): 100 INJECTION INTRAMUSCULAR at 06:36

## 2023-06-24 RX ADMIN — SODIUM CHLORIDE 100 MILLILITER(S): 9 INJECTION, SOLUTION INTRAVENOUS at 06:36

## 2023-06-24 RX ADMIN — PANTOPRAZOLE SODIUM 40 MILLIGRAM(S): 20 TABLET, DELAYED RELEASE ORAL at 12:12

## 2023-06-24 RX ADMIN — OLANZAPINE 5 MILLIGRAM(S): 15 TABLET, FILM COATED ORAL at 12:11

## 2023-06-24 RX ADMIN — HEPARIN SODIUM 5000 UNIT(S): 5000 INJECTION INTRAVENOUS; SUBCUTANEOUS at 06:36

## 2023-06-24 RX ADMIN — Medication 100 MILLIEQUIVALENT(S): at 18:52

## 2023-06-24 RX ADMIN — HEPARIN SODIUM 5000 UNIT(S): 5000 INJECTION INTRAVENOUS; SUBCUTANEOUS at 14:34

## 2023-06-24 RX ADMIN — HEPARIN SODIUM 5000 UNIT(S): 5000 INJECTION INTRAVENOUS; SUBCUTANEOUS at 21:23

## 2023-06-24 RX ADMIN — Medication 62 MICROGRAM(S): at 21:29

## 2023-06-24 RX ADMIN — Medication 100 MILLIEQUIVALENT(S): at 20:00

## 2023-06-24 RX ADMIN — Medication 100 MILLIEQUIVALENT(S): at 18:08

## 2023-06-24 NOTE — PROGRESS NOTE ADULT - NS ATTEND AMEND GEN_ALL_CORE FT
I have seen and examined the patient during rounds form [id number] hrs  events noted    events  oetd, has had 2 BM  On exam NAD, ad soft, reducible but incoercible hernia  no skin changes    KUB idem, no gastric dilation, dilated SB loop idem as in CT.    A/P  resolving SBo  start liquids again, ADAT slow  Hydration  MONICA improving.

## 2023-06-24 NOTE — PROGRESS NOTE ADULT - SUBJECTIVE AND OBJECTIVE BOX
Subjective: Patient seen and examined at bedside, c/o feeling thirsty. No events overnight. She is voiding and having bowel function.     MEDICATIONS  (STANDING):  escitalopram 10 milliGRAM(s) Oral daily  haloperidol    Injectable 5 milliGRAM(s) IntraMuscular every 12 hours  heparin   Injectable 5000 Unit(s) SubCutaneous every 8 hours  lactated ringers. 1000 milliLiter(s) (100 mL/Hr) IV Continuous <Continuous>  levothyroxine Injectable 62 MICROGram(s) IV Push at bedtime  OLANZapine Injectable 5 milliGRAM(s) IntraMuscular daily  pantoprazole  Injectable 40 milliGRAM(s) IV Push daily    MEDICATIONS  (PRN):  acetaminophen   IVPB .. 1000 milliGRAM(s) IV Intermittent every 6 hours PRN Temp greater or equal to 38C (100.4F), Mild Pain (1 - 3), Moderate Pain (4 - 6), Severe Pain (7 - 10)  LORazepam   Injectable 1 milliGRAM(s) IV Push at bedtime PRN Agitation  ondansetron Injectable 4 milliGRAM(s) IV Push every 6 hours PRN Nausea and/or Vomiting    Vital Signs Last 24 Hrs  T(C): 36.9 (24 Jun 2023 00:27), Max: 37.1 (23 Jun 2023 04:38)  T(F): 98.4 (24 Jun 2023 00:27), Max: 98.8 (23 Jun 2023 08:24)  HR: 88 (24 Jun 2023 00:27) (83 - 89)  BP: 160/83 (24 Jun 2023 00:27) (143/73 - 161/81)  BP(mean): --  RR: 18 (24 Jun 2023 00:27) (18 - 18)  SpO2: 92% (24 Jun 2023 00:27) (91% - 96%)  Parameters below as of 24 Jun 2023 00:27  Patient On (Oxygen Delivery Method): room air    Physical Exam:  Constitutional: NAD  HEENT: PERRL, EOMI  Neck: No JVD, FROM without pain  Respiratory: Respirations non-labored, no accessory muscle use  Gastrointestinal: Soft, distorted, non tender, not distended, no rebound or guarding, large parastomal hernia     LABS:  6/24 labs pending     A: Patient is a 74 yo F with a recurrent SBO and CKD.     Plan:   NPO, IVFs, if nausea/ vomiting is improved, will adv to CLD again   serial abdominal exams   AM labs, trend Cr   pain control prn   dvt ppx   encourage IS

## 2023-06-25 RX ADMIN — ONDANSETRON 4 MILLIGRAM(S): 8 TABLET, FILM COATED ORAL at 11:30

## 2023-06-25 RX ADMIN — OLANZAPINE 5 MILLIGRAM(S): 15 TABLET, FILM COATED ORAL at 11:30

## 2023-06-25 RX ADMIN — HEPARIN SODIUM 5000 UNIT(S): 5000 INJECTION INTRAVENOUS; SUBCUTANEOUS at 21:24

## 2023-06-25 RX ADMIN — Medication 62 MICROGRAM(S): at 21:25

## 2023-06-25 RX ADMIN — HALOPERIDOL DECANOATE 5 MILLIGRAM(S): 100 INJECTION INTRAMUSCULAR at 16:25

## 2023-06-25 RX ADMIN — HEPARIN SODIUM 5000 UNIT(S): 5000 INJECTION INTRAVENOUS; SUBCUTANEOUS at 05:24

## 2023-06-25 RX ADMIN — HEPARIN SODIUM 5000 UNIT(S): 5000 INJECTION INTRAVENOUS; SUBCUTANEOUS at 11:30

## 2023-06-25 RX ADMIN — ESCITALOPRAM OXALATE 10 MILLIGRAM(S): 10 TABLET, FILM COATED ORAL at 11:30

## 2023-06-25 RX ADMIN — PANTOPRAZOLE SODIUM 40 MILLIGRAM(S): 20 TABLET, DELAYED RELEASE ORAL at 11:30

## 2023-06-25 RX ADMIN — SODIUM CHLORIDE 65 MILLILITER(S): 9 INJECTION, SOLUTION INTRAVENOUS at 21:30

## 2023-06-25 RX ADMIN — HALOPERIDOL DECANOATE 5 MILLIGRAM(S): 100 INJECTION INTRAMUSCULAR at 05:24

## 2023-06-25 NOTE — PROGRESS NOTE ADULT - SUBJECTIVE AND OBJECTIVE BOX
INTERVAL HPI/OVERNIGHT EVENTS:    Patient evaluated at bedside. NAOE. Patient denies N/V/CP/SOB, no subjective fevers or chills. Large bowel movement yesterday, diet advanced to CLD which has been tolerated thus far.    MEDICATIONS  (STANDING):  escitalopram 10 milliGRAM(s) Oral daily  haloperidol    Injectable 5 milliGRAM(s) IntraMuscular every 12 hours  heparin   Injectable 5000 Unit(s) SubCutaneous every 8 hours  lactated ringers. 1000 milliLiter(s) (65 mL/Hr) IV Continuous <Continuous>  levothyroxine Injectable 62 MICROGram(s) IV Push at bedtime  OLANZapine Injectable 5 milliGRAM(s) IntraMuscular daily  pantoprazole  Injectable 40 milliGRAM(s) IV Push daily    MEDICATIONS  (PRN):  acetaminophen   IVPB .. 1000 milliGRAM(s) IV Intermittent every 6 hours PRN Temp greater or equal to 38C (100.4F), Mild Pain (1 - 3), Moderate Pain (4 - 6), Severe Pain (7 - 10)  LORazepam   Injectable 1 milliGRAM(s) IV Push at bedtime PRN Agitation  ondansetron Injectable 4 milliGRAM(s) IV Push every 6 hours PRN Nausea and/or Vomiting      Vital Signs Last 24 Hrs  T(C): 37.6 (2023 00:35), Max: 37.6 (2023 00:35)  T(F): 99.7 (2023 00:35), Max: 99.7 (2023 00:35)  HR: 97 (2023 00:35) (84 - 97)  BP: 133/75 (2023 00:35) (133/75 - 176/88)  BP(mean): --  RR: 18 (2023 00:35) (18 - 19)  SpO2: 97% (2023 00:35) (94% - 97%)    Parameters below as of 2023 20:00  Patient On (Oxygen Delivery Method): room air      Physical Exam:  Constitutional: NAD  HEENT: PERRL, EOMI  Neck: No JVD, FROM without pain  Respiratory: Respirations non-labored, no accessory muscle use  Gastrointestinal: Soft, distorted, non tender, not distended, no rebound or guarding, large parastomal hernia       I&O's Detail    2023 07:01  -  2023 07:00  --------------------------------------------------------  IN:    IV PiggyBack: 300 mL    Lactated Ringers: 1200 mL  Total IN: 1500 mL    OUT:    Voided (mL): 500 mL  Total OUT: 500 mL    Total NET: 1000 mL      2023 07:01  -  2023 02:06  --------------------------------------------------------  IN:    Lactated Ringers: 1000 mL    Lactated Ringers: 130 mL    Oral Fluid: 500 mL  Total IN: 1630 mL    OUT:    Voided (mL): 850 mL  Total OUT: 850 mL    Total NET: 780 mL          LABS:                        8.9    4.56  )-----------( 160      ( 2023 07:58 )             29.5     06-24    148<H>  |  109<H>  |  29.2<H>  ----------------------------<  87  3.3<L>   |  23.0  |  1.82<H>    Ca    8.8      2023 07:58  Phos  3.3     06-24  Mg     2.0     -24    TPro  5.9<L>  /  Alb  3.3  /  TBili  0.4  /  DBili  x   /  AST  9   /  ALT  11  /  AlkPhos  71  06-24      Urinalysis Basic - ( 2023 09:23 )    Color: Yellow / Appearance: Slightly Turbid / S.010 / pH: x  Gluc: x / Ketone: Moderate  / Bili: Negative / Urobili: Negative   Blood: x / Protein: 30 mg/dL / Nitrite: Positive   Leuk Esterase: Small / RBC: 0-2 /HPF / WBC 6-10 /HPF   Sq Epi: x / Non Sq Epi: x / Bacteria: Many       INTERVAL HPI/OVERNIGHT EVENTS:    Patient evaluated at bedside. NAOE. Patient denies N/V/CP/SOB, no subjective fevers or chills. Large bowel movement yesterday, diet advanced to CLD which has been tolerated thus far. UA positive yesterday, given 1g rocephin.    MEDICATIONS  (STANDING):  escitalopram 10 milliGRAM(s) Oral daily  haloperidol    Injectable 5 milliGRAM(s) IntraMuscular every 12 hours  heparin   Injectable 5000 Unit(s) SubCutaneous every 8 hours  lactated ringers. 1000 milliLiter(s) (65 mL/Hr) IV Continuous <Continuous>  levothyroxine Injectable 62 MICROGram(s) IV Push at bedtime  OLANZapine Injectable 5 milliGRAM(s) IntraMuscular daily  pantoprazole  Injectable 40 milliGRAM(s) IV Push daily    MEDICATIONS  (PRN):  acetaminophen   IVPB .. 1000 milliGRAM(s) IV Intermittent every 6 hours PRN Temp greater or equal to 38C (100.4F), Mild Pain (1 - 3), Moderate Pain (4 - 6), Severe Pain (7 - 10)  LORazepam   Injectable 1 milliGRAM(s) IV Push at bedtime PRN Agitation  ondansetron Injectable 4 milliGRAM(s) IV Push every 6 hours PRN Nausea and/or Vomiting      Vital Signs Last 24 Hrs  T(C): 37.6 (2023 00:35), Max: 37.6 (2023 00:35)  T(F): 99.7 (2023 00:35), Max: 99.7 (2023 00:35)  HR: 97 (2023 00:35) (84 - 97)  BP: 133/75 (2023 00:35) (133/75 - 176/88)  BP(mean): --  RR: 18 (2023 00:35) (18 - 19)  SpO2: 97% (2023 00:35) (94% - 97%)    Parameters below as of 2023 20:00  Patient On (Oxygen Delivery Method): room air      Physical Exam:  Constitutional: NAD  HEENT: PERRL, EOMI  Neck: No JVD, FROM without pain  Respiratory: Respirations non-labored, no accessory muscle use  Gastrointestinal: Soft, distorted, non tender, not distended, no rebound or guarding, large parastomal hernia       I&O's Detail    2023 07:01  -  2023 07:00  --------------------------------------------------------  IN:    IV PiggyBack: 300 mL    Lactated Ringers: 1200 mL  Total IN: 1500 mL    OUT:    Voided (mL): 500 mL  Total OUT: 500 mL    Total NET: 1000 mL      2023 07:01  -  2023 02:06  --------------------------------------------------------  IN:    Lactated Ringers: 1000 mL    Lactated Ringers: 130 mL    Oral Fluid: 500 mL  Total IN: 1630 mL    OUT:    Voided (mL): 850 mL  Total OUT: 850 mL    Total NET: 780 mL          LABS:                        8.9    4.56  )-----------( 160      ( 2023 07:58 )             29.5     06-24    148<H>  |  109<H>  |  29.2<H>  ----------------------------<  87  3.3<L>   |  23.0  |  1.82<H>    Ca    8.8      2023 07:58  Phos  3.3     06-24  Mg     2.0     -24    TPro  5.9<L>  /  Alb  3.3  /  TBili  0.4  /  DBili  x   /  AST  9   /  ALT  11  /  AlkPhos  71  06-24      Urinalysis Basic - ( 2023 09:23 )    Color: Yellow / Appearance: Slightly Turbid / S.010 / pH: x  Gluc: x / Ketone: Moderate  / Bili: Negative / Urobili: Negative   Blood: x / Protein: 30 mg/dL / Nitrite: Positive   Leuk Esterase: Small / RBC: 0-2 /HPF / WBC 6-10 /HPF   Sq Epi: x / Non Sq Epi: x / Bacteria: Many

## 2023-06-25 NOTE — PROGRESS NOTE ADULT - ASSESSMENT
Patient is a 74 yo F with a recurrent SBO and CKD.     Plan:   CLD now that bowel function has started to return - will advance slowly as tolerated  serial abdominal exams   AM labs, trend Cr   pain control prn   dvt ppx   encourage IS

## 2023-06-26 LAB
ANION GAP SERPL CALC-SCNC: 13 MMOL/L — SIGNIFICANT CHANGE UP (ref 5–17)
BASOPHILS # BLD AUTO: 0.02 K/UL — SIGNIFICANT CHANGE UP (ref 0–0.2)
BASOPHILS NFR BLD AUTO: 0.5 % — SIGNIFICANT CHANGE UP (ref 0–2)
BUN SERPL-MCNC: 21.8 MG/DL — HIGH (ref 8–20)
CALCIUM SERPL-MCNC: 8.6 MG/DL — SIGNIFICANT CHANGE UP (ref 8.4–10.5)
CHLORIDE SERPL-SCNC: 113 MMOL/L — HIGH (ref 96–108)
CO2 SERPL-SCNC: 24 MMOL/L — SIGNIFICANT CHANGE UP (ref 22–29)
CREAT SERPL-MCNC: 1.83 MG/DL — HIGH (ref 0.5–1.3)
EGFR: 29 ML/MIN/1.73M2 — LOW
EOSINOPHIL # BLD AUTO: 0.01 K/UL — SIGNIFICANT CHANGE UP (ref 0–0.5)
EOSINOPHIL NFR BLD AUTO: 0.3 % — SIGNIFICANT CHANGE UP (ref 0–6)
GLUCOSE BLDC GLUCOMTR-MCNC: 101 MG/DL — HIGH (ref 70–99)
GLUCOSE SERPL-MCNC: 95 MG/DL — SIGNIFICANT CHANGE UP (ref 70–99)
HCT VFR BLD CALC: 27.2 % — LOW (ref 34.5–45)
HGB BLD-MCNC: 8 G/DL — LOW (ref 11.5–15.5)
IMM GRANULOCYTES NFR BLD AUTO: 3.5 % — HIGH (ref 0–0.9)
LYMPHOCYTES # BLD AUTO: 0.63 K/UL — LOW (ref 1–3.3)
LYMPHOCYTES # BLD AUTO: 16.8 % — SIGNIFICANT CHANGE UP (ref 13–44)
MAGNESIUM SERPL-MCNC: 1.8 MG/DL — SIGNIFICANT CHANGE UP (ref 1.6–2.6)
MCHC RBC-ENTMCNC: 26.2 PG — LOW (ref 27–34)
MCHC RBC-ENTMCNC: 29.4 GM/DL — LOW (ref 32–36)
MCV RBC AUTO: 89.2 FL — SIGNIFICANT CHANGE UP (ref 80–100)
MONOCYTES # BLD AUTO: 0.48 K/UL — SIGNIFICANT CHANGE UP (ref 0–0.9)
MONOCYTES NFR BLD AUTO: 12.8 % — SIGNIFICANT CHANGE UP (ref 2–14)
NEUTROPHILS # BLD AUTO: 2.48 K/UL — SIGNIFICANT CHANGE UP (ref 1.8–7.4)
NEUTROPHILS NFR BLD AUTO: 66.1 % — SIGNIFICANT CHANGE UP (ref 43–77)
PHOSPHATE SERPL-MCNC: 2.1 MG/DL — LOW (ref 2.4–4.7)
PLATELET # BLD AUTO: 136 K/UL — LOW (ref 150–400)
POTASSIUM SERPL-MCNC: 3 MMOL/L — LOW (ref 3.5–5.3)
POTASSIUM SERPL-SCNC: 3 MMOL/L — LOW (ref 3.5–5.3)
RBC # BLD: 3.05 M/UL — LOW (ref 3.8–5.2)
RBC # FLD: 15 % — HIGH (ref 10.3–14.5)
SODIUM SERPL-SCNC: 150 MMOL/L — HIGH (ref 135–145)
WBC # BLD: 3.75 K/UL — LOW (ref 3.8–10.5)
WBC # FLD AUTO: 3.75 K/UL — LOW (ref 3.8–10.5)

## 2023-06-26 PROCEDURE — 99231 SBSQ HOSP IP/OBS SF/LOW 25: CPT

## 2023-06-26 RX ORDER — POTASSIUM PHOSPHATE, MONOBASIC POTASSIUM PHOSPHATE, DIBASIC 236; 224 MG/ML; MG/ML
15 INJECTION, SOLUTION INTRAVENOUS ONCE
Refills: 0 | Status: COMPLETED | OUTPATIENT
Start: 2023-06-26 | End: 2023-06-26

## 2023-06-26 RX ORDER — POTASSIUM CHLORIDE 20 MEQ
10 PACKET (EA) ORAL
Refills: 0 | Status: COMPLETED | OUTPATIENT
Start: 2023-06-26 | End: 2023-06-26

## 2023-06-26 RX ORDER — HALOPERIDOL DECANOATE 100 MG/ML
5 INJECTION INTRAMUSCULAR ONCE
Refills: 0 | Status: COMPLETED | OUTPATIENT
Start: 2023-06-26 | End: 2023-06-26

## 2023-06-26 RX ORDER — OLANZAPINE 15 MG/1
5 TABLET, FILM COATED ORAL ONCE
Refills: 0 | Status: COMPLETED | OUTPATIENT
Start: 2023-06-26 | End: 2023-06-26

## 2023-06-26 RX ADMIN — Medication 100 MILLIEQUIVALENT(S): at 11:33

## 2023-06-26 RX ADMIN — PANTOPRAZOLE SODIUM 40 MILLIGRAM(S): 20 TABLET, DELAYED RELEASE ORAL at 11:35

## 2023-06-26 RX ADMIN — Medication 100 MILLIEQUIVALENT(S): at 15:05

## 2023-06-26 RX ADMIN — HEPARIN SODIUM 5000 UNIT(S): 5000 INJECTION INTRAVENOUS; SUBCUTANEOUS at 22:53

## 2023-06-26 RX ADMIN — OLANZAPINE 5 MILLIGRAM(S): 15 TABLET, FILM COATED ORAL at 11:35

## 2023-06-26 RX ADMIN — SODIUM CHLORIDE 65 MILLILITER(S): 9 INJECTION, SOLUTION INTRAVENOUS at 08:16

## 2023-06-26 RX ADMIN — POTASSIUM PHOSPHATE, MONOBASIC POTASSIUM PHOSPHATE, DIBASIC 62.5 MILLIMOLE(S): 236; 224 INJECTION, SOLUTION INTRAVENOUS at 16:22

## 2023-06-26 RX ADMIN — ESCITALOPRAM OXALATE 10 MILLIGRAM(S): 10 TABLET, FILM COATED ORAL at 11:36

## 2023-06-26 RX ADMIN — Medication 100 MILLIEQUIVALENT(S): at 12:46

## 2023-06-26 RX ADMIN — Medication 62 MICROGRAM(S): at 22:52

## 2023-06-26 RX ADMIN — HALOPERIDOL DECANOATE 5 MILLIGRAM(S): 100 INJECTION INTRAMUSCULAR at 17:08

## 2023-06-26 RX ADMIN — HEPARIN SODIUM 5000 UNIT(S): 5000 INJECTION INTRAVENOUS; SUBCUTANEOUS at 13:06

## 2023-06-26 RX ADMIN — HALOPERIDOL DECANOATE 5 MILLIGRAM(S): 100 INJECTION INTRAMUSCULAR at 05:08

## 2023-06-26 RX ADMIN — HEPARIN SODIUM 5000 UNIT(S): 5000 INJECTION INTRAVENOUS; SUBCUTANEOUS at 05:08

## 2023-06-26 RX ADMIN — HALOPERIDOL DECANOATE 5 MILLIGRAM(S): 100 INJECTION INTRAMUSCULAR at 14:44

## 2023-06-26 RX ADMIN — Medication 1 MILLIGRAM(S): at 15:33

## 2023-06-26 RX ADMIN — OLANZAPINE 5 MILLIGRAM(S): 15 TABLET, FILM COATED ORAL at 13:06

## 2023-06-26 NOTE — PROGRESS NOTE ADULT - SUBJECTIVE AND OBJECTIVE BOX
SUBJECTIVE/24 hour events:  Patient is a 73yFemale PMH of schizophrenia, presenting with SBO, well known to the service. Patient with no acute events overnight, was on a diet but began vomiting and placed NPO. Patient continues to refuse NG tube, and on exam was sipping water with no issues so far      Vital Signs Last 24 Hrs  T(C): 37.1 (25 Jun 2023 23:56), Max: 37.8 (25 Jun 2023 04:07)  T(F): 98.7 (25 Jun 2023 23:56), Max: 100.1 (25 Jun 2023 04:07)  HR: 82 (25 Jun 2023 23:56) (82 - 98)  BP: 134/67 (25 Jun 2023 23:56) (122/70 - 150/72)  BP(mean): 93 (25 Jun 2023 20:00) (93 - 93)  RR: 18 (25 Jun 2023 23:56) (18 - 18)  SpO2: 90% (25 Jun 2023 23:56) (90% - 92%)    Parameters below as of 25 Jun 2023 23:56  Patient On (Oxygen Delivery Method): room air      Drug Dosing Weight  Height (cm): 165.1 (21 Jun 2023 10:52)  Weight (kg): 83.9 (21 Jun 2023 10:52)  BMI (kg/m2): 30.8 (21 Jun 2023 10:52)  BSA (m2): 1.91 (21 Jun 2023 10:52)  I&O's Detail    24 Jun 2023 07:01  -  25 Jun 2023 07:00  --------------------------------------------------------  IN:    Lactated Ringers: 1000 mL    Lactated Ringers: 130 mL    Oral Fluid: 500 mL  Total IN: 1630 mL    OUT:    Voided (mL): 850 mL  Total OUT: 850 mL    Total NET: 780 mL      25 Jun 2023 07:01  -  26 Jun 2023 01:26  --------------------------------------------------------  IN:    Lactated Ringers: 1170 mL  Total IN: 1170 mL    OUT:    Voided (mL): 1850 mL  Total OUT: 1850 mL    Total NET: -680 mL        Allergies    erythromycin (Unknown)  Neupogen (Other)  Depakote (Other)  clozapine (Other)    Intolerances                              8.9    4.56  )-----------( 160      ( 24 Jun 2023 07:58 )             29.5   06-24    148<H>  |  109<H>  |  29.2<H>  ----------------------------<  87  3.3<L>   |  23.0  |  1.82<H>    Ca    8.8      24 Jun 2023 07:58  Phos  3.3     06-24  Mg     2.0     06-24    TPro  5.9<L>  /  Alb  3.3  /  TBili  0.4  /  DBili  x   /  AST  9   /  ALT  11  /  AlkPhos  71  06-24      ROS:    PHYSICAL EXAM:  Constitutional: " you aren't on my list of doctors"  Respiratory: no respiratory distress, no dyspnea  Gastrointestinal: not able to examine abdomen, 2/2 patient not allowing  Genitourinary: voiding spontaneously   Neurological: A&OX3        MEDICATIONS  (STANDING):  escitalopram 10 milliGRAM(s) Oral daily  haloperidol    Injectable 5 milliGRAM(s) IntraMuscular every 12 hours  heparin   Injectable 5000 Unit(s) SubCutaneous every 8 hours  lactated ringers. 1000 milliLiter(s) (65 mL/Hr) IV Continuous <Continuous>  levothyroxine Injectable 62 MICROGram(s) IV Push at bedtime  OLANZapine Injectable 5 milliGRAM(s) IntraMuscular daily  pantoprazole  Injectable 40 milliGRAM(s) IV Push daily    MEDICATIONS  (PRN):  acetaminophen   IVPB .. 1000 milliGRAM(s) IV Intermittent every 6 hours PRN Temp greater or equal to 38C (100.4F), Mild Pain (1 - 3), Moderate Pain (4 - 6), Severe Pain (7 - 10)  LORazepam   Injectable 1 milliGRAM(s) IV Push at bedtime PRN Agitation  ondansetron Injectable 4 milliGRAM(s) IV Push every 6 hours PRN Nausea and/or Vomiting      RADIOLOGY STUDIES:    CULTURES:

## 2023-06-26 NOTE — PATIENT PROFILE ADULT - FALL HARM RISK - HARM RISK INTERVENTIONS
Assistance with ambulation/Assistance OOB with selected safe patient handling equipment/Communicate Risk of Fall with Harm to all staff/Monitor for mental status changes/Move patient closer to nurses' station/Reinforce activity limits and safety measures with patient and family/Reorient to person, place and time as needed/Tailored Fall Risk Interventions/Toileting schedule using arm’s reach rule for commode and bathroom/Use of alarms - bed, chair and/or voice tab/Visual Cue: Yellow wristband and red socks/Bed in lowest position, wheels locked, appropriate side rails in place/Call bell, personal items and telephone in reach/Instruct patient to call for assistance before getting out of bed or chair/Non-slip footwear when patient is out of bed/[place] to call system/Physically safe environment - no spills, clutter or unnecessary equipment/Purposeful Proactive Rounding/Room/bathroom lighting operational, light cord in reach

## 2023-06-26 NOTE — DIETITIAN INITIAL EVALUATION ADULT - PERTINENT LABORATORY DATA
06-26    150<H>  |  113<H>  |  21.8<H>  ----------------------------<  95  3.0<L>   |  24.0  |  1.83<H>    Ca    8.6      26 Jun 2023 09:06  Phos  2.1     06-26  Mg     1.8     06-26    POCT Blood Glucose.: 101 mg/dL (06-26-23 @ 05:05)

## 2023-06-26 NOTE — PROGRESS NOTE ADULT - ASSESSMENT
Patient is a 72 yo F with a recurrent SBO and CKD.     Plan:   NPO, will start to advance diet slowly?  continue IV hydration   patient continues to refuse ng tube   serial abdominal exams   AM labs, trend Cr   pain control prn   dvt ppx   encourage IS

## 2023-06-26 NOTE — DIETITIAN INITIAL EVALUATION ADULT - PERTINENT MEDS FT
MEDICATIONS  (STANDING):  escitalopram 10 milliGRAM(s) Oral daily  haloperidol    Injectable 5 milliGRAM(s) IntraMuscular every 12 hours  heparin   Injectable 5000 Unit(s) SubCutaneous every 8 hours  lactated ringers. 1000 milliLiter(s) (65 mL/Hr) IV Continuous <Continuous>  levothyroxine Injectable 62 MICROGram(s) IV Push at bedtime  OLANZapine Injectable 5 milliGRAM(s) IntraMuscular daily  pantoprazole  Injectable 40 milliGRAM(s) IV Push daily    MEDICATIONS  (PRN):  acetaminophen   IVPB .. 1000 milliGRAM(s) IV Intermittent every 6 hours PRN Temp greater or equal to 38C (100.4F), Mild Pain (1 - 3), Moderate Pain (4 - 6), Severe Pain (7 - 10)  LORazepam   Injectable 1 milliGRAM(s) IV Push at bedtime PRN Agitation  ondansetron Injectable 4 milliGRAM(s) IV Push every 6 hours PRN Nausea and/or Vomiting

## 2023-06-26 NOTE — PATIENT PROFILE ADULT - TOBACCO USE
Prescription refilled. Last visit with Dr. Terry earlier this month- headache upcoming appointment with diabetes education    Unknown if ever smoked

## 2023-06-26 NOTE — DIETITIAN INITIAL EVALUATION ADULT - ORAL INTAKE PTA/DIET HISTORY
Pt just medicated, for screaming and throwing things at staff. Pt now CLD was NPO secondary to vomiting sat night. Pt is from [place], has recurrent obstructions.

## 2023-06-27 DIAGNOSIS — Z86.59 PERSONAL HISTORY OF OTHER MENTAL AND BEHAVIORAL DISORDERS: ICD-10-CM

## 2023-06-27 DIAGNOSIS — F20.9 SCHIZOPHRENIA, UNSPECIFIED: ICD-10-CM

## 2023-06-27 LAB
-  AMIKACIN: SIGNIFICANT CHANGE UP
-  AMOXICILLIN/CLAVULANIC ACID: SIGNIFICANT CHANGE UP
-  AMPICILLIN/SULBACTAM: SIGNIFICANT CHANGE UP
-  AMPICILLIN: SIGNIFICANT CHANGE UP
-  AZTREONAM: SIGNIFICANT CHANGE UP
-  CEFAZOLIN: SIGNIFICANT CHANGE UP
-  CEFEPIME: SIGNIFICANT CHANGE UP
-  CEFOXITIN: SIGNIFICANT CHANGE UP
-  CEFTRIAXONE: SIGNIFICANT CHANGE UP
-  CEFUROXIME: SIGNIFICANT CHANGE UP
-  CIPROFLOXACIN: SIGNIFICANT CHANGE UP
-  ERTAPENEM: SIGNIFICANT CHANGE UP
-  GENTAMICIN: SIGNIFICANT CHANGE UP
-  IMIPENEM: SIGNIFICANT CHANGE UP
-  LEVOFLOXACIN: SIGNIFICANT CHANGE UP
-  MEROPENEM: SIGNIFICANT CHANGE UP
-  NITROFURANTOIN: SIGNIFICANT CHANGE UP
-  PIPERACILLIN/TAZOBACTAM: SIGNIFICANT CHANGE UP
-  TOBRAMYCIN: SIGNIFICANT CHANGE UP
-  TRIMETHOPRIM/SULFAMETHOXAZOLE: SIGNIFICANT CHANGE UP
ANION GAP SERPL CALC-SCNC: 12 MMOL/L — SIGNIFICANT CHANGE UP (ref 5–17)
ANION GAP SERPL CALC-SCNC: 14 MMOL/L — SIGNIFICANT CHANGE UP (ref 5–17)
BASOPHILS # BLD AUTO: 0.03 K/UL — SIGNIFICANT CHANGE UP (ref 0–0.2)
BASOPHILS NFR BLD AUTO: 0.8 % — SIGNIFICANT CHANGE UP (ref 0–2)
BUN SERPL-MCNC: 18.5 MG/DL — SIGNIFICANT CHANGE UP (ref 8–20)
BUN SERPL-MCNC: 20.1 MG/DL — HIGH (ref 8–20)
CALCIUM SERPL-MCNC: 8.4 MG/DL — SIGNIFICANT CHANGE UP (ref 8.4–10.5)
CALCIUM SERPL-MCNC: 8.7 MG/DL — SIGNIFICANT CHANGE UP (ref 8.4–10.5)
CHLORIDE SERPL-SCNC: 108 MMOL/L — SIGNIFICANT CHANGE UP (ref 96–108)
CHLORIDE SERPL-SCNC: 113 MMOL/L — HIGH (ref 96–108)
CO2 SERPL-SCNC: 25 MMOL/L — SIGNIFICANT CHANGE UP (ref 22–29)
CO2 SERPL-SCNC: 26 MMOL/L — SIGNIFICANT CHANGE UP (ref 22–29)
CREAT SERPL-MCNC: 1.79 MG/DL — HIGH (ref 0.5–1.3)
CREAT SERPL-MCNC: 2.13 MG/DL — HIGH (ref 0.5–1.3)
CULTURE RESULTS: SIGNIFICANT CHANGE UP
EGFR: 24 ML/MIN/1.73M2 — LOW
EGFR: 30 ML/MIN/1.73M2 — LOW
EOSINOPHIL # BLD AUTO: 0.02 K/UL — SIGNIFICANT CHANGE UP (ref 0–0.5)
EOSINOPHIL NFR BLD AUTO: 0.5 % — SIGNIFICANT CHANGE UP (ref 0–6)
GLUCOSE BLDC GLUCOMTR-MCNC: 108 MG/DL — HIGH (ref 70–99)
GLUCOSE BLDC GLUCOMTR-MCNC: 126 MG/DL — HIGH (ref 70–99)
GLUCOSE BLDC GLUCOMTR-MCNC: 89 MG/DL — SIGNIFICANT CHANGE UP (ref 70–99)
GLUCOSE SERPL-MCNC: 116 MG/DL — HIGH (ref 70–99)
GLUCOSE SERPL-MCNC: 99 MG/DL — SIGNIFICANT CHANGE UP (ref 70–99)
HCT VFR BLD CALC: 27.9 % — LOW (ref 34.5–45)
HGB BLD-MCNC: 8.1 G/DL — LOW (ref 11.5–15.5)
IMM GRANULOCYTES NFR BLD AUTO: 4 % — HIGH (ref 0–0.9)
LYMPHOCYTES # BLD AUTO: 0.93 K/UL — LOW (ref 1–3.3)
LYMPHOCYTES # BLD AUTO: 24.8 % — SIGNIFICANT CHANGE UP (ref 13–44)
MAGNESIUM SERPL-MCNC: 1.7 MG/DL — SIGNIFICANT CHANGE UP (ref 1.6–2.6)
MAGNESIUM SERPL-MCNC: 2.2 MG/DL — SIGNIFICANT CHANGE UP (ref 1.6–2.6)
MCHC RBC-ENTMCNC: 25.7 PG — LOW (ref 27–34)
MCHC RBC-ENTMCNC: 29 GM/DL — LOW (ref 32–36)
MCV RBC AUTO: 88.6 FL — SIGNIFICANT CHANGE UP (ref 80–100)
METHOD TYPE: SIGNIFICANT CHANGE UP
MONOCYTES # BLD AUTO: 0.71 K/UL — SIGNIFICANT CHANGE UP (ref 0–0.9)
MONOCYTES NFR BLD AUTO: 18.9 % — HIGH (ref 2–14)
NEUTROPHILS # BLD AUTO: 1.91 K/UL — SIGNIFICANT CHANGE UP (ref 1.8–7.4)
NEUTROPHILS NFR BLD AUTO: 51 % — SIGNIFICANT CHANGE UP (ref 43–77)
ORGANISM # SPEC MICROSCOPIC CNT: SIGNIFICANT CHANGE UP
ORGANISM # SPEC MICROSCOPIC CNT: SIGNIFICANT CHANGE UP
PHOSPHATE SERPL-MCNC: 2.5 MG/DL — SIGNIFICANT CHANGE UP (ref 2.4–4.7)
PHOSPHATE SERPL-MCNC: 3.3 MG/DL — SIGNIFICANT CHANGE UP (ref 2.4–4.7)
PLATELET # BLD AUTO: 136 K/UL — LOW (ref 150–400)
POTASSIUM SERPL-MCNC: 3.1 MMOL/L — LOW (ref 3.5–5.3)
POTASSIUM SERPL-MCNC: 3.5 MMOL/L — SIGNIFICANT CHANGE UP (ref 3.5–5.3)
POTASSIUM SERPL-SCNC: 3.1 MMOL/L — LOW (ref 3.5–5.3)
POTASSIUM SERPL-SCNC: 3.5 MMOL/L — SIGNIFICANT CHANGE UP (ref 3.5–5.3)
RBC # BLD: 3.15 M/UL — LOW (ref 3.8–5.2)
RBC # FLD: 15.3 % — HIGH (ref 10.3–14.5)
SODIUM SERPL-SCNC: 145 MMOL/L — SIGNIFICANT CHANGE UP (ref 135–145)
SODIUM SERPL-SCNC: 153 MMOL/L — HIGH (ref 135–145)
SPECIMEN SOURCE: SIGNIFICANT CHANGE UP
WBC # BLD: 3.75 K/UL — LOW (ref 3.8–10.5)
WBC # FLD AUTO: 3.75 K/UL — LOW (ref 3.8–10.5)

## 2023-06-27 PROCEDURE — 93010 ELECTROCARDIOGRAM REPORT: CPT

## 2023-06-27 PROCEDURE — 99232 SBSQ HOSP IP/OBS MODERATE 35: CPT

## 2023-06-27 RX ORDER — DEXAMETHASONE 0.5 MG/5ML
4 ELIXIR ORAL
Refills: 0 | Status: DISCONTINUED | OUTPATIENT
Start: 2023-06-27 | End: 2023-06-29

## 2023-06-27 RX ORDER — SODIUM CHLORIDE 9 MG/ML
1000 INJECTION, SOLUTION INTRAVENOUS
Refills: 0 | Status: DISCONTINUED | OUTPATIENT
Start: 2023-06-27 | End: 2023-06-27

## 2023-06-27 RX ORDER — POLYETHYLENE GLYCOL 3350 17 G/17G
17 POWDER, FOR SOLUTION ORAL DAILY
Refills: 0 | Status: DISCONTINUED | OUTPATIENT
Start: 2023-06-27 | End: 2023-06-29

## 2023-06-27 RX ORDER — POTASSIUM CHLORIDE 20 MEQ
40 PACKET (EA) ORAL ONCE
Refills: 0 | Status: COMPLETED | OUTPATIENT
Start: 2023-06-27 | End: 2023-06-27

## 2023-06-27 RX ORDER — SENNA PLUS 8.6 MG/1
2 TABLET ORAL AT BEDTIME
Refills: 0 | Status: DISCONTINUED | OUTPATIENT
Start: 2023-06-27 | End: 2023-06-29

## 2023-06-27 RX ORDER — MAGNESIUM SULFATE 500 MG/ML
2 VIAL (ML) INJECTION ONCE
Refills: 0 | Status: COMPLETED | OUTPATIENT
Start: 2023-06-27 | End: 2023-06-27

## 2023-06-27 RX ORDER — POTASSIUM CHLORIDE 20 MEQ
40 PACKET (EA) ORAL
Refills: 0 | Status: COMPLETED | OUTPATIENT
Start: 2023-06-27 | End: 2023-06-27

## 2023-06-27 RX ADMIN — Medication 40 MILLIEQUIVALENT(S): at 19:13

## 2023-06-27 RX ADMIN — POLYETHYLENE GLYCOL 3350 17 GRAM(S): 17 POWDER, FOR SOLUTION ORAL at 13:10

## 2023-06-27 RX ADMIN — Medication 40 MILLIEQUIVALENT(S): at 10:14

## 2023-06-27 RX ADMIN — Medication 1 MILLIGRAM(S): at 23:54

## 2023-06-27 RX ADMIN — HEPARIN SODIUM 5000 UNIT(S): 5000 INJECTION INTRAVENOUS; SUBCUTANEOUS at 23:14

## 2023-06-27 RX ADMIN — HEPARIN SODIUM 5000 UNIT(S): 5000 INJECTION INTRAVENOUS; SUBCUTANEOUS at 14:50

## 2023-06-27 RX ADMIN — HEPARIN SODIUM 5000 UNIT(S): 5000 INJECTION INTRAVENOUS; SUBCUTANEOUS at 06:30

## 2023-06-27 RX ADMIN — HALOPERIDOL DECANOATE 5 MILLIGRAM(S): 100 INJECTION INTRAMUSCULAR at 06:29

## 2023-06-27 RX ADMIN — SENNA PLUS 2 TABLET(S): 8.6 TABLET ORAL at 23:14

## 2023-06-27 RX ADMIN — Medication 25 GRAM(S): at 10:14

## 2023-06-27 RX ADMIN — OLANZAPINE 5 MILLIGRAM(S): 15 TABLET, FILM COATED ORAL at 13:11

## 2023-06-27 RX ADMIN — ESCITALOPRAM OXALATE 10 MILLIGRAM(S): 10 TABLET, FILM COATED ORAL at 13:10

## 2023-06-27 RX ADMIN — SODIUM CHLORIDE 75 MILLILITER(S): 9 INJECTION, SOLUTION INTRAVENOUS at 14:50

## 2023-06-27 RX ADMIN — PANTOPRAZOLE SODIUM 40 MILLIGRAM(S): 20 TABLET, DELAYED RELEASE ORAL at 13:11

## 2023-06-27 RX ADMIN — HALOPERIDOL DECANOATE 5 MILLIGRAM(S): 100 INJECTION INTRAMUSCULAR at 18:44

## 2023-06-27 RX ADMIN — Medication 62 MICROGRAM(S): at 23:15

## 2023-06-27 NOTE — PROGRESS NOTE ADULT - SUBJECTIVE AND OBJECTIVE BOX
SUBJECTIVE/24 hour events:    Patient is a 73yFemale PMH of schizophrenia, presenting with SBO, well known to the service. During the day was very agitated, needed a dose of haldol, zyprexa and ativan. Patient with no acute events overnight, remained calm and cooperative, advanced to cld and tolerating .    Vital Signs Last 24 Hrs  T(C): 36.8 (27 Jun 2023 00:13), Max: 37.9 (26 Jun 2023 20:00)  T(F): 98.3 (27 Jun 2023 00:13), Max: 100.3 (26 Jun 2023 20:00)  HR: 82 (27 Jun 2023 00:13) (62 - 95)  BP: 128/76 (27 Jun 2023 00:13) (128/76 - 176/84)  BP(mean): 90 (26 Jun 2023 04:54) (90 - 90)  RR: 20 (27 Jun 2023 00:13) (18 - 20)  SpO2: 94% (27 Jun 2023 00:13) (91% - 96%)    Parameters below as of 27 Jun 2023 00:13  Patient On (Oxygen Delivery Method): room air      Drug Dosing Weight  Height (cm): 165.1 (21 Jun 2023 10:52)  Weight (kg): 83.9 (21 Jun 2023 10:52)  BMI (kg/m2): 30.8 (21 Jun 2023 10:52)  BSA (m2): 1.91 (21 Jun 2023 10:52)  I&O's Detail    25 Jun 2023 07:01  -  26 Jun 2023 07:00  --------------------------------------------------------  IN:    Lactated Ringers: 1560 mL  Total IN: 1560 mL    OUT:    Voided (mL): 1850 mL  Total OUT: 1850 mL    Total NET: -290 mL      26 Jun 2023 07:01  -  27 Jun 2023 02:03  --------------------------------------------------------  IN:    Oral Fluid: 1000 mL  Total IN: 1000 mL    OUT:    Voided (mL): 800 mL  Total OUT: 800 mL    Total NET: 200 mL        Allergies    erythromycin (Unknown)  Neupogen (Other)  Depakote (Other)  clozapine (Other)    Intolerances                              8.0    3.75  )-----------( 136      ( 26 Jun 2023 09:06 )             27.2   06-26    150<H>  |  113<H>  |  21.8<H>  ----------------------------<  95  3.0<L>   |  24.0  |  1.83<H>    Ca    8.6      26 Jun 2023 09:06  Phos  2.1     06-26  Mg     1.8     06-26        ROS:      PHYSICAL EXAM:  Constitutional: resting comfortably  Respiratory: no respiratory distress, no dyspnea  Gastrointestinal: not able to examine abdomen, 2/2 patient not allowing  Genitourinary: voiding spontaneously   Neurological: A&OX3          MEDICATIONS  (STANDING):  escitalopram 10 milliGRAM(s) Oral daily  haloperidol    Injectable 5 milliGRAM(s) IntraMuscular every 12 hours  heparin   Injectable 5000 Unit(s) SubCutaneous every 8 hours  lactated ringers. 1000 milliLiter(s) (65 mL/Hr) IV Continuous <Continuous>  levothyroxine Injectable 62 MICROGram(s) IV Push at bedtime  OLANZapine Injectable 5 milliGRAM(s) IntraMuscular daily  pantoprazole  Injectable 40 milliGRAM(s) IV Push daily    MEDICATIONS  (PRN):  acetaminophen   IVPB .. 1000 milliGRAM(s) IV Intermittent every 6 hours PRN Temp greater or equal to 38C (100.4F), Mild Pain (1 - 3), Moderate Pain (4 - 6), Severe Pain (7 - 10)  LORazepam   Injectable 1 milliGRAM(s) IV Push at bedtime PRN Agitation  ondansetron Injectable 4 milliGRAM(s) IV Push every 6 hours PRN Nausea and/or Vomiting      RADIOLOGY STUDIES:    CULTURES:

## 2023-06-27 NOTE — BH CONSULTATION LIAISON PROGRESS NOTE - NSBHCONSULTFOLLOWAFTERCARE_PSY_A_CORE FT
Will continue to follow patient until medically cleared for discharge to [place]. Pt is at her baseline mental state at this time.

## 2023-06-27 NOTE — PROGRESS NOTE ADULT - ASSESSMENT
Patient is a 74 yo F with a recurrent SBO and CKD.     Plan:    start to advance diet slowly?  continue IV hydration   patient continues to refuse ng tube   serial abdominal exams   AM labs, trend Cr if patient allows labs to be drawn  PRN medication for agitation, reengage psych?  pain control prn   dvt ppx   encourage IS

## 2023-06-27 NOTE — BH CONSULTATION LIAISON PROGRESS NOTE - NSBHFUPINTERVALHXFT_PSY_A_CORE
Upon interview this morning, patient is back to baseline mental status, reporting that she has no pain in her abdomen at this time. Pt is more talkative and following commands. She is tolerating PO clear liquids.

## 2023-06-27 NOTE — BH CONSULTATION LIAISON PROGRESS NOTE - CURRENT MEDICATION
MEDICATIONS  (STANDING):  dextrose 5% + sodium chloride 0.3%. 1000 milliLiter(s) (75 mL/Hr) IV Continuous <Continuous>  escitalopram 10 milliGRAM(s) Oral daily  haloperidol    Injectable 5 milliGRAM(s) IntraMuscular every 12 hours  heparin   Injectable 5000 Unit(s) SubCutaneous every 8 hours  levothyroxine Injectable 62 MICROGram(s) IV Push at bedtime  OLANZapine Injectable 5 milliGRAM(s) IntraMuscular daily  pantoprazole  Injectable 40 milliGRAM(s) IV Push daily    MEDICATIONS  (PRN):  acetaminophen   IVPB .. 1000 milliGRAM(s) IV Intermittent every 6 hours PRN Temp greater or equal to 38C (100.4F), Mild Pain (1 - 3), Moderate Pain (4 - 6), Severe Pain (7 - 10)  LORazepam   Injectable 1 milliGRAM(s) IV Push at bedtime PRN Agitation  ondansetron Injectable 4 milliGRAM(s) IV Push every 6 hours PRN Nausea and/or Vomiting

## 2023-06-27 NOTE — BH CONSULTATION LIAISON PROGRESS NOTE - NSBHCHARTREVIEWLAB_PSY_A_CORE FT
8.1    3.75  )-----------( 136      ( 27 Jun 2023 06:41 )             27.9     06-27    153<H>  |  113<H>  |  20.1<H>  ----------------------------<  99  3.5   |  26.0  |  2.13<H>    Ca    8.7      27 Jun 2023 06:41  Phos  3.3     06-27  Mg     1.7     06-27          Urinalysis Basic - ( 27 Jun 2023 06:41 )    Color: x / Appearance: x / SG: x / pH: x  Gluc: 99 mg/dL / Ketone: x  / Bili: x / Urobili: x   Blood: x / Protein: x / Nitrite: x   Leuk Esterase: x / RBC: x / WBC x   Sq Epi: x / Non Sq Epi: x / Bacteria: x

## 2023-06-27 NOTE — BH CONSULTATION LIAISON PROGRESS NOTE - NSBHASSESSMENTFT_PSY_ALL_CORE
At this time, patient is back to baseline mental status. She is tolerating a clear liquid diet, but remains on IV and IM medications. Pt could benefit from trial of PO meds.  Once advanced diet and medically cleared, patient is at her baseline psych status and can return to [place].

## 2023-06-27 NOTE — BH CONSULTATION LIAISON PROGRESS NOTE - NSBHCHARTREVIEWVS_PSY_A_CORE FT
Vital Signs Last 24 Hrs  T(C): 36.7 (27 Jun 2023 04:46), Max: 37.9 (26 Jun 2023 20:00)  T(F): 98 (27 Jun 2023 04:46), Max: 100.3 (26 Jun 2023 20:00)  HR: 80 (27 Jun 2023 04:46) (80 - 95)  BP: 126/76 (27 Jun 2023 04:46) (126/76 - 176/84)  BP(mean): --  RR: 20 (27 Jun 2023 04:46) (18 - 20)  SpO2: 97% (27 Jun 2023 04:46) (92% - 97%)    Parameters below as of 27 Jun 2023 04:46  Patient On (Oxygen Delivery Method): room air

## 2023-06-28 ENCOUNTER — TRANSCRIPTION ENCOUNTER (OUTPATIENT)
Age: 73
End: 2023-06-28

## 2023-06-28 LAB
ANION GAP SERPL CALC-SCNC: 10 MMOL/L — SIGNIFICANT CHANGE UP (ref 5–17)
BASOPHILS # BLD AUTO: 0.02 K/UL — SIGNIFICANT CHANGE UP (ref 0–0.2)
BASOPHILS NFR BLD AUTO: 0.4 % — SIGNIFICANT CHANGE UP (ref 0–2)
BUN SERPL-MCNC: 19.5 MG/DL — SIGNIFICANT CHANGE UP (ref 8–20)
CALCIUM SERPL-MCNC: 8.4 MG/DL — SIGNIFICANT CHANGE UP (ref 8.4–10.5)
CHLORIDE SERPL-SCNC: 111 MMOL/L — HIGH (ref 96–108)
CO2 SERPL-SCNC: 25 MMOL/L — SIGNIFICANT CHANGE UP (ref 22–29)
CREAT SERPL-MCNC: 1.77 MG/DL — HIGH (ref 0.5–1.3)
EGFR: 30 ML/MIN/1.73M2 — LOW
EOSINOPHIL # BLD AUTO: 0.11 K/UL — SIGNIFICANT CHANGE UP (ref 0–0.5)
EOSINOPHIL NFR BLD AUTO: 2.5 % — SIGNIFICANT CHANGE UP (ref 0–6)
GLUCOSE BLDC GLUCOMTR-MCNC: 97 MG/DL — SIGNIFICANT CHANGE UP (ref 70–99)
GLUCOSE SERPL-MCNC: 94 MG/DL — SIGNIFICANT CHANGE UP (ref 70–99)
HCT VFR BLD CALC: 26.1 % — LOW (ref 34.5–45)
HGB BLD-MCNC: 7.6 G/DL — LOW (ref 11.5–15.5)
IMM GRANULOCYTES NFR BLD AUTO: 3.8 % — HIGH (ref 0–0.9)
LYMPHOCYTES # BLD AUTO: 1.19 K/UL — SIGNIFICANT CHANGE UP (ref 1–3.3)
LYMPHOCYTES # BLD AUTO: 26.6 % — SIGNIFICANT CHANGE UP (ref 13–44)
MAGNESIUM SERPL-MCNC: 1.9 MG/DL — SIGNIFICANT CHANGE UP (ref 1.8–2.6)
MCHC RBC-ENTMCNC: 25.6 PG — LOW (ref 27–34)
MCHC RBC-ENTMCNC: 29.1 GM/DL — LOW (ref 32–36)
MCV RBC AUTO: 87.9 FL — SIGNIFICANT CHANGE UP (ref 80–100)
MONOCYTES # BLD AUTO: 0.64 K/UL — SIGNIFICANT CHANGE UP (ref 0–0.9)
MONOCYTES NFR BLD AUTO: 14.3 % — HIGH (ref 2–14)
NEUTROPHILS # BLD AUTO: 2.34 K/UL — SIGNIFICANT CHANGE UP (ref 1.8–7.4)
NEUTROPHILS NFR BLD AUTO: 52.4 % — SIGNIFICANT CHANGE UP (ref 43–77)
PHOSPHATE SERPL-MCNC: 3 MG/DL — SIGNIFICANT CHANGE UP (ref 2.4–4.7)
PLATELET # BLD AUTO: 154 K/UL — SIGNIFICANT CHANGE UP (ref 150–400)
POTASSIUM SERPL-MCNC: 3.8 MMOL/L — SIGNIFICANT CHANGE UP (ref 3.5–5.3)
POTASSIUM SERPL-SCNC: 3.8 MMOL/L — SIGNIFICANT CHANGE UP (ref 3.5–5.3)
RBC # BLD: 2.97 M/UL — LOW (ref 3.8–5.2)
RBC # FLD: 15.1 % — HIGH (ref 10.3–14.5)
SODIUM SERPL-SCNC: 146 MMOL/L — HIGH (ref 135–145)
WBC # BLD: 4.47 K/UL — SIGNIFICANT CHANGE UP (ref 3.8–10.5)
WBC # FLD AUTO: 4.47 K/UL — SIGNIFICANT CHANGE UP (ref 3.8–10.5)

## 2023-06-28 RX ORDER — ACETAMINOPHEN 500 MG
975 TABLET ORAL EVERY 6 HOURS
Refills: 0 | Status: DISCONTINUED | OUTPATIENT
Start: 2023-06-28 | End: 2023-06-29

## 2023-06-28 RX ORDER — POTASSIUM CHLORIDE 20 MEQ
20 PACKET (EA) ORAL ONCE
Refills: 0 | Status: DISCONTINUED | OUTPATIENT
Start: 2023-06-28 | End: 2023-06-29

## 2023-06-28 RX ORDER — PANTOPRAZOLE SODIUM 20 MG/1
40 TABLET, DELAYED RELEASE ORAL
Refills: 0 | Status: DISCONTINUED | OUTPATIENT
Start: 2023-06-28 | End: 2023-06-29

## 2023-06-28 RX ORDER — SODIUM CHLORIDE 9 MG/ML
1000 INJECTION, SOLUTION INTRAVENOUS
Refills: 0 | Status: DISCONTINUED | OUTPATIENT
Start: 2023-06-28 | End: 2023-06-29

## 2023-06-28 RX ORDER — SODIUM CHLORIDE 9 MG/ML
1000 INJECTION, SOLUTION INTRAVENOUS
Refills: 0 | Status: DISCONTINUED | OUTPATIENT
Start: 2023-06-28 | End: 2023-06-28

## 2023-06-28 RX ADMIN — ESCITALOPRAM OXALATE 10 MILLIGRAM(S): 10 TABLET, FILM COATED ORAL at 15:10

## 2023-06-28 RX ADMIN — Medication 1 TABLET(S): at 17:04

## 2023-06-28 RX ADMIN — POLYETHYLENE GLYCOL 3350 17 GRAM(S): 17 POWDER, FOR SOLUTION ORAL at 15:07

## 2023-06-28 RX ADMIN — HALOPERIDOL DECANOATE 5 MILLIGRAM(S): 100 INJECTION INTRAMUSCULAR at 05:46

## 2023-06-28 RX ADMIN — HEPARIN SODIUM 5000 UNIT(S): 5000 INJECTION INTRAVENOUS; SUBCUTANEOUS at 22:54

## 2023-06-28 RX ADMIN — Medication 62 MICROGRAM(S): at 22:54

## 2023-06-28 RX ADMIN — HEPARIN SODIUM 5000 UNIT(S): 5000 INJECTION INTRAVENOUS; SUBCUTANEOUS at 15:07

## 2023-06-28 RX ADMIN — HEPARIN SODIUM 5000 UNIT(S): 5000 INJECTION INTRAVENOUS; SUBCUTANEOUS at 05:46

## 2023-06-28 RX ADMIN — OLANZAPINE 5 MILLIGRAM(S): 15 TABLET, FILM COATED ORAL at 15:07

## 2023-06-28 RX ADMIN — HALOPERIDOL DECANOATE 5 MILLIGRAM(S): 100 INJECTION INTRAMUSCULAR at 17:04

## 2023-06-28 NOTE — DISCHARGE NOTE PROVIDER - NSDCMRMEDTOKEN_GEN_ALL_CORE_FT
atorvastatin 10 mg tablet: 1 tab(s) orally once a day (at bedtime)  cholecalciferol oral tablet: 50 microgram(s) orally once a day  cyanocobalamin 1000 mcg/mL injectable solution: 1 milliliter(s) injectable once a month  docusate sodium 100 mg oral capsule: 1 cap(s) orally 3 times a day, As Needed  escitalopram 10 mg tablet:   haloperidol 5 mg tablet: 1 tab(s) orally 2 times a day  levothyroxine 112 mcg (0.112 mg) oral tablet: 1 tab(s) orally once a day  lorazepam 0.5 mg tablet: 1 tab(s) orally once a day (at bedtime)  LORazepam 1 mg oral tablet: 1 tab(s) orally once a day  multivitamin: 1 tab(s) orally once a day  olanzapine 2.5 mg tablet: 1 tab(s) orally once a day (at bedtime)  OLANZapine 5 mg oral tablet: 1 tab(s) orally once a day  pantoprazole 20 mg tablet,delayed release: 1 tab(s) orally once a day  polyethylene glycol 3350 oral powder for reconstitution: 17 gram(s) orally once a day (at bedtime)  senna oral tablet: 2 tab(s) orally once a day (at bedtime), As Needed  simethicone 80 mg oral tablet, chewable: 1 tab(s) orally 3 times a day   atorvastatin 10 mg tablet: 1 tab(s) orally once a day (at bedtime)  cholecalciferol oral tablet: 50 microgram(s) orally once a day  cyanocobalamin 1000 mcg/mL injectable solution: 1 milliliter(s) injectable once a month  docusate sodium 100 mg oral capsule: 1 cap(s) orally 3 times a day, As Needed  escitalopram 10 mg tablet:   haloperidol 5 mg tablet: 1 tab(s) orally 2 times a day  levothyroxine 112 mcg (0.112 mg) oral tablet: 1 tab(s) orally once a day  lorazepam 0.5 mg tablet: 1 tab(s) orally once a day (at bedtime)  LORazepam 1 mg oral tablet: 1 tab(s) orally once a day  multivitamin: 1 tab(s) orally once a day  olanzapine 2.5 mg tablet: 1 tab(s) orally once a day (at bedtime)  OLANZapine 5 mg oral tablet: 1 tab(s) orally once a day  pantoprazole 20 mg tablet,delayed release: 1 tab(s) orally once a day  polyethylene glycol 3350 oral powder for reconstitution: 17 gram(s) orally once a day (at bedtime)  senna oral tablet: 2 tab(s) orally once a day (at bedtime), As Needed  simethicone 80 mg oral tablet, chewable: 1 tab(s) orally 3 times a day  sulfamethoxazole-trimethoprim 800 mg-160 mg oral tablet: 1 tab(s) orally 2 times a day end date 7/1

## 2023-06-28 NOTE — DISCHARGE NOTE PROVIDER - HOSPITAL COURSE
Admission HPI:  Ms. Toribio is a 72 year old F with an extensive medical history and complex surgical history. including multiple abdominal surgeries between 6964-5414 including a altemeir procedure c/b perforated viscus and ileostomy creation, rectal I and D's, SBO, parastomal hernia with repair x2 known to ACS service. Patient sent in from [place] for episodes of vomiting ( reportedly dark bilious). Patient does endorse mild abd pain ( similar to prior episodes) 2/2 to recurrent SBO. CT demonstrating a small bowel obstruction with transition point within a right ventral abdominal wall hernia sac.Complex ventral abdominal wall hernia. Patient endorses flatus ( on today) ,and non-bloody BM. Denies diarrhea/constipation hematochezia, On arrival to ED, patient febrile (Tmax 101.8 on rectal), improved with tylenol. No leukocytosis, however left shift. Otherwise HDS.     Hospital Course:  Patient was admitted to the acute care surgery service. Pt refused NGT placement and was initially kept NPO w/ IVF. She continued to have bowel function so diet was advanced. She had episode of emesis after getting clear liquids and so she was made NPO again. Over the next few days she continued to pass gas but also had occasional nausea / vomiting w/ PO intake. On 6/26 pt's diet again adv to clear liquids which she tolerated without pain or N/V. She was advanced to regular diet on 6/27 which she continues to tolerate. Abdomen remains soft and non-tender. She rcvd IVF for management of hypernatremia and was encouraged to increase PO intake of fluids. Behavioral health was consulted and follow pt during admission, as well.   ............................. Admission HPI:  Ms. Toribio is a 72 year old F with an extensive medical history and complex surgical history. including multiple abdominal surgeries between 3340-7190 including a altemeir procedure c/b perforated viscus and ileostomy creation, rectal I and D's, SBO, parastomal hernia with repair x2 known to ACS service. Patient sent in from [place] for episodes of vomiting ( reportedly dark bilious). Patient does endorse mild abd pain ( similar to prior episodes) 2/2 to recurrent SBO. CT demonstrating a small bowel obstruction with transition point within a right ventral abdominal wall hernia sac.Complex ventral abdominal wall hernia. Patient endorses flatus ( on today) ,and non-bloody BM. Denies diarrhea/constipation hematochezia, On arrival to ED, patient febrile (Tmax 101.8 on rectal), improved with tylenol. No leukocytosis, however left shift. Otherwise HDS.     Hospital Course:  Patient was admitted to the acute care surgery service. Pt refused NGT placement and was initially kept NPO w/ IVF. She continued to have bowel function so diet was advanced. She had episode of emesis after getting clear liquids and so she was made NPO again. Over the next few days she continued to pass gas but also had occasional nausea / vomiting w/ PO intake. On 6/26 pt's diet again adv to clear liquids which she tolerated without pain or N/V. She was advanced to regular diet on 6/27 which she continues to tolerate. Abdomen remains soft and non-tender. She rcvd IVF for management of hypernatremia and was encouraged to increase PO intake of fluids. Behavioral health was consulted and follow pt during admission, as well. Patient continues to tolerate regular diet and have consistent bowel function with no abdominal pain. Stable for discharge.    Patient is advised to RETURN TO THE EMERGENCY DEPARTMENT for any of the following - worsening pain, fever/chills, nausea/vomiting, altered mental status, chest pain, shortness of breath, or any other new / worsening symptom.

## 2023-06-28 NOTE — DISCHARGE NOTE PROVIDER - NSFOLLOWUPCLINICS_GEN_ALL_ED_FT
[facility] Acute Care Surgery  Acute Care Surgery  [address] [place] [id number]  Phone: [phone]  Fax:

## 2023-06-28 NOTE — PROGRESS NOTE ADULT - SUBJECTIVE AND OBJECTIVE BOX
Subjective: Patient seen and examined at bedside, she was repeatedly saying "get out of here". She was evaluated by psych who states she is back to her baseline.     MEDICATIONS  (STANDING):  escitalopram 10 milliGRAM(s) Oral daily  haloperidol    Injectable 5 milliGRAM(s) IntraMuscular every 12 hours  heparin   Injectable 5000 Unit(s) SubCutaneous every 8 hours  levothyroxine Injectable 62 MICROGram(s) IV Push at bedtime  OLANZapine Injectable 5 milliGRAM(s) IntraMuscular daily  pantoprazole  Injectable 40 milliGRAM(s) IV Push daily  polyethylene glycol 3350 17 Gram(s) Oral daily  senna 2 Tablet(s) Oral at bedtime    MEDICATIONS  (PRN):  acetaminophen   IVPB .. 1000 milliGRAM(s) IV Intermittent every 6 hours PRN Temp greater or equal to 38C (100.4F), Mild Pain (1 - 3), Moderate Pain (4 - 6), Severe Pain (7 - 10)  dexAMETHasone  Injectable 4 milliGRAM(s) IV Push two times a day PRN nausea / vomiting  LORazepam   Injectable 1 milliGRAM(s) IV Push at bedtime PRN Agitation  ondansetron Injectable 4 milliGRAM(s) IV Push every 6 hours PRN Nausea and/or Vomiting    Vital Signs Last 24 Hrs  T(C): 36.6 (28 Jun 2023 00:20), Max: 37.4 (27 Jun 2023 13:14)  T(F): 97.9 (28 Jun 2023 00:20), Max: 99.3 (27 Jun 2023 13:14)  HR: 70 (28 Jun 2023 00:20) (70 - 80)  BP: 145/75 (28 Jun 2023 00:20) (126/76 - 181/80)  BP(mean): 98 (27 Jun 2023 13:14) (98 - 98)  RR: 18 (28 Jun 2023 00:20) (18 - 20)  SpO2: 95% (28 Jun 2023 00:20) (93% - 97%)  Parameters below as of 28 Jun 2023 00:20  Patient On (Oxygen Delivery Method): room air    Physical Exam:  Patient refused physical exam.     LABS:   pending     A: Patient is a 72 yo F with a recurrent SBO and CKD with hx of schizophrenia. She is tolerating a regular diet. Was hypernatremic, but improving. Cr is also improving. She was evaluated by psych yesterday, who reports she is at her baseline.     Plan:   Soft and bite sized food   serial abdominal exams   AM labs- trend Cr and Na   Pain control prn   DVT ppx with heparin, SCDs   encourage IS

## 2023-06-28 NOTE — DISCHARGE NOTE PROVIDER - NSDCCPCAREPLAN_GEN_ALL_CORE_FT
PRINCIPAL DISCHARGE DIAGNOSIS  Diagnosis: Small bowel obstruction  Assessment and Plan of Treatment: Follow up: Please follow-up with your primary care provider after discharge. You can follow-up at the acute care surgery clinic on an as-needed basis.  Activity: May return to normal activities as tolerated.  Diet: Ok to resume pre-hospital diet.  Medications: Please resume all home medications as previously prescribed.  Patient is advised to RETURN TO THE EMERGENCY DEPARTMENT for any of the following - worsening pain, fever/chills, nausea/vomiting, altered mental status, chest pain, shortness of breath, or any other new / worsening symptom.     PRINCIPAL DISCHARGE DIAGNOSIS  Diagnosis: Small bowel obstruction  Assessment and Plan of Treatment: Follow up: Please follow-up with your primary care provider after discharge. You can follow-up at the acute care surgery clinic on an as-needed basis.  Activity: May return to normal activities as tolerated.  Diet: Ok to resume pre-hospital diet.  Medications: Please resume all home medications as previously prescribed.  Patient is advised to RETURN TO THE EMERGENCY DEPARTMENT for any of the following - worsening pain, fever/chills, nausea/vomiting, altered mental status, chest pain, shortness of breath, or any other new / worsening symptom.      SECONDARY DISCHARGE DIAGNOSES  Diagnosis: E. coli UTI  Assessment and Plan of Treatment: Continue oral antibiotic Bactrim until 7/1. Follow-up with your primary care provider after discharge.

## 2023-06-29 ENCOUNTER — TRANSCRIPTION ENCOUNTER (OUTPATIENT)
Age: 73
End: 2023-06-29

## 2023-06-29 VITALS
RESPIRATION RATE: 18 BRPM | HEART RATE: 91 BPM | DIASTOLIC BLOOD PRESSURE: 85 MMHG | OXYGEN SATURATION: 94 % | SYSTOLIC BLOOD PRESSURE: 148 MMHG | TEMPERATURE: 98 F

## 2023-06-29 LAB
ANION GAP SERPL CALC-SCNC: 15 MMOL/L — SIGNIFICANT CHANGE UP (ref 5–17)
BASOPHILS # BLD AUTO: 0.04 K/UL — SIGNIFICANT CHANGE UP (ref 0–0.2)
BASOPHILS NFR BLD AUTO: 0.6 % — SIGNIFICANT CHANGE UP (ref 0–2)
BUN SERPL-MCNC: 26 MG/DL — HIGH (ref 8–20)
CALCIUM SERPL-MCNC: 8.7 MG/DL — SIGNIFICANT CHANGE UP (ref 8.4–10.5)
CHLORIDE SERPL-SCNC: 107 MMOL/L — SIGNIFICANT CHANGE UP (ref 96–108)
CO2 SERPL-SCNC: 24 MMOL/L — SIGNIFICANT CHANGE UP (ref 22–29)
CREAT SERPL-MCNC: 1.97 MG/DL — HIGH (ref 0.5–1.3)
EGFR: 27 ML/MIN/1.73M2 — LOW
EOSINOPHIL # BLD AUTO: 0.09 K/UL — SIGNIFICANT CHANGE UP (ref 0–0.5)
EOSINOPHIL NFR BLD AUTO: 1.3 % — SIGNIFICANT CHANGE UP (ref 0–6)
GLUCOSE SERPL-MCNC: 99 MG/DL — SIGNIFICANT CHANGE UP (ref 70–99)
HCT VFR BLD CALC: 30.9 % — LOW (ref 34.5–45)
HGB BLD-MCNC: 9 G/DL — LOW (ref 11.5–15.5)
IMM GRANULOCYTES NFR BLD AUTO: 2 % — HIGH (ref 0–0.9)
LYMPHOCYTES # BLD AUTO: 1.38 K/UL — SIGNIFICANT CHANGE UP (ref 1–3.3)
LYMPHOCYTES # BLD AUTO: 19.9 % — SIGNIFICANT CHANGE UP (ref 13–44)
MAGNESIUM SERPL-MCNC: 2.1 MG/DL — SIGNIFICANT CHANGE UP (ref 1.6–2.6)
MCHC RBC-ENTMCNC: 25.7 PG — LOW (ref 27–34)
MCHC RBC-ENTMCNC: 29.1 GM/DL — LOW (ref 32–36)
MCV RBC AUTO: 88.3 FL — SIGNIFICANT CHANGE UP (ref 80–100)
MONOCYTES # BLD AUTO: 0.87 K/UL — SIGNIFICANT CHANGE UP (ref 0–0.9)
MONOCYTES NFR BLD AUTO: 12.6 % — SIGNIFICANT CHANGE UP (ref 2–14)
NEUTROPHILS # BLD AUTO: 4.4 K/UL — SIGNIFICANT CHANGE UP (ref 1.8–7.4)
NEUTROPHILS NFR BLD AUTO: 63.6 % — SIGNIFICANT CHANGE UP (ref 43–77)
PHOSPHATE SERPL-MCNC: 4.3 MG/DL — SIGNIFICANT CHANGE UP (ref 2.4–4.7)
PLATELET # BLD AUTO: 179 K/UL — SIGNIFICANT CHANGE UP (ref 150–400)
POTASSIUM SERPL-MCNC: 3.9 MMOL/L — SIGNIFICANT CHANGE UP (ref 3.5–5.3)
POTASSIUM SERPL-SCNC: 3.9 MMOL/L — SIGNIFICANT CHANGE UP (ref 3.5–5.3)
RBC # BLD: 3.5 M/UL — LOW (ref 3.8–5.2)
RBC # FLD: 15.2 % — HIGH (ref 10.3–14.5)
SARS-COV-2 RNA SPEC QL NAA+PROBE: SIGNIFICANT CHANGE UP
SODIUM SERPL-SCNC: 146 MMOL/L — HIGH (ref 135–145)
WBC # BLD: 6.92 K/UL — SIGNIFICANT CHANGE UP (ref 3.8–10.5)
WBC # FLD AUTO: 6.92 K/UL — SIGNIFICANT CHANGE UP (ref 3.8–10.5)

## 2023-06-29 PROCEDURE — 84100 ASSAY OF PHOSPHORUS: CPT

## 2023-06-29 PROCEDURE — 74176 CT ABD & PELVIS W/O CONTRAST: CPT | Mod: ME

## 2023-06-29 PROCEDURE — 99231 SBSQ HOSP IP/OBS SF/LOW 25: CPT

## 2023-06-29 PROCEDURE — 80048 BASIC METABOLIC PNL TOTAL CA: CPT

## 2023-06-29 PROCEDURE — 80076 HEPATIC FUNCTION PANEL: CPT

## 2023-06-29 PROCEDURE — 87635 SARS-COV-2 COVID-19 AMP PRB: CPT

## 2023-06-29 PROCEDURE — G1004: CPT

## 2023-06-29 PROCEDURE — 74019 RADEX ABDOMEN 2 VIEWS: CPT

## 2023-06-29 PROCEDURE — 87040 BLOOD CULTURE FOR BACTERIA: CPT

## 2023-06-29 PROCEDURE — 80053 COMPREHEN METABOLIC PANEL: CPT

## 2023-06-29 PROCEDURE — 93005 ELECTROCARDIOGRAM TRACING: CPT

## 2023-06-29 PROCEDURE — 87086 URINE CULTURE/COLONY COUNT: CPT

## 2023-06-29 PROCEDURE — 96374 THER/PROPH/DIAG INJ IV PUSH: CPT

## 2023-06-29 PROCEDURE — 82962 GLUCOSE BLOOD TEST: CPT

## 2023-06-29 PROCEDURE — 83605 ASSAY OF LACTIC ACID: CPT

## 2023-06-29 PROCEDURE — 81001 URINALYSIS AUTO W/SCOPE: CPT

## 2023-06-29 PROCEDURE — 36415 COLL VENOUS BLD VENIPUNCTURE: CPT

## 2023-06-29 PROCEDURE — 83690 ASSAY OF LIPASE: CPT

## 2023-06-29 PROCEDURE — 74018 RADEX ABDOMEN 1 VIEW: CPT

## 2023-06-29 PROCEDURE — 87186 SC STD MICRODIL/AGAR DIL: CPT

## 2023-06-29 PROCEDURE — 96375 TX/PRO/DX INJ NEW DRUG ADDON: CPT

## 2023-06-29 PROCEDURE — 85025 COMPLETE CBC W/AUTO DIFF WBC: CPT

## 2023-06-29 PROCEDURE — 83735 ASSAY OF MAGNESIUM: CPT

## 2023-06-29 PROCEDURE — 99285 EMERGENCY DEPT VISIT HI MDM: CPT | Mod: 25

## 2023-06-29 RX ORDER — DIPHENHYDRAMINE HCL 50 MG
50 CAPSULE ORAL ONCE
Refills: 0 | Status: COMPLETED | OUTPATIENT
Start: 2023-06-29 | End: 2023-06-29

## 2023-06-29 RX ORDER — HALOPERIDOL DECANOATE 100 MG/ML
5 INJECTION INTRAMUSCULAR ONCE
Refills: 0 | Status: COMPLETED | OUTPATIENT
Start: 2023-06-29 | End: 2023-06-29

## 2023-06-29 RX ORDER — LEVOTHYROXINE SODIUM 125 MCG
112 TABLET ORAL DAILY
Refills: 0 | Status: DISCONTINUED | OUTPATIENT
Start: 2023-06-29 | End: 2023-06-29

## 2023-06-29 RX ADMIN — Medication 50 MILLIGRAM(S): at 17:29

## 2023-06-29 RX ADMIN — HALOPERIDOL DECANOATE 5 MILLIGRAM(S): 100 INJECTION INTRAMUSCULAR at 05:01

## 2023-06-29 RX ADMIN — Medication 2 MILLIGRAM(S): at 17:30

## 2023-06-29 RX ADMIN — OLANZAPINE 5 MILLIGRAM(S): 15 TABLET, FILM COATED ORAL at 14:34

## 2023-06-29 RX ADMIN — HALOPERIDOL DECANOATE 5 MILLIGRAM(S): 100 INJECTION INTRAMUSCULAR at 17:18

## 2023-06-29 RX ADMIN — HEPARIN SODIUM 5000 UNIT(S): 5000 INJECTION INTRAVENOUS; SUBCUTANEOUS at 05:02

## 2023-06-29 RX ADMIN — Medication 1 TABLET(S): at 05:02

## 2023-06-29 RX ADMIN — PANTOPRAZOLE SODIUM 40 MILLIGRAM(S): 20 TABLET, DELAYED RELEASE ORAL at 05:02

## 2023-06-29 NOTE — DISCHARGE NOTE NURSING/CASE MANAGEMENT/SOCIAL WORK - PATIENT PORTAL LINK FT
You can access the FollowMyHealth Patient Portal offered by [facility] by registering at the following website: [email or web address]. By joining [employer]’s FollowMyHealth portal, you will also be able to view your health information using other applications (apps) compatible with our system.

## 2023-06-29 NOTE — PROGRESS NOTE ADULT - SUBJECTIVE AND OBJECTIVE BOX
Subjective: patient is a 74 yo F with PMH of schizophrenia, presenting with SBO, well known to the service. agitated overnight, nurse reported 1 episode of non-bilious emesis, Patient was given Zofran. patient denies fever, chills, CP, SOB at this time.     MEDICATIONS  (STANDING):  dextrose 5% + sodium chloride 0.45%. 1000 milliLiter(s) (1000 mL/Hr) IV Continuous <Continuous>  escitalopram 10 milliGRAM(s) Oral daily  haloperidol    Injectable 5 milliGRAM(s) IntraMuscular every 12 hours  heparin   Injectable 5000 Unit(s) SubCutaneous every 8 hours  levothyroxine Injectable 62 MICROGram(s) IV Push at bedtime  OLANZapine Injectable 5 milliGRAM(s) IntraMuscular daily  pantoprazole    Tablet 40 milliGRAM(s) Oral before breakfast  polyethylene glycol 3350 17 Gram(s) Oral daily  potassium chloride    Tablet ER 20 milliEquivalent(s) Oral once  senna 2 Tablet(s) Oral at bedtime  trimethoprim  160 mG/sulfamethoxazole 800 mG 1 Tablet(s) Oral two times a day    MEDICATIONS  (PRN):  acetaminophen     Tablet .. 975 milliGRAM(s) Oral every 6 hours PRN Mild Pain (1 - 3)  dexAMETHasone  Injectable 4 milliGRAM(s) IV Push two times a day PRN nausea / vomiting  LORazepam   Injectable 1 milliGRAM(s) IV Push at bedtime PRN Agitation  ondansetron Injectable 4 milliGRAM(s) IV Push every 6 hours PRN Nausea and/or Vomiting      Vital Signs Last 24 Hrs  T(C): 37.2 (29 Jun 2023 00:31), Max: 37.5 (28 Jun 2023 16:12)  T(F): 98.9 (29 Jun 2023 00:31), Max: 99.5 (28 Jun 2023 16:12)  HR: 77 (29 Jun 2023 00:31) (65 - 85)  BP: 145/87 (29 Jun 2023 00:31) (131/68 - 159/96)  BP(mean): --  RR: 18 (29 Jun 2023 00:31) (18 - 18)  SpO2: 94% (29 Jun 2023 00:31) (91% - 97%)    Parameters below as of 29 Jun 2023 00:31  Patient On (Oxygen Delivery Method): room air        Physical Exam:  Constitutional: resting comfortably  Respiratory: no respiratory distress, no dyspnea  Gastrointestinal: Severe visible abdominal distention, rest of exam unable to obtain 2/2 patient refusing.  Genitourinary: voiding spontaneously   Neurological: A&OX3    LABS: Pending              A: 74 yo f with recurrent SBO and CKD.     Plan:   -advance diet as tolerated slowly   -continue IV hydration   -patient continues to refuse ng tube   -serial abdominal exams   -AM labs, trend Cr if patient allows labs to be drawn  -PRN medication for agitation, reengage psych?  -pain control prn   -dvt ppx   -encourage IS

## 2023-06-29 NOTE — DISCHARGE NOTE NURSING/CASE MANAGEMENT/SOCIAL WORK - NSDCPEFALRISK_GEN_ALL_CORE
For information on Fall & Injury Prevention, visit: [email or web address] OR  [email or web address] OR  https://www.cdc.gov/steadi/patient.html

## 2023-06-29 NOTE — PROGRESS NOTE ADULT - ATTENDING COMMENTS
Awake alert  Bilateral BS  Hemodynamic intact  Abdomen soft, active BS.   The lateral wall hernia is easily compressible, abdomen is not firm or distended  Patient has chronic ileus with recurrent episodes of partial SBO  The causes  are anatomic and the medications / sedentary state  Had several BMs this morning  While patient vomited through the night, she is hungry today  Neurologic grossly unchanged  Will restart on liquids  Every possible effort should be made not to operate on this patient for the risks are great. The morbidity is 100% and mortality very high based on patient's clinical status and comorbidities.
Patient continues to recover appropriate: tolerating diet and having bowel function = likely resolved SBO.   AVSS.   Likely stable CKD w/ acceptable lytes and uop (0.84ml/kg/hr).     --Dispo planning.
Patient is a 72 yo F with a recurrent SBO and CKD with hx of schizophrenia. She is tolerating a regular diet. Was hypernatremic, but improving. Cr is also improving. She was evaluated by psych yesterday, who reports she is at her baseline.   Awake alert  Bilateral BS  Hemodynamic intact  Abdomen soft, active BS, diet tolerated  SBO resolved  Neurologic grossly unchanged  Patient has no outstanding medical/surgical issues requiring hospitalization and needs to return to [place]     Plan:   Soft and bite sized food   serial abdominal exams   AM labs- trend Cr and Na   Pain control prn   DVT ppx with heparin, SCDs   encourage IS
Patient is a 73yFemale PMH of schizophrenia, presenting with SBO, well known to the service. During the day was very agitated, needed a dose of haldol, Zyprexa and ativan. Patient with no acute events overnight, remained calm and cooperative, advanced to clears and tolerating .  Awake alert and actually conversant today  Bilateral BS  Hemodynamic intact  Abdomen soft, active BS, no RG. patient appears to be decompressed and SBO has resolved. In prospect and retrospect, this patient has persistent low grade ileus and superimposes partial SBO from time to time, consequent to the large ventral hernia  Diet tolerated and will advance  Neurologic grossly unchanged
Patient is a 74 yo F with a recurrent SBO and CKD.   Awake alert  Bilateral BS  Hemodynamic intact  Abdomen soft, active BS and easily compressible  NO RG, no distension  Neurologic grossly unchanged  Had many BMs in last 48h    Plan:   CLD now that bowel function has started to return. Patient is not acutely obstructed - will advance slowly as tolerated  serial abdominal exams   AM labs, trend Cr   pain control prn   dvt ppx   encourage IS
Patient noted to be laying in bed. Demonstrated ?personality disorder with mild agitation, changing voices, attempts to kick at clinicians.   However, reports bowel function (confirmed by nursing, documentation in chart), hunger, denies abdominal pain.     **Refused abdominal exams during rounds.     --Slowly advance diet and monitor tolerance.   --OOBTC, mobilize as tolerated.     --dc planning.
Patient seen and examined at bedside this morning on rounds with Dr. Horner. She reported that abdominal pain was gone, that she was passing gas and that she felt hungry. Patient was ordered for regular diet and had small episode of emesis after eating. On re-eval after she vomited pt continues to report no pain and that she continues to pass gas but states she only wants water. No other acute complaints at time of my exam. Denies fever, chills, CP or SOB.  Awake alert  Bilateral BS  Hemodynamic intact  Abdomen soft, active BS, nauseated  Neurologic grossly intact

## 2023-06-29 NOTE — CHART NOTE - NSCHARTNOTEFT_GEN_A_CORE
Patient refusing to leave the hospital, patient is tearful and unable to show insight on why she would like to stay in the hospital. RN and myself attempted to convince patient without success. Patient now agitated and grabbing at empolyee's personal belongings. Discussion with EMS and supervisor that they will be okay with transportation of patient on monitor after patient given rescue medications. Patient given 5mg haldol and benadryl 50 without resolution of agitated state. Patient was observed and given ativan 2mg.  Patient observed for about 20 minutes and noted to be calmer. Vitals unchanged, patient sating well and still conversant with staff. Patient safely discharged to inpatient [facility]. Patient refusing to leave the hospital, patient is tearful and unable to show insight on why she would like to stay in the hospital. Patient has been evaluated by behavioral health and deemed not to have capacity.  RN and myself attempted to convince patient without success. Patient now agitated and grabbing at empolyee's badge/personal belongings. Discussion with EMS and supervisor that they will be okay with transportation of patient on monitor after patient given rescue medications. Patient given 5mg haldol and benadryl 50 without resolution of agitated state. Patient was observed and then given ativan 2mg.  Patient observed for about 20 minutes and noted to be calmer. Vitals 126/76 HR 90 SPO2 95% on RA, patient sating well, eyes open and is conversant with staff. Patient safely discharged to inpatient [facility] on monitor.

## 2023-06-29 NOTE — DISCHARGE NOTE NURSING/CASE MANAGEMENT/SOCIAL WORK - NSDCVIVACCINE_GEN_ALL_CORE_FT
Tdap; 17-Sep-2018 10:04; Melina Montoya (JOSETTE); [employer]; [id number] (Exp. Date: 11-Jun-2020); IntraMuscular; Deltoid Right.; 0.5 milliLiter(s); VIS (VIS Published: 09-May-2013, VIS Presented: 17-Sep-2018);

## 2023-07-11 ENCOUNTER — APPOINTMENT (OUTPATIENT)
Dept: TRAUMA SURGERY | Facility: CLINIC | Age: 73
End: 2023-07-11
Payer: MEDICARE

## 2023-07-11 VITALS
TEMPERATURE: 97.6 F | HEART RATE: 71 BPM | OXYGEN SATURATION: 95 % | SYSTOLIC BLOOD PRESSURE: 142 MMHG | DIASTOLIC BLOOD PRESSURE: 83 MMHG | RESPIRATION RATE: 14 BRPM

## 2023-07-11 PROCEDURE — 99213 OFFICE O/P EST LOW 20 MIN: CPT

## 2023-08-10 NOTE — ED ADULT NURSE NOTE - CAS TRG GENERAL NORM CIRC DET
Strong peripheral pulses Consent (Nose)/Introductory Paragraph: The rationale for Mohs was explained to the patient and consent was obtained. The risks, benefits and alternatives to therapy were discussed in detail. Specifically, the risks of nasal deformity, changes in the flow of air through the nose, infection, scarring, bleeding, prolonged wound healing, incomplete removal, allergy to anesthesia, nerve injury and recurrence were addressed. Prior to the procedure, the treatment site was clearly identified and confirmed by the patient. All components of Universal Protocol/PAUSE Rule completed.

## 2023-08-13 ENCOUNTER — OUTPATIENT (OUTPATIENT)
Dept: OUTPATIENT SERVICES | Facility: HOSPITAL | Age: 73
LOS: 1 days | End: 2023-08-13
Payer: COMMERCIAL

## 2023-08-13 DIAGNOSIS — K43.5 PARASTOMAL HERNIA WITHOUT OBSTRUCTION OR GANGRENE: Chronic | ICD-10-CM

## 2023-08-13 DIAGNOSIS — Z93.2 ILEOSTOMY STATUS: Chronic | ICD-10-CM

## 2023-08-13 DIAGNOSIS — F20.9 SCHIZOPHRENIA, UNSPECIFIED: ICD-10-CM

## 2023-08-13 PROCEDURE — 0225U NFCT DS DNA&RNA 21 SARSCOV2: CPT

## 2023-10-06 NOTE — ED PROVIDER NOTE - NSCAREINITIATED _GEN_ER
Vipul Melendez(Attending)
Modify Regimen: Increase spironolactone 25mg to 50mg QD
Detail Level: Simple
Render In Strict Bullet Format?: No
Continue Regimen: Twyneo cream QHS\\nPlexion cleanser

## 2023-10-09 NOTE — ED PROVIDER NOTE - PSYCHIATRIC NEGATIVE STATEMENT, MLM
no known mental health issues. H Plasty Text: Given the location of the defect, shape of the defect and the proximity to free margins a H-plasty was deemed most appropriate for repair.  Using a sterile surgical marker, the appropriate advancement arms of the H-plasty were drawn incorporating the defect and placing the expected incisions within the relaxed skin tension lines where possible. The area thus outlined was incised deep to adipose tissue with a #15 scalpel blade. The skin margins were undermined to an appropriate distance in all directions utilizing iris scissors.  The opposing advancement arms were then advanced into place in opposite direction and anchored with interrupted buried subcutaneous sutures.

## 2023-11-10 ENCOUNTER — OUTPATIENT (OUTPATIENT)
Dept: OUTPATIENT SERVICES | Facility: HOSPITAL | Age: 73
LOS: 1 days | End: 2023-11-10
Payer: COMMERCIAL

## 2023-11-10 DIAGNOSIS — F20.9 SCHIZOPHRENIA, UNSPECIFIED: ICD-10-CM

## 2023-11-10 DIAGNOSIS — Z93.2 ILEOSTOMY STATUS: Chronic | ICD-10-CM

## 2023-11-10 DIAGNOSIS — K43.5 PARASTOMAL HERNIA WITHOUT OBSTRUCTION OR GANGRENE: Chronic | ICD-10-CM

## 2023-11-10 PROCEDURE — 82040 ASSAY OF SERUM ALBUMIN: CPT

## 2023-11-10 PROCEDURE — 80048 BASIC METABOLIC PNL TOTAL CA: CPT

## 2023-11-10 PROCEDURE — 83735 ASSAY OF MAGNESIUM: CPT

## 2023-11-10 PROCEDURE — 84155 ASSAY OF PROTEIN SERUM: CPT

## 2023-11-10 PROCEDURE — 36415 COLL VENOUS BLD VENIPUNCTURE: CPT

## 2023-11-14 NOTE — PHYSICAL THERAPY INITIAL EVALUATION ADULT - LEVEL OF INDEPENDENCE: GAIT, REHAB EVAL
[facility]  [facility]      Patient Name:   Татьяна Jackson  YOB: 1947  Identification Number:   MRN - [id number]  Date of Service:   11/14/2023 16:47:44    Diagnosis:        R26.81 - Unsteady gait        R42 - Dizziness/ Vertigo/ Giddiness    Impression:       77 yo female with history of HTN, avoidance to medications presenting to the ED with sudden onset of dizziness, gait instability and nausea starting at ~0400 this morning. Dizziness lasted 6 hours before resolving, still having a little bit of gait instability. NIHSS 0. CT Head negative for hemorrhage. MRI Brain negative for acute stroke, does show extensive chronic small vessel ischemic changes. Symptoms concerning for TIA. Would obtain CTA head/neck, TTE with bubble study and lipid panel/HbA1c for stroke work-up. Start ASA 81 mg. Needs therapy evaluations in the setting of gait instability.    Our recommendations are outlined below.    Recommendations:          Stroke/Telemetry Floor        Neuro Checks        Bedside Swallow Eval        DVT Prophylaxis        IV Fluids, Normal Saline        Head of Bed 30 Degrees        Euglycemia and Avoid Hyperthermia (PRN Acetaminophen)        Initiate or continue Aspirin 81 MG daily        Antihypertensives PRN if Blood pressure is greater than 220/120 or there is a concern for End organ damage/contraindications for permissive HTN. If blood pressure is greater than 220/120 give labetalol PO or IV or Vasotec IV with a goal of 15% reduction in BP during the first 24 hours.    Recommended Scan:       Routine CTA Head and neck    Lipid Panel to Be Obtained, if Not Done in the Last 30 Days    Therapies:        Physical Therapy, Occupational Therapy, Speech Therapy Assessment When Applicable    Dysphagia:        Swallow Evaluation, Bedside        NPO Until Swallow Evaluation    DVT prophylaxis:        Choice of Primary Team    Disposition:        Neurology Follow  Up Recommended    Sign Out:        Discussed with Emergency Department Provider        ------------------------------------------------------------------------------    Advanced Imaging:  Advanced Imaging Deferred because:    Non-disabling symptoms as verified by the patient; no cortical signs so not consistent with LVO      Metrics:  Last Known Well: 11/14/2023 04:00:00  [facility] Notification Time: 11/14/2023 16:46:59  Arrival Time: 11/14/2023 11:22:00  Stamp Time: 11/14/2023 16:47:44  Initial Response Time: 11/14/2023 16:52:29  Symptoms: dizziness.  Initial patient interaction: 11/14/2023 17:01:32  NIHSS Assessment Completed: 11/14/2023 17:10:06  Patient is not a candidate for Thrombolytic.  Thrombolytic Medical Decision: 11/14/2023 17:10:06  Patient was not deemed candidate for Thrombolytic because of following reasons:  Last Well Known Above 4.5 Hours.    CT head showed no acute hemorrhage or acute core infarct.    Primary Provider Notified of Diagnostic Impression and Management Plan on: 11/14/2023 17:36:17        ------------------------------------------------------------------------------    History of Present Illness:  Patient is a 76 year old Female.    Patient was brought by private transportation with symptoms of dizziness.  Patient reports that she woke up at 0400 to us the bathroom and felt normal. After using the bathroom noticed that she was having dizziness described as a spinning sensation and nausea. Had a fall due to the dizziness and also was feeling off balance. Lasted about 6 hours before resolving. In the ED, still reports that she feels off balance with walking. Notes similar symptoms in the past, but associated with syncope.       Past Medical History:       Hypertension  Othere PMH:  avoidance to medications    Medications:    No Anticoagulant use   No Antiplatelet use  Reviewed EMR for current medications    Allergies:   Reviewed    Social History:  Smoking: No  Alcohol Use:  No  Drug Use: No    Family History:    There is no family history of premature cerebrovascular disease pertinent to this consultation    ROS :  14 Points Review of Systems was performed and was negative except mentioned in HPI.    Past Surgical History:  There Is No Surgical History Contributory To Today’s Visit         Examination:  BP(183/93), Pulse(94), Blood Glucose(99)  1A: Level of Consciousness - Alert; keenly responsive + 0  1B: Ask Month and Age - Both Questions Right + 0  1C: Blink Eyes & Squeeze Hands - Performs Both Tasks + 0  2: Test Horizontal Extraocular Movements - Normal + 0  3: Test Visual Fields - No Visual Loss + 0  4: Test Facial Palsy (Use Grimace if Obtunded) - Normal symmetry + 0  5A: Test Left Arm Motor Drift - No Drift for 10 Seconds + 0  5B: Test Right Arm Motor Drift - No Drift for 10 Seconds + 0  6A: Test Left Leg Motor Drift - No Drift for 5 Seconds + 0  6B: Test Right Leg Motor Drift - No Drift for 5 Seconds + 0  7: Test Limb Ataxia (FNF/Heel-Shin) - No Ataxia + 0  8: Test Sensation - Normal; No sensory loss + 0  9: Test Language/Aphasia - Normal; No aphasia + 0  10: Test Dysarthria - Normal + 0  11: Test Extinction/Inattention - No abnormality + 0    NIHSS Score: 0    Pre-Morbid Modified [place] Scale:  0 Points = No symptoms at all    Spoke with : Dr. Mota  I reviewed the available imaging via Rapid and initiated discussion with the primary provider    Patient/Family was informed the Neurology Consult would occur via TeleHealth consult by way of interactive audio and video telecommunications and consented to receiving care in this manner.      Patient is being evaluated for possible acute neurologic impairment and high probability of imminent or life-threatening deterioration. I spent total of 40 minutes providing care to this patient, including time for face to face visit via telemedicine, review of medical records, imaging studies and discussion of findings with providers, the  patient and/or family.      Dr Brenton Cosme      [facility]  For Inpatient follow-up with [facility] physician please call [facility] [phone]. This is not an outpatient service. Post hospital discharge, please contact hospital directly.      independent

## 2023-11-17 NOTE — ED PROVIDER NOTE - EYES NEGATIVE STATEMENT, MLM
This patient is a 44-year-old female who is newly diagnosed with her first pregnancy. The patient also had a well health exam and is healthy. The patient does have a scheduled OB/GYN visit for next month with the [facility]. The patient was advised to call her OB/GYN's office to get blood work for her first pregnancy visit. The patient did receive a flu shot last week through work. The patient is already started prenatal vitamins which she will continue. The patient has a history of anxiety and depression and will continue Celexa 40 mg daily.
no discharge, no irritation, no pain, no redness, and no visual changes.

## 2023-12-01 ENCOUNTER — INPATIENT (INPATIENT)
Facility: HOSPITAL | Age: 73
LOS: 16 days | Discharge: PSYCHIATRIC OR SUBSTANCE USE FACILITY | DRG: 393 | End: 2023-12-18
Attending: HOSPITALIST | Admitting: SURGERY
Payer: MEDICARE

## 2023-12-01 VITALS
RESPIRATION RATE: 16 BRPM | WEIGHT: 149.91 LBS | SYSTOLIC BLOOD PRESSURE: 153 MMHG | TEMPERATURE: 99 F | HEART RATE: 91 BPM | OXYGEN SATURATION: 99 % | DIASTOLIC BLOOD PRESSURE: 89 MMHG | HEIGHT: 66 IN

## 2023-12-01 DIAGNOSIS — K43.5 PARASTOMAL HERNIA WITHOUT OBSTRUCTION OR GANGRENE: Chronic | ICD-10-CM

## 2023-12-01 DIAGNOSIS — Z93.2 ILEOSTOMY STATUS: Chronic | ICD-10-CM

## 2023-12-01 DIAGNOSIS — Z84.0 FAMILY HISTORY OF DISEASES OF THE SKIN AND SUBCUTANEOUS TISSUE: ICD-10-CM

## 2023-12-01 LAB
ALBUMIN SERPL ELPH-MCNC: 3.9 G/DL — SIGNIFICANT CHANGE UP (ref 3.3–5.2)
ALBUMIN SERPL ELPH-MCNC: 3.9 G/DL — SIGNIFICANT CHANGE UP (ref 3.3–5.2)
ALP SERPL-CCNC: 165 U/L — HIGH (ref 40–120)
ALP SERPL-CCNC: 165 U/L — HIGH (ref 40–120)
ALT FLD-CCNC: 17 U/L — SIGNIFICANT CHANGE UP
ALT FLD-CCNC: 17 U/L — SIGNIFICANT CHANGE UP
ANION GAP SERPL CALC-SCNC: 14 MMOL/L — SIGNIFICANT CHANGE UP (ref 5–17)
ANION GAP SERPL CALC-SCNC: 14 MMOL/L — SIGNIFICANT CHANGE UP (ref 5–17)
APPEARANCE UR: CLEAR — SIGNIFICANT CHANGE UP
APPEARANCE UR: CLEAR — SIGNIFICANT CHANGE UP
AST SERPL-CCNC: 20 U/L — SIGNIFICANT CHANGE UP
AST SERPL-CCNC: 20 U/L — SIGNIFICANT CHANGE UP
BACTERIA # UR AUTO: NEGATIVE /HPF — SIGNIFICANT CHANGE UP
BACTERIA # UR AUTO: NEGATIVE /HPF — SIGNIFICANT CHANGE UP
BASOPHILS # BLD AUTO: 0.01 K/UL — SIGNIFICANT CHANGE UP (ref 0–0.2)
BASOPHILS # BLD AUTO: 0.01 K/UL — SIGNIFICANT CHANGE UP (ref 0–0.2)
BASOPHILS NFR BLD AUTO: 0.2 % — SIGNIFICANT CHANGE UP (ref 0–2)
BASOPHILS NFR BLD AUTO: 0.2 % — SIGNIFICANT CHANGE UP (ref 0–2)
BILIRUB SERPL-MCNC: 0.5 MG/DL — SIGNIFICANT CHANGE UP (ref 0.4–2)
BILIRUB SERPL-MCNC: 0.5 MG/DL — SIGNIFICANT CHANGE UP (ref 0.4–2)
BILIRUB UR-MCNC: NEGATIVE — SIGNIFICANT CHANGE UP
BILIRUB UR-MCNC: NEGATIVE — SIGNIFICANT CHANGE UP
BUN SERPL-MCNC: 29.1 MG/DL — HIGH (ref 8–20)
BUN SERPL-MCNC: 29.1 MG/DL — HIGH (ref 8–20)
CALCIUM SERPL-MCNC: 8.9 MG/DL — SIGNIFICANT CHANGE UP (ref 8.4–10.5)
CALCIUM SERPL-MCNC: 8.9 MG/DL — SIGNIFICANT CHANGE UP (ref 8.4–10.5)
CAST: 3 /LPF — SIGNIFICANT CHANGE UP (ref 0–4)
CAST: 3 /LPF — SIGNIFICANT CHANGE UP (ref 0–4)
CHLORIDE SERPL-SCNC: 105 MMOL/L — SIGNIFICANT CHANGE UP (ref 96–108)
CHLORIDE SERPL-SCNC: 105 MMOL/L — SIGNIFICANT CHANGE UP (ref 96–108)
CO2 SERPL-SCNC: 23 MMOL/L — SIGNIFICANT CHANGE UP (ref 22–29)
CO2 SERPL-SCNC: 23 MMOL/L — SIGNIFICANT CHANGE UP (ref 22–29)
COLOR SPEC: YELLOW — SIGNIFICANT CHANGE UP
COLOR SPEC: YELLOW — SIGNIFICANT CHANGE UP
CREAT SERPL-MCNC: 1.6 MG/DL — HIGH (ref 0.5–1.3)
CREAT SERPL-MCNC: 1.6 MG/DL — HIGH (ref 0.5–1.3)
DIFF PNL FLD: ABNORMAL
DIFF PNL FLD: ABNORMAL
EGFR: 34 ML/MIN/1.73M2 — LOW
EGFR: 34 ML/MIN/1.73M2 — LOW
EOSINOPHIL # BLD AUTO: 0.01 K/UL — SIGNIFICANT CHANGE UP (ref 0–0.5)
EOSINOPHIL # BLD AUTO: 0.01 K/UL — SIGNIFICANT CHANGE UP (ref 0–0.5)
EOSINOPHIL NFR BLD AUTO: 0.2 % — SIGNIFICANT CHANGE UP (ref 0–6)
EOSINOPHIL NFR BLD AUTO: 0.2 % — SIGNIFICANT CHANGE UP (ref 0–6)
GLUCOSE SERPL-MCNC: 96 MG/DL — SIGNIFICANT CHANGE UP (ref 70–99)
GLUCOSE SERPL-MCNC: 96 MG/DL — SIGNIFICANT CHANGE UP (ref 70–99)
GLUCOSE UR QL: NEGATIVE MG/DL — SIGNIFICANT CHANGE UP
GLUCOSE UR QL: NEGATIVE MG/DL — SIGNIFICANT CHANGE UP
HCT VFR BLD CALC: 34 % — LOW (ref 34.5–45)
HCT VFR BLD CALC: 34 % — LOW (ref 34.5–45)
HGB BLD-MCNC: 10.7 G/DL — LOW (ref 11.5–15.5)
HGB BLD-MCNC: 10.7 G/DL — LOW (ref 11.5–15.5)
IMM GRANULOCYTES NFR BLD AUTO: 0.2 % — SIGNIFICANT CHANGE UP (ref 0–0.9)
IMM GRANULOCYTES NFR BLD AUTO: 0.2 % — SIGNIFICANT CHANGE UP (ref 0–0.9)
KETONES UR-MCNC: NEGATIVE MG/DL — SIGNIFICANT CHANGE UP
KETONES UR-MCNC: NEGATIVE MG/DL — SIGNIFICANT CHANGE UP
LACTATE BLDV-MCNC: 1 MMOL/L — SIGNIFICANT CHANGE UP (ref 0.5–2)
LACTATE BLDV-MCNC: 1 MMOL/L — SIGNIFICANT CHANGE UP (ref 0.5–2)
LEUKOCYTE ESTERASE UR-ACNC: NEGATIVE — SIGNIFICANT CHANGE UP
LEUKOCYTE ESTERASE UR-ACNC: NEGATIVE — SIGNIFICANT CHANGE UP
LIDOCAIN IGE QN: 105 U/L — HIGH (ref 22–51)
LIDOCAIN IGE QN: 105 U/L — HIGH (ref 22–51)
LYMPHOCYTES # BLD AUTO: 0.65 K/UL — LOW (ref 1–3.3)
LYMPHOCYTES # BLD AUTO: 0.65 K/UL — LOW (ref 1–3.3)
LYMPHOCYTES # BLD AUTO: 12.2 % — LOW (ref 13–44)
LYMPHOCYTES # BLD AUTO: 12.2 % — LOW (ref 13–44)
MCHC RBC-ENTMCNC: 29.6 PG — SIGNIFICANT CHANGE UP (ref 27–34)
MCHC RBC-ENTMCNC: 29.6 PG — SIGNIFICANT CHANGE UP (ref 27–34)
MCHC RBC-ENTMCNC: 31.5 GM/DL — LOW (ref 32–36)
MCHC RBC-ENTMCNC: 31.5 GM/DL — LOW (ref 32–36)
MCV RBC AUTO: 93.9 FL — SIGNIFICANT CHANGE UP (ref 80–100)
MCV RBC AUTO: 93.9 FL — SIGNIFICANT CHANGE UP (ref 80–100)
MONOCYTES # BLD AUTO: 0.51 K/UL — SIGNIFICANT CHANGE UP (ref 0–0.9)
MONOCYTES # BLD AUTO: 0.51 K/UL — SIGNIFICANT CHANGE UP (ref 0–0.9)
MONOCYTES NFR BLD AUTO: 9.6 % — SIGNIFICANT CHANGE UP (ref 2–14)
MONOCYTES NFR BLD AUTO: 9.6 % — SIGNIFICANT CHANGE UP (ref 2–14)
NEUTROPHILS # BLD AUTO: 4.15 K/UL — SIGNIFICANT CHANGE UP (ref 1.8–7.4)
NEUTROPHILS # BLD AUTO: 4.15 K/UL — SIGNIFICANT CHANGE UP (ref 1.8–7.4)
NEUTROPHILS NFR BLD AUTO: 77.6 % — HIGH (ref 43–77)
NEUTROPHILS NFR BLD AUTO: 77.6 % — HIGH (ref 43–77)
NITRITE UR-MCNC: NEGATIVE — SIGNIFICANT CHANGE UP
NITRITE UR-MCNC: NEGATIVE — SIGNIFICANT CHANGE UP
PH UR: 6 — SIGNIFICANT CHANGE UP (ref 5–8)
PH UR: 6 — SIGNIFICANT CHANGE UP (ref 5–8)
PLATELET # BLD AUTO: 115 K/UL — LOW (ref 150–400)
PLATELET # BLD AUTO: 115 K/UL — LOW (ref 150–400)
POTASSIUM SERPL-MCNC: 4.1 MMOL/L — SIGNIFICANT CHANGE UP (ref 3.5–5.3)
POTASSIUM SERPL-MCNC: 4.1 MMOL/L — SIGNIFICANT CHANGE UP (ref 3.5–5.3)
POTASSIUM SERPL-SCNC: 4.1 MMOL/L — SIGNIFICANT CHANGE UP (ref 3.5–5.3)
POTASSIUM SERPL-SCNC: 4.1 MMOL/L — SIGNIFICANT CHANGE UP (ref 3.5–5.3)
PROT SERPL-MCNC: 6.7 G/DL — SIGNIFICANT CHANGE UP (ref 6.6–8.7)
PROT SERPL-MCNC: 6.7 G/DL — SIGNIFICANT CHANGE UP (ref 6.6–8.7)
PROT UR-MCNC: 30 MG/DL
PROT UR-MCNC: 30 MG/DL
RBC # BLD: 3.62 M/UL — LOW (ref 3.8–5.2)
RBC # BLD: 3.62 M/UL — LOW (ref 3.8–5.2)
RBC # FLD: 17.7 % — HIGH (ref 10.3–14.5)
RBC # FLD: 17.7 % — HIGH (ref 10.3–14.5)
RBC CASTS # UR COMP ASSIST: 2 /HPF — SIGNIFICANT CHANGE UP (ref 0–4)
RBC CASTS # UR COMP ASSIST: 2 /HPF — SIGNIFICANT CHANGE UP (ref 0–4)
SODIUM SERPL-SCNC: 142 MMOL/L — SIGNIFICANT CHANGE UP (ref 135–145)
SODIUM SERPL-SCNC: 142 MMOL/L — SIGNIFICANT CHANGE UP (ref 135–145)
SP GR SPEC: 1.03 — SIGNIFICANT CHANGE UP (ref 1–1.03)
SP GR SPEC: 1.03 — SIGNIFICANT CHANGE UP (ref 1–1.03)
SQUAMOUS # UR AUTO: 1 /HPF — SIGNIFICANT CHANGE UP (ref 0–5)
SQUAMOUS # UR AUTO: 1 /HPF — SIGNIFICANT CHANGE UP (ref 0–5)
UROBILINOGEN FLD QL: 0.2 MG/DL — SIGNIFICANT CHANGE UP (ref 0.2–1)
UROBILINOGEN FLD QL: 0.2 MG/DL — SIGNIFICANT CHANGE UP (ref 0.2–1)
WBC # BLD: 5.34 K/UL — SIGNIFICANT CHANGE UP (ref 3.8–10.5)
WBC # BLD: 5.34 K/UL — SIGNIFICANT CHANGE UP (ref 3.8–10.5)
WBC # FLD AUTO: 5.34 K/UL — SIGNIFICANT CHANGE UP (ref 3.8–10.5)
WBC # FLD AUTO: 5.34 K/UL — SIGNIFICANT CHANGE UP (ref 3.8–10.5)
WBC UR QL: 2 /HPF — SIGNIFICANT CHANGE UP (ref 0–5)
WBC UR QL: 2 /HPF — SIGNIFICANT CHANGE UP (ref 0–5)

## 2023-12-01 PROCEDURE — 93010 ELECTROCARDIOGRAM REPORT: CPT

## 2023-12-01 PROCEDURE — 71045 X-RAY EXAM CHEST 1 VIEW: CPT | Mod: 26

## 2023-12-01 PROCEDURE — 99285 EMERGENCY DEPT VISIT HI MDM: CPT | Mod: GC

## 2023-12-01 PROCEDURE — 99222 1ST HOSP IP/OBS MODERATE 55: CPT

## 2023-12-01 PROCEDURE — 74177 CT ABD & PELVIS W/CONTRAST: CPT | Mod: 26,MG

## 2023-12-01 PROCEDURE — G1004: CPT

## 2023-12-01 RX ORDER — LEVOTHYROXINE SODIUM 125 MCG
112 TABLET ORAL DAILY
Refills: 0 | Status: DISCONTINUED | OUTPATIENT
Start: 2023-12-01 | End: 2023-12-01

## 2023-12-01 RX ORDER — PANTOPRAZOLE SODIUM 20 MG/1
1 TABLET, DELAYED RELEASE ORAL
Qty: 0 | Refills: 0 | DISCHARGE

## 2023-12-01 RX ORDER — LEVOTHYROXINE SODIUM 125 MCG
75 TABLET ORAL AT BEDTIME
Refills: 0 | Status: DISCONTINUED | OUTPATIENT
Start: 2023-12-01 | End: 2023-12-05

## 2023-12-01 RX ORDER — SODIUM CHLORIDE 9 MG/ML
1000 INJECTION INTRAMUSCULAR; INTRAVENOUS; SUBCUTANEOUS ONCE
Refills: 0 | Status: COMPLETED | OUTPATIENT
Start: 2023-12-01 | End: 2023-12-01

## 2023-12-01 RX ORDER — OLANZAPINE 15 MG/1
1 TABLET, FILM COATED ORAL
Qty: 0 | Refills: 0 | DISCHARGE

## 2023-12-01 RX ORDER — ESCITALOPRAM OXALATE 10 MG/1
10 TABLET, FILM COATED ORAL DAILY
Refills: 0 | Status: DISCONTINUED | OUTPATIENT
Start: 2023-12-01 | End: 2023-12-05

## 2023-12-01 RX ORDER — ONDANSETRON 8 MG/1
4 TABLET, FILM COATED ORAL EVERY 6 HOURS
Refills: 0 | Status: DISCONTINUED | OUTPATIENT
Start: 2023-12-01 | End: 2023-12-18

## 2023-12-01 RX ORDER — OLANZAPINE 15 MG/1
5 TABLET, FILM COATED ORAL ONCE
Refills: 0 | Status: COMPLETED | OUTPATIENT
Start: 2023-12-01 | End: 2023-12-01

## 2023-12-01 RX ORDER — ESCITALOPRAM OXALATE 10 MG/1
0 TABLET, FILM COATED ORAL
Qty: 0 | Refills: 0 | DISCHARGE

## 2023-12-01 RX ORDER — ENOXAPARIN SODIUM 100 MG/ML
40 INJECTION SUBCUTANEOUS ONCE
Refills: 0 | Status: DISCONTINUED | OUTPATIENT
Start: 2023-12-01 | End: 2023-12-01

## 2023-12-01 RX ORDER — SIMETHICONE 80 MG/1
1 TABLET, CHEWABLE ORAL
Qty: 0 | Refills: 0 | DISCHARGE

## 2023-12-01 RX ORDER — HEPARIN SODIUM 5000 [USP'U]/ML
5000 INJECTION INTRAVENOUS; SUBCUTANEOUS EVERY 8 HOURS
Refills: 0 | Status: DISCONTINUED | OUTPATIENT
Start: 2023-12-01 | End: 2023-12-18

## 2023-12-01 RX ORDER — ACETAMINOPHEN 500 MG
1000 TABLET ORAL ONCE
Refills: 0 | Status: COMPLETED | OUTPATIENT
Start: 2023-12-01 | End: 2023-12-01

## 2023-12-01 RX ORDER — ONDANSETRON 8 MG/1
4 TABLET, FILM COATED ORAL ONCE
Refills: 0 | Status: COMPLETED | OUTPATIENT
Start: 2023-12-01 | End: 2023-12-01

## 2023-12-01 RX ORDER — ATORVASTATIN CALCIUM 80 MG/1
10 TABLET, FILM COATED ORAL AT BEDTIME
Refills: 0 | Status: DISCONTINUED | OUTPATIENT
Start: 2023-12-01 | End: 2023-12-18

## 2023-12-01 RX ORDER — HALOPERIDOL DECANOATE 100 MG/ML
5 INJECTION INTRAMUSCULAR EVERY 12 HOURS
Refills: 0 | Status: DISCONTINUED | OUTPATIENT
Start: 2023-12-01 | End: 2023-12-05

## 2023-12-01 RX ORDER — HALOPERIDOL DECANOATE 100 MG/ML
1 INJECTION INTRAMUSCULAR
Qty: 0 | Refills: 0 | DISCHARGE

## 2023-12-01 RX ORDER — HALOPERIDOL DECANOATE 100 MG/ML
5 INJECTION INTRAMUSCULAR EVERY 12 HOURS
Refills: 0 | Status: DISCONTINUED | OUTPATIENT
Start: 2023-12-01 | End: 2023-12-01

## 2023-12-01 RX ORDER — SODIUM CHLORIDE 9 MG/ML
1000 INJECTION, SOLUTION INTRAVENOUS
Refills: 0 | Status: DISCONTINUED | OUTPATIENT
Start: 2023-12-01 | End: 2023-12-04

## 2023-12-01 RX ORDER — PANTOPRAZOLE SODIUM 20 MG/1
20 TABLET, DELAYED RELEASE ORAL EVERY 24 HOURS
Refills: 0 | Status: DISCONTINUED | OUTPATIENT
Start: 2023-12-01 | End: 2023-12-05

## 2023-12-01 RX ORDER — ATORVASTATIN CALCIUM 80 MG/1
1 TABLET, FILM COATED ORAL
Qty: 0 | Refills: 0 | DISCHARGE

## 2023-12-01 RX ORDER — PREGABALIN 225 MG/1
1 CAPSULE ORAL
Qty: 0 | Refills: 0 | DISCHARGE

## 2023-12-01 RX ORDER — IOHEXOL 300 MG/ML
30 INJECTION, SOLUTION INTRAVENOUS ONCE
Refills: 0 | Status: COMPLETED | OUTPATIENT
Start: 2023-12-01 | End: 2023-12-01

## 2023-12-01 RX ORDER — KETOROLAC TROMETHAMINE 30 MG/ML
15 SYRINGE (ML) INJECTION ONCE
Refills: 0 | Status: DISCONTINUED | OUTPATIENT
Start: 2023-12-01 | End: 2023-12-01

## 2023-12-01 RX ADMIN — Medication 15 MILLIGRAM(S): at 12:28

## 2023-12-01 RX ADMIN — HEPARIN SODIUM 5000 UNIT(S): 5000 INJECTION INTRAVENOUS; SUBCUTANEOUS at 22:00

## 2023-12-01 RX ADMIN — Medication 1000 MILLIGRAM(S): at 20:57

## 2023-12-01 RX ADMIN — PANTOPRAZOLE SODIUM 20 MILLIGRAM(S): 20 TABLET, DELAYED RELEASE ORAL at 18:42

## 2023-12-01 RX ADMIN — SODIUM CHLORIDE 1000 MILLILITER(S): 9 INJECTION INTRAMUSCULAR; INTRAVENOUS; SUBCUTANEOUS at 12:00

## 2023-12-01 RX ADMIN — IOHEXOL 30 MILLILITER(S): 300 INJECTION, SOLUTION INTRAVENOUS at 12:00

## 2023-12-01 RX ADMIN — Medication 400 MILLIGRAM(S): at 20:27

## 2023-12-01 RX ADMIN — ATORVASTATIN CALCIUM 10 MILLIGRAM(S): 80 TABLET, FILM COATED ORAL at 22:00

## 2023-12-01 RX ADMIN — ONDANSETRON 4 MILLIGRAM(S): 8 TABLET, FILM COATED ORAL at 11:54

## 2023-12-01 RX ADMIN — Medication 75 MICROGRAM(S): at 22:56

## 2023-12-01 RX ADMIN — Medication 15 MILLIGRAM(S): at 11:58

## 2023-12-01 NOTE — ED PROVIDER NOTE - WET READ LAUNCH FT
"Anesthesia Post Evaluation    Patient: Zandra Sanchez    Procedure(s) Performed: * No procedures listed *    Final Anesthesia Type: epidural  Patient location during evaluation: labor & delivery  Patient participation: Yes- Able to Participate  Level of consciousness: awake and alert and oriented  Post-procedure vital signs: reviewed and stable  Pain management: adequate  Airway patency: patent  PONV status at discharge: No PONV  Anesthetic complications: no      Cardiovascular status: blood pressure returned to baseline and hemodynamically stable  Respiratory status: spontaneous ventilation and room air  Hydration status: euvolemic  Follow-up not needed.        Visit Vitals    /71    Pulse 93    Temp 36.6 °C (97.8 °F) (Oral)    Resp 18    Ht 5' 8" (1.727 m)    Wt 71.2 kg (157 lb)    LMP 05/01/2016    SpO2 97%    Breastfeeding Yes    BMI 23.87 kg/m2       Pain/Ladonna Score: No Data Recorded      "
There are no Wet Read(s) to document.

## 2023-12-01 NOTE — ED PROVIDER NOTE - CLINICAL SUMMARY MEDICAL DECISION MAKING FREE TEXT BOX
73 year old female w/PMHx of anemia, CVA, GERD, HTN, HLD, Ventral Hernia with prior obstruction presents to the ED from [place] with abd pain. Patient alert and oriented. Currently c/o right lower quadrant pain and lower abd pain. Examination significant for multiple ventral abd hernias. CT abd w/cont and zofran to manage vomiting ordered. Blood workup pending. 73 year old female w/PMHx of anemia, CVA, GERD, HTN, HLD, Ventral Hernia with prior obstruction presents to the ED from [place] with abd pain. Patient alert and oriented. Currently c/o right lower quadrant pain and lower abd pain. Examination significant for multiple ventral abd hernias. CT abd w/cont and zofran to manage vomiting ordered. Blood workup pending. 73 year old female w/PMHx of anemia, CVA, GERD, HTN, HLD, Ventral Hernia with prior obstruction presents to the ED from [place] with abd pain. Patient alert and oriented. Currently c/o right lower quadrant pain and lower abd pain. Examination significant for multiple ventral abd hernias. CT abd w/cont and zofran to manage vomiting ordered. Blood workup pending.

## 2023-12-01 NOTE — H&P ADULT - NSHPPHYSICALEXAM_GEN_ALL_CORE
VITALS:   T(C): 37.1 (12-01-23 @ 10:28), Max: 37.1 (12-01-23 @ 10:28)  HR: 91 (12-01-23 @ 10:28) (91 - 91)  BP: 153/89 (12-01-23 @ 10:28) (153/89 - 153/89)  RR: 16 (12-01-23 @ 10:28) (16 - 16)  SpO2: 99% (12-01-23 @ 10:28) (99% - 99%)    GENERAL: agitated, NAD  HEAD:  Atraumatic, normocephalic  NECK: Supple, no JVD  HEART: RRR  LUNGS: Unlabored respirations. No conversational dyspnea.   ABDOMEN: tender to palpation. very large ventral hernia   EXTREMITIES: No clubbing, cyanosis, or edema  NERVOUS SYSTEM:  A&Ox3, no focal deficits   SKIN: No rashes or lesions

## 2023-12-01 NOTE — ED ADULT TRIAGE NOTE - NSWEIGHTCALCTOOLDRUG_GEN_A_CORE
used Island Pedicle Flap With Canthal Suspension Text: The defect edges were debeveled with a #15 scalpel blade.  Given the location of the defect, shape of the defect and the proximity to free margins an island pedicle advancement flap was deemed most appropriate.  Using a sterile surgical marker, an appropriate advancement flap was drawn incorporating the defect, outlining the appropriate donor tissue and placing the expected incisions within the relaxed skin tension lines where possible. The area thus outlined was incised deep to adipose tissue with a #15 scalpel blade.  The skin margins were undermined to an appropriate distance in all directions around the primary defect and laterally outward around the island pedicle utilizing iris scissors.  There was minimal undermining beneath the pedicle flap. A suspension suture was placed in the canthal tendon to prevent tension and prevent ectropion.

## 2023-12-01 NOTE — ED ADULT TRIAGE NOTE - PAIN RATING/NUMBER SCALE (0-10): ACTIVITY
Date: 2/21/2017    Patient Name: Lizbeth Mackay          To Whom it may concern: This letter has been written at the patient's request. She has an U/S in January and missed work the day after (1/10/2017) due to continued pain after the procedure. 6 (moderate pain)

## 2023-12-01 NOTE — H&P ADULT - ASSESSMENT
ASSESSMENT: 74yo F w/ complex surgical hx and hx of several small bowel obstructions presenting with high grade SBO.    PLAN:  - admit to surgery, Dr. Figueredo  - NPO, IVF  - NGT to suction  - monitor bowel function  - serial abdominal exams  - resume home meds    Pt seen and plan discussed with attending, Dr. Figueredo

## 2023-12-01 NOTE — ED PROVIDER NOTE - ATTENDING CONTRIBUTION TO CARE
I, Shabbir Villa, personally saw the patient with the resident, and completed the key components of the history and physical exam. I then discussed the management plan with the resident.    73 year old female w/PMHx of Schizophrenia who resides from [place], anemia, CVA, GERD, HTN, HLD, Ventral Hernia,  multiple abdominal surgery with  prior admission for SBO sent in for abdominal pain and vomiting.   WBC 5.3, creatinine 1.6 ( at baseline), lactate 1, CT abdomen showed high-grade SBO.  Surgery consulted, admitted to surgical service.    Zofran given. Patient refused NG tube. I, Shabbir Villa, personally saw the patient with the resident, and completed the key components of the history and physical exam. I then discussed the management plan with the resident.    73 year old female w/PMHx of Schizophrenia who resides from [place], anemia, CVA, GERD, HTN, HLD, Ventral Hernia,  multiple abdominal surgery with  prior admission for SBO sent in for abdominal pain and vomiting.   WBC 5.3, creatinine 1.6 ( at baseline), lactate 1, CT abdomen showed high-grade SBO.  Surgery consulted, admitted to surgical service.    Zofran given. Patient refused NG tube. I, Shabbir Villa, personally saw the patient with the resident, and completed the key components of the history and physical exam. I then discussed the management plan with the resident.    73 year old female w/PMHx of Schizophrenia who resides from [place], anemia, CVA, GERD, HTN, HLD, Ventral Hernia,  multiple abdominal surgery with  prior admission for SBO sent in for abdominal pain and vomiting.   WBC 5.3, creatinine 1.6 ( at baseline), lactate 1, CT abdomen showed high-grade SBO.  Surgery consulted, admitted to surgical service.    Zofran given. Patient refused NG tube.

## 2023-12-01 NOTE — ED PROVIDER NOTE - OBJECTIVE STATEMENT
73 year old female w/PMHx of anemia, CVA, GERD, HTN, HLD, Ventral Hernia with prior obstruction presents to the ED from [place] with abd pain. Patient states she developed right lower quadrant and lower abd pain yesterday, along with bilious vomiting. Pain worsened today and patient noticed multiple site specific abdominal swelling. Reports regular BM, last one today morning. Decreased Apetite, last meal today morning at 7AM. Denies CP, SOB, weakness, diarrhea, constipation. Notes that the pain feels similar to her earlier episodes of SBO. Patient was last here with a similar presentation that required admission. 73 year old female w/PMHx of anemia, CVA, GERD, HTN, HLD, Ventral Hernia with prior obstruction presents to the ED from [place] with abd pain. Patient states she developed right lower quadrant and lower abd pain yesterday, along with bilious vomiting. Pain worsened today and patient noticed multiple site specific abdominal swelling. Reports regular BM, last one today morning. Decreased Apetite, last meal today morning at 7AM. Denies CP, SOB, weakness, diarrhea, constipation. Notes that the pain feels similar to her earlier episodes of SBO. Patient was last here with a similar presentation that required admission. 73 year old female w/PMHx of anemia, CVA, GERD, HTN, HLD, Ventral Hernia with prior obstruction presents to the ED from [place] with abd pain. Patient states she developed right lower quadrant and lower abd pain yesterday, along with bilious vomiting. Pain worsened today and patient noticed multiple site specific abdominal swelling. Reports regular BM, last one today morning. Decreased Apetite, last meal today morning at 7AM. Denies CP, SOB, weakness, diarrhea, constipation. Notes that the pain feels similar to her earlier episodes of SBO. Patient was last here with a similar presentation that required admission. 73 year old female w/PMHx of anemia, CVA, GERD, HTN, HLD, Ventral Hernia with prior obstruction presents to the ED from [place] with abd pain. Patient states she developed right lower quadrant and lower abd pain yesterday, along with bilious vomiting. Pain worsened today and patient noticed multiple site specific abdominal swelling (known ventral abd hernias). Reports regular BM, last one today morning. Decreased Apetite, last meal today morning at 7AM. Denies CP, SOB, weakness, diarrhea, constipation. Notes that the pain feels similar to her earlier episodes of SBO. Patient was last here with a similar presentation that required admission. Known allergy to clozapine, depakote, erythromycin, neupogen. 73 year old female w/PMHx of anemia, CVA, GERD, HTN, HLD, Ventral Hernia with prior obstruction presents to the ED from [place] with abd pain. Patient states she developed right lower quadrant and lower abd pain yesterday, along with bilious vomiting. Pain worsened today and patient noticed multiple site specific abdominal swelling (known ventral abd hernias). Reports regular BM, last one today morning. Decreased Apetite, last meal today morning at 7AM. Denies CP, SOB, weakness, diarrhea, constipation. Notes that the pain feels similar to her earlier episodes of SBO. Patient was last here with a similar presentation that required admission. Known allergy to clozapine, depakote, erythromycin, neupogen. 73 year old female w/PMHx of anemia, CVA, GERD, HTN, HLD, Ventral Hernia with prior obstruction presents to the ED from [place] with abd pain. Patient states she developed right lower quadrant and lower abd pain yesterday, along with bilious vomiting. Pain worsened today and patient noticed multiple site specific abdominal swelling (known ventral abd hernias). Reports regular BM, last one today morning. Decreased Apetite, last meal today morning at 7AM. Denies CP, SOB, weakness, diarrhea, constipation. Notes that the pain feels similar to her earlier episodes of SBO. Patient was last here with a similar presentation that required admission. Known allergy to clozapine, depakote, erythromycin, neupogen.

## 2023-12-01 NOTE — ED ADULT NURSE NOTE - NSFALLRISKINTERV_ED_ALL_ED

## 2023-12-01 NOTE — ED ADULT NURSE REASSESSMENT NOTE - NS ED NURSE REASSESS COMMENT FT1
Patient pending surgery consult at this time. Patient intermittently agitated and yelling. Dr Villa aware and standing medication order placed.

## 2023-12-01 NOTE — CHART NOTE - NSCHARTNOTEFT_GEN_A_CORE
Pt seen and examined at bedside. Attempted to place NGT due to high grade SBO. Pt adamantly refusing, crying, covering head with arms stating that she will not allow NGT placement. Combative with surgical team. Unable to place NGT at this time. Will continue to monitor patient with serial abdominal exams and monitor for any further episodes of emesis. May possible revisit possibility of NGT tomorrow/at another time.

## 2023-12-01 NOTE — H&P ADULT - HISTORY OF PRESENT ILLNESS
72yo F w/ hx of multiple abdominal surgeries and prior bowel obstructions presenting with several episodes of vomiting and CT imaging consistent with SBO. Pt presents from [place] and is unable to provide history, history obtained from [employer] employee at bedside. Pt had several episodes of vomiting last night and one fever. Unknown if she has had bowel function. On arrival to the ED she was hemodynamically stable and afebrile. No leukocytosis, no elevated lactate. CT scan demonstrating high grade SBO with severely dilated loops of small bowel. Surgery team consulted for management.

## 2023-12-01 NOTE — ED ADULT NURSE NOTE - OBJECTIVE STATEMENT
Pt presents to the ED BIBA from [facility] A&O x4 c/o abdominal pain since yesterday. Pt repots 4 episodes of V since yesterday, N and C. Abdomen is soft, and tender upon palpation, worse in LLQ. Abnormal protrusions noted from the lower abdomen , pt states this is normal but the right side feels bigger than normal. Pt reports hx of schizophrenia and stroke but denies use of blood thinners. RR even and unlabored. Pt denies chest pain and SOB.

## 2023-12-01 NOTE — ED PROVIDER NOTE - PROGRESS NOTE DETAILS
Patient declined NG tube at the ED. Surgery aware about patient's reluctance for an NG tube placement.  Samer Chicho Sahni MD

## 2023-12-01 NOTE — H&P ADULT - ATTENDING COMMENTS
I have seen and examined the patient   details as above  no abd pain, o nausea on my evaluation at 1800 hrs.  abd soft, hernia with list of domain non tender, no skin changes.  states she does not want NGT and she is not nauseated.  Images reviewed, : SBO similar to june. no mesenteric edema no free fluid.  WBC is normal, , renal function better than  in november     A/P  pSBO  admit to surgery  hydrate  NGT decomressions  GGC after hdrationa nd NGT decomrerssion

## 2023-12-01 NOTE — ED PROVIDER NOTE - PHYSICAL EXAMINATION
Gen: NAD, AOx3  Head: NCAT  HEENT: EOMI, oral mucosa moist, normal conjunctiva, neck supple  Lung: CTAB, no respiratory distress  CV: rrr, no murmur, Normal perfusion  Abd: multiple ventral abdominal hernias, generalized abd pain to palpation  MSK: No edema, no visible deformities  Neuro: No focal neurologic deficits  Skin: No rash   Psych: normal affect

## 2023-12-01 NOTE — PHARMACOTHERAPY INTERVENTION NOTE - COMMENTS
Referenced facility paperwork to obtain medication list. Outpatient Medication Review updated.    HOME MEDICATIONS:  acetaminophen 650 mg oral tablet: 1 tab(s) orally 3 times a day as needed for  moderate pain (01 Dec 2023 18:26)  atorvastatin 10 mg oral tablet: 1 tab(s) orally once a day (01 Dec 2023 18:26)  calcium (as carbonate) 500 mg oral tablet, chewable: 1 tab(s) chewed 2 times a day (01 Dec 2023 18:26)  cholecalciferol 50 mcg (2000 intl units) oral capsule: 1 cap(s) orally once a day (01 Dec 2023 18:26)  cyanocobalamin 500 mcg oral tablet: 1 tab(s) orally once a day (01 Dec 2023 18:26)  docusate sodium 100 mg oral capsule: 1 cap(s) orally 3 times a day as needed for  constipation (01 Dec 2023 18:26)  escitalopram 10 mg oral tablet: 3 tab(s) orally (total dose 30 mG) (01 Dec 2023 18:26)  haloperidol 5 mg oral tablet: 1 tab(s) orally 2 times a day at 06:30  and 16:30 (01 Dec 2023 18:26)  levothyroxine 112 mcg (0.112 mg) oral tablet: 1 tab(s) orally once a day (01 Dec 2023 18:26)  LORazepam 0.5 mg oral tablet: Take 2 tablets (1 mG) in the morning and 1 tablet at 20:30 (01 Dec 2023 18:26)  Multiple Vitamins oral tablet: 1 tab(s) orally once a day (01 Dec 2023 18:26)  OLANZapine 5 mg oral tablet: 1 tab(s) orally once a day (01 Dec 2023 18:26)  pantoprazole 20 mg oral delayed release tablet: 1 tab(s) orally once a day (01 Dec 2023 18:26)  polyethylene glycol 3350 oral powder for reconstitution: 17 gram(s) orally once a day (at bedtime) (01 Dec 2023 18:26)  senna (sennosides) 8.6 mg oral tablet: 2 tab(s) orally once a day (at bedtime) as needed for  constipation (01 Dec 2023 18:26)  simethicone 80 mg oral tablet, chewable: 1 tab(s) chewed every 8 hours for gas (01 Dec 2023 18:26)

## 2023-12-02 LAB
ANION GAP SERPL CALC-SCNC: 14 MMOL/L — SIGNIFICANT CHANGE UP (ref 5–17)
ANION GAP SERPL CALC-SCNC: 14 MMOL/L — SIGNIFICANT CHANGE UP (ref 5–17)
ANISOCYTOSIS BLD QL: SLIGHT — SIGNIFICANT CHANGE UP
ANISOCYTOSIS BLD QL: SLIGHT — SIGNIFICANT CHANGE UP
BASOPHILS # BLD AUTO: 0.03 K/UL — SIGNIFICANT CHANGE UP (ref 0–0.2)
BASOPHILS # BLD AUTO: 0.03 K/UL — SIGNIFICANT CHANGE UP (ref 0–0.2)
BASOPHILS NFR BLD AUTO: 0.9 % — SIGNIFICANT CHANGE UP (ref 0–2)
BASOPHILS NFR BLD AUTO: 0.9 % — SIGNIFICANT CHANGE UP (ref 0–2)
BUN SERPL-MCNC: 36.2 MG/DL — HIGH (ref 8–20)
BUN SERPL-MCNC: 36.2 MG/DL — HIGH (ref 8–20)
CALCIUM SERPL-MCNC: 8.8 MG/DL — SIGNIFICANT CHANGE UP (ref 8.4–10.5)
CALCIUM SERPL-MCNC: 8.8 MG/DL — SIGNIFICANT CHANGE UP (ref 8.4–10.5)
CHLORIDE SERPL-SCNC: 109 MMOL/L — HIGH (ref 96–108)
CHLORIDE SERPL-SCNC: 109 MMOL/L — HIGH (ref 96–108)
CO2 SERPL-SCNC: 22 MMOL/L — SIGNIFICANT CHANGE UP (ref 22–29)
CO2 SERPL-SCNC: 22 MMOL/L — SIGNIFICANT CHANGE UP (ref 22–29)
CREAT SERPL-MCNC: 1.96 MG/DL — HIGH (ref 0.5–1.3)
CREAT SERPL-MCNC: 1.96 MG/DL — HIGH (ref 0.5–1.3)
EGFR: 27 ML/MIN/1.73M2 — LOW
EGFR: 27 ML/MIN/1.73M2 — LOW
ELLIPTOCYTES BLD QL SMEAR: SLIGHT — SIGNIFICANT CHANGE UP
ELLIPTOCYTES BLD QL SMEAR: SLIGHT — SIGNIFICANT CHANGE UP
EOSINOPHIL # BLD AUTO: 0 K/UL — SIGNIFICANT CHANGE UP (ref 0–0.5)
EOSINOPHIL # BLD AUTO: 0 K/UL — SIGNIFICANT CHANGE UP (ref 0–0.5)
EOSINOPHIL NFR BLD AUTO: 0 % — SIGNIFICANT CHANGE UP (ref 0–6)
EOSINOPHIL NFR BLD AUTO: 0 % — SIGNIFICANT CHANGE UP (ref 0–6)
GIANT PLATELETS BLD QL SMEAR: PRESENT — SIGNIFICANT CHANGE UP
GIANT PLATELETS BLD QL SMEAR: PRESENT — SIGNIFICANT CHANGE UP
GLUCOSE SERPL-MCNC: 84 MG/DL — SIGNIFICANT CHANGE UP (ref 70–99)
GLUCOSE SERPL-MCNC: 84 MG/DL — SIGNIFICANT CHANGE UP (ref 70–99)
HCT VFR BLD CALC: 32.8 % — LOW (ref 34.5–45)
HCT VFR BLD CALC: 32.8 % — LOW (ref 34.5–45)
HGB BLD-MCNC: 10 G/DL — LOW (ref 11.5–15.5)
HGB BLD-MCNC: 10 G/DL — LOW (ref 11.5–15.5)
HYPOCHROMIA BLD QL: SLIGHT — SIGNIFICANT CHANGE UP
HYPOCHROMIA BLD QL: SLIGHT — SIGNIFICANT CHANGE UP
LYMPHOCYTES # BLD AUTO: 0.46 K/UL — LOW (ref 1–3.3)
LYMPHOCYTES # BLD AUTO: 0.46 K/UL — LOW (ref 1–3.3)
LYMPHOCYTES # BLD AUTO: 14.8 % — SIGNIFICANT CHANGE UP (ref 13–44)
LYMPHOCYTES # BLD AUTO: 14.8 % — SIGNIFICANT CHANGE UP (ref 13–44)
MACROCYTES BLD QL: SLIGHT — SIGNIFICANT CHANGE UP
MACROCYTES BLD QL: SLIGHT — SIGNIFICANT CHANGE UP
MAGNESIUM SERPL-MCNC: 2 MG/DL — SIGNIFICANT CHANGE UP (ref 1.8–2.6)
MAGNESIUM SERPL-MCNC: 2 MG/DL — SIGNIFICANT CHANGE UP (ref 1.8–2.6)
MANUAL SMEAR VERIFICATION: SIGNIFICANT CHANGE UP
MANUAL SMEAR VERIFICATION: SIGNIFICANT CHANGE UP
MCHC RBC-ENTMCNC: 29.1 PG — SIGNIFICANT CHANGE UP (ref 27–34)
MCHC RBC-ENTMCNC: 29.1 PG — SIGNIFICANT CHANGE UP (ref 27–34)
MCHC RBC-ENTMCNC: 30.5 GM/DL — LOW (ref 32–36)
MCHC RBC-ENTMCNC: 30.5 GM/DL — LOW (ref 32–36)
MCV RBC AUTO: 95.3 FL — SIGNIFICANT CHANGE UP (ref 80–100)
MCV RBC AUTO: 95.3 FL — SIGNIFICANT CHANGE UP (ref 80–100)
MICROCYTES BLD QL: SLIGHT — SIGNIFICANT CHANGE UP
MICROCYTES BLD QL: SLIGHT — SIGNIFICANT CHANGE UP
MONOCYTES # BLD AUTO: 0.67 K/UL — SIGNIFICANT CHANGE UP (ref 0–0.9)
MONOCYTES # BLD AUTO: 0.67 K/UL — SIGNIFICANT CHANGE UP (ref 0–0.9)
MONOCYTES NFR BLD AUTO: 21.7 % — HIGH (ref 2–14)
MONOCYTES NFR BLD AUTO: 21.7 % — HIGH (ref 2–14)
NEUTROPHILS # BLD AUTO: 1.94 K/UL — SIGNIFICANT CHANGE UP (ref 1.8–7.4)
NEUTROPHILS # BLD AUTO: 1.94 K/UL — SIGNIFICANT CHANGE UP (ref 1.8–7.4)
NEUTROPHILS NFR BLD AUTO: 48.7 % — SIGNIFICANT CHANGE UP (ref 43–77)
NEUTROPHILS NFR BLD AUTO: 48.7 % — SIGNIFICANT CHANGE UP (ref 43–77)
NEUTS BAND # BLD: 13.9 % — HIGH (ref 0–8)
NEUTS BAND # BLD: 13.9 % — HIGH (ref 0–8)
OVALOCYTES BLD QL SMEAR: SLIGHT — SIGNIFICANT CHANGE UP
OVALOCYTES BLD QL SMEAR: SLIGHT — SIGNIFICANT CHANGE UP
PHOSPHATE SERPL-MCNC: 1.8 MG/DL — LOW (ref 2.4–4.7)
PHOSPHATE SERPL-MCNC: 1.8 MG/DL — LOW (ref 2.4–4.7)
PLAT MORPH BLD: NORMAL — SIGNIFICANT CHANGE UP
PLAT MORPH BLD: NORMAL — SIGNIFICANT CHANGE UP
PLATELET # BLD AUTO: 108 K/UL — LOW (ref 150–400)
PLATELET # BLD AUTO: 108 K/UL — LOW (ref 150–400)
POIKILOCYTOSIS BLD QL AUTO: SLIGHT — SIGNIFICANT CHANGE UP
POIKILOCYTOSIS BLD QL AUTO: SLIGHT — SIGNIFICANT CHANGE UP
POLYCHROMASIA BLD QL SMEAR: SLIGHT — SIGNIFICANT CHANGE UP
POLYCHROMASIA BLD QL SMEAR: SLIGHT — SIGNIFICANT CHANGE UP
POTASSIUM SERPL-MCNC: 4.2 MMOL/L — SIGNIFICANT CHANGE UP (ref 3.5–5.3)
POTASSIUM SERPL-MCNC: 4.2 MMOL/L — SIGNIFICANT CHANGE UP (ref 3.5–5.3)
POTASSIUM SERPL-SCNC: 4.2 MMOL/L — SIGNIFICANT CHANGE UP (ref 3.5–5.3)
POTASSIUM SERPL-SCNC: 4.2 MMOL/L — SIGNIFICANT CHANGE UP (ref 3.5–5.3)
RBC # BLD: 3.44 M/UL — LOW (ref 3.8–5.2)
RBC # BLD: 3.44 M/UL — LOW (ref 3.8–5.2)
RBC # FLD: 17.7 % — HIGH (ref 10.3–14.5)
RBC # FLD: 17.7 % — HIGH (ref 10.3–14.5)
RBC BLD AUTO: ABNORMAL
RBC BLD AUTO: ABNORMAL
SODIUM SERPL-SCNC: 145 MMOL/L — SIGNIFICANT CHANGE UP (ref 135–145)
SODIUM SERPL-SCNC: 145 MMOL/L — SIGNIFICANT CHANGE UP (ref 135–145)
WBC # BLD: 3.1 K/UL — LOW (ref 3.8–10.5)
WBC # BLD: 3.1 K/UL — LOW (ref 3.8–10.5)
WBC # FLD AUTO: 3.1 K/UL — LOW (ref 3.8–10.5)
WBC # FLD AUTO: 3.1 K/UL — LOW (ref 3.8–10.5)

## 2023-12-02 PROCEDURE — 99231 SBSQ HOSP IP/OBS SF/LOW 25: CPT | Mod: GC

## 2023-12-02 RX ORDER — SODIUM,POTASSIUM PHOSPHATES 278-250MG
2 POWDER IN PACKET (EA) ORAL ONCE
Refills: 0 | Status: DISCONTINUED | OUTPATIENT
Start: 2023-12-02 | End: 2023-12-03

## 2023-12-02 RX ADMIN — HEPARIN SODIUM 5000 UNIT(S): 5000 INJECTION INTRAVENOUS; SUBCUTANEOUS at 21:31

## 2023-12-02 RX ADMIN — HEPARIN SODIUM 5000 UNIT(S): 5000 INJECTION INTRAVENOUS; SUBCUTANEOUS at 13:42

## 2023-12-02 RX ADMIN — SODIUM CHLORIDE 100 MILLILITER(S): 9 INJECTION, SOLUTION INTRAVENOUS at 21:56

## 2023-12-02 RX ADMIN — HALOPERIDOL DECANOATE 5 MILLIGRAM(S): 100 INJECTION INTRAMUSCULAR at 16:58

## 2023-12-02 RX ADMIN — ONDANSETRON 4 MILLIGRAM(S): 8 TABLET, FILM COATED ORAL at 06:45

## 2023-12-02 RX ADMIN — HEPARIN SODIUM 5000 UNIT(S): 5000 INJECTION INTRAVENOUS; SUBCUTANEOUS at 05:23

## 2023-12-02 RX ADMIN — HALOPERIDOL DECANOATE 5 MILLIGRAM(S): 100 INJECTION INTRAMUSCULAR at 05:23

## 2023-12-02 RX ADMIN — PANTOPRAZOLE SODIUM 20 MILLIGRAM(S): 20 TABLET, DELAYED RELEASE ORAL at 13:42

## 2023-12-02 RX ADMIN — Medication 85 MILLIMOLE(S): at 13:41

## 2023-12-02 RX ADMIN — Medication 75 MICROGRAM(S): at 21:31

## 2023-12-02 NOTE — PROGRESS NOTE ADULT - ASSESSMENT
73F admitted with SBO, refusing NGT    - refusing NGT, observe for now  - NPO/IVF  - monitor for return of bowel function  - encourage OOB/Ambulation

## 2023-12-02 NOTE — PROGRESS NOTE ADULT - ATTENDING COMMENTS
Above assessment noted.  The patient was seen and examined by myself with the surgical resident.  The patient is without abdominal pain, nausea or vomit.  Abdomen is softly distended with no localizing tenderness, the hernias are soft, non tender, and freely reducible.  Continue with NPO, advised that if vomit or pain then she will need and NGT for decompression.

## 2023-12-02 NOTE — PROGRESS NOTE ADULT - SUBJECTIVE AND OBJECTIVE BOX
INTERVAL HPI/OVERNIGHT EVENTS: sleeping comfortably when seen, as per Madisyn ying bedside and bedside RN pt upset she can't eat/drink but not passing flatus, no other complaints or events      MEDICATIONS  (STANDING):  atorvastatin 10 milliGRAM(s) Oral at bedtime  escitalopram 10 milliGRAM(s) Oral daily  haloperidol    Injectable 5 milliGRAM(s) IntraMuscular every 12 hours  heparin   Injectable 5000 Unit(s) SubCutaneous every 8 hours  lactated ringers. 1000 milliLiter(s) (100 mL/Hr) IV Continuous <Continuous>  levothyroxine Injectable 75 MICROGram(s) IV Push at bedtime  pantoprazole  Injectable 20 milliGRAM(s) IV Push every 24 hours    MEDICATIONS  (PRN):  ondansetron Injectable 4 milliGRAM(s) IV Push every 6 hours PRN Nausea      Vital Signs Last 24 Hrs  T(C): 37.1 (02 Dec 2023 04:37), Max: 37.8 (01 Dec 2023 19:35)  T(F): 98.8 (02 Dec 2023 04:37), Max: 100.1 (01 Dec 2023 19:35)  HR: 90 (02 Dec 2023 04:37) (83 - 91)  BP: 123/74 (02 Dec 2023 04:37) (123/74 - 157/85)  BP(mean): --  RR: 18 (02 Dec 2023 04:37) (16 - 18)  SpO2: 94% (02 Dec 2023 04:37) (92% - 99%)    Parameters below as of 02 Dec 2023 04:37  Patient On (Oxygen Delivery Method): room air        PHYSICAL EXAM:      Constitutional: NAD    Respiratory: no accessory muscle use    Cardiovascular: RRR    Gastrointestinal: softly distended, non-tender (per RN)          I&O's Detail      LABS:                        10.0   3.10  )-----------( 108      ( 02 Dec 2023 02:20 )             32.8     12-02    145  |  109<H>  |  36.2<H>  ----------------------------<  84  4.2   |  22.0  |  1.96<H>    Ca    8.8      02 Dec 2023 02:50  Phos  1.8     12-02  Mg     2.0     12-02    TPro  6.7  /  Alb  3.9  /  TBili  0.5  /  DBili  x   /  AST  20  /  ALT  17  /  AlkPhos  165<H>  12-01      Urinalysis Basic - ( 02 Dec 2023 02:50 )    Color: x / Appearance: x / SG: x / pH: x  Gluc: 84 mg/dL / Ketone: x  / Bili: x / Urobili: x   Blood: x / Protein: x / Nitrite: x   Leuk Esterase: x / RBC: x / WBC x   Sq Epi: x / Non Sq Epi: x / Bacteria: x        RADIOLOGY & ADDITIONAL STUDIES:

## 2023-12-03 LAB
ANION GAP SERPL CALC-SCNC: 14 MMOL/L — SIGNIFICANT CHANGE UP (ref 5–17)
ANION GAP SERPL CALC-SCNC: 14 MMOL/L — SIGNIFICANT CHANGE UP (ref 5–17)
BUN SERPL-MCNC: 39.2 MG/DL — HIGH (ref 8–20)
BUN SERPL-MCNC: 39.2 MG/DL — HIGH (ref 8–20)
CALCIUM SERPL-MCNC: 7.6 MG/DL — LOW (ref 8.4–10.5)
CALCIUM SERPL-MCNC: 7.6 MG/DL — LOW (ref 8.4–10.5)
CHLORIDE SERPL-SCNC: 110 MMOL/L — HIGH (ref 96–108)
CHLORIDE SERPL-SCNC: 110 MMOL/L — HIGH (ref 96–108)
CO2 SERPL-SCNC: 22 MMOL/L — SIGNIFICANT CHANGE UP (ref 22–29)
CO2 SERPL-SCNC: 22 MMOL/L — SIGNIFICANT CHANGE UP (ref 22–29)
CREAT ?TM UR-MCNC: 38 MG/DL — SIGNIFICANT CHANGE UP
CREAT ?TM UR-MCNC: 38 MG/DL — SIGNIFICANT CHANGE UP
CREAT SERPL-MCNC: 2.28 MG/DL — HIGH (ref 0.5–1.3)
CREAT SERPL-MCNC: 2.28 MG/DL — HIGH (ref 0.5–1.3)
CULTURE RESULTS: SIGNIFICANT CHANGE UP
CULTURE RESULTS: SIGNIFICANT CHANGE UP
EGFR: 22 ML/MIN/1.73M2 — LOW
EGFR: 22 ML/MIN/1.73M2 — LOW
GLUCOSE SERPL-MCNC: 122 MG/DL — HIGH (ref 70–99)
GLUCOSE SERPL-MCNC: 122 MG/DL — HIGH (ref 70–99)
HCT VFR BLD CALC: 30.9 % — LOW (ref 34.5–45)
HCT VFR BLD CALC: 30.9 % — LOW (ref 34.5–45)
HGB BLD-MCNC: 9.6 G/DL — LOW (ref 11.5–15.5)
HGB BLD-MCNC: 9.6 G/DL — LOW (ref 11.5–15.5)
MAGNESIUM SERPL-MCNC: 2.1 MG/DL — SIGNIFICANT CHANGE UP (ref 1.6–2.6)
MAGNESIUM SERPL-MCNC: 2.1 MG/DL — SIGNIFICANT CHANGE UP (ref 1.6–2.6)
MCHC RBC-ENTMCNC: 29.6 PG — SIGNIFICANT CHANGE UP (ref 27–34)
MCHC RBC-ENTMCNC: 29.6 PG — SIGNIFICANT CHANGE UP (ref 27–34)
MCHC RBC-ENTMCNC: 31.1 GM/DL — LOW (ref 32–36)
MCHC RBC-ENTMCNC: 31.1 GM/DL — LOW (ref 32–36)
MCV RBC AUTO: 95.4 FL — SIGNIFICANT CHANGE UP (ref 80–100)
MCV RBC AUTO: 95.4 FL — SIGNIFICANT CHANGE UP (ref 80–100)
PHOSPHATE SERPL-MCNC: 3.8 MG/DL — SIGNIFICANT CHANGE UP (ref 2.4–4.7)
PHOSPHATE SERPL-MCNC: 3.8 MG/DL — SIGNIFICANT CHANGE UP (ref 2.4–4.7)
PLATELET # BLD AUTO: 117 K/UL — LOW (ref 150–400)
PLATELET # BLD AUTO: 117 K/UL — LOW (ref 150–400)
POTASSIUM SERPL-MCNC: 3.3 MMOL/L — LOW (ref 3.5–5.3)
POTASSIUM SERPL-MCNC: 3.3 MMOL/L — LOW (ref 3.5–5.3)
POTASSIUM SERPL-SCNC: 3.3 MMOL/L — LOW (ref 3.5–5.3)
POTASSIUM SERPL-SCNC: 3.3 MMOL/L — LOW (ref 3.5–5.3)
RBC # BLD: 3.24 M/UL — LOW (ref 3.8–5.2)
RBC # BLD: 3.24 M/UL — LOW (ref 3.8–5.2)
RBC # FLD: 17.6 % — HIGH (ref 10.3–14.5)
RBC # FLD: 17.6 % — HIGH (ref 10.3–14.5)
SODIUM SERPL-SCNC: 146 MMOL/L — HIGH (ref 135–145)
SODIUM SERPL-SCNC: 146 MMOL/L — HIGH (ref 135–145)
SODIUM UR-SCNC: 43 MMOL/L — SIGNIFICANT CHANGE UP
SODIUM UR-SCNC: 43 MMOL/L — SIGNIFICANT CHANGE UP
SPECIMEN SOURCE: SIGNIFICANT CHANGE UP
SPECIMEN SOURCE: SIGNIFICANT CHANGE UP
WBC # BLD: 3.39 K/UL — LOW (ref 3.8–10.5)
WBC # BLD: 3.39 K/UL — LOW (ref 3.8–10.5)
WBC # FLD AUTO: 3.39 K/UL — LOW (ref 3.8–10.5)
WBC # FLD AUTO: 3.39 K/UL — LOW (ref 3.8–10.5)

## 2023-12-03 PROCEDURE — 99231 SBSQ HOSP IP/OBS SF/LOW 25: CPT | Mod: GC

## 2023-12-03 RX ORDER — ACETAMINOPHEN 500 MG
1 TABLET ORAL
Refills: 0 | DISCHARGE

## 2023-12-03 RX ORDER — POTASSIUM CHLORIDE 20 MEQ
20 PACKET (EA) ORAL
Refills: 0 | Status: COMPLETED | OUTPATIENT
Start: 2023-12-03 | End: 2023-12-03

## 2023-12-03 RX ADMIN — HEPARIN SODIUM 5000 UNIT(S): 5000 INJECTION INTRAVENOUS; SUBCUTANEOUS at 21:31

## 2023-12-03 RX ADMIN — HALOPERIDOL DECANOATE 5 MILLIGRAM(S): 100 INJECTION INTRAMUSCULAR at 06:18

## 2023-12-03 RX ADMIN — ESCITALOPRAM OXALATE 10 MILLIGRAM(S): 10 TABLET, FILM COATED ORAL at 11:35

## 2023-12-03 RX ADMIN — Medication 50 MILLIEQUIVALENT(S): at 17:00

## 2023-12-03 RX ADMIN — HEPARIN SODIUM 5000 UNIT(S): 5000 INJECTION INTRAVENOUS; SUBCUTANEOUS at 06:17

## 2023-12-03 RX ADMIN — PANTOPRAZOLE SODIUM 20 MILLIGRAM(S): 20 TABLET, DELAYED RELEASE ORAL at 14:51

## 2023-12-03 RX ADMIN — HEPARIN SODIUM 5000 UNIT(S): 5000 INJECTION INTRAVENOUS; SUBCUTANEOUS at 14:51

## 2023-12-03 RX ADMIN — Medication 75 MICROGRAM(S): at 22:54

## 2023-12-03 RX ADMIN — ATORVASTATIN CALCIUM 10 MILLIGRAM(S): 80 TABLET, FILM COATED ORAL at 21:31

## 2023-12-03 RX ADMIN — Medication 50 MILLIEQUIVALENT(S): at 11:35

## 2023-12-03 RX ADMIN — HALOPERIDOL DECANOATE 5 MILLIGRAM(S): 100 INJECTION INTRAMUSCULAR at 17:01

## 2023-12-03 RX ADMIN — Medication 50 MILLIEQUIVALENT(S): at 14:51

## 2023-12-03 RX ADMIN — ONDANSETRON 4 MILLIGRAM(S): 8 TABLET, FILM COATED ORAL at 06:19

## 2023-12-03 NOTE — PROGRESS NOTE ADULT - ASSESSMENT
A: 73F admitted with  SBO 2/2 to large ventral hernia, reducible.     Plan:   - Continue conservative management   - Refused NGT , will reattempt if patient comits   - NPO/IVF  - monitor for return of bowel function  - encourage OOB/Ambulation  - serial abdominal exams   - DVT ppx A: 73F admitted with  SBO 2/2 to large ventral hernia, reducible.     Plan:   - Continue conservative management   - Refused NGT , will reattempt if patient vomits   - NPO/IVF  - monitor for return of bowel function  - encourage OOB/Ambulation  - serial abdominal exams   - DVT ppx

## 2023-12-03 NOTE — PATIENT PROFILE ADULT - FUNCTIONAL ASSESSMENT - BASIC MOBILITY 6.
2-calculated by average/Not able to assess (calculate score using [facility] averaging method)  2-calculated by average/Not able to assess (calculate score using [facility] averaging method)

## 2023-12-03 NOTE — PROGRESS NOTE ADULT - SUBJECTIVE AND OBJECTIVE BOX
HPI/OVERNIGHT EVENTS:  NAEON. Patient states that she is having " some gas" , no BM. No additional complaints.     MEDICATIONS  (STANDING):  atorvastatin 10 milliGRAM(s) Oral at bedtime  escitalopram 10 milliGRAM(s) Oral daily  haloperidol    Injectable 5 milliGRAM(s) IntraMuscular every 12 hours  heparin   Injectable 5000 Unit(s) SubCutaneous every 8 hours  lactated ringers. 1000 milliLiter(s) (100 mL/Hr) IV Continuous <Continuous>  levothyroxine Injectable 75 MICROGram(s) IV Push at bedtime  pantoprazole  Injectable 20 milliGRAM(s) IV Push every 24 hours  potassium phosphate / sodium phosphate Powder (PHOS-NaK) 2 Packet(s) Oral once    MEDICATIONS  (PRN):  ondansetron Injectable 4 milliGRAM(s) IV Push every 6 hours PRN Nausea      Vital Signs Last 24 Hrs  T(C): 36.9 (02 Dec 2023 20:00), Max: 36.9 (02 Dec 2023 08:00)  T(F): 98.5 (02 Dec 2023 20:00), Max: 98.5 (02 Dec 2023 20:00)  HR: 90 (02 Dec 2023 20:00) (90 - 93)  BP: 145/80 (02 Dec 2023 20:00) (143/67 - 152/82)  BP(mean): 106 (02 Dec 2023 15:29) (106 - 106)  RR: 18 (02 Dec 2023 20:00) (18 - 18)  SpO2: 94% (02 Dec 2023 20:00) (94% - 94%)    Parameters below as of 02 Dec 2023 20:00  Patient On (Oxygen Delivery Method): room air        Constitutional: patient resting comfortably in bed, in no acute distress  Respiratory: respirations are unlabored, no accessory muscle use, no conversational dyspnea  Cardiovascular: regular rate & rhythm  Gastrointestinal: Abdomen soft, non-tender, mildly  distended, no rebound tenderness / guarding,large ventral hernia that is reducible   Neurological: A&O x 3       I&O's Detail    01 Dec 2023 07:01  -  02 Dec 2023 07:00  --------------------------------------------------------  IN:  Total IN: 0 mL    OUT:    Indwelling Catheter - Urethral (mL): 700 mL  Total OUT: 700 mL    Total NET: -700 mL      02 Dec 2023 07:01  -  03 Dec 2023 05:55  --------------------------------------------------------  IN:  Total IN: 0 mL    OUT:    Indwelling Catheter - Urethral (mL): 800 mL  Total OUT: 800 mL    Total NET: -800 mL          LABS:                        9.6    3.39  )-----------( 117      ( 03 Dec 2023 03:14 )             30.9     12-03    146<H>  |  110<H>  |  39.2<H>  ----------------------------<  122<H>  3.3<L>   |  22.0  |  2.28<H>    Ca    7.6<L>      03 Dec 2023 03:14  Phos  3.8     12-03  Mg     2.1     12-03    TPro  6.7  /  Alb  3.9  /  TBili  0.5  /  DBili  x   /  AST  20  /  ALT  17  /  AlkPhos  165<H>  12-01      Urinalysis Basic - ( 03 Dec 2023 03:14 )    Color: x / Appearance: x / SG: x / pH: x  Gluc: 122 mg/dL / Ketone: x  / Bili: x / Urobili: x   Blood: x / Protein: x / Nitrite: x   Leuk Esterase: x / RBC: x / WBC x   Sq Epi: x / Non Sq Epi: x / Bacteria: x

## 2023-12-03 NOTE — PATIENT PROFILE ADULT - FALL HARM RISK - HARM RISK INTERVENTIONS
Assistance with ambulation/Assistance OOB with selected safe patient handling equipment/Communicate Risk of Fall with Harm to all staff/Monitor for mental status changes/Move patient closer to nurses' station/Reinforce activity limits and safety measures with patient and family/Reorient to person, place and time as needed/Tailored Fall Risk Interventions/Toileting schedule using arm’s reach rule for commode and bathroom/Use of alarms - bed, chair and/or voice tab/Visual Cue: Yellow wristband and red socks/Bed in lowest position, wheels locked, appropriate side rails in place/Call bell, personal items and telephone in reach/Instruct patient to call for assistance before getting out of bed or chair/Non-slip footwear when patient is out of bed/[place] to call system/Physically safe environment - no spills, clutter or unnecessary equipment/Purposeful Proactive Rounding/Room/bathroom lighting operational, light cord in reach Assistance with ambulation/Assistance OOB with selected safe patient handling equipment/Communicate Risk of Fall with Harm to all staff/Monitor for mental status changes/Move patient closer to nurses' station/Reinforce activity limits and safety measures with patient and family/Reorient to person, place and time as needed/Tailored Fall Risk Interventions/Toileting schedule using arm’s reach rule for commode and bathroom/Use of alarms - bed, chair and/or voice tab/Visual Cue: Yellow wristband and red socks/Bed in lowest position, wheels locked, appropriate side rails in place/Call bell, personal items and telephone in reach/Instruct patient to call for assistance before getting out of bed or chair/Non-slip footwear when patient is out of bed/[place] to call system/Physically safe environment - no spills, clutter or unnecessary equipment/Purposeful Proactive Rounding/Room/bathroom lighting operational, light cord in reach

## 2023-12-03 NOTE — PROGRESS NOTE ADULT - ATTENDING COMMENTS
Above assessment noted.  The patient was seen and examined by myself with the surgical PA and resident. The patient had a large loose bowel movement and states she has been having flatus.  She has no abdominal pain, nausea, or vomit.  She appears more distended than yesterday with no localizing tenderness, no guarding, no rebound.  Will keep NPO for now given the distension, await improvement for PO.  If clinically worsens or vomit, then she will need an NGT.

## 2023-12-04 LAB
ANION GAP SERPL CALC-SCNC: 12 MMOL/L — SIGNIFICANT CHANGE UP (ref 5–17)
ANION GAP SERPL CALC-SCNC: 12 MMOL/L — SIGNIFICANT CHANGE UP (ref 5–17)
ANION GAP SERPL CALC-SCNC: 14 MMOL/L — SIGNIFICANT CHANGE UP (ref 5–17)
ANION GAP SERPL CALC-SCNC: 16 MMOL/L — SIGNIFICANT CHANGE UP (ref 5–17)
ANION GAP SERPL CALC-SCNC: 16 MMOL/L — SIGNIFICANT CHANGE UP (ref 5–17)
ANISOCYTOSIS BLD QL: SLIGHT — SIGNIFICANT CHANGE UP
ANISOCYTOSIS BLD QL: SLIGHT — SIGNIFICANT CHANGE UP
BASOPHILS # BLD AUTO: 0 K/UL — SIGNIFICANT CHANGE UP (ref 0–0.2)
BASOPHILS # BLD AUTO: 0 K/UL — SIGNIFICANT CHANGE UP (ref 0–0.2)
BASOPHILS NFR BLD AUTO: 0 % — SIGNIFICANT CHANGE UP (ref 0–2)
BASOPHILS NFR BLD AUTO: 0 % — SIGNIFICANT CHANGE UP (ref 0–2)
BUN SERPL-MCNC: 29.5 MG/DL — HIGH (ref 8–20)
BUN SERPL-MCNC: 29.5 MG/DL — HIGH (ref 8–20)
BUN SERPL-MCNC: 31.2 MG/DL — HIGH (ref 8–20)
BUN SERPL-MCNC: 31.2 MG/DL — HIGH (ref 8–20)
BUN SERPL-MCNC: 34 MG/DL — HIGH (ref 8–20)
BUN SERPL-MCNC: 34 MG/DL — HIGH (ref 8–20)
BUN SERPL-MCNC: 35.4 MG/DL — HIGH (ref 8–20)
BUN SERPL-MCNC: 35.4 MG/DL — HIGH (ref 8–20)
CALCIUM SERPL-MCNC: 7.3 MG/DL — LOW (ref 8.4–10.5)
CALCIUM SERPL-MCNC: 7.3 MG/DL — LOW (ref 8.4–10.5)
CALCIUM SERPL-MCNC: 7.5 MG/DL — LOW (ref 8.4–10.5)
CALCIUM SERPL-MCNC: 7.7 MG/DL — LOW (ref 8.4–10.5)
CALCIUM SERPL-MCNC: 7.7 MG/DL — LOW (ref 8.4–10.5)
CHLORIDE SERPL-SCNC: 122 MMOL/L — HIGH (ref 96–108)
CHLORIDE SERPL-SCNC: 122 MMOL/L — HIGH (ref 96–108)
CHLORIDE SERPL-SCNC: 123 MMOL/L — HIGH (ref 96–108)
CHLORIDE SERPL-SCNC: 123 MMOL/L — HIGH (ref 96–108)
CHLORIDE SERPL-SCNC: 124 MMOL/L — HIGH (ref 96–108)
CO2 SERPL-SCNC: 19 MMOL/L — LOW (ref 22–29)
CO2 SERPL-SCNC: 20 MMOL/L — LOW (ref 22–29)
CO2 SERPL-SCNC: 20 MMOL/L — LOW (ref 22–29)
CO2 SERPL-SCNC: 22 MMOL/L — SIGNIFICANT CHANGE UP (ref 22–29)
CO2 SERPL-SCNC: 22 MMOL/L — SIGNIFICANT CHANGE UP (ref 22–29)
CREAT SERPL-MCNC: 1.77 MG/DL — HIGH (ref 0.5–1.3)
CREAT SERPL-MCNC: 1.77 MG/DL — HIGH (ref 0.5–1.3)
CREAT SERPL-MCNC: 1.79 MG/DL — HIGH (ref 0.5–1.3)
CREAT SERPL-MCNC: 1.79 MG/DL — HIGH (ref 0.5–1.3)
CREAT SERPL-MCNC: 1.82 MG/DL — HIGH (ref 0.5–1.3)
CREAT SERPL-MCNC: 1.82 MG/DL — HIGH (ref 0.5–1.3)
CREAT SERPL-MCNC: 1.84 MG/DL — HIGH (ref 0.5–1.3)
CREAT SERPL-MCNC: 1.84 MG/DL — HIGH (ref 0.5–1.3)
EGFR: 29 ML/MIN/1.73M2 — LOW
EGFR: 30 ML/MIN/1.73M2 — LOW
EOSINOPHIL # BLD AUTO: 0 K/UL — SIGNIFICANT CHANGE UP (ref 0–0.5)
EOSINOPHIL # BLD AUTO: 0 K/UL — SIGNIFICANT CHANGE UP (ref 0–0.5)
EOSINOPHIL NFR BLD AUTO: 0 % — SIGNIFICANT CHANGE UP (ref 0–6)
EOSINOPHIL NFR BLD AUTO: 0 % — SIGNIFICANT CHANGE UP (ref 0–6)
GIANT PLATELETS BLD QL SMEAR: PRESENT — SIGNIFICANT CHANGE UP
GIANT PLATELETS BLD QL SMEAR: PRESENT — SIGNIFICANT CHANGE UP
GLUCOSE SERPL-MCNC: 84 MG/DL — SIGNIFICANT CHANGE UP (ref 70–99)
GLUCOSE SERPL-MCNC: 84 MG/DL — SIGNIFICANT CHANGE UP (ref 70–99)
GLUCOSE SERPL-MCNC: 87 MG/DL — SIGNIFICANT CHANGE UP (ref 70–99)
GLUCOSE SERPL-MCNC: 96 MG/DL — SIGNIFICANT CHANGE UP (ref 70–99)
GLUCOSE SERPL-MCNC: 96 MG/DL — SIGNIFICANT CHANGE UP (ref 70–99)
HCT VFR BLD CALC: 29.6 % — LOW (ref 34.5–45)
HCT VFR BLD CALC: 29.6 % — LOW (ref 34.5–45)
HCT VFR BLD CALC: 30.2 % — LOW (ref 34.5–45)
HCT VFR BLD CALC: 30.2 % — LOW (ref 34.5–45)
HGB BLD-MCNC: 9.1 G/DL — LOW (ref 11.5–15.5)
HGB BLD-MCNC: 9.1 G/DL — LOW (ref 11.5–15.5)
HGB BLD-MCNC: 9.3 G/DL — LOW (ref 11.5–15.5)
HGB BLD-MCNC: 9.3 G/DL — LOW (ref 11.5–15.5)
LYMPHOCYTES # BLD AUTO: 0.55 K/UL — LOW (ref 1–3.3)
LYMPHOCYTES # BLD AUTO: 0.55 K/UL — LOW (ref 1–3.3)
LYMPHOCYTES # BLD AUTO: 14.9 % — SIGNIFICANT CHANGE UP (ref 13–44)
LYMPHOCYTES # BLD AUTO: 14.9 % — SIGNIFICANT CHANGE UP (ref 13–44)
MACROCYTES BLD QL: SLIGHT — SIGNIFICANT CHANGE UP
MACROCYTES BLD QL: SLIGHT — SIGNIFICANT CHANGE UP
MAGNESIUM SERPL-MCNC: 2.3 MG/DL — SIGNIFICANT CHANGE UP (ref 1.8–2.6)
MAGNESIUM SERPL-MCNC: 2.4 MG/DL — SIGNIFICANT CHANGE UP (ref 1.6–2.6)
MAGNESIUM SERPL-MCNC: 2.4 MG/DL — SIGNIFICANT CHANGE UP (ref 1.6–2.6)
MANUAL SMEAR VERIFICATION: SIGNIFICANT CHANGE UP
MANUAL SMEAR VERIFICATION: SIGNIFICANT CHANGE UP
MCHC RBC-ENTMCNC: 29.9 PG — SIGNIFICANT CHANGE UP (ref 27–34)
MCHC RBC-ENTMCNC: 29.9 PG — SIGNIFICANT CHANGE UP (ref 27–34)
MCHC RBC-ENTMCNC: 30 PG — SIGNIFICANT CHANGE UP (ref 27–34)
MCHC RBC-ENTMCNC: 30 PG — SIGNIFICANT CHANGE UP (ref 27–34)
MCHC RBC-ENTMCNC: 30.7 GM/DL — LOW (ref 32–36)
MCHC RBC-ENTMCNC: 30.7 GM/DL — LOW (ref 32–36)
MCHC RBC-ENTMCNC: 30.8 GM/DL — LOW (ref 32–36)
MCHC RBC-ENTMCNC: 30.8 GM/DL — LOW (ref 32–36)
MCV RBC AUTO: 97.4 FL — SIGNIFICANT CHANGE UP (ref 80–100)
METAMYELOCYTES # FLD: 0.9 % — HIGH (ref 0–0)
METAMYELOCYTES # FLD: 0.9 % — HIGH (ref 0–0)
MICROCYTES BLD QL: SLIGHT — SIGNIFICANT CHANGE UP
MICROCYTES BLD QL: SLIGHT — SIGNIFICANT CHANGE UP
MONOCYTES # BLD AUTO: 0.23 K/UL — SIGNIFICANT CHANGE UP (ref 0–0.9)
MONOCYTES # BLD AUTO: 0.23 K/UL — SIGNIFICANT CHANGE UP (ref 0–0.9)
MONOCYTES NFR BLD AUTO: 6.1 % — SIGNIFICANT CHANGE UP (ref 2–14)
MONOCYTES NFR BLD AUTO: 6.1 % — SIGNIFICANT CHANGE UP (ref 2–14)
NEUTROPHILS # BLD AUTO: 2.88 K/UL — SIGNIFICANT CHANGE UP (ref 1.8–7.4)
NEUTROPHILS # BLD AUTO: 2.88 K/UL — SIGNIFICANT CHANGE UP (ref 1.8–7.4)
NEUTROPHILS NFR BLD AUTO: 78.1 % — HIGH (ref 43–77)
NEUTROPHILS NFR BLD AUTO: 78.1 % — HIGH (ref 43–77)
OSMOLALITY SERPL: 330 MOSMOL/KG — HIGH (ref 280–301)
OSMOLALITY SERPL: 330 MOSMOL/KG — HIGH (ref 280–301)
OVALOCYTES BLD QL SMEAR: SLIGHT — SIGNIFICANT CHANGE UP
OVALOCYTES BLD QL SMEAR: SLIGHT — SIGNIFICANT CHANGE UP
PHOSPHATE SERPL-MCNC: 1.8 MG/DL — LOW (ref 2.4–4.7)
PHOSPHATE SERPL-MCNC: 1.8 MG/DL — LOW (ref 2.4–4.7)
PHOSPHATE SERPL-MCNC: 1.9 MG/DL — LOW (ref 2.4–4.7)
PHOSPHATE SERPL-MCNC: 1.9 MG/DL — LOW (ref 2.4–4.7)
PHOSPHATE SERPL-MCNC: 2 MG/DL — LOW (ref 2.4–4.7)
PHOSPHATE SERPL-MCNC: 2 MG/DL — LOW (ref 2.4–4.7)
PLAT MORPH BLD: NORMAL — SIGNIFICANT CHANGE UP
PLAT MORPH BLD: NORMAL — SIGNIFICANT CHANGE UP
PLATELET # BLD AUTO: 110 K/UL — LOW (ref 150–400)
PLATELET # BLD AUTO: 110 K/UL — LOW (ref 150–400)
PLATELET # BLD AUTO: 111 K/UL — LOW (ref 150–400)
PLATELET # BLD AUTO: 111 K/UL — LOW (ref 150–400)
POIKILOCYTOSIS BLD QL AUTO: SLIGHT — SIGNIFICANT CHANGE UP
POIKILOCYTOSIS BLD QL AUTO: SLIGHT — SIGNIFICANT CHANGE UP
POLYCHROMASIA BLD QL SMEAR: SLIGHT — SIGNIFICANT CHANGE UP
POLYCHROMASIA BLD QL SMEAR: SLIGHT — SIGNIFICANT CHANGE UP
POTASSIUM SERPL-MCNC: 3.5 MMOL/L — SIGNIFICANT CHANGE UP (ref 3.5–5.3)
POTASSIUM SERPL-MCNC: 3.5 MMOL/L — SIGNIFICANT CHANGE UP (ref 3.5–5.3)
POTASSIUM SERPL-MCNC: 3.6 MMOL/L — SIGNIFICANT CHANGE UP (ref 3.5–5.3)
POTASSIUM SERPL-MCNC: 3.6 MMOL/L — SIGNIFICANT CHANGE UP (ref 3.5–5.3)
POTASSIUM SERPL-MCNC: 3.7 MMOL/L — SIGNIFICANT CHANGE UP (ref 3.5–5.3)
POTASSIUM SERPL-MCNC: 3.7 MMOL/L — SIGNIFICANT CHANGE UP (ref 3.5–5.3)
POTASSIUM SERPL-MCNC: 3.9 MMOL/L — SIGNIFICANT CHANGE UP (ref 3.5–5.3)
POTASSIUM SERPL-MCNC: 3.9 MMOL/L — SIGNIFICANT CHANGE UP (ref 3.5–5.3)
POTASSIUM SERPL-SCNC: 3.5 MMOL/L — SIGNIFICANT CHANGE UP (ref 3.5–5.3)
POTASSIUM SERPL-SCNC: 3.5 MMOL/L — SIGNIFICANT CHANGE UP (ref 3.5–5.3)
POTASSIUM SERPL-SCNC: 3.6 MMOL/L — SIGNIFICANT CHANGE UP (ref 3.5–5.3)
POTASSIUM SERPL-SCNC: 3.6 MMOL/L — SIGNIFICANT CHANGE UP (ref 3.5–5.3)
POTASSIUM SERPL-SCNC: 3.7 MMOL/L — SIGNIFICANT CHANGE UP (ref 3.5–5.3)
POTASSIUM SERPL-SCNC: 3.7 MMOL/L — SIGNIFICANT CHANGE UP (ref 3.5–5.3)
POTASSIUM SERPL-SCNC: 3.9 MMOL/L — SIGNIFICANT CHANGE UP (ref 3.5–5.3)
POTASSIUM SERPL-SCNC: 3.9 MMOL/L — SIGNIFICANT CHANGE UP (ref 3.5–5.3)
RBC # BLD: 3.04 M/UL — LOW (ref 3.8–5.2)
RBC # BLD: 3.04 M/UL — LOW (ref 3.8–5.2)
RBC # BLD: 3.1 M/UL — LOW (ref 3.8–5.2)
RBC # BLD: 3.1 M/UL — LOW (ref 3.8–5.2)
RBC # FLD: 17.5 % — HIGH (ref 10.3–14.5)
RBC BLD AUTO: ABNORMAL
RBC BLD AUTO: ABNORMAL
SODIUM SERPL-SCNC: 156 MMOL/L — HIGH (ref 135–145)
SODIUM SERPL-SCNC: 156 MMOL/L — HIGH (ref 135–145)
SODIUM SERPL-SCNC: 157 MMOL/L — HIGH (ref 135–145)
SODIUM SERPL-SCNC: 158 MMOL/L — HIGH (ref 135–145)
SODIUM SERPL-SCNC: 158 MMOL/L — HIGH (ref 135–145)
WBC # BLD: 3.29 K/UL — LOW (ref 3.8–10.5)
WBC # BLD: 3.29 K/UL — LOW (ref 3.8–10.5)
WBC # BLD: 3.69 K/UL — LOW (ref 3.8–10.5)
WBC # BLD: 3.69 K/UL — LOW (ref 3.8–10.5)
WBC # FLD AUTO: 3.29 K/UL — LOW (ref 3.8–10.5)
WBC # FLD AUTO: 3.29 K/UL — LOW (ref 3.8–10.5)
WBC # FLD AUTO: 3.69 K/UL — LOW (ref 3.8–10.5)
WBC # FLD AUTO: 3.69 K/UL — LOW (ref 3.8–10.5)

## 2023-12-04 PROCEDURE — 76942 ECHO GUIDE FOR BIOPSY: CPT | Mod: 26,59

## 2023-12-04 PROCEDURE — 76937 US GUIDE VASCULAR ACCESS: CPT | Mod: 26

## 2023-12-04 PROCEDURE — 74018 RADEX ABDOMEN 1 VIEW: CPT | Mod: 26

## 2023-12-04 PROCEDURE — 99232 SBSQ HOSP IP/OBS MODERATE 35: CPT

## 2023-12-04 PROCEDURE — 36556 INSERT NON-TUNNEL CV CATH: CPT

## 2023-12-04 RX ORDER — SODIUM CHLORIDE 9 MG/ML
1000 INJECTION, SOLUTION INTRAVENOUS
Refills: 0 | Status: DISCONTINUED | OUTPATIENT
Start: 2023-12-04 | End: 2023-12-05

## 2023-12-04 RX ORDER — POTASSIUM PHOSPHATE, MONOBASIC POTASSIUM PHOSPHATE, DIBASIC 236; 224 MG/ML; MG/ML
30 INJECTION, SOLUTION INTRAVENOUS ONCE
Refills: 0 | Status: COMPLETED | OUTPATIENT
Start: 2023-12-04 | End: 2023-12-04

## 2023-12-04 RX ADMIN — SODIUM CHLORIDE 100 MILLILITER(S): 9 INJECTION, SOLUTION INTRAVENOUS at 13:32

## 2023-12-04 RX ADMIN — POTASSIUM PHOSPHATE, MONOBASIC POTASSIUM PHOSPHATE, DIBASIC 83.33 MILLIMOLE(S): 236; 224 INJECTION, SOLUTION INTRAVENOUS at 13:33

## 2023-12-04 RX ADMIN — ESCITALOPRAM OXALATE 10 MILLIGRAM(S): 10 TABLET, FILM COATED ORAL at 13:36

## 2023-12-04 RX ADMIN — SODIUM CHLORIDE 100 MILLILITER(S): 9 INJECTION, SOLUTION INTRAVENOUS at 19:52

## 2023-12-04 RX ADMIN — HEPARIN SODIUM 5000 UNIT(S): 5000 INJECTION INTRAVENOUS; SUBCUTANEOUS at 06:09

## 2023-12-04 RX ADMIN — HEPARIN SODIUM 5000 UNIT(S): 5000 INJECTION INTRAVENOUS; SUBCUTANEOUS at 13:37

## 2023-12-04 RX ADMIN — HALOPERIDOL DECANOATE 5 MILLIGRAM(S): 100 INJECTION INTRAMUSCULAR at 06:26

## 2023-12-04 RX ADMIN — HALOPERIDOL DECANOATE 5 MILLIGRAM(S): 100 INJECTION INTRAMUSCULAR at 17:57

## 2023-12-04 RX ADMIN — Medication 75 MICROGRAM(S): at 21:09

## 2023-12-04 RX ADMIN — ATORVASTATIN CALCIUM 10 MILLIGRAM(S): 80 TABLET, FILM COATED ORAL at 21:08

## 2023-12-04 RX ADMIN — HEPARIN SODIUM 5000 UNIT(S): 5000 INJECTION INTRAVENOUS; SUBCUTANEOUS at 21:08

## 2023-12-04 RX ADMIN — PANTOPRAZOLE SODIUM 20 MILLIGRAM(S): 20 TABLET, DELAYED RELEASE ORAL at 14:22

## 2023-12-04 NOTE — PROGRESS NOTE ADULT - ATTENDING COMMENTS
Patient seen and examined at bedside. No acute events overnight. Denies any nausea or vomiting. Pain is well controlled. Afebrile. Patient had bowel movements. NGT output has decreased. Patient remains distended on physical exam.     Keep NPO  Switch to D51/2NS for IV hydration  f/u electrolytes, trend Na  abd xray today  f/u urine output Patient seen and examined at bedside. No acute events overnight. Denies any nausea or vomiting. Pain is well controlled. Afebrile. Patient had bowel movements. Patient remains distended on physical exam.    Keep NPO  Switch to D51/2NS for IV hydration  f/u electrolytes, trend Na  abd xray today  f/u urine output

## 2023-12-04 NOTE — PROCEDURE NOTE - ADDITIONAL PROCEDURE DETAILS
#18G 10CM 30CIRC BARD POWER GLIDE MIDLINE inserted with ultrasound guidance.   Good flash, ns flush left brachial vein.   Patient tolerated well.

## 2023-12-04 NOTE — CHART NOTE - NSCHARTNOTEFT_GEN_A_CORE
Chart review for patient from [facility] (PPC). Reviewed PPC packet.    Patient completed a Health Care Proxy (HCP) Form on 9/4/2014. Patient has named her brother, Tom Toribio ([phone]) as her HCP, and her brother Sukh Toribio ([phone]) as her alternate HCP. If the patient has capacity, she could complete a MOLST without the need for MHLS ([facility] [employer] [facility]) review. If she does not have capacity, she has protected her autonomy by naming her brothers as HCP and alternate HCP. As such, her brother could request a MOLST be completed, if indicated. Of note, the patient did not indicate on the health care proxy form that her proxy is aware of her wishes regarding artificial nutrition and hydration (HANNA). Any decisions regarding HANNA would have to be reviewed and approved by [facility].      Ethics and Palliative Care can assist with this.     Jennifer Pearl MA   Ethics Fellow   [phone] Chart review for patient from [facility] (PPC). Reviewed PPC packet.    Patient completed a Health Care Proxy (HCP) Form on 9/4/2014. Patient has named her brother, Tom Toribio ([phone]) as her HCP, and her brother Sukh Toribio ([phone]) as her alternate HCP. If the patient has capacity, she could complete a MOLST without the need for MHLS ([facility]) review. If she does not have capacity, she has protected her autonomy by naming her brothers as HCP and alternate HCP. As such, her brother could request a MOLST be completed, if indicated. Of note, the patient did not indicate on the health care proxy form that her proxy is aware of her wishes regarding artificial nutrition and hydration (HANNA). Any decisions regarding HANNA would have to be reviewed and approved by [facility].      Ethics and Palliative Care can assist with this.     Jennifer Pearl MA   Ethics Fellow   [phone] Chart review for patient from [facility] (PPC). Reviewed PPC packet.    Patient completed a Health Care Proxy (HCP) Form on 9/4/2014. Patient has named her brother, Tom Toribio ([phone]) as her HCP, and her brother Sukh Toribio ([phone]) as her alternate HCP. If patient is without capacity she has protected her autonomy by naming her brothers as HCPs to make medical decisions for her.     IF end of life decisions need to be made and the patient has capacity, she could complete a MOLST without the need for MHLS ([facility] Legal Services) review. If she does not have capacity, her brother/HCP could request a MOLST be completed, if indicated. Of note, the patient did not indicate on the health care proxy form that her proxy is aware of her wishes regarding artificial nutrition and hydration (HANNA). Any decisions regarding HANNA would have to be reviewed and approved by [facility].  Ethics and Palliative Care can assist with this.     Jennifer Pearl MA   Ethics Fellow   [phone]    Agree with/edited above.     Patient is under the auspices of [facility]. Patient has a HCP that can make medical decisions for her if she is without capacity. The HCP can make any and all decisions including end of life decisions except for any decisions regarding HANNA which would require a MOLST checklist process review by [facility].     Scarlett Ferreira MD  Ethics Attending  [phone] Chart review for patient from [facility] (PPC). Reviewed PPC packet.    Patient completed a Health Care Proxy (HCP) Form on 9/4/2014. Patient has named her brother, Tom Toribio ([phone]) as her HCP, and her brother Sukh Toribio ([phone]) as her alternate HCP. If patient is without capacity she has protected her autonomy by naming her brothers as HCPs to make medical decisions for her.     IF end of life decisions need to be made and the patient has capacity, she could complete a MOLST without the need for MHLS ([facility] Legal Services) review. If she does not have capacity, her brother/HCP could request a MOLST be completed, if indicated. Of note, the patient did not indicate on the health care proxy form that her proxy is aware of her wishes regarding artificial nutrition and hydration (HANNA). Any decisions regarding HANNA would have to be reviewed and approved by [facility].  Ethics and Palliative Care can assist with this.     Jennifer Pearl MA   Ethics Fellow   [phone]    Agree with/edited above.     Patient is under the auspices of [facility]. Patient has a HCP that can make medical decisions for her if she is without capacity. The HCP can make any and all decisions including end of life decisions except for any decisions regarding HANNA which would require a MOLST checklist process review by [facility].     Scarlett Ferreira MD  Ethics Attending  [phone]

## 2023-12-04 NOTE — PROGRESS NOTE ADULT - ASSESSMENT
72 y/o female admitted for high grade SBO at ventral hernia and MONICA was afebrile and hemodynamically stable this morning. On exam patient's abdomen was soft, nontender with nonreducibel herniated bowel on R lateral abdomen. Labs showed hypernatremia of 157 with creatinine of 1.82. Patient was started on D5 1/2 NS. Follow up BMP ordered for 3:00pm. KUB ordered. Will follow and consider advance diet depending on read.     PLAN  Trend labs and replete PRN   NPO/ Started on D51/2NS  Strict I/O's   Follow up BMP at 3:00pm  Follow up LUB  Continue home medications   DVT prophylaxis: HSQ and SCDs 74 y/o female admitted for high grade SBO at ventral hernia and MONICA was afebrile and hemodynamically stable this morning. On exam patient's abdomen was soft, nontender with nonreducibel herniated bowel on R lateral abdomen. Labs showed hypernatremia of 157 with creatinine of 1.82. Patient was started on D5 1/2 NS. Follow up BMP ordered for 3:00pm. KUB ordered. Will follow and consider advance diet depending on read.     PLAN  Trend labs and replete PRN   NPO/ Started on D51/2NS  Strict I/O's   Follow up BMP at 3:00pm  Follow up LUB  Continue home medications   DVT prophylaxis: HSQ and SCDs 74 y/o female admitted for high grade SBO at ventral hernia and MONICA was afebrile and hemodynamically stable this morning. On exam patient's abdomen was soft, nontender with nonreducibel herniated bowel on R lateral abdomen. Labs showed hypernatremia of 157 with creatinine of 1.82. Patient was started on D5 1/2 NS. Follow up BMP ordered for 3:00pm. KUB ordered. Will follow and consider advance diet depending on read.     PLAN  Trend labs and replete PRN   NPO/ Started on D5W  Strict I/O's   Follow up BMP at 3:00pm  Follow up LUB  Continue home medications   DVT prophylaxis: HSQ and SCDs 72 y/o female admitted for high grade SBO at ventral hernia and MONICA was afebrile and hemodynamically stable this morning. On exam patient's abdomen was soft, nontender with nonreducibel herniated bowel on R lateral abdomen. Labs showed hypernatremia of 157 with creatinine of 1.82. Patient was started on D5 1/2 NS. Follow up BMP ordered for 3:00pm. KUB ordered. Will follow and consider advance diet depending on read.     PLAN  Trend labs and replete PRN   NPO/ Started on D5W  Strict I/O's   Follow up BMP at 3:00pm  Follow up LUB  Continue home medications   DVT prophylaxis: HSQ and SCDs

## 2023-12-04 NOTE — PROGRESS NOTE ADULT - SUBJECTIVE AND OBJECTIVE BOX
Patient seen and examined at bedside.     No acute events overnight. Patient states that she is hungry. Reported to have a bowel movement yesterday. Denies abdominal pain, nausea, vomiting, and passing flatus.     Vitals:  Vital Signs Last 24 Hrs  T(C): 36.7 (04 Dec 2023 10:00), Max: 37.2 (03 Dec 2023 15:36)  T(F): 98 (04 Dec 2023 10:00), Max: 99 (03 Dec 2023 18:17)  HR: 76 (04 Dec 2023 10:00) (76 - 99)  BP: 176/77 (04 Dec 2023 10:00) (155/78 - 176/77)  BP(mean): --  RR: 18 (04 Dec 2023 10:00) (18 - 18)  SpO2: 92% (04 Dec 2023 10:00) (91% - 93%)    Parameters below as of 04 Dec 2023 10:00  Patient On (Oxygen Delivery Method): room air    Labs:  12-04    157<H>  |  124<H>  |  34.0<H>  ----------------------------<  87  3.6   |  19.0<L>  |  1.82<H>    Ca    7.5<L>      04 Dec 2023 07:30  Phos  1.9     12-04  Mg     2.3     12-04                        9.1    3.69  )-----------( 111      ( 04 Dec 2023 07:30 )             29.6       Exam:  Gen: pt lying in bed, alert, in NAD  Resp: unlabored  CVS: RRR  Abd: soft, NT, herniated bowel on R lateral abdomen  Ext: moving all extremities spontaneously, sensation intact   Patient seen and examined at bedside.     No acute events overnight. Patient states that she is hungry. Reported to have a bowel movement yesterday. Denies abdominal pain, nausea, vomiting, and passing flatus.     Vitals:  Vital Signs Last 24 Hrs  T(C): 36.7 (04 Dec 2023 10:00), Max: 37.2 (03 Dec 2023 15:36)  T(F): 98 (04 Dec 2023 10:00), Max: 99 (03 Dec 2023 18:17)  HR: 76 (04 Dec 2023 10:00) (76 - 99)  BP: 176/77 (04 Dec 2023 10:00) (155/78 - 176/77)  BP(mean): --  RR: 18 (04 Dec 2023 10:00) (18 - 18)  SpO2: 92% (04 Dec 2023 10:00) (91% - 93%)    Parameters below as of 04 Dec 2023 10:00  Patient On (Oxygen Delivery Method): room air    Labs:  12-04    157<H>  |  124<H>  |  34.0<H>  ----------------------------<  87  3.6   |  19.0<L>  |  1.82<H>    Ca    7.5<L>      04 Dec 2023 07:30  Phos  1.9     12-04  Mg     2.3     12-04                        9.1    3.69  )-----------( 111      ( 04 Dec 2023 07:30 )             29.6       Exam:  Gen: pt lying in bed, alert, in NAD  Resp: unlabored  CVS: RRR  Abd: soft, NT, non reducible herniated bowel on R lateral abdomen with no guarding or rebound tenderness.   Ext: moving all extremities spontaneously, sensation intact

## 2023-12-05 DIAGNOSIS — F25.9 SCHIZOAFFECTIVE DISORDER, UNSPECIFIED: ICD-10-CM

## 2023-12-05 LAB
AMORPH CRY # UR COMP ASSIST: PRESENT
AMORPH CRY # UR COMP ASSIST: PRESENT
ANION GAP SERPL CALC-SCNC: 11 MMOL/L — SIGNIFICANT CHANGE UP (ref 5–17)
ANION GAP SERPL CALC-SCNC: 11 MMOL/L — SIGNIFICANT CHANGE UP (ref 5–17)
ANION GAP SERPL CALC-SCNC: 13 MMOL/L — SIGNIFICANT CHANGE UP (ref 5–17)
ANION GAP SERPL CALC-SCNC: 13 MMOL/L — SIGNIFICANT CHANGE UP (ref 5–17)
ANION GAP SERPL CALC-SCNC: 14 MMOL/L — SIGNIFICANT CHANGE UP (ref 5–17)
ANION GAP SERPL CALC-SCNC: 14 MMOL/L — SIGNIFICANT CHANGE UP (ref 5–17)
ANION GAP SERPL CALC-SCNC: 15 MMOL/L — SIGNIFICANT CHANGE UP (ref 5–17)
ANION GAP SERPL CALC-SCNC: 15 MMOL/L — SIGNIFICANT CHANGE UP (ref 5–17)
ANISOCYTOSIS BLD QL: SLIGHT — SIGNIFICANT CHANGE UP
ANISOCYTOSIS BLD QL: SLIGHT — SIGNIFICANT CHANGE UP
APPEARANCE UR: ABNORMAL
APPEARANCE UR: ABNORMAL
BACTERIA # UR AUTO: ABNORMAL /HPF
BACTERIA # UR AUTO: ABNORMAL /HPF
BASOPHILS # BLD AUTO: 0 K/UL — SIGNIFICANT CHANGE UP (ref 0–0.2)
BASOPHILS # BLD AUTO: 0 K/UL — SIGNIFICANT CHANGE UP (ref 0–0.2)
BASOPHILS NFR BLD AUTO: 0 % — SIGNIFICANT CHANGE UP (ref 0–2)
BASOPHILS NFR BLD AUTO: 0 % — SIGNIFICANT CHANGE UP (ref 0–2)
BILIRUB UR-MCNC: NEGATIVE — SIGNIFICANT CHANGE UP
BILIRUB UR-MCNC: NEGATIVE — SIGNIFICANT CHANGE UP
BUN SERPL-MCNC: 19.2 MG/DL — SIGNIFICANT CHANGE UP (ref 8–20)
BUN SERPL-MCNC: 19.2 MG/DL — SIGNIFICANT CHANGE UP (ref 8–20)
BUN SERPL-MCNC: 21.6 MG/DL — HIGH (ref 8–20)
BUN SERPL-MCNC: 21.6 MG/DL — HIGH (ref 8–20)
BUN SERPL-MCNC: 23 MG/DL — HIGH (ref 8–20)
BUN SERPL-MCNC: 23 MG/DL — HIGH (ref 8–20)
BUN SERPL-MCNC: 24.2 MG/DL — HIGH (ref 8–20)
BUN SERPL-MCNC: 24.2 MG/DL — HIGH (ref 8–20)
CALCIUM SERPL-MCNC: 7.3 MG/DL — LOW (ref 8.4–10.5)
CALCIUM SERPL-MCNC: 7.3 MG/DL — LOW (ref 8.4–10.5)
CALCIUM SERPL-MCNC: 7.4 MG/DL — LOW (ref 8.4–10.5)
CALCIUM SERPL-MCNC: 7.4 MG/DL — LOW (ref 8.4–10.5)
CALCIUM SERPL-MCNC: 7.5 MG/DL — LOW (ref 8.4–10.5)
CAST: 40 /LPF — HIGH (ref 0–4)
CAST: 40 /LPF — HIGH (ref 0–4)
CHLORIDE SERPL-SCNC: 121 MMOL/L — HIGH (ref 96–108)
CHLORIDE SERPL-SCNC: 121 MMOL/L — HIGH (ref 96–108)
CHLORIDE SERPL-SCNC: 122 MMOL/L — HIGH (ref 96–108)
CHLORIDE SERPL-SCNC: 122 MMOL/L — HIGH (ref 96–108)
CHLORIDE SERPL-SCNC: 124 MMOL/L — HIGH (ref 96–108)
CO2 SERPL-SCNC: 19 MMOL/L — LOW (ref 22–29)
CO2 SERPL-SCNC: 19 MMOL/L — LOW (ref 22–29)
CO2 SERPL-SCNC: 20 MMOL/L — LOW (ref 22–29)
CO2 SERPL-SCNC: 20 MMOL/L — LOW (ref 22–29)
CO2 SERPL-SCNC: 21 MMOL/L — LOW (ref 22–29)
CO2 SERPL-SCNC: 21 MMOL/L — LOW (ref 22–29)
CO2 SERPL-SCNC: 22 MMOL/L — SIGNIFICANT CHANGE UP (ref 22–29)
CO2 SERPL-SCNC: 22 MMOL/L — SIGNIFICANT CHANGE UP (ref 22–29)
COLOR SPEC: YELLOW — SIGNIFICANT CHANGE UP
COLOR SPEC: YELLOW — SIGNIFICANT CHANGE UP
CREAT SERPL-MCNC: 1.6 MG/DL — HIGH (ref 0.5–1.3)
CREAT SERPL-MCNC: 1.6 MG/DL — HIGH (ref 0.5–1.3)
CREAT SERPL-MCNC: 1.61 MG/DL — HIGH (ref 0.5–1.3)
CREAT SERPL-MCNC: 1.61 MG/DL — HIGH (ref 0.5–1.3)
CREAT SERPL-MCNC: 1.7 MG/DL — HIGH (ref 0.5–1.3)
DIFF PNL FLD: ABNORMAL
DIFF PNL FLD: ABNORMAL
EGFR: 32 ML/MIN/1.73M2 — LOW
EGFR: 34 ML/MIN/1.73M2 — LOW
ELLIPTOCYTES BLD QL SMEAR: SLIGHT — SIGNIFICANT CHANGE UP
ELLIPTOCYTES BLD QL SMEAR: SLIGHT — SIGNIFICANT CHANGE UP
EOSINOPHIL # BLD AUTO: 0.03 K/UL — SIGNIFICANT CHANGE UP (ref 0–0.5)
EOSINOPHIL # BLD AUTO: 0.03 K/UL — SIGNIFICANT CHANGE UP (ref 0–0.5)
EOSINOPHIL NFR BLD AUTO: 0.9 % — SIGNIFICANT CHANGE UP (ref 0–6)
EOSINOPHIL NFR BLD AUTO: 0.9 % — SIGNIFICANT CHANGE UP (ref 0–6)
GIANT PLATELETS BLD QL SMEAR: PRESENT — SIGNIFICANT CHANGE UP
GIANT PLATELETS BLD QL SMEAR: PRESENT — SIGNIFICANT CHANGE UP
GLUCOSE SERPL-MCNC: 102 MG/DL — HIGH (ref 70–99)
GLUCOSE SERPL-MCNC: 102 MG/DL — HIGH (ref 70–99)
GLUCOSE SERPL-MCNC: 110 MG/DL — HIGH (ref 70–99)
GLUCOSE SERPL-MCNC: 110 MG/DL — HIGH (ref 70–99)
GLUCOSE SERPL-MCNC: 118 MG/DL — HIGH (ref 70–99)
GLUCOSE SERPL-MCNC: 118 MG/DL — HIGH (ref 70–99)
GLUCOSE SERPL-MCNC: 88 MG/DL — SIGNIFICANT CHANGE UP (ref 70–99)
GLUCOSE SERPL-MCNC: 88 MG/DL — SIGNIFICANT CHANGE UP (ref 70–99)
GLUCOSE UR QL: NEGATIVE MG/DL — SIGNIFICANT CHANGE UP
GLUCOSE UR QL: NEGATIVE MG/DL — SIGNIFICANT CHANGE UP
HCT VFR BLD CALC: 32.3 % — LOW (ref 34.5–45)
HCT VFR BLD CALC: 32.3 % — LOW (ref 34.5–45)
HGB BLD-MCNC: 9.8 G/DL — LOW (ref 11.5–15.5)
HGB BLD-MCNC: 9.8 G/DL — LOW (ref 11.5–15.5)
HYPOCHROMIA BLD QL: SLIGHT — SIGNIFICANT CHANGE UP
HYPOCHROMIA BLD QL: SLIGHT — SIGNIFICANT CHANGE UP
KETONES UR-MCNC: 15 MG/DL
KETONES UR-MCNC: 15 MG/DL
LEUKOCYTE ESTERASE UR-ACNC: ABNORMAL
LEUKOCYTE ESTERASE UR-ACNC: ABNORMAL
LYMPHOCYTES # BLD AUTO: 0.62 K/UL — LOW (ref 1–3.3)
LYMPHOCYTES # BLD AUTO: 0.62 K/UL — LOW (ref 1–3.3)
LYMPHOCYTES # BLD AUTO: 18.6 % — SIGNIFICANT CHANGE UP (ref 13–44)
LYMPHOCYTES # BLD AUTO: 18.6 % — SIGNIFICANT CHANGE UP (ref 13–44)
MAGNESIUM SERPL-MCNC: 2.2 MG/DL — SIGNIFICANT CHANGE UP (ref 1.6–2.6)
MAGNESIUM SERPL-MCNC: 2.2 MG/DL — SIGNIFICANT CHANGE UP (ref 1.6–2.6)
MAGNESIUM SERPL-MCNC: 2.4 MG/DL — SIGNIFICANT CHANGE UP (ref 1.6–2.6)
MANUAL SMEAR VERIFICATION: SIGNIFICANT CHANGE UP
MANUAL SMEAR VERIFICATION: SIGNIFICANT CHANGE UP
MCHC RBC-ENTMCNC: 29.6 PG — SIGNIFICANT CHANGE UP (ref 27–34)
MCHC RBC-ENTMCNC: 29.6 PG — SIGNIFICANT CHANGE UP (ref 27–34)
MCHC RBC-ENTMCNC: 30.3 GM/DL — LOW (ref 32–36)
MCHC RBC-ENTMCNC: 30.3 GM/DL — LOW (ref 32–36)
MCV RBC AUTO: 97.6 FL — SIGNIFICANT CHANGE UP (ref 80–100)
MCV RBC AUTO: 97.6 FL — SIGNIFICANT CHANGE UP (ref 80–100)
METAMYELOCYTES # FLD: 0.9 % — HIGH (ref 0–0)
METAMYELOCYTES # FLD: 0.9 % — HIGH (ref 0–0)
MICROCYTES BLD QL: SLIGHT — SIGNIFICANT CHANGE UP
MICROCYTES BLD QL: SLIGHT — SIGNIFICANT CHANGE UP
MONOCYTES # BLD AUTO: 0.18 K/UL — SIGNIFICANT CHANGE UP (ref 0–0.9)
MONOCYTES # BLD AUTO: 0.18 K/UL — SIGNIFICANT CHANGE UP (ref 0–0.9)
MONOCYTES NFR BLD AUTO: 5.3 % — SIGNIFICANT CHANGE UP (ref 2–14)
MONOCYTES NFR BLD AUTO: 5.3 % — SIGNIFICANT CHANGE UP (ref 2–14)
NEUTROPHILS # BLD AUTO: 2.27 K/UL — SIGNIFICANT CHANGE UP (ref 1.8–7.4)
NEUTROPHILS # BLD AUTO: 2.27 K/UL — SIGNIFICANT CHANGE UP (ref 1.8–7.4)
NEUTROPHILS NFR BLD AUTO: 68.1 % — SIGNIFICANT CHANGE UP (ref 43–77)
NEUTROPHILS NFR BLD AUTO: 68.1 % — SIGNIFICANT CHANGE UP (ref 43–77)
NITRITE UR-MCNC: NEGATIVE — SIGNIFICANT CHANGE UP
NITRITE UR-MCNC: NEGATIVE — SIGNIFICANT CHANGE UP
OSMOLALITY SERPL: 329 MOSMOL/KG — HIGH (ref 280–301)
OSMOLALITY SERPL: 329 MOSMOL/KG — HIGH (ref 280–301)
OSMOLALITY UR: 276 MOSM/KG — LOW (ref 300–1000)
OSMOLALITY UR: 276 MOSM/KG — LOW (ref 300–1000)
OVALOCYTES BLD QL SMEAR: SLIGHT — SIGNIFICANT CHANGE UP
OVALOCYTES BLD QL SMEAR: SLIGHT — SIGNIFICANT CHANGE UP
PH UR: 8.5 (ref 5–8)
PH UR: 8.5 (ref 5–8)
PHOSPHATE SERPL-MCNC: 1.3 MG/DL — LOW (ref 2.4–4.7)
PHOSPHATE SERPL-MCNC: 1.3 MG/DL — LOW (ref 2.4–4.7)
PHOSPHATE SERPL-MCNC: 2.2 MG/DL — LOW (ref 2.4–4.7)
PHOSPHATE SERPL-MCNC: 2.2 MG/DL — LOW (ref 2.4–4.7)
PHOSPHATE SERPL-MCNC: 2.4 MG/DL — SIGNIFICANT CHANGE UP (ref 2.4–4.7)
PHOSPHATE SERPL-MCNC: 2.4 MG/DL — SIGNIFICANT CHANGE UP (ref 2.4–4.7)
PLAT MORPH BLD: NORMAL — SIGNIFICANT CHANGE UP
PLAT MORPH BLD: NORMAL — SIGNIFICANT CHANGE UP
PLATELET # BLD AUTO: 132 K/UL — LOW (ref 150–400)
PLATELET # BLD AUTO: 132 K/UL — LOW (ref 150–400)
POIKILOCYTOSIS BLD QL AUTO: SLIGHT — SIGNIFICANT CHANGE UP
POIKILOCYTOSIS BLD QL AUTO: SLIGHT — SIGNIFICANT CHANGE UP
POLYCHROMASIA BLD QL SMEAR: SLIGHT — SIGNIFICANT CHANGE UP
POLYCHROMASIA BLD QL SMEAR: SLIGHT — SIGNIFICANT CHANGE UP
POTASSIUM SERPL-MCNC: 3.3 MMOL/L — LOW (ref 3.5–5.3)
POTASSIUM SERPL-MCNC: 3.3 MMOL/L — LOW (ref 3.5–5.3)
POTASSIUM SERPL-MCNC: 3.4 MMOL/L — LOW (ref 3.5–5.3)
POTASSIUM SERPL-MCNC: 3.4 MMOL/L — LOW (ref 3.5–5.3)
POTASSIUM SERPL-MCNC: 3.6 MMOL/L — SIGNIFICANT CHANGE UP (ref 3.5–5.3)
POTASSIUM SERPL-SCNC: 3.3 MMOL/L — LOW (ref 3.5–5.3)
POTASSIUM SERPL-SCNC: 3.3 MMOL/L — LOW (ref 3.5–5.3)
POTASSIUM SERPL-SCNC: 3.4 MMOL/L — LOW (ref 3.5–5.3)
POTASSIUM SERPL-SCNC: 3.4 MMOL/L — LOW (ref 3.5–5.3)
POTASSIUM SERPL-SCNC: 3.6 MMOL/L — SIGNIFICANT CHANGE UP (ref 3.5–5.3)
PROMYELOCYTES # FLD: 4.4 % — HIGH (ref 0–0)
PROMYELOCYTES # FLD: 4.4 % — HIGH (ref 0–0)
PROT UR-MCNC: 30 MG/DL
PROT UR-MCNC: 30 MG/DL
RBC # BLD: 3.31 M/UL — LOW (ref 3.8–5.2)
RBC # BLD: 3.31 M/UL — LOW (ref 3.8–5.2)
RBC # FLD: 17.5 % — HIGH (ref 10.3–14.5)
RBC # FLD: 17.5 % — HIGH (ref 10.3–14.5)
RBC BLD AUTO: ABNORMAL
RBC BLD AUTO: ABNORMAL
RBC CASTS # UR COMP ASSIST: 2 /HPF — SIGNIFICANT CHANGE UP (ref 0–4)
RBC CASTS # UR COMP ASSIST: 2 /HPF — SIGNIFICANT CHANGE UP (ref 0–4)
SODIUM SERPL-SCNC: 154 MMOL/L — HIGH (ref 135–145)
SODIUM SERPL-SCNC: 154 MMOL/L — HIGH (ref 135–145)
SODIUM SERPL-SCNC: 156 MMOL/L — HIGH (ref 135–145)
SODIUM SERPL-SCNC: 156 MMOL/L — HIGH (ref 135–145)
SODIUM SERPL-SCNC: 157 MMOL/L — HIGH (ref 135–145)
SODIUM SERPL-SCNC: 157 MMOL/L — HIGH (ref 135–145)
SODIUM SERPL-SCNC: 159 MMOL/L — HIGH (ref 135–145)
SODIUM SERPL-SCNC: 159 MMOL/L — HIGH (ref 135–145)
SODIUM UR-SCNC: 84 MMOL/L — SIGNIFICANT CHANGE UP
SODIUM UR-SCNC: 84 MMOL/L — SIGNIFICANT CHANGE UP
SP GR SPEC: 1.01 — SIGNIFICANT CHANGE UP (ref 1–1.03)
SP GR SPEC: 1.01 — SIGNIFICANT CHANGE UP (ref 1–1.03)
SQUAMOUS # UR AUTO: 2 /HPF — SIGNIFICANT CHANGE UP (ref 0–5)
SQUAMOUS # UR AUTO: 2 /HPF — SIGNIFICANT CHANGE UP (ref 0–5)
TARGETS BLD QL SMEAR: SLIGHT — SIGNIFICANT CHANGE UP
TARGETS BLD QL SMEAR: SLIGHT — SIGNIFICANT CHANGE UP
TRI-PHOS CRY UR QL COMP ASSIST: PRESENT
TRI-PHOS CRY UR QL COMP ASSIST: PRESENT
UROBILINOGEN FLD QL: 0.2 MG/DL — SIGNIFICANT CHANGE UP (ref 0.2–1)
UROBILINOGEN FLD QL: 0.2 MG/DL — SIGNIFICANT CHANGE UP (ref 0.2–1)
VARIANT LYMPHS # BLD: 1.8 % — SIGNIFICANT CHANGE UP (ref 0–6)
VARIANT LYMPHS # BLD: 1.8 % — SIGNIFICANT CHANGE UP (ref 0–6)
WBC # BLD: 3.34 K/UL — LOW (ref 3.8–10.5)
WBC # BLD: 3.34 K/UL — LOW (ref 3.8–10.5)
WBC # FLD AUTO: 3.34 K/UL — LOW (ref 3.8–10.5)
WBC # FLD AUTO: 3.34 K/UL — LOW (ref 3.8–10.5)
WBC UR QL: 98 /HPF — HIGH (ref 0–5)
WBC UR QL: 98 /HPF — HIGH (ref 0–5)

## 2023-12-05 PROCEDURE — 99221 1ST HOSP IP/OBS SF/LOW 40: CPT

## 2023-12-05 PROCEDURE — 99233 SBSQ HOSP IP/OBS HIGH 50: CPT

## 2023-12-05 PROCEDURE — 99232 SBSQ HOSP IP/OBS MODERATE 35: CPT

## 2023-12-05 RX ORDER — ACETAMINOPHEN 500 MG
650 TABLET ORAL EVERY 6 HOURS
Refills: 0 | Status: DISCONTINUED | OUTPATIENT
Start: 2023-12-05 | End: 2023-12-18

## 2023-12-05 RX ORDER — SODIUM CHLORIDE 9 MG/ML
1000 INJECTION, SOLUTION INTRAVENOUS
Refills: 0 | Status: DISCONTINUED | OUTPATIENT
Start: 2023-12-05 | End: 2023-12-07

## 2023-12-05 RX ORDER — ESCITALOPRAM OXALATE 10 MG/1
30 TABLET, FILM COATED ORAL DAILY
Refills: 0 | Status: DISCONTINUED | OUTPATIENT
Start: 2023-12-05 | End: 2023-12-18

## 2023-12-05 RX ORDER — OLANZAPINE 15 MG/1
5 TABLET, FILM COATED ORAL DAILY
Refills: 0 | Status: DISCONTINUED | OUTPATIENT
Start: 2023-12-05 | End: 2023-12-18

## 2023-12-05 RX ORDER — POLYETHYLENE GLYCOL 3350 17 G/17G
17 POWDER, FOR SOLUTION ORAL AT BEDTIME
Refills: 0 | Status: DISCONTINUED | OUTPATIENT
Start: 2023-12-05 | End: 2023-12-18

## 2023-12-05 RX ORDER — ESCITALOPRAM OXALATE 10 MG/1
3 TABLET, FILM COATED ORAL
Refills: 0 | DISCHARGE

## 2023-12-05 RX ORDER — HYDRALAZINE HCL 50 MG
10 TABLET ORAL ONCE
Refills: 0 | Status: COMPLETED | OUTPATIENT
Start: 2023-12-05 | End: 2023-12-05

## 2023-12-05 RX ORDER — HALOPERIDOL DECANOATE 100 MG/ML
5 INJECTION INTRAMUSCULAR
Refills: 0 | Status: DISCONTINUED | OUTPATIENT
Start: 2023-12-05 | End: 2023-12-18

## 2023-12-05 RX ORDER — LEVOTHYROXINE SODIUM 125 MCG
112 TABLET ORAL DAILY
Refills: 0 | Status: DISCONTINUED | OUTPATIENT
Start: 2023-12-05 | End: 2023-12-18

## 2023-12-05 RX ORDER — POTASSIUM PHOSPHATE, MONOBASIC POTASSIUM PHOSPHATE, DIBASIC 236; 224 MG/ML; MG/ML
30 INJECTION, SOLUTION INTRAVENOUS ONCE
Refills: 0 | Status: COMPLETED | OUTPATIENT
Start: 2023-12-05 | End: 2023-12-05

## 2023-12-05 RX ORDER — POTASSIUM PHOSPHATE, MONOBASIC POTASSIUM PHOSPHATE, DIBASIC 236; 224 MG/ML; MG/ML
15 INJECTION, SOLUTION INTRAVENOUS ONCE
Refills: 0 | Status: COMPLETED | OUTPATIENT
Start: 2023-12-05 | End: 2023-12-05

## 2023-12-05 RX ORDER — PANTOPRAZOLE SODIUM 20 MG/1
40 TABLET, DELAYED RELEASE ORAL
Refills: 0 | Status: DISCONTINUED | OUTPATIENT
Start: 2023-12-05 | End: 2023-12-18

## 2023-12-05 RX ADMIN — PANTOPRAZOLE SODIUM 20 MILLIGRAM(S): 20 TABLET, DELAYED RELEASE ORAL at 12:54

## 2023-12-05 RX ADMIN — ESCITALOPRAM OXALATE 10 MILLIGRAM(S): 10 TABLET, FILM COATED ORAL at 12:54

## 2023-12-05 RX ADMIN — HEPARIN SODIUM 5000 UNIT(S): 5000 INJECTION INTRAVENOUS; SUBCUTANEOUS at 22:17

## 2023-12-05 RX ADMIN — HEPARIN SODIUM 5000 UNIT(S): 5000 INJECTION INTRAVENOUS; SUBCUTANEOUS at 05:32

## 2023-12-05 RX ADMIN — POTASSIUM PHOSPHATE, MONOBASIC POTASSIUM PHOSPHATE, DIBASIC 83.33 MILLIMOLE(S): 236; 224 INJECTION, SOLUTION INTRAVENOUS at 02:54

## 2023-12-05 RX ADMIN — SODIUM CHLORIDE 1000 MILLILITER(S): 9 INJECTION, SOLUTION INTRAVENOUS at 14:47

## 2023-12-05 RX ADMIN — HALOPERIDOL DECANOATE 5 MILLIGRAM(S): 100 INJECTION INTRAMUSCULAR at 05:32

## 2023-12-05 RX ADMIN — HEPARIN SODIUM 5000 UNIT(S): 5000 INJECTION INTRAVENOUS; SUBCUTANEOUS at 12:54

## 2023-12-05 RX ADMIN — Medication 10 MILLIGRAM(S): at 04:46

## 2023-12-05 RX ADMIN — Medication 0.5 MILLIGRAM(S): at 22:17

## 2023-12-05 RX ADMIN — POTASSIUM PHOSPHATE, MONOBASIC POTASSIUM PHOSPHATE, DIBASIC 62.5 MILLIMOLE(S): 236; 224 INJECTION, SOLUTION INTRAVENOUS at 12:55

## 2023-12-05 RX ADMIN — ATORVASTATIN CALCIUM 10 MILLIGRAM(S): 80 TABLET, FILM COATED ORAL at 22:17
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
## 2023-12-05 NOTE — DIETITIAN NUTRITION RISK NOTIFICATION - TREATMENT: THE FOLLOWING DIET HAS BEEN RECOMMENDED
Diet, NPO:   Except Medications     Special Instructions for Nursing:  Except Medications (12-01-23 @ 15:19) [Active]

## 2023-12-05 NOTE — DIETITIAN INITIAL EVALUATION ADULT - ORAL INTAKE PTA/DIET HISTORY
Nutrition assessment completed. Pt with schizoaffective disorder, able to report she has been with poor appetite "for a long time". Currently NPO. Pt unable to determine weight history. Per EMR review, pts weight in 6/2023 was 184 lbs, current weight is 150 lbs. Limited NFPE conducted. RD to follow up.

## 2023-12-05 NOTE — DIETITIAN INITIAL EVALUATION ADULT - PERTINENT LABORATORY DATA
12-05    156<H>  |  122<H>  |  24.2<H>  ----------------------------<  88  3.6   |  19.0<L>  |  1.70<H>    Ca    7.5<L>      05 Dec 2023 04:40  Phos  2.2     12-05  Mg     2.4     12-05

## 2023-12-05 NOTE — DIETITIAN INITIAL EVALUATION ADULT - WEIGHT (KG)
OFFICE VISIT      Patient: Kesha Mancia   : 1961 MRN: [id number]    SUBJECTIVE:  Chief Complaint   Patient presents with   • Physical   • Derm Problem     spot on face       A 61 year old female is here for annual physical exam.    Patient has given consent to record this visit for documentation in their clinical record.    HISTORY OF PRESENT ILLNESS:    Routine physical examination; Encounter for screening mammogram for breast cancer; Screening for malignant neoplasm of cervix:  Breast cancer screening: Due for mammogram.  Cervical cancer screening: Due for pap test. Denies vaginal itching, burning or discharge. She does wear pads all the time due to her urinary issues.  OB/GYN: Does not follow up with Obstetrics & Gynecology.  Dermatology screening: Notes some light spots on the side of her face which she wants me to look at.  Immunizations: Due for shingles vaccination.  Labs: Recent labs show normal kidney function test and electrolytes. Blood glucose was sightly elevated at 101 mg/dL.    Essential hypertension:  Blood pressure measured today is normal. Her blood pressure is well controlled on Lisinopril and Hydrochlorothiazide, but is experiencing side effects of cough which is not subsiding.      Mild hyperlipidemia  Labs revealed improved levels, total cholesterol at 210 mg/dL, triglycerides at 146 mg/dL, HDL at 58 mg/dL and LDL at 123 mg/dL.     NAFL (nonalcoholic fatty liver)  Labs revealed normal SGPT but elevated SGOT at 38 Units/L.    PAST MEDICAL HISTORY:  Past Medical History:   Diagnosis Date   • Actinic keratosis    • Allergy     foods, environmental   • Amblyopia of left eye    • Anxiety    • ASCUS of cervix with negative high risk HPV 2014   • Chronic abdominal pain     Mild   • Degenerative arthritis    • Elevated liver enzymes     History of   • Esotropia of left eye    • Essential hypertension 2019   • Frequent UTI    • GERD (gastroesophageal reflux disease)    • Incontinence      Stress + Urge   • Left ovarian cyst     Complex, solid-appearing   • Mild hyperlipidemia 4/27/2022   • Nystagmus    • Psoriasis        MEDICATIONS:  Current Outpatient Medications   Medication Sig   • hydroCHLOROthiazide (HYDRODIURIL) 25 MG tablet Take 1 tablet by mouth daily.   • Ascorbic Acid (VITAMIN C) 100 MG tablet Take 100 mg by mouth daily.   • Multiple Vitamins-Minerals (MULTIVITAMIN ADULT) Tab Take 1 tablet by mouth daily.   • Cholecalciferol (VITAMIN D3) 2000 UNITS Tab Take 1 tablet by mouth daily.   • losartan (COZAAR) 25 MG tablet Take 1 tablet by mouth daily.     No current facility-administered medications for this visit.       ALLERGIES:  ALLERGIES:   Allergen Reactions   • Banana   (Food And Med) GI UPSET     Bloating, cramps, chills   • Eggs [Egg White   (Food Or Med)] GI UPSET   • Latex   (Environmental) RASH   • Peanuts [Peanut   (Food Or Med)] GI UPSET   • Penicillins HIVES   • Seasonal Cough   • Shellfish Allergy   (Food Or Med) GI UPSET     Shellfish derived - GI   • Shrimp   (Food) GI UPSET   • Lisinopril Cough       PAST SURGICAL HISTORY:  Past Surgical History:   Procedure Laterality Date   • Breast biopsy  2007    Negative   • Breast biopsy  04/10/2009    Benign   • Breast surgery      benign lump removal   • Colonoscopy  10/22/2019    7 year repeat due    • Colonoscopy w/ polypectomy N/A 08/19/2014    Dr. Mac - repeat in 5 years   • Endometrial biopsy  05/01/2007   • Extraocular muscle surg proc unlisted Left 3-    Strabismus surg   • Tubal ligation  1992   • Vaginal delivery      Spontaneous, four times   • [place] tooth extraction         FAMILY HISTORY:  Family History   Problem Relation Age of Onset   • Diabetes Father    • Hypertension Father    • Alcohol Abuse Father         Alcoholism   • Other Father         Myasthenia Gravis   • Hypertension Mother    • Cataracts Mother    • Cancer Mother         kidney - met   • Depression Mother    • Musculoskeletal Mother          Disc degeneration Lumb/Lumbosa   • Cancer, Kidney Mother 70   • Cancer, Liver Mother    • Other Brother         blood clot X 2   • Stroke Maternal Grandmother 55        CVA with infarction   • Diabetes Maternal Grandmother    • Diabetes Paternal Grandfather        SOCIAL HISTORY:  Social History     Tobacco Use   Smoking Status Never Smoker   Smokeless Tobacco Never Used     Social History     Substance and Sexual Activity   Alcohol Use Not Currently    Comment: rare       ROS  Constitutional: Per HPI.  Cardiovascular: Per HPI.  Gastrointestinal: Per HPI.  Endocrine: Per HPI.  Skin: Per HPI.    OBJECTIVE:    Vitals:    04/27/22 0843   BP: 126/80   BP Location: RUE - Right upper extremity   Patient Position: Sitting   Cuff Size: Regular   Pulse: 80   Temp: 97.3 °F (36.3 °C)   TempSrc: Temporal   Weight: 69.5 kg (153 lb 3.5 oz)   Height: 5' 3\" (1.6 m)       Body mass index is 27.14 kg/m².    PHYSICAL EXAM  GENERAL: The patient is alert and oriented x3, in no acute distress. Well nourished.  HEENT:  Head: Atraumatic, normocephalic.  Eyes: Conjunctivae are noninjected with no icterus, no discharge. Pupils are equally round and reactive to light. Extraocular muscles are intact.  Ears: Tympanic membranes are pearly gray with a good light reflex, no erythema or bulging. Clear external ear canals.  Nose: No congestion, erythema, edema or discharge. No maxillary sinus tenderness. Oropharynx: Mucous membranes are moist, no erythema or edema, no exudate.  NECK: Supple with no cervical or supraclavicular lymphadenopathy, no masses. Normal range of motion. No JVD or carotid bruits.  RESPIRATORY: Respiratory effort is normal. Lungs are clear to auscultation bilaterally with equal breath sounds.  CARDIO: Normal rate, regular rhythm. No murmurs, rubs or gallops.  ABDOMEN: Soft, nondistended, nontender. Normal bowel sounds in all 4 quadrants. No hepatosplenomegaly, no masses, no rebound or guarding.  BREASTS: Symmetric  bilaterally, no skin changes. No abnormal masses palpated. No axillary adenopathy bilaterally.  GYN: External genitalia is a normal female with no lesions. Urethra is normal anatomy. Cervix is round with no lesions. Normal discharge was noted. Vaginal walls moist with no lesions. Bimanual exam reveals normal uterus, which was nontender. There was no adnexal tenderness or masses, no cervical motion tenderness.  EXTREMITIES: No edema. Warm and well perfused. 2+ pedal pulses.  SKIN: Warm and dry. No rashes.  MUSCULOSKELETAL: Joints had no swelling or obvious deformities. Patient had normal range of motion.  NEUROLOGIC: Grossly normal.  PSYCH: Normal affect, mood, and behavior.    DIAGNOSTIC STUDIES:  LAB RESULTS:  Lab Services on 04/12/2022   Component Date Value Ref Range Status   • Cholesterol 04/12/2022 210 (A) <=199 mg/dL Final   • Triglycerides 04/12/2022 146  <=149 mg/dL Final   • HDL 04/12/2022 58  >=50 mg/dL Final   • LDL 04/12/2022 123  <=129 mg/dL Final   • Non-HDL Cholesterol 04/12/2022 152  mg/dL Final   • Cholesterol/ HDL Ratio 04/12/2022 3.6  <=4.4 Final   • Fasting Status 04/12/2022 12  0 - 999 Hours Final   • Sodium 04/12/2022 143  135 - 145 mmol/L Final   • Potassium 04/12/2022 4.2  3.4 - 5.1 mmol/L Final   • Chloride 04/12/2022 106  98 - 107 mmol/L Final   • Carbon Dioxide 04/12/2022 25  21 - 32 mmol/L Final   • Anion Gap 04/12/2022 16  10 - 20 mmol/L Final   • Glucose 04/12/2022 101 (A) 70 - 99 mg/dL Final   • BUN 04/12/2022 18  6 - 20 mg/dL Final   • Creatinine 04/12/2022 0.86  0.51 - 0.95 mg/dL Final   • Glomerular Filtration Rate 04/12/2022 74  >=60 Final   • BUN/ Creatinine Ratio 04/12/2022 21  7 - 25 Final   • Calcium 04/12/2022 9.5  8.4 - 10.2 mg/dL Final   • Bilirubin, Total 04/12/2022 0.6  0.2 - 1.0 mg/dL Final   • GOT/AST 04/12/2022 38 (A) <=37 Units/L Final   • GPT/ALT 04/12/2022 60  <64 Units/L Final   • Alkaline Phosphatase 04/12/2022 69  45 - 117 Units/L Final   • Albumin 04/12/2022 3.8   3.6 - 5.1 g/dL Final   • Protein, Total 04/12/2022 7.3  6.4 - 8.2 g/dL Final   • Globulin 04/12/2022 3.5  2.0 - 4.0 g/dL Final   • A/G Ratio 04/12/2022 1.1  1.0 - 2.4 Final       ASSESSMENT AND PLAN:  This is a 61 year old female who presents with :    1. Routine physical examination    2. Essential hypertension    3. Mild hyperlipidemia    4. NAFL (nonalcoholic fatty liver)    5. Encounter for screening mammogram for breast cancer    6. Screening for malignant neoplasm of cervix        Orders Placed This Encounter   • Mammogram - Screening, bilateral   • Comprehensive Metabolic Panel   • Lipid Panel With Reflex   • HPV, High Risk   • Pap Test   • hydroCHLOROthiazide (HYDRODIURIL) 25 MG tablet   • losartan (COZAAR) 25 MG tablet       Recent PHQ 2/9 Score    PHQ 2:  Date Adult PHQ 2 Score Adult PHQ 2 Interpretation   4/27/2022 0 No further screening needed       PHQ 9:     DEPRESSION ASSESSMENT/PLAN:  Depression screening is negative no further plan needed.        Plan:    1. Routine physical examination  Past medical, surgical, family, and social history were updated.  Medication list reviewed and reconciled.  Recent labs were reviewed.  Will continue to monitor blood glucose.  Recommended monitoring her diet. Discussed the importance of limiting portion sizes, limiting sugar intake and reducing calories.  Recommended shingles vaccination. Advised to check for insurance coverage.  Discussed benign nature of milia. Reassured that not concerning at this point. Continue to monitor ant notify if notices any changes.  Age appropriate screening measures were performed and reviewed.  Immunization records were updated.    2. Essential hypertension  Well controlled on Lisinopril and Hydrochlorothiazide, but she was having a cough with Lisinopril hence will discontinue that and start on Losartan.  Ordered CMP.  Prescribed Losartan 25 mg daily. Side effects explained.  Discontinued Lisinopril.  Continue Hydrochlorothiazide.  Refills provided.  Will have her come for a nurse BP check in 2-3 weeks after starting Losartan to make sure this is working well for the BP control.     3. Mild hyperlipidemia  Improved.  Reviewed labs.  Ordered lipid panel.  Continue current management.    4. NAFL (nonalcoholic fatty liver)  She does have one mild elevated liver enzyme.  Will continue to monitor.    5. Encounter for screening mammogram for breast cancer  Ordered Mammo screening bilateral    6. Screening for malignant neoplasm of cervix  Obtained pap test.     Refer to orders.  Medical compliance with plan discussed and risks of non-compliance reviewed.  Patient education completed on disease process, etiology & prognosis.  Proper usage and side effects of medications reviewed & discussed.  Return to clinic as clinically indicated as discussed with patient who verbalized understanding of the plan and is in agreement with the plan.    Return in about 1 year (around 4/27/2023) for CPE, nursing BP check in 2-3 weeks.    I,  Dr. Britney Sellers, have created a visit summary document based on the audio recording between Dr. Saúl Garber MD and this patient for the physician to review, edit as needed, and authenticate.  Creation Date: 4/27/2022     I have reviewed and edited the visit summary above and attest that it is accurate.       68

## 2023-12-05 NOTE — CHART NOTE - NSCHARTNOTEFT_GEN_A_CORE
TRANSFER NOTE:    HPI:    71 yo Female with an extensive medical history, including Bipolar on Lithium, and complex surgical history. 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 Patient sent in from [place] for episodes of vomiting. Pt had several episodes of vomiting and it was unknown if she has had bowel function. On arrival to the ED she was hemodynamically stable and afebrile. No leukocytosis, no elevated lactate. CT scan demonstrating high grade SBO with severely dilated loops of small bowel. Pt admitted to surgery service and plan for conservative management with NGT and bowel rest. Pt adamantly refused NGT placement by surgical team.  Pt was made NPO at this time. Pt had return of bowel function on  with flatus and small BM. Pt was advanced to clears and tolerating. Pt developed significant hypernatremia refractory to D5W. Pt developed polyuria but urine studies were not consistent with DI. Pt appeared under resuscitated on POCUS evaluation. Pt likely with free water deficit secondary to GI losses and now in polyuric phase of ATN. Pt has been off Lithium for several days. Pt transferred to Hospitalist service for further management with resolution of SBO.         MEDICATIONS  (STANDING):  atorvastatin 10 milliGRAM(s) Oral at bedtime  dextrose 5%. 1000 milliLiter(s) (150 mL/Hr) IV Continuous <Continuous>  dextrose 5%. 1000 milliLiter(s) (1000 mL/Hr) IV Continuous <Continuous>  escitalopram 10 milliGRAM(s) Oral daily  haloperidol    Injectable 5 milliGRAM(s) IntraMuscular every 12 hours  heparin   Injectable 5000 Unit(s) SubCutaneous every 8 hours  levothyroxine Injectable 75 MICROGram(s) IV Push at bedtime  pantoprazole  Injectable 20 milliGRAM(s) IV Push every 24 hours    MEDICATIONS  (PRN):  ondansetron Injectable 4 milliGRAM(s) IV Push every 6 hours PRN Nausea      Vital Signs Last 24 Hrs  T(C): 36.7 (05 Dec 2023 15:49), Max: 37.2 (04 Dec 2023 16:52)  T(F): 98 (05 Dec 2023 15:49), Max: 98.9 (04 Dec 2023 16:52)  HR: 67 (05 Dec 2023 15:49) (65 - 77)  BP: 165/72 (05 Dec 2023 15:49) (154/80 - 174/80)  BP(mean): --  RR: 18 (05 Dec 2023 15:49) (18 - 18)  SpO2: 95% (05 Dec 2023 15:49) (92% - 97%)    Parameters below as of 05 Dec 2023 15:49  Patient On (Oxygen Delivery Method): room air        Physical Exam:    Neurological:  Alert, answering questions with delayed response. No sensory/motor deficits    HEENT: PERRLA, EOMI, no drainage or redness    Respiratory: Breath Sounds equal & clear to auscultation, no accessory muscle use    Cardiovascular: Regular rate & rhythm, normal S1, S2; no murmurs, gallops or rubs    Gastrointestinal: Soft, NT, non reducible herniated bowel on R lateral abdomen with no guarding or rebound tenderness.     Extremities: No peripheral edema, No cyanosis, clubbing     Vascular: Equal and normal pulses: 2+ peripheral pulses throughout      I&O's Detail    04 Dec 2023 07:01  -  05 Dec 2023 07:00  --------------------------------------------------------  IN:    dextrose 5%: 1100 mL  Total IN: 1100 mL    OUT:    Indwelling Catheter - Urethral (mL): 2300 mL  Total OUT: 2300 mL    Total NET: -1200 mL      05 Dec 2023 07:01  -  05 Dec 2023 16:09  --------------------------------------------------------  IN:    dextrose 5%: 700 mL    Oral Fluid: 150 mL  Total IN: 850 mL    OUT:    Indwelling Catheter - Urethral (mL): 1700 mL  Total OUT: 1700 mL    Total NET: -850 mL          LABS:                        9.8    3.34  )-----------( 132      ( 05 Dec 2023 04:40 )             32.3     12-05    159<H>  |  124<H>  |  21.6<H>  ----------------------------<  110<H>  3.6   |  21.0<L>  |  1.60<H>    Ca    7.5<L>      05 Dec 2023 12:35  Phos  2.4     12-05  Mg     2.4     12-05        Urinalysis Basic - ( 05 Dec 2023 13:00 )    Color: Yellow / Appearance: Cloudy / S.008 / pH: x  Gluc: x / Ketone: 15 mg/dL  / Bili: Negative / Urobili: 0.2 mg/dL   Blood: x / Protein: 30 mg/dL / Nitrite: Negative   Leuk Esterase: Large / RBC: 2 /HPF / WBC 98 /HPF   Sq Epi: x / Non Sq Epi: 2 /HPF / Bacteria: Many /HPF      Assessment and Plan:    Neuro:  Bipolar  - Pt off Lithium at this time. Continuing Zyprexa. Will defer to medical team at this time with hypernatremia  - Psychiatry consulted: F/up continued recs for [place] patient  - Multimodal pain control   - delirium precautions  - Optimize sleep / wake cycle    CV:   - Continue hemodynamic monitoring  - Maintain MAP > 65    Pulm:   - Incentive spirometry  - Pulmonary toilet  - OOB to chair    GI/Nutrition: SBO  - Pt reporting flatus and small BM with improved and currently benign abdominal exam  - Pt on clears: ADAT. Plan for regular diet tomorrow  - Monitor bowel function and continue serial abdominal exams    /Renal: Hypernatremia  - Significant free water deficit refractory to D5W  - Pt developed polyuria: Urine studies not consistent with DI: Urine Na 84, Urine Osmol: 276  - D5W bolus given and fluid rate increased  - Continue to trend Na  - Pt off Lithium for several dasy  - Medical team to manage  - Chance for strict I&O  - monitor kidney fxn  - Replete lytes as needed    ID:  - Monitor fever curve  - Leukocytosis    Endo:  - monitor blood glucose    Skin:   - repositioning for DTI prevention while in bed    Heme/DVT Prophylaxis:  - SCDs  - Chemical prophylaxis with NEENA    Dispo:  - Patient to be transferred to Hospitalist service for further management  - Surgery team to follow TRANSFER NOTE:    HPI:    71 yo Female with an extensive medical history, including Bipolar on Lithium, and complex surgical history. 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 Patient sent in from [place] for episodes of vomiting. Pt had several episodes of vomiting and it was unknown if she has had bowel function. On arrival to the ED she was hemodynamically stable and afebrile. No leukocytosis, no elevated lactate. CT scan demonstrating high grade SBO with severely dilated loops of small bowel. Pt admitted to surgery service and plan for conservative management with NGT and bowel rest. Pt adamantly refused NGT placement by surgical team.  Pt was made NPO at this time. Pt had return of bowel function on  with flatus and small BM. Pt was advanced to clears and tolerating. Pt developed significant hypernatremia refractory to D5W. Pt developed polyuria but urine studies were not consistent with DI. Pt appeared under resuscitated on POCUS evaluation. Pt likely with free water deficit secondary to GI losses and now in polyuric phase of ATN. Pt has been off Lithium for several days. Pt transferred to Hospitalist service for further management with resolution of SBO.         MEDICATIONS  (STANDING):  atorvastatin 10 milliGRAM(s) Oral at bedtime  dextrose 5%. 1000 milliLiter(s) (150 mL/Hr) IV Continuous <Continuous>  dextrose 5%. 1000 milliLiter(s) (1000 mL/Hr) IV Continuous <Continuous>  escitalopram 10 milliGRAM(s) Oral daily  haloperidol    Injectable 5 milliGRAM(s) IntraMuscular every 12 hours  heparin   Injectable 5000 Unit(s) SubCutaneous every 8 hours  levothyroxine Injectable 75 MICROGram(s) IV Push at bedtime  pantoprazole  Injectable 20 milliGRAM(s) IV Push every 24 hours    MEDICATIONS  (PRN):  ondansetron Injectable 4 milliGRAM(s) IV Push every 6 hours PRN Nausea      Vital Signs Last 24 Hrs  T(C): 36.7 (05 Dec 2023 15:49), Max: 37.2 (04 Dec 2023 16:52)  T(F): 98 (05 Dec 2023 15:49), Max: 98.9 (04 Dec 2023 16:52)  HR: 67 (05 Dec 2023 15:49) (65 - 77)  BP: 165/72 (05 Dec 2023 15:49) (154/80 - 174/80)  BP(mean): --  RR: 18 (05 Dec 2023 15:49) (18 - 18)  SpO2: 95% (05 Dec 2023 15:49) (92% - 97%)    Parameters below as of 05 Dec 2023 15:49  Patient On (Oxygen Delivery Method): room air        Physical Exam:    Neurological:  Alert, answering questions with delayed response. No sensory/motor deficits    HEENT: PERRLA, EOMI, no drainage or redness    Respiratory: Breath Sounds equal & clear to auscultation, no accessory muscle use    Cardiovascular: Regular rate & rhythm, normal S1, S2; no murmurs, gallops or rubs    Gastrointestinal: Soft, NT, non reducible herniated bowel on R lateral abdomen with no guarding or rebound tenderness.     Extremities: No peripheral edema, No cyanosis, clubbing     Vascular: Equal and normal pulses: 2+ peripheral pulses throughout      I&O's Detail    04 Dec 2023 07:01  -  05 Dec 2023 07:00  --------------------------------------------------------  IN:    dextrose 5%: 1100 mL  Total IN: 1100 mL    OUT:    Indwelling Catheter - Urethral (mL): 2300 mL  Total OUT: 2300 mL    Total NET: -1200 mL      05 Dec 2023 07:01  -  05 Dec 2023 16:09  --------------------------------------------------------  IN:    dextrose 5%: 700 mL    Oral Fluid: 150 mL  Total IN: 850 mL    OUT:    Indwelling Catheter - Urethral (mL): 1700 mL  Total OUT: 1700 mL    Total NET: -850 mL          LABS:                        9.8    3.34  )-----------( 132      ( 05 Dec 2023 04:40 )             32.3     12-05    159<H>  |  124<H>  |  21.6<H>  ----------------------------<  110<H>  3.6   |  21.0<L>  |  1.60<H>    Ca    7.5<L>      05 Dec 2023 12:35  Phos  2.4     12-05  Mg     2.4     12-05        Urinalysis Basic - ( 05 Dec 2023 13:00 )    Color: Yellow / Appearance: Cloudy / S.008 / pH: x  Gluc: x / Ketone: 15 mg/dL  / Bili: Negative / Urobili: 0.2 mg/dL   Blood: x / Protein: 30 mg/dL / Nitrite: Negative   Leuk Esterase: Large / RBC: 2 /HPF / WBC 98 /HPF   Sq Epi: x / Non Sq Epi: 2 /HPF / Bacteria: Many /HPF      Assessment and Plan:    Neuro:  Bipolar  - Pt off Lithium at this time. Continuing Zyprexa. Will defer to medical team at this time with hypernatremia  - Psychiatry consulted: F/up continued recs for [place] patient  - Multimodal pain control   - delirium precautions  - Optimize sleep / wake cycle    CV:   - Continue hemodynamic monitoring  - Maintain MAP > 65    Pulm:   - Incentive spirometry  - Pulmonary toilet  - OOB to chair    GI/Nutrition: SBO  - Pt reporting flatus and small BM with improved and currently benign abdominal exam  - Pt on clears: ADAT. Plan for regular diet tomorrow  - Monitor bowel function and continue serial abdominal exams    /Renal: Hypernatremia  - Significant free water deficit refractory to D5W  - Pt developed polyuria: Urine studies not consistent with DI: Urine Na 84, Urine Osmol: 276  - D5W bolus given and fluid rate increased  - Continue to trend Na  - Pt off Lithium for several dasy  - Medical team to manage  - Chance for strict I&O  - monitor kidney fxn  - Replete lytes as needed    ID:  - Monitor fever curve  - Leukocytosis    Endo:  - monitor blood glucose    Skin:   - repositioning for DTI prevention while in bed    Heme/DVT Prophylaxis:  - SCDs  - Chemical prophylaxis with NEENA    Dispo:  - Patient to be transferred to Hospitalist service for further management  - Surgery team to follow TRANSFER NOTE:    HPI:    73 yo Female with an extensive medical history, including Bipolar on Lithium, and complex surgical history. 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 Patient sent in from [place] for episodes of vomiting. Pt had several episodes of vomiting and it was unknown if she has had bowel function. On arrival to the ED she was hemodynamically stable and afebrile. No leukocytosis, no elevated lactate. CT scan demonstrating high grade SBO with severely dilated loops of small bowel. Pt admitted to surgery service and plan for conservative management with NGT and bowel rest. Pt adamantly refused NGT placement by surgical team.  Pt was made NPO at this time. Pt had return of bowel function on  with flatus and small BM. Pt was advanced to clears and tolerating. Pt developed significant hypernatremia refractory to D5W. Pt developed polyuria but urine studies were not consistent with DI. Pt appeared under resuscitated on POCUS evaluation. Pt likely with free water deficit secondary to GI losses and now in polyuric phase of ATN. Pt has been off Lithium for several days. Pt transferred to Hospitalist service for further management with resolution of SBO.         MEDICATIONS  (STANDING):  atorvastatin 10 milliGRAM(s) Oral at bedtime  dextrose 5%. 1000 milliLiter(s) (150 mL/Hr) IV Continuous <Continuous>  dextrose 5%. 1000 milliLiter(s) (1000 mL/Hr) IV Continuous <Continuous>  escitalopram 10 milliGRAM(s) Oral daily  haloperidol    Injectable 5 milliGRAM(s) IntraMuscular every 12 hours  heparin   Injectable 5000 Unit(s) SubCutaneous every 8 hours  levothyroxine Injectable 75 MICROGram(s) IV Push at bedtime  pantoprazole  Injectable 20 milliGRAM(s) IV Push every 24 hours    MEDICATIONS  (PRN):  ondansetron Injectable 4 milliGRAM(s) IV Push every 6 hours PRN Nausea      Vital Signs Last 24 Hrs  T(C): 36.7 (05 Dec 2023 15:49), Max: 37.2 (04 Dec 2023 16:52)  T(F): 98 (05 Dec 2023 15:49), Max: 98.9 (04 Dec 2023 16:52)  HR: 67 (05 Dec 2023 15:49) (65 - 77)  BP: 165/72 (05 Dec 2023 15:49) (154/80 - 174/80)  BP(mean): --  RR: 18 (05 Dec 2023 15:49) (18 - 18)  SpO2: 95% (05 Dec 2023 15:49) (92% - 97%)    Parameters below as of 05 Dec 2023 15:49  Patient On (Oxygen Delivery Method): room air        Physical Exam:    Neurological:  Alert, answering questions with delayed response. No sensory/motor deficits    HEENT: PERRLA, EOMI, no drainage or redness    Respiratory: Breath Sounds equal & clear to auscultation, no accessory muscle use    Cardiovascular: Regular rate & rhythm, normal S1, S2; no murmurs, gallops or rubs    Gastrointestinal: Soft, NT, non reducible herniated bowel on R lateral abdomen with no guarding or rebound tenderness.     Extremities: No peripheral edema, No cyanosis, clubbing     Vascular: Equal and normal pulses: 2+ peripheral pulses throughout      I&O's Detail    04 Dec 2023 07:01  -  05 Dec 2023 07:00  --------------------------------------------------------  IN:    dextrose 5%: 1100 mL  Total IN: 1100 mL    OUT:    Indwelling Catheter - Urethral (mL): 2300 mL  Total OUT: 2300 mL    Total NET: -1200 mL      05 Dec 2023 07:01  -  05 Dec 2023 16:09  --------------------------------------------------------  IN:    dextrose 5%: 700 mL    Oral Fluid: 150 mL  Total IN: 850 mL    OUT:    Indwelling Catheter - Urethral (mL): 1700 mL  Total OUT: 1700 mL    Total NET: -850 mL          LABS:                        9.8    3.34  )-----------( 132      ( 05 Dec 2023 04:40 )             32.3     12-05    159<H>  |  124<H>  |  21.6<H>  ----------------------------<  110<H>  3.6   |  21.0<L>  |  1.60<H>    Ca    7.5<L>      05 Dec 2023 12:35  Phos  2.4     12-05  Mg     2.4     12-05        Urinalysis Basic - ( 05 Dec 2023 13:00 )    Color: Yellow / Appearance: Cloudy / S.008 / pH: x  Gluc: x / Ketone: 15 mg/dL  / Bili: Negative / Urobili: 0.2 mg/dL   Blood: x / Protein: 30 mg/dL / Nitrite: Negative   Leuk Esterase: Large / RBC: 2 /HPF / WBC 98 /HPF   Sq Epi: x / Non Sq Epi: 2 /HPF / Bacteria: Many /HPF      Assessment and Plan:    Neuro:  Bipolar  - Pt off Lithium at this time. Continuing Zyprexa. Will defer to medical team at this time with hypernatremia  - Psychiatry consulted: F/up continued recs for [place] patient  - Multimodal pain control   - delirium precautions  - Optimize sleep / wake cycle    CV:   - Continue hemodynamic monitoring  - Maintain MAP > 65    Pulm:   - Incentive spirometry  - Pulmonary toilet  - OOB to chair    GI/Nutrition: SBO  - Pt reporting flatus and small BM with improved and currently benign abdominal exam  - Pt on clears: ADAT. Plan for regular diet tomorrow  - Monitor bowel function and continue serial abdominal exams    /Renal: Hypernatremia  - Significant free water deficit refractory to D5W  - Pt developed polyuria: Urine studies not consistent with DI: Urine Na 84, Urine Osmol: 276  - Polyuria likely related to polyuric phase of ATN with noted renal recovery  - POCUS evaluation with 60-70% variability of IVC. No B-lines noted.   - D5W 1 L bolus given and fluid rate increased  - Continue to trend Na, repeat labs pending  - Pt off Lithium for several dasy  - Medical team to manage  - Chance for strict I&O  - monitor kidney fxn  - Replete lytes as needed    ID:  - Monitor fever curve  - Leukocytosis    Endo:  - monitor blood glucose    Skin:   - repositioning for DTI prevention while in bed    Heme/DVT Prophylaxis:  - SCDs  - Chemical prophylaxis with NEENA    Dispo:  - Patient to be transferred to Hospitalist service for further management  - Surgery team to follow TRANSFER NOTE:    HPI:    71 yo Female with an extensive medical history, including Bipolar on Lithium, and complex surgical history. 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 Patient sent in from [place] for episodes of vomiting. Pt had several episodes of vomiting and it was unknown if she has had bowel function. On arrival to the ED she was hemodynamically stable and afebrile. No leukocytosis, no elevated lactate. CT scan demonstrating high grade SBO with severely dilated loops of small bowel. Pt admitted to surgery service and plan for conservative management with NGT and bowel rest. Pt adamantly refused NGT placement by surgical team.  Pt was made NPO at this time. Pt had return of bowel function on  with flatus and small BM. Pt was advanced to clears and tolerating. Pt developed significant hypernatremia refractory to D5W. Pt developed polyuria but urine studies were not consistent with DI. Pt appeared under resuscitated on POCUS evaluation. Pt likely with free water deficit secondary to GI losses and now in polyuric phase of ATN. Pt has been off Lithium for several days. Pt transferred to Hospitalist service for further management with resolution of SBO.         MEDICATIONS  (STANDING):  atorvastatin 10 milliGRAM(s) Oral at bedtime  dextrose 5%. 1000 milliLiter(s) (150 mL/Hr) IV Continuous <Continuous>  dextrose 5%. 1000 milliLiter(s) (1000 mL/Hr) IV Continuous <Continuous>  escitalopram 10 milliGRAM(s) Oral daily  haloperidol    Injectable 5 milliGRAM(s) IntraMuscular every 12 hours  heparin   Injectable 5000 Unit(s) SubCutaneous every 8 hours  levothyroxine Injectable 75 MICROGram(s) IV Push at bedtime  pantoprazole  Injectable 20 milliGRAM(s) IV Push every 24 hours    MEDICATIONS  (PRN):  ondansetron Injectable 4 milliGRAM(s) IV Push every 6 hours PRN Nausea      Vital Signs Last 24 Hrs  T(C): 36.7 (05 Dec 2023 15:49), Max: 37.2 (04 Dec 2023 16:52)  T(F): 98 (05 Dec 2023 15:49), Max: 98.9 (04 Dec 2023 16:52)  HR: 67 (05 Dec 2023 15:49) (65 - 77)  BP: 165/72 (05 Dec 2023 15:49) (154/80 - 174/80)  BP(mean): --  RR: 18 (05 Dec 2023 15:49) (18 - 18)  SpO2: 95% (05 Dec 2023 15:49) (92% - 97%)    Parameters below as of 05 Dec 2023 15:49  Patient On (Oxygen Delivery Method): room air        Physical Exam:    Neurological:  Alert, answering questions with delayed response. No sensory/motor deficits    HEENT: PERRLA, EOMI, no drainage or redness    Respiratory: Breath Sounds equal & clear to auscultation, no accessory muscle use    Cardiovascular: Regular rate & rhythm, normal S1, S2; no murmurs, gallops or rubs    Gastrointestinal: Soft, NT, non reducible herniated bowel on R lateral abdomen with no guarding or rebound tenderness.     Extremities: No peripheral edema, No cyanosis, clubbing     Vascular: Equal and normal pulses: 2+ peripheral pulses throughout      I&O's Detail    04 Dec 2023 07:01  -  05 Dec 2023 07:00  --------------------------------------------------------  IN:    dextrose 5%: 1100 mL  Total IN: 1100 mL    OUT:    Indwelling Catheter - Urethral (mL): 2300 mL  Total OUT: 2300 mL    Total NET: -1200 mL      05 Dec 2023 07:01  -  05 Dec 2023 16:09  --------------------------------------------------------  IN:    dextrose 5%: 700 mL    Oral Fluid: 150 mL  Total IN: 850 mL    OUT:    Indwelling Catheter - Urethral (mL): 1700 mL  Total OUT: 1700 mL    Total NET: -850 mL          LABS:                        9.8    3.34  )-----------( 132      ( 05 Dec 2023 04:40 )             32.3     12-05    159<H>  |  124<H>  |  21.6<H>  ----------------------------<  110<H>  3.6   |  21.0<L>  |  1.60<H>    Ca    7.5<L>      05 Dec 2023 12:35  Phos  2.4     12-05  Mg     2.4     12-05        Urinalysis Basic - ( 05 Dec 2023 13:00 )    Color: Yellow / Appearance: Cloudy / S.008 / pH: x  Gluc: x / Ketone: 15 mg/dL  / Bili: Negative / Urobili: 0.2 mg/dL   Blood: x / Protein: 30 mg/dL / Nitrite: Negative   Leuk Esterase: Large / RBC: 2 /HPF / WBC 98 /HPF   Sq Epi: x / Non Sq Epi: 2 /HPF / Bacteria: Many /HPF      Assessment and Plan:    Neuro:  Bipolar  - Pt off Lithium at this time. Continuing Zyprexa. Will defer to medical team at this time with hypernatremia  - Psychiatry consulted: F/up continued recs for [place] patient  - Multimodal pain control   - delirium precautions  - Optimize sleep / wake cycle    CV:   - Continue hemodynamic monitoring  - Maintain MAP > 65    Pulm:   - Incentive spirometry  - Pulmonary toilet  - OOB to chair    GI/Nutrition: SBO  - Pt reporting flatus and small BM with improved and currently benign abdominal exam  - Pt on clears: ADAT. Plan for regular diet tomorrow  - Monitor bowel function and continue serial abdominal exams    /Renal: Hypernatremia  - Significant free water deficit refractory to D5W  - Pt developed polyuria: Urine studies not consistent with DI: Urine Na 84, Urine Osmol: 276  - Polyuria likely related to polyuric phase of ATN with noted renal recovery  - POCUS evaluation with 60-70% variability of IVC. No B-lines noted.   - D5W 1 L bolus given and fluid rate increased  - Continue to trend Na, repeat labs pending  - Pt off Lithium for several dasy  - Medical team to manage  - Chance for strict I&O  - monitor kidney fxn  - Replete lytes as needed    ID:  - Monitor fever curve  - Leukocytosis    Endo:  - monitor blood glucose    Skin:   - repositioning for DTI prevention while in bed    Heme/DVT Prophylaxis:  - SCDs  - Chemical prophylaxis with NEENA    Dispo:  - Patient to be transferred to Hospitalist service for further management  - Surgery team to follow

## 2023-12-05 NOTE — PROGRESS NOTE ADULT - SUBJECTIVE AND OBJECTIVE BOX
INTERVAL HPI/OVERNIGHT EVENTS:    Pt examined at bedside and in no distress laying in bed comfortably.  Pt states she is passing flatus and had small BM. Pt denies abdominal pain at this time. Pt continues with hypernatremia and receiving D5W IV. Pt denies chest pain, SOB, N/V, dizziness/lightheadedness.     MEDICATIONS  (STANDING):  atorvastatin 10 milliGRAM(s) Oral at bedtime  dextrose 5%. 1000 milliLiter(s) (100 mL/Hr) IV Continuous <Continuous>  dextrose 5%. 1000 milliLiter(s) (100 mL/Hr) IV Continuous <Continuous>  escitalopram 10 milliGRAM(s) Oral daily  haloperidol    Injectable 5 milliGRAM(s) IntraMuscular every 12 hours  heparin   Injectable 5000 Unit(s) SubCutaneous every 8 hours  levothyroxine Injectable 75 MICROGram(s) IV Push at bedtime  pantoprazole  Injectable 20 milliGRAM(s) IV Push every 24 hours    MEDICATIONS  (PRN):  ondansetron Injectable 4 milliGRAM(s) IV Push every 6 hours PRN Nausea      Vital Signs Last 24 Hrs  T(C): 36.9 (05 Dec 2023 09:20), Max: 37.2 (04 Dec 2023 16:52)  T(F): 98.5 (05 Dec 2023 09:20), Max: 98.9 (04 Dec 2023 16:52)  HR: 77 (05 Dec 2023 09:20) (65 - 77)  BP: 172/83 (05 Dec 2023 09:20) (154/80 - 174/80)  BP(mean): --  RR: 18 (05 Dec 2023 09:20) (18 - 18)  SpO2: 96% (05 Dec 2023 09:20) (92% - 97%)    Parameters below as of 05 Dec 2023 09:20  Patient On (Oxygen Delivery Method): room air        Physical Exam:    Neurological:  No sensory/motor deficits    HEENT: PERRLA, EOMI, no drainage or redness    Respiratory: Breath Sounds equal & clear to auscultation, no accessory muscle use    Cardiovascular: Regular rate & rhythm, normal S1, S2; no murmurs, gallops or rubs    Gastrointestinal: Soft, NT, non reducible herniated bowel on R lateral abdomen with no guarding or rebound tenderness.     Extremities: No peripheral edema, No cyanosis, clubbing     Vascular: Equal and normal pulses: 2+ peripheral pulses throughout        I&O's Detail    04 Dec 2023 07:01  -  05 Dec 2023 07:00  --------------------------------------------------------  IN:    dextrose 5%: 1100 mL  Total IN: 1100 mL    OUT:    Indwelling Catheter - Urethral (mL): 2300 mL  Total OUT: 2300 mL    Total NET: -1200 mL          LABS:                        9.8    3.34  )-----------( 132      ( 05 Dec 2023 04:40 )             32.3     12-05    159<H>  |  124<H>  |  21.6<H>  ----------------------------<  110<H>  3.6   |  21.0<L>  |  1.60<H>    Ca    7.5<L>      05 Dec 2023 12:35  Phos  2.4     12-05  Mg     2.4     12-05        Urinalysis Basic - ( 05 Dec 2023 12:35 )    Color: x / Appearance: x / SG: x / pH: x  Gluc: 110 mg/dL / Ketone: x  / Bili: x / Urobili: x   Blood: x / Protein: x / Nitrite: x   Leuk Esterase: x / RBC: x / WBC x   Sq Epi: x / Non Sq Epi: x / Bacteria: x        RADIOLOGY & ADDITIONAL STUDIES:

## 2023-12-05 NOTE — DIETITIAN INITIAL EVALUATION ADULT - CONTINUE CURRENT NUTRITION CARE PLAN
- Advance diet as medically feasible/tolerable to clear liquids and add Ensure Clear TID (240 kcal, 8g protein, 0g fat per serving)./yes

## 2023-12-05 NOTE — PROGRESS NOTE ADULT - ASSESSMENT
74 y/o female admitted for high grade SBO at ventral hernia and MONICA was afebrile and hemodynamically stable this morning. On exam patient's abdomen was soft, nontender with nonreducible herniated bowel on R lateral abdomen. Pt now with hypernatremia.    PLAN    Hypernatremia: Receiving D5W and started on free water now that patient approved for sips / chips  Sodium levels rising: Will perform volume assessment and add urine lytes, specific gravity with concern of Nephrogenic DI  Continue serial BMP  Pt with bowel function: Started on sips/ chips. Will advance as tolerated  Trend labs and replete PRN   Strict I/O's   Continue home medications   DVT prophylaxis: HSQ and SCDs  Dispo planning   72 y/o female admitted for high grade SBO at ventral hernia and MONICA was afebrile and hemodynamically stable this morning. On exam patient's abdomen was soft, nontender with nonreducible herniated bowel on R lateral abdomen. Pt now with hypernatremia.    PLAN    Hypernatremia: Receiving D5W and started on free water now that patient approved for sips / chips  Sodium levels rising: Will perform volume assessment and add urine lytes, specific gravity with concern of Nephrogenic DI  Continue serial BMP  Pt with bowel function: Started on sips/ chips. Will advance as tolerated  Trend labs and replete PRN   Strict I/O's   Continue home medications   DVT prophylaxis: HSQ and SCDs  Dispo planning

## 2023-12-05 NOTE — CONSULT NOTE ADULT - ASSESSMENT
Hypernatremia   UO < 3 L   Esr free water deficit 5.5 L to start   Remote h/o Lithium   poor intake     Continue the current IV D5W  Urine Na and Urine osm   prima fit - for strict I/O   Follow labs   Check TFT's   Keep electrolytes stable   No need for DDAVP at this moment     Will follow 
The patient appears stated age, fair hygiene and dressed appropriately.  The patient was calm, cooperative with the interview and maintained appropriate eye contact.  No psychomotor agitation or retardation noted.   The patient's speech was at times garbled, normal in tone, rate, and volume.  The patient's mood is anxious.  Affect is constricted..  The patient's thoughts are disorganized.  Denies any delusions or hallucinations. Denies any suicidal or homicidal ideation, intent, or plan.  Insight is fair.  Judgment is fair.  Impulse control has been fair.

## 2023-12-05 NOTE — CONSULT NOTE ADULT - SUBJECTIVE AND OBJECTIVE BOX
Patient is a 73y old  Female who presents with a chief complaint of small bowel obstruction (05 Dec 2023 19:44)      HPI:  74yo F w/ hx of multiple abdominal surgeries and prior bowel obstructions presenting with several episodes of vomiting and CT imaging consistent with SBO. Pt presents from [place] and is unable to provide history, history obtained from [facility] employee at bedside. Pt had several episodes of vomiting last night and one fever. Unknown if she has had bowel function. On arrival to the ED she was hemodynamically stable and afebrile. No leukocytosis, no elevated lactate. CT scan demonstrating high grade SBO with severely dilated loops of small bowel. Surgery team consulted for management.  (01 Dec 2023 15:04)    Admitted with  SBO 2/2 to large ventral hernia, reducible  Responded to conservative management   Refused NG Tube   ? History of DI   UO < 3 L   Intake poor   Remote history of Lithium , no longer on   She says , stopped years ago   Potassium low at times , calcium not high       PAST MEDICAL & SURGICAL HISTORY:  Venous insufficiency (chronic) (peripheral)      Constipation      Sensory hearing loss      GERD (gastroesophageal reflux disease)      Hypothyroidism      Schizoaffective disorder, bipolar type      Suicide attempt      Behavior problems  assaultive      Hemiparesis, left      Seizure      Osteopenia      Fall      Vaginal prolapse      Neutropenia      Anemia      Splenomegaly      CVA (cerebral vascular accident)      HTN (hypertension)      Obesity      Urinary incontinence      Schizo affective schizophrenia      Displaced fracture of olecranon process with intraarticular extension of left ulna      Rectal prolapse      Onychomycosis      Uterine prolapse      H/O ileostomy  4/2015      Parastomal hernia without obstruction or gangrene          FAMILY HISTORY:  Family history of psoriasis        Social History:    MEDICATIONS  (STANDING):  atorvastatin 10 milliGRAM(s) Oral at bedtime  dextrose 5%. 1000 milliLiter(s) (150 mL/Hr) IV Continuous <Continuous>  dextrose 5%. 1000 milliLiter(s) (1000 mL/Hr) IV Continuous <Continuous>  escitalopram 30 milliGRAM(s) Oral daily  haloperidol     Tablet 5 milliGRAM(s) Oral <User Schedule>  heparin   Injectable 5000 Unit(s) SubCutaneous every 8 hours  levothyroxine 112 MICROGram(s) Oral daily  LORazepam     Tablet 1 milliGRAM(s) Oral <User Schedule>  LORazepam     Tablet 0.5 milliGRAM(s) Oral <User Schedule>  multivitamin 1 Tablet(s) Oral daily  OLANZapine 5 milliGRAM(s) Oral daily  pantoprazole    Tablet 40 milliGRAM(s) Oral before breakfast  polyethylene glycol 3350 17 Gram(s) Oral at bedtime    MEDICATIONS  (PRN):  acetaminophen     Tablet .. 650 milliGRAM(s) Oral every 6 hours PRN Temp greater or equal to 38C (100.4F), Mild Pain (1 - 3)  ondansetron Injectable 4 milliGRAM(s) IV Push every 6 hours PRN Nausea      Allergies    clozapine (Other)  Depakote (Other)  Neupogen (Other)  erythromycin (Unknown)    Intolerances              Vital Signs Last 24 Hrs  T(C): 37.2 (05 Dec 2023 19:49), Max: 37.2 (05 Dec 2023 19:49)  T(F): 99 (05 Dec 2023 19:49), Max: 99 (05 Dec 2023 19:49)  HR: 73 (05 Dec 2023 19:49) (65 - 77)  BP: 157/75 (05 Dec 2023 19:49) (156/79 - 174/80)  BP(mean): --  RR: 18 (05 Dec 2023 19:49) (18 - 18)  SpO2: 94% (05 Dec 2023 19:49) (94% - 96%)    Parameters below as of 05 Dec 2023 19:49  Patient On (Oxygen Delivery Method): room air      Daily     Daily   I&O's Detail    04 Dec 2023 07:01  -  05 Dec 2023 07:00  --------------------------------------------------------  IN:    dextrose 5%: 1100 mL  Total IN: 1100 mL    OUT:    Indwelling Catheter - Urethral (mL): 2300 mL  Total OUT: 2300 mL    Total NET: -1200 mL      05 Dec 2023 07:01  -  05 Dec 2023 20:33  --------------------------------------------------------  IN:    dextrose 5%: 700 mL    dextrose 5%: 1000 mL    dextrose 5%: 600 mL    Oral Fluid: 390 mL  Total IN: 2690 mL    OUT:    Indwelling Catheter - Urethral (mL): 2100 mL  Total OUT: 2100 mL    Total NET: 590 mL        I&O's Summary    04 Dec 2023 07:01  -  05 Dec 2023 07:00  --------------------------------------------------------  IN: 1100 mL / OUT: 2300 mL / NET: -1200 mL    05 Dec 2023 07:01  -  05 Dec 2023 20:33  --------------------------------------------------------  IN: 2690 mL / OUT: 2100 mL / NET: 590 mL        PHYSICAL EXAM:    GENERAL: NAD, talkative   HEAD:  Atraumatic, Normocephalic, dry MM   NECK: Supple, No JVD, Normal thyroid  CHEST/LUNG: Clear to percussion bilaterally; No rales, rhonchi, wheezing, or rubs  HEART: Regular rate and rhythm; No murmurs, rubs, or gallops  ABDOMEN: Soft, Non rigidity or rbound   EXTREMITIES:  2+ Peripheral Pulses, No clubbing, cyanosis, or edema    LABS:                        9.8    3.34  )-----------( 132      ( 05 Dec 2023 04:40 )             32.3     12-05    154<H>  |  121<H>  |  19.2  ----------------------------<  118<H>  3.4<L>   |  20.0<L>  |  1.61<H>    Ca    7.3<L>      05 Dec 2023 17:15  Phos  2.4     12-05  Mg     2.4     12-05        Urinalysis Basic - ( 05 Dec 2023 17:15 )    Color: x / Appearance: x / SG: x / pH: x  Gluc: 118 mg/dL / Ketone: x  / Bili: x / Urobili: x   Blood: x / Protein: x / Nitrite: x   Leuk Esterase: x / RBC: x / WBC x   Sq Epi: x / Non Sq Epi: x / Bacteria: x      Magnesium: 2.4 mg/dL (12-05 @ 12:35)  Phosphorus: 2.4 mg/dL (12-05 @ 12:35)  Magnesium: 2.4 mg/dL (12-05 @ 04:40)  Phosphorus: 2.2 mg/dL (12-05 @ 04:40)  Magnesium: 2.2 mg/dL (12-05 @ 01:17)  Phosphorus: 1.3 mg/dL (12-05 @ 01:17)          RADIOLOGY & ADDITIONAL TESTS:   Patient is a 73y old  Female who presents with a chief complaint of small bowel obstruction (05 Dec 2023 19:44)      HPI:  74yo F w/ hx of multiple abdominal surgeries and prior bowel obstructions presenting with several episodes of vomiting and CT imaging consistent with SBO. Pt presents from [place] and is unable to provide history, history obtained from [facility] employee at bedside. Pt had several episodes of vomiting last night and one fever. Unknown if she has had bowel function. On arrival to the ED she was hemodynamically stable and afebrile. No leukocytosis, no elevated lactate. CT scan demonstrating high grade SBO with severely dilated loops of small bowel. Surgery team consulted for management.  (01 Dec 2023 15:04)    Admitted with  SBO 2/2 to large ventral hernia, reducible  Responded to conservative management   Refused NG Tube   ? History of DI   UO < 3 L   Intake poor   Remote history of Lithium , no longer on   She says , stopped years ago   Potassium low at times , calcium not high       PAST MEDICAL & SURGICAL HISTORY:  Venous insufficiency (chronic) (peripheral)      Constipation      Sensory hearing loss      GERD (gastroesophageal reflux disease)      Hypothyroidism      Schizoaffective disorder, bipolar type      Suicide attempt      Behavior problems  assaultive      Hemiparesis, left      Seizure      Osteopenia      Fall      Vaginal prolapse      Neutropenia      Anemia      Splenomegaly      CVA (cerebral vascular accident)      HTN (hypertension)      Obesity      Urinary incontinence      Schizo affective schizophrenia      Displaced fracture of olecranon process with intraarticular extension of left ulna      Rectal prolapse      Onychomycosis      Uterine prolapse      H/O ileostomy  4/2015      Parastomal hernia without obstruction or gangrene          FAMILY HISTORY:  Family history of psoriasis        Social History:    MEDICATIONS  (STANDING):  atorvastatin 10 milliGRAM(s) Oral at bedtime  dextrose 5%. 1000 milliLiter(s) (150 mL/Hr) IV Continuous <Continuous>  dextrose 5%. 1000 milliLiter(s) (1000 mL/Hr) IV Continuous <Continuous>  escitalopram 30 milliGRAM(s) Oral daily  haloperidol     Tablet 5 milliGRAM(s) Oral <User Schedule>  heparin   Injectable 5000 Unit(s) SubCutaneous every 8 hours  levothyroxine 112 MICROGram(s) Oral daily  LORazepam     Tablet 1 milliGRAM(s) Oral <User Schedule>  LORazepam     Tablet 0.5 milliGRAM(s) Oral <User Schedule>  multivitamin 1 Tablet(s) Oral daily  OLANZapine 5 milliGRAM(s) Oral daily  pantoprazole    Tablet 40 milliGRAM(s) Oral before breakfast  polyethylene glycol 3350 17 Gram(s) Oral at bedtime    MEDICATIONS  (PRN):  acetaminophen     Tablet .. 650 milliGRAM(s) Oral every 6 hours PRN Temp greater or equal to 38C (100.4F), Mild Pain (1 - 3)  ondansetron Injectable 4 milliGRAM(s) IV Push every 6 hours PRN Nausea      Allergies    clozapine (Other)  Depakote (Other)  Neupogen (Other)  erythromycin (Unknown)    Intolerances              Vital Signs Last 24 Hrs  T(C): 37.2 (05 Dec 2023 19:49), Max: 37.2 (05 Dec 2023 19:49)  T(F): 99 (05 Dec 2023 19:49), Max: 99 (05 Dec 2023 19:49)  HR: 73 (05 Dec 2023 19:49) (65 - 77)  BP: 157/75 (05 Dec 2023 19:49) (156/79 - 174/80)  BP(mean): --  RR: 18 (05 Dec 2023 19:49) (18 - 18)  SpO2: 94% (05 Dec 2023 19:49) (94% - 96%)    Parameters below as of 05 Dec 2023 19:49  Patient On (Oxygen Delivery Method): room air      Daily     Daily   I&O's Detail    04 Dec 2023 07:01  -  05 Dec 2023 07:00  --------------------------------------------------------  IN:    dextrose 5%: 1100 mL  Total IN: 1100 mL    OUT:    Indwelling Catheter - Urethral (mL): 2300 mL  Total OUT: 2300 mL    Total NET: -1200 mL      05 Dec 2023 07:01  -  05 Dec 2023 20:33  --------------------------------------------------------  IN:    dextrose 5%: 700 mL    dextrose 5%: 1000 mL    dextrose 5%: 600 mL    Oral Fluid: 390 mL  Total IN: 2690 mL    OUT:    Indwelling Catheter - Urethral (mL): 2100 mL  Total OUT: 2100 mL    Total NET: 590 mL        I&O's Summary    04 Dec 2023 07:01  -  05 Dec 2023 07:00  --------------------------------------------------------  IN: 1100 mL / OUT: 2300 mL / NET: -1200 mL    05 Dec 2023 07:01  -  05 Dec 2023 20:33  --------------------------------------------------------  IN: 2690 mL / OUT: 2100 mL / NET: 590 mL        PHYSICAL EXAM:    GENERAL: NAD, talkative   HEAD:  Atraumatic, Normocephalic, dry MM   NECK: Supple, No JVD, Normal thyroid  CHEST/LUNG: Clear to percussion bilaterally; No rales, rhonchi, wheezing, or rubs  HEART: Regular rate and rhythm; No murmurs, rubs, or gallops  ABDOMEN: Soft, Non rigidity or rbound   EXTREMITIES:  2+ Peripheral Pulses, No clubbing, cyanosis, or edema    LABS:                        9.8    3.34  )-----------( 132      ( 05 Dec 2023 04:40 )             32.3     12-05    154<H>  |  121<H>  |  19.2  ----------------------------<  118<H>  3.4<L>   |  20.0<L>  |  1.61<H>    Ca    7.3<L>      05 Dec 2023 17:15  Phos  2.4     12-05  Mg     2.4     12-05        Urinalysis Basic - ( 05 Dec 2023 17:15 )    Color: x / Appearance: x / SG: x / pH: x  Gluc: 118 mg/dL / Ketone: x  / Bili: x / Urobili: x   Blood: x / Protein: x / Nitrite: x   Leuk Esterase: x / RBC: x / WBC x   Sq Epi: x / Non Sq Epi: x / Bacteria: x      Magnesium: 2.4 mg/dL (12-05 @ 12:35)  Phosphorus: 2.4 mg/dL (12-05 @ 12:35)  Magnesium: 2.4 mg/dL (12-05 @ 04:40)  Phosphorus: 2.2 mg/dL (12-05 @ 04:40)  Magnesium: 2.2 mg/dL (12-05 @ 01:17)  Phosphorus: 1.3 mg/dL (12-05 @ 01:17)          RADIOLOGY & ADDITIONAL TESTS:   Patient is a 73y old  Female who presents with a chief complaint of small bowel obstruction (05 Dec 2023 19:44)      HPI:  72yo F w/ hx of multiple abdominal surgeries and prior bowel obstructions presenting with several episodes of vomiting and CT imaging consistent with SBO. Pt presents from [facility] and is unable to provide history, history obtained from [facility] employee at bedside. Pt had several episodes of vomiting last night and one fever. Unknown if she has had bowel function. On arrival to the ED she was hemodynamically stable and afebrile. No leukocytosis, no elevated lactate. CT scan demonstrating high grade SBO with severely dilated loops of small bowel. Surgery team consulted for management.  (01 Dec 2023 15:04)    Admitted with  SBO 2/2 to large ventral hernia, reducible  Responded to conservative management   Refused NG Tube   ? History of DI   UO < 3 L   Intake poor   Remote history of Lithium , no longer on   She says , stopped years ago   Potassium low at times , calcium not high       PAST MEDICAL & SURGICAL HISTORY:  Venous insufficiency (chronic) (peripheral)      Constipation      Sensory hearing loss      GERD (gastroesophageal reflux disease)      Hypothyroidism      Schizoaffective disorder, bipolar type      Suicide attempt      Behavior problems  assaultive      Hemiparesis, left      Seizure      Osteopenia      Fall      Vaginal prolapse      Neutropenia      Anemia      Splenomegaly      CVA (cerebral vascular accident)      HTN (hypertension)      Obesity      Urinary incontinence      Schizo affective schizophrenia      Displaced fracture of olecranon process with intraarticular extension of left ulna      Rectal prolapse      Onychomycosis      Uterine prolapse      H/O ileostomy  4/2015      Parastomal hernia without obstruction or gangrene          FAMILY HISTORY:  Family history of psoriasis        Social History:    MEDICATIONS  (STANDING):  atorvastatin 10 milliGRAM(s) Oral at bedtime  dextrose 5%. 1000 milliLiter(s) (150 mL/Hr) IV Continuous <Continuous>  dextrose 5%. 1000 milliLiter(s) (1000 mL/Hr) IV Continuous <Continuous>  escitalopram 30 milliGRAM(s) Oral daily  haloperidol     Tablet 5 milliGRAM(s) Oral <User Schedule>  heparin   Injectable 5000 Unit(s) SubCutaneous every 8 hours  levothyroxine 112 MICROGram(s) Oral daily  LORazepam     Tablet 1 milliGRAM(s) Oral <User Schedule>  LORazepam     Tablet 0.5 milliGRAM(s) Oral <User Schedule>  multivitamin 1 Tablet(s) Oral daily  OLANZapine 5 milliGRAM(s) Oral daily  pantoprazole    Tablet 40 milliGRAM(s) Oral before breakfast  polyethylene glycol 3350 17 Gram(s) Oral at bedtime    MEDICATIONS  (PRN):  acetaminophen     Tablet .. 650 milliGRAM(s) Oral every 6 hours PRN Temp greater or equal to 38C (100.4F), Mild Pain (1 - 3)  ondansetron Injectable 4 milliGRAM(s) IV Push every 6 hours PRN Nausea      Allergies    clozapine (Other)  Depakote (Other)  Neupogen (Other)  erythromycin (Unknown)    Intolerances              Vital Signs Last 24 Hrs  T(C): 37.2 (05 Dec 2023 19:49), Max: 37.2 (05 Dec 2023 19:49)  T(F): 99 (05 Dec 2023 19:49), Max: 99 (05 Dec 2023 19:49)  HR: 73 (05 Dec 2023 19:49) (65 - 77)  BP: 157/75 (05 Dec 2023 19:49) (156/79 - 174/80)  BP(mean): --  RR: 18 (05 Dec 2023 19:49) (18 - 18)  SpO2: 94% (05 Dec 2023 19:49) (94% - 96%)    Parameters below as of 05 Dec 2023 19:49  Patient On (Oxygen Delivery Method): room air      Daily     Daily   I&O's Detail    04 Dec 2023 07:01  -  05 Dec 2023 07:00  --------------------------------------------------------  IN:    dextrose 5%: 1100 mL  Total IN: 1100 mL    OUT:    Indwelling Catheter - Urethral (mL): 2300 mL  Total OUT: 2300 mL    Total NET: -1200 mL      05 Dec 2023 07:01  -  05 Dec 2023 20:33  --------------------------------------------------------  IN:    dextrose 5%: 700 mL    dextrose 5%: 1000 mL    dextrose 5%: 600 mL    Oral Fluid: 390 mL  Total IN: 2690 mL    OUT:    Indwelling Catheter - Urethral (mL): 2100 mL  Total OUT: 2100 mL    Total NET: 590 mL        I&O's Summary    04 Dec 2023 07:01  -  05 Dec 2023 07:00  --------------------------------------------------------  IN: 1100 mL / OUT: 2300 mL / NET: -1200 mL    05 Dec 2023 07:01  -  05 Dec 2023 20:33  --------------------------------------------------------  IN: 2690 mL / OUT: 2100 mL / NET: 590 mL        PHYSICAL EXAM:    GENERAL: NAD, talkative   HEAD:  Atraumatic, Normocephalic, dry MM   NECK: Supple, No JVD, Normal thyroid  CHEST/LUNG: Clear to percussion bilaterally; No rales, rhonchi, wheezing, or rubs  HEART: Regular rate and rhythm; No murmurs, rubs, or gallops  ABDOMEN: Soft, Non rigidity or rbound   EXTREMITIES:  2+ Peripheral Pulses, No clubbing, cyanosis, or edema    LABS:                        9.8    3.34  )-----------( 132      ( 05 Dec 2023 04:40 )             32.3     12-05    154<H>  |  121<H>  |  19.2  ----------------------------<  118<H>  3.4<L>   |  20.0<L>  |  1.61<H>    Ca    7.3<L>      05 Dec 2023 17:15  Phos  2.4     12-05  Mg     2.4     12-05        Urinalysis Basic - ( 05 Dec 2023 17:15 )    Color: x / Appearance: x / SG: x / pH: x  Gluc: 118 mg/dL / Ketone: x  / Bili: x / Urobili: x   Blood: x / Protein: x / Nitrite: x   Leuk Esterase: x / RBC: x / WBC x   Sq Epi: x / Non Sq Epi: x / Bacteria: x      Magnesium: 2.4 mg/dL (12-05 @ 12:35)  Phosphorus: 2.4 mg/dL (12-05 @ 12:35)  Magnesium: 2.4 mg/dL (12-05 @ 04:40)  Phosphorus: 2.2 mg/dL (12-05 @ 04:40)  Magnesium: 2.2 mg/dL (12-05 @ 01:17)  Phosphorus: 1.3 mg/dL (12-05 @ 01:17)        < from: CT Abdomen and Pelvis w/ Oral Cont and w/ IV Cont (12.01.23 @ 13:09) >    ACC: [id number] EXAM:  CT ABDOMEN AND PELVIS OC IC   ORDERED BY: PAU CARRENO ABU     PROCEDURE DATE:  12/01/2023          INTERPRETATION:  CLINICAL INFORMATION: right lower quadrant pain, small   bowel obstruction, extensive abdominal surgical history    COMPARISON: CT abdomen/pelvis 6/21/2023, CT abdomen/pelvis 3/19/2018    CONTRAST/COMPLICATIONS:  IV Contrast: Omnipaque 350  90 cc administered  Oral Contrast: Omnipaque 300   Fruit 2o  Complications: None reported at time of study completion    PROCEDURE:  CT of the Abdomen and Pelvis was performed.  Sagittal and coronal reformats were performed.    FINDINGS:  LOWER CHEST: Probable atelectatic density posterior left lung base.   Unchanged hiatus hernia 6.5 cm in width.    LIVER: Unchanged hepatomegaly. Hepatic cysts.  BILE DUCTS: Normal in caliber.  GALLBLADDER: No intraluminal calcification or pericholecystic fluid.  SPLEEN: Mildly enlarged with numerous focal low-attenuation lesions that   are grossly unchanged from 3/19/2018.  PANCREAS: Unremarkable.  ADRENALS: No mass.  KIDNEYS/URETERS: Diffusely heterogeneous enhancement of both kidneys with   numerous low-attenuation areas throughout kidneys. No renal or ureteral   calcification or hydronephrosis    BLADDER: Prominently fluiddistended but otherwise grossly unremarkable.  REPRODUCTIVE ORGANS: Status post hysterectomy.    BOWEL: High grade small bowel obstruction at level of the lateral lower   abdominal wall hernia with marked dilatation of small bowel loops in and   proximal to hernia up to approximately 7 cm in caliber, similar in   appearance to that on 6/21/2023.  PERITONEUM: No ascites or free air.  VESSELS: No aneurysm. Atherosclerotic calcification.  RETROPERITONEUM/LYMPH NODES: No significant lymphadenopathy.  ABDOMINAL WALL: Large right lateral lower abdominal wall hernia measuring   approximately 23 cm CC x 12 cm W x 20 cm AP.  BONES: Unchanged in appearance with markedly exaggerated lumbosacral   lordosis, moderate-marked anterior compression of T12, multilevel   degenerative changes in spine, and old rib fractures.    IMPRESSION:    High grade small bowel obstruction at level of the lateral lower   abdominal wall hernia with marked dilatation of small bowel loops in and   proximal to hernia up to approximately 7 cm in caliber, similar in   appearance to that on 6/21/2023.    Splenomegaly with numerous focal low-attenuation lesions that are grossly   unchanged from 3/19/2018.    Unchanged hepatomegaly.    Diffusely heterogeneous enhancement of both kidneys with numerous   low-attenuation areas throughout kidneys. Findings could be secondary to   marked renal insufficiency with the possibilities of infection and   infarcts not be excluded. Clinical and laboratory correlation recommended.    --- End of Report ---            PAYTON SNEED MD; Attending Nuclear Medicine  This document has been electronically signed. Dec  1 2023  2:10PM    < end of copied text >    RADIOLOGY & ADDITIONAL TESTS:   Patient is a 73y old  Female who presents with a chief complaint of small bowel obstruction (05 Dec 2023 19:44)      HPI:  72yo F w/ hx of multiple abdominal surgeries and prior bowel obstructions presenting with several episodes of vomiting and CT imaging consistent with SBO. Pt presents from [place] and is unable to provide history, history obtained from [facility] employee at bedside. Pt had several episodes of vomiting last night and one fever. Unknown if she has had bowel function. On arrival to the ED she was hemodynamically stable and afebrile. No leukocytosis, no elevated lactate. CT scan demonstrating high grade SBO with severely dilated loops of small bowel. Surgery team consulted for management.  (01 Dec 2023 15:04)    Admitted with  SBO 2/2 to large ventral hernia, reducible  Responded to conservative management   Refused NG Tube   ? History of DI   UO < 3 L   Intake poor   Remote history of Lithium , no longer on   She says , stopped years ago   Potassium low at times , calcium not high       PAST MEDICAL & SURGICAL HISTORY:  Venous insufficiency (chronic) (peripheral)      Constipation      Sensory hearing loss      GERD (gastroesophageal reflux disease)      Hypothyroidism      Schizoaffective disorder, bipolar type      Suicide attempt      Behavior problems  assaultive      Hemiparesis, left      Seizure      Osteopenia      Fall      Vaginal prolapse      Neutropenia      Anemia      Splenomegaly      CVA (cerebral vascular accident)      HTN (hypertension)      Obesity      Urinary incontinence      Schizo affective schizophrenia      Displaced fracture of olecranon process with intraarticular extension of left ulna      Rectal prolapse      Onychomycosis      Uterine prolapse      H/O ileostomy  4/2015      Parastomal hernia without obstruction or gangrene          FAMILY HISTORY:  Family history of psoriasis        Social History:    MEDICATIONS  (STANDING):  atorvastatin 10 milliGRAM(s) Oral at bedtime  dextrose 5%. 1000 milliLiter(s) (150 mL/Hr) IV Continuous <Continuous>  dextrose 5%. 1000 milliLiter(s) (1000 mL/Hr) IV Continuous <Continuous>  escitalopram 30 milliGRAM(s) Oral daily  haloperidol     Tablet 5 milliGRAM(s) Oral <User Schedule>  heparin   Injectable 5000 Unit(s) SubCutaneous every 8 hours  levothyroxine 112 MICROGram(s) Oral daily  LORazepam     Tablet 1 milliGRAM(s) Oral <User Schedule>  LORazepam     Tablet 0.5 milliGRAM(s) Oral <User Schedule>  multivitamin 1 Tablet(s) Oral daily  OLANZapine 5 milliGRAM(s) Oral daily  pantoprazole    Tablet 40 milliGRAM(s) Oral before breakfast  polyethylene glycol 3350 17 Gram(s) Oral at bedtime    MEDICATIONS  (PRN):  acetaminophen     Tablet .. 650 milliGRAM(s) Oral every 6 hours PRN Temp greater or equal to 38C (100.4F), Mild Pain (1 - 3)  ondansetron Injectable 4 milliGRAM(s) IV Push every 6 hours PRN Nausea      Allergies    clozapine (Other)  Depakote (Other)  Neupogen (Other)  erythromycin (Unknown)    Intolerances              Vital Signs Last 24 Hrs  T(C): 37.2 (05 Dec 2023 19:49), Max: 37.2 (05 Dec 2023 19:49)  T(F): 99 (05 Dec 2023 19:49), Max: 99 (05 Dec 2023 19:49)  HR: 73 (05 Dec 2023 19:49) (65 - 77)  BP: 157/75 (05 Dec 2023 19:49) (156/79 - 174/80)  BP(mean): --  RR: 18 (05 Dec 2023 19:49) (18 - 18)  SpO2: 94% (05 Dec 2023 19:49) (94% - 96%)    Parameters below as of 05 Dec 2023 19:49  Patient On (Oxygen Delivery Method): room air      Daily     Daily   I&O's Detail    04 Dec 2023 07:01  -  05 Dec 2023 07:00  --------------------------------------------------------  IN:    dextrose 5%: 1100 mL  Total IN: 1100 mL    OUT:    Indwelling Catheter - Urethral (mL): 2300 mL  Total OUT: 2300 mL    Total NET: -1200 mL      05 Dec 2023 07:01  -  05 Dec 2023 20:33  --------------------------------------------------------  IN:    dextrose 5%: 700 mL    dextrose 5%: 1000 mL    dextrose 5%: 600 mL    Oral Fluid: 390 mL  Total IN: 2690 mL    OUT:    Indwelling Catheter - Urethral (mL): 2100 mL  Total OUT: 2100 mL    Total NET: 590 mL        I&O's Summary    04 Dec 2023 07:01  -  05 Dec 2023 07:00  --------------------------------------------------------  IN: 1100 mL / OUT: 2300 mL / NET: -1200 mL    05 Dec 2023 07:01  -  05 Dec 2023 20:33  --------------------------------------------------------  IN: 2690 mL / OUT: 2100 mL / NET: 590 mL        PHYSICAL EXAM:    GENERAL: NAD, talkative   HEAD:  Atraumatic, Normocephalic, dry MM   NECK: Supple, No JVD, Normal thyroid  CHEST/LUNG: Clear to percussion bilaterally; No rales, rhonchi, wheezing, or rubs  HEART: Regular rate and rhythm; No murmurs, rubs, or gallops  ABDOMEN: Soft, Non rigidity or rbound   EXTREMITIES:  2+ Peripheral Pulses, No clubbing, cyanosis, or edema    LABS:                        9.8    3.34  )-----------( 132      ( 05 Dec 2023 04:40 )             32.3     12-05    154<H>  |  121<H>  |  19.2  ----------------------------<  118<H>  3.4<L>   |  20.0<L>  |  1.61<H>    Ca    7.3<L>      05 Dec 2023 17:15  Phos  2.4     12-05  Mg     2.4     12-05        Urinalysis Basic - ( 05 Dec 2023 17:15 )    Color: x / Appearance: x / SG: x / pH: x  Gluc: 118 mg/dL / Ketone: x  / Bili: x / Urobili: x   Blood: x / Protein: x / Nitrite: x   Leuk Esterase: x / RBC: x / WBC x   Sq Epi: x / Non Sq Epi: x / Bacteria: x      Magnesium: 2.4 mg/dL (12-05 @ 12:35)  Phosphorus: 2.4 mg/dL (12-05 @ 12:35)  Magnesium: 2.4 mg/dL (12-05 @ 04:40)  Phosphorus: 2.2 mg/dL (12-05 @ 04:40)  Magnesium: 2.2 mg/dL (12-05 @ 01:17)  Phosphorus: 1.3 mg/dL (12-05 @ 01:17)        < from: CT Abdomen and Pelvis w/ Oral Cont and w/ IV Cont (12.01.23 @ 13:09) >    ACC: [id number] EXAM:  CT ABDOMEN AND PELVIS OC IC   ORDERED BY: PAU CARRENO ABU     PROCEDURE DATE:  12/01/2023          INTERPRETATION:  CLINICAL INFORMATION: right lower quadrant pain, small   bowel obstruction, extensive abdominal surgical history    COMPARISON: CT abdomen/pelvis 6/21/2023, CT abdomen/pelvis 3/19/2018    CONTRAST/COMPLICATIONS:  IV Contrast: Omnipaque 350  90 cc administered  Oral Contrast: Omnipaque 300   Fruit 2o  Complications: None reported at time of study completion    PROCEDURE:  CT of the Abdomen and Pelvis was performed.  Sagittal and coronal reformats were performed.    FINDINGS:  LOWER CHEST: Probable atelectatic density posterior left lung base.   Unchanged hiatus hernia 6.5 cm in width.    LIVER: Unchanged hepatomegaly. Hepatic cysts.  BILE DUCTS: Normal in caliber.  GALLBLADDER: No intraluminal calcification or pericholecystic fluid.  SPLEEN: Mildly enlarged with numerous focal low-attenuation lesions that   are grossly unchanged from 3/19/2018.  PANCREAS: Unremarkable.  ADRENALS: No mass.  KIDNEYS/URETERS: Diffusely heterogeneous enhancement of both kidneys with   numerous low-attenuation areas throughout kidneys. No renal or ureteral   calcification or hydronephrosis    BLADDER: Prominently fluiddistended but otherwise grossly unremarkable.  REPRODUCTIVE ORGANS: Status post hysterectomy.    BOWEL: High grade small bowel obstruction at level of the lateral lower   abdominal wall hernia with marked dilatation of small bowel loops in and   proximal to hernia up to approximately 7 cm in caliber, similar in   appearance to that on 6/21/2023.  PERITONEUM: No ascites or free air.  VESSELS: No aneurysm. Atherosclerotic calcification.  RETROPERITONEUM/LYMPH NODES: No significant lymphadenopathy.  ABDOMINAL WALL: Large right lateral lower abdominal wall hernia measuring   approximately 23 cm CC x 12 cm W x 20 cm AP.  BONES: Unchanged in appearance with markedly exaggerated lumbosacral   lordosis, moderate-marked anterior compression of T12, multilevel   degenerative changes in spine, and old rib fractures.    IMPRESSION:    High grade small bowel obstruction at level of the lateral lower   abdominal wall hernia with marked dilatation of small bowel loops in and   proximal to hernia up to approximately 7 cm in caliber, similar in   appearance to that on 6/21/2023.    Splenomegaly with numerous focal low-attenuation lesions that are grossly   unchanged from 3/19/2018.    Unchanged hepatomegaly.    Diffusely heterogeneous enhancement of both kidneys with numerous   low-attenuation areas throughout kidneys. Findings could be secondary to   marked renal insufficiency with the possibilities of infection and   infarcts not be excluded. Clinical and laboratory correlation recommended.    --- End of Report ---            PAYTON SNEED MD; Attending Nuclear Medicine  This document has been electronically signed. Dec  1 2023  2:10PM    < end of copied text >    RADIOLOGY & ADDITIONAL TESTS:

## 2023-12-05 NOTE — CONSULT NOTE ADULT - PROBLEM SELECTOR RECOMMENDATION 9
nothing new to add  at baseline mental state as patient well known to me  cooperative with care  plan is for return to [place] once medically appropriate  continue meds per [place] nothing new to add  at baseline mental state as patient well known to me  cooperative with care  plan is for return to [place] once medically appropriate  continue meds per [place]

## 2023-12-05 NOTE — PROGRESS NOTE ADULT - SUBJECTIVE AND OBJECTIVE BOX
Patient is a 73y old  Female who presents with a chief complaint of small bowel obstruction (05 Dec 2023 16:41)      HPI:  72yo F w/ hx of multiple abdominal surgeries and prior bowel obstructions presenting with several episodes of vomiting and CT imaging consistent with SBO. Pt presents from [place] and is unable to provide history, history obtained from [employer] employee at bedside. Pt had several episodes of vomiting last night and one fever. Unknown if she has had bowel function. On arrival to the ED she was hemodynamically stable and afebrile. No leukocytosis, no elevated lactate. CT scan demonstrating high grade SBO with severely dilated loops of small bowel. Surgery team consulted for management.  (01 Dec 2023 15:04)    Interim noted   IVF noted - Changed to D5W   + [place]   Reported history of DI       PAST MEDICAL & SURGICAL HISTORY:  Venous insufficiency (chronic) (peripheral)      Constipation      Sensory hearing loss      GERD (gastroesophageal reflux disease)      Hypothyroidism      Schizoaffective disorder, bipolar type      Suicide attempt      Behavior problems  assaultive      Hemiparesis, left      Seizure      Osteopenia      Fall      Vaginal prolapse      Neutropenia      Anemia      Splenomegaly      CVA (cerebral vascular accident)      HTN (hypertension)      Obesity      Urinary incontinence      Schizo affective schizophrenia      Displaced fracture of olecranon process with intraarticular extension of left ulna      Rectal prolapse      Onychomycosis      Uterine prolapse      H/O ileostomy  4/2015      Parastomal hernia without obstruction or gangrene          FAMILY HISTORY:  Family history of psoriasis        Social History:    MEDICATIONS  (STANDING):  atorvastatin 10 milliGRAM(s) Oral at bedtime  dextrose 5%. 1000 milliLiter(s) (150 mL/Hr) IV Continuous <Continuous>  dextrose 5%. 1000 milliLiter(s) (1000 mL/Hr) IV Continuous <Continuous>  escitalopram 30 milliGRAM(s) Oral daily  haloperidol     Tablet 5 milliGRAM(s) Oral <User Schedule>  heparin   Injectable 5000 Unit(s) SubCutaneous every 8 hours  levothyroxine 112 MICROGram(s) Oral daily  LORazepam     Tablet 1 milliGRAM(s) Oral <User Schedule>  LORazepam     Tablet 0.5 milliGRAM(s) Oral <User Schedule>  multivitamin 1 Tablet(s) Oral daily  OLANZapine 5 milliGRAM(s) Oral daily  pantoprazole    Tablet 40 milliGRAM(s) Oral before breakfast  polyethylene glycol 3350 17 Gram(s) Oral at bedtime    MEDICATIONS  (PRN):  acetaminophen     Tablet .. 650 milliGRAM(s) Oral every 6 hours PRN Temp greater or equal to 38C (100.4F), Mild Pain (1 - 3)  ondansetron Injectable 4 milliGRAM(s) IV Push every 6 hours PRN Nausea      Allergies    clozapine (Other)  Depakote (Other)  Neupogen (Other)  erythromycin (Unknown)    Intolerances            Vital Signs Last 24 Hrs  T(C): 36.7 (05 Dec 2023 15:49), Max: 36.9 (05 Dec 2023 00:00)  T(F): 98 (05 Dec 2023 15:49), Max: 98.5 (05 Dec 2023 09:20)  HR: 67 (05 Dec 2023 15:49) (65 - 77)  BP: 165/72 (05 Dec 2023 15:49) (156/79 - 174/80)  BP(mean): --  RR: 18 (05 Dec 2023 15:49) (18 - 18)  SpO2: 95% (05 Dec 2023 15:49) (94% - 96%)    Parameters below as of 05 Dec 2023 15:49  Patient On (Oxygen Delivery Method): room air      Daily     Daily   I&O's Detail    04 Dec 2023 07:01  -  05 Dec 2023 07:00  --------------------------------------------------------  IN:    dextrose 5%: 1100 mL  Total IN: 1100 mL    OUT:    Indwelling Catheter - Urethral (mL): 2300 mL  Total OUT: 2300 mL    Total NET: -1200 mL      05 Dec 2023 07:01  -  05 Dec 2023 19:44  --------------------------------------------------------  IN:    dextrose 5%: 700 mL    dextrose 5%: 1000 mL    dextrose 5%: 600 mL    Oral Fluid: 390 mL  Total IN: 2690 mL    OUT:    Indwelling Catheter - Urethral (mL): 2100 mL  Total OUT: 2100 mL    Total NET: 590 mL        I&O's Summary    04 Dec 2023 07:01  -  05 Dec 2023 07:00  --------------------------------------------------------  IN: 1100 mL / OUT: 2300 mL / NET: -1200 mL    05 Dec 2023 07:01  -  05 Dec 2023 19:44  --------------------------------------------------------  IN: 2690 mL / OUT: 2100 mL / NET: 590 mL        PHYSICAL EXAM:    GENERAL: NAD, well-groomed, well-developed  HEAD:  Atraumatic, Normocephalic  EYES: EOMI, PERRLA, conjunctiva and sclera clear  ENMT: No tonsillar erythema, exudates, or enlargement; Moist mucous membranes, Good dentition, No lesions  NECK: Supple, No JVD, Normal thyroid  CHEST/LUNG: Clear to percussion bilaterally; No rales, rhonchi, wheezing, or rubs  HEART: Regular rate and rhythm; No murmurs, rubs, or gallops  ABDOMEN: Soft, Nontender, Nondistended; Bowel sounds present  EXTREMITIES:  2+ Peripheral Pulses, No clubbing, cyanosis, or edema        LABS:                        9.8    3.34  )-----------( 132      ( 05 Dec 2023 04:40 )             32.3     12-05    154<H>  |  121<H>  |  19.2  ----------------------------<  118<H>  3.4<L>   |  20.0<L>  |  1.61<H>    Ca    7.3<L>      05 Dec 2023 17:15  Phos  2.4     12-05  Mg     2.4     12-05        Urinalysis Basic - ( 05 Dec 2023 17:15 )    Color: x / Appearance: x / SG: x / pH: x  Gluc: 118 mg/dL / Ketone: x  / Bili: x / Urobili: x   Blood: x / Protein: x / Nitrite: x   Leuk Esterase: x / RBC: x / WBC x   Sq Epi: x / Non Sq Epi: x / Bacteria: x      Magnesium: 2.4 mg/dL (12-05 @ 12:35)  Phosphorus: 2.4 mg/dL (12-05 @ 12:35)  Magnesium: 2.4 mg/dL (12-05 @ 04:40)  Phosphorus: 2.2 mg/dL (12-05 @ 04:40)  Magnesium: 2.2 mg/dL (12-05 @ 01:17)  Phosphorus: 1.3 mg/dL (12-05 @ 01:17)          RADIOLOGY & ADDITIONAL TESTS:   Patient is a 73y old  Female who presents with a chief complaint of small bowel obstruction (05 Dec 2023 16:41)      HPI:  74yo F w/ hx of multiple abdominal surgeries and prior bowel obstructions presenting with several episodes of vomiting and CT imaging consistent with SBO. Pt presents from [place] and is unable to provide history, history obtained from [employer] employee at bedside. Pt had several episodes of vomiting last night and one fever. Unknown if she has had bowel function. On arrival to the ED she was hemodynamically stable and afebrile. No leukocytosis, no elevated lactate. CT scan demonstrating high grade SBO with severely dilated loops of small bowel. Surgery team consulted for management.  (01 Dec 2023 15:04)    Interim noted   IVF noted - Changed to D5W   + [place]   Reported history of DI       PAST MEDICAL & SURGICAL HISTORY:  Venous insufficiency (chronic) (peripheral)      Constipation      Sensory hearing loss      GERD (gastroesophageal reflux disease)      Hypothyroidism      Schizoaffective disorder, bipolar type      Suicide attempt      Behavior problems  assaultive      Hemiparesis, left      Seizure      Osteopenia      Fall      Vaginal prolapse      Neutropenia      Anemia      Splenomegaly      CVA (cerebral vascular accident)      HTN (hypertension)      Obesity      Urinary incontinence      Schizo affective schizophrenia      Displaced fracture of olecranon process with intraarticular extension of left ulna      Rectal prolapse      Onychomycosis      Uterine prolapse      H/O ileostomy  4/2015      Parastomal hernia without obstruction or gangrene          FAMILY HISTORY:  Family history of psoriasis        Social History:    MEDICATIONS  (STANDING):  atorvastatin 10 milliGRAM(s) Oral at bedtime  dextrose 5%. 1000 milliLiter(s) (150 mL/Hr) IV Continuous <Continuous>  dextrose 5%. 1000 milliLiter(s) (1000 mL/Hr) IV Continuous <Continuous>  escitalopram 30 milliGRAM(s) Oral daily  haloperidol     Tablet 5 milliGRAM(s) Oral <User Schedule>  heparin   Injectable 5000 Unit(s) SubCutaneous every 8 hours  levothyroxine 112 MICROGram(s) Oral daily  LORazepam     Tablet 1 milliGRAM(s) Oral <User Schedule>  LORazepam     Tablet 0.5 milliGRAM(s) Oral <User Schedule>  multivitamin 1 Tablet(s) Oral daily  OLANZapine 5 milliGRAM(s) Oral daily  pantoprazole    Tablet 40 milliGRAM(s) Oral before breakfast  polyethylene glycol 3350 17 Gram(s) Oral at bedtime    MEDICATIONS  (PRN):  acetaminophen     Tablet .. 650 milliGRAM(s) Oral every 6 hours PRN Temp greater or equal to 38C (100.4F), Mild Pain (1 - 3)  ondansetron Injectable 4 milliGRAM(s) IV Push every 6 hours PRN Nausea      Allergies    clozapine (Other)  Depakote (Other)  Neupogen (Other)  erythromycin (Unknown)    Intolerances            Vital Signs Last 24 Hrs  T(C): 36.7 (05 Dec 2023 15:49), Max: 36.9 (05 Dec 2023 00:00)  T(F): 98 (05 Dec 2023 15:49), Max: 98.5 (05 Dec 2023 09:20)  HR: 67 (05 Dec 2023 15:49) (65 - 77)  BP: 165/72 (05 Dec 2023 15:49) (156/79 - 174/80)  BP(mean): --  RR: 18 (05 Dec 2023 15:49) (18 - 18)  SpO2: 95% (05 Dec 2023 15:49) (94% - 96%)    Parameters below as of 05 Dec 2023 15:49  Patient On (Oxygen Delivery Method): room air      Daily     Daily   I&O's Detail    04 Dec 2023 07:01  -  05 Dec 2023 07:00  --------------------------------------------------------  IN:    dextrose 5%: 1100 mL  Total IN: 1100 mL    OUT:    Indwelling Catheter - Urethral (mL): 2300 mL  Total OUT: 2300 mL    Total NET: -1200 mL      05 Dec 2023 07:01  -  05 Dec 2023 19:44  --------------------------------------------------------  IN:    dextrose 5%: 700 mL    dextrose 5%: 1000 mL    dextrose 5%: 600 mL    Oral Fluid: 390 mL  Total IN: 2690 mL    OUT:    Indwelling Catheter - Urethral (mL): 2100 mL  Total OUT: 2100 mL    Total NET: 590 mL        I&O's Summary    04 Dec 2023 07:01  -  05 Dec 2023 07:00  --------------------------------------------------------  IN: 1100 mL / OUT: 2300 mL / NET: -1200 mL    05 Dec 2023 07:01  -  05 Dec 2023 19:44  --------------------------------------------------------  IN: 2690 mL / OUT: 2100 mL / NET: 590 mL        PHYSICAL EXAM:    GENERAL: NAD, well-groomed, well-developed  HEAD:  Atraumatic, Normocephalic  EYES: EOMI, PERRLA, conjunctiva and sclera clear  ENMT: No tonsillar erythema, exudates, or enlargement; Moist mucous membranes, Good dentition, No lesions  NECK: Supple, No JVD, Normal thyroid  CHEST/LUNG: Clear to percussion bilaterally; No rales, rhonchi, wheezing, or rubs  HEART: Regular rate and rhythm; No murmurs, rubs, or gallops  ABDOMEN: Soft, Nontender, Nondistended; Bowel sounds present  EXTREMITIES:  2+ Peripheral Pulses, No clubbing, cyanosis, or edema        LABS:                        9.8    3.34  )-----------( 132      ( 05 Dec 2023 04:40 )             32.3     12-05    154<H>  |  121<H>  |  19.2  ----------------------------<  118<H>  3.4<L>   |  20.0<L>  |  1.61<H>    Ca    7.3<L>      05 Dec 2023 17:15  Phos  2.4     12-05  Mg     2.4     12-05        Urinalysis Basic - ( 05 Dec 2023 17:15 )    Color: x / Appearance: x / SG: x / pH: x  Gluc: 118 mg/dL / Ketone: x  / Bili: x / Urobili: x   Blood: x / Protein: x / Nitrite: x   Leuk Esterase: x / RBC: x / WBC x   Sq Epi: x / Non Sq Epi: x / Bacteria: x      Magnesium: 2.4 mg/dL (12-05 @ 12:35)  Phosphorus: 2.4 mg/dL (12-05 @ 12:35)  Magnesium: 2.4 mg/dL (12-05 @ 04:40)  Phosphorus: 2.2 mg/dL (12-05 @ 04:40)  Magnesium: 2.2 mg/dL (12-05 @ 01:17)  Phosphorus: 1.3 mg/dL (12-05 @ 01:17)          RADIOLOGY & ADDITIONAL TESTS:   Consult to follow

## 2023-12-05 NOTE — DIETITIAN NUTRITION RISK NOTIFICATION - ADDITIONAL COMMENTS/DIETITIAN RECOMMENDATIONS
Advance diet as medically feasible/tolerable to clear liquids and add Ensure Clear TID (240 kcal, 8g protein, 0g fat per serving).  Rx: MVI daily as feasible. Obtain daily weights to monitor trends.

## 2023-12-05 NOTE — DIETITIAN INITIAL EVALUATION ADULT - ETIOLOGY
related to inability to meet sufficient protein-energy in setting of schizoaffective disorder and poor appetite, now with high grade SBO due to large ventral hernia

## 2023-12-05 NOTE — DIETITIAN INITIAL EVALUATION ADULT - NS FNS DIET ORDER
Diet, NPO:   Except Medications     Special Instructions for Nursing:  Except Medications (12-01-23 @ 15:19)

## 2023-12-05 NOTE — PROGRESS NOTE ADULT - ASSESSMENT
The patient is a 73 year old female with a past meidcal history of schizophrenia, hypothyroidism, hypertension, Nephrogenic DI,  and prior SBO who was sent to the ER from [place] for complaints of abdominal pain. In the ER, CT abdomen and pelvis consistent with high grade SBO. Patient was admitted to ACS, Patient refused NGT placement. NPO and started on IVF. Blood cultures negative x2. Course complicated by MONICA on CKD stage 3 with metabolic acidosis and hypernatremia. IV fluids were changed to D5W with no improement. SBO Improved diet was advanced. Now transferred to Medicine service for management of hypernatremia    Assessment/Plan:    1. Hypernatremia  - History of Nephrogenic DI   - Output ~2 liters since morning  - On D5W  - Nephrology consulted for recommendations    2. MONICA on CKD stage 3 with metabolic acidosis   - Baseline creatinine of 1.6  - IMproved with IV fluids   - MOnitor CMP    3. High grade SBO  - Patient refused NGT placement  - Abdominal pain improved, + BM  - Diet advanvced     4. Schizophrenia  - Continue Zyprexa, Haldol, and ativan     5. Hyperlipidemia  - On Statin    6. Hypothyroidism  - On Synthroid     7. Depression   - On Lexapro       VTE= Heparin subcut       [place] records and medications reviewed    Plan for discharge back to [place] pending improvement in hyponatremia  The patient is a 73 year old female with a past meidcal history of schizophrenia, hypothyroidism, hypertension, Nephrogenic DI,  and prior SBO who was sent to the ER from [place] for complaints of abdominal pain. In the ER, CT abdomen and pelvis consistent with high grade SBO. Patient was admitted to ACS, Patient refused NGT placement. NPO and started on IVF. Blood cultures negative x2. Course complicated by MONICA on CKD stage 3 with metabolic acidosis and hypernatremia. IV fluids were changed to D5W with no improement. SBO Improved diet was advanced. Now transferred to Medicine service for management of hypernatremia    Assessment/Plan:    1. Hypernatremia  - History of Nephrogenic DI   - Output ~2 liters since morning  - On D5W  - Nephrology consulted for recommendations    2. MONICA on CKD stage 3 with metabolic acidosis   - Baseline creatinine of 1.6  - IMproved with IV fluids   - MOnitor CMP    3. High grade SBO  - Patient refused NGT placement  - Abdominal pain improved, + BM  - Diet advanvced     4. Schizophrenia  - Continue Zyprexa, Haldol, and ativan     5. Hyperlipidemia  - On Statin    6. Hypothyroidism  - On Synthroid     7. Depression   - On Lexapro       VTE= Heparin subcut       [place] records and medications reviewed    Plan for discharge back to [place] pending improvement in hyponatremia  The patient is a 73 year old female with a past meidcal history of schizophrenia, hypothyroidism, hypertension, Nephrogenic DI,  and prior SBO who was sent to the ER from [place] for complaints of abdominal pain. In the ER, CT abdomen and pelvis consistent with high grade SBO. Patient was admitted to ACS, Patient refused NGT placement. NPO and started on IVF. Blood cultures negative x2. Course complicated by MONICA on CKD stage 3 with metabolic acidosis and hypernatremia. IV fluids were changed to D5W with no improement. SBO Improved diet was advanced. Now transferred to Medicine service for management of hypernatremia    Assessment/Plan:    1. Hypernatremia  - History of Nephrogenic DI   - Output ~2 liters since morning  - On D5W  - Nephrology consulted for recommendations    2. MONICA on CKD stage 3 with metabolic acidosis   - Baseline creatinine of 1.6  - IMproved with IV fluids   - MOnitor CMP    3. High grade SBO  - Patient refused NGT placement  - Abdominal pain improved, + BM  - Diet advanvced     4. Schizophrenia  - Continue Zyprexa, Haldol, and ativan     5. Hyperlipidemia  - On Statin    6. Hypothyroidism  - On Synthroid     7. Depression   - On Lexapro     8. Pancytopenia  - Seen at [facility] on 10/2/23 for pancytopenia by Hematology. Transfused 1 unit PRBC and started on iron   -follow up with hematology as outpatient       VTE= Heparin subcut       [place] records and medications reviewed    Plan for discharge back to [place] pending improvement in hyponatremia  The patient is a 73 year old female with a past meidcal history of schizophrenia, hypothyroidism, hypertension, Nephrogenic DI,  and prior SBO who was sent to the ER from [place] for complaints of abdominal pain. In the ER, CT abdomen and pelvis consistent with high grade SBO. Patient was admitted to ACS, Patient refused NGT placement. NPO and started on IVF. Blood cultures negative x2. Course complicated by MONICA on CKD stage 3 with metabolic acidosis and hypernatremia. IV fluids were changed to D5W with no improement. SBO Improved diet was advanced. Now transferred to Medicine service for management of hypernatremia    Assessment/Plan:    1. Hypernatremia  - History of Nephrogenic DI   - Output ~2 liters since morning  - On D5W  - Nephrology consulted for recommendations    2. MONICA on CKD stage 3 with metabolic acidosis   - Baseline creatinine of 1.6  - IMproved with IV fluids   - MOnitor CMP    3. High grade SBO  - Patient refused NGT placement  - Abdominal pain improved, + BM  - Diet advanvced     4. Schizophrenia  - Continue Zyprexa, Haldol, and ativan     5. Hyperlipidemia  - On Statin    6. Hypothyroidism  - On Synthroid     7. Depression   - On Lexapro     8. Pancytopenia  - Seen at [facility] on 10/2/23 for pancytopenia by Hematology. Transfused 1 unit PRBC and started on iron   -follow up with hematology as outpatient       VTE= Heparin subcut       [place] records and medications reviewed    Plan for discharge back to [place] pending improvement in hyponatremia

## 2023-12-05 NOTE — DIETITIAN INITIAL EVALUATION ADULT - PERTINENT MEDS FT
MEDICATIONS  (STANDING):  atorvastatin 10 milliGRAM(s) Oral at bedtime  dextrose 5%. 1000 milliLiter(s) (100 mL/Hr) IV Continuous <Continuous>  haloperidol    Injectable 5 milliGRAM(s) IntraMuscular every 12 hours  levothyroxine Injectable 75 MICROGram(s) IV Push at bedtime  pantoprazole  Injectable 20 milliGRAM(s) IV Push every 24 hours  potassium phosphate IVPB 15 milliMole(s) IV Intermittent once    MEDICATIONS  (PRN):  ondansetron Injectable 4 milliGRAM(s) IV Push every 6 hours PRN Nausea

## 2023-12-05 NOTE — PROGRESS NOTE ADULT - SUBJECTIVE AND OBJECTIVE BOX
Transfer to Hospitalist Service:     INTERVAL HPI/OVERNIGHT EVENTS: Patient seen and examined, awake. Denies nausea vomiting or abdominal pain Afebrile       Vital Signs Last 24 Hrs  T(C): 36.7 (05 Dec 2023 15:49), Max: 37.2 (04 Dec 2023 16:52)  T(F): 98 (05 Dec 2023 15:49), Max: 98.9 (04 Dec 2023 16:52)  HR: 67 (05 Dec 2023 15:49) (65 - 77)  BP: 165/72 (05 Dec 2023 15:49) (154/80 - 174/80)  BP(mean): --  RR: 18 (05 Dec 2023 15:49) (18 - 18)  SpO2: 95% (05 Dec 2023 15:49) (92% - 97%)    Parameters below as of 05 Dec 2023 15:49  Patient On (Oxygen Delivery Method): room air        PHYSICAL EXAM:    GENERAL: NAD, AOX2  HEAD:  Atraumatic, Normocephalic  EYES: conjunctiva and sclera clear  ENMT: Moist mucous membranes  CHEST/LUNG: Clear to auscultation bilaterally; No rales, rhonchi, wheezing, or rubs  HEART: Regular rate and rhythm; No murmurs, rubs, or gallops  ABDOMEN: Soft, Nontender, + Distended, Ventral hernia ; Bowel sounds present  EXTREMITIES:  2+ Peripheral Pulses, No clubbing, cyanosis, or edema        MEDICATIONS  (STANDING):  atorvastatin 10 milliGRAM(s) Oral at bedtime  dextrose 5%. 1000 milliLiter(s) (150 mL/Hr) IV Continuous <Continuous>  dextrose 5%. 1000 milliLiter(s) (1000 mL/Hr) IV Continuous <Continuous>  escitalopram 30 milliGRAM(s) Oral daily  haloperidol     Tablet 5 milliGRAM(s) Oral <User Schedule>  heparin   Injectable 5000 Unit(s) SubCutaneous every 8 hours  levothyroxine 112 MICROGram(s) Oral daily  LORazepam     Tablet 1 milliGRAM(s) Oral <User Schedule>  LORazepam     Tablet 0.5 milliGRAM(s) Oral <User Schedule>  multivitamin 1 Tablet(s) Oral daily  OLANZapine 5 milliGRAM(s) Oral daily  pantoprazole    Tablet 40 milliGRAM(s) Oral before breakfast  polyethylene glycol 3350 17 Gram(s) Oral at bedtime    MEDICATIONS  (PRN):  acetaminophen     Tablet .. 650 milliGRAM(s) Oral every 6 hours PRN Temp greater or equal to 38C (100.4F), Mild Pain (1 - 3)  ondansetron Injectable 4 milliGRAM(s) IV Push every 6 hours PRN Nausea      Allergies    clozapine (Other)  Depakote (Other)  Neupogen (Other)  erythromycin (Unknown)    Intolerances          LABS:                          9.8    3.34  )-----------( 132      ( 05 Dec 2023 04:40 )             32.3     12-05    159<H>  |  124<H>  |  21.6<H>  ----------------------------<  110<H>  3.6   |  21.0<L>  |  1.60<H>    Ca    7.5<L>      05 Dec 2023 12:35  Phos  2.4     12-  Mg     2.4     12-05        Urinalysis Basic - ( 05 Dec 2023 13:00 )    Color: Yellow / Appearance: Cloudy / S.008 / pH: x  Gluc: x / Ketone: 15 mg/dL  / Bili: Negative / Urobili: 0.2 mg/dL   Blood: x / Protein: 30 mg/dL / Nitrite: Negative   Leuk Esterase: Large / RBC: 2 /HPF / WBC 98 /HPF   Sq Epi: x / Non Sq Epi: 2 /HPF / Bacteria: Many /HPF        RADIOLOGY & ADDITIONAL TESTS:

## 2023-12-06 DIAGNOSIS — N17.9 ACUTE KIDNEY FAILURE, UNSPECIFIED: ICD-10-CM

## 2023-12-06 DIAGNOSIS — E87.0 HYPEROSMOLALITY AND HYPERNATREMIA: ICD-10-CM

## 2023-12-06 DIAGNOSIS — D61.818 OTHER PANCYTOPENIA: ICD-10-CM

## 2023-12-06 DIAGNOSIS — E23.2 DIABETES INSIPIDUS: ICD-10-CM

## 2023-12-06 DIAGNOSIS — E87.20 ACIDOSIS, UNSPECIFIED: ICD-10-CM

## 2023-12-06 DIAGNOSIS — E03.9 HYPOTHYROIDISM, UNSPECIFIED: ICD-10-CM

## 2023-12-06 LAB
ANION GAP SERPL CALC-SCNC: 10 MMOL/L — SIGNIFICANT CHANGE UP (ref 5–17)
ANION GAP SERPL CALC-SCNC: 10 MMOL/L — SIGNIFICANT CHANGE UP (ref 5–17)
BASOPHILS # BLD AUTO: 0.04 K/UL — SIGNIFICANT CHANGE UP (ref 0–0.2)
BASOPHILS # BLD AUTO: 0.04 K/UL — SIGNIFICANT CHANGE UP (ref 0–0.2)
BASOPHILS NFR BLD AUTO: 1.3 % — SIGNIFICANT CHANGE UP (ref 0–2)
BASOPHILS NFR BLD AUTO: 1.3 % — SIGNIFICANT CHANGE UP (ref 0–2)
BUN SERPL-MCNC: 16.5 MG/DL — SIGNIFICANT CHANGE UP (ref 8–20)
BUN SERPL-MCNC: 16.5 MG/DL — SIGNIFICANT CHANGE UP (ref 8–20)
CALCIUM SERPL-MCNC: 7.1 MG/DL — LOW (ref 8.4–10.5)
CALCIUM SERPL-MCNC: 7.1 MG/DL — LOW (ref 8.4–10.5)
CHLORIDE SERPL-SCNC: 118 MMOL/L — HIGH (ref 96–108)
CHLORIDE SERPL-SCNC: 118 MMOL/L — HIGH (ref 96–108)
CO2 SERPL-SCNC: 23 MMOL/L — SIGNIFICANT CHANGE UP (ref 22–29)
CO2 SERPL-SCNC: 23 MMOL/L — SIGNIFICANT CHANGE UP (ref 22–29)
CREAT SERPL-MCNC: 1.61 MG/DL — HIGH (ref 0.5–1.3)
CREAT SERPL-MCNC: 1.61 MG/DL — HIGH (ref 0.5–1.3)
CULTURE RESULTS: SIGNIFICANT CHANGE UP
EGFR: 34 ML/MIN/1.73M2 — LOW
EGFR: 34 ML/MIN/1.73M2 — LOW
EOSINOPHIL # BLD AUTO: 0.09 K/UL — SIGNIFICANT CHANGE UP (ref 0–0.5)
EOSINOPHIL # BLD AUTO: 0.09 K/UL — SIGNIFICANT CHANGE UP (ref 0–0.5)
EOSINOPHIL NFR BLD AUTO: 2.9 % — SIGNIFICANT CHANGE UP (ref 0–6)
EOSINOPHIL NFR BLD AUTO: 2.9 % — SIGNIFICANT CHANGE UP (ref 0–6)
GLUCOSE SERPL-MCNC: 105 MG/DL — HIGH (ref 70–99)
GLUCOSE SERPL-MCNC: 105 MG/DL — HIGH (ref 70–99)
HCT VFR BLD CALC: 32.9 % — LOW (ref 34.5–45)
HCT VFR BLD CALC: 32.9 % — LOW (ref 34.5–45)
HGB BLD-MCNC: 10 G/DL — LOW (ref 11.5–15.5)
HGB BLD-MCNC: 10 G/DL — LOW (ref 11.5–15.5)
IMM GRANULOCYTES NFR BLD AUTO: 6.1 % — HIGH (ref 0–0.9)
IMM GRANULOCYTES NFR BLD AUTO: 6.1 % — HIGH (ref 0–0.9)
LYMPHOCYTES # BLD AUTO: 1.04 K/UL — SIGNIFICANT CHANGE UP (ref 1–3.3)
LYMPHOCYTES # BLD AUTO: 1.04 K/UL — SIGNIFICANT CHANGE UP (ref 1–3.3)
LYMPHOCYTES # BLD AUTO: 33.3 % — SIGNIFICANT CHANGE UP (ref 13–44)
LYMPHOCYTES # BLD AUTO: 33.3 % — SIGNIFICANT CHANGE UP (ref 13–44)
MAGNESIUM SERPL-MCNC: 2.2 MG/DL — SIGNIFICANT CHANGE UP (ref 1.6–2.6)
MAGNESIUM SERPL-MCNC: 2.2 MG/DL — SIGNIFICANT CHANGE UP (ref 1.6–2.6)
MCHC RBC-ENTMCNC: 30 PG — SIGNIFICANT CHANGE UP (ref 27–34)
MCHC RBC-ENTMCNC: 30 PG — SIGNIFICANT CHANGE UP (ref 27–34)
MCHC RBC-ENTMCNC: 30.4 GM/DL — LOW (ref 32–36)
MCHC RBC-ENTMCNC: 30.4 GM/DL — LOW (ref 32–36)
MCV RBC AUTO: 98.8 FL — SIGNIFICANT CHANGE UP (ref 80–100)
MCV RBC AUTO: 98.8 FL — SIGNIFICANT CHANGE UP (ref 80–100)
MONOCYTES # BLD AUTO: 0.39 K/UL — SIGNIFICANT CHANGE UP (ref 0–0.9)
MONOCYTES # BLD AUTO: 0.39 K/UL — SIGNIFICANT CHANGE UP (ref 0–0.9)
MONOCYTES NFR BLD AUTO: 12.5 % — SIGNIFICANT CHANGE UP (ref 2–14)
MONOCYTES NFR BLD AUTO: 12.5 % — SIGNIFICANT CHANGE UP (ref 2–14)
NEUTROPHILS # BLD AUTO: 1.37 K/UL — LOW (ref 1.8–7.4)
NEUTROPHILS # BLD AUTO: 1.37 K/UL — LOW (ref 1.8–7.4)
NEUTROPHILS NFR BLD AUTO: 43.9 % — SIGNIFICANT CHANGE UP (ref 43–77)
NEUTROPHILS NFR BLD AUTO: 43.9 % — SIGNIFICANT CHANGE UP (ref 43–77)
OSMOLALITY SERPL: 316 MOSMOL/KG — HIGH (ref 280–301)
OSMOLALITY SERPL: 316 MOSMOL/KG — HIGH (ref 280–301)
OSMOLALITY UR: 185 MOSM/KG — LOW (ref 300–1000)
OSMOLALITY UR: 185 MOSM/KG — LOW (ref 300–1000)
PHOSPHATE SERPL-MCNC: 1.6 MG/DL — LOW (ref 2.4–4.7)
PHOSPHATE SERPL-MCNC: 1.6 MG/DL — LOW (ref 2.4–4.7)
PLATELET # BLD AUTO: 127 K/UL — LOW (ref 150–400)
PLATELET # BLD AUTO: 127 K/UL — LOW (ref 150–400)
POTASSIUM SERPL-MCNC: 3.5 MMOL/L — SIGNIFICANT CHANGE UP (ref 3.5–5.3)
POTASSIUM SERPL-MCNC: 3.5 MMOL/L — SIGNIFICANT CHANGE UP (ref 3.5–5.3)
POTASSIUM SERPL-SCNC: 3.5 MMOL/L — SIGNIFICANT CHANGE UP (ref 3.5–5.3)
POTASSIUM SERPL-SCNC: 3.5 MMOL/L — SIGNIFICANT CHANGE UP (ref 3.5–5.3)
POTASSIUM UR-SCNC: 9 MMOL/L — SIGNIFICANT CHANGE UP
POTASSIUM UR-SCNC: 9 MMOL/L — SIGNIFICANT CHANGE UP
RBC # BLD: 3.33 M/UL — LOW (ref 3.8–5.2)
RBC # BLD: 3.33 M/UL — LOW (ref 3.8–5.2)
RBC # FLD: 17.3 % — HIGH (ref 10.3–14.5)
RBC # FLD: 17.3 % — HIGH (ref 10.3–14.5)
SODIUM SERPL-SCNC: 151 MMOL/L — HIGH (ref 135–145)
SODIUM SERPL-SCNC: 151 MMOL/L — HIGH (ref 135–145)
SODIUM UR-SCNC: 50 MMOL/L — SIGNIFICANT CHANGE UP
SODIUM UR-SCNC: 50 MMOL/L — SIGNIFICANT CHANGE UP
SPECIMEN SOURCE: SIGNIFICANT CHANGE UP
T4 FREE SERPL-MCNC: 1.5 NG/DL — SIGNIFICANT CHANGE UP (ref 0.9–1.8)
T4 FREE SERPL-MCNC: 1.5 NG/DL — SIGNIFICANT CHANGE UP (ref 0.9–1.8)
TSH SERPL-MCNC: 1.7 UIU/ML — SIGNIFICANT CHANGE UP (ref 0.27–4.2)
TSH SERPL-MCNC: 1.7 UIU/ML — SIGNIFICANT CHANGE UP (ref 0.27–4.2)
WBC # BLD: 3.12 K/UL — LOW (ref 3.8–10.5)
WBC # BLD: 3.12 K/UL — LOW (ref 3.8–10.5)
WBC # FLD AUTO: 3.12 K/UL — LOW (ref 3.8–10.5)
WBC # FLD AUTO: 3.12 K/UL — LOW (ref 3.8–10.5)

## 2023-12-06 PROCEDURE — 99232 SBSQ HOSP IP/OBS MODERATE 35: CPT

## 2023-12-06 PROCEDURE — 99223 1ST HOSP IP/OBS HIGH 75: CPT

## 2023-12-06 RX ORDER — CEFTRIAXONE 500 MG/1
1000 INJECTION, POWDER, FOR SOLUTION INTRAMUSCULAR; INTRAVENOUS EVERY 24 HOURS
Refills: 0 | Status: DISCONTINUED | OUTPATIENT
Start: 2023-12-06 | End: 2023-12-08

## 2023-12-06 RX ORDER — HYDRALAZINE HCL 50 MG
10 TABLET ORAL ONCE
Refills: 0 | Status: COMPLETED | OUTPATIENT
Start: 2023-12-06 | End: 2023-12-06

## 2023-12-06 RX ORDER — CEFTRIAXONE 500 MG/1
1000 INJECTION, POWDER, FOR SOLUTION INTRAMUSCULAR; INTRAVENOUS EVERY 24 HOURS
Refills: 0 | Status: DISCONTINUED | OUTPATIENT
Start: 2023-12-06 | End: 2023-12-06

## 2023-12-06 RX ORDER — AMLODIPINE BESYLATE 2.5 MG/1
5 TABLET ORAL DAILY
Refills: 0 | Status: DISCONTINUED | OUTPATIENT
Start: 2023-12-06 | End: 2023-12-18

## 2023-12-06 RX ADMIN — CEFTRIAXONE 1000 MILLIGRAM(S): 500 INJECTION, POWDER, FOR SOLUTION INTRAMUSCULAR; INTRAVENOUS at 13:03

## 2023-12-06 RX ADMIN — ESCITALOPRAM OXALATE 30 MILLIGRAM(S): 10 TABLET, FILM COATED ORAL at 13:06

## 2023-12-06 RX ADMIN — HEPARIN SODIUM 5000 UNIT(S): 5000 INJECTION INTRAVENOUS; SUBCUTANEOUS at 13:04

## 2023-12-06 RX ADMIN — HALOPERIDOL DECANOATE 5 MILLIGRAM(S): 100 INJECTION INTRAMUSCULAR at 16:26

## 2023-12-06 RX ADMIN — Medication 1 MILLIGRAM(S): at 05:36

## 2023-12-06 RX ADMIN — ATORVASTATIN CALCIUM 10 MILLIGRAM(S): 80 TABLET, FILM COATED ORAL at 21:21

## 2023-12-06 RX ADMIN — OLANZAPINE 5 MILLIGRAM(S): 15 TABLET, FILM COATED ORAL at 13:03

## 2023-12-06 RX ADMIN — Medication 1 TABLET(S): at 13:03

## 2023-12-06 RX ADMIN — POLYETHYLENE GLYCOL 3350 17 GRAM(S): 17 POWDER, FOR SOLUTION ORAL at 21:21

## 2023-12-06 RX ADMIN — HEPARIN SODIUM 5000 UNIT(S): 5000 INJECTION INTRAVENOUS; SUBCUTANEOUS at 21:21

## 2023-12-06 RX ADMIN — Medication 0.5 MILLIGRAM(S): at 21:21

## 2023-12-06 RX ADMIN — Medication 112 MICROGRAM(S): at 05:36

## 2023-12-06 RX ADMIN — HEPARIN SODIUM 5000 UNIT(S): 5000 INJECTION INTRAVENOUS; SUBCUTANEOUS at 05:38

## 2023-12-06 RX ADMIN — SODIUM CHLORIDE 150 MILLILITER(S): 9 INJECTION, SOLUTION INTRAVENOUS at 21:21

## 2023-12-06 RX ADMIN — Medication 10 MILLIGRAM(S): at 05:38

## 2023-12-06 RX ADMIN — HALOPERIDOL DECANOATE 5 MILLIGRAM(S): 100 INJECTION INTRAMUSCULAR at 05:36

## 2023-12-06 RX ADMIN — PANTOPRAZOLE SODIUM 40 MILLIGRAM(S): 20 TABLET, DELAYED RELEASE ORAL at 05:36

## 2023-12-06 NOTE — PROGRESS NOTE ADULT - SUBJECTIVE AND OBJECTIVE BOX
NEPHROLOGY INTERVAL HPI/OVERNIGHT EVENTS:    No new events.    MEDICATIONS  (STANDING):  atorvastatin 10 milliGRAM(s) Oral at bedtime  dextrose 5%. 1000 milliLiter(s) (150 mL/Hr) IV Continuous <Continuous>  dextrose 5%. 1000 milliLiter(s) (1000 mL/Hr) IV Continuous <Continuous>  escitalopram 30 milliGRAM(s) Oral daily  haloperidol     Tablet 5 milliGRAM(s) Oral <User Schedule>  heparin   Injectable 5000 Unit(s) SubCutaneous every 8 hours  levothyroxine 112 MICROGram(s) Oral daily  LORazepam     Tablet 1 milliGRAM(s) Oral <User Schedule>  LORazepam     Tablet 0.5 milliGRAM(s) Oral <User Schedule>  multivitamin 1 Tablet(s) Oral daily  OLANZapine 5 milliGRAM(s) Oral daily  pantoprazole    Tablet 40 milliGRAM(s) Oral before breakfast  polyethylene glycol 3350 17 Gram(s) Oral at bedtime    MEDICATIONS  (PRN):  acetaminophen     Tablet .. 650 milliGRAM(s) Oral every 6 hours PRN Temp greater or equal to 38C (100.4F), Mild Pain (1 - 3)  ondansetron Injectable 4 milliGRAM(s) IV Push every 6 hours PRN Nausea      Allergies    clozapine (Other)  Depakote (Other)  Neupogen (Other)  erythromycin (Unknown)            Vital Signs Last 24 Hrs  T(C): 36.5 (06 Dec 2023 04:39), Max: 37.2 (05 Dec 2023 19:49)  T(F): 97.7 (06 Dec 2023 04:39), Max: 99 (05 Dec 2023 19:49)  HR: 71 (06 Dec 2023 04:39) (67 - 77)  BP: 160/73 (06 Dec 2023 06:31) (147/75 - 182/75)  BP(mean): --  RR: 18 (06 Dec 2023 04:39) (18 - 18)  SpO2: 97% (06 Dec 2023 04:39) (94% - 97%)    Parameters below as of 06 Dec 2023 04:39  Patient On (Oxygen Delivery Method): room air       Daily Weight in k.8 (06 Dec 2023 00:00)    PHYSICAL EXAM:    GENERAL: comfortable in bed, eating  HEAD: wnl   EYES: open  ENMT:   NECK: veins  flat  NERVOUS SYSTEM: alert, verbal   CHEST/LUNG: no 02, clear  ABDOMEN: NT, soft  EXTREMITIES: muscle  wasting, no edema   LYMPH:   SKIN: pale  : no  rahman    LABS:                        10.0   3.12  )-----------( 127      ( 06 Dec 2023 04:35 )             32.9         151<H>  |  118<H>  |  16.5  ----------------------------<  105<H>  3.5   |  23.0  |  1.61<H>    Ca    7.1<L>      06 Dec 2023 04:35  Phos  1.6       Mg     2.2             Urinalysis Basic - ( 06 Dec 2023 04:35 )    Color: x / Appearance: x / SG: x / pH: x  Gluc: 105 mg/dL / Ketone: x  / Bili: x / Urobili: x   Blood: x / Protein: x / Nitrite: x   Leuk Esterase: x / RBC: x / WBC x   Sq Epi: x / Non Sq Epi: x / Bacteria: x      Magnesium: 2.2 mg/dL ( @ 04:35)  Phosphorus: 1.6 mg/dL ( @ 04:35)  Magnesium: 2.4 mg/dL ( @ 12:35)  Phosphorus: 2.4 mg/dL ( @ 12:35)          RADIOLOGY & ADDITIONAL TESTS:   NEPHROLOGY INTERVAL HPI/OVERNIGHT EVENTS:    No new events.    MEDICATIONS  (STANDING):  atorvastatin 10 milliGRAM(s) Oral at bedtime  dextrose 5%. 1000 milliLiter(s) (150 mL/Hr) IV Continuous <Continuous>  dextrose 5%. 1000 milliLiter(s) (1000 mL/Hr) IV Continuous <Continuous>  escitalopram 30 milliGRAM(s) Oral daily  haloperidol     Tablet 5 milliGRAM(s) Oral <User Schedule>  heparin   Injectable 5000 Unit(s) SubCutaneous every 8 hours  levothyroxine 112 MICROGram(s) Oral daily  LORazepam     Tablet 1 milliGRAM(s) Oral <User Schedule>  LORazepam     Tablet 0.5 milliGRAM(s) Oral <User Schedule>  multivitamin 1 Tablet(s) Oral daily  OLANZapine 5 milliGRAM(s) Oral daily  pantoprazole    Tablet 40 milliGRAM(s) Oral before breakfast  polyethylene glycol 3350 17 Gram(s) Oral at bedtime    MEDICATIONS  (PRN):  acetaminophen     Tablet .. 650 milliGRAM(s) Oral every 6 hours PRN Temp greater or equal to 38C (100.4F), Mild Pain (1 - 3)  ondansetron Injectable 4 milliGRAM(s) IV Push every 6 hours PRN Nausea      Allergies    clozapine (Other)  Depakote (Other)  Neupogen (Other)  erythromycin (Unknown)            Vital Signs Last 24 Hrs  T(C): 36.5 (06 Dec 2023 04:39), Max: 37.2 (05 Dec 2023 19:49)  T(F): 97.7 (06 Dec 2023 04:39), Max: 99 (05 Dec 2023 19:49)  HR: 71 (06 Dec 2023 04:39) (67 - 77)  BP: 160/73 (06 Dec 2023 06:31) (147/75 - 182/75)  BP(mean): --  RR: 18 (06 Dec 2023 04:39) (18 - 18)  SpO2: 97% (06 Dec 2023 04:39) (94% - 97%)    Parameters below as of 06 Dec 2023 04:39  Patient On (Oxygen Delivery Method): room air       Daily Weight in k.8 (06 Dec 2023 00:00)    PHYSICAL EXAM:    GENERAL: comfortable in bed, eating  HEAD: wnl   EYES: open  ENMT:   NECK: veins  flat  NERVOUS SYSTEM: alert, verbal   CHEST/LUNG: no 02, clear  ABDOMEN: NT, soft, ventral hernia  EXTREMITIES: muscle  wasting, no edema   LYMPH:   SKIN: pale  : no  rahman    LABS:                        10.0   3.12  )-----------( 127      ( 06 Dec 2023 04:35 )             32.9         151<H>  |  118<H>  |  16.5  ----------------------------<  105<H>  3.5   |  23.0  |  1.61<H>    Ca    7.1<L>      06 Dec 2023 04:35  Phos  1.6       Mg     2.2             Urinalysis Basic - ( 06 Dec 2023 04:35 )    Color: x / Appearance: x / SG: x / pH: x  Gluc: 105 mg/dL / Ketone: x  / Bili: x / Urobili: x   Blood: x / Protein: x / Nitrite: x   Leuk Esterase: x / RBC: x / WBC x   Sq Epi: x / Non Sq Epi: x / Bacteria: x      Magnesium: 2.2 mg/dL ( @ 04:35)  Phosphorus: 1.6 mg/dL ( @ 04:35)  Magnesium: 2.4 mg/dL ( @ 12:35)  Phosphorus: 2.4 mg/dL ( @ 12:35)          RADIOLOGY & ADDITIONAL TESTS:   NEPHROLOGY INTERVAL HPI/OVERNIGHT EVENTS:    No new events.    MEDICATIONS  (STANDING):  atorvastatin 10 milliGRAM(s) Oral at bedtime  dextrose 5%. 1000 milliLiter(s) (150 mL/Hr) IV Continuous <Continuous>  dextrose 5%. 1000 milliLiter(s) (1000 mL/Hr) IV Continuous <Continuous>  escitalopram 30 milliGRAM(s) Oral daily  haloperidol     Tablet 5 milliGRAM(s) Oral <User Schedule>  heparin   Injectable 5000 Unit(s) SubCutaneous every 8 hours  levothyroxine 112 MICROGram(s) Oral daily  LORazepam     Tablet 1 milliGRAM(s) Oral <User Schedule>  LORazepam     Tablet 0.5 milliGRAM(s) Oral <User Schedule>  multivitamin 1 Tablet(s) Oral daily  OLANZapine 5 milliGRAM(s) Oral daily  pantoprazole    Tablet 40 milliGRAM(s) Oral before breakfast  polyethylene glycol 3350 17 Gram(s) Oral at bedtime    MEDICATIONS  (PRN):  acetaminophen     Tablet .. 650 milliGRAM(s) Oral every 6 hours PRN Temp greater or equal to 38C (100.4F), Mild Pain (1 - 3)  ondansetron Injectable 4 milliGRAM(s) IV Push every 6 hours PRN Nausea      Allergies    clozapine (Other)  Depakote (Other)  Neupogen (Other)  erythromycin (Unknown)            Vital Signs Last 24 Hrs  T(C): 36.5 (06 Dec 2023 04:39), Max: 37.2 (05 Dec 2023 19:49)  T(F): 97.7 (06 Dec 2023 04:39), Max: 99 (05 Dec 2023 19:49)  HR: 71 (06 Dec 2023 04:39) (67 - 77)  BP: 160/73 (06 Dec 2023 06:31) (147/75 - 182/75)  BP(mean): --  RR: 18 (06 Dec 2023 04:39) (18 - 18)  SpO2: 97% (06 Dec 2023 04:39) (94% - 97%)    Parameters below as of 06 Dec 2023 04:39  Patient On (Oxygen Delivery Method): room air       Daily Weight in k.8 (06 Dec 2023 00:00)    PHYSICAL EXAM:    GENERAL: comfortable in bed, eating  HEAD: wnl   EYES: open  ENMT:   NECK: veins  flat  NERVOUS SYSTEM: alert, verbal   CHEST/LUNG: no 02, clear  ABDOMEN: NT, soft, ventral hernia  EXTREMITIES: muscle  wasting, no edema   LYMPH:   SKIN: pale  : no  rhaman    LABS:                        10.0   3.12  )-----------( 127      ( 06 Dec 2023 04:35 )             32.9         151<H>  |  118<H>  |  16.5  ----------------------------<  105<H>  3.5   |  23.0  |  1.61<H>    Ca    7.1<L>      06 Dec 2023 04:35  Phos  1.6       Mg     2.2             Urinalysis Basic - ( 06 Dec 2023 04:35 )    Color: x / Appearance: x / SG: x / pH: x  Gluc: 105 mg/dL / Ketone: x  / Bili: x / Urobili: x   Blood: x / Protein: x / Nitrite: x   Leuk Esterase: x / RBC: x / WBC x   Sq Epi: x / Non Sq Epi: x / Bacteria: x      Magnesium: 2.2 mg/dL ( @ 04:35)  Phosphorus: 1.6 mg/dL ( @ 04:35)  Magnesium: 2.4 mg/dL ( @ 12:35)  Phosphorus: 2.4 mg/dL ( @ 12:35)          RADIOLOGY & ADDITIONAL TESTS:

## 2023-12-06 NOTE — CHART NOTE - NSCHARTNOTEFT_GEN_A_CORE
Notified by RN that patient's bp is 182/75. Patient has no complaints at this time.   Patient has history of hypertension, no documented outpatient anti-hypertensive medications in chart.   During this admission prior episode of hypertension was treated with IV hydralazine with good response.      Vital Signs   T(F): 97.7   HR: 71   BP: 182/75   RR: 18   SpO2: 97%     -Hydralazine 10mg IVPx1  -Reassess BP after intervention

## 2023-12-06 NOTE — PROGRESS NOTE ADULT - SUBJECTIVE AND OBJECTIVE BOX
Patient seen and examined at bedside.     No acute events overnight. Patient is doing well this morning. Tolerating clears this morning. Passing flatus and had bowel movement.     Vitals:  Vital Signs Last 24 Hrs  T(C): 36.5 (06 Dec 2023 04:39), Max: 37.2 (05 Dec 2023 19:49)  T(F): 97.7 (06 Dec 2023 04:39), Max: 99 (05 Dec 2023 19:49)  HR: 71 (06 Dec 2023 04:39) (67 - 77)  BP: 160/73 (06 Dec 2023 06:31) (147/75 - 182/75)  BP(mean): --  RR: 18 (06 Dec 2023 04:39) (18 - 18)  SpO2: 97% (06 Dec 2023 04:39) (94% - 97%)    Parameters below as of 06 Dec 2023 04:39  Patient On (Oxygen Delivery Method): room air    Labs:  12-06    151<H>  |  118<H>  |  16.5  ----------------------------<  105<H>  3.5   |  23.0  |  1.61<H>    Ca    7.1<L>      06 Dec 2023 04:35  Phos  1.6     12-06  Mg     2.2     12-06                        10.0   3.12  )-----------( 127      ( 06 Dec 2023 04:35 )             32.9       Exam:  Gen: pt lying in bed, alert, in NAD  Resp: unlabored  CVS: RRR  Abd: soft, NT with no guarding or rebound tenderness. Large R ventral hernia.   Ext: moving all extremities spontaneously, sensation intact

## 2023-12-06 NOTE — PROGRESS NOTE ADULT - ASSESSMENT
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    P) IV  fluid, urine  C&S, IV  antibiotic; follow up labs.

## 2023-12-06 NOTE — PROGRESS NOTE ADULT - SUBJECTIVE AND OBJECTIVE BOX
HOSPITALIST PROGRESS NOTE    ANDREAS LYONS  [id number]  73yFemale    Patient is a 73y old  Female who presents with a chief complaint of small bowel obstruction (06 Dec 2023 09:19)      SUBJECTIVE:   Chart reviewed since last visit.  Patient seen and examined at bedside earlier today for SBO, Hypernatremia, DI.  Denies any abdominal pain, nausea or  vomiting. Wants to eat solid food.  Admits to one small bowel movement in morning.  Denies any dizziness, urinating yellow urine      OBJECTIVE:  Vital Signs Last 24 Hrs  T(C): 36.9 (06 Dec 2023 17:06), Max: 37.2 (05 Dec 2023 19:49)  T(F): 98.5 (06 Dec 2023 17:06), Max: 99 (05 Dec 2023 19:49)  HR: 73 (06 Dec 2023 17:06) (71 - 73)  BP: 163/81 (06 Dec 2023 17:06) (145/82 - 182/75)  RR: 18 (06 Dec 2023 17:06) (18 - 18)  SpO2: 96% (06 Dec 2023 17:06) (94% - 98%)    Parameters below as of 06 Dec 2023 17:06  Patient On (Oxygen Delivery Method): room air        PHYSICAL EXAMINATION  General: Elderly female lying in bed, eating lunch  HEENT:  Atraumatic normocephalic  NECK:  Supple  CVS: regular rate and rhythm S1 S2  RESP:  CTAB  GI:  deformed abdominal wall with scars, stretch marks and hernia. BS+. Nontender.  : No suprapubic tenderness. Yellow urine  MSK:  lifts legs off bed, raises arms, trace pretibial swelling  CNS:  Awake, oriented to place and person. fluent speech, follows simple   INTEG:  warm dry skin  PSYCH:  Fair mood, calm and cooperative    MONITOR:  CAPILLARY BLOOD GLUCOSE            I&O's Summary    05 Dec 2023 07:01  -  06 Dec 2023 07:00  --------------------------------------------------------  IN: 3710 mL / OUT: 3570 mL / NET: 140 mL    06 Dec 2023 07:01  -  06 Dec 2023 17:53  --------------------------------------------------------  IN: 0 mL / OUT: 2900 mL / NET: -2900 mL                            10.0   3.12  )-----------( 127      ( 06 Dec 2023 04:35 )             32.9       12-06    151<H>  |  118<H>  |  16.5  ----------------------------<  105<H>  3.5   |  23.0  |  1.61<H>    Ca    7.1<L>      06 Dec 2023 04:35  Phos  1.6     12-06  Mg     2.2     12-06          Urinalysis Basic - ( 06 Dec 2023 04:35 )    Color: x / Appearance: x / SG: x / pH: x  Gluc: 105 mg/dL / Ketone: x  / Bili: x / Urobili: x   Blood: x / Protein: x / Nitrite: x   Leuk Esterase: x / RBC: x / WBC x   Sq Epi: x / Non Sq Epi: x / Bacteria: x        Culture:    TTE:    RADIOLOGY        MEDICATIONS  (STANDING):  atorvastatin 10 milliGRAM(s) Oral at bedtime  cefTRIAXone Injectable. 1000 milliGRAM(s) IV Push every 24 hours  dextrose 5%. 1000 milliLiter(s) (150 mL/Hr) IV Continuous <Continuous>  dextrose 5%. 1000 milliLiter(s) (1000 mL/Hr) IV Continuous <Continuous>  escitalopram 30 milliGRAM(s) Oral daily  haloperidol     Tablet 5 milliGRAM(s) Oral <User Schedule>  heparin   Injectable 5000 Unit(s) SubCutaneous every 8 hours  levothyroxine 112 MICROGram(s) Oral daily  LORazepam     Tablet 0.5 milliGRAM(s) Oral <User Schedule>  LORazepam     Tablet 1 milliGRAM(s) Oral <User Schedule>  multivitamin 1 Tablet(s) Oral daily  OLANZapine 5 milliGRAM(s) Oral daily  pantoprazole    Tablet 40 milliGRAM(s) Oral before breakfast  polyethylene glycol 3350 17 Gram(s) Oral at bedtime      MEDICATIONS  (PRN):  acetaminophen     Tablet .. 650 milliGRAM(s) Oral every 6 hours PRN Temp greater or equal to 38C (100.4F), Mild Pain (1 - 3)  ondansetron Injectable 4 milliGRAM(s) IV Push every 6 hours PRN Nausea     HOSPITALIST PROGRESS NOTE    ANDREAS LYONS  [id number]  73yFemale    Patient is a 73y old  Female who presents with a chief complaint of small bowel obstruction (06 Dec 2023 09:19)      SUBJECTIVE:   Chart reviewed since last visit.  Patient seen and examined at bedside earlier today for SBO, Hypernatremia, DI.  Denies any abdominal pain, nausea or  vomiting. Wants to eat solid food.  Admits to one small bowel movement in morning.  Denies any dizziness, urinating yellow urine      OBJECTIVE:  Vital Signs Last 24 Hrs  T(C): 36.9 (06 Dec 2023 17:06), Max: 37.2 (05 Dec 2023 19:49)  T(F): 98.5 (06 Dec 2023 17:06), Max: 99 (05 Dec 2023 19:49)  HR: 73 (06 Dec 2023 17:06) (71 - 73)  BP: 163/81 (06 Dec 2023 17:06) (145/82 - 182/75)  RR: 18 (06 Dec 2023 17:06) (18 - 18)  SpO2: 96% (06 Dec 2023 17:06) (94% - 98%)    Parameters below as of 06 Dec 2023 17:06  Patient On (Oxygen Delivery Method): room air        PHYSICAL EXAMINATION  General: Elderly female lying in bed, eating lunch  HEENT:  Atraumatic normocephalic  NECK:  Supple  CVS: regular rate and rhythm S1 S2  RESP:  CTAB  GI:  deformed abdominal wall with scars, stretch marks and hernia. BS+. Nontender.  : No suprapubic tenderness. Yellow urine  MSK:  lifts legs off bed, raises arms, trace pretibial swelling  CNS:  Awake, oriented to place and person. fluent speech, follows simple   INTEG:  warm dry skin  PSYCH:  Fair mood, calm and cooperative    MONITOR:  CAPILLARY BLOOD GLUCOSE            I&O's Summary    05 Dec 2023 07:01  -  06 Dec 2023 07:00  --------------------------------------------------------  IN: 3710 mL / OUT: 3570 mL / NET: 140 mL    06 Dec 2023 07:01  -  06 Dec 2023 17:53  --------------------------------------------------------  IN: 0 mL / OUT: 2900 mL / NET: -2900 mL                            10.0   3.12  )-----------( 127      ( 06 Dec 2023 04:35 )             32.9       12-06    151<H>  |  118<H>  |  16.5  ----------------------------<  105<H>  3.5   |  23.0  |  1.61<H>    Ca    7.1<L>      06 Dec 2023 04:35  Phos  1.6     12-06  Mg     2.2     12-06          Urinalysis Basic - ( 06 Dec 2023 04:35 )    Color: x / Appearance: x / SG: x / pH: x  Gluc: 105 mg/dL / Ketone: x  / Bili: x / Urobili: x   Blood: x / Protein: x / Nitrite: x   Leuk Esterase: x / RBC: x / WBC x   Sq Epi: x / Non Sq Epi: x / Bacteria: x        Culture:    TTE:    RADIOLOGY        MEDICATIONS  (STANDING):  atorvastatin 10 milliGRAM(s) Oral at bedtime  cefTRIAXone Injectable. 1000 milliGRAM(s) IV Push every 24 hours  dextrose 5%. 1000 milliLiter(s) (150 mL/Hr) IV Continuous <Continuous>  dextrose 5%. 1000 milliLiter(s) (1000 mL/Hr) IV Continuous <Continuous>  escitalopram 30 milliGRAM(s) Oral daily  haloperidol     Tablet 5 milliGRAM(s) Oral <User Schedule>  heparin   Injectable 5000 Unit(s) SubCutaneous every 8 hours  levothyroxine 112 MICROGram(s) Oral daily  LORazepam     Tablet 0.5 milliGRAM(s) Oral <User Schedule>  LORazepam     Tablet 1 milliGRAM(s) Oral <User Schedule>  multivitamin 1 Tablet(s) Oral daily  OLANZapine 5 milliGRAM(s) Oral daily  pantoprazole    Tablet 40 milliGRAM(s) Oral before breakfast  polyethylene glycol 3350 17 Gram(s) Oral at bedtime      MEDICATIONS  (PRN):  acetaminophen     Tablet .. 650 milliGRAM(s) Oral every 6 hours PRN Temp greater or equal to 38C (100.4F), Mild Pain (1 - 3)  ondansetron Injectable 4 milliGRAM(s) IV Push every 6 hours PRN Nausea

## 2023-12-06 NOTE — PROGRESS NOTE ADULT - ASSESSMENT
73 year old female with a past medical history of schizophrenia, hypothyroidism, hypertension, Nephrogenic DI,  and prior SBO who was sent to the ER from [place] for complaints of abdominal pain.   In the ER, CT abdomen and pelvis consistent with high grade SBO. Patient was admitted to ACS, Patient refused NGT placement. NPO and started on IVF. Blood cultures negative x2. Course complicated by MONICA on CKD stage 3 with metabolic acidosis and hypernatremia. SBO Improved diet was advanced. Now transferred to Medicine service for management of hypernatremia    # Hypernatremia  # History of Nephrogenic DI   Elevated SOsm  Interval improvement  - Strict I/O  - D5W  - Nephrology follow up     # MONICA on CKD 3b   # Metabolic acidosis   No obstruction on imaging.  Interval stabilization of SCr. Acidosis resolved  - I/O  - IVF  - monitor BMP  - Nephrology follow up    # High grade SBO  Patient refused NGT placement  GI symptoms resolved, moving bowels  - Diet advanced   - monitor bowel function    # Schizophrenia  # Depression  Appears to be stable currently  - Olanzapine, Haloperidol and Lorazepam  - Escitalopram    # Dyslipidemia  - On Statin    # Hypothyroidism  Normal TFT  - Levothyroxine    # Pancytopenia  Seen at [facility] on 10/2/23 for pancytopenia by Hematology. Transfused 1 unit PRBC and started on iron   - monitor CBC  - follow up with hematology as outpatient     VTE Prophylaxis  OOBTC, Mobilize    Disposition - Acutely ill;  [place] pending improvement in hyponatremia     Discussed with Nephrology Dr Schultz 73 year old female with a past medical history of schizophrenia, hypothyroidism, hypertension, Nephrogenic DI,  and prior SBO who was sent to the ER from [place] for complaints of abdominal pain.   In the ER, CT abdomen and pelvis consistent with high grade SBO. Patient was admitted to ACS, Patient refused NGT placement. NPO and started on IVF. Blood cultures negative x2. Course complicated by MONICA on CKD stage 3 with metabolic acidosis and hypernatremia. SBO Improved diet was advanced. Now transferred to Medicine service for management of hypernatremia    # Hypernatremia  # History of Nephrogenic DI   Elevated SOsm  Interval improvement  - Strict I/O  - D5W  - Nephrology follow up     # MONICA on CKD 3b   # Metabolic acidosis   No obstruction on imaging.  Interval stabilization of SCr. Acidosis resolved  - I/O  - IVF  - monitor BMP  - Nephrology follow up    # High grade SBO  Patient refused NGT placement  GI symptoms resolved, moving bowels  - Diet advanced   - monitor bowel function    # Schizophrenia  # Depression  Appears to be stable currently  - Olanzapine, Haloperidol and Lorazepam  - Escitalopram    # Dyslipidemia  - On Statin    # Hypothyroidism  Normal TFT  - Levothyroxine    # Pancytopenia  Seen at [facility] on 10/2/23 for pancytopenia by Hematology. Transfused 1 unit PRBC and started on iron   - monitor CBC  - follow up with hematology as outpatient     VTE Prophylaxis  OOBTC, Mobilize    Disposition - Acutely ill;  [place] pending improvement in hyponatremia     Discussed with Nephrology Dr Schultz 73 year old female with a past medical history of schizophrenia, hypothyroidism, hypertension, Nephrogenic DI,  and prior SBO who was sent to the ER from [place] for complaints of abdominal pain.   In the ER, CT abdomen and pelvis consistent with high grade SBO. Patient was admitted to ACS, Patient refused NGT placement. NPO and started on IVF. Blood cultures negative x2. Course complicated by MONICA on CKD stage 3 with metabolic acidosis and hypernatremia. SBO Improved diet was advanced. Now transferred to Medicine service for management of hypernatremia    # Hypernatremia  # History of Nephrogenic DI   Elevated SOsm  Interval improvement  - Strict I/O  - D5W  - Nephrology follow up     # MONICA on CKD 3b   # Metabolic acidosis   No obstruction on imaging.  Interval stabilization of SCr. Acidosis resolved  - I/O  - IVF  - monitor BMP  - Nephrology follow up    # High grade SBO  Patient refused NGT placement  GI symptoms resolved, moving bowels  - Diet advanced   - monitor bowel function    # Hypertension  Likely due to IVF. Pain free  - Amlodipine 5mg started.    # Schizophrenia  # Depression  Appears to be stable currently  - Olanzapine, Haloperidol and Lorazepam  - Escitalopram    # Dyslipidemia  - On Statin    # Hypothyroidism  Normal TFT  - Levothyroxine    # Pancytopenia  Seen at [facility] on 10/2/23 for pancytopenia by Hematology. Transfused 1 unit PRBC and started on iron   - monitor CBC  - follow up with hematology as outpatient     VTE Prophylaxis  OOBTC, Mobilize    Disposition - Acutely ill;  [place] pending improvement in hyponatremia     Discussed with Nephrology Dr Schultz 73 year old female with a past medical history of schizophrenia, hypothyroidism, hypertension, Nephrogenic DI,  and prior SBO who was sent to the ER from [place] for complaints of abdominal pain.   In the ER, CT abdomen and pelvis consistent with high grade SBO. Patient was admitted to ACS, Patient refused NGT placement. NPO and started on IVF. Blood cultures negative x2. Course complicated by MONICA on CKD stage 3 with metabolic acidosis and hypernatremia. SBO Improved diet was advanced. Now transferred to Medicine service for management of hypernatremia    # Hypernatremia  # History of Nephrogenic DI   Elevated SOsm  Interval improvement  - Strict I/O  - D5W  - Nephrology follow up     # MONICA on CKD 3b   # Metabolic acidosis   No obstruction on imaging.  Interval stabilization of SCr. Acidosis resolved  - I/O  - IVF  - monitor BMP  - Nephrology follow up    # High grade SBO  Patient refused NGT placement  GI symptoms resolved, moving bowels  - Diet advanced   - monitor bowel function    # Hypertension  Likely due to IVF. Pain free  - Amlodipine 5mg started.    # Schizophrenia  # Depression  Appears to be stable currently  - Olanzapine, Haloperidol and Lorazepam  - Escitalopram    # Dyslipidemia  - On Statin    # Hypothyroidism  Normal TFT  - Levothyroxine    # Pancytopenia  Seen at [facility] on 10/2/23 for pancytopenia by Hematology. Transfused 1 unit PRBC and started on iron   - monitor CBC  - follow up with hematology as outpatient     VTE Prophylaxis  OOBTC, Mobilize    Disposition - Acutely ill;  [place] pending improvement in hyponatremia     Discussed with Nephrology Dr Schultz

## 2023-12-06 NOTE — CHART NOTE - NSCHARTNOTEFT_GEN_A_CORE
Ethics continues to follow. Noted patient transferred to Medicine Service. Updated Dr. JEFRY Iyer (Medicine Attending) about patient's HCPs IF she is without capacity.       Scarlett Ferreira MD  Ethics Attending  [phone] Ethics continues to follow. Noted patient transferred to Medicine Service. Updated Dr. JEFRY Iyer (Medicine Attending) about patient's HCPs IF she is without capacity.       Scarlett Ferreira MD  Ethics Attending  [phone] Ethics continues to follow. Noted patient transferred to Medicine Service. Updated Dr. JEFRY Iyer (Medicine Attending) about patient's HCPs IF she is without capacity. See Ethics note 12/4/23.       Scarlett Ferreira MD  Ethics Attending  [phone] Ethics continues to follow. Noted patient transferred to Medicine Service. Updated Dr. JEFRY Iyer (Medicine Attending) about patient's HCPs IF she is without capacity. See Ethics note 12/4/23.       Scarlett Ferreira MD  Ethics Attending  [phone]

## 2023-12-06 NOTE — PROGRESS NOTE ADULT - ATTENDING COMMENTS
Patient seen and examined at bedside. No acute events overnight. Admits to nausea but no vomiting. Pain is well controlled. Afebrile. Patient is tolerating clear liquid diet. Admits to passing gas but no bowel movements.    Continue clear liquid diet and monitor tolerance  Monitor bowel function  SCDs and LVNX for DVT ppx  Medical management per primary team

## 2023-12-06 NOTE — PROGRESS NOTE ADULT - NS ATTEST RISK PROBLEM GEN_ALL_CORE FT
Schizoaffective inpatient with recurrent SBO, complicated by MONICA on CKD3b and hypernatremia.  Still with significantly elevated SNa, requiring hypotonic fluid and close monitoring.  Discussion with specialist Schizoaffective inpatient with recurrent SBO, complicated by MONICA on CKD3b and hypernatremia.  Still with significantly elevated SNa, requiring hypotonic fluid and close monitoring.  Uncontrolled BP - started on new medications.  Discussion with specialist

## 2023-12-06 NOTE — PROGRESS NOTE ADULT - ASSESSMENT
72 y/o female admitted for high grade SBO was afebrile and hemodynamically stable this morning. On exam, patient's abdomen soft and nontender with no guarding or rebound tenderness. Patient is tolerating CLD, passing flatus and having bowel function.     PLAN  -Recommend to advance diet   -pain control  -strict Is/Os  -continue home meds  -trend labs, replete electrolytes as needed  -encourage OOB  -incentive spirometry  -DVT ppx: SCDs and HSQ   74 y/o female admitted for high grade SBO was afebrile and hemodynamically stable this morning. On exam, patient's abdomen soft and nontender with no guarding or rebound tenderness. Patient is tolerating CLD, passing flatus and having bowel function.     PLAN  -Recommend to advance diet   -pain control  -strict Is/Os  -continue home meds  -trend labs, replete electrolytes as needed  -encourage OOB  -incentive spirometry  -DVT ppx: SCDs and HSQ

## 2023-12-07 LAB
ALBUMIN SERPL ELPH-MCNC: 3.3 G/DL — SIGNIFICANT CHANGE UP (ref 3.3–5.2)
ALBUMIN SERPL ELPH-MCNC: 3.3 G/DL — SIGNIFICANT CHANGE UP (ref 3.3–5.2)
ALP SERPL-CCNC: 124 U/L — HIGH (ref 40–120)
ALP SERPL-CCNC: 124 U/L — HIGH (ref 40–120)
ALT FLD-CCNC: 13 U/L — SIGNIFICANT CHANGE UP
ALT FLD-CCNC: 13 U/L — SIGNIFICANT CHANGE UP
ANION GAP SERPL CALC-SCNC: 13 MMOL/L — SIGNIFICANT CHANGE UP (ref 5–17)
ANION GAP SERPL CALC-SCNC: 13 MMOL/L — SIGNIFICANT CHANGE UP (ref 5–17)
AST SERPL-CCNC: 10 U/L — SIGNIFICANT CHANGE UP
AST SERPL-CCNC: 10 U/L — SIGNIFICANT CHANGE UP
BILIRUB SERPL-MCNC: 0.3 MG/DL — LOW (ref 0.4–2)
BILIRUB SERPL-MCNC: 0.3 MG/DL — LOW (ref 0.4–2)
BUN SERPL-MCNC: 22.9 MG/DL — HIGH (ref 8–20)
BUN SERPL-MCNC: 22.9 MG/DL — HIGH (ref 8–20)
CALCIUM SERPL-MCNC: 6.9 MG/DL — LOW (ref 8.4–10.5)
CALCIUM SERPL-MCNC: 6.9 MG/DL — LOW (ref 8.4–10.5)
CHLORIDE SERPL-SCNC: 115 MMOL/L — HIGH (ref 96–108)
CHLORIDE SERPL-SCNC: 115 MMOL/L — HIGH (ref 96–108)
CO2 SERPL-SCNC: 18 MMOL/L — LOW (ref 22–29)
CO2 SERPL-SCNC: 18 MMOL/L — LOW (ref 22–29)
CREAT SERPL-MCNC: 1.66 MG/DL — HIGH (ref 0.5–1.3)
CREAT SERPL-MCNC: 1.66 MG/DL — HIGH (ref 0.5–1.3)
EGFR: 33 ML/MIN/1.73M2 — LOW
EGFR: 33 ML/MIN/1.73M2 — LOW
GLUCOSE SERPL-MCNC: 76 MG/DL — SIGNIFICANT CHANGE UP (ref 70–99)
GLUCOSE SERPL-MCNC: 76 MG/DL — SIGNIFICANT CHANGE UP (ref 70–99)
HCT VFR BLD CALC: 37.3 % — SIGNIFICANT CHANGE UP (ref 34.5–45)
HCT VFR BLD CALC: 37.3 % — SIGNIFICANT CHANGE UP (ref 34.5–45)
HGB BLD-MCNC: 11.2 G/DL — LOW (ref 11.5–15.5)
HGB BLD-MCNC: 11.2 G/DL — LOW (ref 11.5–15.5)
MCHC RBC-ENTMCNC: 29.3 PG — SIGNIFICANT CHANGE UP (ref 27–34)
MCHC RBC-ENTMCNC: 29.3 PG — SIGNIFICANT CHANGE UP (ref 27–34)
MCHC RBC-ENTMCNC: 30 GM/DL — LOW (ref 32–36)
MCHC RBC-ENTMCNC: 30 GM/DL — LOW (ref 32–36)
MCV RBC AUTO: 97.6 FL — SIGNIFICANT CHANGE UP (ref 80–100)
MCV RBC AUTO: 97.6 FL — SIGNIFICANT CHANGE UP (ref 80–100)
PLATELET # BLD AUTO: 131 K/UL — LOW (ref 150–400)
PLATELET # BLD AUTO: 131 K/UL — LOW (ref 150–400)
POTASSIUM SERPL-MCNC: 3.3 MMOL/L — LOW (ref 3.5–5.3)
POTASSIUM SERPL-MCNC: 3.3 MMOL/L — LOW (ref 3.5–5.3)
POTASSIUM SERPL-SCNC: 3.3 MMOL/L — LOW (ref 3.5–5.3)
POTASSIUM SERPL-SCNC: 3.3 MMOL/L — LOW (ref 3.5–5.3)
PROT SERPL-MCNC: 5.8 G/DL — LOW (ref 6.6–8.7)
PROT SERPL-MCNC: 5.8 G/DL — LOW (ref 6.6–8.7)
RBC # BLD: 3.82 M/UL — SIGNIFICANT CHANGE UP (ref 3.8–5.2)
RBC # BLD: 3.82 M/UL — SIGNIFICANT CHANGE UP (ref 3.8–5.2)
RBC # FLD: 17.1 % — HIGH (ref 10.3–14.5)
RBC # FLD: 17.1 % — HIGH (ref 10.3–14.5)
SODIUM SERPL-SCNC: 146 MMOL/L — HIGH (ref 135–145)
SODIUM SERPL-SCNC: 146 MMOL/L — HIGH (ref 135–145)
WBC # BLD: 4.43 K/UL — SIGNIFICANT CHANGE UP (ref 3.8–10.5)
WBC # BLD: 4.43 K/UL — SIGNIFICANT CHANGE UP (ref 3.8–10.5)
WBC # FLD AUTO: 4.43 K/UL — SIGNIFICANT CHANGE UP (ref 3.8–10.5)
WBC # FLD AUTO: 4.43 K/UL — SIGNIFICANT CHANGE UP (ref 3.8–10.5)

## 2023-12-07 PROCEDURE — 99232 SBSQ HOSP IP/OBS MODERATE 35: CPT

## 2023-12-07 RX ORDER — HALOPERIDOL DECANOATE 100 MG/ML
3 INJECTION INTRAMUSCULAR ONCE
Refills: 0 | Status: COMPLETED | OUTPATIENT
Start: 2023-12-07 | End: 2023-12-07

## 2023-12-07 RX ORDER — POTASSIUM CHLORIDE 20 MEQ
20 PACKET (EA) ORAL DAILY
Refills: 0 | Status: DISCONTINUED | OUTPATIENT
Start: 2023-12-07 | End: 2023-12-09

## 2023-12-07 RX ORDER — DEXTROSE MONOHYDRATE, SODIUM CHLORIDE, AND POTASSIUM CHLORIDE 50; .745; 4.5 G/1000ML; G/1000ML; G/1000ML
1000 INJECTION, SOLUTION INTRAVENOUS
Refills: 0 | Status: DISCONTINUED | OUTPATIENT
Start: 2023-12-07 | End: 2023-12-07

## 2023-12-07 RX ORDER — SODIUM CHLORIDE 9 MG/ML
1000 INJECTION, SOLUTION INTRAVENOUS
Refills: 0 | Status: DISCONTINUED | OUTPATIENT
Start: 2023-12-07 | End: 2023-12-07

## 2023-12-07 RX ORDER — SODIUM CHLORIDE 9 MG/ML
1000 INJECTION, SOLUTION INTRAVENOUS
Refills: 0 | Status: DISCONTINUED | OUTPATIENT
Start: 2023-12-07 | End: 2023-12-11

## 2023-12-07 RX ADMIN — ATORVASTATIN CALCIUM 10 MILLIGRAM(S): 80 TABLET, FILM COATED ORAL at 21:00

## 2023-12-07 RX ADMIN — OLANZAPINE 5 MILLIGRAM(S): 15 TABLET, FILM COATED ORAL at 14:50

## 2023-12-07 RX ADMIN — HALOPERIDOL DECANOATE 5 MILLIGRAM(S): 100 INJECTION INTRAMUSCULAR at 16:50

## 2023-12-07 RX ADMIN — Medication 112 MICROGRAM(S): at 06:57

## 2023-12-07 RX ADMIN — HALOPERIDOL DECANOATE 5 MILLIGRAM(S): 100 INJECTION INTRAMUSCULAR at 06:57

## 2023-12-07 RX ADMIN — Medication 20 MILLIEQUIVALENT(S): at 14:51

## 2023-12-07 RX ADMIN — Medication 0.5 MILLIGRAM(S): at 21:00

## 2023-12-07 RX ADMIN — PANTOPRAZOLE SODIUM 40 MILLIGRAM(S): 20 TABLET, DELAYED RELEASE ORAL at 06:56

## 2023-12-07 RX ADMIN — Medication 1 MILLIGRAM(S): at 06:56

## 2023-12-07 RX ADMIN — ESCITALOPRAM OXALATE 30 MILLIGRAM(S): 10 TABLET, FILM COATED ORAL at 14:50

## 2023-12-07 RX ADMIN — AMLODIPINE BESYLATE 5 MILLIGRAM(S): 2.5 TABLET ORAL at 06:57

## 2023-12-07 RX ADMIN — SODIUM CHLORIDE 150 MILLILITER(S): 9 INJECTION, SOLUTION INTRAVENOUS at 03:55

## 2023-12-07 RX ADMIN — Medication 1 TABLET(S): at 14:50

## 2023-12-07 RX ADMIN — HALOPERIDOL DECANOATE 3 MILLIGRAM(S): 100 INJECTION INTRAMUSCULAR at 05:57

## 2023-12-07 NOTE — PROGRESS NOTE ADULT - ASSESSMENT
Pt is a 72 y/o female admitted with an SBO. SBO has resolved - pt having bowel function, both gas and BMs. Tolerated CLD yesterday w/o pain, nausea, or vomiting.  - Recommend advancing to regular diet (soft / easy to chew)  - Monitor for consistent bowel fxn  - No surgical intervention indicated at this time  - Remainder of care per primary team  - Please re-consult surgical service as needed - [phone] Pt is a 72 y/o female admitted with an SBO. SBO has resolved - pt having bowel function, both gas and BMs. Tolerated CLD yesterday w/o pain, nausea, or vomiting.  - Recommend advancing to regular diet (soft / easy to chew)  - Monitor for consistent bowel fxn  - No surgical intervention indicated at this time  - Remainder of care per primary team  - Please re-consult surgical service as needed - [phone] Pt is a 72 y/o female admitted with an SBO. SBO has resolved - pt having bowel function, both gas and BMs. Tolerated CLD yesterday w/o pain, nausea, or vomiting.  - Recommend advancing to regular diet (soft / easy to chew)  - Monitor for consistent bowel fxn  - No surgical intervention indicated at this time  - Remainder of care per primary team  - Please re-consult surgical service as needed - [phone] Pt is a 72 y/o female admitted with an SBO. SBO has resolved - pt having bowel function, both gas and BMs. Tolerated CLD yesterday w/o pain, nausea, or vomiting.  - Recommend advancing to regular diet (soft / easy to chew)  - Monitor for consistent bowel fxn  - No surgical intervention indicated at this time  - Remainder of care per primary team  - Please re-consult surgical service as needed - [phone]

## 2023-12-07 NOTE — PROGRESS NOTE ADULT - SUBJECTIVE AND OBJECTIVE BOX
SUBJECTIVE / 24H EVENTS: Patient seen and examined at bedside. She reports feeling "good". States she is hungry. Tolerating CLD yesterday without pain, nausea, or vomiting. Reports she is passing gas and had multiple BMs overnight. Denies fever or chills, CP or SOB.    MEDICATIONS  (STANDING):  amLODIPine   Tablet 5 milliGRAM(s) Oral daily  atorvastatin 10 milliGRAM(s) Oral at bedtime  cefTRIAXone Injectable. 1000 milliGRAM(s) IV Push every 24 hours  dextrose 5%. 1000 milliLiter(s) (1000 mL/Hr) IV Continuous <Continuous>  dextrose 5%. 1000 milliLiter(s) (150 mL/Hr) IV Continuous <Continuous>  escitalopram 30 milliGRAM(s) Oral daily  haloperidol     Tablet 5 milliGRAM(s) Oral <User Schedule>  heparin   Injectable 5000 Unit(s) SubCutaneous every 8 hours  levothyroxine 112 MICROGram(s) Oral daily  LORazepam     Tablet 1 milliGRAM(s) Oral <User Schedule>  LORazepam     Tablet 0.5 milliGRAM(s) Oral <User Schedule>  multivitamin 1 Tablet(s) Oral daily  OLANZapine 5 milliGRAM(s) Oral daily  pantoprazole    Tablet 40 milliGRAM(s) Oral before breakfast  polyethylene glycol 3350 17 Gram(s) Oral at bedtime    MEDICATIONS  (PRN):  acetaminophen     Tablet .. 650 milliGRAM(s) Oral every 6 hours PRN Temp greater or equal to 38C (100.4F), Mild Pain (1 - 3)  ondansetron Injectable 4 milliGRAM(s) IV Push every 6 hours PRN Nausea      Vital Signs Last 24 Hrs  T(C): 37.1 (07 Dec 2023 05:00), Max: 37.2 (07 Dec 2023 00:11)  T(F): 98.7 (07 Dec 2023 05:00), Max: 98.9 (07 Dec 2023 00:11)  HR: 80 (07 Dec 2023 05:00) (70 - 91)  BP: 145/78 (07 Dec 2023 05:00) (133/71 - 163/81)  BP(mean): --  RR: 18 (07 Dec 2023 05:00) (18 - 18)  SpO2: 96% (07 Dec 2023 05:00) (93% - 96%)    Parameters below as of 07 Dec 2023 05:00  Patient On (Oxygen Delivery Method): room air        Constitutional: patient resting comfortably in bed, in no acute distress  Respiratory: respirations are unlabored, no accessory muscle use, no conversational dyspnea  Cardiovascular: regular rate & rhythm  Gastrointestinal: abdomen soft & non-distended, non-tender to palpation w/ no rebound / guarding, ventral hernia is soft with no overlying skin changes  Neurological: oriented to person & place  Skin: mucous membranes moist, no diaphoresis, pallor, cyanosis or jaundice      I&O's Detail    06 Dec 2023 07:01  -  07 Dec 2023 07:00  --------------------------------------------------------  IN:    dextrose 5%: 300 mL    dextrose 5%: 600 mL    Oral Fluid: 250 mL  Total IN: 1150 mL    OUT:    Voided (mL): 3500 mL  Total OUT: 3500 mL    Total NET: -2350 mL          LABS:                        11.2   4.43  )-----------( 131      ( 07 Dec 2023 04:50 )             37.3     12-07    146<H>  |  115<H>  |  22.9<H>  ----------------------------<  76  3.3<L>   |  18.0<L>  |  1.66<H>    Ca    6.9<L>      07 Dec 2023 04:50  Phos  1.6     12-06  Mg     2.2     12-06    TPro  5.8<L>  /  Alb  3.3  /  TBili  0.3<L>  /  DBili  x   /  AST  10  /  ALT  13  /  AlkPhos  124<H>  12-07      Urinalysis Basic - ( 07 Dec 2023 04:50 )    Color: x / Appearance: x / SG: x / pH: x  Gluc: 76 mg/dL / Ketone: x  / Bili: x / Urobili: x   Blood: x / Protein: x / Nitrite: x   Leuk Esterase: x / RBC: x / WBC x   Sq Epi: x / Non Sq Epi: x / Bacteria: x

## 2023-12-07 NOTE — CHART NOTE - NSCHARTNOTEFT_GEN_A_CORE
Source: Patient [ ]  Family [ ]   other [x ] pt found asleep  Pt is a 74 y/o female admitted with an SBO. SBO has resolved - pt having bowel function, both gas and BMs. Tolerated CLD yesterday w/o pain, nausea, or vomiting.    Current Diet: Soft and bite sized    Patient reports [ ] nausea  [ ] vomiting [ ] diarrhea [ ] constipation  [ ]chewing problems [ ] swallowing issues  [ ] other:     PO intake:  < 50% [ ]   50-75%  [x ]   %  [ ]  other :    Source for PO intake [ ] Patient [ ] family [ ] chart [x ] staff [ ] other      Current Weight:   12/6 79.8 kg  12/1 68 kg  % Weight Change   ? accuracy    Pertinent Medications: MEDICATIONS  (STANDING):  amLODIPine   Tablet 5 milliGRAM(s) Oral daily  atorvastatin 10 milliGRAM(s) Oral at bedtime  cefTRIAXone Injectable. 1000 milliGRAM(s) IV Push every 24 hours  dextrose 5% 1000 milliLiter(s) (100 mL/Hr) IV Continuous <Continuous>  escitalopram 30 milliGRAM(s) Oral daily  haloperidol     Tablet 5 milliGRAM(s) Oral <User Schedule>  heparin   Injectable 5000 Unit(s) SubCutaneous every 8 hours  levothyroxine 112 MICROGram(s) Oral daily  LORazepam     Tablet 1 milliGRAM(s) Oral <User Schedule>  LORazepam     Tablet 0.5 milliGRAM(s) Oral <User Schedule>  multivitamin 1 Tablet(s) Oral daily  OLANZapine 5 milliGRAM(s) Oral daily  pantoprazole    Tablet 40 milliGRAM(s) Oral before breakfast  polyethylene glycol 3350 17 Gram(s) Oral at bedtime  potassium chloride    Tablet ER 20 milliEquivalent(s) Oral daily    MEDICATIONS  (PRN):  acetaminophen     Tablet .. 650 milliGRAM(s) Oral every 6 hours PRN Temp greater or equal to 38C (100.4F), Mild Pain (1 - 3)  ondansetron Injectable 4 milliGRAM(s) IV Push every 6 hours PRN Nausea    Pertinent Labs: CBC Full  -  ( 07 Dec 2023 04:50 )  WBC Count : 4.43 K/uL  RBC Count : 3.82 M/uL  Hemoglobin : 11.2 g/dL  Platelet Count - Automated : 131 K/uL  Mean Cell Volume : 97.6 fl  Mean Cell Hemoglobin : 29.3 pg  Mean Cell Hemoglobin Concentration : 30.0 gm/dL    12-07 Na146 mmol/L<H> Glu 76 mg/dL K+ 3.3 mmol/L<L> Cr  1.66 mg/dL<H> BUN 22.9 mg/dL<H> Phos n/a   Alb 3.3 g/dL PAB n/a               Skin: IAD    Nutrition focused physical exam conducted - found signs of malnutrition [ ]absent [x ]present    Subcutaneous fat loss: [x ] Orbital fat pads region, [x ]Buccal fat region, [ ]Triceps region,  [ ]Ribs region    Muscle wasting: [ x]Temples region, [ x]Clavicle region, [ ]Shoulder region, [ ]Scapula region, [ ]Interosseous region,  [ ]thigh region, [ ]Calf region    Estimated Needs:   [ x] no change since previous assessment  [ ] recalculated:     Current Nutrition Diagnosis:  Malnutrition, severe, chronic, related to inability to meet sufficient protein-energy in setting of schizoaffective disorder and poor appetite, now with high grade SBO due to large ventral hernia  as evidenced by meeting <75% nutrient needs >1 mo, moderate muscle/fat loss, 22.6% wt loss x ~6 mo. Pt found asleep with blanket over her head, did not arouse to verbal stimuli. Per documentation- pt was tolerating Liquid diet- advanced today to soft and bite sized. Pt did yet receive a tray. RD to remain available.     Recommendations:   1) Add ensure high protein TID  2) Daily weight  3) Assist with meals and enc ourage PO intake prn  4) Continue MVI- Add Vit C and Thiamine    Monitoring and Evaluation:   [x ] PO intake [ x] Tolerance to diet prescription [X] Weights  [X] Follow up per protocol [X] Labs:

## 2023-12-07 NOTE — CHART NOTE - NSCHARTNOTEFT_GEN_A_CORE
Patient self removed midline overnight 12/6. New midline ordered. Patient seen and examined at bedside, procedure explained and patient refusing replacement of midline. I recommended to patient attempt for PIV with US guidance. Patient again refused. Risks of not having IV access inpatient discussed with Patient, PT understands and continues to refuse procedures.     Please reorder midline when appropriate and patient is amendable

## 2023-12-07 NOTE — CHART NOTE - NSCHARTNOTEFT_GEN_A_CORE
Medicine PA-  Pt admitted with SBO, per surgery pt's passing gas and had BM, on CLD.  Tonight pt was caught eating chips in bed.  She became very agitated and screaming when attempts were made to redirect.  Haldol 3mg IM ordered, continue to monitor closely.

## 2023-12-07 NOTE — PROGRESS NOTE ADULT - SUBJECTIVE AND OBJECTIVE BOX
NEPHROLOGY INTERVAL HPI/OVERNIGHT EVENTS:    No new events.    MEDICATIONS  (STANDING):  atorvastatin 10 milliGRAM(s) Oral at bedtime  dextrose 5%. 1000 milliLiter(s) (150 mL/Hr) IV Continuous <Continuous>  dextrose 5%. 1000 milliLiter(s) (1000 mL/Hr) IV Continuous <Continuous>  escitalopram 30 milliGRAM(s) Oral daily  haloperidol     Tablet 5 milliGRAM(s) Oral <User Schedule>  heparin   Injectable 5000 Unit(s) SubCutaneous every 8 hours  levothyroxine 112 MICROGram(s) Oral daily  LORazepam     Tablet 1 milliGRAM(s) Oral <User Schedule>  LORazepam     Tablet 0.5 milliGRAM(s) Oral <User Schedule>  multivitamin 1 Tablet(s) Oral daily  OLANZapine 5 milliGRAM(s) Oral daily  pantoprazole    Tablet 40 milliGRAM(s) Oral before breakfast  polyethylene glycol 3350 17 Gram(s) Oral at bedtime    MEDICATIONS  (PRN):  acetaminophen     Tablet .. 650 milliGRAM(s) Oral every 6 hours PRN Temp greater or equal to 38C (100.4F), Mild Pain (1 - 3)  ondansetron Injectable 4 milliGRAM(s) IV Push every 6 hours PRN Nausea      Allergies    clozapine (Other)  Depakote (Other)  Neupogen (Other)  erythromycin (Unknown)      Vital Signs Last 24 Hrs  T(C): 37.1 (07 Dec 2023 05:00), Max: 37.2 (07 Dec 2023 00:11)  T(F): 98.7 (07 Dec 2023 05:00), Max: 98.9 (07 Dec 2023 00:11)  HR: 80 (07 Dec 2023 05:00) (70 - 91)  BP: 145/78 (07 Dec 2023 05:00) (133/71 - 163/81)  BP(mean): --  RR: 18 (07 Dec 2023 05:00) (18 - 18)  SpO2: 96% (07 Dec 2023 05:00) (93% - 96%)    Parameters below as of 07 Dec 2023 05:00  Patient On (Oxygen Delivery Method): room air      T(C): 36.5 (06 Dec 2023 04:39), Max: 37.2 (05 Dec 2023 19:49)  T(F): 97.7 (06 Dec 2023 04:39), Max: 99 (05 Dec 2023 19:49)  HR: 71 (06 Dec 2023 04:39) (67 - 77)  BP: 160/73 (06 Dec 2023 06:31) (147/75 - 182/75)  BP(mean): --  RR: 18 (06 Dec 2023 04:39) (18 - 18)  SpO2: 97% (06 Dec 2023 04:39) (94% - 97%)    Parameters below as of 06 Dec 2023 04:39  Patient On (Oxygen Delivery Method): room air       Daily Weight in k.8 (06 Dec 2023 00:00)    PHYSICAL EXAM:    GENERAL: Agitated in bed  HEAD: wnl   EYES: open  ENMT:   NECK: veins  flat  NERVOUS SYSTEM: alert, verbal   CHEST/LUNG: no 02, clear  ABDOMEN: NT, soft, ventral hernia  EXTREMITIES: muscle  wasting, no edema   LYMPH:   SKIN: pale  : no  rahman    LABS:      146<H>  |  115<H>  |  22.9<H>  ----------------------------<  76  3.3<L>   |  18.0<L>  |  1.66<H>    Ca    6.9<L>      07 Dec 2023 04:50  Phos  1.6     12-06  Mg     2.2     12-    TPro  5.8<L>  /  Alb  3.3  /  TBili  0.3<L>  /  DBili  x   /  AST  10  /  ALT  13  /  AlkPhos  124<H>                            10.0   3.12  )-----------( 127      ( 06 Dec 2023 04:35 )             32.9     12-06    151<H>  |  118<H>  |  16.5  ----------------------------<  105<H>  3.5   |  23.0  |  1.61<H>    Ca    7.1<L>      06 Dec 2023 04:35  Phos  1.6     12-06  Mg     2.2     12-06        Urinalysis Basic - ( 06 Dec 2023 04:35 )    Color: x / Appearance: x / SG: x / pH: x  Gluc: 105 mg/dL / Ketone: x  / Bili: x / Urobili: x   Blood: x / Protein: x / Nitrite: x   Leuk Esterase: x / RBC: x / WBC x   Sq Epi: x / Non Sq Epi: x / Bacteria: x      Magnesium: 2.2 mg/dL ( @ 04:35)  Phosphorus: 1.6 mg/dL ( @ 04:35)  Magnesium: 2.4 mg/dL ( @ 12:35)  Phosphorus: 2.4 mg/dL ( @ 12:35)          RADIOLOGY & ADDITIONAL TESTS:

## 2023-12-07 NOTE — PROGRESS NOTE ADULT - ASSESSMENT
73 year old female with a past medical history of schizophrenia, hypothyroidism, hypertension, Nephrogenic DI,  and prior SBO who was sent to the ER from [place] for complaints of abdominal pain.   In the ER, CT abdomen and pelvis consistent with high grade SBO. Patient was admitted to ACS, Patient refused NGT placement. NPO and started on IVF. Blood cultures negative x2. Course complicated by MONICA on CKD stage 3 with metabolic acidosis and hypernatremia. SBO Improved diet was advanced. Now transferred to Medicine service for management of hypernatremia    # Hypernatremia  # History of Nephrogenic DI   Elevated SOsm  Interval improvement  - Strict I/O  - Nephrology follow up  IVF adjusted      # MONICA on CKD 3b   # Metabolic acidosis   No obstruction on imaging.  - IVF  - Nephrology follow up    # High grade SBO  Patient refused NGT placement  GI symptoms resolved, moving bowels  - Diet advanced   - monitor bowel function    # Hypertension  Likely due to IVF. Pain free  - Amlodipine 5mg started.    # Schizophrenia  # Depression  Appears to be stable currently  - Olanzapine, Haloperidol and Lorazepam  - Escitalopram    # Dyslipidemia  - On Statin    # Hypothyroidism  Normal TFT  - Levothyroxine    # Pancytopenia  Seen at [facility] on 10/2/23 for pancytopenia by Hematology. Transfused 1 unit PRBC and started on iron   - monitor CBC  - follow up with hematology as outpatient     VTE Prophylaxis  OOBTC, Mobilize    Disposition - dc in am pending improvement in labs. spoke to amelia BARONE  73 year old female with a past medical history of schizophrenia, hypothyroidism, hypertension, Nephrogenic DI,  and prior SBO who was sent to the ER from [place] for complaints of abdominal pain.   In the ER, CT abdomen and pelvis consistent with high grade SBO. Patient was admitted to ACS, Patient refused NGT placement. NPO and started on IVF. Blood cultures negative x2. Course complicated by MONICA on CKD stage 3 with metabolic acidosis and hypernatremia. SBO Improved diet was advanced. Now transferred to Medicine service for management of hypernatremia    # Hypernatremia  # History of Nephrogenic DI   Elevated SOsm  Interval improvement  - Strict I/O  - Nephrology follow up  IVF adjusted      # MONICA on CKD 3b   # Metabolic acidosis   No obstruction on imaging.  - IVF  - Nephrology follow up    # High grade SBO  Patient refused NGT placement  GI symptoms resolved, moving bowels  - Diet advanced   - monitor bowel function    # Hypertension  Likely due to IVF. Pain free  - Amlodipine 5mg started.    # Schizophrenia  # Depression  Appears to be stable currently  - Olanzapine, Haloperidol and Lorazepam  - Escitalopram    # Dyslipidemia  - On Statin    # Hypothyroidism  Normal TFT  - Levothyroxine    # Pancytopenia  Seen at [facility] on 10/2/23 for pancytopenia by Hematology. Transfused 1 unit PRBC and started on iron   - monitor CBC  - follow up with hematology as outpatient     VTE Prophylaxis  OOBTC, Mobilize    Disposition - dc in am pending improvement in labs. spoke to amelia BARONE

## 2023-12-07 NOTE — PROGRESS NOTE ADULT - ASSESSMENT
A) HYponatremia  better; Metabolic  acidosis, low K, Calcium  corrects to 7.46    P) IV  fluid changed,  follow up labs.

## 2023-12-07 NOTE — PROGRESS NOTE ADULT - NS ATTEND AMEND GEN_ALL_CORE FT
Patient seen and examined at bedside. No acute events overnight. Denies any nausea or vomiting. Pain is well controlled. Afebrile. Remains NPO on D5W for hypernatremia. Patient admits to passing gas.    Remains hyponatremic despite D5W  advance to sips of clears  POCUS today to assess for volume status  SCDs and HSQ  Serial BMP for hypernatremia
Patient seen and examined at bedside. No acute events overnight. Denies any nausea or vomiting. Pain is well controlled. Afebrile. tolerating clear liquid diet. Having bowel movements. Obstruction seem to have resolved.    Advance diet and monitor tolerance  Hypernatremia improving  Monitor bowel function  No further surgical intervention warranted at this time  Please recall as needed

## 2023-12-07 NOTE — PROGRESS NOTE ADULT - SUBJECTIVE AND OBJECTIVE BOX
seen for SBO    feels well  tolerating diet  +BM  no n/v  ros negative    MEDICATIONS  (STANDING):  amLODIPine   Tablet 5 milliGRAM(s) Oral daily  atorvastatin 10 milliGRAM(s) Oral at bedtime  cefTRIAXone Injectable. 1000 milliGRAM(s) IV Push every 24 hours  dextrose 5% 1000 milliLiter(s) (100 mL/Hr) IV Continuous <Continuous>  escitalopram 30 milliGRAM(s) Oral daily  haloperidol     Tablet 5 milliGRAM(s) Oral <User Schedule>  heparin   Injectable 5000 Unit(s) SubCutaneous every 8 hours  levothyroxine 112 MICROGram(s) Oral daily  LORazepam     Tablet 1 milliGRAM(s) Oral <User Schedule>  LORazepam     Tablet 0.5 milliGRAM(s) Oral <User Schedule>  multivitamin 1 Tablet(s) Oral daily  OLANZapine 5 milliGRAM(s) Oral daily  pantoprazole    Tablet 40 milliGRAM(s) Oral before breakfast  polyethylene glycol 3350 17 Gram(s) Oral at bedtime  potassium chloride    Tablet ER 20 milliEquivalent(s) Oral daily    MEDICATIONS  (PRN):  acetaminophen     Tablet .. 650 milliGRAM(s) Oral every 6 hours PRN Temp greater or equal to 38C (100.4F), Mild Pain (1 - 3)  ondansetron Injectable 4 milliGRAM(s) IV Push every 6 hours PRN Nausea      Allergies    clozapine (Other)  Depakote (Other)  Neupogen (Other)  erythromycin (Unknown)        Vital Signs Last 24 Hrs  T(C): 37.1 (07 Dec 2023 05:00), Max: 37.2 (07 Dec 2023 00:11)  T(F): 98.7 (07 Dec 2023 05:00), Max: 98.9 (07 Dec 2023 00:11)  HR: 80 (07 Dec 2023 05:00) (70 - 91)  BP: 145/78 (07 Dec 2023 05:00) (133/71 - 163/81)  BP(mean): --  RR: 18 (07 Dec 2023 05:00) (18 - 18)  SpO2: 96% (07 Dec 2023 05:00) (93% - 96%)    Parameters below as of 07 Dec 2023 05:00  Patient On (Oxygen Delivery Method): room air        PHYSICAL EXAM:    GENERAL: NAD  CHEST/LUNG: Clear to ausculation bilaterally  HEART: Regular rate and rhythm; S1 S2  ABDOMEN: Soft,  Bowel sounds present  EXTREMITIES: no edema   NERVOUS SYSTEM:  Alert & awake, moves ext x4    LABS:                        11.2   4.43  )-----------( 131      ( 07 Dec 2023 04:50 )             37.3     12-07    146<H>  |  115<H>  |  22.9<H>  ----------------------------<  76  3.3<L>   |  18.0<L>  |  1.66<H>    Ca    6.9<L>      07 Dec 2023 04:50  Phos  1.6     12-06  Mg     2.2     12-06    TPro  5.8<L>  /  Alb  3.3  /  TBili  0.3<L>  /  DBili  x   /  AST  10  /  ALT  13  /  AlkPhos  124<H>  12-07      Urinalysis Basic - ( 07 Dec 2023 04:50 )    Color: x / Appearance: x / SG: x / pH: x  Gluc: 76 mg/dL / Ketone: x  / Bili: x / Urobili: x   Blood: x / Protein: x / Nitrite: x   Leuk Esterase: x / RBC: x / WBC x   Sq Epi: x / Non Sq Epi: x / Bacteria: x        CAPILLARY BLOOD GLUCOSE            RADIOLOGY & ADDITIONAL TESTS:

## 2023-12-08 LAB
ANION GAP SERPL CALC-SCNC: 10 MMOL/L — SIGNIFICANT CHANGE UP (ref 5–17)
ANION GAP SERPL CALC-SCNC: 10 MMOL/L — SIGNIFICANT CHANGE UP (ref 5–17)
BUN SERPL-MCNC: 26.7 MG/DL — HIGH (ref 8–20)
BUN SERPL-MCNC: 26.7 MG/DL — HIGH (ref 8–20)
CA-I BLD-SCNC: 0.9 MMOL/L — LOW (ref 1.15–1.33)
CA-I BLD-SCNC: 0.9 MMOL/L — LOW (ref 1.15–1.33)
CALCIUM SERPL-MCNC: 6.3 MG/DL — CRITICAL LOW (ref 8.4–10.5)
CALCIUM SERPL-MCNC: 6.3 MG/DL — CRITICAL LOW (ref 8.4–10.5)
CHLORIDE SERPL-SCNC: 116 MMOL/L — HIGH (ref 96–108)
CHLORIDE SERPL-SCNC: 116 MMOL/L — HIGH (ref 96–108)
CO2 SERPL-SCNC: 19 MMOL/L — LOW (ref 22–29)
CO2 SERPL-SCNC: 19 MMOL/L — LOW (ref 22–29)
CREAT SERPL-MCNC: 1.99 MG/DL — HIGH (ref 0.5–1.3)
CREAT SERPL-MCNC: 1.99 MG/DL — HIGH (ref 0.5–1.3)
EGFR: 26 ML/MIN/1.73M2 — LOW
EGFR: 26 ML/MIN/1.73M2 — LOW
GLUCOSE SERPL-MCNC: 82 MG/DL — SIGNIFICANT CHANGE UP (ref 70–99)
GLUCOSE SERPL-MCNC: 82 MG/DL — SIGNIFICANT CHANGE UP (ref 70–99)
MAGNESIUM SERPL-MCNC: 1.9 MG/DL — SIGNIFICANT CHANGE UP (ref 1.6–2.6)
MAGNESIUM SERPL-MCNC: 1.9 MG/DL — SIGNIFICANT CHANGE UP (ref 1.6–2.6)
POTASSIUM SERPL-MCNC: 4.1 MMOL/L — SIGNIFICANT CHANGE UP (ref 3.5–5.3)
POTASSIUM SERPL-MCNC: 4.1 MMOL/L — SIGNIFICANT CHANGE UP (ref 3.5–5.3)
POTASSIUM SERPL-SCNC: 4.1 MMOL/L — SIGNIFICANT CHANGE UP (ref 3.5–5.3)
POTASSIUM SERPL-SCNC: 4.1 MMOL/L — SIGNIFICANT CHANGE UP (ref 3.5–5.3)
SODIUM SERPL-SCNC: 145 MMOL/L — SIGNIFICANT CHANGE UP (ref 135–145)
SODIUM SERPL-SCNC: 145 MMOL/L — SIGNIFICANT CHANGE UP (ref 135–145)

## 2023-12-08 PROCEDURE — 99232 SBSQ HOSP IP/OBS MODERATE 35: CPT

## 2023-12-08 RX ORDER — CALCIUM CARBONATE 500(1250)
2 TABLET ORAL THREE TIMES A DAY
Refills: 0 | Status: DISCONTINUED | OUTPATIENT
Start: 2023-12-08 | End: 2023-12-18

## 2023-12-08 RX ORDER — ASCORBIC ACID 60 MG
500 TABLET,CHEWABLE ORAL DAILY
Refills: 0 | Status: DISCONTINUED | OUTPATIENT
Start: 2023-12-08 | End: 2023-12-18

## 2023-12-08 RX ORDER — CALCIUM GLUCONATE 100 MG/ML
2 VIAL (ML) INTRAVENOUS ONCE
Refills: 0 | Status: COMPLETED | OUTPATIENT
Start: 2023-12-08 | End: 2023-12-08

## 2023-12-08 RX ORDER — THIAMINE MONONITRATE (VIT B1) 100 MG
100 TABLET ORAL DAILY
Refills: 0 | Status: DISCONTINUED | OUTPATIENT
Start: 2023-12-08 | End: 2023-12-18

## 2023-12-08 RX ADMIN — PANTOPRAZOLE SODIUM 40 MILLIGRAM(S): 20 TABLET, DELAYED RELEASE ORAL at 06:48

## 2023-12-08 RX ADMIN — Medication 112 MICROGRAM(S): at 06:47

## 2023-12-08 RX ADMIN — ATORVASTATIN CALCIUM 10 MILLIGRAM(S): 80 TABLET, FILM COATED ORAL at 21:32

## 2023-12-08 RX ADMIN — Medication 1 MILLIGRAM(S): at 06:50

## 2023-12-08 RX ADMIN — Medication 20 MILLIEQUIVALENT(S): at 12:02

## 2023-12-08 RX ADMIN — ESCITALOPRAM OXALATE 30 MILLIGRAM(S): 10 TABLET, FILM COATED ORAL at 12:02

## 2023-12-08 RX ADMIN — Medication 2 TABLET(S): at 12:02

## 2023-12-08 RX ADMIN — Medication 2 TABLET(S): at 21:32

## 2023-12-08 RX ADMIN — Medication 0.5 MILLIGRAM(S): at 19:46

## 2023-12-08 RX ADMIN — HALOPERIDOL DECANOATE 5 MILLIGRAM(S): 100 INJECTION INTRAMUSCULAR at 15:58

## 2023-12-08 RX ADMIN — AMLODIPINE BESYLATE 5 MILLIGRAM(S): 2.5 TABLET ORAL at 06:48

## 2023-12-08 RX ADMIN — HALOPERIDOL DECANOATE 5 MILLIGRAM(S): 100 INJECTION INTRAMUSCULAR at 06:47

## 2023-12-08 RX ADMIN — Medication 1 TABLET(S): at 12:02

## 2023-12-08 RX ADMIN — OLANZAPINE 5 MILLIGRAM(S): 15 TABLET, FILM COATED ORAL at 12:02

## 2023-12-08 NOTE — PROGRESS NOTE ADULT - ASSESSMENT
72yo F w/ hx of multiple abdominal surgeries and prior bowel obstructions presenting with several episodes of vomiting and CT imaging consistent with SBO. Pt presents from [place] and is unable to provide history    Hypernatremia --> improved  UO < 3 L   Esr free water deficit 5.5 L at admit   Remote h/o Lithium   poor intake     MA cont IVF w added bicarb    Urine Na and Urine osm - noted  prima fit - for strict I/O   No need for DDAVP at this moment     AM labs will follow   74yo F w/ hx of multiple abdominal surgeries and prior bowel obstructions presenting with several episodes of vomiting and CT imaging consistent with SBO. Pt presents from [place] and is unable to provide history    Hypernatremia --> improved  UO < 3 L   Esr free water deficit 5.5 L at admit   Remote h/o Lithium   poor intake     MA cont IVF w added bicarb    Urine Na and Urine osm - noted  prima fit - for strict I/O   No need for DDAVP at this moment     AM labs will follow   74yo F w/ hx of multiple abdominal surgeries and prior bowel obstructions presenting with several episodes of vomiting and CT imaging consistent with SBO. Pt presents from [place] and is unable to provide history    Hypernatremia --> improved  UO < 3 L   Esr free water deficit 5.5 L at admit   Remote h/o Lithium   poor intake     MA cont IVF w added bicarb    HypoCalcemia agree w Bharat gluconate 2 gm IV x 1 today  Will check pth level    Urine Na and Urine osm - noted  prima fit - for strict I/O   No need for DDAVP at this moment     AM labs will follow   72yo F w/ hx of multiple abdominal surgeries and prior bowel obstructions presenting with several episodes of vomiting and CT imaging consistent with SBO. [facility] and is unable to provide history    Hypernatremia --> improved  UO < 3 L   Esr free water deficit 5.5 L at admit   Remote h/o Lithium   poor intake     MA cont IVF w added bicarb    HypoCalcemia agree w Bharat gluconate 2 gm IV x 1 today--> notified NO IV access  Will give po Calcium Carbonate   Will check pth level, repeat phos    Urine Na and Urine osm - noted  prima fit - for strict I/O   No need for DDAVP at this moment     AM labs will follow   74yo F w/ hx of multiple abdominal surgeries and prior bowel obstructions presenting with several episodes of vomiting and CT imaging consistent with SBO. [facility] and is [facility] to provide history    Hypernatremia --> improved  UO < 3 L   Esr free water deficit 5.5 L at admit   Remote h/o Lithium   poor intake     MA cont IVF w added bicarb    HypoCalcemia agree w Bharat gluconate 2 gm IV x 1 today--> notified NO IV access  Will give po Calcium Carbonate   Will check pth level, repeat phos    Urine Na and Urine osm - noted  prima fit - for strict I/O   No need for DDAVP at this moment     AM labs will follow

## 2023-12-08 NOTE — DIETITIAN INITIAL EVALUATION ADULT - OTHER INFO
72 y/o female admitted for high grade SBO at ventral hernia and MONICA was afebrile and hemodynamically stable this morning. On exam patient's abdomen was soft, nontender with nonreducibel herniated bowel on R lateral abdomen. Labs showed hypernatremia of 157 with creatinine of 1.82. Patient was started on D5 1/2 NS. Follow up BMP ordered for 3:00pm. KUB ordered.  Statement Selected

## 2023-12-08 NOTE — PROGRESS NOTE ADULT - ASSESSMENT
73 year old female with a past medical history of schizophrenia, hypothyroidism, hypertension, Nephrogenic DI,  and prior SBO who was sent to the ER from [place] for complaints of abdominal pain.   In the ER, CT abdomen and pelvis consistent with high grade SBO. Patient was admitted to ACS, Patient refused NGT placement. NPO and started on IVF. Blood cultures negative x2. Course complicated by MONICA on CKD stage 3 with metabolic acidosis and hypernatremia. SBO Improved diet was advanced. Now transferred to Medicine service for management of hypernatremia      #hypocalcemia:  refused IV placement    ca carbonate ordered     # Hypernatremia  # History of Nephrogenic DI   Elevated SOsm  Interval improvement  - Nephrology follow up  IVF adjusted      # MONICA on CKD 3b   # Metabolic acidosis   No obstruction on imaging.  - IVF  - Nephrology follow up    # High grade SBO  Patient refused NGT placement  GI symptoms resolved, moving bowels  - Diet advanced   - monitor bowel function  dc abx as no signs of infection    # Hypertension  Likely due to IVF. Pain free  - Amlodipine 5mg started.    # Schizophrenia  # Depression  Appears to be stable currently  - Olanzapine, Haloperidol and Lorazepam  - Escitalopram    # Dyslipidemia  - On Statin    # Hypothyroidism  Normal TFT  - Levothyroxine    # Pancytopenia  Seen at [facility] on 10/2/23 for pancytopenia by Hematology. Transfused 1 unit PRBC and started on iron   - monitor CBC  - follow up with hematology as outpatient     VTE Prophylaxis  OOBTC, Mobilize    dispo: pending stable electrolytes   spoke to amelia BARONE  73 year old female with a past medical history of schizophrenia, hypothyroidism, hypertension, Nephrogenic DI,  and prior SBO who was sent to the ER from [place] for complaints of abdominal pain.   In the ER, CT abdomen and pelvis consistent with high grade SBO. Patient was admitted to ACS, Patient refused NGT placement. NPO and started on IVF. Blood cultures negative x2. Course complicated by MONICA on CKD stage 3 with metabolic acidosis and hypernatremia. SBO Improved diet was advanced. Now transferred to Medicine service for management of hypernatremia      #hypocalcemia:  refused IV placement    ca carbonate ordered     # Hypernatremia  # History of Nephrogenic DI   Elevated SOsm  Interval improvement  - Nephrology follow up  IVF adjusted      # MONICA on CKD 3b   # Metabolic acidosis   No obstruction on imaging.  - IVF  - Nephrology follow up    # High grade SBO  Patient refused NGT placement  GI symptoms resolved, moving bowels  - Diet advanced   - monitor bowel function  dc abx as no signs of infection    # Hypertension  Likely due to IVF. Pain free  - Amlodipine 5mg started.    # Schizophrenia  # Depression  Appears to be stable currently  - Olanzapine, Haloperidol and Lorazepam  - Escitalopram    # Dyslipidemia  - On Statin    # Hypothyroidism  Normal TFT  - Levothyroxine    # Pancytopenia  Seen at [facility] on 10/2/23 for pancytopenia by Hematology. Transfused 1 unit PRBC and started on iron   - monitor CBC  - follow up with hematology as outpatient     VTE Prophylaxis  OOBTC, Mobilize    dispo: pending stable electrolytes   spoke to amelia BARONE

## 2023-12-08 NOTE — BH CONSULTATION LIAISON PROGRESS NOTE - CURRENT MEDICATION
No
MEDICATIONS  (STANDING):  amLODIPine   Tablet 5 milliGRAM(s) Oral daily  atorvastatin 10 milliGRAM(s) Oral at bedtime  calcium gluconate IVPB 2 Gram(s) IV Intermittent once  dextrose 5% 1000 milliLiter(s) (100 mL/Hr) IV Continuous <Continuous>  escitalopram 30 milliGRAM(s) Oral daily  haloperidol     Tablet 5 milliGRAM(s) Oral <User Schedule>  heparin   Injectable 5000 Unit(s) SubCutaneous every 8 hours  levothyroxine 112 MICROGram(s) Oral daily  LORazepam     Tablet 1 milliGRAM(s) Oral <User Schedule>  LORazepam     Tablet 0.5 milliGRAM(s) Oral <User Schedule>  multivitamin 1 Tablet(s) Oral daily  OLANZapine 5 milliGRAM(s) Oral daily  pantoprazole    Tablet 40 milliGRAM(s) Oral before breakfast  polyethylene glycol 3350 17 Gram(s) Oral at bedtime  potassium chloride    Tablet ER 20 milliEquivalent(s) Oral daily    MEDICATIONS  (PRN):  acetaminophen     Tablet .. 650 milliGRAM(s) Oral every 6 hours PRN Temp greater or equal to 38C (100.4F), Mild Pain (1 - 3)  ondansetron Injectable 4 milliGRAM(s) IV Push every 6 hours PRN Nausea

## 2023-12-08 NOTE — PROVIDER CONTACT NOTE (CRITICAL VALUE NOTIFICATION) - RECOMMENDATIONS
MD ordered Calcium gluconate IVPB. MD aware pt refusing IV access. No other interventions ordered at this time. Plan ongoing.

## 2023-12-08 NOTE — BH CONSULTATION LIAISON PROGRESS NOTE - NSBHFUPINTERVALHXFT_PSY_A_CORE
Patient states she is feeling good this morning. Patient wants to go "home to her house". She states she is feeling better, tolerating diet, ate breakfast this morning.

## 2023-12-08 NOTE — PROGRESS NOTE ADULT - SUBJECTIVE AND OBJECTIVE BOX
seen for sbo    declines IV access  no acute complaints  ros negative  tolerating diet     MEDICATIONS  (STANDING):  amLODIPine   Tablet 5 milliGRAM(s) Oral daily  atorvastatin 10 milliGRAM(s) Oral at bedtime  calcium carbonate    500 mG (Tums) Chewable 2 Tablet(s) Chew three times a day  dextrose 5% 1000 milliLiter(s) (100 mL/Hr) IV Continuous <Continuous>  escitalopram 30 milliGRAM(s) Oral daily  haloperidol     Tablet 5 milliGRAM(s) Oral <User Schedule>  heparin   Injectable 5000 Unit(s) SubCutaneous every 8 hours  levothyroxine 112 MICROGram(s) Oral daily  LORazepam     Tablet 0.5 milliGRAM(s) Oral <User Schedule>  LORazepam     Tablet 1 milliGRAM(s) Oral <User Schedule>  multivitamin 1 Tablet(s) Oral daily  OLANZapine 5 milliGRAM(s) Oral daily  pantoprazole    Tablet 40 milliGRAM(s) Oral before breakfast  polyethylene glycol 3350 17 Gram(s) Oral at bedtime  potassium chloride    Tablet ER 20 milliEquivalent(s) Oral daily    MEDICATIONS  (PRN):  acetaminophen     Tablet .. 650 milliGRAM(s) Oral every 6 hours PRN Temp greater or equal to 38C (100.4F), Mild Pain (1 - 3)  ondansetron Injectable 4 milliGRAM(s) IV Push every 6 hours PRN Nausea      Allergies    clozapine (Other)  Depakote (Other)  Neupogen (Other)  erythromycin (Unknown)        Vital Signs Last 24 Hrs  T(C): 36.9 (08 Dec 2023 12:41), Max: 37.2 (07 Dec 2023 17:42)  T(F): 98.4 (08 Dec 2023 12:41), Max: 99 (07 Dec 2023 17:42)  HR: 73 (08 Dec 2023 12:41) (65 - 96)  BP: 146/76 (08 Dec 2023 12:41) (112/68 - 146/76)  BP(mean): --  RR: 18 (08 Dec 2023 12:41) (18 - 18)  SpO2: 95% (08 Dec 2023 12:41) (95% - 99%)    Parameters below as of 08 Dec 2023 12:41  Patient On (Oxygen Delivery Method): room air        PHYSICAL EXAM:    GENERAL: NAD  CHEST/LUNG: Clear to ausculation bilaterally  HEART: Regular rate and rhythm; S1 S2  ABDOMEN: Soft,  Bowel sounds present  EXTREMITIES: no edema   NERVOUS SYSTEM:  nonfocal  LABS:                        11.2   4.43  )-----------( 131      ( 07 Dec 2023 04:50 )             37.3     12-08    145  |  116<H>  |  26.7<H>  ----------------------------<  82  4.1   |  19.0<L>  |  1.99<H>    Ca    6.3<LL>      08 Dec 2023 06:26  Mg     1.9     12-08    TPro  5.8<L>  /  Alb  3.3  /  TBili  0.3<L>  /  DBili  x   /  AST  10  /  ALT  13  /  AlkPhos  124<H>  12-07      Urinalysis Basic - ( 08 Dec 2023 06:26 )    Color: x / Appearance: x / SG: x / pH: x  Gluc: 82 mg/dL / Ketone: x  / Bili: x / Urobili: x   Blood: x / Protein: x / Nitrite: x   Leuk Esterase: x / RBC: x / WBC x   Sq Epi: x / Non Sq Epi: x / Bacteria: x        CAPILLARY BLOOD GLUCOSE            RADIOLOGY & ADDITIONAL TESTS:

## 2023-12-08 NOTE — BH CONSULTATION LIAISON PROGRESS NOTE - NSBHCHARTREVIEWVS_PSY_A_CORE FT
Vital Signs Last 24 Hrs  T(C): 36.7 (08 Dec 2023 08:51), Max: 37.2 (07 Dec 2023 17:42)  T(F): 98.1 (08 Dec 2023 08:51), Max: 99 (07 Dec 2023 17:42)  HR: 83 (08 Dec 2023 08:51) (65 - 96)  BP: 130/74 (08 Dec 2023 08:51) (112/68 - 130/74)  BP(mean): --  RR: 18 (08 Dec 2023 08:51) (18 - 18)  SpO2: 95% (08 Dec 2023 08:51) (95% - 99%)    Parameters below as of 08 Dec 2023 08:51  Patient On (Oxygen Delivery Method): room air

## 2023-12-08 NOTE — BH CONSULTATION LIAISON PROGRESS NOTE - NSBHATTESTCOMMENTATTENDFT_PSY_A_CORE
patient at baseline mental state  awaiting clearance for return to [place] patient at baseline mental state  awaiting clearance for return to [place]

## 2023-12-08 NOTE — PROGRESS NOTE ADULT - SUBJECTIVE AND OBJECTIVE BOX
NEPHROLOGY INTERVAL HPI/OVERNIGHT EVENTS:    Examined earlier  Awake confused  No distress noted    MEDICATIONS  (STANDING):  amLODIPine   Tablet 5 milliGRAM(s) Oral daily  atorvastatin 10 milliGRAM(s) Oral at bedtime  calcium gluconate IVPB 2 Gram(s) IV Intermittent once  dextrose 5% 1000 milliLiter(s) (100 mL/Hr) IV Continuous <Continuous>  escitalopram 30 milliGRAM(s) Oral daily  haloperidol     Tablet 5 milliGRAM(s) Oral <User Schedule>  heparin   Injectable 5000 Unit(s) SubCutaneous every 8 hours  levothyroxine 112 MICROGram(s) Oral daily  LORazepam     Tablet 1 milliGRAM(s) Oral <User Schedule>  LORazepam     Tablet 0.5 milliGRAM(s) Oral <User Schedule>  multivitamin 1 Tablet(s) Oral daily  OLANZapine 5 milliGRAM(s) Oral daily  pantoprazole    Tablet 40 milliGRAM(s) Oral before breakfast  polyethylene glycol 3350 17 Gram(s) Oral at bedtime  potassium chloride    Tablet ER 20 milliEquivalent(s) Oral daily    MEDICATIONS  (PRN):  acetaminophen     Tablet .. 650 milliGRAM(s) Oral every 6 hours PRN Temp greater or equal to 38C (100.4F), Mild Pain (1 - 3)  ondansetron Injectable 4 milliGRAM(s) IV Push every 6 hours PRN Nausea      Allergies    clozapine (Other)  Depakote (Other)  Neupogen (Other)  erythromycin (Unknown)    Intolerances        Vital Signs Last 24 Hrs  T(C): 36.7 (08 Dec 2023 08:51), Max: 37.2 (07 Dec 2023 17:42)  T(F): 98.1 (08 Dec 2023 08:51), Max: 99 (07 Dec 2023 17:42)  HR: 83 (08 Dec 2023 08:51) (65 - 96)  BP: 130/74 (08 Dec 2023 08:51) (112/68 - 130/74)  BP(mean): --  RR: 18 (08 Dec 2023 08:51) (18 - 18)  SpO2: 95% (08 Dec 2023 08:51) (95% - 99%)    Parameters below as of 08 Dec 2023 08:51  Patient On (Oxygen Delivery Method): room air      Daily     Daily     PHYSICAL EXAM:  GENERAL: NAD, talkative but confused  HEAD:  Atraumatic, Normocephalic, dry MM   NECK: Supple, No JVD, Normal thyroid  CHEST/LUNG: Clear to percussion bilaterally  HEART: Regular rate and rhythm  ABDOMEN: Soft, Non rigidity or rbound   EXTREMITIES:  2+ Peripheral Pulses, No clubbing, cyanosis, or edema    LABS:                        11.2   4.43  )-----------( 131      ( 07 Dec 2023 04:50 )             37.3     12-08    145  |  116<H>  |  26.7<H>  ----------------------------<  82  4.1   |  19.0<L>  |  1.99<H>    Ca    6.3<LL>      08 Dec 2023 06:26  Mg     1.9     12-08    TPro  5.8<L>  /  Alb  3.3  /  TBili  0.3<L>  /  DBili  x   /  AST  10  /  ALT  13  /  AlkPhos  124<H>  12-07      Urinalysis Basic - ( 08 Dec 2023 06:26 )    Color: x / Appearance: x / SG: x / pH: x  Gluc: 82 mg/dL / Ketone: x  / Bili: x / Urobili: x   Blood: x / Protein: x / Nitrite: x   Leuk Esterase: x / RBC: x / WBC x   Sq Epi: x / Non Sq Epi: x / Bacteria: x      Magnesium: 1.9 mg/dL (12-08 @ 06:26)          RADIOLOGY & ADDITIONAL TESTS:

## 2023-12-08 NOTE — PROVIDER CONTACT NOTE (CRITICAL VALUE NOTIFICATION) - ASSESSMENT
A&O x 3. VSS. She denies chest pain or SOB. She does not show any s/s of distress or discomfort. Pt refusing IV access.

## 2023-12-08 NOTE — BH CONSULTATION LIAISON PROGRESS NOTE - PRN MEDS
Amlodipine 5mg PO 06:00  Haloperidol 5mg PO 06:00  Levothyroxine 112mcg PO 06:00  Lorazepam 1mg PO 06:00  Pantoprazole 40mg PO 06:00

## 2023-12-09 LAB
ALBUMIN SERPL ELPH-MCNC: 3.3 G/DL — SIGNIFICANT CHANGE UP (ref 3.3–5.2)
ALBUMIN SERPL ELPH-MCNC: 3.3 G/DL — SIGNIFICANT CHANGE UP (ref 3.3–5.2)
ANION GAP SERPL CALC-SCNC: 9 MMOL/L — SIGNIFICANT CHANGE UP (ref 5–17)
ANION GAP SERPL CALC-SCNC: 9 MMOL/L — SIGNIFICANT CHANGE UP (ref 5–17)
BUN SERPL-MCNC: 22.7 MG/DL — HIGH (ref 8–20)
BUN SERPL-MCNC: 22.7 MG/DL — HIGH (ref 8–20)
CALCIUM SERPL-MCNC: 6.5 MG/DL — CRITICAL LOW (ref 8.4–10.5)
CALCIUM SERPL-MCNC: 6.5 MG/DL — CRITICAL LOW (ref 8.4–10.5)
CALCIUM SERPL-MCNC: 6.6 MG/DL — LOW (ref 8.4–10.5)
CALCIUM SERPL-MCNC: 6.6 MG/DL — LOW (ref 8.4–10.5)
CHLORIDE SERPL-SCNC: 115 MMOL/L — HIGH (ref 96–108)
CHLORIDE SERPL-SCNC: 115 MMOL/L — HIGH (ref 96–108)
CO2 SERPL-SCNC: 20 MMOL/L — LOW (ref 22–29)
CO2 SERPL-SCNC: 20 MMOL/L — LOW (ref 22–29)
CREAT SERPL-MCNC: 1.82 MG/DL — HIGH (ref 0.5–1.3)
CREAT SERPL-MCNC: 1.82 MG/DL — HIGH (ref 0.5–1.3)
EGFR: 29 ML/MIN/1.73M2 — LOW
EGFR: 29 ML/MIN/1.73M2 — LOW
GLUCOSE SERPL-MCNC: 82 MG/DL — SIGNIFICANT CHANGE UP (ref 70–99)
GLUCOSE SERPL-MCNC: 82 MG/DL — SIGNIFICANT CHANGE UP (ref 70–99)
PHOSPHATE SERPL-MCNC: 1.8 MG/DL — LOW (ref 2.4–4.7)
PHOSPHATE SERPL-MCNC: 1.8 MG/DL — LOW (ref 2.4–4.7)
POTASSIUM SERPL-MCNC: 4.7 MMOL/L — SIGNIFICANT CHANGE UP (ref 3.5–5.3)
POTASSIUM SERPL-MCNC: 4.7 MMOL/L — SIGNIFICANT CHANGE UP (ref 3.5–5.3)
POTASSIUM SERPL-SCNC: 4.7 MMOL/L — SIGNIFICANT CHANGE UP (ref 3.5–5.3)
POTASSIUM SERPL-SCNC: 4.7 MMOL/L — SIGNIFICANT CHANGE UP (ref 3.5–5.3)
PTH-INTACT FLD-MCNC: 349 PG/ML — HIGH (ref 15–65)
PTH-INTACT FLD-MCNC: 349 PG/ML — HIGH (ref 15–65)
SODIUM SERPL-SCNC: 144 MMOL/L — SIGNIFICANT CHANGE UP (ref 135–145)
SODIUM SERPL-SCNC: 144 MMOL/L — SIGNIFICANT CHANGE UP (ref 135–145)

## 2023-12-09 PROCEDURE — 99232 SBSQ HOSP IP/OBS MODERATE 35: CPT

## 2023-12-09 RX ORDER — SODIUM BICARBONATE 1 MEQ/ML
650 SYRINGE (ML) INTRAVENOUS THREE TIMES A DAY
Refills: 0 | Status: DISCONTINUED | OUTPATIENT
Start: 2023-12-09 | End: 2023-12-17

## 2023-12-09 RX ORDER — SODIUM,POTASSIUM PHOSPHATES 278-250MG
2 POWDER IN PACKET (EA) ORAL ONCE
Refills: 0 | Status: COMPLETED | OUTPATIENT
Start: 2023-12-09 | End: 2023-12-09

## 2023-12-09 RX ADMIN — Medication 650 MILLIGRAM(S): at 12:30

## 2023-12-09 RX ADMIN — Medication 2 TABLET(S): at 12:29

## 2023-12-09 RX ADMIN — HALOPERIDOL DECANOATE 5 MILLIGRAM(S): 100 INJECTION INTRAMUSCULAR at 15:29

## 2023-12-09 RX ADMIN — Medication 1 TABLET(S): at 12:29

## 2023-12-09 RX ADMIN — HALOPERIDOL DECANOATE 5 MILLIGRAM(S): 100 INJECTION INTRAMUSCULAR at 05:27

## 2023-12-09 RX ADMIN — Medication 2 PACKET(S): at 12:29

## 2023-12-09 RX ADMIN — Medication 650 MILLIGRAM(S): at 22:18

## 2023-12-09 RX ADMIN — ESCITALOPRAM OXALATE 30 MILLIGRAM(S): 10 TABLET, FILM COATED ORAL at 12:29

## 2023-12-09 RX ADMIN — Medication 2 TABLET(S): at 22:18

## 2023-12-09 RX ADMIN — Medication 1 MILLIGRAM(S): at 05:27

## 2023-12-09 RX ADMIN — PANTOPRAZOLE SODIUM 40 MILLIGRAM(S): 20 TABLET, DELAYED RELEASE ORAL at 05:28

## 2023-12-09 RX ADMIN — ATORVASTATIN CALCIUM 10 MILLIGRAM(S): 80 TABLET, FILM COATED ORAL at 22:18

## 2023-12-09 RX ADMIN — Medication 112 MICROGRAM(S): at 05:21

## 2023-12-09 RX ADMIN — Medication 0.5 MILLIGRAM(S): at 20:04

## 2023-12-09 RX ADMIN — Medication 2 TABLET(S): at 05:28

## 2023-12-09 RX ADMIN — Medication 100 MILLIGRAM(S): at 12:28

## 2023-12-09 RX ADMIN — AMLODIPINE BESYLATE 5 MILLIGRAM(S): 2.5 TABLET ORAL at 05:28

## 2023-12-09 RX ADMIN — OLANZAPINE 5 MILLIGRAM(S): 15 TABLET, FILM COATED ORAL at 12:29

## 2023-12-09 RX ADMIN — Medication 500 MILLIGRAM(S): at 12:29

## 2023-12-09 NOTE — PROGRESS NOTE ADULT - SUBJECTIVE AND OBJECTIVE BOX
ANDREAS LYONS    [id number]    73y      Female    CC: sbo     INTERVAL HPI/OVERNIGHT EVENTS: pt seen and examined. no acute events reported o/n     REVIEW OF SYSTEMS:    CONSTITUTIONAL: No fever, weight loss  RESPIRATORY: No cough, wheezing, hemoptysis; No shortness of breath  CARDIOVASCULAR: No chest pain, palpitations  GASTROINTESTINAL: No abdominal or epigastric pain. No nausea, vomiting    Vital Signs Last 24 Hrs  T(C): 36.7 (09 Dec 2023 08:51), Max: 37.4 (08 Dec 2023 17:51)  T(F): 98.1 (09 Dec 2023 08:51), Max: 99.4 (08 Dec 2023 17:51)  HR: 71 (09 Dec 2023 08:51) (71 - 84)  BP: 136/81 (09 Dec 2023 08:51) (120/77 - 146/76)  BP(mean): --  RR: 18 (09 Dec 2023 08:51) (18 - 18)  SpO2: 98% (09 Dec 2023 08:51) (94% - 98%)    Parameters below as of 09 Dec 2023 08:51  Patient On (Oxygen Delivery Method): room air        PHYSICAL EXAM:    GENERAL: NAD  CHEST/LUNG: Clear to auscultation bilaterally  HEART: S1S2+, Regular rate and rhythm  ABDOMEN: Soft, Nontender, Nondistended  SKIN: warm, dry  NEURO: Awake, alert; confused  PSYCH: calm at time of exam     LABS:    12-09    144  |  115<H>  |  22.7<H>  ----------------------------<  82  4.7   |  20.0<L>  |  1.82<H>    Ca    6.6<L>      09 Dec 2023 05:35  Phos  1.8     12-09  Mg     1.9     12-08    TPro  x   /  Alb  3.3  /  TBili  x   /  DBili  x   /  AST  x   /  ALT  x   /  AlkPhos  x   12-09      Urinalysis Basic - ( 09 Dec 2023 05:35 )    Color: x / Appearance: x / SG: x / pH: x  Gluc: 82 mg/dL / Ketone: x  / Bili: x / Urobili: x   Blood: x / Protein: x / Nitrite: x   Leuk Esterase: x / RBC: x / WBC x   Sq Epi: x / Non Sq Epi: x / Bacteria: x          MEDICATIONS  (STANDING):  amLODIPine   Tablet 5 milliGRAM(s) Oral daily  ascorbic acid 500 milliGRAM(s) Oral daily  atorvastatin 10 milliGRAM(s) Oral at bedtime  calcium carbonate    500 mG (Tums) Chewable 2 Tablet(s) Chew three times a day  dextrose 5% 1000 milliLiter(s) (100 mL/Hr) IV Continuous <Continuous>  escitalopram 30 milliGRAM(s) Oral daily  haloperidol     Tablet 5 milliGRAM(s) Oral <User Schedule>  heparin   Injectable 5000 Unit(s) SubCutaneous every 8 hours  levothyroxine 112 MICROGram(s) Oral daily  LORazepam     Tablet 1 milliGRAM(s) Oral <User Schedule>  LORazepam     Tablet 0.5 milliGRAM(s) Oral <User Schedule>  multivitamin 1 Tablet(s) Oral daily  OLANZapine 5 milliGRAM(s) Oral daily  pantoprazole    Tablet 40 milliGRAM(s) Oral before breakfast  polyethylene glycol 3350 17 Gram(s) Oral at bedtime  potassium phosphate / sodium phosphate Powder (PHOS-NaK) 2 Packet(s) Oral once  sodium bicarbonate 650 milliGRAM(s) Oral three times a day  thiamine 100 milliGRAM(s) Oral daily    MEDICATIONS  (PRN):  acetaminophen     Tablet .. 650 milliGRAM(s) Oral every 6 hours PRN Temp greater or equal to 38C (100.4F), Mild Pain (1 - 3)  ondansetron Injectable 4 milliGRAM(s) IV Push every 6 hours PRN Nausea      RADIOLOGY & ADDITIONAL TESTS:   ANDREAS LYONS    [id number]    73y      Female    CC: sbo     INTERVAL HPI/OVERNIGHT EVENTS: pt seen and examined. no acute events reported o/n     REVIEW OF SYSTEMS:    CONSTITUTIONAL: No fever, weight loss  RESPIRATORY: No cough, wheezing, hemoptysis; No shortness of breath  CARDIOVASCULAR: No chest pain, palpitations  GASTROINTESTINAL: No abdominal or epigastric pain. No nausea, vomiting    Vital Signs Last 24 Hrs  T(C): 36.7 (09 Dec 2023 08:51), Max: 37.4 (08 Dec 2023 17:51)  T(F): 98.1 (09 Dec 2023 08:51), Max: 99.4 (08 Dec 2023 17:51)  HR: 71 (09 Dec 2023 08:51) (71 - 84)  BP: 136/81 (09 Dec 2023 08:51) (120/77 - 146/76)  BP(mean): --  RR: 18 (09 Dec 2023 08:51) (18 - 18)  SpO2: 98% (09 Dec 2023 08:51) (94% - 98%)    Parameters below as of 09 Dec 2023 08:51  Patient On (Oxygen Delivery Method): room air        PHYSICAL EXAM:    GENERAL: NAD  CHEST/LUNG: Clear to auscultation bilaterally  HEART: S1S2+, Regular rate and rhythm  ABDOMEN: Soft, Nontender, Nondistended  SKIN: warm, dry  NEURO: Awake, alert; confused  PSYCH: calm at time of exam     LABS:    12-09    144  |  115<H>  |  22.7<H>  ----------------------------<  82  4.7   |  20.0<L>  |  1.82<H>    Ca    6.6<L>      09 Dec 2023 05:35  Phos  1.8     12-09  Mg     1.9     12-08    TPro  x   /  Alb  3.3  /  TBili  x   /  DBili  x   /  AST  x   /  ALT  x   /  AlkPhos  x   12-09      Urinalysis Basic - ( 09 Dec 2023 05:35 )    Color: x / Appearance: x / SG: x / pH: x  Gluc: 82 mg/dL / Ketone: x  / Bili: x / Urobili: x   Blood: x / Protein: x / Nitrite: x   Leuk Esterase: x / RBC: x / WBC x   Sq Epi: x / Non Sq Epi: x / Bacteria: x          MEDICATIONS  (STANDING):  amLODIPine   Tablet 5 milliGRAM(s) Oral daily  ascorbic acid 500 milliGRAM(s) Oral daily  atorvastatin 10 milliGRAM(s) Oral at bedtime  calcium carbonate    500 mG (Tums) Chewable 2 Tablet(s) Chew three times a day  dextrose 5% 1000 milliLiter(s) (100 mL/Hr) IV Continuous <Continuous>  escitalopram 30 milliGRAM(s) Oral daily  haloperidol     Tablet 5 milliGRAM(s) Oral <User Schedule>  heparin   Injectable 5000 Unit(s) SubCutaneous every 8 hours  levothyroxine 112 MICROGram(s) Oral daily  LORazepam     Tablet 1 milliGRAM(s) Oral <User Schedule>  LORazepam     Tablet 0.5 milliGRAM(s) Oral <User Schedule>  multivitamin 1 Tablet(s) Oral daily  OLANZapine 5 milliGRAM(s) Oral daily  pantoprazole    Tablet 40 milliGRAM(s) Oral before breakfast  polyethylene glycol 3350 17 Gram(s) Oral at bedtime  potassium phosphate / sodium phosphate Powder (PHOS-NaK) 2 Packet(s) Oral once  sodium bicarbonate 650 milliGRAM(s) Oral three times a day  thiamine 100 milliGRAM(s) Oral daily    MEDICATIONS  (PRN):  acetaminophen     Tablet .. 650 milliGRAM(s) Oral every 6 hours PRN Temp greater or equal to 38C (100.4F), Mild Pain (1 - 3)  ondansetron Injectable 4 milliGRAM(s) IV Push every 6 hours PRN Nausea      RADIOLOGY & ADDITIONAL TESTS:

## 2023-12-09 NOTE — PROGRESS NOTE ADULT - ASSESSMENT
72yo F w/ hx of multiple abdominal surgeries and prior bowel obstructions presenting with several episodes of vomiting and CT imaging consistent with SBO. Pt presents from [place] and is unable to provide history    pt without IV access    Hypernatremia --> improved  UO < 3 L   Esr free water deficit 5.5 L at admit   Remote h/o Lithium   poor intake     Hypophos being supplemented po    MA cont po bicarb    HypoCalcemia improving but still low  cont po Calcium Carbonate    pth level pending    AM labs will follow 74yo F w/ hx of multiple abdominal surgeries and prior bowel obstructions presenting with several episodes of vomiting and CT imaging consistent with SBO. Pt presents from [place] and is unable to provide history    pt without IV access    Hypernatremia --> improved  UO < 3 L   Esr free water deficit 5.5 L at admit   Remote h/o Lithium   poor intake     Hypophos being supplemented po    MA cont po bicarb    HypoCalcemia improving but still low  cont po Calcium Carbonate    pth level pending    AM labs will follow

## 2023-12-09 NOTE — PROGRESS NOTE ADULT - SUBJECTIVE AND OBJECTIVE BOX
NEPHROLOGY INTERVAL HPI/OVERNIGHT EVENTS:    Examined   Awake confused   No distress noted    MEDICATIONS  (STANDING):  amLODIPine   Tablet 5 milliGRAM(s) Oral daily  ascorbic acid 500 milliGRAM(s) Oral daily  atorvastatin 10 milliGRAM(s) Oral at bedtime  calcium carbonate    500 mG (Tums) Chewable 2 Tablet(s) Chew three times a day  dextrose 5% 1000 milliLiter(s) (100 mL/Hr) IV Continuous <Continuous>  escitalopram 30 milliGRAM(s) Oral daily  haloperidol     Tablet 5 milliGRAM(s) Oral <User Schedule>  heparin   Injectable 5000 Unit(s) SubCutaneous every 8 hours  levothyroxine 112 MICROGram(s) Oral daily  LORazepam     Tablet 1 milliGRAM(s) Oral <User Schedule>  LORazepam     Tablet 0.5 milliGRAM(s) Oral <User Schedule>  multivitamin 1 Tablet(s) Oral daily  OLANZapine 5 milliGRAM(s) Oral daily  pantoprazole    Tablet 40 milliGRAM(s) Oral before breakfast  polyethylene glycol 3350 17 Gram(s) Oral at bedtime  potassium phosphate / sodium phosphate Powder (PHOS-NaK) 2 Packet(s) Oral once  thiamine 100 milliGRAM(s) Oral daily    MEDICATIONS  (PRN):  acetaminophen     Tablet .. 650 milliGRAM(s) Oral every 6 hours PRN Temp greater or equal to 38C (100.4F), Mild Pain (1 - 3)  ondansetron Injectable 4 milliGRAM(s) IV Push every 6 hours PRN Nausea      Allergies    clozapine (Other)  Depakote (Other)  Neupogen (Other)  erythromycin (Unknown)    Intolerances        Vital Signs Last 24 Hrs  T(C): 37.1 (09 Dec 2023 04:30), Max: 37.4 (08 Dec 2023 17:51)  T(F): 98.7 (09 Dec 2023 04:30), Max: 99.4 (08 Dec 2023 17:51)  HR: 78 (09 Dec 2023 04:30) (73 - 84)  BP: 130/80 (09 Dec 2023 04:30) (120/77 - 146/76)  BP(mean): --  RR: 18 (09 Dec 2023 04:30) (18 - 18)  SpO2: 94% (09 Dec 2023 04:30) (94% - 96%)    Parameters below as of 09 Dec 2023 04:30  Patient On (Oxygen Delivery Method): room air      PHYSICAL EXAM:  GENERAL: NAD, talkative but confused  HEAD:  Atraumatic, Normocephalic, dry MM   NECK: Supple, No JVD, Normal thyroid  CHEST/LUNG: Clear to percussion bilaterally  HEART: Regular rate and rhythm  ABDOMEN: Soft, Non rigidity or rbound   EXTREMITIES:  No clubbing, cyanosis, or edema    LABS:    12-09    144  |  115<H>  |  22.7<H>  ----------------------------<  82  4.7   |  20.0<L>  |  1.82<H>    Ca    6.6<L>      09 Dec 2023 05:35  Phos  1.8     12-09  Mg     1.9     12-08    TPro  x   /  Alb  3.3  /  TBili  x   /  DBili  x   /  AST  x   /  ALT  x   /  AlkPhos  x   12-09      Urinalysis Basic - ( 09 Dec 2023 05:35 )    Color: x / Appearance: x / SG: x / pH: x  Gluc: 82 mg/dL / Ketone: x  / Bili: x / Urobili: x   Blood: x / Protein: x / Nitrite: x   Leuk Esterase: x / RBC: x / WBC x   Sq Epi: x / Non Sq Epi: x / Bacteria: x      Phosphorus: 1.8 mg/dL (12-09 @ 05:35)          RADIOLOGY & ADDITIONAL TESTS:

## 2023-12-09 NOTE — PROGRESS NOTE ADULT - ASSESSMENT
73y/oF PMH schizophrenia, hypothyroidism, HTN, nephrogenic DI, prior SBO, sent to ER from [place] for c/o abd pain. In ER, CT abd and pelvis consistent with high grade SBO. Pt admitted to ACS, refused NGT placement. Pt made NPO and started on IVF. Bcx neg x2. course complicated by MONICA on CKD 3 with metabolic acidosis and hypernatremia. SBO now resolved, tolerating diet. Transferred to medicine service for further electrolyte management.     Hypocalcemia   -pt refusing IV placement   -cont PO calcium carbonate   -iPTH 349  -nephro recs appreciated   -f/u am labs     Hypernatremia   Hx nephrogenic DI   -nephro following   -Na improved     MONICA on CKD3  Metabolic acidosis   -no obstruction on imaging   -f/u am labs   -cont po sodium bicarb    High grade SBO, resolved   -pt refused NGT  -tolerating diet   -cont to monitor bowel function   -abx dc'ed     HTN   -cont amlodipine   -cont to monitor bp     Schizophrenia   Depression   -cont home regimen     HLD   -cont statin     Hypothyroid   -cont levothyroxine     Pancytopenia   -seen by hematology at [facility] 10/2/23 with pancytopenia  -f/u outpt     vte ppx: heparin sq     dispo: return to [place] pending electrolyte improvement  73y/oF PMH schizophrenia, hypothyroidism, HTN, nephrogenic DI, prior SBO, sent to ER from [place] for c/o abd pain. In ER, CT abd and pelvis consistent with high grade SBO. Pt admitted to ACS, refused NGT placement. Pt made NPO and started on IVF. Bcx neg x2. course complicated by MONICA on CKD 3 with metabolic acidosis and hypernatremia. SBO now resolved, tolerating diet. Transferred to medicine service for further electrolyte management.     Hypocalcemia   -pt refusing IV placement   -cont PO calcium carbonate   -iPTH 349  -nephro recs appreciated   -f/u am labs     Hypernatremia   Hx nephrogenic DI   -nephro following   -Na improved     MONICA on CKD3  Metabolic acidosis   -no obstruction on imaging   -f/u am labs   -cont po sodium bicarb    High grade SBO, resolved   -pt refused NGT  -tolerating diet   -cont to monitor bowel function   -abx dc'ed     HTN   -cont amlodipine   -cont to monitor bp     Schizophrenia   Depression   -cont home regimen     HLD   -cont statin     Hypothyroid   -cont levothyroxine     Pancytopenia   -seen by hematology at [facility] 10/2/23 with pancytopenia  -f/u outpt     vte ppx: heparin sq     dispo: return to [place] pending electrolyte improvement

## 2023-12-10 LAB
ANION GAP SERPL CALC-SCNC: 9 MMOL/L — SIGNIFICANT CHANGE UP (ref 5–17)
ANION GAP SERPL CALC-SCNC: 9 MMOL/L — SIGNIFICANT CHANGE UP (ref 5–17)
BUN SERPL-MCNC: 23.9 MG/DL — HIGH (ref 8–20)
BUN SERPL-MCNC: 23.9 MG/DL — HIGH (ref 8–20)
CALCIUM SERPL-MCNC: 6.8 MG/DL — LOW (ref 8.4–10.5)
CALCIUM SERPL-MCNC: 6.8 MG/DL — LOW (ref 8.4–10.5)
CHLORIDE SERPL-SCNC: 113 MMOL/L — HIGH (ref 96–108)
CHLORIDE SERPL-SCNC: 113 MMOL/L — HIGH (ref 96–108)
CO2 SERPL-SCNC: 21 MMOL/L — LOW (ref 22–29)
CO2 SERPL-SCNC: 21 MMOL/L — LOW (ref 22–29)
CREAT SERPL-MCNC: 1.86 MG/DL — HIGH (ref 0.5–1.3)
CREAT SERPL-MCNC: 1.86 MG/DL — HIGH (ref 0.5–1.3)
EGFR: 29 ML/MIN/1.73M2 — LOW
EGFR: 29 ML/MIN/1.73M2 — LOW
GLUCOSE SERPL-MCNC: 88 MG/DL — SIGNIFICANT CHANGE UP (ref 70–99)
GLUCOSE SERPL-MCNC: 88 MG/DL — SIGNIFICANT CHANGE UP (ref 70–99)
MAGNESIUM SERPL-MCNC: 2.1 MG/DL — SIGNIFICANT CHANGE UP (ref 1.6–2.6)
MAGNESIUM SERPL-MCNC: 2.1 MG/DL — SIGNIFICANT CHANGE UP (ref 1.6–2.6)
PHOSPHATE SERPL-MCNC: 2 MG/DL — LOW (ref 2.4–4.7)
PHOSPHATE SERPL-MCNC: 2 MG/DL — LOW (ref 2.4–4.7)
POTASSIUM SERPL-MCNC: 4.3 MMOL/L — SIGNIFICANT CHANGE UP (ref 3.5–5.3)
POTASSIUM SERPL-MCNC: 4.3 MMOL/L — SIGNIFICANT CHANGE UP (ref 3.5–5.3)
POTASSIUM SERPL-SCNC: 4.3 MMOL/L — SIGNIFICANT CHANGE UP (ref 3.5–5.3)
POTASSIUM SERPL-SCNC: 4.3 MMOL/L — SIGNIFICANT CHANGE UP (ref 3.5–5.3)
SODIUM SERPL-SCNC: 143 MMOL/L — SIGNIFICANT CHANGE UP (ref 135–145)
SODIUM SERPL-SCNC: 143 MMOL/L — SIGNIFICANT CHANGE UP (ref 135–145)

## 2023-12-10 PROCEDURE — 99232 SBSQ HOSP IP/OBS MODERATE 35: CPT

## 2023-12-10 RX ORDER — CALCITRIOL 0.5 UG/1
0.25 CAPSULE ORAL DAILY
Refills: 0 | Status: DISCONTINUED | OUTPATIENT
Start: 2023-12-10 | End: 2023-12-18

## 2023-12-10 RX ADMIN — PANTOPRAZOLE SODIUM 40 MILLIGRAM(S): 20 TABLET, DELAYED RELEASE ORAL at 05:23

## 2023-12-10 RX ADMIN — Medication 2 TABLET(S): at 05:23

## 2023-12-10 RX ADMIN — HALOPERIDOL DECANOATE 5 MILLIGRAM(S): 100 INJECTION INTRAMUSCULAR at 05:22

## 2023-12-10 RX ADMIN — AMLODIPINE BESYLATE 5 MILLIGRAM(S): 2.5 TABLET ORAL at 05:22

## 2023-12-10 RX ADMIN — Medication 1 MILLIGRAM(S): at 05:22

## 2023-12-10 RX ADMIN — Medication 650 MILLIGRAM(S): at 05:23

## 2023-12-10 RX ADMIN — Medication 112 MICROGRAM(S): at 05:22

## 2023-12-10 NOTE — PROGRESS NOTE ADULT - ASSESSMENT
73y/oF PMH schizophrenia, hypothyroidism, HTN, nephrogenic DI, prior SBO, sent to ER from [place] for c/o abd pain. In ER, CT abd and pelvis consistent with high grade SBO. Pt admitted to ACS, refused NGT placement. Pt made NPO and started on IVF. Bcx neg x2. course complicated by MONICA on CKD 3 with metabolic acidosis and hypernatremia. SBO now resolved, tolerating diet. Transferred to medicine service for further electrolyte management.     Hypocalcemia   -pt refusing IV placement   -cont PO calcium carbonate   -nephro recs appreciated   -f/u am labs   -calcitriol added 12/10     Hypernatremia   Hx nephrogenic DI   -nephro following   -Na improved     MONICA on CKD3  Metabolic acidosis   -no obstruction on imaging   -f/u am labs   -cont po sodium bicarb    High grade SBO, resolved   -pt refused NGT  -tolerating diet   -cont to monitor bowel function   -abx dc'ed     HTN   -cont amlodipine   -cont to monitor bp     Schizophrenia   Depression   -cont home regimen     HLD   -cont statin     Hypothyroid   -cont levothyroxine     Pancytopenia   -seen by hematology at [facility] 10/2/23 with pancytopenia  -f/u outpt     vte ppx: heparin sq     dispo: return to [place] pending electrolyte improvement    73y/oF PMH schizophrenia, hypothyroidism, HTN, nephrogenic DI, prior SBO, sent to ER from [place] for c/o abd pain. In ER, CT abd and pelvis consistent with high grade SBO. Pt admitted to ACS, refused NGT placement. Pt made NPO and started on IVF. Bcx neg x2. course complicated by MONICA on CKD 3 with metabolic acidosis and hypernatremia. SBO now resolved, tolerating diet. Transferred to medicine service for further electrolyte management.     Hypocalcemia   -pt refusing IV placement   -cont PO calcium carbonate   -nephro recs appreciated   -f/u am labs   -calcitriol added 12/10     Hypernatremia   Hx nephrogenic DI   -nephro following   -Na improved     MONICA on CKD3  Metabolic acidosis   -no obstruction on imaging   -f/u am labs   -cont po sodium bicarb    High grade SBO, resolved   -pt refused NGT  -tolerating diet   -cont to monitor bowel function   -abx dc'ed     HTN   -cont amlodipine   -cont to monitor bp     Schizophrenia   Depression   -cont home regimen     HLD   -cont statin     Hypothyroid   -cont levothyroxine     Pancytopenia   -seen by hematology at [facility] 10/2/23 with pancytopenia  -f/u outpt     vte ppx: heparin sq     dispo: return to [place] pending electrolyte improvement

## 2023-12-10 NOTE — PROGRESS NOTE ADULT - ASSESSMENT
74yo F w/ hx of multiple abdominal surgeries and prior bowel obstructions presenting with several episodes of vomiting and CT imaging consistent with SBO. Pt presents from [place] and is unable to provide history    pt without IV access    Hypernatremia --> improved/ resolved  UO < 3 L   Esr free water deficit 5.5 L at admit   Remote h/o Lithium   poor intake all contributing    Hypophos being supplemented po    MA cont po bicarb    HypoCalcemia improving --> but still low  cont po Calcium Carbonate     2HPT-  pth level high- will start Calcitriol     AM labs will follow 74yo F w/ hx of multiple abdominal surgeries and prior bowel obstructions presenting with several episodes of vomiting and CT imaging consistent with SBO. Pt presents from [place] and is unable to provide history    pt without IV access    Hypernatremia --> improved/ resolved  UO < 3 L   Esr free water deficit 5.5 L at admit   Remote h/o Lithium   poor intake all contributing    Hypophos being supplemented po    MA cont po bicarb    HypoCalcemia improving --> but still low  cont po Calcium Carbonate     2HPT-  pth level high- will start Calcitriol     AM labs will follow

## 2023-12-10 NOTE — PROGRESS NOTE ADULT - SUBJECTIVE AND OBJECTIVE BOX
NEPHROLOGY INTERVAL HPI/OVERNIGHT EVENTS:    Examined   Awake confused   No distress noted    MEDICATIONS  (STANDING):  amLODIPine   Tablet 5 milliGRAM(s) Oral daily  ascorbic acid 500 milliGRAM(s) Oral daily  atorvastatin 10 milliGRAM(s) Oral at bedtime  calcium carbonate    500 mG (Tums) Chewable 2 Tablet(s) Chew three times a day  dextrose 5% 1000 milliLiter(s) (100 mL/Hr) IV Continuous <Continuous>  escitalopram 30 milliGRAM(s) Oral daily  haloperidol     Tablet 5 milliGRAM(s) Oral <User Schedule>  heparin   Injectable 5000 Unit(s) SubCutaneous every 8 hours  levothyroxine 112 MICROGram(s) Oral daily  LORazepam     Tablet 1 milliGRAM(s) Oral <User Schedule>  LORazepam     Tablet 0.5 milliGRAM(s) Oral <User Schedule>  multivitamin 1 Tablet(s) Oral daily  OLANZapine 5 milliGRAM(s) Oral daily  pantoprazole    Tablet 40 milliGRAM(s) Oral before breakfast  polyethylene glycol 3350 17 Gram(s) Oral at bedtime  sodium bicarbonate 650 milliGRAM(s) Oral three times a day  thiamine 100 milliGRAM(s) Oral daily    MEDICATIONS  (PRN):  acetaminophen     Tablet .. 650 milliGRAM(s) Oral every 6 hours PRN Temp greater or equal to 38C (100.4F), Mild Pain (1 - 3)  ondansetron Injectable 4 milliGRAM(s) IV Push every 6 hours PRN Nausea      Allergies    clozapine (Other)  Depakote (Other)  Neupogen (Other)  erythromycin (Unknown)    Intolerances        Vital Signs Last 24 Hrs  T(C): 37.1 (10 Dec 2023 04:17), Max: 37.3 (09 Dec 2023 17:27)  T(F): 98.8 (10 Dec 2023 04:17), Max: 99.1 (09 Dec 2023 17:27)  HR: 68 (10 Dec 2023 04:17) (68 - 77)  BP: 130/74 (10 Dec 2023 04:17) (111/64 - 136/81)  BP(mean): --  RR: 19 (10 Dec 2023 04:17) (18 - 19)  SpO2: 94% (10 Dec 2023 04:17) (93% - 98%)    Parameters below as of 10 Dec 2023 04:17  Patient On (Oxygen Delivery Method): room air      PHYSICAL EXAM:  GENERAL: NAD, talkative but confused  HEAD:  Atraumatic, Normocephalic, dry MM   NECK: Supple, No JVD, Normal thyroid  CHEST/LUNG: Clear to percussion bilaterally  HEART: Regular rate and rhythm  ABDOMEN: Soft, Non rigidity or rbound   EXTREMITIES:  No clubbing, cyanosis, or edema      LABS:    12-10    143  |  113<H>  |  23.9<H>  ----------------------------<  88  4.3   |  21.0<L>  |  1.86<H>    Ca    6.8<L>      10 Dec 2023 05:48  Phos  2.0     12-10  Mg     2.1     12-10    TPro  x   /  Alb  3.3  /  TBili  x   /  DBili  x   /  AST  x   /  ALT  x   /  AlkPhos  x   12-09      Urinalysis Basic - ( 10 Dec 2023 05:48 )    Color: x / Appearance: x / SG: x / pH: x  Gluc: 88 mg/dL / Ketone: x  / Bili: x / Urobili: x   Blood: x / Protein: x / Nitrite: x   Leuk Esterase: x / RBC: x / WBC x   Sq Epi: x / Non Sq Epi: x / Bacteria: x      Phosphorus: 2.0 mg/dL (12-10 @ 05:48)  Magnesium: 2.1 mg/dL (12-10 @ 05:48)          RADIOLOGY & ADDITIONAL TESTS:

## 2023-12-10 NOTE — PROGRESS NOTE ADULT - SUBJECTIVE AND OBJECTIVE BOX
ANDREAS LYONS    [id number]    73y      Female    CC: SBO    INTERVAL HPI/OVERNIGHT EVENTS: Pt seen and examined. refusing IV access. refusing all meds today       Vital Signs Last 24 Hrs  T(C): 37.2 (10 Dec 2023 13:25), Max: 37.3 (09 Dec 2023 17:27)  T(F): 98.9 (10 Dec 2023 13:25), Max: 99.1 (09 Dec 2023 17:27)  HR: 78 (10 Dec 2023 13:25) (68 - 79)  BP: 126/75 (10 Dec 2023 13:25) (111/64 - 131/73)  BP(mean): --  RR: 18 (10 Dec 2023 13:25) (18 - 19)  SpO2: 97% (10 Dec 2023 13:25) (93% - 97%)    Parameters below as of 10 Dec 2023 13:25  Patient On (Oxygen Delivery Method): room air        PHYSICAL EXAM:    GENERAL: NAD  CHEST/LUNG: Clear to auscultation bilaterally  HEART: S1S2+, Regular rate and rhythm  ABDOMEN: Soft, Nontender, Nondistended  SKIN: warm, dry  NEURO: Awake, alert  PSYCH: calm at time of exam     LABS:    12-10    143  |  113<H>  |  23.9<H>  ----------------------------<  88  4.3   |  21.0<L>  |  1.86<H>    Ca    6.8<L>      10 Dec 2023 05:48  Phos  2.0     12-10  Mg     2.1     12-10    TPro  x   /  Alb  3.3  /  TBili  x   /  DBili  x   /  AST  x   /  ALT  x   /  AlkPhos  x   12-09      Urinalysis Basic - ( 10 Dec 2023 05:48 )    Color: x / Appearance: x / SG: x / pH: x  Gluc: 88 mg/dL / Ketone: x  / Bili: x / Urobili: x   Blood: x / Protein: x / Nitrite: x   Leuk Esterase: x / RBC: x / WBC x   Sq Epi: x / Non Sq Epi: x / Bacteria: x          MEDICATIONS  (STANDING):  amLODIPine   Tablet 5 milliGRAM(s) Oral daily  ascorbic acid 500 milliGRAM(s) Oral daily  atorvastatin 10 milliGRAM(s) Oral at bedtime  calcitriol   Capsule 0.25 MICROGram(s) Oral daily  calcium carbonate    500 mG (Tums) Chewable 2 Tablet(s) Chew three times a day  dextrose 5% 1000 milliLiter(s) (100 mL/Hr) IV Continuous <Continuous>  escitalopram 30 milliGRAM(s) Oral daily  haloperidol     Tablet 5 milliGRAM(s) Oral <User Schedule>  heparin   Injectable 5000 Unit(s) SubCutaneous every 8 hours  levothyroxine 112 MICROGram(s) Oral daily  LORazepam     Tablet 0.5 milliGRAM(s) Oral <User Schedule>  LORazepam     Tablet 1 milliGRAM(s) Oral <User Schedule>  multivitamin 1 Tablet(s) Oral daily  OLANZapine 5 milliGRAM(s) Oral daily  pantoprazole    Tablet 40 milliGRAM(s) Oral before breakfast  polyethylene glycol 3350 17 Gram(s) Oral at bedtime  sodium bicarbonate 650 milliGRAM(s) Oral three times a day  thiamine 100 milliGRAM(s) Oral daily    MEDICATIONS  (PRN):  acetaminophen     Tablet .. 650 milliGRAM(s) Oral every 6 hours PRN Temp greater or equal to 38C (100.4F), Mild Pain (1 - 3)  ondansetron Injectable 4 milliGRAM(s) IV Push every 6 hours PRN Nausea      RADIOLOGY & ADDITIONAL TESTS:   ANDREAS LYONS    [id number]    73y      Female    CC: SBO    INTERVAL HPI/OVERNIGHT EVENTS: Pt seen and examined. refusing IV access. refusing all meds today       Vital Signs Last 24 Hrs  T(C): 37.2 (10 Dec 2023 13:25), Max: 37.3 (09 Dec 2023 17:27)  T(F): 98.9 (10 Dec 2023 13:25), Max: 99.1 (09 Dec 2023 17:27)  HR: 78 (10 Dec 2023 13:25) (68 - 79)  BP: 126/75 (10 Dec 2023 13:25) (111/64 - 131/73)  BP(mean): --  RR: 18 (10 Dec 2023 13:25) (18 - 19)  SpO2: 97% (10 Dec 2023 13:25) (93% - 97%)    Parameters below as of 10 Dec 2023 13:25  Patient On (Oxygen Delivery Method): room air        PHYSICAL EXAM:    GENERAL: NAD  CHEST/LUNG: Clear to auscultation bilaterally  HEART: S1S2+, Regular rate and rhythm  ABDOMEN: Soft, Nontender, Nondistended  SKIN: warm, dry  NEURO: Awake, alert  PSYCH: calm at time of exam     LABS:    12-10    143  |  113<H>  |  23.9<H>  ----------------------------<  88  4.3   |  21.0<L>  |  1.86<H>    Ca    6.8<L>      10 Dec 2023 05:48  Phos  2.0     12-10  Mg     2.1     12-10    TPro  x   /  Alb  3.3  /  TBili  x   /  DBili  x   /  AST  x   /  ALT  x   /  AlkPhos  x   12-09      Urinalysis Basic - ( 10 Dec 2023 05:48 )    Color: x / Appearance: x / SG: x / pH: x  Gluc: 88 mg/dL / Ketone: x  / Bili: x / Urobili: x   Blood: x / Protein: x / Nitrite: x   Leuk Esterase: x / RBC: x / WBC x   Sq Epi: x / Non Sq Epi: x / Bacteria: x          MEDICATIONS  (STANDING):  amLODIPine   Tablet 5 milliGRAM(s) Oral daily  ascorbic acid 500 milliGRAM(s) Oral daily  atorvastatin 10 milliGRAM(s) Oral at bedtime  calcitriol   Capsule 0.25 MICROGram(s) Oral daily  calcium carbonate    500 mG (Tums) Chewable 2 Tablet(s) Chew three times a day  dextrose 5% 1000 milliLiter(s) (100 mL/Hr) IV Continuous <Continuous>  escitalopram 30 milliGRAM(s) Oral daily  haloperidol     Tablet 5 milliGRAM(s) Oral <User Schedule>  heparin   Injectable 5000 Unit(s) SubCutaneous every 8 hours  levothyroxine 112 MICROGram(s) Oral daily  LORazepam     Tablet 0.5 milliGRAM(s) Oral <User Schedule>  LORazepam     Tablet 1 milliGRAM(s) Oral <User Schedule>  multivitamin 1 Tablet(s) Oral daily  OLANZapine 5 milliGRAM(s) Oral daily  pantoprazole    Tablet 40 milliGRAM(s) Oral before breakfast  polyethylene glycol 3350 17 Gram(s) Oral at bedtime  sodium bicarbonate 650 milliGRAM(s) Oral three times a day  thiamine 100 milliGRAM(s) Oral daily    MEDICATIONS  (PRN):  acetaminophen     Tablet .. 650 milliGRAM(s) Oral every 6 hours PRN Temp greater or equal to 38C (100.4F), Mild Pain (1 - 3)  ondansetron Injectable 4 milliGRAM(s) IV Push every 6 hours PRN Nausea      RADIOLOGY & ADDITIONAL TESTS:

## 2023-12-11 LAB
ANION GAP SERPL CALC-SCNC: 9 MMOL/L — SIGNIFICANT CHANGE UP (ref 5–17)
ANION GAP SERPL CALC-SCNC: 9 MMOL/L — SIGNIFICANT CHANGE UP (ref 5–17)
BUN SERPL-MCNC: 25.9 MG/DL — HIGH (ref 8–20)
BUN SERPL-MCNC: 25.9 MG/DL — HIGH (ref 8–20)
CALCIUM SERPL-MCNC: 6.9 MG/DL — LOW (ref 8.4–10.5)
CALCIUM SERPL-MCNC: 6.9 MG/DL — LOW (ref 8.4–10.5)
CHLORIDE SERPL-SCNC: 111 MMOL/L — HIGH (ref 96–108)
CHLORIDE SERPL-SCNC: 111 MMOL/L — HIGH (ref 96–108)
CO2 SERPL-SCNC: 21 MMOL/L — LOW (ref 22–29)
CO2 SERPL-SCNC: 21 MMOL/L — LOW (ref 22–29)
CREAT SERPL-MCNC: 1.64 MG/DL — HIGH (ref 0.5–1.3)
CREAT SERPL-MCNC: 1.64 MG/DL — HIGH (ref 0.5–1.3)
EGFR: 33 ML/MIN/1.73M2 — LOW
EGFR: 33 ML/MIN/1.73M2 — LOW
GLUCOSE SERPL-MCNC: 93 MG/DL — SIGNIFICANT CHANGE UP (ref 70–99)
GLUCOSE SERPL-MCNC: 93 MG/DL — SIGNIFICANT CHANGE UP (ref 70–99)
HCT VFR BLD CALC: 33.5 % — LOW (ref 34.5–45)
HCT VFR BLD CALC: 33.5 % — LOW (ref 34.5–45)
HGB BLD-MCNC: 10.4 G/DL — LOW (ref 11.5–15.5)
HGB BLD-MCNC: 10.4 G/DL — LOW (ref 11.5–15.5)
MAGNESIUM SERPL-MCNC: 2 MG/DL — SIGNIFICANT CHANGE UP (ref 1.6–2.6)
MAGNESIUM SERPL-MCNC: 2 MG/DL — SIGNIFICANT CHANGE UP (ref 1.6–2.6)
MCHC RBC-ENTMCNC: 29.9 PG — SIGNIFICANT CHANGE UP (ref 27–34)
MCHC RBC-ENTMCNC: 29.9 PG — SIGNIFICANT CHANGE UP (ref 27–34)
MCHC RBC-ENTMCNC: 31 GM/DL — LOW (ref 32–36)
MCHC RBC-ENTMCNC: 31 GM/DL — LOW (ref 32–36)
MCV RBC AUTO: 96.3 FL — SIGNIFICANT CHANGE UP (ref 80–100)
MCV RBC AUTO: 96.3 FL — SIGNIFICANT CHANGE UP (ref 80–100)
PHOSPHATE SERPL-MCNC: 2.1 MG/DL — LOW (ref 2.4–4.7)
PHOSPHATE SERPL-MCNC: 2.1 MG/DL — LOW (ref 2.4–4.7)
PLATELET # BLD AUTO: 136 K/UL — LOW (ref 150–400)
PLATELET # BLD AUTO: 136 K/UL — LOW (ref 150–400)
POTASSIUM SERPL-MCNC: 4.5 MMOL/L — SIGNIFICANT CHANGE UP (ref 3.5–5.3)
POTASSIUM SERPL-MCNC: 4.5 MMOL/L — SIGNIFICANT CHANGE UP (ref 3.5–5.3)
POTASSIUM SERPL-SCNC: 4.5 MMOL/L — SIGNIFICANT CHANGE UP (ref 3.5–5.3)
POTASSIUM SERPL-SCNC: 4.5 MMOL/L — SIGNIFICANT CHANGE UP (ref 3.5–5.3)
RBC # BLD: 3.48 M/UL — LOW (ref 3.8–5.2)
RBC # BLD: 3.48 M/UL — LOW (ref 3.8–5.2)
RBC # FLD: 16.9 % — HIGH (ref 10.3–14.5)
RBC # FLD: 16.9 % — HIGH (ref 10.3–14.5)
SODIUM SERPL-SCNC: 141 MMOL/L — SIGNIFICANT CHANGE UP (ref 135–145)
SODIUM SERPL-SCNC: 141 MMOL/L — SIGNIFICANT CHANGE UP (ref 135–145)
WBC # BLD: 5.64 K/UL — SIGNIFICANT CHANGE UP (ref 3.8–10.5)
WBC # BLD: 5.64 K/UL — SIGNIFICANT CHANGE UP (ref 3.8–10.5)
WBC # FLD AUTO: 5.64 K/UL — SIGNIFICANT CHANGE UP (ref 3.8–10.5)
WBC # FLD AUTO: 5.64 K/UL — SIGNIFICANT CHANGE UP (ref 3.8–10.5)

## 2023-12-11 PROCEDURE — 99232 SBSQ HOSP IP/OBS MODERATE 35: CPT

## 2023-12-11 RX ORDER — SODIUM,POTASSIUM PHOSPHATES 278-250MG
1 POWDER IN PACKET (EA) ORAL EVERY 6 HOURS
Refills: 0 | Status: COMPLETED | OUTPATIENT
Start: 2023-12-11 | End: 2023-12-11

## 2023-12-11 RX ADMIN — Medication 2 TABLET(S): at 22:23

## 2023-12-11 RX ADMIN — Medication 0.5 MILLIGRAM(S): at 22:22

## 2023-12-11 RX ADMIN — Medication 1 TABLET(S): at 22:21

## 2023-12-11 RX ADMIN — ATORVASTATIN CALCIUM 10 MILLIGRAM(S): 80 TABLET, FILM COATED ORAL at 22:21

## 2023-12-11 RX ADMIN — HEPARIN SODIUM 5000 UNIT(S): 5000 INJECTION INTRAVENOUS; SUBCUTANEOUS at 22:25

## 2023-12-11 RX ADMIN — Medication 650 MILLIGRAM(S): at 22:22

## 2023-12-11 NOTE — PROGRESS NOTE ADULT - SUBJECTIVE AND OBJECTIVE BOX
NEPHROLOGY INTERVAL HPI/OVERNIGHT EVENTS:    Examined   Awake confused   No distress noted      MEDICATIONS  (STANDING):  amLODIPine   Tablet 5 milliGRAM(s) Oral daily  ascorbic acid 500 milliGRAM(s) Oral daily  atorvastatin 10 milliGRAM(s) Oral at bedtime  calcitriol   Capsule 0.25 MICROGram(s) Oral daily  calcium carbonate    500 mG (Tums) Chewable 2 Tablet(s) Chew three times a day  dextrose 5% 1000 milliLiter(s) (100 mL/Hr) IV Continuous <Continuous>  escitalopram 30 milliGRAM(s) Oral daily  haloperidol     Tablet 5 milliGRAM(s) Oral <User Schedule>  heparin   Injectable 5000 Unit(s) SubCutaneous every 8 hours  levothyroxine 112 MICROGram(s) Oral daily  LORazepam     Tablet 0.5 milliGRAM(s) Oral <User Schedule>  LORazepam     Tablet 1 milliGRAM(s) Oral <User Schedule>  multivitamin 1 Tablet(s) Oral daily  OLANZapine 5 milliGRAM(s) Oral daily  pantoprazole    Tablet 40 milliGRAM(s) Oral before breakfast  polyethylene glycol 3350 17 Gram(s) Oral at bedtime  sodium bicarbonate 650 milliGRAM(s) Oral three times a day  thiamine 100 milliGRAM(s) Oral daily    MEDICATIONS  (PRN):  acetaminophen     Tablet .. 650 milliGRAM(s) Oral every 6 hours PRN Temp greater or equal to 38C (100.4F), Mild Pain (1 - 3)  ondansetron Injectable 4 milliGRAM(s) IV Push every 6 hours PRN Nausea      Allergies    clozapine (Other)  Depakote (Other)  Neupogen (Other)  erythromycin (Unknown)    Intolerances        Vital Signs Last 24 Hrs  T(C): 36.4 (11 Dec 2023 12:55), Max: 37.2 (10 Dec 2023 17:48)  T(F): 97.6 (11 Dec 2023 12:55), Max: 98.9 (10 Dec 2023 17:48)  HR: 60 (11 Dec 2023 12:55) (60 - 87)  BP: 105/69 (11 Dec 2023 12:55) (105/69 - 142/80)  BP(mean): --  RR: 18 (11 Dec 2023 12:55) (18 - 18)  SpO2: 92% (11 Dec 2023 12:55) (92% - 97%)    Parameters below as of 11 Dec 2023 12:55  Patient On (Oxygen Delivery Method): room air    PHYSICAL EXAM:  GENERAL: NAD, talkative but confused  HEAD:  NCAT  NECK: Supple, No JVD  CHEST/LUNG: Clear to percussion bilaterally  HEART: Regular rate and rhythm  ABDOMEN: Soft, Non rigidity or rbound   EXTREMITIES:  No clubbing, cyanosis, or edema    LABS:                        10.4   5.64  )-----------( 136      ( 11 Dec 2023 08:17 )             33.5     12-11    141  |  111<H>  |  25.9<H>  ----------------------------<  93  4.5   |  21.0<L>  |  1.64<H>    Ca    6.9<L>      11 Dec 2023 08:17  Phos  2.1     12-11  Mg     2.0     12-11        Urinalysis Basic - ( 11 Dec 2023 08:17 )    Color: x / Appearance: x / SG: x / pH: x  Gluc: 93 mg/dL / Ketone: x  / Bili: x / Urobili: x   Blood: x / Protein: x / Nitrite: x   Leuk Esterase: x / RBC: x / WBC x   Sq Epi: x / Non Sq Epi: x / Bacteria: x      Magnesium: 2.0 mg/dL (12-11 @ 08:17)  Phosphorus: 2.1 mg/dL (12-11 @ 08:17)          RADIOLOGY & ADDITIONAL TESTS:

## 2023-12-11 NOTE — PROGRESS NOTE ADULT - SUBJECTIVE AND OBJECTIVE BOX
ANDREAS LYONS    [id number]    73y      Female    CC: sbo     INTERVAL HPI/OVERNIGHT EVENTS: pt seen and examined. refusing PO meds this am again     REVIEW OF SYSTEMS:    CONSTITUTIONAL: No weight loss  RESPIRATORY: No cough, wheezing, hemoptysis; No shortness of breath  CARDIOVASCULAR: No chest pain, palpitations  GASTROINTESTINAL: No abdominal or epigastric pain. No nausea, vomiting    Vital Signs Last 24 Hrs  T(C): 36.7 (11 Dec 2023 09:19), Max: 37.2 (10 Dec 2023 13:25)  T(F): 98.1 (11 Dec 2023 09:19), Max: 98.9 (10 Dec 2023 13:25)  HR: 73 (11 Dec 2023 09:19) (71 - 87)  BP: 134/63 (11 Dec 2023 09:19) (114/49 - 142/80)  BP(mean): --  RR: 18 (11 Dec 2023 09:19) (18 - 18)  SpO2: 94% (11 Dec 2023 09:19) (93% - 97%)    Parameters below as of 11 Dec 2023 09:19  Patient On (Oxygen Delivery Method): room air        PHYSICAL EXAM:    GENERAL: NAD  CHEST/LUNG: Clear to auscultation bilaterally  HEART: S1S2+, Regular rate and rhythm  ABDOMEN: Soft, Nontender, Nondistended  SKIN: warm, dry  NEURO: Awake, alert  PSYCH: calm at time of exam    LABS:                        10.4   5.64  )-----------( 136      ( 11 Dec 2023 08:17 )             33.5     12-11    141  |  111<H>  |  25.9<H>  ----------------------------<  93  4.5   |  21.0<L>  |  1.64<H>    Ca    6.9<L>      11 Dec 2023 08:17  Phos  2.1     12-11  Mg     2.0     12-11        Urinalysis Basic - ( 11 Dec 2023 08:17 )    Color: x / Appearance: x / SG: x / pH: x  Gluc: 93 mg/dL / Ketone: x  / Bili: x / Urobili: x   Blood: x / Protein: x / Nitrite: x   Leuk Esterase: x / RBC: x / WBC x   Sq Epi: x / Non Sq Epi: x / Bacteria: x          MEDICATIONS  (STANDING):  amLODIPine   Tablet 5 milliGRAM(s) Oral daily  ascorbic acid 500 milliGRAM(s) Oral daily  atorvastatin 10 milliGRAM(s) Oral at bedtime  calcitriol   Capsule 0.25 MICROGram(s) Oral daily  calcium carbonate    500 mG (Tums) Chewable 2 Tablet(s) Chew three times a day  dextrose 5% 1000 milliLiter(s) (100 mL/Hr) IV Continuous <Continuous>  escitalopram 30 milliGRAM(s) Oral daily  haloperidol     Tablet 5 milliGRAM(s) Oral <User Schedule>  heparin   Injectable 5000 Unit(s) SubCutaneous every 8 hours  levothyroxine 112 MICROGram(s) Oral daily  LORazepam     Tablet 1 milliGRAM(s) Oral <User Schedule>  LORazepam     Tablet 0.5 milliGRAM(s) Oral <User Schedule>  multivitamin 1 Tablet(s) Oral daily  OLANZapine 5 milliGRAM(s) Oral daily  pantoprazole    Tablet 40 milliGRAM(s) Oral before breakfast  polyethylene glycol 3350 17 Gram(s) Oral at bedtime  sodium bicarbonate 650 milliGRAM(s) Oral three times a day  thiamine 100 milliGRAM(s) Oral daily    MEDICATIONS  (PRN):  acetaminophen     Tablet .. 650 milliGRAM(s) Oral every 6 hours PRN Temp greater or equal to 38C (100.4F), Mild Pain (1 - 3)  ondansetron Injectable 4 milliGRAM(s) IV Push every 6 hours PRN Nausea      RADIOLOGY & ADDITIONAL TESTS:   ANDREAS LYONS    [id number]    73y      Female    CC: sbo     INTERVAL HPI/OVERNIGHT EVENTS: pt seen and examined. refusing PO meds this am again     REVIEW OF SYSTEMS:    CONSTITUTIONAL: No weight loss  RESPIRATORY: No cough, wheezing, hemoptysis; No shortness of breath  CARDIOVASCULAR: No chest pain, palpitations  GASTROINTESTINAL: No abdominal or epigastric pain. No nausea, vomiting    Vital Signs Last 24 Hrs  T(C): 36.7 (11 Dec 2023 09:19), Max: 37.2 (10 Dec 2023 13:25)  T(F): 98.1 (11 Dec 2023 09:19), Max: 98.9 (10 Dec 2023 13:25)  HR: 73 (11 Dec 2023 09:19) (71 - 87)  BP: 134/63 (11 Dec 2023 09:19) (114/49 - 142/80)  BP(mean): --  RR: 18 (11 Dec 2023 09:19) (18 - 18)  SpO2: 94% (11 Dec 2023 09:19) (93% - 97%)    Parameters below as of 11 Dec 2023 09:19  Patient On (Oxygen Delivery Method): room air        PHYSICAL EXAM:    GENERAL: NAD  CHEST/LUNG: Clear to auscultation bilaterally  HEART: S1S2+, Regular rate and rhythm  ABDOMEN: Soft, Nontender, Nondistended  SKIN: warm, dry  NEURO: Awake, alert  PSYCH: calm at time of exam    LABS:                        10.4   5.64  )-----------( 136      ( 11 Dec 2023 08:17 )             33.5     12-11    141  |  111<H>  |  25.9<H>  ----------------------------<  93  4.5   |  21.0<L>  |  1.64<H>    Ca    6.9<L>      11 Dec 2023 08:17  Phos  2.1     12-11  Mg     2.0     12-11        Urinalysis Basic - ( 11 Dec 2023 08:17 )    Color: x / Appearance: x / SG: x / pH: x  Gluc: 93 mg/dL / Ketone: x  / Bili: x / Urobili: x   Blood: x / Protein: x / Nitrite: x   Leuk Esterase: x / RBC: x / WBC x   Sq Epi: x / Non Sq Epi: x / Bacteria: x          MEDICATIONS  (STANDING):  amLODIPine   Tablet 5 milliGRAM(s) Oral daily  ascorbic acid 500 milliGRAM(s) Oral daily  atorvastatin 10 milliGRAM(s) Oral at bedtime  calcitriol   Capsule 0.25 MICROGram(s) Oral daily  calcium carbonate    500 mG (Tums) Chewable 2 Tablet(s) Chew three times a day  dextrose 5% 1000 milliLiter(s) (100 mL/Hr) IV Continuous <Continuous>  escitalopram 30 milliGRAM(s) Oral daily  haloperidol     Tablet 5 milliGRAM(s) Oral <User Schedule>  heparin   Injectable 5000 Unit(s) SubCutaneous every 8 hours  levothyroxine 112 MICROGram(s) Oral daily  LORazepam     Tablet 1 milliGRAM(s) Oral <User Schedule>  LORazepam     Tablet 0.5 milliGRAM(s) Oral <User Schedule>  multivitamin 1 Tablet(s) Oral daily  OLANZapine 5 milliGRAM(s) Oral daily  pantoprazole    Tablet 40 milliGRAM(s) Oral before breakfast  polyethylene glycol 3350 17 Gram(s) Oral at bedtime  sodium bicarbonate 650 milliGRAM(s) Oral three times a day  thiamine 100 milliGRAM(s) Oral daily    MEDICATIONS  (PRN):  acetaminophen     Tablet .. 650 milliGRAM(s) Oral every 6 hours PRN Temp greater or equal to 38C (100.4F), Mild Pain (1 - 3)  ondansetron Injectable 4 milliGRAM(s) IV Push every 6 hours PRN Nausea      RADIOLOGY & ADDITIONAL TESTS:

## 2023-12-11 NOTE — PROGRESS NOTE ADULT - ASSESSMENT
The patient appears stated age, fair hygiene y.  The patient was calm, cooperative with the interview and maintained appropriate eye contact.  No psychomotor agitation or retardation noted.   The patient's speech was fluent, normal in tone, rate, and volume.  The patient's mood is neutral  Affect is full.  The patient's thoughts are goal directed.  Denies any delusions or hallucinations. Denies any suicidal or homicidal ideation, intent, or plan.  Insight is poor.  Judgment is poor.

## 2023-12-11 NOTE — PROGRESS NOTE ADULT - SUBJECTIVE AND OBJECTIVE BOX
chart reviewed and patient seen at 0730 refused IV and meds  verbal, affect brighter denies any discomfort Has no understanding of why she remains in this hospital  Vital Signs Last 24 Hrs  T(C): 36.8 (11 Dec 2023 04:35), Max: 37.2 (10 Dec 2023 13:25)  T(F): 98.3 (11 Dec 2023 04:35), Max: 98.9 (10 Dec 2023 13:25)  HR: 71 (11 Dec 2023 04:35) (71 - 87)  BP: 114/49 (11 Dec 2023 04:35) (114/49 - 142/80)  BP(mean): --  RR: 18 (11 Dec 2023 04:35) (18 - 18)  SpO2: 96% (11 Dec 2023 04:35) (93% - 97%)    Parameters below as of 11 Dec 2023 04:35  Patient On (Oxygen Delivery Method): room air    12-10    143  |  113<H>  |  23.9<H>  ----------------------------<  88  4.3   |  21.0<L>  |  1.86<H>    Ca    6.8<L>      10 Dec 2023 05:48  Phos  2.0     12-10  Mg     2.1     12-10          Urinalysis Basic - ( 10 Dec 2023 05:48 )    Color: x / Appearance: x / SG: x / pH: x  Gluc: 88 mg/dL / Ketone: x  / Bili: x / Urobili: x   Blood: x / Protein: x / Nitrite: x   Leuk Esterase: x / RBC: x / WBC x   Sq Epi: x / Non Sq Epi: x / Bacteria: x  MEDICATIONS  (STANDING):  amLODIPine   Tablet 5 milliGRAM(s) Oral daily  ascorbic acid 500 milliGRAM(s) Oral daily  atorvastatin 10 milliGRAM(s) Oral at bedtime  calcitriol   Capsule 0.25 MICROGram(s) Oral daily  calcium carbonate    500 mG (Tums) Chewable 2 Tablet(s) Chew three times a day  dextrose 5% 1000 milliLiter(s) (100 mL/Hr) IV Continuous <Continuous>  escitalopram 30 milliGRAM(s) Oral daily  haloperidol     Tablet 5 milliGRAM(s) Oral <User Schedule>  heparin   Injectable 5000 Unit(s) SubCutaneous every 8 hours  levothyroxine 112 MICROGram(s) Oral daily  LORazepam     Tablet 1 milliGRAM(s) Oral <User Schedule>  LORazepam     Tablet 0.5 milliGRAM(s) Oral <User Schedule>  multivitamin 1 Tablet(s) Oral daily  OLANZapine 5 milliGRAM(s) Oral daily  pantoprazole    Tablet 40 milliGRAM(s) Oral before breakfast  polyethylene glycol 3350 17 Gram(s) Oral at bedtime  sodium bicarbonate 650 milliGRAM(s) Oral three times a day  thiamine 100 milliGRAM(s) Oral daily    MEDICATIONS  (PRN):  acetaminophen     Tablet .. 650 milliGRAM(s) Oral every 6 hours PRN Temp greater or equal to 38C (100.4F), Mild Pain (1 - 3)  ondansetron Injectable 4 milliGRAM(s) IV Push every 6 hours PRN Nausea

## 2023-12-11 NOTE — PROGRESS NOTE ADULT - PROBLEM SELECTOR PLAN 1
nothing new to add  chronic mental illness complicates her treatment  awaiting medical stability for return to [facility]  patient with impaired ability to make medical decisions

## 2023-12-11 NOTE — PROGRESS NOTE ADULT - ASSESSMENT
72yo F w/ hx of multiple abdominal surgeries and prior bowel obstructions presenting with several episodes of vomiting and CT imaging consistent with SBO. Pt presents from [place] and is unable to provide history    Hypernatremia --> improved/ resolved  UO < 3 L   Esr free water deficit 5.5 L at admit   Remote h/o Lithium   poor intake all contributing    Hypophos - will supplement    MA cont po bicarb    HypoCalcemia improving --> but still low  cont po Calcium Carbonate     2HPT-  pth level high- cont Calcitriol     AM labs will follow 74yo F w/ hx of multiple abdominal surgeries and prior bowel obstructions presenting with several episodes of vomiting and CT imaging consistent with SBO. Pt presents from [place] and is unable to provide history    Hypernatremia --> improved/ resolved  UO < 3 L   Esr free water deficit 5.5 L at admit   Remote h/o Lithium   poor intake all contributing    Hypophos - will supplement    MA cont po bicarb    HypoCalcemia improving --> but still low  cont po Calcium Carbonate     2HPT-  pth level high- cont Calcitriol     AM labs will follow

## 2023-12-11 NOTE — PROGRESS NOTE ADULT - ASSESSMENT
73y/oF PMH schizophrenia, hypothyroidism, HTN, nephrogenic DI, prior SBO, sent to ER from [place] for c/o abd pain. In ER, CT abd and pelvis consistent with high grade SBO. Pt admitted to ACS, refused NGT placement. Pt made NPO and started on IVF. Bcx neg x2. course complicated by MONICA on CKD 3 with metabolic acidosis and hypernatremia. SBO now resolved, tolerating diet. Transferred to medicine service for further electrolyte management.     Hypocalcemia   -pt refusing IV placement   -cont PO calcium carbonate   -nephro recs appreciated   -f/u am labs   -calcitriol added 12/10 ; however pt refusing PO meds today     Hypophosphatemia   -cont PO supplement  -f/u am phos    Hypernatremia   Hx nephrogenic DI   -nephro following   -Na improved   -f/u am bmp     MONICA on CKD3  Metabolic acidosis   -no obstruction on imaging   -f/u am labs   -cont po sodium bicarb    High grade SBO, resolved   -pt refused NGT  -tolerating diet   -cont to monitor bowel function   -abx dc'ed     HTN   -cont amlodipine   -cont to monitor bp     Schizophrenia   Depression   -cont home regimen     HLD   -cont statin     Hypothyroid   -cont levothyroxine     Pancytopenia   -seen by hematology at [facility] 10/2/23 with pancytopenia  -f/u outpt     vte ppx: heparin sq     dispo: return to [place] pending electrolyte improvement    73y/oF PMH schizophrenia, hypothyroidism, HTN, nephrogenic DI, prior SBO, sent to ER from [place] for c/o abd pain. In ER, CT abd and pelvis consistent with high grade SBO. Pt admitted to ACS, refused NGT placement. Pt made NPO and started on IVF. Bcx neg x2. course complicated by MONICA on CKD 3 with metabolic acidosis and hypernatremia. SBO now resolved, tolerating diet. Transferred to medicine service for further electrolyte management.     Hypocalcemia   -pt refusing IV placement   -cont PO calcium carbonate   -nephro recs appreciated   -f/u am labs   -calcitriol added 12/10 ; however pt refusing PO meds today     Hypophosphatemia   -cont PO supplement  -f/u am phos    Hypernatremia   Hx nephrogenic DI   -nephro following   -Na improved   -f/u am bmp     MONICA on CKD3  Metabolic acidosis   -no obstruction on imaging   -f/u am labs   -cont po sodium bicarb    High grade SBO, resolved   -pt refused NGT  -tolerating diet   -cont to monitor bowel function   -abx dc'ed     HTN   -cont amlodipine   -cont to monitor bp     Schizophrenia   Depression   -cont home regimen     HLD   -cont statin     Hypothyroid   -cont levothyroxine     Pancytopenia   -seen by hematology at [facility] 10/2/23 with pancytopenia  -f/u outpt     vte ppx: heparin sq     dispo: return to [place] pending electrolyte improvement

## 2023-12-12 LAB
ALBUMIN SERPL ELPH-MCNC: 3.2 G/DL — LOW (ref 3.3–5.2)
ALBUMIN SERPL ELPH-MCNC: 3.2 G/DL — LOW (ref 3.3–5.2)
ALP SERPL-CCNC: 97 U/L — SIGNIFICANT CHANGE UP (ref 40–120)
ALP SERPL-CCNC: 97 U/L — SIGNIFICANT CHANGE UP (ref 40–120)
ALT FLD-CCNC: 13 U/L — SIGNIFICANT CHANGE UP
ALT FLD-CCNC: 13 U/L — SIGNIFICANT CHANGE UP
ANION GAP SERPL CALC-SCNC: 8 MMOL/L — SIGNIFICANT CHANGE UP (ref 5–17)
ANION GAP SERPL CALC-SCNC: 8 MMOL/L — SIGNIFICANT CHANGE UP (ref 5–17)
AST SERPL-CCNC: 9 U/L — SIGNIFICANT CHANGE UP
AST SERPL-CCNC: 9 U/L — SIGNIFICANT CHANGE UP
BILIRUB DIRECT SERPL-MCNC: 0.1 MG/DL — SIGNIFICANT CHANGE UP (ref 0–0.3)
BILIRUB DIRECT SERPL-MCNC: 0.1 MG/DL — SIGNIFICANT CHANGE UP (ref 0–0.3)
BILIRUB INDIRECT FLD-MCNC: 0.2 MG/DL — SIGNIFICANT CHANGE UP (ref 0.2–1)
BILIRUB INDIRECT FLD-MCNC: 0.2 MG/DL — SIGNIFICANT CHANGE UP (ref 0.2–1)
BILIRUB SERPL-MCNC: 0.3 MG/DL — LOW (ref 0.4–2)
BILIRUB SERPL-MCNC: 0.3 MG/DL — LOW (ref 0.4–2)
BUN SERPL-MCNC: 26 MG/DL — HIGH (ref 8–20)
BUN SERPL-MCNC: 26 MG/DL — HIGH (ref 8–20)
CALCIUM SERPL-MCNC: 6.7 MG/DL — LOW (ref 8.4–10.5)
CALCIUM SERPL-MCNC: 6.7 MG/DL — LOW (ref 8.4–10.5)
CHLORIDE SERPL-SCNC: 115 MMOL/L — HIGH (ref 96–108)
CHLORIDE SERPL-SCNC: 115 MMOL/L — HIGH (ref 96–108)
CO2 SERPL-SCNC: 21 MMOL/L — LOW (ref 22–29)
CO2 SERPL-SCNC: 21 MMOL/L — LOW (ref 22–29)
CREAT SERPL-MCNC: 1.55 MG/DL — HIGH (ref 0.5–1.3)
CREAT SERPL-MCNC: 1.55 MG/DL — HIGH (ref 0.5–1.3)
EGFR: 36 ML/MIN/1.73M2 — LOW
EGFR: 36 ML/MIN/1.73M2 — LOW
GLUCOSE SERPL-MCNC: 88 MG/DL — SIGNIFICANT CHANGE UP (ref 70–99)
GLUCOSE SERPL-MCNC: 88 MG/DL — SIGNIFICANT CHANGE UP (ref 70–99)
HCT VFR BLD CALC: 31.1 % — LOW (ref 34.5–45)
HCT VFR BLD CALC: 31.1 % — LOW (ref 34.5–45)
HGB BLD-MCNC: 9.5 G/DL — LOW (ref 11.5–15.5)
HGB BLD-MCNC: 9.5 G/DL — LOW (ref 11.5–15.5)
MAGNESIUM SERPL-MCNC: 2.2 MG/DL — SIGNIFICANT CHANGE UP (ref 1.8–2.6)
MAGNESIUM SERPL-MCNC: 2.2 MG/DL — SIGNIFICANT CHANGE UP (ref 1.8–2.6)
MCHC RBC-ENTMCNC: 29.7 PG — SIGNIFICANT CHANGE UP (ref 27–34)
MCHC RBC-ENTMCNC: 29.7 PG — SIGNIFICANT CHANGE UP (ref 27–34)
MCHC RBC-ENTMCNC: 30.5 GM/DL — LOW (ref 32–36)
MCHC RBC-ENTMCNC: 30.5 GM/DL — LOW (ref 32–36)
MCV RBC AUTO: 97.2 FL — SIGNIFICANT CHANGE UP (ref 80–100)
MCV RBC AUTO: 97.2 FL — SIGNIFICANT CHANGE UP (ref 80–100)
PHOSPHATE SERPL-MCNC: 3.2 MG/DL — SIGNIFICANT CHANGE UP (ref 2.4–4.7)
PHOSPHATE SERPL-MCNC: 3.2 MG/DL — SIGNIFICANT CHANGE UP (ref 2.4–4.7)
PLATELET # BLD AUTO: 135 K/UL — LOW (ref 150–400)
PLATELET # BLD AUTO: 135 K/UL — LOW (ref 150–400)
POTASSIUM SERPL-MCNC: 4.2 MMOL/L — SIGNIFICANT CHANGE UP (ref 3.5–5.3)
POTASSIUM SERPL-MCNC: 4.2 MMOL/L — SIGNIFICANT CHANGE UP (ref 3.5–5.3)
POTASSIUM SERPL-SCNC: 4.2 MMOL/L — SIGNIFICANT CHANGE UP (ref 3.5–5.3)
POTASSIUM SERPL-SCNC: 4.2 MMOL/L — SIGNIFICANT CHANGE UP (ref 3.5–5.3)
PROT SERPL-MCNC: 5.6 G/DL — LOW (ref 6.6–8.7)
PROT SERPL-MCNC: 5.6 G/DL — LOW (ref 6.6–8.7)
RBC # BLD: 3.2 M/UL — LOW (ref 3.8–5.2)
RBC # BLD: 3.2 M/UL — LOW (ref 3.8–5.2)
RBC # FLD: 16.8 % — HIGH (ref 10.3–14.5)
RBC # FLD: 16.8 % — HIGH (ref 10.3–14.5)
SODIUM SERPL-SCNC: 144 MMOL/L — SIGNIFICANT CHANGE UP (ref 135–145)
SODIUM SERPL-SCNC: 144 MMOL/L — SIGNIFICANT CHANGE UP (ref 135–145)
WBC # BLD: 4.59 K/UL — SIGNIFICANT CHANGE UP (ref 3.8–10.5)
WBC # BLD: 4.59 K/UL — SIGNIFICANT CHANGE UP (ref 3.8–10.5)
WBC # FLD AUTO: 4.59 K/UL — SIGNIFICANT CHANGE UP (ref 3.8–10.5)
WBC # FLD AUTO: 4.59 K/UL — SIGNIFICANT CHANGE UP (ref 3.8–10.5)

## 2023-12-12 PROCEDURE — 99232 SBSQ HOSP IP/OBS MODERATE 35: CPT

## 2023-12-12 RX ORDER — OLANZAPINE 15 MG/1
5 TABLET, FILM COATED ORAL ONCE
Refills: 0 | Status: COMPLETED | OUTPATIENT
Start: 2023-12-12 | End: 2023-12-12

## 2023-12-12 RX ADMIN — AMLODIPINE BESYLATE 5 MILLIGRAM(S): 2.5 TABLET ORAL at 05:48

## 2023-12-12 RX ADMIN — HEPARIN SODIUM 5000 UNIT(S): 5000 INJECTION INTRAVENOUS; SUBCUTANEOUS at 05:48

## 2023-12-12 RX ADMIN — Medication 650 MILLIGRAM(S): at 05:48

## 2023-12-12 RX ADMIN — PANTOPRAZOLE SODIUM 40 MILLIGRAM(S): 20 TABLET, DELAYED RELEASE ORAL at 05:48

## 2023-12-12 RX ADMIN — Medication 1 MILLIGRAM(S): at 05:48

## 2023-12-12 RX ADMIN — Medication 112 MICROGRAM(S): at 05:48

## 2023-12-12 RX ADMIN — HEPARIN SODIUM 5000 UNIT(S): 5000 INJECTION INTRAVENOUS; SUBCUTANEOUS at 21:25

## 2023-12-12 RX ADMIN — HALOPERIDOL DECANOATE 5 MILLIGRAM(S): 100 INJECTION INTRAMUSCULAR at 05:48

## 2023-12-12 NOTE — PROGRESS NOTE ADULT - ASSESSMENT
74yo F w/ hx of multiple abdominal surgeries and prior bowel obstructions presenting with several episodes of vomiting and CT imaging consistent with SBO. Pt presents from [place] and is unable to provide history    Hypernatremia --> improved/ resolved  UO < 3 L   Esr free water deficit 5.5 L at admit   Remote h/o Lithium   poor intake all contributing    Hypophos - better    MA cont po bicarb    HypoCalcemia improving --> but still low  Pt has no IV and has occasionally refuses meds  cont po Calcium Carbonate     2HPT-  pth level high- cont Calcitriol     AM labs will follow 74yo F w/ hx of multiple abdominal surgeries and prior bowel obstructions presenting with several episodes of vomiting and CT imaging consistent with SBO. Pt presents from [place] and is unable to provide history    Hypernatremia --> improved/ resolved  UO < 3 L   Esr free water deficit 5.5 L at admit   Remote h/o Lithium   poor intake all contributing    Hypophos - better    MA cont po bicarb    HypoCalcemia improving --> but still low  Pt has no IV and has occasionally refuses meds  cont po Calcium Carbonate     2HPT-  pth level high- cont Calcitriol     AM labs will follow

## 2023-12-12 NOTE — PROGRESS NOTE ADULT - SUBJECTIVE AND OBJECTIVE BOX
ANDREAS LYONS    [id number]    73y      Female    CC: sbo    INTERVAL HPI/OVERNIGHT EVENTS: pt seen and examined. upset this am, refusing exam. however, was agreeable to meds last night and this am    REVIEW OF SYSTEMS:  unable to obtain     Vital Signs Last 24 Hrs  T(C): 36.7 (12 Dec 2023 09:30), Max: 37.4 (11 Dec 2023 17:32)  T(F): 98.1 (12 Dec 2023 09:30), Max: 99.4 (11 Dec 2023 17:32)  HR: 75 (12 Dec 2023 09:30) (60 - 82)  BP: 126/62 (12 Dec 2023 09:30) (102/57 - 148/68)  BP(mean): --  RR: 18 (12 Dec 2023 09:30) (18 - 18)  SpO2: 97% (12 Dec 2023 09:30) (92% - 97%)    Parameters below as of 12 Dec 2023 09:30  Patient On (Oxygen Delivery Method): room air        PHYSICAL EXAM:    GENERAL: chronically ill-appearing   CHEST/LUNG: respirations unlabored on RA   NEURO: Awake, alert  PSYCH: refusing exam    LABS:                        9.5    4.59  )-----------( 135      ( 12 Dec 2023 04:47 )             31.1     12-12    144  |  115<H>  |  26.0<H>  ----------------------------<  88  4.2   |  21.0<L>  |  1.55<H>    Ca    6.7<L>      12 Dec 2023 04:47  Phos  3.2     12-12  Mg     2.2     12-12    TPro  5.6<L>  /  Alb  3.2<L>  /  TBili  0.3<L>  /  DBili  0.1  /  AST  9   /  ALT  13  /  AlkPhos  97  12-12      Urinalysis Basic - ( 12 Dec 2023 04:47 )    Color: x / Appearance: x / SG: x / pH: x  Gluc: 88 mg/dL / Ketone: x  / Bili: x / Urobili: x   Blood: x / Protein: x / Nitrite: x   Leuk Esterase: x / RBC: x / WBC x   Sq Epi: x / Non Sq Epi: x / Bacteria: x          MEDICATIONS  (STANDING):  amLODIPine   Tablet 5 milliGRAM(s) Oral daily  ascorbic acid 500 milliGRAM(s) Oral daily  atorvastatin 10 milliGRAM(s) Oral at bedtime  calcitriol   Capsule 0.25 MICROGram(s) Oral daily  calcium carbonate    500 mG (Tums) Chewable 2 Tablet(s) Chew three times a day  escitalopram 30 milliGRAM(s) Oral daily  haloperidol     Tablet 5 milliGRAM(s) Oral <User Schedule>  heparin   Injectable 5000 Unit(s) SubCutaneous every 8 hours  levothyroxine 112 MICROGram(s) Oral daily  LORazepam     Tablet 0.5 milliGRAM(s) Oral <User Schedule>  multivitamin 1 Tablet(s) Oral daily  OLANZapine 5 milliGRAM(s) Oral daily  pantoprazole    Tablet 40 milliGRAM(s) Oral before breakfast  polyethylene glycol 3350 17 Gram(s) Oral at bedtime  sodium bicarbonate 650 milliGRAM(s) Oral three times a day  thiamine 100 milliGRAM(s) Oral daily    MEDICATIONS  (PRN):  acetaminophen     Tablet .. 650 milliGRAM(s) Oral every 6 hours PRN Temp greater or equal to 38C (100.4F), Mild Pain (1 - 3)  ondansetron Injectable 4 milliGRAM(s) IV Push every 6 hours PRN Nausea      RADIOLOGY & ADDITIONAL TESTS:   ANDREAS LYONS    [id number]    73y      Female    CC: sbo    INTERVAL HPI/OVERNIGHT EVENTS: pt seen and examined. upset this am, refusing exam. however, was agreeable to meds last night and this am    REVIEW OF SYSTEMS:  unable to obtain     Vital Signs Last 24 Hrs  T(C): 36.7 (12 Dec 2023 09:30), Max: 37.4 (11 Dec 2023 17:32)  T(F): 98.1 (12 Dec 2023 09:30), Max: 99.4 (11 Dec 2023 17:32)  HR: 75 (12 Dec 2023 09:30) (60 - 82)  BP: 126/62 (12 Dec 2023 09:30) (102/57 - 148/68)  BP(mean): --  RR: 18 (12 Dec 2023 09:30) (18 - 18)  SpO2: 97% (12 Dec 2023 09:30) (92% - 97%)    Parameters below as of 12 Dec 2023 09:30  Patient On (Oxygen Delivery Method): room air        PHYSICAL EXAM:    GENERAL: chronically ill-appearing   CHEST/LUNG: respirations unlabored on RA   NEURO: Awake, alert  PSYCH: refusing exam    LABS:                        9.5    4.59  )-----------( 135      ( 12 Dec 2023 04:47 )             31.1     12-12    144  |  115<H>  |  26.0<H>  ----------------------------<  88  4.2   |  21.0<L>  |  1.55<H>    Ca    6.7<L>      12 Dec 2023 04:47  Phos  3.2     12-12  Mg     2.2     12-12    TPro  5.6<L>  /  Alb  3.2<L>  /  TBili  0.3<L>  /  DBili  0.1  /  AST  9   /  ALT  13  /  AlkPhos  97  12-12      Urinalysis Basic - ( 12 Dec 2023 04:47 )    Color: x / Appearance: x / SG: x / pH: x  Gluc: 88 mg/dL / Ketone: x  / Bili: x / Urobili: x   Blood: x / Protein: x / Nitrite: x   Leuk Esterase: x / RBC: x / WBC x   Sq Epi: x / Non Sq Epi: x / Bacteria: x          MEDICATIONS  (STANDING):  amLODIPine   Tablet 5 milliGRAM(s) Oral daily  ascorbic acid 500 milliGRAM(s) Oral daily  atorvastatin 10 milliGRAM(s) Oral at bedtime  calcitriol   Capsule 0.25 MICROGram(s) Oral daily  calcium carbonate    500 mG (Tums) Chewable 2 Tablet(s) Chew three times a day  escitalopram 30 milliGRAM(s) Oral daily  haloperidol     Tablet 5 milliGRAM(s) Oral <User Schedule>  heparin   Injectable 5000 Unit(s) SubCutaneous every 8 hours  levothyroxine 112 MICROGram(s) Oral daily  LORazepam     Tablet 0.5 milliGRAM(s) Oral <User Schedule>  multivitamin 1 Tablet(s) Oral daily  OLANZapine 5 milliGRAM(s) Oral daily  pantoprazole    Tablet 40 milliGRAM(s) Oral before breakfast  polyethylene glycol 3350 17 Gram(s) Oral at bedtime  sodium bicarbonate 650 milliGRAM(s) Oral three times a day  thiamine 100 milliGRAM(s) Oral daily    MEDICATIONS  (PRN):  acetaminophen     Tablet .. 650 milliGRAM(s) Oral every 6 hours PRN Temp greater or equal to 38C (100.4F), Mild Pain (1 - 3)  ondansetron Injectable 4 milliGRAM(s) IV Push every 6 hours PRN Nausea      RADIOLOGY & ADDITIONAL TESTS:

## 2023-12-12 NOTE — PROGRESS NOTE ADULT - ASSESSMENT
73y/oF PMH schizophrenia, hypothyroidism, HTN, nephrogenic DI, prior SBO, sent to ER from [place] for c/o abd pain. In ER, CT abd and pelvis consistent with high grade SBO. Pt admitted to ACS, refused NGT placement. Pt made NPO and started on IVF. Bcx neg x2. course complicated by MONICA on CKD 3 with metabolic acidosis and hypernatremia. SBO now resolved, tolerating diet. Transferred to medicine service for further electrolyte management.     Hypocalcemia   -pt refusing IV placement   -cont PO calcium carbonate   -nephro recs appreciated   -f/u am labs   -cont calcitriol     Hypophosphatemia   -cont PO supplement  -f/u am phos    Hypernatremia   Hx nephrogenic DI   -nephro following   -Na improved   -f/u am bmp     MONICA on CKD3  Metabolic acidosis   -no obstruction on imaging   -f/u am labs   -cont po sodium bicarb    High grade SBO, resolved   -pt refused NGT  -tolerating diet   -cont to monitor bowel function   -abx dc'ed     HTN   -cont amlodipine   -cont to monitor bp     Schizophrenia   Depression   -cont home regimen     HLD   -cont statin     Hypothyroid   -cont levothyroxine     Pancytopenia   -seen by hematology at [facility] [place] 10/2/23 with pancytopenia  -f/u outpt     vte ppx: heparin sq     dispo: return to [place] pending electrolyte improvement    73y/oF PMH schizophrenia, hypothyroidism, HTN, nephrogenic DI, prior SBO, sent to ER from [place] for c/o abd pain. In ER, CT abd and pelvis consistent with high grade SBO. Pt admitted to ACS, refused NGT placement. Pt made NPO and started on IVF. Bcx neg x2. course complicated by MONICA on CKD 3 with metabolic acidosis and hypernatremia. SBO now resolved, tolerating diet. Transferred to medicine service for further electrolyte management.     Hypocalcemia   -pt refusing IV placement   -cont PO calcium carbonate   -nephro recs appreciated   -f/u am labs   -cont calcitriol     Hypophosphatemia   -cont PO supplement  -f/u am phos    Hypernatremia   Hx nephrogenic DI   -nephro following   -Na improved   -f/u am bmp     MONICA on CKD3  Metabolic acidosis   -no obstruction on imaging   -f/u am labs   -cont po sodium bicarb    High grade SBO, resolved   -pt refused NGT  -tolerating diet   -cont to monitor bowel function   -abx dc'ed     HTN   -cont amlodipine   -cont to monitor bp     Schizophrenia   Depression   -cont home regimen     HLD   -cont statin     Hypothyroid   -cont levothyroxine     Pancytopenia   -seen by hematology at [facility] 10/2/23 with pancytopenia  -f/u outpt     vte ppx: heparin sq     dispo: return to [place] pending electrolyte improvement

## 2023-12-12 NOTE — PROGRESS NOTE ADULT - SUBJECTIVE AND OBJECTIVE BOX
NEPHROLOGY INTERVAL HPI/OVERNIGHT EVENTS:    Examined  No distress noted  Has periods of agitation  Refusing meds    MEDICATIONS  (STANDING):  amLODIPine   Tablet 5 milliGRAM(s) Oral daily  ascorbic acid 500 milliGRAM(s) Oral daily  atorvastatin 10 milliGRAM(s) Oral at bedtime  calcitriol   Capsule 0.25 MICROGram(s) Oral daily  calcium carbonate    500 mG (Tums) Chewable 2 Tablet(s) Chew three times a day  escitalopram 30 milliGRAM(s) Oral daily  haloperidol     Tablet 5 milliGRAM(s) Oral <User Schedule>  heparin   Injectable 5000 Unit(s) SubCutaneous every 8 hours  levothyroxine 112 MICROGram(s) Oral daily  LORazepam     Tablet 0.5 milliGRAM(s) Oral <User Schedule>  multivitamin 1 Tablet(s) Oral daily  OLANZapine 5 milliGRAM(s) Oral daily  pantoprazole    Tablet 40 milliGRAM(s) Oral before breakfast  polyethylene glycol 3350 17 Gram(s) Oral at bedtime  sodium bicarbonate 650 milliGRAM(s) Oral three times a day  thiamine 100 milliGRAM(s) Oral daily    MEDICATIONS  (PRN):  acetaminophen     Tablet .. 650 milliGRAM(s) Oral every 6 hours PRN Temp greater or equal to 38C (100.4F), Mild Pain (1 - 3)  ondansetron Injectable 4 milliGRAM(s) IV Push every 6 hours PRN Nausea      Allergies    clozapine (Other)  Depakote (Other)  Neupogen (Other)  erythromycin (Unknown)    Intolerances        Vital Signs Last 24 Hrs  T(C): 36.9 (12 Dec 2023 05:07), Max: 37.4 (11 Dec 2023 17:32)  T(F): 98.4 (12 Dec 2023 05:07), Max: 99.4 (11 Dec 2023 17:32)  HR: 68 (12 Dec 2023 05:07) (60 - 82)  BP: 102/57 (12 Dec 2023 05:07) (102/57 - 148/68)  BP(mean): --  RR: 18 (12 Dec 2023 05:07) (18 - 18)  SpO2: 95% (12 Dec 2023 05:07) (92% - 97%)    Parameters below as of 12 Dec 2023 05:07  Patient On (Oxygen Delivery Method): room air    PHYSICAL EXAM:  GENERAL: NAD, talkative but confused  HEAD:  NCAT  NECK: Supple, No JVD  CHEST/LUNG: Clear to percussion bilaterally  HEART: Regular rate and rhythm  ABDOMEN: Soft, Non rigidity or rbound   EXTREMITIES:  No clubbing, cyanosis, or edema      LABS:                        9.5    4.59  )-----------( 135      ( 12 Dec 2023 04:47 )             31.1     12-12    144  |  115<H>  |  26.0<H>  ----------------------------<  88  4.2   |  21.0<L>  |  1.55<H>    Ca    6.7<L>      12 Dec 2023 04:47  Phos  3.2     12-12  Mg     2.2     12-12    TPro  5.6<L>  /  Alb  3.2<L>  /  TBili  0.3<L>  /  DBili  0.1  /  AST  9   /  ALT  13  /  AlkPhos  97  12-12      Urinalysis Basic - ( 12 Dec 2023 04:47 )    Color: x / Appearance: x / SG: x / pH: x  Gluc: 88 mg/dL / Ketone: x  / Bili: x / Urobili: x   Blood: x / Protein: x / Nitrite: x   Leuk Esterase: x / RBC: x / WBC x   Sq Epi: x / Non Sq Epi: x / Bacteria: x      Magnesium: 2.2 mg/dL (12-12 @ 04:47)  Phosphorus: 3.2 mg/dL (12-12 @ 04:47)          RADIOLOGY & ADDITIONAL TESTS:

## 2023-12-13 LAB
ALBUMIN SERPL ELPH-MCNC: 3.2 G/DL — LOW (ref 3.3–5.2)
ALBUMIN SERPL ELPH-MCNC: 3.2 G/DL — LOW (ref 3.3–5.2)
ALP SERPL-CCNC: 96 U/L — SIGNIFICANT CHANGE UP (ref 40–120)
ALP SERPL-CCNC: 96 U/L — SIGNIFICANT CHANGE UP (ref 40–120)
ALT FLD-CCNC: 18 U/L — SIGNIFICANT CHANGE UP
ALT FLD-CCNC: 18 U/L — SIGNIFICANT CHANGE UP
ANION GAP SERPL CALC-SCNC: 11 MMOL/L — SIGNIFICANT CHANGE UP (ref 5–17)
ANION GAP SERPL CALC-SCNC: 11 MMOL/L — SIGNIFICANT CHANGE UP (ref 5–17)
AST SERPL-CCNC: 14 U/L — SIGNIFICANT CHANGE UP
AST SERPL-CCNC: 14 U/L — SIGNIFICANT CHANGE UP
BILIRUB DIRECT SERPL-MCNC: 0.1 MG/DL — SIGNIFICANT CHANGE UP (ref 0–0.3)
BILIRUB DIRECT SERPL-MCNC: 0.1 MG/DL — SIGNIFICANT CHANGE UP (ref 0–0.3)
BILIRUB INDIRECT FLD-MCNC: 0.2 MG/DL — SIGNIFICANT CHANGE UP (ref 0.2–1)
BILIRUB INDIRECT FLD-MCNC: 0.2 MG/DL — SIGNIFICANT CHANGE UP (ref 0.2–1)
BILIRUB SERPL-MCNC: 0.3 MG/DL — LOW (ref 0.4–2)
BILIRUB SERPL-MCNC: 0.3 MG/DL — LOW (ref 0.4–2)
BUN SERPL-MCNC: 22.5 MG/DL — HIGH (ref 8–20)
BUN SERPL-MCNC: 22.5 MG/DL — HIGH (ref 8–20)
CALCIUM SERPL-MCNC: 7 MG/DL — LOW (ref 8.4–10.5)
CALCIUM SERPL-MCNC: 7 MG/DL — LOW (ref 8.4–10.5)
CHLORIDE SERPL-SCNC: 110 MMOL/L — HIGH (ref 96–108)
CHLORIDE SERPL-SCNC: 110 MMOL/L — HIGH (ref 96–108)
CO2 SERPL-SCNC: 20 MMOL/L — LOW (ref 22–29)
CO2 SERPL-SCNC: 20 MMOL/L — LOW (ref 22–29)
CREAT SERPL-MCNC: 1.65 MG/DL — HIGH (ref 0.5–1.3)
CREAT SERPL-MCNC: 1.65 MG/DL — HIGH (ref 0.5–1.3)
EGFR: 33 ML/MIN/1.73M2 — LOW
EGFR: 33 ML/MIN/1.73M2 — LOW
GLUCOSE SERPL-MCNC: 92 MG/DL — SIGNIFICANT CHANGE UP (ref 70–99)
GLUCOSE SERPL-MCNC: 92 MG/DL — SIGNIFICANT CHANGE UP (ref 70–99)
HCT VFR BLD CALC: 32.8 % — LOW (ref 34.5–45)
HCT VFR BLD CALC: 32.8 % — LOW (ref 34.5–45)
HGB BLD-MCNC: 9.9 G/DL — LOW (ref 11.5–15.5)
HGB BLD-MCNC: 9.9 G/DL — LOW (ref 11.5–15.5)
MAGNESIUM SERPL-MCNC: 2.3 MG/DL — SIGNIFICANT CHANGE UP (ref 1.6–2.6)
MAGNESIUM SERPL-MCNC: 2.3 MG/DL — SIGNIFICANT CHANGE UP (ref 1.6–2.6)
MCHC RBC-ENTMCNC: 29 PG — SIGNIFICANT CHANGE UP (ref 27–34)
MCHC RBC-ENTMCNC: 29 PG — SIGNIFICANT CHANGE UP (ref 27–34)
MCHC RBC-ENTMCNC: 30.2 GM/DL — LOW (ref 32–36)
MCHC RBC-ENTMCNC: 30.2 GM/DL — LOW (ref 32–36)
MCV RBC AUTO: 96.2 FL — SIGNIFICANT CHANGE UP (ref 80–100)
MCV RBC AUTO: 96.2 FL — SIGNIFICANT CHANGE UP (ref 80–100)
PHOSPHATE SERPL-MCNC: 2.6 MG/DL — SIGNIFICANT CHANGE UP (ref 2.4–4.7)
PHOSPHATE SERPL-MCNC: 2.6 MG/DL — SIGNIFICANT CHANGE UP (ref 2.4–4.7)
PLATELET # BLD AUTO: 140 K/UL — LOW (ref 150–400)
PLATELET # BLD AUTO: 140 K/UL — LOW (ref 150–400)
POTASSIUM SERPL-MCNC: 4.4 MMOL/L — SIGNIFICANT CHANGE UP (ref 3.5–5.3)
POTASSIUM SERPL-MCNC: 4.4 MMOL/L — SIGNIFICANT CHANGE UP (ref 3.5–5.3)
POTASSIUM SERPL-SCNC: 4.4 MMOL/L — SIGNIFICANT CHANGE UP (ref 3.5–5.3)
POTASSIUM SERPL-SCNC: 4.4 MMOL/L — SIGNIFICANT CHANGE UP (ref 3.5–5.3)
PROT SERPL-MCNC: 5.7 G/DL — LOW (ref 6.6–8.7)
PROT SERPL-MCNC: 5.7 G/DL — LOW (ref 6.6–8.7)
RBC # BLD: 3.41 M/UL — LOW (ref 3.8–5.2)
RBC # BLD: 3.41 M/UL — LOW (ref 3.8–5.2)
RBC # FLD: 16.3 % — HIGH (ref 10.3–14.5)
RBC # FLD: 16.3 % — HIGH (ref 10.3–14.5)
SODIUM SERPL-SCNC: 141 MMOL/L — SIGNIFICANT CHANGE UP (ref 135–145)
SODIUM SERPL-SCNC: 141 MMOL/L — SIGNIFICANT CHANGE UP (ref 135–145)
WBC # BLD: 4.64 K/UL — SIGNIFICANT CHANGE UP (ref 3.8–10.5)
WBC # BLD: 4.64 K/UL — SIGNIFICANT CHANGE UP (ref 3.8–10.5)
WBC # FLD AUTO: 4.64 K/UL — SIGNIFICANT CHANGE UP (ref 3.8–10.5)
WBC # FLD AUTO: 4.64 K/UL — SIGNIFICANT CHANGE UP (ref 3.8–10.5)

## 2023-12-13 PROCEDURE — 99231 SBSQ HOSP IP/OBS SF/LOW 25: CPT

## 2023-12-13 PROCEDURE — 99232 SBSQ HOSP IP/OBS MODERATE 35: CPT

## 2023-12-13 RX ADMIN — HEPARIN SODIUM 5000 UNIT(S): 5000 INJECTION INTRAVENOUS; SUBCUTANEOUS at 05:48

## 2023-12-13 RX ADMIN — HALOPERIDOL DECANOATE 5 MILLIGRAM(S): 100 INJECTION INTRAMUSCULAR at 16:10

## 2023-12-13 RX ADMIN — AMLODIPINE BESYLATE 5 MILLIGRAM(S): 2.5 TABLET ORAL at 05:45

## 2023-12-13 RX ADMIN — PANTOPRAZOLE SODIUM 40 MILLIGRAM(S): 20 TABLET, DELAYED RELEASE ORAL at 05:45

## 2023-12-13 RX ADMIN — Medication 650 MILLIGRAM(S): at 05:45

## 2023-12-13 RX ADMIN — Medication 2 TABLET(S): at 05:46

## 2023-12-13 RX ADMIN — HALOPERIDOL DECANOATE 5 MILLIGRAM(S): 100 INJECTION INTRAMUSCULAR at 05:45

## 2023-12-13 RX ADMIN — Medication 112 MICROGRAM(S): at 05:45

## 2023-12-13 NOTE — PROGRESS NOTE ADULT - SUBJECTIVE AND OBJECTIVE BOX
NEPHROLOGY INTERVAL HPI/OVERNIGHT EVENTS:    Feels better   Eating   Psych follow up noted     MEDICATIONS  (STANDING):  amLODIPine   Tablet 5 milliGRAM(s) Oral daily  ascorbic acid 500 milliGRAM(s) Oral daily  atorvastatin 10 milliGRAM(s) Oral at bedtime  calcitriol   Capsule 0.25 MICROGram(s) Oral daily  calcium carbonate    500 mG (Tums) Chewable 2 Tablet(s) Chew three times a day  escitalopram 30 milliGRAM(s) Oral daily  haloperidol     Tablet 5 milliGRAM(s) Oral <User Schedule>  heparin   Injectable 5000 Unit(s) SubCutaneous every 8 hours  levothyroxine 112 MICROGram(s) Oral daily  multivitamin 1 Tablet(s) Oral daily  OLANZapine 5 milliGRAM(s) Oral daily  pantoprazole    Tablet 40 milliGRAM(s) Oral before breakfast  polyethylene glycol 3350 17 Gram(s) Oral at bedtime  sodium bicarbonate 650 milliGRAM(s) Oral three times a day  thiamine 100 milliGRAM(s) Oral daily    MEDICATIONS  (PRN):  acetaminophen     Tablet .. 650 milliGRAM(s) Oral every 6 hours PRN Temp greater or equal to 38C (100.4F), Mild Pain (1 - 3)  ondansetron Injectable 4 milliGRAM(s) IV Push every 6 hours PRN Nausea      Allergies    clozapine (Other)  Depakote (Other)  Neupogen (Other)  erythromycin (Unknown)    Intolerances    Vital Signs Last 24 Hrs  T(C): 37.3 (13 Dec 2023 16:35), Max: 37.3 (12 Dec 2023 22:02)  T(F): 99.1 (13 Dec 2023 16:35), Max: 99.2 (12 Dec 2023 22:02)  HR: 66 (13 Dec 2023 16:35) (62 - 70)  BP: 127/57 (13 Dec 2023 16:35) (101/61 - 127/57)  BP(mean): --  RR: 18 (13 Dec 2023 16:35) (18 - 18)  SpO2: 94% (13 Dec 2023 16:35) (93% - 96%)    Parameters below as of 13 Dec 2023 12:43  Patient On (Oxygen Delivery Method): room air      Daily     Daily Weight in k.5 (13 Dec 2023 00:53)  I&O's Detail    12 Dec 2023 07:01  -  13 Dec 2023 07:00  --------------------------------------------------------  IN:    Oral Fluid: 230 mL  Total IN: 230 mL    OUT:  Total OUT: 0 mL    Total NET: 230 mL        I&O's Summary    12 Dec 2023 07:01  -  13 Dec 2023 07:00  --------------------------------------------------------  IN: 230 mL / OUT: 0 mL / NET: 230 mL        PHYSICAL EXAM:    GENERAL: NAD, talkative but confused  HEAD:  NCAT  NECK: Supple, No JVD  CHEST/LUNG: Clear to percussion bilaterally  HEART: Regular rate and rhythm  ABDOMEN: Soft, Non rigidity or rbound   EXTREMITIES:  No clubbing, cyanosis, or edema  LABS:                        9.9    4.64  )-----------( 140      ( 13 Dec 2023 04:55 )             32.8         141  |  110<H>  |  22.5<H>  ----------------------------<  92  4.4   |  20.0<L>  |  1.65<H>    Ca    7.0<L>      13 Dec 2023 04:55  Phos  2.6       Mg     2.3         TPro  5.7<L>  /  Alb  3.2<L>  /  TBili  0.3<L>  /  DBili  0.1  /  AST  14  /  ALT  18  /  AlkPhos  96        Urinalysis Basic - ( 13 Dec 2023 04:55 )    Color: x / Appearance: x / SG: x / pH: x  Gluc: 92 mg/dL / Ketone: x  / Bili: x / Urobili: x   Blood: x / Protein: x / Nitrite: x   Leuk Esterase: x / RBC: x / WBC x   Sq Epi: x / Non Sq Epi: x / Bacteria: x      Magnesium: 2.3 mg/dL ( @ 04:55)  Phosphorus: 2.6 mg/dL ( @ 04:55)          RADIOLOGY & ADDITIONAL TESTS:

## 2023-12-13 NOTE — PROGRESS NOTE ADULT - SUBJECTIVE AND OBJECTIVE BOX
ANDREAS LYONS    [id number]    73y      Female    CC: sbo    INTERVAL HPI/OVERNIGHT EVENTS: pt seen and examined. pt tearful this am but denies abd pain    REVIEW OF SYSTEMS:    RESPIRATORY: No cough, wheezing, hemoptysis; No shortness of breath  CARDIOVASCULAR: No chest pain, palpitations  GASTROINTESTINAL: No abdominal or epigastric pain  NEUROLOGICAL: No headaches    Vital Signs Last 24 Hrs  T(C): 36.8 (13 Dec 2023 12:43), Max: 37.3 (12 Dec 2023 22:02)  T(F): 98.2 (13 Dec 2023 12:43), Max: 99.2 (12 Dec 2023 22:02)  HR: 62 (13 Dec 2023 12:43) (62 - 75)  BP: 114/65 (13 Dec 2023 12:43) (101/61 - 117/58)  BP(mean): --  RR: 18 (13 Dec 2023 12:43) (18 - 18)  SpO2: 96% (13 Dec 2023 12:43) (93% - 96%)    Parameters below as of 13 Dec 2023 12:43  Patient On (Oxygen Delivery Method): room air        PHYSICAL EXAM:    GENERAL: NAD  CHEST/LUNG: respirations unlabored on RA   HEART: S1S2+, Regular rate and rhythm  ABDOMEN: Soft, Nontender, Nondistended  SKIN: warm, dry  NEURO: Awake, alert      LABS:                        9.9    4.64  )-----------( 140      ( 13 Dec 2023 04:55 )             32.8     12-13    141  |  110<H>  |  22.5<H>  ----------------------------<  92  4.4   |  20.0<L>  |  1.65<H>    Ca    7.0<L>      13 Dec 2023 04:55  Phos  2.6     12-13  Mg     2.3     12-13    TPro  5.7<L>  /  Alb  3.2<L>  /  TBili  0.3<L>  /  DBili  0.1  /  AST  14  /  ALT  18  /  AlkPhos  96  12-13      Urinalysis Basic - ( 13 Dec 2023 04:55 )    Color: x / Appearance: x / SG: x / pH: x  Gluc: 92 mg/dL / Ketone: x  / Bili: x / Urobili: x   Blood: x / Protein: x / Nitrite: x   Leuk Esterase: x / RBC: x / WBC x   Sq Epi: x / Non Sq Epi: x / Bacteria: x          MEDICATIONS  (STANDING):  amLODIPine   Tablet 5 milliGRAM(s) Oral daily  ascorbic acid 500 milliGRAM(s) Oral daily  atorvastatin 10 milliGRAM(s) Oral at bedtime  calcitriol   Capsule 0.25 MICROGram(s) Oral daily  calcium carbonate    500 mG (Tums) Chewable 2 Tablet(s) Chew three times a day  escitalopram 30 milliGRAM(s) Oral daily  haloperidol     Tablet 5 milliGRAM(s) Oral <User Schedule>  heparin   Injectable 5000 Unit(s) SubCutaneous every 8 hours  levothyroxine 112 MICROGram(s) Oral daily  multivitamin 1 Tablet(s) Oral daily  OLANZapine 5 milliGRAM(s) Oral daily  pantoprazole    Tablet 40 milliGRAM(s) Oral before breakfast  polyethylene glycol 3350 17 Gram(s) Oral at bedtime  sodium bicarbonate 650 milliGRAM(s) Oral three times a day  thiamine 100 milliGRAM(s) Oral daily    MEDICATIONS  (PRN):  acetaminophen     Tablet .. 650 milliGRAM(s) Oral every 6 hours PRN Temp greater or equal to 38C (100.4F), Mild Pain (1 - 3)  ondansetron Injectable 4 milliGRAM(s) IV Push every 6 hours PRN Nausea      RADIOLOGY & ADDITIONAL TESTS:   ANDREAS LYONS    [id number]    73y      Female    CC: sbo    INTERVAL HPI/OVERNIGHT EVENTS: pt seen and examined. pt tearful this am but denies abd pain    REVIEW OF SYSTEMS:    RESPIRATORY: No cough, wheezing, hemoptysis; No shortness of breath  CARDIOVASCULAR: No chest pain, palpitations  GASTROINTESTINAL: No abdominal or epigastric pain  NEUROLOGICAL: No headaches    Vital Signs Last 24 Hrs  T(C): 36.8 (13 Dec 2023 12:43), Max: 37.3 (12 Dec 2023 22:02)  T(F): 98.2 (13 Dec 2023 12:43), Max: 99.2 (12 Dec 2023 22:02)  HR: 62 (13 Dec 2023 12:43) (62 - 75)  BP: 114/65 (13 Dec 2023 12:43) (101/61 - 117/58)  BP(mean): --  RR: 18 (13 Dec 2023 12:43) (18 - 18)  SpO2: 96% (13 Dec 2023 12:43) (93% - 96%)    Parameters below as of 13 Dec 2023 12:43  Patient On (Oxygen Delivery Method): room air        PHYSICAL EXAM:    GENERAL: NAD  CHEST/LUNG: respirations unlabored on RA   HEART: S1S2+, Regular rate and rhythm  ABDOMEN: Soft, Nontender, Nondistended  SKIN: warm, dry  NEURO: Awake, alert      LABS:                        9.9    4.64  )-----------( 140      ( 13 Dec 2023 04:55 )             32.8     12-13    141  |  110<H>  |  22.5<H>  ----------------------------<  92  4.4   |  20.0<L>  |  1.65<H>    Ca    7.0<L>      13 Dec 2023 04:55  Phos  2.6     12-13  Mg     2.3     12-13    TPro  5.7<L>  /  Alb  3.2<L>  /  TBili  0.3<L>  /  DBili  0.1  /  AST  14  /  ALT  18  /  AlkPhos  96  12-13      Urinalysis Basic - ( 13 Dec 2023 04:55 )    Color: x / Appearance: x / SG: x / pH: x  Gluc: 92 mg/dL / Ketone: x  / Bili: x / Urobili: x   Blood: x / Protein: x / Nitrite: x   Leuk Esterase: x / RBC: x / WBC x   Sq Epi: x / Non Sq Epi: x / Bacteria: x          MEDICATIONS  (STANDING):  amLODIPine   Tablet 5 milliGRAM(s) Oral daily  ascorbic acid 500 milliGRAM(s) Oral daily  atorvastatin 10 milliGRAM(s) Oral at bedtime  calcitriol   Capsule 0.25 MICROGram(s) Oral daily  calcium carbonate    500 mG (Tums) Chewable 2 Tablet(s) Chew three times a day  escitalopram 30 milliGRAM(s) Oral daily  haloperidol     Tablet 5 milliGRAM(s) Oral <User Schedule>  heparin   Injectable 5000 Unit(s) SubCutaneous every 8 hours  levothyroxine 112 MICROGram(s) Oral daily  multivitamin 1 Tablet(s) Oral daily  OLANZapine 5 milliGRAM(s) Oral daily  pantoprazole    Tablet 40 milliGRAM(s) Oral before breakfast  polyethylene glycol 3350 17 Gram(s) Oral at bedtime  sodium bicarbonate 650 milliGRAM(s) Oral three times a day  thiamine 100 milliGRAM(s) Oral daily    MEDICATIONS  (PRN):  acetaminophen     Tablet .. 650 milliGRAM(s) Oral every 6 hours PRN Temp greater or equal to 38C (100.4F), Mild Pain (1 - 3)  ondansetron Injectable 4 milliGRAM(s) IV Push every 6 hours PRN Nausea      RADIOLOGY & ADDITIONAL TESTS:

## 2023-12-13 NOTE — PROGRESS NOTE ADULT - SUBJECTIVE AND OBJECTIVE BOX
took medications this morning  otherwise no specific complaints  tangential and at times illogical speech  Vital Signs Last 24 Hrs  T(C): 37.1 (13 Dec 2023 05:29), Max: 37.3 (12 Dec 2023 22:02)  T(F): 98.7 (13 Dec 2023 05:29), Max: 99.2 (12 Dec 2023 22:02)  HR: 68 (13 Dec 2023 05:29) (67 - 76)  BP: 101/61 (13 Dec 2023 05:29) (101/61 - 130/71)  BP(mean): --  RR: 18 (13 Dec 2023 05:29) (18 - 18)  SpO2: 93% (13 Dec 2023 05:29) (93% - 97%)    Parameters below as of 13 Dec 2023 05:29  Patient On (Oxygen Delivery Method): room air                        9.9    4.64  )-----------( 140      ( 13 Dec 2023 04:55 )             32.8     12-13    141  |  110<H>  |  22.5<H>  ----------------------------<  92  4.4   |  20.0<L>  |  1.65<H>    Ca    7.0<L>      13 Dec 2023 04:55  Phos  2.6     12-13  Mg     2.3     12-13    TPro  5.7<L>  /  Alb  3.2<L>  /  TBili  0.3<L>  /  DBili  0.1  /  AST  14  /  ALT  18  /  AlkPhos  96  12-13    LIVER FUNCTIONS - ( 13 Dec 2023 04:55 )  Alb: 3.2 g/dL / Pro: 5.7 g/dL / ALK PHOS: 96 U/L / ALT: 18 U/L / AST: 14 U/L / GGT: x             Urinalysis Basic - ( 13 Dec 2023 04:55 )    Color: x / Appearance: x / SG: x / pH: x  Gluc: 92 mg/dL / Ketone: x  / Bili: x / Urobili: x   Blood: x / Protein: x / Nitrite: x   Leuk Esterase: x / RBC: x / WBC x   Sq Epi: x / Non Sq Epi: x / Bacteria: x    MEDICATIONS  (STANDING):  amLODIPine   Tablet 5 milliGRAM(s) Oral daily  ascorbic acid 500 milliGRAM(s) Oral daily  atorvastatin 10 milliGRAM(s) Oral at bedtime  calcitriol   Capsule 0.25 MICROGram(s) Oral daily  calcium carbonate    500 mG (Tums) Chewable 2 Tablet(s) Chew three times a day  escitalopram 30 milliGRAM(s) Oral daily  haloperidol     Tablet 5 milliGRAM(s) Oral <User Schedule>  heparin   Injectable 5000 Unit(s) SubCutaneous every 8 hours  levothyroxine 112 MICROGram(s) Oral daily  multivitamin 1 Tablet(s) Oral daily  OLANZapine 5 milliGRAM(s) Oral daily  pantoprazole    Tablet 40 milliGRAM(s) Oral before breakfast  polyethylene glycol 3350 17 Gram(s) Oral at bedtime  sodium bicarbonate 650 milliGRAM(s) Oral three times a day  thiamine 100 milliGRAM(s) Oral daily    MEDICATIONS  (PRN):  acetaminophen     Tablet .. 650 milliGRAM(s) Oral every 6 hours PRN Temp greater or equal to 38C (100.4F), Mild Pain (1 - 3)  ondansetron Injectable 4 milliGRAM(s) IV Push every 6 hours PRN Nausea

## 2023-12-13 NOTE — PROGRESS NOTE ADULT - ASSESSMENT
73y/oF PMH schizophrenia, hypothyroidism, HTN, nephrogenic DI, prior SBO, sent to ER from [place] for c/o abd pain. In ER, CT abd and pelvis consistent with high grade SBO. Pt admitted to ACS, refused NGT placement. Pt made NPO and started on IVF. Bcx neg x2. course complicated by MONICA on CKD 3 with metabolic acidosis and hypernatremia. SBO now resolved, tolerating diet. Transferred to medicine service for further electrolyte management.     Hypocalcemia   -pt refusing IV placement ; intermittently refusing PO meds   -cont PO calcium carbonate   -nephro recs appreciated   -f/u am labs   -cont calcitriol     Hypophosphatemia , improved   -cont PO supplement  -f/u am phos    Hypernatremia   Hx nephrogenic DI   -nephro following   -Na improved   -f/u am bmp     MONICA on CKD3  Metabolic acidosis   -no obstruction on imaging   -f/u am labs   -cont po sodium bicarb    High grade SBO, resolved   -pt refused NGT  -tolerating diet   -cont to monitor bowel function   -abx dc'ed     HTN   -cont amlodipine   -cont to monitor bp     Schizophrenia   Depression   -cont home regimen     HLD   -cont statin     Hypothyroid   -cont levothyroxine     Pancytopenia   -seen by hematology at [facility] 10/2/23 with pancytopenia  -f/u outpt     vte ppx: heparin sq     dispo: return to [place] pending electrolyte improvement    73y/oF PMH schizophrenia, hypothyroidism, HTN, nephrogenic DI, prior SBO, sent to ER from [place] for c/o abd pain. In ER, CT abd and pelvis consistent with high grade SBO. Pt admitted to ACS, refused NGT placement. Pt made NPO and started on IVF. Bcx neg x2. course complicated by MONICA on CKD 3 with metabolic acidosis and hypernatremia. SBO now resolved, tolerating diet. Transferred to medicine service for further electrolyte management.     Hypocalcemia   -pt refusing IV placement ; intermittently refusing PO meds   -cont PO calcium carbonate   -nephro recs appreciated   -f/u am labs   -cont calcitriol     Hypophosphatemia , improved   -cont PO supplement  -f/u am phos    Hypernatremia   Hx nephrogenic DI   -nephro following   -Na improved   -f/u am bmp     MONICA on CKD3  Metabolic acidosis   -no obstruction on imaging   -f/u am labs   -cont po sodium bicarb    High grade SBO, resolved   -pt refused NGT  -tolerating diet   -cont to monitor bowel function   -abx dc'ed     HTN   -cont amlodipine   -cont to monitor bp     Schizophrenia   Depression   -cont home regimen     HLD   -cont statin     Hypothyroid   -cont levothyroxine     Pancytopenia   -seen by hematology at [facility] 10/2/23 with pancytopenia  -f/u outpt     vte ppx: heparin sq     dispo: return to [place] pending electrolyte improvement

## 2023-12-13 NOTE — CHART NOTE - NSCHARTNOTEFT_GEN_A_CORE
Source: Patient [ ]  Family [ ]   other [ x]    73y/oF PMH schizophrenia, hypothyroidism, HTN, nephrogenic DI, prior SBO, sent to ER from [place] for c/o abd pain. In ER, CT abd and pelvis consistent with high grade SBO. Pt admitted to ACS, refused NGT placement. Pt made NPO and started on IVF. Bcx neg x2. course complicated by MONICA on CKD 3 with metabolic acidosis and hypernatremia. SBO now resolved, tolerating diet. Transferred to medicine service for further electrolyte management.   Hypocalcemia     Current Diet: Soft and bite sized with ensure TID    Patient reports [ ] nausea  [ ] vomiting [ ] diarrhea [ ] constipation  [ ]chewing problems [ ] swallowing issues  [ ] other:     PO intake:  < 50% [ ]   50-75%  [x ]   %  [ ]  other :    Source for PO intake [ ] Patient [ ] family [ ] chart [x ] staff [ ] other      Current Weight:   12/13 76.5 kg  12/6 79.8 kg  % Weight Change     Pertinent Medications: MEDICATIONS  (STANDING):  amLODIPine   Tablet 5 milliGRAM(s) Oral daily  ascorbic acid 500 milliGRAM(s) Oral daily  atorvastatin 10 milliGRAM(s) Oral at bedtime  calcitriol   Capsule 0.25 MICROGram(s) Oral daily  calcium carbonate    500 mG (Tums) Chewable 2 Tablet(s) Chew three times a day  escitalopram 30 milliGRAM(s) Oral daily  haloperidol     Tablet 5 milliGRAM(s) Oral <User Schedule>  heparin   Injectable 5000 Unit(s) SubCutaneous every 8 hours  levothyroxine 112 MICROGram(s) Oral daily  multivitamin 1 Tablet(s) Oral daily  OLANZapine 5 milliGRAM(s) Oral daily  pantoprazole    Tablet 40 milliGRAM(s) Oral before breakfast  polyethylene glycol 3350 17 Gram(s) Oral at bedtime  sodium bicarbonate 650 milliGRAM(s) Oral three times a day  thiamine 100 milliGRAM(s) Oral daily    MEDICATIONS  (PRN):  acetaminophen     Tablet .. 650 milliGRAM(s) Oral every 6 hours PRN Temp greater or equal to 38C (100.4F), Mild Pain (1 - 3)  ondansetron Injectable 4 milliGRAM(s) IV Push every 6 hours PRN Nausea    Pertinent Labs: CBC Full  -  ( 13 Dec 2023 04:55 )  WBC Count : 4.64 K/uL  RBC Count : 3.41 M/uL  Hemoglobin : 9.9 g/dL  Hematocrit : 32.8 %  Platelet Count - Automated : 140 K/uL  Mean Cell Volume : 96.2 fl  Mean Cell Hemoglobin : 29.0 pg  Mean Cell Hemoglobin Concentration : 30.2 gm/dL  Auto Neutrophil # : x  Auto Lymphocyte # : x  Auto Monocyte # : x  Auto Eosinophil # : x  Auto Basophil # : x  Auto Neutrophil % : x  Auto Lymphocyte % : x  Auto Monocyte % : x  Auto Eosinophil % : x  Auto Basophil % : x  12-13 Na141 mmol/L Glu 92 mg/dL K+ 4.4 mmol/L Cr  1.65 mg/dL<H> BUN 22.5 mg/dL<H> Phos 2.6 mg/dL Alb 3.2 g/dL<L> PAB n/a               Skin:   IAD  Nutrition focused physical exam conducted - found signs of malnutrition [ ]absent [x ]present    Subcutaneous fat loss: [x ] Orbital fat pads region, [x ]Buccal fat region, [z ]Triceps region,  [ ]Ribs region    Muscle wasting: [x ]Temples region, [x ]Clavicle region, [x ]Shoulder region, [ z]Scapula region, [ ]Interosseous region,  [ ]thigh region, [ ]Calf region    Estimated Needs:   [ ] no change since previous assessment  [ ] recalculated:     Current Nutrition Diagnosis: Pt presents at risk secondary Malnutrition, severe, chronic, related to inability to meet sufficient protein-energy in setting of schizoaffective disorder and poor appetite, now with high grade SBO due to large ventral hernias evidenced by meeting <75% nutrient needs >1 mo, moderate muscle/fat loss, 22.6% wt loss x ~6 mo. Pt dislikes soft and bite sized diet- noted poor dentition. Pt receiving ensure high protein - intake encouraged. HBV protein intake encouraged.       Recommendations:   1) Consider upgrade diet if tolerated- swallow eval?  2) Continue ensureTID  3) Cont MVI, Thiamine, Vit C    Monitoring and Evaluation:   [x ] PO intake [x ] Tolerance to diet prescription [X] Weights  [X] Follow up per protocol [X] Labs: Source: Patient [ ]  Family [ ]   other [ x]    73y/oF PMH schizophrenia, hypothyroidism, HTN, nephrogenic DI, prior SBO, sent to ER from [place] for c/o abd pain. In ER, CT abd and pelvis consistent with high grade SBO. Pt admitted to ACS, refused NGT placement. Pt made NPO and started on IVF. Bcx neg x2. course complicated by MONICA on CKD 3 with metabolic acidosis and hypernatremia. SBO now resolved, tolerating diet. Transferred to medicine service for further electrolyte management.   Hypocalcemia     Current Diet: Soft and bite sized with ensure TID    Patient reports [ ] nausea  [ ] vomiting [ ] diarrhea [ ] constipation  [ ]chewing problems [ ] swallowing issues  [ ] other:     PO intake:  < 50% [ ]   50-75%  [x ]   %  [ ]  other :    Source for PO intake [ ] Patient [ ] family [ ] chart [x ] staff [ ] other      Current Weight:   12/13 76.5 kg  12/6 79.8 kg  % Weight Change     Pertinent Medications: MEDICATIONS  (STANDING):  amLODIPine   Tablet 5 milliGRAM(s) Oral daily  ascorbic acid 500 milliGRAM(s) Oral daily  atorvastatin 10 milliGRAM(s) Oral at bedtime  calcitriol   Capsule 0.25 MICROGram(s) Oral daily  calcium carbonate    500 mG (Tums) Chewable 2 Tablet(s) Chew three times a day  escitalopram 30 milliGRAM(s) Oral daily  haloperidol     Tablet 5 milliGRAM(s) Oral <User Schedule>  heparin   Injectable 5000 Unit(s) SubCutaneous every 8 hours  levothyroxine 112 MICROGram(s) Oral daily  multivitamin 1 Tablet(s) Oral daily  OLANZapine 5 milliGRAM(s) Oral daily  pantoprazole    Tablet 40 milliGRAM(s) Oral before breakfast  polyethylene glycol 3350 17 Gram(s) Oral at bedtime  sodium bicarbonate 650 milliGRAM(s) Oral three times a day  thiamine 100 milliGRAM(s) Oral daily    MEDICATIONS  (PRN):  acetaminophen     Tablet .. 650 milliGRAM(s) Oral every 6 hours PRN Temp greater or equal to 38C (100.4F), Mild Pain (1 - 3)  ondansetron Injectable 4 milliGRAM(s) IV Push every 6 hours PRN Nausea    Pertinent Labs: CBC Full  -  ( 13 Dec 2023 04:55 )  WBC Count : 4.64 K/uL  RBC Count : 3.41 M/uL  Hemoglobin : 9.9 g/dL  Hematocrit : 32.8 %  Platelet Count - Automated : 140 K/uL  Mean Cell Volume : 96.2 fl  Mean Cell Hemoglobin : 29.0 pg  Mean Cell Hemoglobin Concentration : 30.2 gm/dL  Auto Neutrophil # : x  Auto Lymphocyte # : x  Auto Monocyte # : x  Auto Eosinophil # : x  Auto Basophil # : x  Auto Neutrophil % : x  Auto Lymphocyte % : x  Auto Monocyte % : x  Auto Eosinophil % : x  Auto Basophil % : x  12-13 Na141 mmol/L Glu 92 mg/dL K+ 4.4 mmol/L Cr  1.65 mg/dL<H> BUN 22.5 mg/dL<H> Phos 2.6 mg/dL Alb 3.2 g/dL<L> PAB n/a               Skin:   IAD  Nutrition focused physical exam conducted - found signs of malnutrition [ ]absent [x ]present    Subcutaneous fat loss: [x ] Orbital fat pads region, [x ]Buccal fat region, [z ]Triceps region,  [ ]Ribs region    Muscle wasting: [x ]Temples region, [x ]Clavicle region, [x ]Shoulder region, [ z]Scapula region, [ ]Interosseous region,  [ ]thigh region, [ ]Calf region    Estimated Needs:   [ ] no change since previous assessment  [ ] recalculated:     Current Nutrition Diagnosis: Pt presents at risk secondary Malnutrition, severe, chronic, related to inability to meet sufficient protein-energy in setting of schizoaffective disorder and poor appetite, now with high grade SBO due to large ventral hernias evidenced by meeting <75% nutrient needs >1 mo, moderate muscle/fat loss, 22.6% wt loss x ~6 mo. Pt dislikes soft and bite sized diet- noted poor dentition. Pt receiving ensure high protein - intake encouraged. HBV protein intake encouraged.       Recommendations:   1) Consider upgrade diet if tolerated- swallow eval?  2) Continue ensureTID  3) Cont MVI, Thiamine, Vit C    Monitoring and Evaluation:   [x ] PO intake [x ] Tolerance to diet prescription [X] Weights  [X] Follow up per protocol [X] Labs:

## 2023-12-13 NOTE — PROGRESS NOTE ADULT - PROBLEM SELECTOR PLAN 1
no new recommendations  return to [place] when medically approriate no new recommendations  return to [place] when medically approriate

## 2023-12-13 NOTE — PROGRESS NOTE ADULT - ASSESSMENT
chronic schizophrenia complicating medical treatment for SBO  improving  awaiting medical stability for return to [place] chronic schizophrenia complicating medical treatment for SBO  improving  awaiting medical stability for return to [place]

## 2023-12-13 NOTE — PROGRESS NOTE ADULT - ASSESSMENT
74yo F w/ hx of multiple abdominal surgeries and prior bowel obstructions presenting with several episodes of vomiting and CT imaging consistent with SBO. Pt presents from [place] and is unable to provide history      CKD III - Stable       Hypernatremia --> improved/ resolved  UO < 3 L   Esr free water deficit 5.5 L at admit   Remote h/o Lithium     Hypophos - better    MA cont po bicarb    HypoCalcemia improving --> but still low  Pt has no IV and has occasionally refuses meds  cont po Calcium Carbonate     2HPT-  pth level high- cont Calcitriol          72yo F w/ hx of multiple abdominal surgeries and prior bowel obstructions presenting with several episodes of vomiting and CT imaging consistent with SBO. Pt presents from [place] and is unable to provide history      CKD III - Stable       Hypernatremia --> improved/ resolved  UO < 3 L   Esr free water deficit 5.5 L at admit   Remote h/o Lithium     Hypophos - better    MA cont po bicarb    HypoCalcemia improving --> but still low  Pt has no IV and has occasionally refuses meds  cont po Calcium Carbonate     2HPT-  pth level high- cont Calcitriol

## 2023-12-14 LAB
ANION GAP SERPL CALC-SCNC: 9 MMOL/L — SIGNIFICANT CHANGE UP (ref 5–17)
ANION GAP SERPL CALC-SCNC: 9 MMOL/L — SIGNIFICANT CHANGE UP (ref 5–17)
BUN SERPL-MCNC: 20.8 MG/DL — HIGH (ref 8–20)
BUN SERPL-MCNC: 20.8 MG/DL — HIGH (ref 8–20)
CA-I BLD-SCNC: 0.98 MMOL/L — LOW (ref 1.15–1.33)
CA-I BLD-SCNC: 0.98 MMOL/L — LOW (ref 1.15–1.33)
CALCIUM SERPL-MCNC: 7.1 MG/DL — LOW (ref 8.4–10.5)
CALCIUM SERPL-MCNC: 7.1 MG/DL — LOW (ref 8.4–10.5)
CHLORIDE SERPL-SCNC: 107 MMOL/L — SIGNIFICANT CHANGE UP (ref 96–108)
CHLORIDE SERPL-SCNC: 107 MMOL/L — SIGNIFICANT CHANGE UP (ref 96–108)
CO2 SERPL-SCNC: 21 MMOL/L — LOW (ref 22–29)
CO2 SERPL-SCNC: 21 MMOL/L — LOW (ref 22–29)
CREAT SERPL-MCNC: 1.27 MG/DL — SIGNIFICANT CHANGE UP (ref 0.5–1.3)
CREAT SERPL-MCNC: 1.27 MG/DL — SIGNIFICANT CHANGE UP (ref 0.5–1.3)
EGFR: 45 ML/MIN/1.73M2 — LOW
EGFR: 45 ML/MIN/1.73M2 — LOW
GLUCOSE SERPL-MCNC: 110 MG/DL — HIGH (ref 70–99)
GLUCOSE SERPL-MCNC: 110 MG/DL — HIGH (ref 70–99)
HCT VFR BLD CALC: 32.5 % — LOW (ref 34.5–45)
HCT VFR BLD CALC: 32.5 % — LOW (ref 34.5–45)
HGB BLD-MCNC: 10.4 G/DL — LOW (ref 11.5–15.5)
HGB BLD-MCNC: 10.4 G/DL — LOW (ref 11.5–15.5)
MAGNESIUM SERPL-MCNC: 2.5 MG/DL — SIGNIFICANT CHANGE UP (ref 1.6–2.6)
MAGNESIUM SERPL-MCNC: 2.5 MG/DL — SIGNIFICANT CHANGE UP (ref 1.6–2.6)
MCHC RBC-ENTMCNC: 30.6 PG — SIGNIFICANT CHANGE UP (ref 27–34)
MCHC RBC-ENTMCNC: 30.6 PG — SIGNIFICANT CHANGE UP (ref 27–34)
MCHC RBC-ENTMCNC: 32 GM/DL — SIGNIFICANT CHANGE UP (ref 32–36)
MCHC RBC-ENTMCNC: 32 GM/DL — SIGNIFICANT CHANGE UP (ref 32–36)
MCV RBC AUTO: 95.6 FL — SIGNIFICANT CHANGE UP (ref 80–100)
MCV RBC AUTO: 95.6 FL — SIGNIFICANT CHANGE UP (ref 80–100)
PHOSPHATE SERPL-MCNC: 2.3 MG/DL — LOW (ref 2.4–4.7)
PHOSPHATE SERPL-MCNC: 2.3 MG/DL — LOW (ref 2.4–4.7)
PLATELET # BLD AUTO: 160 K/UL — SIGNIFICANT CHANGE UP (ref 150–400)
PLATELET # BLD AUTO: 160 K/UL — SIGNIFICANT CHANGE UP (ref 150–400)
POTASSIUM SERPL-MCNC: 4.5 MMOL/L — SIGNIFICANT CHANGE UP (ref 3.5–5.3)
POTASSIUM SERPL-MCNC: 4.5 MMOL/L — SIGNIFICANT CHANGE UP (ref 3.5–5.3)
POTASSIUM SERPL-SCNC: 4.5 MMOL/L — SIGNIFICANT CHANGE UP (ref 3.5–5.3)
POTASSIUM SERPL-SCNC: 4.5 MMOL/L — SIGNIFICANT CHANGE UP (ref 3.5–5.3)
RBC # BLD: 3.4 M/UL — LOW (ref 3.8–5.2)
RBC # BLD: 3.4 M/UL — LOW (ref 3.8–5.2)
RBC # FLD: 16.2 % — HIGH (ref 10.3–14.5)
RBC # FLD: 16.2 % — HIGH (ref 10.3–14.5)
SODIUM SERPL-SCNC: 137 MMOL/L — SIGNIFICANT CHANGE UP (ref 135–145)
SODIUM SERPL-SCNC: 137 MMOL/L — SIGNIFICANT CHANGE UP (ref 135–145)
WBC # BLD: 3.18 K/UL — LOW (ref 3.8–10.5)
WBC # BLD: 3.18 K/UL — LOW (ref 3.8–10.5)
WBC # FLD AUTO: 3.18 K/UL — LOW (ref 3.8–10.5)
WBC # FLD AUTO: 3.18 K/UL — LOW (ref 3.8–10.5)

## 2023-12-14 PROCEDURE — 99232 SBSQ HOSP IP/OBS MODERATE 35: CPT

## 2023-12-14 PROCEDURE — 99231 SBSQ HOSP IP/OBS SF/LOW 25: CPT

## 2023-12-14 RX ADMIN — Medication 650 MILLIGRAM(S): at 13:24

## 2023-12-14 RX ADMIN — OLANZAPINE 5 MILLIGRAM(S): 15 TABLET, FILM COATED ORAL at 13:24

## 2023-12-14 RX ADMIN — HEPARIN SODIUM 5000 UNIT(S): 5000 INJECTION INTRAVENOUS; SUBCUTANEOUS at 13:25

## 2023-12-14 RX ADMIN — CALCITRIOL 0.25 MICROGRAM(S): 0.5 CAPSULE ORAL at 13:23

## 2023-12-14 RX ADMIN — ATORVASTATIN CALCIUM 10 MILLIGRAM(S): 80 TABLET, FILM COATED ORAL at 23:24

## 2023-12-14 RX ADMIN — Medication 650 MILLIGRAM(S): at 23:23

## 2023-12-14 RX ADMIN — Medication 1 TABLET(S): at 13:25

## 2023-12-14 RX ADMIN — Medication 100 MILLIGRAM(S): at 13:25

## 2023-12-14 RX ADMIN — Medication 500 MILLIGRAM(S): at 13:25

## 2023-12-14 RX ADMIN — Medication 2 TABLET(S): at 13:24

## 2023-12-14 RX ADMIN — ESCITALOPRAM OXALATE 30 MILLIGRAM(S): 10 TABLET, FILM COATED ORAL at 13:24

## 2023-12-14 RX ADMIN — Medication 2 TABLET(S): at 23:25

## 2023-12-14 NOTE — PROGRESS NOTE ADULT - ASSESSMENT
73y/oF PMH schizophrenia, hypothyroidism, HTN, nephrogenic DI, prior SBO, sent to ER from [place] for c/o abd pain. In ER, CT abd and pelvis consistent with high grade SBO. Pt admitted to ACS, refused NGT placement. Pt made NPO and started on IVF. Bcx neg x2. course complicated by MONICA on CKD 3 with metabolic acidosis and hypernatremia. SBO now resolved, tolerating diet. Transferred to medicine service for further electrolyte management.     Hypocalcemia   -pt refusing IV placement ; intermittently refusing PO meds   -cont PO calcium carbonate   -nephro recs appreciated   -f/u am labs   -cont calcitriol   -took midday meds today    Hypophosphatemia , improved   -cont PO supplement  -f/u am phos    Hypernatremia   Hx nephrogenic DI   -nephro following   -Na improved   -f/u am bmp     MONICA on CKD3  Metabolic acidosis   -no obstruction on imaging   -f/u am labs   -cont po sodium bicarb    High grade SBO, resolved   -pt refused NGT  -tolerating diet   -cont to monitor bowel function   -abx dc'ed     HTN   -cont amlodipine   -cont to monitor bp     Schizophrenia   Depression   -cont home regimen     HLD   -cont statin     Hypothyroid   -cont levothyroxine     Pancytopenia   -seen by hematology at [facility] 10/2/23 with pancytopenia  -f/u outpt     vte ppx: heparin sq     dispo: return to [place] pending electrolyte improvement    73y/oF PMH schizophrenia, hypothyroidism, HTN, nephrogenic DI, prior SBO, sent to ER from [place] for c/o abd pain. In ER, CT abd and pelvis consistent with high grade SBO. Pt admitted to ACS, refused NGT placement. Pt made NPO and started on IVF. Bcx neg x2. course complicated by MONICA on CKD 3 with metabolic acidosis and hypernatremia. SBO now resolved, tolerating diet. Transferred to medicine service for further electrolyte management.     Hypocalcemia   -pt refusing IV placement ; intermittently refusing PO meds   -cont PO calcium carbonate   -nephro recs appreciated   -f/u am labs   -cont calcitriol   -took midday meds today    Hypophosphatemia , improved   -cont PO supplement  -f/u am phos    Hypernatremia   Hx nephrogenic DI   -nephro following   -Na improved   -f/u am bmp     MONICA on CKD3  Metabolic acidosis   -no obstruction on imaging   -f/u am labs   -cont po sodium bicarb    High grade SBO, resolved   -pt refused NGT  -tolerating diet   -cont to monitor bowel function   -abx dc'ed     HTN   -cont amlodipine   -cont to monitor bp     Schizophrenia   Depression   -cont home regimen     HLD   -cont statin     Hypothyroid   -cont levothyroxine     Pancytopenia   -seen by hematology at [facility] 10/2/23 with pancytopenia  -f/u outpt     vte ppx: heparin sq     dispo: return to [place] pending electrolyte improvement

## 2023-12-14 NOTE — PROGRESS NOTE ADULT - ASSESSMENT
chronic schizophrenia impaired decision making complicating medial care  nothing new to add  advise return to [place] when approriate

## 2023-12-14 NOTE — PROGRESS NOTE ADULT - SUBJECTIVE AND OBJECTIVE BOX
"I don't need any medicine"  refusing PO meds asking for food denies any pain or abdominal discomfort  disorganized and illogical  Vital Signs Last 24 Hrs  T(C): 36.5 (14 Dec 2023 05:00), Max: 37.3 (13 Dec 2023 16:35)  T(F): 97.7 (14 Dec 2023 05:00), Max: 99.1 (13 Dec 2023 16:35)  HR: 70 (14 Dec 2023 05:00) (62 - 78)  BP: 147/76 (14 Dec 2023 05:00) (96/61 - 156/83)  BP(mean): --  RR: 18 (14 Dec 2023 05:00) (18 - 18)  SpO2: 93% (14 Dec 2023 05:00) (93% - 97%)    Parameters below as of 14 Dec 2023 05:00  Patient On (Oxygen Delivery Method): room air                        9.9    4.64  )-----------( 140      ( 13 Dec 2023 04:55 )             32.8     12-13    141  |  110<H>  |  22.5<H>  ----------------------------<  92  4.4   |  20.0<L>  |  1.65<H>    Ca    7.0<L>      13 Dec 2023 04:55  Phos  2.6     12-13  Mg     2.3     12-13    TPro  5.7<L>  /  Alb  3.2<L>  /  TBili  0.3<L>  /  DBili  0.1  /  AST  14  /  ALT  18  /  AlkPhos  96  12-13    LIVER FUNCTIONS - ( 13 Dec 2023 04:55 )  Alb: 3.2 g/dL / Pro: 5.7 g/dL / ALK PHOS: 96 U/L / ALT: 18 U/L / AST: 14 U/L / GGT: x             Urinalysis Basic - ( 13 Dec 2023 04:55 )    Color: x / Appearance: x / SG: x / pH: x  Gluc: 92 mg/dL / Ketone: x  / Bili: x / Urobili: x   Blood: x / Protein: x / Nitrite: x   Leuk Esterase: x / RBC: x / WBC x   Sq Epi: x / Non Sq Epi: x / Bacteria: x  MEDICATIONS  (STANDING):  amLODIPine   Tablet 5 milliGRAM(s) Oral daily  ascorbic acid 500 milliGRAM(s) Oral daily  atorvastatin 10 milliGRAM(s) Oral at bedtime  calcitriol   Capsule 0.25 MICROGram(s) Oral daily  calcium carbonate    500 mG (Tums) Chewable 2 Tablet(s) Chew three times a day  escitalopram 30 milliGRAM(s) Oral daily  haloperidol     Tablet 5 milliGRAM(s) Oral <User Schedule>  heparin   Injectable 5000 Unit(s) SubCutaneous every 8 hours  levothyroxine 112 MICROGram(s) Oral daily  multivitamin 1 Tablet(s) Oral daily  OLANZapine 5 milliGRAM(s) Oral daily  pantoprazole    Tablet 40 milliGRAM(s) Oral before breakfast  polyethylene glycol 3350 17 Gram(s) Oral at bedtime  sodium bicarbonate 650 milliGRAM(s) Oral three times a day  thiamine 100 milliGRAM(s) Oral daily

## 2023-12-14 NOTE — PROGRESS NOTE ADULT - SUBJECTIVE AND OBJECTIVE BOX
ANDREAS LYONS    [id number]    73y      Female    CC: sbo    INTERVAL HPI/OVERNIGHT EVENTS: pt seen and examined.     REVIEW OF SYSTEMS:    CONSTITUTIONAL: No weight loss  RESPIRATORY: No cough, wheezing, hemoptysis; No shortness of breath  CARDIOVASCULAR: No chest pain, palpitations  GASTROINTESTINAL: No abdominal or epigastric pain. No nausea, vomiting    Vital Signs Last 24 Hrs  T(C): 36.7 (14 Dec 2023 14:14), Max: 37.3 (13 Dec 2023 16:35)  T(F): 98.1 (14 Dec 2023 14:14), Max: 99.1 (13 Dec 2023 16:35)  HR: 77 (14 Dec 2023 14:14) (64 - 78)  BP: 144/76 (14 Dec 2023 14:14) (96/61 - 156/83)  BP(mean): --  RR: 18 (14 Dec 2023 14:14) (18 - 18)  SpO2: 96% (14 Dec 2023 14:14) (93% - 98%)    Parameters below as of 14 Dec 2023 14:14  Patient On (Oxygen Delivery Method): room air        PHYSICAL EXAM:    GENERAL: NAD  CHEST/LUNG: respirations unlabored on RA   HEART: S1S2+, Regular rate and rhythm  ABDOMEN: Soft, Nontender, Nondistended  SKIN: warm, dry  NEURO: Awake, alert    LABS:                        10.4   3.18  )-----------( 160      ( 14 Dec 2023 11:50 )             32.5     12-14    137  |  107  |  20.8<H>  ----------------------------<  110<H>  4.5   |  21.0<L>  |  1.27    Ca    7.1<L>      14 Dec 2023 11:50  Phos  2.3     12-14  Mg     2.5     12-14    TPro  5.7<L>  /  Alb  3.2<L>  /  TBili  0.3<L>  /  DBili  0.1  /  AST  14  /  ALT  18  /  AlkPhos  96  12-13      Urinalysis Basic - ( 14 Dec 2023 11:50 )    Color: x / Appearance: x / SG: x / pH: x  Gluc: 110 mg/dL / Ketone: x  / Bili: x / Urobili: x   Blood: x / Protein: x / Nitrite: x   Leuk Esterase: x / RBC: x / WBC x   Sq Epi: x / Non Sq Epi: x / Bacteria: x          MEDICATIONS  (STANDING):  amLODIPine   Tablet 5 milliGRAM(s) Oral daily  ascorbic acid 500 milliGRAM(s) Oral daily  atorvastatin 10 milliGRAM(s) Oral at bedtime  calcitriol   Capsule 0.25 MICROGram(s) Oral daily  calcium carbonate    500 mG (Tums) Chewable 2 Tablet(s) Chew three times a day  escitalopram 30 milliGRAM(s) Oral daily  haloperidol     Tablet 5 milliGRAM(s) Oral <User Schedule>  heparin   Injectable 5000 Unit(s) SubCutaneous every 8 hours  levothyroxine 112 MICROGram(s) Oral daily  multivitamin 1 Tablet(s) Oral daily  OLANZapine 5 milliGRAM(s) Oral daily  pantoprazole    Tablet 40 milliGRAM(s) Oral before breakfast  polyethylene glycol 3350 17 Gram(s) Oral at bedtime  sodium bicarbonate 650 milliGRAM(s) Oral three times a day  thiamine 100 milliGRAM(s) Oral daily    MEDICATIONS  (PRN):  acetaminophen     Tablet .. 650 milliGRAM(s) Oral every 6 hours PRN Temp greater or equal to 38C (100.4F), Mild Pain (1 - 3)  ondansetron Injectable 4 milliGRAM(s) IV Push every 6 hours PRN Nausea      RADIOLOGY & ADDITIONAL TESTS:   ANDREAS LYONS    [id number]    73y      Female    CC: sbo    INTERVAL HPI/OVERNIGHT EVENTS: pt seen and examined.     REVIEW OF SYSTEMS:    CONSTITUTIONAL: No weight loss  RESPIRATORY: No cough, wheezing, hemoptysis; No shortness of breath  CARDIOVASCULAR: No chest pain, palpitations  GASTROINTESTINAL: No abdominal or epigastric pain. No nausea, vomiting    Vital Signs Last 24 Hrs  T(C): 36.7 (14 Dec 2023 14:14), Max: 37.3 (13 Dec 2023 16:35)  T(F): 98.1 (14 Dec 2023 14:14), Max: 99.1 (13 Dec 2023 16:35)  HR: 77 (14 Dec 2023 14:14) (64 - 78)  BP: 144/76 (14 Dec 2023 14:14) (96/61 - 156/83)  BP(mean): --  RR: 18 (14 Dec 2023 14:14) (18 - 18)  SpO2: 96% (14 Dec 2023 14:14) (93% - 98%)    Parameters below as of 14 Dec 2023 14:14  Patient On (Oxygen Delivery Method): room air        PHYSICAL EXAM:    GENERAL: NAD  CHEST/LUNG: respirations unlabored on RA   HEART: S1S2+, Regular rate and rhythm  ABDOMEN: Soft, Nontender, Nondistended  SKIN: warm, dry  NEURO: Awake, alert    LABS:                        10.4   3.18  )-----------( 160      ( 14 Dec 2023 11:50 )             32.5     12-14    137  |  107  |  20.8<H>  ----------------------------<  110<H>  4.5   |  21.0<L>  |  1.27    Ca    7.1<L>      14 Dec 2023 11:50  Phos  2.3     12-14  Mg     2.5     12-14    TPro  5.7<L>  /  Alb  3.2<L>  /  TBili  0.3<L>  /  DBili  0.1  /  AST  14  /  ALT  18  /  AlkPhos  96  12-13      Urinalysis Basic - ( 14 Dec 2023 11:50 )    Color: x / Appearance: x / SG: x / pH: x  Gluc: 110 mg/dL / Ketone: x  / Bili: x / Urobili: x   Blood: x / Protein: x / Nitrite: x   Leuk Esterase: x / RBC: x / WBC x   Sq Epi: x / Non Sq Epi: x / Bacteria: x          MEDICATIONS  (STANDING):  amLODIPine   Tablet 5 milliGRAM(s) Oral daily  ascorbic acid 500 milliGRAM(s) Oral daily  atorvastatin 10 milliGRAM(s) Oral at bedtime  calcitriol   Capsule 0.25 MICROGram(s) Oral daily  calcium carbonate    500 mG (Tums) Chewable 2 Tablet(s) Chew three times a day  escitalopram 30 milliGRAM(s) Oral daily  haloperidol     Tablet 5 milliGRAM(s) Oral <User Schedule>  heparin   Injectable 5000 Unit(s) SubCutaneous every 8 hours  levothyroxine 112 MICROGram(s) Oral daily  multivitamin 1 Tablet(s) Oral daily  OLANZapine 5 milliGRAM(s) Oral daily  pantoprazole    Tablet 40 milliGRAM(s) Oral before breakfast  polyethylene glycol 3350 17 Gram(s) Oral at bedtime  sodium bicarbonate 650 milliGRAM(s) Oral three times a day  thiamine 100 milliGRAM(s) Oral daily    MEDICATIONS  (PRN):  acetaminophen     Tablet .. 650 milliGRAM(s) Oral every 6 hours PRN Temp greater or equal to 38C (100.4F), Mild Pain (1 - 3)  ondansetron Injectable 4 milliGRAM(s) IV Push every 6 hours PRN Nausea      RADIOLOGY & ADDITIONAL TESTS:

## 2023-12-14 NOTE — PROGRESS NOTE ADULT - SUBJECTIVE AND OBJECTIVE BOX
NEPHROLOGY INTERVAL HPI/OVERNIGHT EVENTS:    Psych follow up noted   meds the same     MEDICATIONS  (STANDING):  amLODIPine   Tablet 5 milliGRAM(s) Oral daily  ascorbic acid 500 milliGRAM(s) Oral daily  atorvastatin 10 milliGRAM(s) Oral at bedtime  calcitriol   Capsule 0.25 MICROGram(s) Oral daily  calcium carbonate    500 mG (Tums) Chewable 2 Tablet(s) Chew three times a day  escitalopram 30 milliGRAM(s) Oral daily  haloperidol     Tablet 5 milliGRAM(s) Oral <User Schedule>  heparin   Injectable 5000 Unit(s) SubCutaneous every 8 hours  levothyroxine 112 MICROGram(s) Oral daily  multivitamin 1 Tablet(s) Oral daily  OLANZapine 5 milliGRAM(s) Oral daily  pantoprazole    Tablet 40 milliGRAM(s) Oral before breakfast  polyethylene glycol 3350 17 Gram(s) Oral at bedtime  sodium bicarbonate 650 milliGRAM(s) Oral three times a day  thiamine 100 milliGRAM(s) Oral daily    MEDICATIONS  (PRN):  acetaminophen     Tablet .. 650 milliGRAM(s) Oral every 6 hours PRN Temp greater or equal to 38C (100.4F), Mild Pain (1 - 3)  ondansetron Injectable 4 milliGRAM(s) IV Push every 6 hours PRN Nausea      Allergies    clozapine (Other)  Depakote (Other)  Neupogen (Other)  erythromycin (Unknown)    Intolerances            Vital Signs Last 24 Hrs  T(C): 36.7 (14 Dec 2023 08:40), Max: 37.3 (13 Dec 2023 16:35)  T(F): 98.1 (14 Dec 2023 08:40), Max: 99.1 (13 Dec 2023 16:35)  HR: 64 (14 Dec 2023 08:40) (62 - 78)  BP: 128/72 (14 Dec 2023 08:40) (96/61 - 156/83)  BP(mean): --  RR: 18 (14 Dec 2023 08:40) (18 - 18)  SpO2: 98% (14 Dec 2023 08:40) (93% - 98%)    Parameters below as of 14 Dec 2023 08:40  Patient On (Oxygen Delivery Method): room air      Daily     Daily   I&O's Detail    13 Dec 2023 07:01  -  14 Dec 2023 07:00  --------------------------------------------------------  IN:  Total IN: 0 mL    OUT:    Voided (mL): 75 mL  Total OUT: 75 mL    Total NET: -75 mL        I&O's Summary    13 Dec 2023 07:01  -  14 Dec 2023 07:00  --------------------------------------------------------  IN: 0 mL / OUT: 75 mL / NET: -75 mL        PHYSICAL EXAM:  GENERAL: NAD, talkative but confused  HEAD:  NCAT  NECK: Supple, No JVD  CHEST/LUNG: Clear to percussion bilaterally  HEART: Regular rate and rhythm  ABDOMEN: Soft, Non rigidity or rbound   EXTREMITIES:  No clubbing, cyanosis, or edema                        10.4   3.18  )-----------( 160      ( 14 Dec 2023 11:50 )             32.5     12-13    141  |  110<H>  |  22.5<H>  ----------------------------<  92  4.4   |  20.0<L>  |  1.65<H>    Ca    7.0<L>      13 Dec 2023 04:55  Phos  2.6     12-13  Mg     2.3     12-13    TPro  5.7<L>  /  Alb  3.2<L>  /  TBili  0.3<L>  /  DBili  0.1  /  AST  14  /  ALT  18  /  AlkPhos  96  12-13      Urinalysis Basic - ( 13 Dec 2023 04:55 )    Color: x / Appearance: x / SG: x / pH: x  Gluc: 92 mg/dL / Ketone: x  / Bili: x / Urobili: x   Blood: x / Protein: x / Nitrite: x   Leuk Esterase: x / RBC: x / WBC x   Sq Epi: x / Non Sq Epi: x / Bacteria: x              RADIOLOGY & ADDITIONAL TESTS:

## 2023-12-15 LAB
ALBUMIN SERPL ELPH-MCNC: 3.2 G/DL — LOW (ref 3.3–5.2)
ALBUMIN SERPL ELPH-MCNC: 3.2 G/DL — LOW (ref 3.3–5.2)
ALP SERPL-CCNC: 92 U/L — SIGNIFICANT CHANGE UP (ref 40–120)
ALP SERPL-CCNC: 92 U/L — SIGNIFICANT CHANGE UP (ref 40–120)
ALT FLD-CCNC: 16 U/L — SIGNIFICANT CHANGE UP
ALT FLD-CCNC: 16 U/L — SIGNIFICANT CHANGE UP
ANION GAP SERPL CALC-SCNC: 11 MMOL/L — SIGNIFICANT CHANGE UP (ref 5–17)
ANION GAP SERPL CALC-SCNC: 11 MMOL/L — SIGNIFICANT CHANGE UP (ref 5–17)
AST SERPL-CCNC: 16 U/L — SIGNIFICANT CHANGE UP
AST SERPL-CCNC: 16 U/L — SIGNIFICANT CHANGE UP
BILIRUB DIRECT SERPL-MCNC: 0.1 MG/DL — SIGNIFICANT CHANGE UP (ref 0–0.3)
BILIRUB DIRECT SERPL-MCNC: 0.1 MG/DL — SIGNIFICANT CHANGE UP (ref 0–0.3)
BILIRUB INDIRECT FLD-MCNC: 0.1 MG/DL — LOW (ref 0.2–1)
BILIRUB INDIRECT FLD-MCNC: 0.1 MG/DL — LOW (ref 0.2–1)
BILIRUB SERPL-MCNC: 0.2 MG/DL — LOW (ref 0.4–2)
BILIRUB SERPL-MCNC: 0.2 MG/DL — LOW (ref 0.4–2)
BUN SERPL-MCNC: 23.1 MG/DL — HIGH (ref 8–20)
BUN SERPL-MCNC: 23.1 MG/DL — HIGH (ref 8–20)
CALCIUM SERPL-MCNC: 7.3 MG/DL — LOW (ref 8.4–10.5)
CALCIUM SERPL-MCNC: 7.3 MG/DL — LOW (ref 8.4–10.5)
CHLORIDE SERPL-SCNC: 111 MMOL/L — HIGH (ref 96–108)
CHLORIDE SERPL-SCNC: 111 MMOL/L — HIGH (ref 96–108)
CO2 SERPL-SCNC: 20 MMOL/L — LOW (ref 22–29)
CO2 SERPL-SCNC: 20 MMOL/L — LOW (ref 22–29)
CREAT SERPL-MCNC: 1.39 MG/DL — HIGH (ref 0.5–1.3)
CREAT SERPL-MCNC: 1.39 MG/DL — HIGH (ref 0.5–1.3)
EGFR: 41 ML/MIN/1.73M2 — LOW
EGFR: 41 ML/MIN/1.73M2 — LOW
GLUCOSE SERPL-MCNC: 87 MG/DL — SIGNIFICANT CHANGE UP (ref 70–99)
GLUCOSE SERPL-MCNC: 87 MG/DL — SIGNIFICANT CHANGE UP (ref 70–99)
HCT VFR BLD CALC: 30.5 % — LOW (ref 34.5–45)
HCT VFR BLD CALC: 30.5 % — LOW (ref 34.5–45)
HGB BLD-MCNC: 9.6 G/DL — LOW (ref 11.5–15.5)
HGB BLD-MCNC: 9.6 G/DL — LOW (ref 11.5–15.5)
MAGNESIUM SERPL-MCNC: 2.5 MG/DL — SIGNIFICANT CHANGE UP (ref 1.6–2.6)
MAGNESIUM SERPL-MCNC: 2.5 MG/DL — SIGNIFICANT CHANGE UP (ref 1.6–2.6)
MCHC RBC-ENTMCNC: 30.2 PG — SIGNIFICANT CHANGE UP (ref 27–34)
MCHC RBC-ENTMCNC: 30.2 PG — SIGNIFICANT CHANGE UP (ref 27–34)
MCHC RBC-ENTMCNC: 31.5 GM/DL — LOW (ref 32–36)
MCHC RBC-ENTMCNC: 31.5 GM/DL — LOW (ref 32–36)
MCV RBC AUTO: 95.9 FL — SIGNIFICANT CHANGE UP (ref 80–100)
MCV RBC AUTO: 95.9 FL — SIGNIFICANT CHANGE UP (ref 80–100)
PHOSPHATE SERPL-MCNC: 2.5 MG/DL — SIGNIFICANT CHANGE UP (ref 2.4–4.7)
PHOSPHATE SERPL-MCNC: 2.5 MG/DL — SIGNIFICANT CHANGE UP (ref 2.4–4.7)
PLATELET # BLD AUTO: 150 K/UL — SIGNIFICANT CHANGE UP (ref 150–400)
PLATELET # BLD AUTO: 150 K/UL — SIGNIFICANT CHANGE UP (ref 150–400)
POTASSIUM SERPL-MCNC: 4.1 MMOL/L — SIGNIFICANT CHANGE UP (ref 3.5–5.3)
POTASSIUM SERPL-MCNC: 4.1 MMOL/L — SIGNIFICANT CHANGE UP (ref 3.5–5.3)
POTASSIUM SERPL-SCNC: 4.1 MMOL/L — SIGNIFICANT CHANGE UP (ref 3.5–5.3)
POTASSIUM SERPL-SCNC: 4.1 MMOL/L — SIGNIFICANT CHANGE UP (ref 3.5–5.3)
PROT SERPL-MCNC: 5.6 G/DL — LOW (ref 6.6–8.7)
PROT SERPL-MCNC: 5.6 G/DL — LOW (ref 6.6–8.7)
RBC # BLD: 3.18 M/UL — LOW (ref 3.8–5.2)
RBC # BLD: 3.18 M/UL — LOW (ref 3.8–5.2)
RBC # FLD: 16.1 % — HIGH (ref 10.3–14.5)
RBC # FLD: 16.1 % — HIGH (ref 10.3–14.5)
SODIUM SERPL-SCNC: 142 MMOL/L — SIGNIFICANT CHANGE UP (ref 135–145)
SODIUM SERPL-SCNC: 142 MMOL/L — SIGNIFICANT CHANGE UP (ref 135–145)
WBC # BLD: 4.18 K/UL — SIGNIFICANT CHANGE UP (ref 3.8–10.5)
WBC # BLD: 4.18 K/UL — SIGNIFICANT CHANGE UP (ref 3.8–10.5)
WBC # FLD AUTO: 4.18 K/UL — SIGNIFICANT CHANGE UP (ref 3.8–10.5)
WBC # FLD AUTO: 4.18 K/UL — SIGNIFICANT CHANGE UP (ref 3.8–10.5)

## 2023-12-15 PROCEDURE — 99232 SBSQ HOSP IP/OBS MODERATE 35: CPT

## 2023-12-15 RX ORDER — HALOPERIDOL DECANOATE 100 MG/ML
5 INJECTION INTRAMUSCULAR ONCE
Refills: 0 | Status: COMPLETED | OUTPATIENT
Start: 2023-12-15 | End: 2023-12-15

## 2023-12-15 RX ORDER — OLANZAPINE 15 MG/1
5 TABLET, FILM COATED ORAL ONCE
Refills: 0 | Status: DISCONTINUED | OUTPATIENT
Start: 2023-12-15 | End: 2023-12-15

## 2023-12-15 RX ORDER — OLANZAPINE 15 MG/1
2.5 TABLET, FILM COATED ORAL ONCE
Refills: 0 | Status: COMPLETED | OUTPATIENT
Start: 2023-12-15 | End: 2023-12-15

## 2023-12-15 RX ADMIN — HEPARIN SODIUM 5000 UNIT(S): 5000 INJECTION INTRAVENOUS; SUBCUTANEOUS at 14:54

## 2023-12-15 RX ADMIN — Medication 650 MILLIGRAM(S): at 16:06

## 2023-12-15 RX ADMIN — Medication 2 TABLET(S): at 14:52

## 2023-12-15 RX ADMIN — HALOPERIDOL DECANOATE 5 MILLIGRAM(S): 100 INJECTION INTRAMUSCULAR at 16:07

## 2023-12-15 RX ADMIN — Medication 1 TABLET(S): at 11:00

## 2023-12-15 RX ADMIN — OLANZAPINE 5 MILLIGRAM(S): 15 TABLET, FILM COATED ORAL at 11:00

## 2023-12-15 RX ADMIN — Medication 2 TABLET(S): at 06:52

## 2023-12-15 RX ADMIN — HEPARIN SODIUM 5000 UNIT(S): 5000 INJECTION INTRAVENOUS; SUBCUTANEOUS at 23:15

## 2023-12-15 RX ADMIN — OLANZAPINE 2.5 MILLIGRAM(S): 15 TABLET, FILM COATED ORAL at 00:40

## 2023-12-15 RX ADMIN — HALOPERIDOL DECANOATE 5 MILLIGRAM(S): 100 INJECTION INTRAMUSCULAR at 06:51

## 2023-12-15 RX ADMIN — HALOPERIDOL DECANOATE 5 MILLIGRAM(S): 100 INJECTION INTRAMUSCULAR at 03:00

## 2023-12-15 RX ADMIN — Medication 2 TABLET(S): at 23:13

## 2023-12-15 RX ADMIN — Medication 112 MICROGRAM(S): at 06:51

## 2023-12-15 RX ADMIN — Medication 650 MILLIGRAM(S): at 23:14

## 2023-12-15 RX ADMIN — Medication 100 MILLIGRAM(S): at 11:00

## 2023-12-15 RX ADMIN — AMLODIPINE BESYLATE 5 MILLIGRAM(S): 2.5 TABLET ORAL at 06:50

## 2023-12-15 RX ADMIN — ESCITALOPRAM OXALATE 30 MILLIGRAM(S): 10 TABLET, FILM COATED ORAL at 11:00

## 2023-12-15 RX ADMIN — ATORVASTATIN CALCIUM 10 MILLIGRAM(S): 80 TABLET, FILM COATED ORAL at 23:14

## 2023-12-15 RX ADMIN — HALOPERIDOL DECANOATE 5 MILLIGRAM(S): 100 INJECTION INTRAMUSCULAR at 23:16

## 2023-12-15 RX ADMIN — PANTOPRAZOLE SODIUM 40 MILLIGRAM(S): 20 TABLET, DELAYED RELEASE ORAL at 06:50

## 2023-12-15 RX ADMIN — Medication 500 MILLIGRAM(S): at 11:00

## 2023-12-15 RX ADMIN — Medication 650 MILLIGRAM(S): at 06:49

## 2023-12-15 RX ADMIN — CALCITRIOL 0.25 MICROGRAM(S): 0.5 CAPSULE ORAL at 11:00

## 2023-12-15 NOTE — ED BEHAVIORAL HEALTH NOTE - BEHAVIORAL HEALTH NOTE
patient seen at 0820 chart reviewed   no major change from psych perspective intermittently refusing medications  patient eating breakfast asking "to go home"  no complaints  Vital Signs Last 24 Hrs  T(C): 36.7 (15 Dec 2023 08:45), Max: 36.9 (14 Dec 2023 18:00)  T(F): 98 (15 Dec 2023 08:45), Max: 98.5 (14 Dec 2023 18:00)  HR: 65 (15 Dec 2023 08:45) (65 - 81)  BP: 145/73 (15 Dec 2023 08:45) (112/78 - 149/75)  BP(mean): --  RR: 18 (15 Dec 2023 08:45) (18 - 18)  SpO2: 94% (15 Dec 2023 08:45) (92% - 97%)    Parameters below as of 15 Dec 2023 08:45  Patient On (Oxygen Delivery Method): room air                          9.6    4.18  )-----------( 150      ( 15 Dec 2023 05:03 )             30.5     12-15    142  |  111<H>  |  23.1<H>  ----------------------------<  87  4.1   |  20.0<L>  |  1.39<H>    Ca    7.3<L>      15 Dec 2023 05:03  Phos  2.5     12-15  Mg     2.5     12-15    TPro  5.6<L>  /  Alb  3.2<L>  /  TBili  0.2<L>  /  DBili  0.1  /  AST  16  /  ALT  16  /  AlkPhos  92  12-15    LIVER FUNCTIONS - ( 15 Dec 2023 05:03 )  Alb: 3.2 g/dL / Pro: 5.6 g/dL / ALK PHOS: 92 U/L / ALT: 16 U/L / AST: 16 U/L / GGT: x             Urinalysis Basic - ( 15 Dec 2023 05:03 )    Color: x / Appearance: x / SG: x / pH: x  Gluc: 87 mg/dL / Ketone: x  / Bili: x / Urobili: x   Blood: x / Protein: x / Nitrite: x   Leuk Esterase: x / RBC: x / WBC x   Sq Epi: x / Non Sq Epi: x / Bacteria: x  MEDICATIONS  (STANDING):  amLODIPine   Tablet 5 milliGRAM(s) Oral daily  ascorbic acid 500 milliGRAM(s) Oral daily  atorvastatin 10 milliGRAM(s) Oral at bedtime  calcitriol   Capsule 0.25 MICROGram(s) Oral daily  calcium carbonate    500 mG (Tums) Chewable 2 Tablet(s) Chew three times a day  escitalopram 30 milliGRAM(s) Oral daily  haloperidol     Tablet 5 milliGRAM(s) Oral <User Schedule>  heparin   Injectable 5000 Unit(s) SubCutaneous every 8 hours  levothyroxine 112 MICROGram(s) Oral daily  multivitamin 1 Tablet(s) Oral daily  OLANZapine 5 milliGRAM(s) Oral daily  pantoprazole    Tablet 40 milliGRAM(s) Oral before breakfast  polyethylene glycol 3350 17 Gram(s) Oral at bedtime  sodium bicarbonate 650 milliGRAM(s) Oral three times a day  thiamine 100 milliGRAM(s) Oral daily    MEDICATIONS  (PRN):  acetaminophen     Tablet .. 650 milliGRAM(s) Oral every 6 hours PRN Temp greater or equal to 38C (100.4F), Mild Pain (1 - 3)  ondansetron Injectable 4 milliGRAM(s) IV Push every 6 hours PRN Nausea  Imp; Schizophrenia awaiting stability [facility] patient seen at 0820 chart reviewed   no major change from psych perspective intermittently refusing medications  patient eating breakfast asking "to go home"  no complaints  Vital Signs Last 24 Hrs  T(C): 36.7 (15 Dec 2023 08:45), Max: 36.9 (14 Dec 2023 18:00)  T(F): 98 (15 Dec 2023 08:45), Max: 98.5 (14 Dec 2023 18:00)  HR: 65 (15 Dec 2023 08:45) (65 - 81)  BP: 145/73 (15 Dec 2023 08:45) (112/78 - 149/75)  BP(mean): --  RR: 18 (15 Dec 2023 08:45) (18 - 18)  SpO2: 94% (15 Dec 2023 08:45) (92% - 97%)    Parameters below as of 15 Dec 2023 08:45  Patient On (Oxygen Delivery Method): room air                          9.6    4.18  )-----------( 150      ( 15 Dec 2023 05:03 )             30.5     12-15    142  |  111<H>  |  23.1<H>  ----------------------------<  87  4.1   |  20.0<L>  |  1.39<H>    Ca    7.3<L>      15 Dec 2023 05:03  Phos  2.5     12-15  Mg     2.5     12-15    TPro  5.6<L>  /  Alb  3.2<L>  /  TBili  0.2<L>  /  DBili  0.1  /  AST  16  /  ALT  16  /  AlkPhos  92  12-15    LIVER FUNCTIONS - ( 15 Dec 2023 05:03 )  Alb: 3.2 g/dL / Pro: 5.6 g/dL / ALK PHOS: 92 U/L / ALT: 16 U/L / AST: 16 U/L / GGT: x             Urinalysis Basic - ( 15 Dec 2023 05:03 )    Color: x / Appearance: x / SG: x / pH: x  Gluc: 87 mg/dL / Ketone: x  / Bili: x / Urobili: x   Blood: x / Protein: x / Nitrite: x   Leuk Esterase: x / RBC: x / WBC x   Sq Epi: x / Non Sq Epi: x / Bacteria: x  MEDICATIONS  (STANDING):  amLODIPine   Tablet 5 milliGRAM(s) Oral daily  ascorbic acid 500 milliGRAM(s) Oral daily  atorvastatin 10 milliGRAM(s) Oral at bedtime  calcitriol   Capsule 0.25 MICROGram(s) Oral daily  calcium carbonate    500 mG (Tums) Chewable 2 Tablet(s) Chew three times a day  escitalopram 30 milliGRAM(s) Oral daily  haloperidol     Tablet 5 milliGRAM(s) Oral <User Schedule>  heparin   Injectable 5000 Unit(s) SubCutaneous every 8 hours  levothyroxine 112 MICROGram(s) Oral daily  multivitamin 1 Tablet(s) Oral daily  OLANZapine 5 milliGRAM(s) Oral daily  pantoprazole    Tablet 40 milliGRAM(s) Oral before breakfast  polyethylene glycol 3350 17 Gram(s) Oral at bedtime  sodium bicarbonate 650 milliGRAM(s) Oral three times a day  thiamine 100 milliGRAM(s) Oral daily    MEDICATIONS  (PRN):  acetaminophen     Tablet .. 650 milliGRAM(s) Oral every 6 hours PRN Temp greater or equal to 38C (100.4F), Mild Pain (1 - 3)  ondansetron Injectable 4 milliGRAM(s) IV Push every 6 hours PRN Nausea  Imp; Schizophrenia awaiting stability for [facility]

## 2023-12-15 NOTE — PROGRESS NOTE ADULT - SUBJECTIVE AND OBJECTIVE BOX
ANDREAS LYONS    [id number]    73y      Female    CC: sbo    INTERVAL HPI/OVERNIGHT EVENTS: Pt seen and examined. oob to chair. upset this am       Vital Signs Last 24 Hrs  T(C): 36.7 (15 Dec 2023 12:42), Max: 36.9 (14 Dec 2023 18:00)  T(F): 98.1 (15 Dec 2023 12:42), Max: 98.5 (14 Dec 2023 18:00)  HR: 83 (15 Dec 2023 12:42) (65 - 83)  BP: 127/72 (15 Dec 2023 12:42) (112/78 - 149/75)  BP(mean): --  RR: 18 (15 Dec 2023 12:42) (18 - 18)  SpO2: 95% (15 Dec 2023 12:42) (92% - 97%)    Parameters below as of 15 Dec 2023 12:42  Patient On (Oxygen Delivery Method): room air        PHYSICAL EXAM:    GENERAL: chronically ill-appearing   CHEST/LUNG: respirations unlabored on RA   NEURO: Awake, alert  PSYCH: refusing exam    LABS:                        9.6    4.18  )-----------( 150      ( 15 Dec 2023 05:03 )             30.5     12-15    142  |  111<H>  |  23.1<H>  ----------------------------<  87  4.1   |  20.0<L>  |  1.39<H>    Ca    7.3<L>      15 Dec 2023 05:03  Phos  2.5     12-15  Mg     2.5     12-15    TPro  5.6<L>  /  Alb  3.2<L>  /  TBili  0.2<L>  /  DBili  0.1  /  AST  16  /  ALT  16  /  AlkPhos  92  12-15      Urinalysis Basic - ( 15 Dec 2023 05:03 )    Color: x / Appearance: x / SG: x / pH: x  Gluc: 87 mg/dL / Ketone: x  / Bili: x / Urobili: x   Blood: x / Protein: x / Nitrite: x   Leuk Esterase: x / RBC: x / WBC x   Sq Epi: x / Non Sq Epi: x / Bacteria: x          MEDICATIONS  (STANDING):  amLODIPine   Tablet 5 milliGRAM(s) Oral daily  ascorbic acid 500 milliGRAM(s) Oral daily  atorvastatin 10 milliGRAM(s) Oral at bedtime  calcitriol   Capsule 0.25 MICROGram(s) Oral daily  calcium carbonate    500 mG (Tums) Chewable 2 Tablet(s) Chew three times a day  escitalopram 30 milliGRAM(s) Oral daily  haloperidol     Tablet 5 milliGRAM(s) Oral <User Schedule>  heparin   Injectable 5000 Unit(s) SubCutaneous every 8 hours  levothyroxine 112 MICROGram(s) Oral daily  multivitamin 1 Tablet(s) Oral daily  OLANZapine 5 milliGRAM(s) Oral daily  pantoprazole    Tablet 40 milliGRAM(s) Oral before breakfast  polyethylene glycol 3350 17 Gram(s) Oral at bedtime  sodium bicarbonate 650 milliGRAM(s) Oral three times a day  thiamine 100 milliGRAM(s) Oral daily    MEDICATIONS  (PRN):  acetaminophen     Tablet .. 650 milliGRAM(s) Oral every 6 hours PRN Temp greater or equal to 38C (100.4F), Mild Pain (1 - 3)  ondansetron Injectable 4 milliGRAM(s) IV Push every 6 hours PRN Nausea      RADIOLOGY & ADDITIONAL TESTS:   ANDREAS LYONS    [id number]    73y      Female    CC: sbo    INTERVAL HPI/OVERNIGHT EVENTS: Pt seen and examined. oob to chair. upset this am       Vital Signs Last 24 Hrs  T(C): 36.7 (15 Dec 2023 12:42), Max: 36.9 (14 Dec 2023 18:00)  T(F): 98.1 (15 Dec 2023 12:42), Max: 98.5 (14 Dec 2023 18:00)  HR: 83 (15 Dec 2023 12:42) (65 - 83)  BP: 127/72 (15 Dec 2023 12:42) (112/78 - 149/75)  BP(mean): --  RR: 18 (15 Dec 2023 12:42) (18 - 18)  SpO2: 95% (15 Dec 2023 12:42) (92% - 97%)    Parameters below as of 15 Dec 2023 12:42  Patient On (Oxygen Delivery Method): room air        PHYSICAL EXAM:    GENERAL: chronically ill-appearing   CHEST/LUNG: respirations unlabored on RA   NEURO: Awake, alert  PSYCH: refusing exam    LABS:                        9.6    4.18  )-----------( 150      ( 15 Dec 2023 05:03 )             30.5     12-15    142  |  111<H>  |  23.1<H>  ----------------------------<  87  4.1   |  20.0<L>  |  1.39<H>    Ca    7.3<L>      15 Dec 2023 05:03  Phos  2.5     12-15  Mg     2.5     12-15    TPro  5.6<L>  /  Alb  3.2<L>  /  TBili  0.2<L>  /  DBili  0.1  /  AST  16  /  ALT  16  /  AlkPhos  92  12-15      Urinalysis Basic - ( 15 Dec 2023 05:03 )    Color: x / Appearance: x / SG: x / pH: x  Gluc: 87 mg/dL / Ketone: x  / Bili: x / Urobili: x   Blood: x / Protein: x / Nitrite: x   Leuk Esterase: x / RBC: x / WBC x   Sq Epi: x / Non Sq Epi: x / Bacteria: x          MEDICATIONS  (STANDING):  amLODIPine   Tablet 5 milliGRAM(s) Oral daily  ascorbic acid 500 milliGRAM(s) Oral daily  atorvastatin 10 milliGRAM(s) Oral at bedtime  calcitriol   Capsule 0.25 MICROGram(s) Oral daily  calcium carbonate    500 mG (Tums) Chewable 2 Tablet(s) Chew three times a day  escitalopram 30 milliGRAM(s) Oral daily  haloperidol     Tablet 5 milliGRAM(s) Oral <User Schedule>  heparin   Injectable 5000 Unit(s) SubCutaneous every 8 hours  levothyroxine 112 MICROGram(s) Oral daily  multivitamin 1 Tablet(s) Oral daily  OLANZapine 5 milliGRAM(s) Oral daily  pantoprazole    Tablet 40 milliGRAM(s) Oral before breakfast  polyethylene glycol 3350 17 Gram(s) Oral at bedtime  sodium bicarbonate 650 milliGRAM(s) Oral three times a day  thiamine 100 milliGRAM(s) Oral daily    MEDICATIONS  (PRN):  acetaminophen     Tablet .. 650 milliGRAM(s) Oral every 6 hours PRN Temp greater or equal to 38C (100.4F), Mild Pain (1 - 3)  ondansetron Injectable 4 milliGRAM(s) IV Push every 6 hours PRN Nausea      RADIOLOGY & ADDITIONAL TESTS:

## 2023-12-15 NOTE — CHART NOTE - NSCHARTNOTESELECT_GEN_ALL_CORE
ETHICS/Event Note
Event Note
Event Note
Nutrition Services
Event Note
Transfer Note
vascular access PA/Event Note
ETHICS/Event Note
ETHICS/Event Note
Event Note
Nutrition Services

## 2023-12-15 NOTE — CHART NOTE - NSCHARTNOTEFT_GEN_A_CORE
Ethics following.    Discussion with VANNESSA Pearl MA (Ethics Fellow), VANNESSA Ferreira MD (Ethics Attending) and VANNESSA HOLLIS (Director of Social Work) on 12/14 regarding the patient intermittently declining medications (including IV) and labs resulting in delayed improvement. The fellow offered to speak with the patient to understand why she is declining medications.     The fellow met with the patient at bedside on 12/14 and introduced herself. The patient expressed wanting to go home. The fellow explained that she is still sick and needs some more time with the medications. The patient explained that she is afraid of the IV but is willing to get her lab work and take her medications by mouth. The fellow encouraged the patient to consider IV medications, which the patient agreed to think about it.    On 12/15 the fellow followed up with the patient who stated she will still take her medications by mouth, but was adamant that she would not have an IV. The patient became upset at the idea of having an IV, so the fellow encouraged her to continue to think about it and let her nurse know if she was willing to do it. The patient agreed.    Ethics will remain available for future discussions as necessary.    Jennifer Pearl MA  Ethics Fellow  [phone] Ethics following.    Discussion with VANNESSA Pearl MA (Ethics Fellow), VANNESSA Ferreira MD (Ethics Attending) and VANNESSA HOLLIS (Director of Social Work) on 12/14 regarding the patient intermittently declining medications (including IV) and labs resulting in delayed improvement. The fellow offered to speak with the patient to understand why she is declining medications.     The fellow met with the patient at bedside on 12/14 and introduced herself. The patient expressed wanting to go home. The fellow explained that she is still sick and needs some more time with the medications. The patient explained that she is afraid of the IV but is willing to get her lab work and take her medications by mouth. The fellow encouraged the patient to consider IV medications, which the patient agreed to think about it.    On 12/15 the fellow followed up with the patient who stated she will still take her medications by mouth, but was adamant that she would not have an IV. The patient became upset at the idea of having an IV, so the fellow encouraged her to continue to think about it and let her nurse know if she was willing to do it. The patient agreed.    Ethics will remain available for future discussions as necessary.    Jennifer Pearl MA  Ethics Fellow  [phone] Ethics following. See chart note     Discussion with VANNESSA Pearl MA (Ethics Fellow), VANNESSA Ferreira MD (Ethics Attending) and VANNESSA HOLLIS (Director of Social Work) on 12/14 regarding the patient intermittently declining medications (including IV) and labs resulting in delayed improvement. The fellow offered to speak with the patient to understand why she is declining medications.     The fellow met with the patient at bedside on 12/14 and introduced herself. The patient expressed wanting to go home. The fellow explained that she is still sick and needs some more time with the medications. The patient explained that she is afraid of the IV but is willing to get her lab work and take her medications by mouth. The fellow encouraged the patient to consider IV medications, which the patient agreed to think about it.    On 12/15 the fellow followed up with the patient who stated she will still take her medications by mouth, but was adamant that she would not have an IV. The patient became upset at the idea of having an IV, so the fellow encouraged her to continue to think about it and let her nurse know if she was willing to do it. The patient agreed.    Ethics will remain available for future discussions as necessary.    Jennifre Peral MA  Ethics Fellow  [phone]    Agree with above. Ethics Fellow updated Dr. Marshall (Medicine Attending) regarding above on 12/14/23 and 12/15/23. Ethics following. See chart note     Discussion with VANNESSA Pearl MA (Ethics Fellow), VANNESSA Ferreira MD (Ethics Attending) and VANNESSA HOLLIS (Director of Social Work) on 12/14 regarding the patient intermittently declining medications (including IV) and labs resulting in delayed improvement. The fellow offered to speak with the patient to understand why she is declining medications.     The fellow met with the patient at bedside on 12/14 and introduced herself. The patient expressed wanting to go home. The fellow explained that she is still sick and needs some more time with the medications. The patient explained that she is afraid of the IV but is willing to get her lab work and take her medications by mouth. The fellow encouraged the patient to consider IV medications, which the patient agreed to think about it.    On 12/15 the fellow followed up with the patient who stated she will still take her medications by mouth, but was adamant that she would not have an IV. The patient became upset at the idea of having an IV, so the fellow encouraged her to continue to think about it and let her nurse know if she was willing to do it. The patient agreed.    Ethics will remain available for future discussions as necessary.    Jennifer Pearl MA  Ethics Fellow  [phone]    Agree with above. Ethics Fellow updated Dr. Marshall (Medicine Attending) regarding above on 12/14/23 and 12/15/23. Ethics following.     Discussion with VANNESSA Pearl MA (Ethics Fellow), VANNESSA Ferreira MD (Ethics Attending) and VANNESSA HOLLIS (Director of Social Work) on 12/14 regarding the patient intermittently declining medications (including IV) and labs resulting in delayed improvement. The fellow offered to speak with the patient to understand why she is declining medications.     The fellow met with the patient at bedside on 12/14 and introduced herself. The patient expressed wanting to go home. The fellow explained that she is still sick and needs some more time with the medications. The patient explained that she is afraid of the IV but is willing to get her lab work and take her medications by mouth. The fellow encouraged the patient to consider IV medications, which the patient agreed to think about it.    On 12/15 the fellow followed up with the patient who stated she will still take her medications by mouth, but was adamant that she would not have an IV. The patient became upset at the idea of having an IV, so the fellow encouraged her to continue to think about it and let her nurse know if she was willing to do it. The patient agreed.    Ethics will remain available for future discussions as necessary.    Jennifer Ryanne ESPINOZA  Ethics Fellow  [phone]    Agree with above. See chart notes on 12/4/23 and 12/6/23. Patient with HCP if she is without capacity. Patients without capacity always have the right to refuse unless the intervention is emergent (to save life or limb). In this case, the patient is making attempts to comply with meds, labs and potentially an IV.     Ethics Fellow updated Dr. Marshall (Medicine Attending) regarding above on 12/14/23 and 12/15/23.    Scarlett Ferreira MD  Ethics Attending  [phone] Ethics following.     Discussion with VANNESSA Pearl MA (Ethics Fellow), VANNESSA Ferreira MD (Ethics Attending) and VANNESSA HOLLIS (Director of Social Work) on 12/14 regarding the patient intermittently declining medications (including IV) and labs resulting in delayed improvement. The fellow offered to speak with the patient to understand why she is declining medications.     The fellow met with the patient at bedside on 12/14 and introduced herself. The patient expressed wanting to go home. The fellow explained that she is still sick and needs some more time with the medications. The patient explained that she is afraid of the IV but is willing to get her lab work and take her medications by mouth. The fellow encouraged the patient to consider IV medications, which the patient agreed to think about it.    On 12/15 the fellow followed up with the patient who stated she will still take her medications by mouth, but was adamant that she would not have an IV. The patient became upset at the idea of having an IV, so the fellow encouraged her to continue to think about it and let her nurse know if she was willing to do it. The patient agreed.    Ethics will remain available for future discussions as necessary.    Jennifer Ryanne ESPINOZA  Ethics Fellow  [phone]    Agree with above. See chart notes on 12/4/23 and 12/6/23. Patient with HCP if she is without capacity. Patients without capacity always have the right to refuse unless the intervention is emergent (to save life or limb). In this case, the patient is making attempts to comply with meds, labs and potentially an IV.     Ethics Fellow updated Dr. Marshall (Medicine Attending) regarding above on 12/14/23 and 12/15/23.    Scarlett Ferreira MD  Ethics Attending  [phone]

## 2023-12-15 NOTE — PROGRESS NOTE ADULT - ASSESSMENT
73y/oF PMH schizophrenia, hypothyroidism, HTN, nephrogenic DI, prior SBO, sent to ER from [place] for c/o abd pain. In ER, CT abd and pelvis consistent with high grade SBO. Pt admitted to ACS, refused NGT placement. Pt made NPO and started on IVF. Bcx neg x2. course complicated by MONICA on CKD 3 with metabolic acidosis and hypernatremia. SBO now resolved, tolerating diet. Transferred to medicine service for further electrolyte management.     Hypocalcemia   -pt refusing IV placement ; intermittently refusing PO meds   -cont PO calcium carbonate   -nephro recs appreciated   -f/u am labs   -cont calcitriol   -cont to encourage intake   -d/w Dr. Barfield @ [place], wants Ca 7.5 prior to dc 12/15    Hypophosphatemia , improved   -cont PO supplement  -f/u am phos    Hypernatremia   Hx nephrogenic DI   -nephro following   -Na improved   -f/u am bmp     MONICA on CKD3  Metabolic acidosis   -no obstruction on imaging   -f/u am labs   -cont po sodium bicarb    High grade SBO, resolved   -pt refused NGT  -tolerating diet   -cont to monitor bowel function   -abx dc'ed     HTN   -cont amlodipine   -cont to monitor bp     Schizophrenia   Depression   -cont home regimen     HLD   -cont statin     Hypothyroid   -cont levothyroxine     Pancytopenia   -seen by hematology at [facility] 10/2/23 with pancytopenia  -f/u outpt     vte ppx: heparin sq     dispo: return to [place] pending calcium improvement    73y/oF PMH schizophrenia, hypothyroidism, HTN, nephrogenic DI, prior SBO, sent to ER from [place] for c/o abd pain. In ER, CT abd and pelvis consistent with high grade SBO. Pt admitted to ACS, refused NGT placement. Pt made NPO and started on IVF. Bcx neg x2. course complicated by MONICA on CKD 3 with metabolic acidosis and hypernatremia. SBO now resolved, tolerating diet. Transferred to medicine service for further electrolyte management.     Hypocalcemia   -pt refusing IV placement ; intermittently refusing PO meds   -cont PO calcium carbonate   -nephro recs appreciated   -f/u am labs   -cont calcitriol   -cont to encourage intake   -d/w Dr. Barfield @ [place], wants Ca 7.5 prior to dc 12/15    Hypophosphatemia , improved   -cont PO supplement  -f/u am phos    Hypernatremia   Hx nephrogenic DI   -nephro following   -Na improved   -f/u am bmp     MONICA on CKD3  Metabolic acidosis   -no obstruction on imaging   -f/u am labs   -cont po sodium bicarb    High grade SBO, resolved   -pt refused NGT  -tolerating diet   -cont to monitor bowel function   -abx dc'ed     HTN   -cont amlodipine   -cont to monitor bp     Schizophrenia   Depression   -cont home regimen     HLD   -cont statin     Hypothyroid   -cont levothyroxine     Pancytopenia   -seen by hematology at [facility] 10/2/23 with pancytopenia  -f/u outpt     vte ppx: heparin sq     dispo: return to [place] pending calcium improvement

## 2023-12-16 LAB
ALBUMIN SERPL ELPH-MCNC: 3.6 G/DL — SIGNIFICANT CHANGE UP (ref 3.3–5.2)
ALBUMIN SERPL ELPH-MCNC: 3.6 G/DL — SIGNIFICANT CHANGE UP (ref 3.3–5.2)
ALP SERPL-CCNC: 103 U/L — SIGNIFICANT CHANGE UP (ref 40–120)
ALP SERPL-CCNC: 103 U/L — SIGNIFICANT CHANGE UP (ref 40–120)
ALT FLD-CCNC: 19 U/L — SIGNIFICANT CHANGE UP
ALT FLD-CCNC: 19 U/L — SIGNIFICANT CHANGE UP
ANION GAP SERPL CALC-SCNC: 10 MMOL/L — SIGNIFICANT CHANGE UP (ref 5–17)
ANION GAP SERPL CALC-SCNC: 10 MMOL/L — SIGNIFICANT CHANGE UP (ref 5–17)
AST SERPL-CCNC: 20 U/L — SIGNIFICANT CHANGE UP
AST SERPL-CCNC: 20 U/L — SIGNIFICANT CHANGE UP
BILIRUB DIRECT SERPL-MCNC: 0.1 MG/DL — SIGNIFICANT CHANGE UP (ref 0–0.3)
BILIRUB DIRECT SERPL-MCNC: 0.1 MG/DL — SIGNIFICANT CHANGE UP (ref 0–0.3)
BILIRUB INDIRECT FLD-MCNC: 0.2 MG/DL — SIGNIFICANT CHANGE UP (ref 0.2–1)
BILIRUB INDIRECT FLD-MCNC: 0.2 MG/DL — SIGNIFICANT CHANGE UP (ref 0.2–1)
BILIRUB SERPL-MCNC: 0.3 MG/DL — LOW (ref 0.4–2)
BILIRUB SERPL-MCNC: 0.3 MG/DL — LOW (ref 0.4–2)
BUN SERPL-MCNC: 22.2 MG/DL — HIGH (ref 8–20)
BUN SERPL-MCNC: 22.2 MG/DL — HIGH (ref 8–20)
CALCIUM SERPL-MCNC: 8.5 MG/DL — SIGNIFICANT CHANGE UP (ref 8.4–10.5)
CALCIUM SERPL-MCNC: 8.5 MG/DL — SIGNIFICANT CHANGE UP (ref 8.4–10.5)
CHLORIDE SERPL-SCNC: 107 MMOL/L — SIGNIFICANT CHANGE UP (ref 96–108)
CHLORIDE SERPL-SCNC: 107 MMOL/L — SIGNIFICANT CHANGE UP (ref 96–108)
CO2 SERPL-SCNC: 24 MMOL/L — SIGNIFICANT CHANGE UP (ref 22–29)
CO2 SERPL-SCNC: 24 MMOL/L — SIGNIFICANT CHANGE UP (ref 22–29)
CREAT SERPL-MCNC: 1.55 MG/DL — HIGH (ref 0.5–1.3)
CREAT SERPL-MCNC: 1.55 MG/DL — HIGH (ref 0.5–1.3)
EGFR: 36 ML/MIN/1.73M2 — LOW
EGFR: 36 ML/MIN/1.73M2 — LOW
GLUCOSE SERPL-MCNC: 73 MG/DL — SIGNIFICANT CHANGE UP (ref 70–99)
GLUCOSE SERPL-MCNC: 73 MG/DL — SIGNIFICANT CHANGE UP (ref 70–99)
HCT VFR BLD CALC: 34 % — LOW (ref 34.5–45)
HCT VFR BLD CALC: 34 % — LOW (ref 34.5–45)
HGB BLD-MCNC: 10.3 G/DL — LOW (ref 11.5–15.5)
HGB BLD-MCNC: 10.3 G/DL — LOW (ref 11.5–15.5)
MAGNESIUM SERPL-MCNC: 2.5 MG/DL — SIGNIFICANT CHANGE UP (ref 1.6–2.6)
MAGNESIUM SERPL-MCNC: 2.5 MG/DL — SIGNIFICANT CHANGE UP (ref 1.6–2.6)
MCHC RBC-ENTMCNC: 29.3 PG — SIGNIFICANT CHANGE UP (ref 27–34)
MCHC RBC-ENTMCNC: 29.3 PG — SIGNIFICANT CHANGE UP (ref 27–34)
MCHC RBC-ENTMCNC: 30.3 GM/DL — LOW (ref 32–36)
MCHC RBC-ENTMCNC: 30.3 GM/DL — LOW (ref 32–36)
MCV RBC AUTO: 96.6 FL — SIGNIFICANT CHANGE UP (ref 80–100)
MCV RBC AUTO: 96.6 FL — SIGNIFICANT CHANGE UP (ref 80–100)
PHOSPHATE SERPL-MCNC: 2.4 MG/DL — SIGNIFICANT CHANGE UP (ref 2.4–4.7)
PHOSPHATE SERPL-MCNC: 2.4 MG/DL — SIGNIFICANT CHANGE UP (ref 2.4–4.7)
PLATELET # BLD AUTO: 161 K/UL — SIGNIFICANT CHANGE UP (ref 150–400)
PLATELET # BLD AUTO: 161 K/UL — SIGNIFICANT CHANGE UP (ref 150–400)
POTASSIUM SERPL-MCNC: 4.2 MMOL/L — SIGNIFICANT CHANGE UP (ref 3.5–5.3)
POTASSIUM SERPL-MCNC: 4.2 MMOL/L — SIGNIFICANT CHANGE UP (ref 3.5–5.3)
POTASSIUM SERPL-SCNC: 4.2 MMOL/L — SIGNIFICANT CHANGE UP (ref 3.5–5.3)
POTASSIUM SERPL-SCNC: 4.2 MMOL/L — SIGNIFICANT CHANGE UP (ref 3.5–5.3)
PROT SERPL-MCNC: 6.3 G/DL — LOW (ref 6.6–8.7)
PROT SERPL-MCNC: 6.3 G/DL — LOW (ref 6.6–8.7)
RBC # BLD: 3.52 M/UL — LOW (ref 3.8–5.2)
RBC # BLD: 3.52 M/UL — LOW (ref 3.8–5.2)
RBC # FLD: 16.1 % — HIGH (ref 10.3–14.5)
RBC # FLD: 16.1 % — HIGH (ref 10.3–14.5)
SODIUM SERPL-SCNC: 141 MMOL/L — SIGNIFICANT CHANGE UP (ref 135–145)
SODIUM SERPL-SCNC: 141 MMOL/L — SIGNIFICANT CHANGE UP (ref 135–145)
WBC # BLD: 3.02 K/UL — LOW (ref 3.8–10.5)
WBC # BLD: 3.02 K/UL — LOW (ref 3.8–10.5)
WBC # FLD AUTO: 3.02 K/UL — LOW (ref 3.8–10.5)
WBC # FLD AUTO: 3.02 K/UL — LOW (ref 3.8–10.5)

## 2023-12-16 PROCEDURE — 99232 SBSQ HOSP IP/OBS MODERATE 35: CPT

## 2023-12-16 RX ADMIN — Medication 500 MILLIGRAM(S): at 13:10

## 2023-12-16 RX ADMIN — Medication 650 MILLIGRAM(S): at 21:23

## 2023-12-16 RX ADMIN — Medication 650 MILLIGRAM(S): at 05:22

## 2023-12-16 RX ADMIN — Medication 2 TABLET(S): at 13:10

## 2023-12-16 RX ADMIN — HEPARIN SODIUM 5000 UNIT(S): 5000 INJECTION INTRAVENOUS; SUBCUTANEOUS at 05:25

## 2023-12-16 RX ADMIN — Medication 112 MICROGRAM(S): at 05:22

## 2023-12-16 RX ADMIN — Medication 650 MILLIGRAM(S): at 13:11

## 2023-12-16 RX ADMIN — Medication 100 MILLIGRAM(S): at 13:11

## 2023-12-16 RX ADMIN — HEPARIN SODIUM 5000 UNIT(S): 5000 INJECTION INTRAVENOUS; SUBCUTANEOUS at 21:23

## 2023-12-16 RX ADMIN — OLANZAPINE 5 MILLIGRAM(S): 15 TABLET, FILM COATED ORAL at 13:11

## 2023-12-16 RX ADMIN — HALOPERIDOL DECANOATE 5 MILLIGRAM(S): 100 INJECTION INTRAMUSCULAR at 17:23

## 2023-12-16 RX ADMIN — Medication 2 TABLET(S): at 21:23

## 2023-12-16 RX ADMIN — Medication 1 TABLET(S): at 13:11

## 2023-12-16 RX ADMIN — AMLODIPINE BESYLATE 5 MILLIGRAM(S): 2.5 TABLET ORAL at 05:25

## 2023-12-16 RX ADMIN — ESCITALOPRAM OXALATE 30 MILLIGRAM(S): 10 TABLET, FILM COATED ORAL at 13:11

## 2023-12-16 RX ADMIN — HALOPERIDOL DECANOATE 5 MILLIGRAM(S): 100 INJECTION INTRAMUSCULAR at 06:35

## 2023-12-16 RX ADMIN — CALCITRIOL 0.25 MICROGRAM(S): 0.5 CAPSULE ORAL at 13:10

## 2023-12-16 RX ADMIN — ATORVASTATIN CALCIUM 10 MILLIGRAM(S): 80 TABLET, FILM COATED ORAL at 21:23

## 2023-12-16 RX ADMIN — HEPARIN SODIUM 5000 UNIT(S): 5000 INJECTION INTRAVENOUS; SUBCUTANEOUS at 13:11

## 2023-12-16 RX ADMIN — Medication 2 TABLET(S): at 05:23

## 2023-12-16 RX ADMIN — PANTOPRAZOLE SODIUM 40 MILLIGRAM(S): 20 TABLET, DELAYED RELEASE ORAL at 05:22

## 2023-12-16 NOTE — PROGRESS NOTE ADULT - SUBJECTIVE AND OBJECTIVE BOX
ANDREAS LYONS    [id number]    73y      Female    CC: sbo    INTERVAL HPI/OVERNIGHT EVENTS: pt seen and examined. sitting on edge of bed     REVIEW OF SYSTEMS:    RESPIRATORY: No cough, wheezing, hemoptysis; No shortness of breath  CARDIOVASCULAR: No chest pain, palpitations  GASTROINTESTINAL: No abdominal or epigastric pain. No nausea, vomiting    Vital Signs Last 24 Hrs  T(C): 36.8 (16 Dec 2023 09:28), Max: 37.5 (16 Dec 2023 00:02)  T(F): 98.2 (16 Dec 2023 09:28), Max: 99.5 (16 Dec 2023 00:02)  HR: 76 (16 Dec 2023 09:28) (76 - 100)  BP: 130/82 (16 Dec 2023 09:28) (115/70 - 155/75)  BP(mean): --  RR: 18 (16 Dec 2023 09:28) (18 - 18)  SpO2: 93% (16 Dec 2023 09:28) (93% - 95%)    Parameters below as of 16 Dec 2023 09:28  Patient On (Oxygen Delivery Method): room air        PHYSICAL EXAM:    GENERAL: NAD  CHEST/LUNG: respirations unlabored on RA   HEART: S1S2+, Regular rate and rhythm  ABDOMEN: Soft, Nontender, Nondistended  SKIN: warm, dry  NEURO: Awake, alert    LABS:                        10.3   3.02  )-----------( 161      ( 16 Dec 2023 06:34 )             34.0     12-16    141  |  107  |  22.2<H>  ----------------------------<  73  4.2   |  24.0  |  1.55<H>    Ca    8.5      16 Dec 2023 06:34  Phos  2.4     12-16  Mg     2.5     12-16    TPro  6.3<L>  /  Alb  3.6  /  TBili  0.3<L>  /  DBili  0.1  /  AST  20  /  ALT  19  /  AlkPhos  103  12-16      Urinalysis Basic - ( 16 Dec 2023 06:34 )    Color: x / Appearance: x / SG: x / pH: x  Gluc: 73 mg/dL / Ketone: x  / Bili: x / Urobili: x   Blood: x / Protein: x / Nitrite: x   Leuk Esterase: x / RBC: x / WBC x   Sq Epi: x / Non Sq Epi: x / Bacteria: x          MEDICATIONS  (STANDING):  amLODIPine   Tablet 5 milliGRAM(s) Oral daily  ascorbic acid 500 milliGRAM(s) Oral daily  atorvastatin 10 milliGRAM(s) Oral at bedtime  calcitriol   Capsule 0.25 MICROGram(s) Oral daily  calcium carbonate    500 mG (Tums) Chewable 2 Tablet(s) Chew three times a day  escitalopram 30 milliGRAM(s) Oral daily  haloperidol     Tablet 5 milliGRAM(s) Oral <User Schedule>  heparin   Injectable 5000 Unit(s) SubCutaneous every 8 hours  levothyroxine 112 MICROGram(s) Oral daily  multivitamin 1 Tablet(s) Oral daily  OLANZapine 5 milliGRAM(s) Oral daily  pantoprazole    Tablet 40 milliGRAM(s) Oral before breakfast  polyethylene glycol 3350 17 Gram(s) Oral at bedtime  sodium bicarbonate 650 milliGRAM(s) Oral three times a day  thiamine 100 milliGRAM(s) Oral daily    MEDICATIONS  (PRN):  acetaminophen     Tablet .. 650 milliGRAM(s) Oral every 6 hours PRN Temp greater or equal to 38C (100.4F), Mild Pain (1 - 3)  ondansetron Injectable 4 milliGRAM(s) IV Push every 6 hours PRN Nausea      RADIOLOGY & ADDITIONAL TESTS:   ANDREAS LYONS    [id number]    73y      Female    CC: sbo    INTERVAL HPI/OVERNIGHT EVENTS: pt seen and examined. sitting on edge of bed     REVIEW OF SYSTEMS:    RESPIRATORY: No cough, wheezing, hemoptysis; No shortness of breath  CARDIOVASCULAR: No chest pain, palpitations  GASTROINTESTINAL: No abdominal or epigastric pain. No nausea, vomiting    Vital Signs Last 24 Hrs  T(C): 36.8 (16 Dec 2023 09:28), Max: 37.5 (16 Dec 2023 00:02)  T(F): 98.2 (16 Dec 2023 09:28), Max: 99.5 (16 Dec 2023 00:02)  HR: 76 (16 Dec 2023 09:28) (76 - 100)  BP: 130/82 (16 Dec 2023 09:28) (115/70 - 155/75)  BP(mean): --  RR: 18 (16 Dec 2023 09:28) (18 - 18)  SpO2: 93% (16 Dec 2023 09:28) (93% - 95%)    Parameters below as of 16 Dec 2023 09:28  Patient On (Oxygen Delivery Method): room air        PHYSICAL EXAM:    GENERAL: NAD  CHEST/LUNG: respirations unlabored on RA   HEART: S1S2+, Regular rate and rhythm  ABDOMEN: Soft, Nontender, Nondistended  SKIN: warm, dry  NEURO: Awake, alert    LABS:                        10.3   3.02  )-----------( 161      ( 16 Dec 2023 06:34 )             34.0     12-16    141  |  107  |  22.2<H>  ----------------------------<  73  4.2   |  24.0  |  1.55<H>    Ca    8.5      16 Dec 2023 06:34  Phos  2.4     12-16  Mg     2.5     12-16    TPro  6.3<L>  /  Alb  3.6  /  TBili  0.3<L>  /  DBili  0.1  /  AST  20  /  ALT  19  /  AlkPhos  103  12-16      Urinalysis Basic - ( 16 Dec 2023 06:34 )    Color: x / Appearance: x / SG: x / pH: x  Gluc: 73 mg/dL / Ketone: x  / Bili: x / Urobili: x   Blood: x / Protein: x / Nitrite: x   Leuk Esterase: x / RBC: x / WBC x   Sq Epi: x / Non Sq Epi: x / Bacteria: x          MEDICATIONS  (STANDING):  amLODIPine   Tablet 5 milliGRAM(s) Oral daily  ascorbic acid 500 milliGRAM(s) Oral daily  atorvastatin 10 milliGRAM(s) Oral at bedtime  calcitriol   Capsule 0.25 MICROGram(s) Oral daily  calcium carbonate    500 mG (Tums) Chewable 2 Tablet(s) Chew three times a day  escitalopram 30 milliGRAM(s) Oral daily  haloperidol     Tablet 5 milliGRAM(s) Oral <User Schedule>  heparin   Injectable 5000 Unit(s) SubCutaneous every 8 hours  levothyroxine 112 MICROGram(s) Oral daily  multivitamin 1 Tablet(s) Oral daily  OLANZapine 5 milliGRAM(s) Oral daily  pantoprazole    Tablet 40 milliGRAM(s) Oral before breakfast  polyethylene glycol 3350 17 Gram(s) Oral at bedtime  sodium bicarbonate 650 milliGRAM(s) Oral three times a day  thiamine 100 milliGRAM(s) Oral daily    MEDICATIONS  (PRN):  acetaminophen     Tablet .. 650 milliGRAM(s) Oral every 6 hours PRN Temp greater or equal to 38C (100.4F), Mild Pain (1 - 3)  ondansetron Injectable 4 milliGRAM(s) IV Push every 6 hours PRN Nausea      RADIOLOGY & ADDITIONAL TESTS:

## 2023-12-16 NOTE — PROGRESS NOTE ADULT - ASSESSMENT
73y/oF PMH schizophrenia, hypothyroidism, HTN, nephrogenic DI, prior SBO, sent to ER from [place] for c/o abd pain. In ER, CT abd and pelvis consistent with high grade SBO. Pt admitted to ACS, refused NGT placement. Pt made NPO and started on IVF. Bcx neg x2. course complicated by MONICA on CKD 3 with metabolic acidosis and hypernatremia. SBO now resolved, tolerating diet. Transferred to medicine service for further electrolyte management.     Hypocalcemia , improved  -pt refusing IV placement ; intermittently refusing PO meds   -cont PO calcium carbonate   -nephro recs appreciated   -f/u am labs   -cont calcitriol   -cont to encourage intake   -d/w Dr. Barfield @ [place] (12/15), wants Ca 7.5 prior to dc     Hypophosphatemia , improved   -cont PO supplement  -f/u am phos    Hypernatremia , improved  Hx nephrogenic DI   -nephro following   -Na improved   -f/u am bmp     MONICA on CKD3  Metabolic acidosis   -no obstruction on imaging   -f/u am labs   -cont po sodium bicarb    High grade SBO, resolved   -pt refused NGT  -tolerating diet   -cont to monitor bowel function   -abx dc'ed     HTN   -cont amlodipine   -cont to monitor bp     Schizophrenia   Depression   -cont home regimen     HLD   -cont statin     Hypothyroid   -cont levothyroxine     Pancytopenia   -seen by hematology at [facility] 10/2/23 with pancytopenia  -f/u outpt     vte ppx: heparin sq     dispo: return to [place] pending electrolyte improvement; poss 48hrs   73y/oF PMH schizophrenia, hypothyroidism, HTN, nephrogenic DI, prior SBO, sent to ER from [place] for c/o abd pain. In ER, CT abd and pelvis consistent with high grade SBO. Pt admitted to ACS, refused NGT placement. Pt made NPO and started on IVF. Bcx neg x2. course complicated by MONICA on CKD 3 with metabolic acidosis and hypernatremia. SBO now resolved, tolerating diet. Transferred to medicine service for further electrolyte management.     Hypocalcemia , improved  -pt refusing IV placement ; intermittently refusing PO meds   -cont PO calcium carbonate   -nephro recs appreciated   -f/u am labs   -cont calcitriol   -cont to encourage intake   -d/w Dr. Barfield @ [place] (12/15), wants Ca 7.5 prior to dc     Hypophosphatemia , improved   -cont PO supplement  -f/u am phos    Hypernatremia , improved  Hx nephrogenic DI   -nephro following   -Na improved   -f/u am bmp     MONICA on CKD3  Metabolic acidosis   -no obstruction on imaging   -f/u am labs   -cont po sodium bicarb    High grade SBO, resolved   -pt refused NGT  -tolerating diet   -cont to monitor bowel function   -abx dc'ed     HTN   -cont amlodipine   -cont to monitor bp     Schizophrenia   Depression   -cont home regimen     HLD   -cont statin     Hypothyroid   -cont levothyroxine     Pancytopenia   -seen by hematology at [facility] 10/2/23 with pancytopenia  -f/u outpt     vte ppx: heparin sq     dispo: return to [place] pending electrolyte improvement; poss 48hrs

## 2023-12-17 ENCOUNTER — TRANSCRIPTION ENCOUNTER (OUTPATIENT)
Age: 73
End: 2023-12-17

## 2023-12-17 LAB
ALBUMIN SERPL ELPH-MCNC: 3.7 G/DL — SIGNIFICANT CHANGE UP (ref 3.3–5.2)
ALBUMIN SERPL ELPH-MCNC: 3.7 G/DL — SIGNIFICANT CHANGE UP (ref 3.3–5.2)
ALP SERPL-CCNC: 104 U/L — SIGNIFICANT CHANGE UP (ref 40–120)
ALP SERPL-CCNC: 104 U/L — SIGNIFICANT CHANGE UP (ref 40–120)
ALT FLD-CCNC: 20 U/L — SIGNIFICANT CHANGE UP
ALT FLD-CCNC: 20 U/L — SIGNIFICANT CHANGE UP
ANION GAP SERPL CALC-SCNC: 12 MMOL/L — SIGNIFICANT CHANGE UP (ref 5–17)
ANION GAP SERPL CALC-SCNC: 12 MMOL/L — SIGNIFICANT CHANGE UP (ref 5–17)
AST SERPL-CCNC: 18 U/L — SIGNIFICANT CHANGE UP
AST SERPL-CCNC: 18 U/L — SIGNIFICANT CHANGE UP
BILIRUB SERPL-MCNC: 0.3 MG/DL — LOW (ref 0.4–2)
BILIRUB SERPL-MCNC: 0.3 MG/DL — LOW (ref 0.4–2)
BUN SERPL-MCNC: 23.5 MG/DL — HIGH (ref 8–20)
BUN SERPL-MCNC: 23.5 MG/DL — HIGH (ref 8–20)
CALCIUM SERPL-MCNC: 8.7 MG/DL — SIGNIFICANT CHANGE UP (ref 8.4–10.5)
CALCIUM SERPL-MCNC: 8.7 MG/DL — SIGNIFICANT CHANGE UP (ref 8.4–10.5)
CHLORIDE SERPL-SCNC: 109 MMOL/L — HIGH (ref 96–108)
CHLORIDE SERPL-SCNC: 109 MMOL/L — HIGH (ref 96–108)
CO2 SERPL-SCNC: 23 MMOL/L — SIGNIFICANT CHANGE UP (ref 22–29)
CO2 SERPL-SCNC: 23 MMOL/L — SIGNIFICANT CHANGE UP (ref 22–29)
CREAT SERPL-MCNC: 1.6 MG/DL — HIGH (ref 0.5–1.3)
CREAT SERPL-MCNC: 1.6 MG/DL — HIGH (ref 0.5–1.3)
EGFR: 34 ML/MIN/1.73M2 — LOW
EGFR: 34 ML/MIN/1.73M2 — LOW
GLUCOSE SERPL-MCNC: 90 MG/DL — SIGNIFICANT CHANGE UP (ref 70–99)
GLUCOSE SERPL-MCNC: 90 MG/DL — SIGNIFICANT CHANGE UP (ref 70–99)
POTASSIUM SERPL-MCNC: 4.1 MMOL/L — SIGNIFICANT CHANGE UP (ref 3.5–5.3)
POTASSIUM SERPL-MCNC: 4.1 MMOL/L — SIGNIFICANT CHANGE UP (ref 3.5–5.3)
POTASSIUM SERPL-SCNC: 4.1 MMOL/L — SIGNIFICANT CHANGE UP (ref 3.5–5.3)
POTASSIUM SERPL-SCNC: 4.1 MMOL/L — SIGNIFICANT CHANGE UP (ref 3.5–5.3)
PROT SERPL-MCNC: 6.2 G/DL — LOW (ref 6.6–8.7)
PROT SERPL-MCNC: 6.2 G/DL — LOW (ref 6.6–8.7)
SODIUM SERPL-SCNC: 144 MMOL/L — SIGNIFICANT CHANGE UP (ref 135–145)
SODIUM SERPL-SCNC: 144 MMOL/L — SIGNIFICANT CHANGE UP (ref 135–145)

## 2023-12-17 PROCEDURE — 99232 SBSQ HOSP IP/OBS MODERATE 35: CPT

## 2023-12-17 RX ORDER — OLANZAPINE 15 MG/1
1 TABLET, FILM COATED ORAL
Qty: 0 | Refills: 0 | DISCHARGE
Start: 2023-12-17

## 2023-12-17 RX ORDER — SODIUM BICARBONATE 1 MEQ/ML
1 SYRINGE (ML) INTRAVENOUS
Qty: 0 | Refills: 0 | DISCHARGE
Start: 2023-12-17

## 2023-12-17 RX ORDER — THIAMINE MONONITRATE (VIT B1) 100 MG
1 TABLET ORAL
Qty: 0 | Refills: 0 | DISCHARGE
Start: 2023-12-17

## 2023-12-17 RX ORDER — CALCIUM CARBONATE 500(1250)
2 TABLET ORAL
Qty: 0 | Refills: 0 | DISCHARGE
Start: 2023-12-17

## 2023-12-17 RX ORDER — CALCITRIOL 0.5 UG/1
1 CAPSULE ORAL
Qty: 0 | Refills: 0 | DISCHARGE
Start: 2023-12-17

## 2023-12-17 RX ORDER — PANTOPRAZOLE SODIUM 20 MG/1
1 TABLET, DELAYED RELEASE ORAL
Refills: 0 | DISCHARGE

## 2023-12-17 RX ORDER — SODIUM BICARBONATE 1 MEQ/ML
650 SYRINGE (ML) INTRAVENOUS
Refills: 0 | Status: DISCONTINUED | OUTPATIENT
Start: 2023-12-17 | End: 2023-12-18

## 2023-12-17 RX ORDER — OLANZAPINE 15 MG/1
1 TABLET, FILM COATED ORAL
Refills: 0 | DISCHARGE

## 2023-12-17 RX ORDER — POLYETHYLENE GLYCOL 3350 17 G/17G
17 POWDER, FOR SOLUTION ORAL
Qty: 0 | Refills: 0 | DISCHARGE
Start: 2023-12-17

## 2023-12-17 RX ORDER — AMLODIPINE BESYLATE 2.5 MG/1
1 TABLET ORAL
Qty: 0 | Refills: 0 | DISCHARGE
Start: 2023-12-17

## 2023-12-17 RX ORDER — ASCORBIC ACID 60 MG
1 TABLET,CHEWABLE ORAL
Qty: 0 | Refills: 0 | DISCHARGE
Start: 2023-12-17

## 2023-12-17 RX ORDER — CALCIUM CARBONATE 500(1250)
1 TABLET ORAL
Refills: 0 | DISCHARGE

## 2023-12-17 RX ORDER — PANTOPRAZOLE SODIUM 20 MG/1
1 TABLET, DELAYED RELEASE ORAL
Qty: 0 | Refills: 0 | DISCHARGE
Start: 2023-12-17

## 2023-12-17 RX ADMIN — Medication 2 TABLET(S): at 06:16

## 2023-12-17 RX ADMIN — Medication 650 MILLIGRAM(S): at 17:50

## 2023-12-17 RX ADMIN — PANTOPRAZOLE SODIUM 40 MILLIGRAM(S): 20 TABLET, DELAYED RELEASE ORAL at 06:16

## 2023-12-17 RX ADMIN — ESCITALOPRAM OXALATE 30 MILLIGRAM(S): 10 TABLET, FILM COATED ORAL at 14:48

## 2023-12-17 RX ADMIN — OLANZAPINE 5 MILLIGRAM(S): 15 TABLET, FILM COATED ORAL at 14:48

## 2023-12-17 RX ADMIN — HALOPERIDOL DECANOATE 5 MILLIGRAM(S): 100 INJECTION INTRAMUSCULAR at 06:16

## 2023-12-17 RX ADMIN — Medication 650 MILLIGRAM(S): at 06:16

## 2023-12-17 RX ADMIN — Medication 100 MILLIGRAM(S): at 14:49

## 2023-12-17 RX ADMIN — HEPARIN SODIUM 5000 UNIT(S): 5000 INJECTION INTRAVENOUS; SUBCUTANEOUS at 06:18

## 2023-12-17 RX ADMIN — HALOPERIDOL DECANOATE 5 MILLIGRAM(S): 100 INJECTION INTRAMUSCULAR at 17:51

## 2023-12-17 RX ADMIN — Medication 1 TABLET(S): at 14:48

## 2023-12-17 RX ADMIN — HEPARIN SODIUM 5000 UNIT(S): 5000 INJECTION INTRAVENOUS; SUBCUTANEOUS at 14:49

## 2023-12-17 RX ADMIN — Medication 112 MICROGRAM(S): at 06:17

## 2023-12-17 RX ADMIN — Medication 500 MILLIGRAM(S): at 14:49

## 2023-12-17 RX ADMIN — Medication 2 TABLET(S): at 14:49

## 2023-12-17 RX ADMIN — ATORVASTATIN CALCIUM 10 MILLIGRAM(S): 80 TABLET, FILM COATED ORAL at 22:42

## 2023-12-17 RX ADMIN — AMLODIPINE BESYLATE 5 MILLIGRAM(S): 2.5 TABLET ORAL at 06:16

## 2023-12-17 RX ADMIN — Medication 2 TABLET(S): at 22:41

## 2023-12-17 RX ADMIN — CALCITRIOL 0.25 MICROGRAM(S): 0.5 CAPSULE ORAL at 14:49

## 2023-12-17 RX ADMIN — HEPARIN SODIUM 5000 UNIT(S): 5000 INJECTION INTRAVENOUS; SUBCUTANEOUS at 22:42

## 2023-12-17 NOTE — DISCHARGE NOTE PROVIDER - PROVIDER TOKENS
FREE:[LAST:[PMD],PHONE:[(   )    -],FAX:[(   )    -]],FREE:[LAST:[psychiatry],PHONE:[(   )    -],FAX:[(   )    -]]

## 2023-12-17 NOTE — DISCHARGE NOTE PROVIDER - CARE PROVIDER_API CALL
PMD,   Phone: (   )    -  Fax: (   )    -  Follow Up Time:     psychiatry,   Phone: (   )    -  Fax: (   )    -  Follow Up Time:

## 2023-12-17 NOTE — PROGRESS NOTE ADULT - ASSESSMENT
73y/oF PMH schizophrenia, hypothyroidism, HTN, nephrogenic DI, prior SBO, sent to ER from [place] for c/o abd pain. In ER, CT abd and pelvis consistent with high grade SBO. Pt admitted to ACS, refused NGT placement. Pt made NPO and started on IVF. Bcx neg x2. course complicated by MONICA on CKD 3 with metabolic acidosis and hypernatremia. SBO now resolved, tolerating diet. Transferred to medicine service for further electrolyte management. course complicated by pt refusing IV and IV meds, intermittently refusing PO meds. labs improving     Hypocalcemia , improved  -pt refusing IV placement ; intermittently refusing PO meds   -cont PO calcium carbonate   -nephro recs appreciated   -cont calcitriol   -cont to encourage intake   -d/w Dr. Barfield @ [place] (12/15), wants Ca 7.5 prior to dc     Hypophosphatemia , improved   -cont PO supplement     Hypernatremia , improved  Hx nephrogenic DI   -nephro following   -Na improved     MONICA on CKD3  Metabolic acidosis   -no obstruction on imaging   -cont po sodium bicarb, adjusted 12/17    High grade SBO, resolved   -pt refused NGT  -tolerating diet   -cont to monitor bowel function   -abx dc'ed     HTN   -cont amlodipine   -cont to monitor bp     Schizophrenia   Depression   -cont home regimen     HLD   -cont statin     Hypothyroid   -cont levothyroxine     Pancytopenia   -seen by hematology at [facility] 10/2/23 with pancytopenia  -f/u outpt     vte ppx: heparin sq     dispo: return to [place] pending electrolyte improvement; likely next 24hrs   73y/oF PMH schizophrenia, hypothyroidism, HTN, nephrogenic DI, prior SBO, sent to ER from [place] for c/o abd pain. In ER, CT abd and pelvis consistent with high grade SBO. Pt admitted to ACS, refused NGT placement. Pt made NPO and started on IVF. Bcx neg x2. course complicated by MONICA on CKD 3 with metabolic acidosis and hypernatremia. SBO now resolved, tolerating diet. Transferred to medicine service for further electrolyte management. course complicated by pt refusing IV and IV meds, intermittently refusing PO meds. labs improving     Hypocalcemia , improved  -pt refusing IV placement ; intermittently refusing PO meds   -cont PO calcium carbonate   -nephro recs appreciated   -cont calcitriol   -cont to encourage intake   -d/w Dr. Barfield @ [place] (12/15), wants Ca 7.5 prior to dc     Hypophosphatemia , improved   -cont PO supplement     Hypernatremia , improved  Hx nephrogenic DI   -nephro following   -Na improved     MONICA on CKD3  Metabolic acidosis   -no obstruction on imaging   -cont po sodium bicarb, adjusted 12/17    High grade SBO, resolved   -pt refused NGT  -tolerating diet   -cont to monitor bowel function   -abx dc'ed     HTN   -cont amlodipine   -cont to monitor bp     Schizophrenia   Depression   -cont home regimen     HLD   -cont statin     Hypothyroid   -cont levothyroxine     Pancytopenia   -seen by hematology at [facility] 10/2/23 with pancytopenia  -f/u outpt     vte ppx: heparin sq     dispo: return to [place] pending electrolyte improvement; likely next 24hrs

## 2023-12-17 NOTE — DISCHARGE NOTE PROVIDER - ATTENDING DISCHARGE PHYSICAL EXAMINATION:
Vital Signs Last 24 Hrs  T(C): 36.5 (18 Dec 2023 04:27), Max: 37.3 (17 Dec 2023 22:15)  T(F): 97.7 (18 Dec 2023 04:27), Max: 99.2 (17 Dec 2023 22:15)  HR: 63 (18 Dec 2023 04:27) (63 - 82)  BP: 140/58 (18 Dec 2023 04:27) (121/70 - 147/72)  BP(mean): --  RR: 18 (18 Dec 2023 04:27) (18 - 18)  SpO2: 92% (18 Dec 2023 04:27) (91% - 94%)    Parameters below as of 18 Dec 2023 04:27  Patient On (Oxygen Delivery Method): room air    GENERAL: NAD  CHEST/LUNG: respirations unlabored on RA   HEART: S1S2+, Regular rate and rhythm  ABDOMEN: Soft, Nontender, Nondistended  SKIN: warm, dry  NEURO: Awake, alert

## 2023-12-17 NOTE — PROGRESS NOTE ADULT - SUBJECTIVE AND OBJECTIVE BOX
ANDREAS LYONS    [id number]    73y      Female    CC: sbo    INTERVAL HPI/OVERNIGHT EVENTS: pt seen and examined. no acute events reported o/n. has been taking PO meds more consistently     REVIEW OF SYSTEMS:    RESPIRATORY: No cough, wheezing, hemoptysis; No shortness of breath  CARDIOVASCULAR: No chest pain, palpitations  GASTROINTESTINAL: No abdominal or epigastric pain. No nausea, vomiting    Vital Signs Last 24 Hrs  T(C): 37.1 (17 Dec 2023 09:20), Max: 37.1 (17 Dec 2023 04:40)  T(F): 98.7 (17 Dec 2023 09:20), Max: 98.8 (17 Dec 2023 04:40)  HR: 76 (17 Dec 2023 09:20) (67 - 90)  BP: 133/71 (17 Dec 2023 09:20) (101/64 - 133/71)  BP(mean): --  RR: 18 (17 Dec 2023 09:20) (18 - 18)  SpO2: 95% (17 Dec 2023 09:20) (92% - 95%)    Parameters below as of 17 Dec 2023 09:20  Patient On (Oxygen Delivery Method): room air        PHYSICAL EXAM:    GENERAL: NAD  CHEST/LUNG: respirations unlabored on RA   HEART: S1S2+, Regular rate and rhythm  ABDOMEN: Soft, Nontender, Nondistended  SKIN: warm, dry  NEURO: Awake, alert    LABS:                        10.3   3.02  )-----------( 161      ( 16 Dec 2023 06:34 )             34.0     12-17    144  |  109<H>  |  23.5<H>  ----------------------------<  90  4.1   |  23.0  |  1.60<H>    Ca    8.7      17 Dec 2023 04:33  Phos  2.4     12-16  Mg     2.5     12-16    TPro  6.2<L>  /  Alb  3.7  /  TBili  0.3<L>  /  DBili  x   /  AST  18  /  ALT  20  /  AlkPhos  104  12-17      Urinalysis Basic - ( 17 Dec 2023 04:33 )    Color: x / Appearance: x / SG: x / pH: x  Gluc: 90 mg/dL / Ketone: x  / Bili: x / Urobili: x   Blood: x / Protein: x / Nitrite: x   Leuk Esterase: x / RBC: x / WBC x   Sq Epi: x / Non Sq Epi: x / Bacteria: x          MEDICATIONS  (STANDING):  amLODIPine   Tablet 5 milliGRAM(s) Oral daily  ascorbic acid 500 milliGRAM(s) Oral daily  atorvastatin 10 milliGRAM(s) Oral at bedtime  calcitriol   Capsule 0.25 MICROGram(s) Oral daily  calcium carbonate    500 mG (Tums) Chewable 2 Tablet(s) Chew three times a day  escitalopram 30 milliGRAM(s) Oral daily  haloperidol     Tablet 5 milliGRAM(s) Oral <User Schedule>  heparin   Injectable 5000 Unit(s) SubCutaneous every 8 hours  levothyroxine 112 MICROGram(s) Oral daily  multivitamin 1 Tablet(s) Oral daily  OLANZapine 5 milliGRAM(s) Oral daily  pantoprazole    Tablet 40 milliGRAM(s) Oral before breakfast  polyethylene glycol 3350 17 Gram(s) Oral at bedtime  sodium bicarbonate 650 milliGRAM(s) Oral two times a day  thiamine 100 milliGRAM(s) Oral daily    MEDICATIONS  (PRN):  acetaminophen     Tablet .. 650 milliGRAM(s) Oral every 6 hours PRN Temp greater or equal to 38C (100.4F), Mild Pain (1 - 3)  ondansetron Injectable 4 milliGRAM(s) IV Push every 6 hours PRN Nausea      RADIOLOGY & ADDITIONAL TESTS:   ANDREAS LYONS    [id number]    73y      Female    CC: sbo    INTERVAL HPI/OVERNIGHT EVENTS: pt seen and examined. no acute events reported o/n. has been taking PO meds more consistently     REVIEW OF SYSTEMS:    RESPIRATORY: No cough, wheezing, hemoptysis; No shortness of breath  CARDIOVASCULAR: No chest pain, palpitations  GASTROINTESTINAL: No abdominal or epigastric pain. No nausea, vomiting    Vital Signs Last 24 Hrs  T(C): 37.1 (17 Dec 2023 09:20), Max: 37.1 (17 Dec 2023 04:40)  T(F): 98.7 (17 Dec 2023 09:20), Max: 98.8 (17 Dec 2023 04:40)  HR: 76 (17 Dec 2023 09:20) (67 - 90)  BP: 133/71 (17 Dec 2023 09:20) (101/64 - 133/71)  BP(mean): --  RR: 18 (17 Dec 2023 09:20) (18 - 18)  SpO2: 95% (17 Dec 2023 09:20) (92% - 95%)    Parameters below as of 17 Dec 2023 09:20  Patient On (Oxygen Delivery Method): room air        PHYSICAL EXAM:    GENERAL: NAD  CHEST/LUNG: respirations unlabored on RA   HEART: S1S2+, Regular rate and rhythm  ABDOMEN: Soft, Nontender, Nondistended  SKIN: warm, dry  NEURO: Awake, alert    LABS:                        10.3   3.02  )-----------( 161      ( 16 Dec 2023 06:34 )             34.0     12-17    144  |  109<H>  |  23.5<H>  ----------------------------<  90  4.1   |  23.0  |  1.60<H>    Ca    8.7      17 Dec 2023 04:33  Phos  2.4     12-16  Mg     2.5     12-16    TPro  6.2<L>  /  Alb  3.7  /  TBili  0.3<L>  /  DBili  x   /  AST  18  /  ALT  20  /  AlkPhos  104  12-17      Urinalysis Basic - ( 17 Dec 2023 04:33 )    Color: x / Appearance: x / SG: x / pH: x  Gluc: 90 mg/dL / Ketone: x  / Bili: x / Urobili: x   Blood: x / Protein: x / Nitrite: x   Leuk Esterase: x / RBC: x / WBC x   Sq Epi: x / Non Sq Epi: x / Bacteria: x          MEDICATIONS  (STANDING):  amLODIPine   Tablet 5 milliGRAM(s) Oral daily  ascorbic acid 500 milliGRAM(s) Oral daily  atorvastatin 10 milliGRAM(s) Oral at bedtime  calcitriol   Capsule 0.25 MICROGram(s) Oral daily  calcium carbonate    500 mG (Tums) Chewable 2 Tablet(s) Chew three times a day  escitalopram 30 milliGRAM(s) Oral daily  haloperidol     Tablet 5 milliGRAM(s) Oral <User Schedule>  heparin   Injectable 5000 Unit(s) SubCutaneous every 8 hours  levothyroxine 112 MICROGram(s) Oral daily  multivitamin 1 Tablet(s) Oral daily  OLANZapine 5 milliGRAM(s) Oral daily  pantoprazole    Tablet 40 milliGRAM(s) Oral before breakfast  polyethylene glycol 3350 17 Gram(s) Oral at bedtime  sodium bicarbonate 650 milliGRAM(s) Oral two times a day  thiamine 100 milliGRAM(s) Oral daily    MEDICATIONS  (PRN):  acetaminophen     Tablet .. 650 milliGRAM(s) Oral every 6 hours PRN Temp greater or equal to 38C (100.4F), Mild Pain (1 - 3)  ondansetron Injectable 4 milliGRAM(s) IV Push every 6 hours PRN Nausea      RADIOLOGY & ADDITIONAL TESTS:

## 2023-12-17 NOTE — DISCHARGE NOTE PROVIDER - HOSPITAL COURSE
73y/oF PMH schizophrenia, hypothyroidism, HTN, nephrogenic DI, prior SBO, sent to ER from [place] for c/o abd pain. In ER, CT abd and pelvis consistent with high grade SBO. Pt admitted to ACS, refused NGT placement. Pt made NPO and started on IVF. Bcx neg x2. course complicated by MONICA on CKD 3 with metabolic acidosis and hypernatremia. SBO now resolved, tolerating diet. Transferred to medicine service for further electrolyte management. course complicated by pt refusing IV and IV meds, intermittently refusing PO meds. labs improving. nephrology recs appreciated. pt to return to [place] 73y/oF PMH schizophrenia, hypothyroidism, HTN, nephrogenic DI, prior SBO, sent to ER from [place] for c/o abd pain. In ER, CT abd and pelvis consistent with high grade SBO. Pt admitted to ACS, refused NGT placement. Pt made NPO and started on IVF. Bcx neg x2. course complicated by MONICA on CKD 3 with metabolic acidosis and hypernatremia. SBO now resolved, tolerating diet. Transferred to medicine service for further electrolyte management. course complicated by pt refusing IV and IV meds, intermittently refusing PO meds. labs improving. nephrology recs appreciated. pt to return to [place]

## 2023-12-17 NOTE — DISCHARGE NOTE PROVIDER - NSDCMRMEDTOKEN_GEN_ALL_CORE_FT
amLODIPine 5 mg oral tablet: 1 tab(s) orally once a day  ascorbic acid 500 mg oral tablet: 1 tab(s) orally once a day  atorvastatin 10 mg oral tablet: 1 tab(s) orally once a day  calcitriol 0.25 mcg oral capsule: 1 cap(s) orally once a day  calcium carbonate 500 mg (200 mg elemental calcium) oral tablet, chewable: 2 tab(s) orally 3 times a day  cholecalciferol 50 mcg (2000 intl units) oral capsule: 1 cap(s) orally once a day  cyanocobalamin 500 mcg oral tablet: 1 tab(s) orally once a day  docusate sodium 100 mg oral capsule: 1 cap(s) orally 3 times a day as needed for  constipation  haloperidol 5 mg oral tablet: 1 tab(s) orally 2 times a day at 06:30  and 16:30  levothyroxine 112 mcg (0.112 mg) oral tablet: 1 tab(s) orally once a day  Lexapro 10 mg oral tablet: 3 tab(s) orally once a day  LORazepam 0.5 mg oral tablet: Take 2 tablets (1 mG) in the morning and 1 tablet at 20:30  Multiple Vitamins oral tablet: 1 tab(s) orally once a day  OLANZapine 5 mg oral tablet: 1 tab(s) orally once a day  pantoprazole 40 mg oral delayed release tablet: 1 tab(s) orally once a day (before a meal)  polyethylene glycol 3350 oral powder for reconstitution: 17 gram(s) orally once a day (at bedtime)  senna (sennosides) 8.6 mg oral tablet: 2 tab(s) orally once a day (at bedtime) as needed for  constipation  simethicone 80 mg oral tablet, chewable: 1 tab(s) chewed every 8 hours for gas  sodium bicarbonate 650 mg oral tablet: 1 tab(s) orally 2 times a day  thiamine 100 mg oral tablet: 1 tab(s) orally once a day

## 2023-12-17 NOTE — DISCHARGE NOTE PROVIDER - NSDCCPCAREPLAN_GEN_ALL_CORE_FT
PRINCIPAL DISCHARGE DIAGNOSIS  Diagnosis: Small bowel obstruction  Assessment and Plan of Treatment: resolved      SECONDARY DISCHARGE DIAGNOSES  Diagnosis: Schizoaffective disorder  Assessment and Plan of Treatment:      PRINCIPAL DISCHARGE DIAGNOSIS  Diagnosis: Small bowel obstruction  Assessment and Plan of Treatment: resolved      SECONDARY DISCHARGE DIAGNOSES  Diagnosis: Acute hypernatremia  Assessment and Plan of Treatment:     Diagnosis: Schizoaffective disorder  Assessment and Plan of Treatment:

## 2023-12-17 NOTE — DISCHARGE NOTE PROVIDER - DETAILS OF MALNUTRITION DIAGNOSIS/DIAGNOSES
This patient has been assessed with a concern for Malnutrition and was treated during this hospitalization for the following Nutrition diagnosis/diagnoses:     -  12/05/2023: Severe protein-calorie malnutrition

## 2023-12-18 ENCOUNTER — TRANSCRIPTION ENCOUNTER (OUTPATIENT)
Age: 73
End: 2023-12-18

## 2023-12-18 VITALS
OXYGEN SATURATION: 91 % | RESPIRATION RATE: 18 BRPM | TEMPERATURE: 99 F | SYSTOLIC BLOOD PRESSURE: 162 MMHG | HEART RATE: 103 BPM | DIASTOLIC BLOOD PRESSURE: 84 MMHG

## 2023-12-18 DIAGNOSIS — E83.51 HYPOCALCEMIA: ICD-10-CM

## 2023-12-18 LAB
SARS-COV-2 RNA SPEC QL NAA+PROBE: SIGNIFICANT CHANGE UP
SARS-COV-2 RNA SPEC QL NAA+PROBE: SIGNIFICANT CHANGE UP

## 2023-12-18 PROCEDURE — 84443 ASSAY THYROID STIM HORMONE: CPT

## 2023-12-18 PROCEDURE — 83735 ASSAY OF MAGNESIUM: CPT

## 2023-12-18 PROCEDURE — 93005 ELECTROCARDIOGRAM TRACING: CPT

## 2023-12-18 PROCEDURE — 74177 CT ABD & PELVIS W/CONTRAST: CPT | Mod: MG

## 2023-12-18 PROCEDURE — 96374 THER/PROPH/DIAG INJ IV PUSH: CPT

## 2023-12-18 PROCEDURE — 99285 EMERGENCY DEPT VISIT HI MDM: CPT

## 2023-12-18 PROCEDURE — 83970 ASSAY OF PARATHORMONE: CPT

## 2023-12-18 PROCEDURE — 87635 SARS-COV-2 COVID-19 AMP PRB: CPT

## 2023-12-18 PROCEDURE — 87040 BLOOD CULTURE FOR BACTERIA: CPT

## 2023-12-18 PROCEDURE — 83605 ASSAY OF LACTIC ACID: CPT

## 2023-12-18 PROCEDURE — 84300 ASSAY OF URINE SODIUM: CPT

## 2023-12-18 PROCEDURE — 71045 X-RAY EXAM CHEST 1 VIEW: CPT

## 2023-12-18 PROCEDURE — 80076 HEPATIC FUNCTION PANEL: CPT

## 2023-12-18 PROCEDURE — 82330 ASSAY OF CALCIUM: CPT

## 2023-12-18 PROCEDURE — 85025 COMPLETE CBC W/AUTO DIFF WBC: CPT

## 2023-12-18 PROCEDURE — 99239 HOSP IP/OBS DSCHRG MGMT >30: CPT

## 2023-12-18 PROCEDURE — 96375 TX/PRO/DX INJ NEW DRUG ADDON: CPT

## 2023-12-18 PROCEDURE — 82570 ASSAY OF URINE CREATININE: CPT

## 2023-12-18 PROCEDURE — 83930 ASSAY OF BLOOD OSMOLALITY: CPT

## 2023-12-18 PROCEDURE — 83690 ASSAY OF LIPASE: CPT

## 2023-12-18 PROCEDURE — 83935 ASSAY OF URINE OSMOLALITY: CPT

## 2023-12-18 PROCEDURE — 99231 SBSQ HOSP IP/OBS SF/LOW 25: CPT

## 2023-12-18 PROCEDURE — 87086 URINE CULTURE/COLONY COUNT: CPT

## 2023-12-18 PROCEDURE — 84133 ASSAY OF URINE POTASSIUM: CPT

## 2023-12-18 PROCEDURE — 82040 ASSAY OF SERUM ALBUMIN: CPT

## 2023-12-18 PROCEDURE — 84100 ASSAY OF PHOSPHORUS: CPT

## 2023-12-18 PROCEDURE — 74018 RADEX ABDOMEN 1 VIEW: CPT

## 2023-12-18 PROCEDURE — 85027 COMPLETE CBC AUTOMATED: CPT

## 2023-12-18 PROCEDURE — 82310 ASSAY OF CALCIUM: CPT

## 2023-12-18 PROCEDURE — G1004: CPT

## 2023-12-18 PROCEDURE — 36415 COLL VENOUS BLD VENIPUNCTURE: CPT

## 2023-12-18 PROCEDURE — 81001 URINALYSIS AUTO W/SCOPE: CPT

## 2023-12-18 PROCEDURE — 80048 BASIC METABOLIC PNL TOTAL CA: CPT

## 2023-12-18 PROCEDURE — 84439 ASSAY OF FREE THYROXINE: CPT

## 2023-12-18 PROCEDURE — 80053 COMPREHEN METABOLIC PANEL: CPT

## 2023-12-18 RX ADMIN — ESCITALOPRAM OXALATE 30 MILLIGRAM(S): 10 TABLET, FILM COATED ORAL at 13:43

## 2023-12-18 RX ADMIN — HEPARIN SODIUM 5000 UNIT(S): 5000 INJECTION INTRAVENOUS; SUBCUTANEOUS at 06:07

## 2023-12-18 RX ADMIN — AMLODIPINE BESYLATE 5 MILLIGRAM(S): 2.5 TABLET ORAL at 06:08

## 2023-12-18 RX ADMIN — OLANZAPINE 5 MILLIGRAM(S): 15 TABLET, FILM COATED ORAL at 13:42

## 2023-12-18 RX ADMIN — Medication 2 TABLET(S): at 06:08

## 2023-12-18 RX ADMIN — Medication 100 MILLIGRAM(S): at 13:41

## 2023-12-18 RX ADMIN — Medication 650 MILLIGRAM(S): at 06:08

## 2023-12-18 RX ADMIN — Medication 2 TABLET(S): at 13:41

## 2023-12-18 RX ADMIN — Medication 500 MILLIGRAM(S): at 13:42

## 2023-12-18 RX ADMIN — Medication 1 TABLET(S): at 13:42

## 2023-12-18 RX ADMIN — HALOPERIDOL DECANOATE 5 MILLIGRAM(S): 100 INJECTION INTRAMUSCULAR at 06:08

## 2023-12-18 RX ADMIN — Medication 112 MICROGRAM(S): at 06:08

## 2023-12-18 RX ADMIN — PANTOPRAZOLE SODIUM 40 MILLIGRAM(S): 20 TABLET, DELAYED RELEASE ORAL at 06:08

## 2023-12-18 RX ADMIN — CALCITRIOL 0.25 MICROGRAM(S): 0.5 CAPSULE ORAL at 13:42

## 2023-12-18 NOTE — PROGRESS NOTE ADULT - SUBJECTIVE AND OBJECTIVE BOX
ANDREAS LYONS    [id number]    73y      Female    CC: sbo    INTERVAL HPI/OVERNIGHT EVENTS: pt seen and examined. no acute events reported o/n     REVIEW OF SYSTEMS:    CONSTITUTIONAL: No weight loss  RESPIRATORY: No cough, wheezing, hemoptysis; No shortness of breath  CARDIOVASCULAR: No chest pain, palpitations  GASTROINTESTINAL: No abdominal or epigastric pain. No nausea, vomiting    Vital Signs Last 24 Hrs  T(C): 36.5 (18 Dec 2023 04:27), Max: 37.3 (17 Dec 2023 22:15)  T(F): 97.7 (18 Dec 2023 04:27), Max: 99.2 (17 Dec 2023 22:15)  HR: 63 (18 Dec 2023 04:27) (63 - 82)  BP: 140/58 (18 Dec 2023 04:27) (121/70 - 147/72)  BP(mean): --  RR: 18 (18 Dec 2023 04:27) (18 - 18)  SpO2: 92% (18 Dec 2023 04:27) (91% - 94%)    Parameters below as of 18 Dec 2023 04:27  Patient On (Oxygen Delivery Method): room air        PHYSICAL EXAM:    GENERAL: NAD  CHEST/LUNG: respirations unlabored on RA   HEART: S1S2+, Regular rate and rhythm  ABDOMEN: Soft, Nontender, Nondistended  SKIN: warm, dry  NEURO: Awake, alert    LABS:    12-17    144  |  109<H>  |  23.5<H>  ----------------------------<  90  4.1   |  23.0  |  1.60<H>    Ca    8.7      17 Dec 2023 04:33    TPro  6.2<L>  /  Alb  3.7  /  TBili  0.3<L>  /  DBili  x   /  AST  18  /  ALT  20  /  AlkPhos  104  12-17      Urinalysis Basic - ( 17 Dec 2023 04:33 )    Color: x / Appearance: x / SG: x / pH: x  Gluc: 90 mg/dL / Ketone: x  / Bili: x / Urobili: x   Blood: x / Protein: x / Nitrite: x   Leuk Esterase: x / RBC: x / WBC x   Sq Epi: x / Non Sq Epi: x / Bacteria: x          MEDICATIONS  (STANDING):  amLODIPine   Tablet 5 milliGRAM(s) Oral daily  ascorbic acid 500 milliGRAM(s) Oral daily  atorvastatin 10 milliGRAM(s) Oral at bedtime  calcitriol   Capsule 0.25 MICROGram(s) Oral daily  calcium carbonate    500 mG (Tums) Chewable 2 Tablet(s) Chew three times a day  escitalopram 30 milliGRAM(s) Oral daily  haloperidol     Tablet 5 milliGRAM(s) Oral <User Schedule>  heparin   Injectable 5000 Unit(s) SubCutaneous every 8 hours  levothyroxine 112 MICROGram(s) Oral daily  multivitamin 1 Tablet(s) Oral daily  OLANZapine 5 milliGRAM(s) Oral daily  pantoprazole    Tablet 40 milliGRAM(s) Oral before breakfast  polyethylene glycol 3350 17 Gram(s) Oral at bedtime  sodium bicarbonate 650 milliGRAM(s) Oral two times a day  thiamine 100 milliGRAM(s) Oral daily    MEDICATIONS  (PRN):  acetaminophen     Tablet .. 650 milliGRAM(s) Oral every 6 hours PRN Temp greater or equal to 38C (100.4F), Mild Pain (1 - 3)  ondansetron Injectable 4 milliGRAM(s) IV Push every 6 hours PRN Nausea      RADIOLOGY & ADDITIONAL TESTS:   ANDREAS LYONS    [id number]    73y      Female    CC: sbo    INTERVAL HPI/OVERNIGHT EVENTS: pt seen and examined. no acute events reported o/n     REVIEW OF SYSTEMS:    CONSTITUTIONAL: No weight loss  RESPIRATORY: No cough, wheezing, hemoptysis; No shortness of breath  CARDIOVASCULAR: No chest pain, palpitations  GASTROINTESTINAL: No abdominal or epigastric pain. No nausea, vomiting    Vital Signs Last 24 Hrs  T(C): 36.5 (18 Dec 2023 04:27), Max: 37.3 (17 Dec 2023 22:15)  T(F): 97.7 (18 Dec 2023 04:27), Max: 99.2 (17 Dec 2023 22:15)  HR: 63 (18 Dec 2023 04:27) (63 - 82)  BP: 140/58 (18 Dec 2023 04:27) (121/70 - 147/72)  BP(mean): --  RR: 18 (18 Dec 2023 04:27) (18 - 18)  SpO2: 92% (18 Dec 2023 04:27) (91% - 94%)    Parameters below as of 18 Dec 2023 04:27  Patient On (Oxygen Delivery Method): room air        PHYSICAL EXAM:    GENERAL: NAD  CHEST/LUNG: respirations unlabored on RA   HEART: S1S2+, Regular rate and rhythm  ABDOMEN: Soft, Nontender, Nondistended  SKIN: warm, dry  NEURO: Awake, alert    LABS:    12-17    144  |  109<H>  |  23.5<H>  ----------------------------<  90  4.1   |  23.0  |  1.60<H>    Ca    8.7      17 Dec 2023 04:33    TPro  6.2<L>  /  Alb  3.7  /  TBili  0.3<L>  /  DBili  x   /  AST  18  /  ALT  20  /  AlkPhos  104  12-17      Urinalysis Basic - ( 17 Dec 2023 04:33 )    Color: x / Appearance: x / SG: x / pH: x  Gluc: 90 mg/dL / Ketone: x  / Bili: x / Urobili: x   Blood: x / Protein: x / Nitrite: x   Leuk Esterase: x / RBC: x / WBC x   Sq Epi: x / Non Sq Epi: x / Bacteria: x          MEDICATIONS  (STANDING):  amLODIPine   Tablet 5 milliGRAM(s) Oral daily  ascorbic acid 500 milliGRAM(s) Oral daily  atorvastatin 10 milliGRAM(s) Oral at bedtime  calcitriol   Capsule 0.25 MICROGram(s) Oral daily  calcium carbonate    500 mG (Tums) Chewable 2 Tablet(s) Chew three times a day  escitalopram 30 milliGRAM(s) Oral daily  haloperidol     Tablet 5 milliGRAM(s) Oral <User Schedule>  heparin   Injectable 5000 Unit(s) SubCutaneous every 8 hours  levothyroxine 112 MICROGram(s) Oral daily  multivitamin 1 Tablet(s) Oral daily  OLANZapine 5 milliGRAM(s) Oral daily  pantoprazole    Tablet 40 milliGRAM(s) Oral before breakfast  polyethylene glycol 3350 17 Gram(s) Oral at bedtime  sodium bicarbonate 650 milliGRAM(s) Oral two times a day  thiamine 100 milliGRAM(s) Oral daily    MEDICATIONS  (PRN):  acetaminophen     Tablet .. 650 milliGRAM(s) Oral every 6 hours PRN Temp greater or equal to 38C (100.4F), Mild Pain (1 - 3)  ondansetron Injectable 4 milliGRAM(s) IV Push every 6 hours PRN Nausea      RADIOLOGY & ADDITIONAL TESTS:

## 2023-12-18 NOTE — PROGRESS NOTE ADULT - ASSESSMENT
73y/oF PMH schizophrenia, hypothyroidism, HTN, nephrogenic DI, prior SBO, sent to ER from [place] for c/o abd pain. In ER, CT abd and pelvis consistent with high grade SBO. Pt admitted to ACS, refused NGT placement. Pt made NPO and started on IVF. Bcx neg x2. course complicated by MONICA on CKD 3 with metabolic acidosis and hypernatremia. SBO now resolved, tolerating diet. Transferred to medicine service for further electrolyte management. course complicated by pt refusing IV and IV meds, intermittently refusing PO meds. labs improving     Hypocalcemia , improved  -pt refusing IV placement ; intermittently refusing PO meds   -cont PO calcium carbonate   -nephro recs appreciated   -cont calcitriol   -cont to encourage intake   -d/w Dr. Barfield @ [place] (12/15), wants Ca 7.5 prior to dc     Hypophosphatemia , improved   -cont PO supplement     Hypernatremia , improved  Hx nephrogenic DI   -nephro following   -Na improved     MONICA on CKD3  Metabolic acidosis   -no obstruction on imaging   -cont po sodium bicarb, adjusted 12/17    High grade SBO, resolved   -pt refused NGT  -tolerating diet   -cont to monitor bowel function   -abx dc'ed     HTN   -cont amlodipine   -cont to monitor bp     Schizophrenia   Depression   -cont home regimen     HLD   -cont statin     Hypothyroid   -cont levothyroxine     Pancytopenia   -seen by hematology at [facility] 10/2/23 with pancytopenia  -f/u outpt     vte ppx: heparin sq     dispo: return to [place] next 24hrs, accepted by Dr. Barfield  73y/oF PMH schizophrenia, hypothyroidism, HTN, nephrogenic DI, prior SBO, sent to ER from [place] for c/o abd pain. In ER, CT abd and pelvis consistent with high grade SBO. Pt admitted to ACS, refused NGT placement. Pt made NPO and started on IVF. Bcx neg x2. course complicated by MONICA on CKD 3 with metabolic acidosis and hypernatremia. SBO now resolved, tolerating diet. Transferred to medicine service for further electrolyte management. course complicated by pt refusing IV and IV meds, intermittently refusing PO meds. labs improving     Hypocalcemia , improved  -pt refusing IV placement ; intermittently refusing PO meds   -cont PO calcium carbonate   -nephro recs appreciated   -cont calcitriol   -cont to encourage intake   -d/w Dr. Barfield @ [place] (12/15), wants Ca 7.5 prior to dc     Hypophosphatemia , improved   -cont PO supplement     Hypernatremia , improved  Hx nephrogenic DI   -nephro following   -Na improved     MONICA on CKD3  Metabolic acidosis   -no obstruction on imaging   -cont po sodium bicarb, adjusted 12/17    High grade SBO, resolved   -pt refused NGT  -tolerating diet   -cont to monitor bowel function   -abx dc'ed     HTN   -cont amlodipine   -cont to monitor bp     Schizophrenia   Depression   -cont home regimen     HLD   -cont statin     Hypothyroid   -cont levothyroxine     Pancytopenia   -seen by hematology at [facility] 10/2/23 with pancytopenia  -f/u outpt     vte ppx: heparin sq     dispo: return to [place] next 24hrs, accepted by Dr. Barfield

## 2023-12-18 NOTE — DISCHARGE NOTE NURSING/CASE MANAGEMENT/SOCIAL WORK - NSDCVIVACCINE_GEN_ALL_CORE_FT
Tdap; 17-Sep-2018 10:04; Melina Montoya (JOSETTE); [employer]; [id number] (Exp. Date: 11-Jun-2020); IntraMuscular; Deltoid Right.; 0.5 milliLiter(s); VIS (VIS Published: 09-May-2013, VIS Presented: 17-Sep-2018);    Tdap; 17-Sep-2018 10:04; Melina Montoya (JOSETTE); [employer]; [id number] (Exp. Date: 11-Jun-2020); IntraMuscular; Deltoid Right.; 0.5 milliLiter(s); VIS (VIS Published: 09-May-2013, VIS Presented: 17-Sep-2018);

## 2023-12-18 NOTE — PROGRESS NOTE ADULT - REASON FOR ADMISSION
small bowel obstruction

## 2023-12-18 NOTE — PROGRESS NOTE ADULT - ASSESSMENT
schizophrenia chronic condition  anticipate return to [place] soon  nothing new to add schizophrenia chronic condition  anticipate return to [place] soon  nothing new to add

## 2023-12-18 NOTE — DISCHARGE NOTE NURSING/CASE MANAGEMENT/SOCIAL WORK - NSDCPEFALRISK_GEN_ALL_CORE
For information on Fall & Injury Prevention, visit: [email or web address] OR  [email or web address] OR  https://www.cdc.gov/steadi/patient.html For information on Fall & Injury Prevention, visit: [email or web address] OR  [email or web address] OR  https://www.cdc.gov/steadi/patient.html

## 2023-12-18 NOTE — PROGRESS NOTE ADULT - PROVIDER SPECIALTY LIST ADULT
Hospitalist
Nephrology
Psychiatry
Surgery
Surgery
Hospitalist
Nephrology
Nephrology
Surgery
Surgery
Internal Medicine
Nephrology
Psychiatry
Hospitalist
Hospitalist
Surgery
Hospitalist
Psychiatry
Psychiatry
Hospitalist
Internal Medicine
Nephrology
Surgery
Hospitalist

## 2023-12-18 NOTE — DISCHARGE NOTE NURSING/CASE MANAGEMENT/SOCIAL WORK - PATIENT PORTAL LINK FT
You can access the FollowMyHealth Patient Portal offered by [facility] by registering at the following website: [email or web address]. By joining [employer]’s FollowMyHealth portal, you will also be able to view your health information using other applications (apps) compatible with our system. You can access the FollowMyHealth Patient Portal offered by [facility] by registering at the following website: [email or web address]. By joining [employer]’s FollowMyHealth portal, you will also be able to view your health information using other applications (apps) compatible with our system.

## 2023-12-18 NOTE — PROGRESS NOTE ADULT - SUBJECTIVE AND OBJECTIVE BOX
hospitalist notes appreciated  Plan for return to [place] once calcium level stabilized  "I was moved - Can I go home or back to [place]"  Tearful labile affect illogical speech Has no specific complaints   observed eating fruit cup  RN Peter reports erratic medication compliance and frequent verbal outbursts  Vital Signs Last 24 Hrs  T(C): 36.5 (18 Dec 2023 04:27), Max: 37.3 (17 Dec 2023 22:15)  T(F): 97.7 (18 Dec 2023 04:27), Max: 99.2 (17 Dec 2023 22:15)  HR: 63 (18 Dec 2023 04:27) (63 - 82)  BP: 140/58 (18 Dec 2023 04:27) (121/70 - 147/72)  BP(mean): --  RR: 18 (18 Dec 2023 04:27) (18 - 18)  SpO2: 92% (18 Dec 2023 04:27) (91% - 95%)    Parameters below as of 18 Dec 2023 04:27  Patient On (Oxygen Delivery Method): room air    12-17    144  |  109<H>  |  23.5<H>  ----------------------------<  90  4.1   |  23.0  |  1.60<H>    Ca    8.7      17 Dec 2023 04:33    TPro  6.2<L>  /  Alb  3.7  /  TBili  0.3<L>  /  DBili  x   /  AST  18  /  ALT  20  /  AlkPhos  104  12-17    LIVER FUNCTIONS - ( 17 Dec 2023 04:33 )  Alb: 3.7 g/dL / Pro: 6.2 g/dL / ALK PHOS: 104 U/L / ALT: 20 U/L / AST: 18 U/L / GGT: x             Urinalysis Basic - ( 17 Dec 2023 04:33 )    Color: x / Appearance: x / SG: x / pH: x  Gluc: 90 mg/dL / Ketone: x  / Bili: x / Urobili: x   Blood: x / Protein: x / Nitrite: x   Leuk Esterase: x / RBC: x / WBC x   Sq Epi: x / Non Sq Epi: x / Bacteria: x    MEDICATIONS  (STANDING):  amLODIPine   Tablet 5 milliGRAM(s) Oral daily  ascorbic acid 500 milliGRAM(s) Oral daily  atorvastatin 10 milliGRAM(s) Oral at bedtime  calcitriol   Capsule 0.25 MICROGram(s) Oral daily  calcium carbonate    500 mG (Tums) Chewable 2 Tablet(s) Chew three times a day  escitalopram 30 milliGRAM(s) Oral daily  haloperidol     Tablet 5 milliGRAM(s) Oral <User Schedule>  heparin   Injectable 5000 Unit(s) SubCutaneous every 8 hours  levothyroxine 112 MICROGram(s) Oral daily  multivitamin 1 Tablet(s) Oral daily  OLANZapine 5 milliGRAM(s) Oral daily  pantoprazole    Tablet 40 milliGRAM(s) Oral before breakfast  polyethylene glycol 3350 17 Gram(s) Oral at bedtime  sodium bicarbonate 650 milliGRAM(s) Oral two times a day  thiamine 100 milliGRAM(s) Oral daily    MEDICATIONS  (PRN):  acetaminophen     Tablet .. 650 milliGRAM(s) Oral every 6 hours PRN Temp greater or equal to 38C (100.4F), Mild Pain (1 - 3)  ondansetron Injectable 4 milliGRAM(s) IV Push every 6 hours PRN Nausea       hospitalist notes appreciated  Plan for return to [place] once calcium level stabilized  "I was moved - Can I go home or back to [place]"  Tearful labile affect illogical speech Has no specific complaints   observed eating fruit cup  RN Peter reports erratic medication compliance and frequent verbal outbursts  Vital Signs Last 24 Hrs  T(C): 36.5 (18 Dec 2023 04:27), Max: 37.3 (17 Dec 2023 22:15)  T(F): 97.7 (18 Dec 2023 04:27), Max: 99.2 (17 Dec 2023 22:15)  HR: 63 (18 Dec 2023 04:27) (63 - 82)  BP: 140/58 (18 Dec 2023 04:27) (121/70 - 147/72)  BP(mean): --  RR: 18 (18 Dec 2023 04:27) (18 - 18)  SpO2: 92% (18 Dec 2023 04:27) (91% - 95%)    Parameters below as of 18 Dec 2023 04:27  Patient On (Oxygen Delivery Method): room air    12-17    144  |  109<H>  |  23.5<H>  ----------------------------<  90  4.1   |  23.0  |  1.60<H>    Ca    8.7      17 Dec 2023 04:33    TPro  6.2<L>  /  Alb  3.7  /  TBili  0.3<L>  /  DBili  x   /  AST  18  /  ALT  20  /  AlkPhos  104  12-17    LIVER FUNCTIONS - ( 17 Dec 2023 04:33 )  Alb: 3.7 g/dL / Pro: 6.2 g/dL / ALK PHOS: 104 U/L / ALT: 20 U/L / AST: 18 U/L / GGT: x             Urinalysis Basic - ( 17 Dec 2023 04:33 )    Color: x / Appearance: x / SG: x / pH: x  Gluc: 90 mg/dL / Ketone: x  / Bili: x / Urobili: x   Blood: x / Protein: x / Nitrite: x   Leuk Esterase: x / RBC: x / WBC x   Sq Epi: x / Non Sq Epi: x / Bacteria: x    MEDICATIONS  (STANDING):  amLODIPine   Tablet 5 milliGRAM(s) Oral daily  ascorbic acid 500 milliGRAM(s) Oral daily  atorvastatin 10 milliGRAM(s) Oral at bedtime  calcitriol   Capsule 0.25 MICROGram(s) Oral daily  calcium carbonate    500 mG (Tums) Chewable 2 Tablet(s) Chew three times a day  escitalopram 30 milliGRAM(s) Oral daily  haloperidol     Tablet 5 milliGRAM(s) Oral <User Schedule>  heparin   Injectable 5000 Unit(s) SubCutaneous every 8 hours  levothyroxine 112 MICROGram(s) Oral daily  multivitamin 1 Tablet(s) Oral daily  OLANZapine 5 milliGRAM(s) Oral daily  pantoprazole    Tablet 40 milliGRAM(s) Oral before breakfast  polyethylene glycol 3350 17 Gram(s) Oral at bedtime  sodium bicarbonate 650 milliGRAM(s) Oral two times a day  thiamine 100 milliGRAM(s) Oral daily    MEDICATIONS  (PRN):  acetaminophen     Tablet .. 650 milliGRAM(s) Oral every 6 hours PRN Temp greater or equal to 38C (100.4F), Mild Pain (1 - 3)  ondansetron Injectable 4 milliGRAM(s) IV Push every 6 hours PRN Nausea

## 2023-12-18 NOTE — PROGRESS NOTE ADULT - NUTRITIONAL ASSESSMENT
NA
This patient has been assessed with a concern for Malnutrition and has been determined to have a diagnosis/diagnoses of Severe protein-calorie malnutrition.    This patient is being managed with:   Diet Clear Liquid-  Entered: Dec  5 2023  3:47PM  
This patient has been assessed with a concern for Malnutrition and has been determined to have a diagnosis/diagnoses of Severe protein-calorie malnutrition.    This patient is being managed with:   Diet Clear Liquid-  Entered: Dec  5 2023  3:47PM  
This patient has been assessed with a concern for Malnutrition and has been determined to have a diagnosis/diagnoses of Severe protein-calorie malnutrition.    This patient is being managed with:   Diet Soft and Bite Sized-  Entered: Dec  7 2023  8:59AM  
This patient has been assessed with a concern for Malnutrition and has been determined to have a diagnosis/diagnoses of Severe protein-calorie malnutrition.    This patient is being managed with:   Diet Soft and Bite Sized-  Supplement Feeding Modality:  Oral  Ensure Enlive Cans or Servings Per Day:  1       Frequency:  Three Times a day  Entered: Dec  8 2023  1:33PM  
This patient has been assessed with a concern for Malnutrition and has been determined to have a diagnosis/diagnoses of Severe protein-calorie malnutrition.    This patient is being managed with:   Diet Soft and Bite Sized-  Supplement Feeding Modality:  Oral  Ensure Plus High Protein Cans or Servings Per Day:  1       Frequency:  Three Times a day  Entered: Dec 12 2023 12:11PM  
NA
This patient has been assessed with a concern for Malnutrition and has been determined to have a diagnosis/diagnoses of Severe protein-calorie malnutrition.    This patient is being managed with:   Diet NPO-  Except Medications  With Ice Chips/Sips of Water     Special Instructions for Nursing:  Except Medications  Entered: Dec  5 2023 11:20AM  
This patient has been assessed with a concern for Malnutrition and has been determined to have a diagnosis/diagnoses of Severe protein-calorie malnutrition.    This patient is being managed with:   Diet Soft and Bite Sized-  Supplement Feeding Modality:  Oral  Ensure Plus High Protein Cans or Servings Per Day:  1       Frequency:  Three Times a day  Entered: Dec 12 2023 12:11PM  
This patient has been assessed with a concern for Malnutrition and has been determined to have a diagnosis/diagnoses of Severe protein-calorie malnutrition.    This patient is being managed with:   Diet Soft and Bite Sized-  Entered: Dec  7 2023  8:59AM  
This patient has been assessed with a concern for Malnutrition and has been determined to have a diagnosis/diagnoses of Severe protein-calorie malnutrition.    This patient is being managed with:   Diet Soft and Bite Sized-  Supplement Feeding Modality:  Oral  Ensure Plus High Protein Cans or Servings Per Day:  1       Frequency:  Three Times a day  Entered: Dec 12 2023 12:11PM  
This patient has been assessed with a concern for Malnutrition and has been determined to have a diagnosis/diagnoses of Severe protein-calorie malnutrition.    This patient is being managed with:   Diet Soft and Bite Sized-  Supplement Feeding Modality:  Oral  Ensure Enlive Cans or Servings Per Day:  1       Frequency:  Three Times a day  Entered: Dec  8 2023  1:33PM  
This patient has been assessed with a concern for Malnutrition and has been determined to have a diagnosis/diagnoses of Severe protein-calorie malnutrition.    This patient is being managed with:   Diet Soft and Bite Sized-  Supplement Feeding Modality:  Oral  Ensure Plus High Protein Cans or Servings Per Day:  1       Frequency:  Three Times a day  Entered: Dec 12 2023 12:11PM  
This patient has been assessed with a concern for Malnutrition and has been determined to have a diagnosis/diagnoses of Severe protein-calorie malnutrition.    This patient is being managed with:   Diet Soft and Bite Sized-  Entered: Dec  7 2023  8:59AM  
This patient has been assessed with a concern for Malnutrition and has been determined to have a diagnosis/diagnoses of Severe protein-calorie malnutrition.    This patient is being managed with:   Diet Soft and Bite Sized-  Supplement Feeding Modality:  Oral  Ensure Plus High Protein Cans or Servings Per Day:  1       Frequency:  Three Times a day  Entered: Dec 12 2023 12:11PM  
NA
This patient has been assessed with a concern for Malnutrition and has been determined to have a diagnosis/diagnoses of Severe protein-calorie malnutrition.    This patient is being managed with:   Diet Clear Liquid-  Entered: Dec  5 2023  3:47PM  
This patient has been assessed with a concern for Malnutrition and has been determined to have a diagnosis/diagnoses of Severe protein-calorie malnutrition.    This patient is being managed with:   Diet Clear Liquid-  Entered: Dec  5 2023  3:47PM  
This patient has been assessed with a concern for Malnutrition and has been determined to have a diagnosis/diagnoses of Severe protein-calorie malnutrition.    This patient is being managed with:   Diet Soft and Bite Sized-  Supplement Feeding Modality:  Oral  Ensure Enlive Cans or Servings Per Day:  1       Frequency:  Three Times a day  Entered: Dec  8 2023  1:33PM  
This patient has been assessed with a concern for Malnutrition and has been determined to have a diagnosis/diagnoses of Severe protein-calorie malnutrition.    This patient is being managed with:   Diet Clear Liquid-  Entered: Dec  5 2023  3:47PM

## 2023-12-26 ENCOUNTER — OUTPATIENT (OUTPATIENT)
Dept: OUTPATIENT SERVICES | Facility: HOSPITAL | Age: 73
LOS: 1 days | End: 2023-12-26
Payer: COMMERCIAL

## 2023-12-26 DIAGNOSIS — Z93.2 ILEOSTOMY STATUS: Chronic | ICD-10-CM

## 2023-12-26 DIAGNOSIS — K43.5 PARASTOMAL HERNIA WITHOUT OBSTRUCTION OR GANGRENE: Chronic | ICD-10-CM

## 2023-12-26 PROCEDURE — 0225U NFCT DS DNA&RNA 21 SARSCOV2: CPT

## 2024-01-23 NOTE — PROGRESS NOTE BEHAVIORAL HEALTH - NS ED [facility] MED ROS NEUROLOGICAL
Interval History: ***    ROS  Medications:  Continuous Infusions:  Scheduled Meds:   aspirin  81 mg Oral Daily    cefUROXime (ZINACEF) IVPB  1.5 g Intravenous Once     PRN Meds:acetaminophen, cefUROXime (ZINACEF) IVPB, hydrALAZINE, HYDROcodone-acetaminophen, morphine, ondansetron     Objective:     Vital Signs (Most Recent):  Temp: 98.4 °F (36.9 °C) (01/23/24 0726)  Pulse: 75 (01/23/24 0726)  Resp: 18 (01/23/24 0726)  BP: 134/69 (01/23/24 0726)  SpO2: (!) 92 % (01/23/24 0726) Vital Signs (24h Range):  Temp:  [96.8 °F (36 °C)-98.4 °F (36.9 °C)] 98.4 °F (36.9 °C)  Pulse:  [68-85] 75  Resp:  [12-20] 18  SpO2:  [89 %-97 %] 92 %  BP: ()/(57-78) 134/69     Weight: 77 kg (169 lb 12.1 oz)  Body mass index is 28.25 kg/m².    SpO2: (!) 92 %       Intake/Output - Last 3 Shifts         01/21 0700 01/22 0659 01/22 0700 01/23 0659 01/23 0700 01/24 0659    IV Piggyback  2050     Total Intake(mL/kg)  2050 (26.6)     Urine (mL/kg/hr)  400     Total Output  400     Net  +1650            Urine Occurrence  2 x             Lines/Drains/Airways       Peripheral Intravenous Line  Duration                  Peripheral IV - Single Lumen 01/22/24 1020 20 G Distal;Left;Posterior Forearm <1 day         Peripheral IV - Single Lumen 01/22/24 1025 Left;Posterior;Proximal Forearm <1 day                     Physical Exam       Significant Labs:  {Results:65534}    Significant Diagnostics:  {Imaging Review:87417}  
No complaints

## 2024-02-12 ENCOUNTER — INPATIENT (INPATIENT)
Facility: HOSPITAL | Age: 74
LOS: 3 days | Discharge: PSYCHIATRIC OR SUBSTANCE USE FACILITY | DRG: 683 | End: 2024-02-16
Attending: STUDENT IN AN ORGANIZED HEALTH CARE EDUCATION/TRAINING PROGRAM | Admitting: STUDENT IN AN ORGANIZED HEALTH CARE EDUCATION/TRAINING PROGRAM
Payer: MEDICARE

## 2024-02-12 VITALS
OXYGEN SATURATION: 98 % | RESPIRATION RATE: 18 BRPM | DIASTOLIC BLOOD PRESSURE: 94 MMHG | TEMPERATURE: 98 F | SYSTOLIC BLOOD PRESSURE: 140 MMHG | HEART RATE: 69 BPM

## 2024-02-12 DIAGNOSIS — Z93.2 ILEOSTOMY STATUS: Chronic | ICD-10-CM

## 2024-02-12 DIAGNOSIS — K43.5 PARASTOMAL HERNIA WITHOUT OBSTRUCTION OR GANGRENE: Chronic | ICD-10-CM

## 2024-02-12 LAB
ALBUMIN SERPL ELPH-MCNC: 3.6 G/DL — SIGNIFICANT CHANGE UP (ref 3.3–5.2)
ALP SERPL-CCNC: 59 U/L — SIGNIFICANT CHANGE UP (ref 40–120)
ALT FLD-CCNC: 26 U/L — SIGNIFICANT CHANGE UP
ANION GAP SERPL CALC-SCNC: 12 MMOL/L — SIGNIFICANT CHANGE UP (ref 5–17)
AST SERPL-CCNC: 30 U/L — SIGNIFICANT CHANGE UP
BASOPHILS # BLD AUTO: 0 K/UL — SIGNIFICANT CHANGE UP (ref 0–0.2)
BASOPHILS NFR BLD AUTO: 0 % — SIGNIFICANT CHANGE UP (ref 0–2)
BILIRUB SERPL-MCNC: 0.3 MG/DL — LOW (ref 0.4–2)
BUN SERPL-MCNC: 40.4 MG/DL — HIGH (ref 8–20)
CALCIUM SERPL-MCNC: 8.7 MG/DL — SIGNIFICANT CHANGE UP (ref 8.4–10.5)
CHLORIDE SERPL-SCNC: 103 MMOL/L — SIGNIFICANT CHANGE UP (ref 96–108)
CO2 SERPL-SCNC: 26 MMOL/L — SIGNIFICANT CHANGE UP (ref 22–29)
CREAT SERPL-MCNC: 2.23 MG/DL — HIGH (ref 0.5–1.3)
EGFR: 23 ML/MIN/1.73M2 — LOW
EOSINOPHIL # BLD AUTO: 0.04 K/UL — SIGNIFICANT CHANGE UP (ref 0–0.5)
EOSINOPHIL NFR BLD AUTO: 1.7 % — SIGNIFICANT CHANGE UP (ref 0–6)
GIANT PLATELETS BLD QL SMEAR: PRESENT — SIGNIFICANT CHANGE UP
GLUCOSE SERPL-MCNC: 123 MG/DL — HIGH (ref 70–99)
HCT VFR BLD CALC: 27.6 % — LOW (ref 34.5–45)
HGB BLD-MCNC: 8.9 G/DL — LOW (ref 11.5–15.5)
LYMPHOCYTES # BLD AUTO: 1.18 K/UL — SIGNIFICANT CHANGE UP (ref 1–3.3)
LYMPHOCYTES # BLD AUTO: 49.1 % — HIGH (ref 13–44)
MANUAL SMEAR VERIFICATION: SIGNIFICANT CHANGE UP
MCHC RBC-ENTMCNC: 31.6 PG — SIGNIFICANT CHANGE UP (ref 27–34)
MCHC RBC-ENTMCNC: 32.2 GM/DL — SIGNIFICANT CHANGE UP (ref 32–36)
MCV RBC AUTO: 97.9 FL — SIGNIFICANT CHANGE UP (ref 80–100)
MONOCYTES # BLD AUTO: 0.02 K/UL — SIGNIFICANT CHANGE UP (ref 0–0.9)
MONOCYTES NFR BLD AUTO: 0.9 % — LOW (ref 2–14)
NEUTROPHILS # BLD AUTO: 1.14 K/UL — LOW (ref 1.8–7.4)
NEUTROPHILS NFR BLD AUTO: 46.5 % — SIGNIFICANT CHANGE UP (ref 43–77)
NEUTS BAND # BLD: 0.9 % — SIGNIFICANT CHANGE UP (ref 0–8)
PLAT MORPH BLD: NORMAL — SIGNIFICANT CHANGE UP
PLATELET # BLD AUTO: 108 K/UL — LOW (ref 150–400)
POIKILOCYTOSIS BLD QL AUTO: SLIGHT — SIGNIFICANT CHANGE UP
POLYCHROMASIA BLD QL SMEAR: SLIGHT — SIGNIFICANT CHANGE UP
POTASSIUM SERPL-MCNC: 3.6 MMOL/L — SIGNIFICANT CHANGE UP (ref 3.5–5.3)
POTASSIUM SERPL-SCNC: 3.6 MMOL/L — SIGNIFICANT CHANGE UP (ref 3.5–5.3)
PROT SERPL-MCNC: 6.2 G/DL — LOW (ref 6.6–8.7)
RBC # BLD: 2.82 M/UL — LOW (ref 3.8–5.2)
RBC # FLD: 13.5 % — SIGNIFICANT CHANGE UP (ref 10.3–14.5)
RBC BLD AUTO: ABNORMAL
SMUDGE CELLS # BLD: PRESENT — SIGNIFICANT CHANGE UP
SODIUM SERPL-SCNC: 141 MMOL/L — SIGNIFICANT CHANGE UP (ref 135–145)
VARIANT LYMPHS # BLD: 0.9 % — SIGNIFICANT CHANGE UP (ref 0–6)
WBC # BLD: 2.41 K/UL — LOW (ref 3.8–10.5)
WBC # FLD AUTO: 2.41 K/UL — LOW (ref 3.8–10.5)

## 2024-02-12 PROCEDURE — 99285 EMERGENCY DEPT VISIT HI MDM: CPT

## 2024-02-12 RX ORDER — SODIUM CHLORIDE 9 MG/ML
1500 INJECTION, SOLUTION INTRAVENOUS ONCE
Refills: 0 | Status: COMPLETED | OUTPATIENT
Start: 2024-02-12 | End: 2024-02-12

## 2024-02-12 RX ORDER — SODIUM CHLORIDE 9 MG/ML
1000 INJECTION INTRAMUSCULAR; INTRAVENOUS; SUBCUTANEOUS ONCE
Refills: 0 | Status: COMPLETED | OUTPATIENT
Start: 2024-02-12 | End: 2024-02-12

## 2024-02-12 RX ADMIN — SODIUM CHLORIDE 1500 MILLILITER(S): 9 INJECTION, SOLUTION INTRAVENOUS at 22:43

## 2024-02-12 RX ADMIN — SODIUM CHLORIDE 1000 MILLILITER(S): 9 INJECTION INTRAMUSCULAR; INTRAVENOUS; SUBCUTANEOUS at 17:55

## 2024-02-12 NOTE — ED ADULT NURSE NOTE - NSFALLRISKINTERV_ED_ALL_ED
Assistance OOB with selected safe patient handling equipment if applicable/Assistance with ambulation/Communicate fall risk and risk factors to all staff, patient, and family/Monitor gait and stability/Monitor for mental status changes and reorient to person, place, and time, as needed/Provide patient with walking aids/Provide visual cue: yellow wristband, yellow gown, etc/Reinforce activity limits and safety measures with patient and family/Toileting schedule using arm’s reach rule for commode and bathroom/Use of alarms - bed, stretcher, chair and/or video monitoring/Call bell, personal items and telephone in reach/Instruct patient to call for assistance before getting out of bed/chair/stretcher/Non-slip footwear applied when patient is off stretcher/[place] to call system/Physically safe environment - no spills, clutter or unnecessary equipment/Purposeful Proactive Rounding/Room/bathroom lighting operational, light cord in reach

## 2024-02-12 NOTE — ED PROVIDER NOTE - NSCAREINITIATED _GEN_ER
S/W Mikayla at [facility], aware to move forward with prescription for spironolactone   Kristian Hernandez(Attending)

## 2024-02-12 NOTE — ED ADULT NURSE NOTE - OBJECTIVE STATEMENT
Pt awake and confused at baseline, not able to answer questions appropriately during RN interview. Brought in by [facility] for abnormal sodium levels. Airway patent Respirations even and unlabored. Bed locked in lowest position with side rails raised.

## 2024-02-12 NOTE — ED PROVIDER NOTE - CLINICAL SUMMARY MEDICAL DECISION MAKING FREE TEXT BOX
Estinfa: Patient received in sign-out from Dr. Hernandez pending CT abdomen/pelvic and rpt labs after IV hydration. The scan is due to mild lower abdominal distention with some tenderness appreciated on examination in the ED. He stated patient originally sent in due to hypernatremia which was not appreciated. However, patient is noted to have slight increase in creatinine likely due to dehydration. Estinfa: Patient received in sign-out from Dr. Hernandez pending CT abdomen/pelvic and rpt labs after IV hydration. The scan is due to mild lower abdominal distention with some tenderness appreciated on examination in the ED. He stated patient originally sent in due to hypernatremia which was not appreciated. However, patient is noted to have slight increase in creatinine likely due to dehydration.    Labs are as noted with improved renal function but elevated hypernatremia. Patient with possible urinary retention but after encouragement of micturition, bladder scan showing less then 200 cc's.

## 2024-02-12 NOTE — ED PROVIDER NOTE - OBJECTIVE STATEMENT
73-year-old female past medical history of schizophrenia CVA constipation comes to the ED with noted abnormal labs outpatient.  As per outpatient record patient noted with elevated sodium.  Patient presently without complaints at this time.  Patient noted with distended abdomen in the ED.  Patient is accompanied with  and aide who notes patient with a history of abdominal hernia.

## 2024-02-12 NOTE — ED PROVIDER NOTE - CARE PLAN
1 Principal Discharge DX:	Acute on chronic renal failure  Secondary Diagnosis:	Acute hypernatremia

## 2024-02-13 DIAGNOSIS — Z84.0 FAMILY HISTORY OF DISEASES OF THE SKIN AND SUBCUTANEOUS TISSUE: ICD-10-CM

## 2024-02-13 LAB
ANION GAP SERPL CALC-SCNC: 12 MMOL/L — SIGNIFICANT CHANGE UP (ref 5–17)
BUN SERPL-MCNC: 33.9 MG/DL — HIGH (ref 8–20)
CALCIUM SERPL-MCNC: 8.6 MG/DL — SIGNIFICANT CHANGE UP (ref 8.4–10.5)
CHLORIDE SERPL-SCNC: 112 MMOL/L — HIGH (ref 96–108)
CO2 SERPL-SCNC: 26 MMOL/L — SIGNIFICANT CHANGE UP (ref 22–29)
CREAT SERPL-MCNC: 1.84 MG/DL — HIGH (ref 0.5–1.3)
EGFR: 29 ML/MIN/1.73M2 — LOW
GLUCOSE SERPL-MCNC: 84 MG/DL — SIGNIFICANT CHANGE UP (ref 70–99)
POTASSIUM SERPL-MCNC: 3.8 MMOL/L — SIGNIFICANT CHANGE UP (ref 3.5–5.3)
POTASSIUM SERPL-SCNC: 3.8 MMOL/L — SIGNIFICANT CHANGE UP (ref 3.5–5.3)
SODIUM SERPL-SCNC: 150 MMOL/L — HIGH (ref 135–145)

## 2024-02-13 PROCEDURE — 74176 CT ABD & PELVIS W/O CONTRAST: CPT | Mod: 26,MA

## 2024-02-13 PROCEDURE — 99232 SBSQ HOSP IP/OBS MODERATE 35: CPT

## 2024-02-13 RX ORDER — LEVOTHYROXINE SODIUM 125 MCG
112 TABLET ORAL DAILY
Refills: 0 | Status: DISCONTINUED | OUTPATIENT
Start: 2024-02-13 | End: 2024-02-16

## 2024-02-13 RX ORDER — SENNA PLUS 8.6 MG/1
2 TABLET ORAL
Refills: 0 | DISCHARGE

## 2024-02-13 RX ORDER — HYDRALAZINE HCL 50 MG
10 TABLET ORAL ONCE
Refills: 0 | Status: COMPLETED | OUTPATIENT
Start: 2024-02-13 | End: 2024-02-13

## 2024-02-13 RX ORDER — ASCORBIC ACID 60 MG
1 TABLET,CHEWABLE ORAL
Refills: 0 | DISCHARGE

## 2024-02-13 RX ORDER — ESCITALOPRAM OXALATE 10 MG/1
3 TABLET, FILM COATED ORAL
Refills: 0 | DISCHARGE

## 2024-02-13 RX ORDER — DOCUSATE SODIUM 100 MG
1 CAPSULE ORAL
Refills: 0 | DISCHARGE

## 2024-02-13 RX ORDER — ATORVASTATIN CALCIUM 80 MG/1
10 TABLET, FILM COATED ORAL AT BEDTIME
Refills: 0 | Status: DISCONTINUED | OUTPATIENT
Start: 2024-02-13 | End: 2024-02-16

## 2024-02-13 RX ORDER — ASCORBIC ACID 60 MG
500 TABLET,CHEWABLE ORAL DAILY
Refills: 0 | Status: DISCONTINUED | OUTPATIENT
Start: 2024-02-13 | End: 2024-02-16

## 2024-02-13 RX ORDER — ESCITALOPRAM OXALATE 10 MG/1
30 TABLET, FILM COATED ORAL DAILY
Refills: 0 | Status: DISCONTINUED | OUTPATIENT
Start: 2024-02-13 | End: 2024-02-16

## 2024-02-13 RX ORDER — POLYETHYLENE GLYCOL 3350 17 G/17G
17 POWDER, FOR SOLUTION ORAL AT BEDTIME
Refills: 0 | Status: DISCONTINUED | OUTPATIENT
Start: 2024-02-13 | End: 2024-02-16

## 2024-02-13 RX ORDER — CALCITRIOL 0.5 UG/1
0.25 CAPSULE ORAL DAILY
Refills: 0 | Status: DISCONTINUED | OUTPATIENT
Start: 2024-02-13 | End: 2024-02-16

## 2024-02-13 RX ORDER — SIMETHICONE 80 MG/1
80 TABLET, CHEWABLE ORAL EVERY 8 HOURS
Refills: 0 | Status: DISCONTINUED | OUTPATIENT
Start: 2024-02-13 | End: 2024-02-16

## 2024-02-13 RX ORDER — OLANZAPINE 15 MG/1
1 TABLET, FILM COATED ORAL
Refills: 0 | DISCHARGE

## 2024-02-13 RX ORDER — SENNA PLUS 8.6 MG/1
2 TABLET ORAL AT BEDTIME
Refills: 0 | Status: DISCONTINUED | OUTPATIENT
Start: 2024-02-13 | End: 2024-02-16

## 2024-02-13 RX ORDER — HALOPERIDOL DECANOATE 100 MG/ML
1 INJECTION INTRAMUSCULAR
Refills: 0 | DISCHARGE

## 2024-02-13 RX ORDER — ATORVASTATIN CALCIUM 80 MG/1
1 TABLET, FILM COATED ORAL
Refills: 0 | DISCHARGE

## 2024-02-13 RX ORDER — PANTOPRAZOLE SODIUM 20 MG/1
40 TABLET, DELAYED RELEASE ORAL
Refills: 0 | Status: DISCONTINUED | OUTPATIENT
Start: 2024-02-13 | End: 2024-02-16

## 2024-02-13 RX ORDER — CHOLECALCIFEROL (VITAMIN D3) 125 MCG
1 CAPSULE ORAL
Refills: 0 | DISCHARGE

## 2024-02-13 RX ORDER — THIAMINE MONONITRATE (VIT B1) 100 MG
100 TABLET ORAL DAILY
Refills: 0 | Status: DISCONTINUED | OUTPATIENT
Start: 2024-02-13 | End: 2024-02-16

## 2024-02-13 RX ORDER — OLANZAPINE 15 MG/1
15 TABLET, FILM COATED ORAL DAILY
Refills: 0 | Status: DISCONTINUED | OUTPATIENT
Start: 2024-02-13 | End: 2024-02-16

## 2024-02-13 RX ORDER — LEVOTHYROXINE SODIUM 125 MCG
1 TABLET ORAL
Refills: 0 | DISCHARGE

## 2024-02-13 RX ORDER — SIMETHICONE 80 MG/1
1 TABLET, CHEWABLE ORAL
Refills: 0 | DISCHARGE

## 2024-02-13 RX ORDER — SODIUM CHLORIDE 9 MG/ML
1000 INJECTION INTRAMUSCULAR; INTRAVENOUS; SUBCUTANEOUS
Refills: 0 | Status: DISCONTINUED | OUTPATIENT
Start: 2024-02-13 | End: 2024-02-15

## 2024-02-13 RX ORDER — SODIUM BICARBONATE 1 MEQ/ML
650 SYRINGE (ML) INTRAVENOUS
Refills: 0 | Status: DISCONTINUED | OUTPATIENT
Start: 2024-02-13 | End: 2024-02-16

## 2024-02-13 RX ORDER — AMLODIPINE BESYLATE 2.5 MG/1
5 TABLET ORAL DAILY
Refills: 0 | Status: DISCONTINUED | OUTPATIENT
Start: 2024-02-13 | End: 2024-02-16

## 2024-02-13 RX ORDER — PREGABALIN 225 MG/1
1 CAPSULE ORAL
Refills: 0 | DISCHARGE

## 2024-02-13 RX ADMIN — POLYETHYLENE GLYCOL 3350 17 GRAM(S): 17 POWDER, FOR SOLUTION ORAL at 21:20

## 2024-02-13 RX ADMIN — ESCITALOPRAM OXALATE 30 MILLIGRAM(S): 10 TABLET, FILM COATED ORAL at 14:55

## 2024-02-13 RX ADMIN — SENNA PLUS 2 TABLET(S): 8.6 TABLET ORAL at 21:20

## 2024-02-13 RX ADMIN — CALCITRIOL 0.25 MICROGRAM(S): 0.5 CAPSULE ORAL at 14:55

## 2024-02-13 RX ADMIN — Medication 650 MILLIGRAM(S): at 17:54

## 2024-02-13 RX ADMIN — PANTOPRAZOLE SODIUM 40 MILLIGRAM(S): 20 TABLET, DELAYED RELEASE ORAL at 14:56

## 2024-02-13 RX ADMIN — ATORVASTATIN CALCIUM 10 MILLIGRAM(S): 80 TABLET, FILM COATED ORAL at 21:20

## 2024-02-13 RX ADMIN — Medication 1 TABLET(S): at 14:54

## 2024-02-13 RX ADMIN — Medication 100 MILLIGRAM(S): at 14:55

## 2024-02-13 RX ADMIN — AMLODIPINE BESYLATE 5 MILLIGRAM(S): 2.5 TABLET ORAL at 14:55

## 2024-02-13 RX ADMIN — SODIUM CHLORIDE 120 MILLILITER(S): 9 INJECTION INTRAMUSCULAR; INTRAVENOUS; SUBCUTANEOUS at 16:27

## 2024-02-13 RX ADMIN — Medication 500 MILLIGRAM(S): at 14:55

## 2024-02-13 RX ADMIN — OLANZAPINE 15 MILLIGRAM(S): 15 TABLET, FILM COATED ORAL at 16:22

## 2024-02-13 RX ADMIN — Medication 10 MILLIGRAM(S): at 23:45

## 2024-02-13 NOTE — H&P ADULT - ASSESSMENT
73year old female from [place] sent in for abnormal labs - med hx significant for schizophrenia, hypothyroidism, HTN, nephrogenic DI, prior SBO,     MONICA on CKD3 with Hypernatremia   Hx nephrogenic DI   Metabolic acidosis   -nephro called    Schizoaffective disorder +Depression   per aid from [place] baseline is more active alert and interactive  medication list from [place] reviewed   advise continuation of all current medications  1 To 1 not required - per psychiatry continue home meds     HTN   -cont amlodipine   -cont to monitor bp     HLD   -cont statin     Hypothyroid   -cont levothyroxine     Pancytopenia - was present last admission too- hgb slightly worse - will check for occult blood feces    hematology at [facility] 10/2/23 with pancytopenia    vte ppx: heparin sq     dispo: return to [place] 73year old female from [place] sent in for abnormal labs - med hx significant for schizophrenia, hypothyroidism, HTN, nephrogenic DI, prior SBO,     MONICA on CKD3 with Hypernatremia known metabolic acidosis   Hx nephrogenic DI ,   cotn bicarb   Start IVF avoid contrast , trend renal indices   Metabolic acidosis   -nephro called    Schizoaffective disorder +Depression   per aid from [place] baseline is more active alert and interactive  medication list from [place] - olazapine , lexapro- cont per psychiatry, lorazepam change to PRN  advise continuation of all current medications  1 To 1 not required - per psychiatry continue home meds     Pancytopenia - was present last admission too-   hgb and platelets slightly worse -  check for occult blood feces   hematology at [facility] 10/2/23 with pancytopenia    Abdominal hernia- No Obstruction     HTN   -cont amlodipine   -cont to monitor bp     HLD   -cont statin     Hypothyroid   -cont levothyroxine     vte ppx platelets - anemia - no Lovenox - follow stool occult blood     dispo: return to [place] 73year old female from [place] sent in for abnormal labs - med hx significant for schizophrenia, hypothyroidism, HTN, nephrogenic DI, prior SBO,     MONICA on CKD3 with Hypernatremia known metabolic acidosis   Hx nephrogenic DI ,   cont bicarb   Start IVF avoid contrast , trend renal indices   Metabolic acidosis   -nephro called    Schizoaffective disorder +Depression   per aid from [place] baseline is more active alert and interactive  medication list from [place] - Olanzapine , Lexapro cont per psychiatry, lorazepam change to PRN  advise continuation of all current medications  1 To 1 not required - per psychiatry continue home meds     Pancytopenia - was present last admission too-   hgb and platelets slightly worse -  check for occult blood feces   hematology at [facility] 10/2/23 with pancytopenia    Abdominal hernia- No Obstruction     HTN   -cont amlodipine   -cont to monitor bp     HLD   -cont statin     Hypothyroid   -cont levothyroxine     vte ppx platelets - anemia - no Lovenox - follow stool occult blood     dispo: return to [place]

## 2024-02-13 NOTE — H&P ADULT - HISTORY OF PRESENT ILLNESS
Patient drowsy and confused and unable to provide any history - [facility] aid at bedside   speech very difficult to understand - states "i have never had a baby" when asked about he abdominal hernia  73y/oF PMH schizophrenia, hypothyroidism, HTN, nephrogenic DI, prior SBO, sent to ER from [place] for abnormal lab results   Patient drowsy and confused and unable to provide any history - [facility] aid at bedside   speech very difficult to understand - states "i have never had a baby" when asked about he abdominal hernia

## 2024-02-13 NOTE — ED ADULT NURSE REASSESSMENT NOTE - NS ED NURSE REASSESS COMMENT FT1
Assumed care of patient at 2300.  Pt at baseline mental status, [place] aid present at bedside.  Pt resting on stretcher in NAD at this time, IVF infusing as ordered.  Safety maintained with bed locked, in lowest position.
Assumed care of pt from night shift RN. Aox1. Pt resting comfortably in stretcher with [facility] aide bedside. Denies chest pain, SOB, abd pain, back pain, headaches, dizziness, lightheadedness, fevers, chills, nausea, vomitting, diarrhea and constipation. Patient awake and alert, respirations even and unlabored, in no apparent distress.  Plan, abnormal labs, history of present illness, pending labs/tests reviewed.
Patient at baseline mental status, resting on stretcher, in NAD at this time.  Pt noted with 600cc of urine in bladder after voiding on stretcher.  Pt was assisted to bathroom, with post void residual 170cc.  Safety maintained with bed locked, in lowest position.
Report given to CDU JOSETTE Ch. Pt moved to CDU 4 [place].  Patient awake and alert, respirations even and unlabored, in no apparent distress.  Plan, abnormal labs, history of present illness, pending labs/tests explained, opportunity to answer questions provided.
Pt resting comfortable appearing. respirations even and unlabored.

## 2024-02-13 NOTE — CHART NOTE - NSCHARTNOTEFT_GEN_A_CORE
Chart review for patient from [facility] ([facility]). Reviewed PPC packet.    The patient is under the auspices of Office of Mental Health. Patient completed a Health Care Proxy (HCP) Form on 9/4/2014. Patient has named her brother, Tom Toribio ([phone]) as her HCP, and her brother Sukh Toribio ([phone]) as her alternate HCP. If patient is without capacity she has protected her autonomy by naming her brothers as HCPs to make medical decisions for her.     IF end of life decisions need to be made and the patient has capacity, she could complete a MOLST without the need for MHLS ([facility]) review. If she does not have capacity, her brother/HCP could request a MOLST be completed, if indicated. Of note, the patient did not indicate on the health care proxy form that her proxy is aware of her wishes regarding artificial nutrition and hydration (HANNA). Any decisions regarding HANNA would have to be reviewed and approved by \[facility]\"".  Ethics and Palliative Care can assist with this.     Jennifer Pearl MA  Ethics Fellow  [phone] Chart review for patient from [facility] ([facility]). Reviewed PPC packet.    The patient is under the auspices of Office of Mental Health. Patient completed a Health Care Proxy (HCP) Form on 9/4/2014. Patient has named her brother, Tom Toribio ([phone]) as her HCP, and her brother Sukh Toribio ([phone]) as her alternate HCP. If patient is without capacity she has protected her autonomy by naming her brothers as HCPs to make medical decisions for her.     IF end of life decisions need to be made and the patient has capacity, she could complete a MOLST without the need for MHLS ([facility]) review. If she does not have capacity, her brother/HCP could request a MOLST be completed, if indicated. Of note, the patient did not indicate on the health care proxy form that her proxy is aware of her wishes regarding artificial nutrition and hydration (HANNA). Any decisions regarding HANNA would have to be reviewed and approved by [facility].  Ethics and Palliative Care can assist with this.     Jennifer Pearl MA  Ethics Fellow  [phone]    Agree with above. Well known to Ethics Service.  Scarltet Ferreira MD  Ethics Attending

## 2024-02-13 NOTE — H&P ADULT - NSHPPHYSICALEXAM_GEN_ALL_CORE
GENERAL: NAD, lethargic confused   HEAD:  Atraumatic, Normocephalic  EYES:  conjunctiva and sclera clear  ENMT: DRY mucous membranes,   NECK: Supple, No JVD,  NERVOUS SYSTEM:   Moves in bed upper and lower extremities;   CHEST/LUNG: Clear to percussion bilaterally; No rales, rhonchi,   HEART: Regular rate and rhythm;   ABDOMEN: Soft, big abdominal hernia   EXTREMITIES:  2+ Peripheral Pulses, No clubbing, cyanosis, or edema

## 2024-02-13 NOTE — PHARMACOTHERAPY INTERVENTION NOTE - COMMENTS
Outpatient Medication Review updated.    HOME MEDICATIONS:  ascorbic acid 500 mg oral tablet: 1 tab(s) orally 2 times a day (13 Feb 2024 10:59)  atorvastatin 10 mg oral tablet: 1 tab(s) orally once a day (13 Feb 2024 11:00)  calcitriol 0.25 mcg oral capsule: 1 cap(s) orally once a day (13 Feb 2024 11:00)  calcium carbonate 500 mg (200 mg elemental calcium) oral tablet, chewable: 2 tab(s) orally 3 times a day (13 Feb 2024 11:00)  cholecalciferol 50 mcg (2000 intl units) oral capsule: 1 cap(s) orally once a day (13 Feb 2024 11:00)  cyanocobalamin 500 mcg oral tablet: 1 tab(s) orally once a day (13 Feb 2024 11:00)  docusate sodium 100 mg oral capsule: 1 cap(s) orally 3 times a day as needed for  constipation (13 Feb 2024 11:00)  levothyroxine 112 mcg (0.112 mg) oral tablet: 1 tab(s) orally once a day (13 Feb 2024 11:00)  Lexapro 10 mg oral tablet: 3 tab(s) orally once a day (13 Feb 2024 11:00)  LORazepam 0.5 mg oral tablet: Take 2 tablets (1 mG) in the morning and 1 tablet at 20:30 (13 Feb 2024 11:00)  Multiple Vitamins oral tablet: 1 tab(s) orally once a day (13 Feb 2024 11:00)  OLANZapine 15 mg oral tablet: 1 tab(s) orally once a day (13 Feb 2024 10:58)  pantoprazole 40 mg oral delayed release tablet: 1 tab(s) orally once a day (before a meal) (13 Feb 2024 11:00)  polyethylene glycol 3350 oral powder for reconstitution: 17 gram(s) orally once a day (at bedtime) (13 Feb 2024 11:00)  senna (sennosides) 8.6 mg oral tablet: 2 tab(s) orally once a day (at bedtime) as needed for  constipation (13 Feb 2024 11:00)  simethicone 80 mg oral tablet, chewable: 1 tab(s) chewed every 8 hours for gas (13 Feb 2024 11:00)  sodium bicarbonate 650 mg oral tablet: 1 tab(s) orally 2 times a day (13 Feb 2024 11:00)  thiamine 100 mg oral tablet: 1 tab(s) orally once a day (13 Feb 2024 11:00)   Spoke with Kaleb (pharmacist @ [facility]) to obtain medication list    Outpatient Medication Review updated.    HOME MEDICATIONS:  ascorbic acid 500 mg oral tablet: 1 tab(s) orally 2 times a day (13 Feb 2024 10:59)  atorvastatin 10 mg oral tablet: 1 tab(s) orally once a day (13 Feb 2024 11:00)  calcitriol 0.25 mcg oral capsule: 1 cap(s) orally once a day (13 Feb 2024 11:00)  calcium carbonate 500 mg (200 mg elemental calcium) oral tablet, chewable: 2 tab(s) orally 3 times a day (13 Feb 2024 11:00)  cholecalciferol 50 mcg (2000 intl units) oral capsule: 1 cap(s) orally once a day (13 Feb 2024 11:00)  cyanocobalamin 500 mcg oral tablet: 1 tab(s) orally once a day (13 Feb 2024 11:00)  docusate sodium 100 mg oral capsule: 1 cap(s) orally 3 times a day as needed for  constipation (13 Feb 2024 11:00)  levothyroxine 112 mcg (0.112 mg) oral tablet: 1 tab(s) orally once a day (13 Feb 2024 11:00)  Lexapro 10 mg oral tablet: 3 tab(s) orally once a day (13 Feb 2024 11:00)  LORazepam 0.5 mg oral tablet: Take 2 tablets (1 mG) in the morning and 1 tablet at 20:30 (13 Feb 2024 11:00)  Multiple Vitamins oral tablet: 1 tab(s) orally once a day (13 Feb 2024 11:00)  OLANZapine 15 mg oral tablet: 1 tab(s) orally once a day (13 Feb 2024 10:58)  pantoprazole 40 mg oral delayed release tablet: 1 tab(s) orally once a day (before a meal) (13 Feb 2024 11:00)  polyethylene glycol 3350 oral powder for reconstitution: 17 gram(s) orally once a day (at bedtime) (13 Feb 2024 11:00)  senna (sennosides) 8.6 mg oral tablet: 2 tab(s) orally once a day (at bedtime) as needed for  constipation (13 Feb 2024 11:00)  simethicone 80 mg oral tablet, chewable: 1 tab(s) chewed every 8 hours for gas (13 Feb 2024 11:00)  sodium bicarbonate 650 mg oral tablet: 1 tab(s) orally 2 times a day (13 Feb 2024 11:00)  thiamine 100 mg oral tablet: 1 tab(s) orally once a day (13 Feb 2024 11:00)

## 2024-02-13 NOTE — CHART NOTE - NSCHARTNOTEFT_GEN_A_CORE
Called by RN for pt's BP of 187/94 P 65.  Pt w/ hx of HTN, on amlodipine, currently not agitated nor in any pain, comfortable, asymptomatic.   Hydralazine 10mg IVPx1 ordered.  RN to monitor vitals and escalate prn.

## 2024-02-13 NOTE — H&P ADULT - REASON FOR ADMISSION
sent in from [place] with worsening renal failure sent in from [facility] with worsening renal failure on lab result

## 2024-02-14 LAB
ALBUMIN SERPL ELPH-MCNC: 3.2 G/DL — LOW (ref 3.3–5.2)
ALP SERPL-CCNC: 56 U/L — SIGNIFICANT CHANGE UP (ref 40–120)
ALT FLD-CCNC: 20 U/L — SIGNIFICANT CHANGE UP
ANION GAP SERPL CALC-SCNC: 13 MMOL/L — SIGNIFICANT CHANGE UP (ref 5–17)
APPEARANCE UR: CLEAR — SIGNIFICANT CHANGE UP
AST SERPL-CCNC: 28 U/L — SIGNIFICANT CHANGE UP
BACTERIA # UR AUTO: NEGATIVE /HPF — SIGNIFICANT CHANGE UP
BILIRUB SERPL-MCNC: 1.1 MG/DL — SIGNIFICANT CHANGE UP (ref 0.4–2)
BILIRUB UR-MCNC: NEGATIVE — SIGNIFICANT CHANGE UP
BUN SERPL-MCNC: 29.2 MG/DL — HIGH (ref 8–20)
CALCIUM SERPL-MCNC: 8.3 MG/DL — LOW (ref 8.4–10.5)
CHLORIDE SERPL-SCNC: 110 MMOL/L — HIGH (ref 96–108)
CHLORIDE UR-SCNC: 55 MMOL/L — SIGNIFICANT CHANGE UP
CO2 SERPL-SCNC: 23 MMOL/L — SIGNIFICANT CHANGE UP (ref 22–29)
COLOR SPEC: YELLOW — SIGNIFICANT CHANGE UP
CREAT SERPL-MCNC: 1.57 MG/DL — HIGH (ref 0.5–1.3)
DIFF PNL FLD: ABNORMAL
EGFR: 35 ML/MIN/1.73M2 — LOW
GLUCOSE SERPL-MCNC: 98 MG/DL — SIGNIFICANT CHANGE UP (ref 70–99)
GLUCOSE UR QL: NEGATIVE MG/DL — SIGNIFICANT CHANGE UP
HCT VFR BLD CALC: 29.1 % — LOW (ref 34.5–45)
HGB BLD-MCNC: 9 G/DL — LOW (ref 11.5–15.5)
KETONES UR-MCNC: NEGATIVE MG/DL — SIGNIFICANT CHANGE UP
LEUKOCYTE ESTERASE UR-ACNC: NEGATIVE — SIGNIFICANT CHANGE UP
MCHC RBC-ENTMCNC: 30.2 PG — SIGNIFICANT CHANGE UP (ref 27–34)
MCHC RBC-ENTMCNC: 30.9 GM/DL — LOW (ref 32–36)
MCV RBC AUTO: 97.7 FL — SIGNIFICANT CHANGE UP (ref 80–100)
NITRITE UR-MCNC: NEGATIVE — SIGNIFICANT CHANGE UP
OSMOLALITY UR: 228 MOSM/KG — LOW (ref 300–1000)
PH UR: 8.5 (ref 5–8)
PLATELET # BLD AUTO: 106 K/UL — LOW (ref 150–400)
POTASSIUM SERPL-MCNC: 3.7 MMOL/L — SIGNIFICANT CHANGE UP (ref 3.5–5.3)
POTASSIUM SERPL-SCNC: 3.7 MMOL/L — SIGNIFICANT CHANGE UP (ref 3.5–5.3)
POTASSIUM UR-SCNC: 8 MMOL/L — SIGNIFICANT CHANGE UP
PROT SERPL-MCNC: 5.8 G/DL — LOW (ref 6.6–8.7)
PROT UR-MCNC: SIGNIFICANT CHANGE UP MG/DL
RBC # BLD: 2.98 M/UL — LOW (ref 3.8–5.2)
RBC # FLD: 13.6 % — SIGNIFICANT CHANGE UP (ref 10.3–14.5)
RBC CASTS # UR COMP ASSIST: 0 /HPF — SIGNIFICANT CHANGE UP (ref 0–4)
SODIUM SERPL-SCNC: 146 MMOL/L — HIGH (ref 135–145)
SODIUM UR-SCNC: 78 MMOL/L — SIGNIFICANT CHANGE UP
SP GR SPEC: 1.01 — SIGNIFICANT CHANGE UP (ref 1–1.03)
SQUAMOUS # UR AUTO: 1 /HPF — SIGNIFICANT CHANGE UP (ref 0–5)
UROBILINOGEN FLD QL: 0.2 MG/DL — SIGNIFICANT CHANGE UP (ref 0.2–1)
WBC # BLD: 2.23 K/UL — LOW (ref 3.8–10.5)
WBC # FLD AUTO: 2.23 K/UL — LOW (ref 3.8–10.5)
WBC UR QL: 0 /HPF — SIGNIFICANT CHANGE UP (ref 0–5)

## 2024-02-14 PROCEDURE — 99232 SBSQ HOSP IP/OBS MODERATE 35: CPT

## 2024-02-14 RX ORDER — DESMOPRESSIN ACETATE 0.1 MG/1
0.2 TABLET ORAL
Refills: 0 | Status: DISCONTINUED | OUTPATIENT
Start: 2024-02-14 | End: 2024-02-16

## 2024-02-14 RX ADMIN — Medication 650 MILLIGRAM(S): at 05:33

## 2024-02-14 RX ADMIN — Medication 500 MILLIGRAM(S): at 11:38

## 2024-02-14 RX ADMIN — SENNA PLUS 2 TABLET(S): 8.6 TABLET ORAL at 22:06

## 2024-02-14 RX ADMIN — Medication 100 MILLIGRAM(S): at 11:40

## 2024-02-14 RX ADMIN — AMLODIPINE BESYLATE 5 MILLIGRAM(S): 2.5 TABLET ORAL at 05:33

## 2024-02-14 RX ADMIN — ESCITALOPRAM OXALATE 30 MILLIGRAM(S): 10 TABLET, FILM COATED ORAL at 11:38

## 2024-02-14 RX ADMIN — CALCITRIOL 0.25 MICROGRAM(S): 0.5 CAPSULE ORAL at 11:40

## 2024-02-14 RX ADMIN — DESMOPRESSIN ACETATE 0.2 MICROGRAM(S): 0.1 TABLET ORAL at 17:54

## 2024-02-14 RX ADMIN — POLYETHYLENE GLYCOL 3350 17 GRAM(S): 17 POWDER, FOR SOLUTION ORAL at 22:06

## 2024-02-14 RX ADMIN — Medication 1 TABLET(S): at 11:39

## 2024-02-14 RX ADMIN — ATORVASTATIN CALCIUM 10 MILLIGRAM(S): 80 TABLET, FILM COATED ORAL at 22:06

## 2024-02-14 RX ADMIN — Medication 650 MILLIGRAM(S): at 17:54

## 2024-02-14 RX ADMIN — OLANZAPINE 15 MILLIGRAM(S): 15 TABLET, FILM COATED ORAL at 11:38

## 2024-02-14 RX ADMIN — Medication 112 MICROGRAM(S): at 05:33

## 2024-02-14 RX ADMIN — PANTOPRAZOLE SODIUM 40 MILLIGRAM(S): 20 TABLET, DELAYED RELEASE ORAL at 05:34

## 2024-02-14 NOTE — PATIENT PROFILE ADULT - FALL HARM RISK - HARM RISK INTERVENTIONS
Assistance with ambulation/Assistance OOB with selected safe patient handling equipment/Communicate Risk of Fall with Harm to all staff/Monitor for mental status changes/Move patient closer to nurses' station/Reinforce activity limits and safety measures with patient and family/Reorient to person, place and time as needed/Tailored Fall Risk Interventions/Toileting schedule using arm’s reach rule for commode and bathroom/Use of alarms - bed, chair and/or voice tab/Visual Cue: Yellow wristband and red socks/Bed in lowest position, wheels locked, appropriate side rails in place/Call bell, personal items and telephone in reach/Instruct patient to call for assistance before getting out of bed or chair/Non-slip footwear when patient is out of bed/[place] to call system/Physically safe environment - no spills, clutter or unnecessary equipment/Purposeful Proactive Rounding/Room/bathroom lighting operational, light cord in reach

## 2024-02-14 NOTE — PROGRESS NOTE ADULT - ASSESSMENT
73year old female from [place] sent in for abnormal labs - med hx significant for schizophrenia, hypothyroidism, HTN, nephrogenic DI, prior SBO,     MONICA on CKD3 with Hypernatremia known metabolic acidosis   Hx nephrogenic DI ,   cont bicarb   Start IVF avoid contrast , trend renal indices   Metabolic acidosis   -nephro called    Schizoaffective disorder +Depression   per aid from [place] baseline is more active alert and interactive  medication list from [place] - Olanzapine , Lexapro cont per psychiatry, lorazepam change to PRN  advise continuation of all current medications  1 To 1 not required - per psychiatry continue home meds     Pancytopenia - was present last admission too-   hgb and platelets slightly worse -  check for occult blood feces   hematology at [facility] 10/2/23 with pancytopenia    Abdominal hernia- No Obstruction     HTN   -cont amlodipine   -cont to monitor bp     HLD   -cont statin     Hypothyroid   -cont levothyroxine     vte ppx platelets - anemia - no Lovenox - follow stool occult blood     dispo: return to [place]   72 yo female with hx of schizophrenia, hypothyroidism, HTN, nephrogenic DI, prior SBO sent in for abnormal labs of hypernatremia and Vaishali.     #VAISHALI on CKD3 with Hypernatremia known metabolic acidosis   Hx nephrogenic DI   cont sodium bicarb  IV fluids per renal  Started on Desmopressin  Renal recs appreciated    #Schizoaffective disorder +Depression   Continue with zyprexa, lexapro   Ativan as needed  Psych recs appreciated  1 To 1 not required - per psychiatry     #Pancytopenia   was present last admission too  Trend  FOBT  Follows with hematology at [facility] 10/2/23 with pancytopenia    #Abdominal hernia  No Obstruction     #HTN   cont amlodipine     #HLD   cont statin     #ypothyroid   cont levothyroxine     vte ppx: SCDs - Pending fobt    dispo: return to [place] if labs improving

## 2024-02-14 NOTE — CONSULT NOTE ADULT - ASSESSMENT
schizoaffective disorder  at baseline much more active alert and interactive  medication list from [place] reviewed   advise continuation of all current medications  1 To 1 not required  health care proxy designation noted if needed  plan for return to [place] once medically optimized  No new recommendations  will follow
A) CRF stable with Hx NDI; Pancytopenia  with enlarged  spleen.    P) Urine  studies, iv  fluid, follow  up labs.

## 2024-02-14 NOTE — PROGRESS NOTE ADULT - ASSESSMENT
chronic schizophrenia= [place] improved  no  new recommendations  will follow  once medically optimized plan return to [place]

## 2024-02-14 NOTE — PATIENT PROFILE ADULT - NSPROPASSIVESMOKEEXPOSURE_GEN_A_NUR
Unknown Cantharidin Counseling:  I discussed with the patient the risks of Cantharidin including but not limited to pain, redness, burning, itching, and blistering.

## 2024-02-14 NOTE — CONSULT NOTE ADULT - SUBJECTIVE AND OBJECTIVE BOX
Patient is a 73y old  Female who presents with a chief complaint of sent in from [place] with worsening renal failure (13 Feb 2024 13:52)      HPI:   Hx from  chart pt un able to give. [facility]  [facility] high  sodium  and  creatinine. Hx from  prior  hospital admissions  shows CRF in  same  range and  varing  sodium and  bicarbonate  levels. pt has schizophrenia and is on  multiple meds. She  denies any gu  symptoms. hx large ventral  hernia with failed  prior mesh  surgery. hx large  urine  volumes and  enlarged  spleen. Pt presently not on lithium.    PAST MEDICAL & SURGICAL HISTORY:  Venous insufficiency (chronic) (peripheral)      Constipation      Sensory hearing loss      GERD (gastroesophageal reflux disease)      Hypothyroidism      Schizoaffective disorder, bipolar type      Suicide attempt      Behavior problems  assaultive      Hemiparesis, left      Seizure      Osteopenia      Fall      Vaginal prolapse      Neutropenia      Anemia      Splenomegaly      CVA (cerebral vascular accident)      HTN (hypertension)      Obesity      Urinary incontinence      Schizo affective schizophrenia      Displaced fracture of olecranon process with intraarticular extension of left ulna      Rectal prolapse      Onychomycosis      Uterine prolapse      H/O ileostomy  4/2015      Parastomal hernia without obstruction or gangrene          FAMILY HISTORY:  Family history of psoriasis    NC    Social History:Non smoker    MEDICATIONS  (STANDING):  amLODIPine   Tablet 5 milliGRAM(s) Oral daily  ascorbic acid 500 milliGRAM(s) Oral daily  atorvastatin 10 milliGRAM(s) Oral at bedtime  calcitriol   Capsule 0.25 MICROGram(s) Oral daily  escitalopram 30 milliGRAM(s) Oral daily  levothyroxine 112 MICROGram(s) Oral daily  multivitamin 1 Tablet(s) Oral daily  OLANZapine 15 milliGRAM(s) Oral daily  pantoprazole    Tablet 40 milliGRAM(s) Oral before breakfast  polyethylene glycol 3350 17 Gram(s) Oral at bedtime  senna 2 Tablet(s) Oral at bedtime  sodium bicarbonate 650 milliGRAM(s) Oral two times a day  sodium chloride 0.225%. 1000 milliLiter(s) (120 mL/Hr) IV Continuous <Continuous>  thiamine 100 milliGRAM(s) Oral daily    MEDICATIONS  (PRN):  LORazepam     Tablet 0.5 milliGRAM(s) Oral two times a day PRN Anxiety  simethicone 80 milliGRAM(s) Chew every 8 hours PRN Gas   Meds reviewed    Allergies    Neupogen (Other)  clozapine (Other)  Depakote (Other)  erythromycin (Unknown)        REVIEW OF SYSTEMS:    as above    ALLERY AND IMMUNOLOGIC: No hives or eczema      Vital Signs Last 24 Hrs  T(C): 36.6 (14 Feb 2024 08:16), Max: 37.2 (13 Feb 2024 16:20)  T(F): 97.8 (14 Feb 2024 08:16), Max: 98.9 (13 Feb 2024 16:20)  HR: 65 (14 Feb 2024 08:16) (60 - 79)  BP: 171/76 (14 Feb 2024 08:16) (155/78 - 187/94)  BP(mean): --  RR: 18 (14 Feb 2024 08:16) (18 - 18)  SpO2: 95% (14 Feb 2024 08:16) (95% - 97%)    Parameters below as of 14 Feb 2024 08:16  Patient On (Oxygen Delivery Method): room air          PHYSICAL EXAM:    GENERAL: appears chronically ill, supine  in bed.  HEAD:  Atraumatic, Normocephalic  EYES: EOMI, PERRLA, conjunctiva and sclera clear  ENMT: No tonsillar erythema, exudates, or enlargement; Moist mucous membranes, Good dentition, No lesions  NECK: Supple, neck  veins flat  NERVOUS SYSTEM:  Alert verbal ; Motor Strength wnl upper and lower extremities;  CHEST/LUNG: Clear to percussion bilaterally; No rales, rhonchi, wheezing, or rubs no 02  HEART: Regular rate and rhythm; No murmurs, rubs, or gallops  ABDOMEN: Soft, Nontender, Nondistended; Bowel sounds present, large right  sided  ventral hernia, scar noted  EXTREMITIES:  Muscle  atrophy  LYMPH: No lymphadenopathy noted  SKIN: No rashes or lesions, pale   Clear  urine, no  rahman, large  volume, no I&O    LABS:                        9.0    2.23  )-----------( 106      ( 14 Feb 2024 03:10 )             29.1     02-14    146<H>  |  110<H>  |  29.2<H>  ----------------------------<  98  3.7   |  23.0  |  1.57<H>    Ca    8.3<L>      14 Feb 2024 03:10    TPro  5.8<L>  /  Alb  3.2<L>  /  TBili  1.1  /  DBili  x   /  AST  28  /  ALT  20  /  AlkPhos  56  02-14      Urinalysis Basic - ( 14 Feb 2024 03:10 )    Color: x / Appearance: x / SG: x / pH: x  Gluc: 98 mg/dL / Ketone: x  / Bili: x / Urobili: x   Blood: x / Protein: x / Nitrite: x   Leuk Esterase: x / RBC: x / WBC x   Sq Epi: x / Non Sq Epi: x / Bacteria: x              RADIOLOGY & ADDITIONAL TESTS:  
abnormal labs noted with hypernatremia and elevated Cr  Patient drowsy unable to maintain alertness to answer questions  PPC aide at bedside reports calm behaviors and compliance with meds  PPC transfer packet reviewed  Not on 1 to 1 at [facility]  HCP copy in packet appoint s brother as proxy\  Patient unable to provide ROS or any other information At baseline - known to me- can be labile and easily agitated  PAST MEDICAL & SURGICAL HISTORY:  Venous insufficiency (chronic) (peripheral)  Constipation  Sensory hearing loss  GERD (gastroesophageal reflux disease)  Hypothyroidism  Schizoaffective disorder, bipolar type  Suicide attempt  Behavior problems  assaultive  Hemiparesis, left  Seizure  Osteopenia  Vaginal prolapse  Neutropenia H/O  Anemia  Splenomegaly  CVA (cerebral vascular accident) in 2005  HTN (hypertension)  Obesity  Urinary incontinence  Displaced fracture of olecranon process with intraarticular extension of left ulna  Rectal prolapse  Onychomycosis  Uterine prolapse  H/O ileostomy  4/2015  Parastomal hernia without obstruction or gangrene  Vital Signs Last 24 Hrs  T(C): 36.6 (13 Feb 2024 05:30), Max: 36.8 (12 Feb 2024 22:43)  T(F): 97.9 (13 Feb 2024 05:30), Max: 98.2 (12 Feb 2024 22:43)  HR: 65 (13 Feb 2024 05:30) (65 - 69)  BP: 168/85 (13 Feb 2024 05:30) (140/94 - 168/85)  BP(mean): --  RR: 18 (13 Feb 2024 05:30) (16 - 18)  SpO2: 96% (13 Feb 2024 05:30) (95% - 98%)    Parameters below as of 13 Feb 2024 05:30  Patient On (Oxygen Delivery Method): room air                          8.9    2.41  )-----------( 108      ( 12 Feb 2024 17:55 )             27.6     02-13    150<H>  |  112<H>  |  33.9<H>  ----------------------------<  84  3.8   |  26.0  |  1.84<H>    Ca    8.6      13 Feb 2024 03:40    TPro  6.2<L>  /  Alb  3.6  /  TBili  0.3<L>  /  DBili  x   /  AST  30  /  ALT  26  /  AlkPhos  59  02-12    LIVER FUNCTIONS - ( 12 Feb 2024 17:55 )  Alb: 3.6 g/dL / Pro: 6.2 g/dL / ALK PHOS: 59 U/L / ALT: 26 U/L / AST: 30 U/L / GGT: x             Urinalysis Basic - ( 13 Feb 2024 03:40 )    Color: x / Appearance: x / SG: x / pH: x  Gluc: 84 mg/dL / Ketone: x  / Bili: x / Urobili: x   Blood: x / Protein: x / Nitrite: x   Leuk Esterase: x / RBC: x / WBC x   Sq Epi: x / Non Sq Epi: x / Bacteria: x    < from: CT Abdomen and Pelvis No Cont (02.13.24 @ 00:52) >  IMPRESSION:  Large right anterolateral abdominal wall hernia, unchanged. No bowel  obstruction.    Marked urinary bladder distention.    Stable heterogeneous appearance of the enlarged spleen.        --- End of Report ---    < end of copied text >

## 2024-02-15 ENCOUNTER — TRANSCRIPTION ENCOUNTER (OUTPATIENT)
Age: 74
End: 2024-02-15

## 2024-02-15 LAB
ALBUMIN SERPL ELPH-MCNC: 3.4 G/DL — SIGNIFICANT CHANGE UP (ref 3.3–5.2)
ALP SERPL-CCNC: 62 U/L — SIGNIFICANT CHANGE UP (ref 40–120)
ALT FLD-CCNC: 17 U/L — SIGNIFICANT CHANGE UP
ANION GAP SERPL CALC-SCNC: 11 MMOL/L — SIGNIFICANT CHANGE UP (ref 5–17)
APPEARANCE UR: CLEAR — SIGNIFICANT CHANGE UP
AST SERPL-CCNC: 25 U/L — SIGNIFICANT CHANGE UP
BACTERIA # UR AUTO: NEGATIVE /HPF — SIGNIFICANT CHANGE UP
BILIRUB SERPL-MCNC: 1.4 MG/DL — SIGNIFICANT CHANGE UP (ref 0.4–2)
BILIRUB UR-MCNC: NEGATIVE — SIGNIFICANT CHANGE UP
BUN SERPL-MCNC: 27.6 MG/DL — HIGH (ref 8–20)
CALCIUM SERPL-MCNC: 8.4 MG/DL — SIGNIFICANT CHANGE UP (ref 8.4–10.5)
CAST: 0 /LPF — SIGNIFICANT CHANGE UP (ref 0–4)
CHLORIDE SERPL-SCNC: 111 MMOL/L — HIGH (ref 96–108)
CO2 SERPL-SCNC: 22 MMOL/L — SIGNIFICANT CHANGE UP (ref 22–29)
COLOR SPEC: YELLOW — SIGNIFICANT CHANGE UP
CREAT SERPL-MCNC: 1.84 MG/DL — HIGH (ref 0.5–1.3)
DIFF PNL FLD: ABNORMAL
EGFR: 29 ML/MIN/1.73M2 — LOW
GLUCOSE SERPL-MCNC: 89 MG/DL — SIGNIFICANT CHANGE UP (ref 70–99)
GLUCOSE UR QL: NEGATIVE MG/DL — SIGNIFICANT CHANGE UP
KETONES UR-MCNC: NEGATIVE MG/DL — SIGNIFICANT CHANGE UP
LEUKOCYTE ESTERASE UR-ACNC: NEGATIVE — SIGNIFICANT CHANGE UP
MAGNESIUM SERPL-MCNC: 1.9 MG/DL — SIGNIFICANT CHANGE UP (ref 1.6–2.6)
MRSA PCR RESULT.: SIGNIFICANT CHANGE UP
NIGHT BLUE STAIN TISS: SIGNIFICANT CHANGE UP
NITRITE UR-MCNC: NEGATIVE — SIGNIFICANT CHANGE UP
PH UR: 8.5 (ref 5–8)
POTASSIUM SERPL-MCNC: 3.6 MMOL/L — SIGNIFICANT CHANGE UP (ref 3.5–5.3)
POTASSIUM SERPL-SCNC: 3.6 MMOL/L — SIGNIFICANT CHANGE UP (ref 3.5–5.3)
PROT SERPL-MCNC: 5.8 G/DL — LOW (ref 6.6–8.7)
PROT UR-MCNC: 30 MG/DL
RBC CASTS # UR COMP ASSIST: 0 /HPF — SIGNIFICANT CHANGE UP (ref 0–4)
S AUREUS DNA NOSE QL NAA+PROBE: SIGNIFICANT CHANGE UP
SODIUM SERPL-SCNC: 144 MMOL/L — SIGNIFICANT CHANGE UP (ref 135–145)
SP GR SPEC: 1.01 — SIGNIFICANT CHANGE UP (ref 1–1.03)
SPECIMEN SOURCE: SIGNIFICANT CHANGE UP
SQUAMOUS # UR AUTO: 0 /HPF — SIGNIFICANT CHANGE UP (ref 0–5)
UROBILINOGEN FLD QL: 1 MG/DL — SIGNIFICANT CHANGE UP (ref 0.2–1)
WBC UR QL: 0 /HPF — SIGNIFICANT CHANGE UP (ref 0–5)

## 2024-02-15 PROCEDURE — 99231 SBSQ HOSP IP/OBS SF/LOW 25: CPT

## 2024-02-15 PROCEDURE — 99232 SBSQ HOSP IP/OBS MODERATE 35: CPT

## 2024-02-15 RX ORDER — POTASSIUM CHLORIDE 20 MEQ
20 PACKET (EA) ORAL DAILY
Refills: 0 | Status: DISCONTINUED | OUTPATIENT
Start: 2024-02-15 | End: 2024-02-15

## 2024-02-15 RX ORDER — SODIUM CHLORIDE 9 MG/ML
1000 INJECTION INTRAMUSCULAR; INTRAVENOUS; SUBCUTANEOUS
Refills: 0 | Status: DISCONTINUED | OUTPATIENT
Start: 2024-02-15 | End: 2024-02-15

## 2024-02-15 RX ORDER — POTASSIUM CHLORIDE 20 MEQ
20 PACKET (EA) ORAL DAILY
Refills: 0 | Status: DISCONTINUED | OUTPATIENT
Start: 2024-02-15 | End: 2024-02-16

## 2024-02-15 RX ORDER — CHLORHEXIDINE GLUCONATE 213 G/1000ML
1 SOLUTION TOPICAL
Refills: 0 | Status: DISCONTINUED | OUTPATIENT
Start: 2024-02-15 | End: 2024-02-16

## 2024-02-15 RX ORDER — HEPARIN SODIUM 5000 [USP'U]/ML
5000 INJECTION INTRAVENOUS; SUBCUTANEOUS EVERY 12 HOURS
Refills: 0 | Status: DISCONTINUED | OUTPATIENT
Start: 2024-02-15 | End: 2024-02-16

## 2024-02-15 RX ADMIN — SODIUM CHLORIDE 120 MILLILITER(S): 9 INJECTION INTRAMUSCULAR; INTRAVENOUS; SUBCUTANEOUS at 06:03

## 2024-02-15 RX ADMIN — Medication 650 MILLIGRAM(S): at 17:28

## 2024-02-15 RX ADMIN — DESMOPRESSIN ACETATE 0.2 MICROGRAM(S): 0.1 TABLET ORAL at 17:27

## 2024-02-15 RX ADMIN — Medication 100 MILLIGRAM(S): at 11:28

## 2024-02-15 RX ADMIN — Medication 1 TABLET(S): at 11:28

## 2024-02-15 RX ADMIN — ATORVASTATIN CALCIUM 10 MILLIGRAM(S): 80 TABLET, FILM COATED ORAL at 21:10

## 2024-02-15 RX ADMIN — Medication 112 MICROGRAM(S): at 06:03

## 2024-02-15 RX ADMIN — Medication 20 MILLIEQUIVALENT(S): at 15:17

## 2024-02-15 RX ADMIN — OLANZAPINE 15 MILLIGRAM(S): 15 TABLET, FILM COATED ORAL at 11:27

## 2024-02-15 RX ADMIN — SENNA PLUS 2 TABLET(S): 8.6 TABLET ORAL at 21:10

## 2024-02-15 RX ADMIN — Medication 500 MILLIGRAM(S): at 11:28

## 2024-02-15 RX ADMIN — PANTOPRAZOLE SODIUM 40 MILLIGRAM(S): 20 TABLET, DELAYED RELEASE ORAL at 06:03

## 2024-02-15 RX ADMIN — CHLORHEXIDINE GLUCONATE 1 APPLICATION(S): 213 SOLUTION TOPICAL at 11:28

## 2024-02-15 RX ADMIN — AMLODIPINE BESYLATE 5 MILLIGRAM(S): 2.5 TABLET ORAL at 06:03

## 2024-02-15 RX ADMIN — Medication 650 MILLIGRAM(S): at 06:03

## 2024-02-15 RX ADMIN — ESCITALOPRAM OXALATE 30 MILLIGRAM(S): 10 TABLET, FILM COATED ORAL at 11:27

## 2024-02-15 RX ADMIN — DESMOPRESSIN ACETATE 0.2 MICROGRAM(S): 0.1 TABLET ORAL at 06:03

## 2024-02-15 RX ADMIN — POLYETHYLENE GLYCOL 3350 17 GRAM(S): 17 POWDER, FOR SOLUTION ORAL at 21:10

## 2024-02-15 RX ADMIN — HEPARIN SODIUM 5000 UNIT(S): 5000 INJECTION INTRAVENOUS; SUBCUTANEOUS at 17:28

## 2024-02-15 RX ADMIN — CALCITRIOL 0.25 MICROGRAM(S): 0.5 CAPSULE ORAL at 11:28

## 2024-02-15 NOTE — DISCHARGE NOTE PROVIDER - NSDCCPCAREPLAN_GEN_ALL_CORE_FT
PRINCIPAL DISCHARGE DIAGNOSIS  Diagnosis: Acute on chronic renal failure  Assessment and Plan of Treatment: Resolved  With known CKD      SECONDARY DISCHARGE DIAGNOSES  Diagnosis: Acute hypernatremia  Assessment and Plan of Treatment: Resolved     PRINCIPAL DISCHARGE DIAGNOSIS  Diagnosis: Acute on chronic renal failure  Assessment and Plan of Treatment: Resolved  With known CKD      SECONDARY DISCHARGE DIAGNOSES  Diagnosis: Acute hypernatremia  Assessment and Plan of Treatment: Resolved    Diagnosis: Hypertension  Assessment and Plan of Treatment:

## 2024-02-15 NOTE — PROGRESS NOTE ADULT - ASSESSMENT
A) Psych. illnesses with hypernatremia, prerenal  azotemia with low urine  sp.gr. - NDI    P) DDAVP , amiloride not available  this  hospital ; iv  fluid D/Miguel now, added po k; may need NSAID if pt not able to drink enough  fluid but has ARF now. Repeat labs in am and if  stable  could be  discharged.

## 2024-02-15 NOTE — DISCHARGE NOTE PROVIDER - HOSPITAL COURSE
72 yo female with hx of schizophrenia, hypothyroidism, HTN, nephrogenic DI, prior SBO sent in for abnormal labs of hypernatremia and MONICA. Pt with MONICA on CKD3 with hypernatremia and known metabolic acidosis. S/p IVF per renal and desmopressin with improvement. Now stable to return to [facility].

## 2024-02-15 NOTE — DISCHARGE NOTE PROVIDER - NSDCMRMEDTOKEN_GEN_ALL_CORE_FT
ascorbic acid 500 mg oral tablet: 1 tab(s) orally 2 times a day  atorvastatin 10 mg oral tablet: 1 tab(s) orally once a day  calcitriol 0.25 mcg oral capsule: 1 cap(s) orally once a day  calcium carbonate 500 mg (200 mg elemental calcium) oral tablet, chewable: 2 tab(s) orally 3 times a day  cholecalciferol 50 mcg (2000 intl units) oral capsule: 1 cap(s) orally once a day  cyanocobalamin 500 mcg oral tablet: 1 tab(s) orally once a day  docusate sodium 100 mg oral capsule: 1 cap(s) orally 3 times a day as needed for  constipation  levothyroxine 112 mcg (0.112 mg) oral tablet: 1 tab(s) orally once a day  Lexapro 10 mg oral tablet: 3 tab(s) orally once a day  LORazepam 0.5 mg oral tablet: Take 2 tablets (1 mG) in the morning and 1 tablet at 20:30  Multiple Vitamins oral tablet: 1 tab(s) orally once a day  OLANZapine 15 mg oral tablet: 1 tab(s) orally once a day  pantoprazole 40 mg oral delayed release tablet: 1 tab(s) orally once a day (before a meal)  polyethylene glycol 3350 oral powder for reconstitution: 17 gram(s) orally once a day (at bedtime)  senna (sennosides) 8.6 mg oral tablet: 2 tab(s) orally once a day (at bedtime) as needed for  constipation  simethicone 80 mg oral tablet, chewable: 1 tab(s) chewed every 8 hours for gas  sodium bicarbonate 650 mg oral tablet: 1 tab(s) orally 2 times a day  thiamine 100 mg oral tablet: 1 tab(s) orally once a day   ascorbic acid 500 mg oral tablet: 1 tab(s) orally 2 times a day  atorvastatin 10 mg oral tablet: 1 tab(s) orally once a day  calcitriol 0.25 mcg oral capsule: 1 cap(s) orally once a day  calcium carbonate 500 mg (200 mg elemental calcium) oral tablet, chewable: 2 tab(s) orally 3 times a day  cholecalciferol 50 mcg (2000 intl units) oral capsule: 1 cap(s) orally once a day  cyanocobalamin 500 mcg oral tablet: 1 tab(s) orally once a day  docusate sodium 100 mg oral capsule: 1 cap(s) orally 3 times a day as needed for  constipation  levothyroxine 112 mcg (0.112 mg) oral tablet: 1 tab(s) orally once a day  Lexapro 10 mg oral tablet: 3 tab(s) orally once a day  LORazepam 0.5 mg oral tablet: 0.5 milligram(s) orally once a day Take 2 tablets (1 mG) in the morning and 1 tablet at 20:30  Multiple Vitamins oral tablet: 1 tab(s) orally once a day  OLANZapine 15 mg oral tablet: 1 tab(s) orally once a day  pantoprazole 40 mg oral delayed release tablet: 1 tab(s) orally once a day (before a meal)  polyethylene glycol 3350 oral powder for reconstitution: 17 gram(s) orally once a day (at bedtime)  senna (sennosides) 8.6 mg oral tablet: 2 tab(s) orally once a day (at bedtime) as needed for  constipation  simethicone 80 mg oral tablet, chewable: 1 tab(s) chewed every 8 hours for gas  sodium bicarbonate 650 mg oral tablet: 1 tab(s) orally 2 times a day  thiamine 100 mg oral tablet: 1 tab(s) orally once a day

## 2024-02-15 NOTE — PROGRESS NOTE ADULT - ASSESSMENT
74 yo female with hx of schizophrenia, hypothyroidism, HTN, nephrogenic DI, prior SBO sent in for abnormal labs of hypernatremia and Vaishali.     1-VAISHALI on CKD3 with Hypernatremia known metabolic acidosis   Hx nephrogenic DI   cont sodium bicarb and IV fluid   neohrology follow up   Na is 144  Started on Desmopressin by nephrology   cont to monitor   Dc in 1-2 days if stable likely       2-Schizoaffective disorder +Depression   Continue with zyprexa, lexapro   Ativan as needed  Psych recs appreciated    3- Hypokalemia   replace Po k ordered keep k over 4     4-Pancytopenia , cronic   Trend  Follows with hematology at [facility] 10/2/23 with pancytopenia    5-Abdominal hernia  No Obstruction     6-HTN   cont amlodipine   monitor    7-HLD   cont statin     8- Hypothyroid   cont levothyroxine   TSH 1.7 normal       dispo: return to [facility] when labs improves  d/w nephrology Dr Lara

## 2024-02-15 NOTE — DISCHARGE NOTE PROVIDER - ATTENDING DISCHARGE PHYSICAL EXAMINATION:
Constitutional: NAD  Lungs: CTA B/L, Non-labored breathing  Cardio: RRR, S1/S2, No murmur  Abdomen: Soft, Nontender, Nondistended; Bowel sounds present, hernia noted  Extremities: No calf tenderness, No pitting edema  Neuro: Awake and alert, incoherently mumbles name, states shes here, does not answer where or time

## 2024-02-15 NOTE — PROGRESS NOTE ADULT - ASSESSMENT
chronic schizophrenia  at baseline mental state  awaiting final medical evaluation by hospitalist for discharge back to [facility]  no new recommendations

## 2024-02-16 ENCOUNTER — TRANSCRIPTION ENCOUNTER (OUTPATIENT)
Age: 74
End: 2024-02-16

## 2024-02-16 VITALS — WEIGHT: 149.91 LBS

## 2024-02-16 LAB
ANION GAP SERPL CALC-SCNC: 11 MMOL/L — SIGNIFICANT CHANGE UP (ref 5–17)
BUN SERPL-MCNC: 27.1 MG/DL — HIGH (ref 8–20)
CALCIUM SERPL-MCNC: 8.9 MG/DL — SIGNIFICANT CHANGE UP (ref 8.4–10.5)
CHLORIDE SERPL-SCNC: 109 MMOL/L — HIGH (ref 96–108)
CO2 SERPL-SCNC: 24 MMOL/L — SIGNIFICANT CHANGE UP (ref 22–29)
CREAT SERPL-MCNC: 1.78 MG/DL — HIGH (ref 0.5–1.3)
EGFR: 30 ML/MIN/1.73M2 — LOW
FERRITIN SERPL-MCNC: 435 NG/ML — HIGH (ref 13–330)
FOLATE SERPL-MCNC: >20 NG/ML — SIGNIFICANT CHANGE UP
GLUCOSE SERPL-MCNC: 97 MG/DL — SIGNIFICANT CHANGE UP (ref 70–99)
HCT VFR BLD CALC: 28.3 % — LOW (ref 34.5–45)
HGB BLD-MCNC: 9 G/DL — LOW (ref 11.5–15.5)
IRON SATN MFR SERPL: 12 % — LOW (ref 14–50)
IRON SATN MFR SERPL: 31 UG/DL — LOW (ref 37–145)
MAGNESIUM SERPL-MCNC: 2 MG/DL — SIGNIFICANT CHANGE UP (ref 1.8–2.6)
MCHC RBC-ENTMCNC: 30 PG — SIGNIFICANT CHANGE UP (ref 27–34)
MCHC RBC-ENTMCNC: 31.8 GM/DL — LOW (ref 32–36)
MCV RBC AUTO: 94.3 FL — SIGNIFICANT CHANGE UP (ref 80–100)
PLATELET # BLD AUTO: 116 K/UL — LOW (ref 150–400)
POTASSIUM SERPL-MCNC: 4 MMOL/L — SIGNIFICANT CHANGE UP (ref 3.5–5.3)
POTASSIUM SERPL-SCNC: 4 MMOL/L — SIGNIFICANT CHANGE UP (ref 3.5–5.3)
RBC # BLD: 3 M/UL — LOW (ref 3.8–5.2)
RBC # FLD: 13.6 % — SIGNIFICANT CHANGE UP (ref 10.3–14.5)
SARS-COV-2 RNA SPEC QL NAA+PROBE: SIGNIFICANT CHANGE UP
SODIUM SERPL-SCNC: 144 MMOL/L — SIGNIFICANT CHANGE UP (ref 135–145)
TIBC SERPL-MCNC: 250 UG/DL — SIGNIFICANT CHANGE UP (ref 220–430)
TRANSFERRIN SERPL-MCNC: 175 MG/DL — LOW (ref 192–382)
VIT B12 SERPL-MCNC: 503 PG/ML — SIGNIFICANT CHANGE UP (ref 232–1245)
WBC # BLD: 2.91 K/UL — LOW (ref 3.8–10.5)
WBC # FLD AUTO: 2.91 K/UL — LOW (ref 3.8–10.5)

## 2024-02-16 PROCEDURE — 87206 SMEAR FLUORESCENT/ACID STAI: CPT

## 2024-02-16 PROCEDURE — 82746 ASSAY OF FOLIC ACID SERUM: CPT

## 2024-02-16 PROCEDURE — 36415 COLL VENOUS BLD VENIPUNCTURE: CPT

## 2024-02-16 PROCEDURE — 85027 COMPLETE CBC AUTOMATED: CPT

## 2024-02-16 PROCEDURE — 87116 MYCOBACTERIA CULTURE: CPT

## 2024-02-16 PROCEDURE — 87640 STAPH A DNA AMP PROBE: CPT

## 2024-02-16 PROCEDURE — 84466 ASSAY OF TRANSFERRIN: CPT

## 2024-02-16 PROCEDURE — 84300 ASSAY OF URINE SODIUM: CPT

## 2024-02-16 PROCEDURE — 82607 VITAMIN B-12: CPT

## 2024-02-16 PROCEDURE — 83550 IRON BINDING TEST: CPT

## 2024-02-16 PROCEDURE — 87641 MR-STAPH DNA AMP PROBE: CPT

## 2024-02-16 PROCEDURE — 87015 SPECIMEN INFECT AGNT CONCNTJ: CPT

## 2024-02-16 PROCEDURE — 85025 COMPLETE CBC W/AUTO DIFF WBC: CPT

## 2024-02-16 PROCEDURE — 80053 COMPREHEN METABOLIC PANEL: CPT

## 2024-02-16 PROCEDURE — 83540 ASSAY OF IRON: CPT

## 2024-02-16 PROCEDURE — 99239 HOSP IP/OBS DSCHRG MGMT >30: CPT

## 2024-02-16 PROCEDURE — 80048 BASIC METABOLIC PNL TOTAL CA: CPT

## 2024-02-16 PROCEDURE — 82436 ASSAY OF URINE CHLORIDE: CPT

## 2024-02-16 PROCEDURE — 99285 EMERGENCY DEPT VISIT HI MDM: CPT | Mod: 25

## 2024-02-16 PROCEDURE — 74176 CT ABD & PELVIS W/O CONTRAST: CPT | Mod: MA

## 2024-02-16 PROCEDURE — 82728 ASSAY OF FERRITIN: CPT

## 2024-02-16 PROCEDURE — 81001 URINALYSIS AUTO W/SCOPE: CPT

## 2024-02-16 PROCEDURE — 87635 SARS-COV-2 COVID-19 AMP PRB: CPT

## 2024-02-16 PROCEDURE — 83735 ASSAY OF MAGNESIUM: CPT

## 2024-02-16 PROCEDURE — 84133 ASSAY OF URINE POTASSIUM: CPT

## 2024-02-16 PROCEDURE — 93005 ELECTROCARDIOGRAM TRACING: CPT

## 2024-02-16 PROCEDURE — 83935 ASSAY OF URINE OSMOLALITY: CPT

## 2024-02-16 RX ADMIN — OLANZAPINE 15 MILLIGRAM(S): 15 TABLET, FILM COATED ORAL at 12:57

## 2024-02-16 RX ADMIN — ESCITALOPRAM OXALATE 30 MILLIGRAM(S): 10 TABLET, FILM COATED ORAL at 12:57

## 2024-02-16 RX ADMIN — HEPARIN SODIUM 5000 UNIT(S): 5000 INJECTION INTRAVENOUS; SUBCUTANEOUS at 05:34

## 2024-02-16 RX ADMIN — Medication 20 MILLIEQUIVALENT(S): at 12:56

## 2024-02-16 RX ADMIN — Medication 500 MILLIGRAM(S): at 12:57

## 2024-02-16 RX ADMIN — CALCITRIOL 0.25 MICROGRAM(S): 0.5 CAPSULE ORAL at 12:57

## 2024-02-16 RX ADMIN — AMLODIPINE BESYLATE 5 MILLIGRAM(S): 2.5 TABLET ORAL at 05:34

## 2024-02-16 RX ADMIN — Medication 1 TABLET(S): at 12:56

## 2024-02-16 RX ADMIN — Medication 112 MICROGRAM(S): at 05:34

## 2024-02-16 RX ADMIN — CHLORHEXIDINE GLUCONATE 1 APPLICATION(S): 213 SOLUTION TOPICAL at 05:33

## 2024-02-16 RX ADMIN — PANTOPRAZOLE SODIUM 40 MILLIGRAM(S): 20 TABLET, DELAYED RELEASE ORAL at 05:34

## 2024-02-16 RX ADMIN — Medication 650 MILLIGRAM(S): at 05:34

## 2024-02-16 RX ADMIN — DESMOPRESSIN ACETATE 0.2 MICROGRAM(S): 0.1 TABLET ORAL at 05:33

## 2024-02-16 RX ADMIN — Medication 100 MILLIGRAM(S): at 12:57

## 2024-02-16 NOTE — DISCHARGE NOTE NURSING/CASE MANAGEMENT/SOCIAL WORK - PATIENT PORTAL LINK FT
You can access the FollowMyHealth Patient Portal offered by [facility] by registering at the following website: [email or web address]. By joining [employer]’s FollowMyHealth portal, you will also be able to view your health information using other applications (apps) compatible with our system.

## 2024-02-16 NOTE — PROGRESS NOTE ADULT - ASSESSMENT
CKD III - Creat stable , baseline 1.8   Distended bladder , no hydro on CT 2/13/24  Check bladder scan     Hypernatremia - Encourage po   DDAVP added - will stop   Urine osm 228     MA - Oral bicarb     HTN =- Watch on meds

## 2024-02-16 NOTE — DIETITIAN INITIAL EVALUATION ADULT - OTHER INFO
72 yo female with hx of schizophrenia, hypothyroidism, HTN, nephrogenic DI, prior SBO sent in for abnormal labs of hypernatremia and Vaishali.

## 2024-02-16 NOTE — DISCHARGE NOTE NURSING/CASE MANAGEMENT/SOCIAL WORK - NSDCPEFALRISK_GEN_ALL_CORE
For information on Fall & Injury Prevention, visit: [email or web address] OR  [email or web address] OR  https://www.cdc.gov/steadi/patient.html

## 2024-02-16 NOTE — DIETITIAN INITIAL EVALUATION ADULT - ORAL INTAKE PTA/DIET HISTORY
Patient admitted from [facility] for abnormal labs. Pt A/O x2, limited nutrition interview. Per nursing flowsheets pt consuming 50-75% of meals. Pt could not remember if she ate lunch today. Pt with poor dentition and requesting a soft and bite-sized diet at this time. No current c/o N/V/C/D. UBW per chart review 150 lbs (December 2023). No admission weight recorded. Nutrition focused physical exam conducted and pt found with mild muscle/fat wasting. RD remains available.

## 2024-02-16 NOTE — DISCHARGE NOTE NURSING/CASE MANAGEMENT/SOCIAL WORK - NSDCVIVACCINE_GEN_ALL_CORE_FT
Tdap; 17-Sep-2018 10:04; Melina Montoya (JOSETTE); [employer]; [id number] (Exp. Date: 11-Jun-2020); IntraMuscular; Deltoid Right.; 0.5 milliLiter(s); VIS (VIS Published: 09-May-2013, VIS Presented: 17-Sep-2018);

## 2024-02-16 NOTE — DIETITIAN INITIAL EVALUATION ADULT - ETIOLOGY
related to inability to meet sufficient protein-energy in setting of schizoaffective disorder 
DISPLAY PLAN FREE TEXT

## 2024-02-16 NOTE — DIETITIAN INITIAL EVALUATION ADULT - NSFNSGIIOFT_GEN_A_CORE
02-15-24 @ 07:01  -  02-16-24 @ 07:00  --------------------------------------------------------  OUT:    Stool (mL): 1 mL  Total OUT: 1 mL    Total NET: -1 mL

## 2024-02-16 NOTE — PROGRESS NOTE ADULT - ASSESSMENT
chronic schizophrenia - more or less at baseline - nothing new to add  pending stabilization for return to [place]

## 2024-02-16 NOTE — PROGRESS NOTE ADULT - SUBJECTIVE AND OBJECTIVE BOX
NEPHROLOGY INTERVAL HPI/OVERNIGHT EVENTS:    Appears  comfortable in bed.    MEDICATIONS  (STANDING):  amLODIPine   Tablet 5 milliGRAM(s) Oral daily  ascorbic acid 500 milliGRAM(s) Oral daily  atorvastatin 10 milliGRAM(s) Oral at bedtime  calcitriol   Capsule 0.25 MICROGram(s) Oral daily  chlorhexidine 2% Cloths 1 Application(s) Topical <User Schedule>  desmopressin Injectable 0.2 MICROGram(s) SubCutaneous two times a day  escitalopram 30 milliGRAM(s) Oral daily  heparin   Injectable 5000 Unit(s) SubCutaneous every 12 hours  levothyroxine 112 MICROGram(s) Oral daily  multivitamin 1 Tablet(s) Oral daily  OLANZapine 15 milliGRAM(s) Oral daily  pantoprazole    Tablet 40 milliGRAM(s) Oral before breakfast  polyethylene glycol 3350 17 Gram(s) Oral at bedtime  potassium chloride    Tablet ER 20 milliEquivalent(s) Oral daily  senna 2 Tablet(s) Oral at bedtime  sodium bicarbonate 650 milliGRAM(s) Oral two times a day  thiamine 100 milliGRAM(s) Oral daily    MEDICATIONS  (PRN):  LORazepam     Tablet 0.5 milliGRAM(s) Oral two times a day PRN Anxiety  simethicone 80 milliGRAM(s) Chew every 8 hours PRN Gas      Allergies    Neupogen (Other)  clozapine (Other)  Depakote (Other)  erythromycin (Unknown)      Vital Signs Last 24 Hrs  T(C): 37 (15 Feb 2024 10:56), Max: 37.1 (15 Feb 2024 04:53)  T(F): 98.6 (15 Feb 2024 10:56), Max: 98.7 (15 Feb 2024 04:53)  HR: 68 (15 Feb 2024 10:56) (54 - 71)  BP: 145/72 (15 Feb 2024 10:56) (127/81 - 191/83)  BP(mean): --  RR: 19 (15 Feb 2024 10:56) (16 - 20)  SpO2: 97% (15 Feb 2024 10:56) (95% - 97%)    Parameters below as of 15 Feb 2024 04:53  Patient On (Oxygen Delivery Method): room air      PHYSICAL EXAM:    GENERAL: Appears  chronically ill  in bed  HEAD:  wnl  EYES: open  ENMT:   NECK: veins  flat upright  NERVOUS SYSTEM: verbal, mentation  same   CHEST/LUNG: no 02, no  wheezes  HEART: no  rub  ABDOMEN: NT, large right  hernia  same  EXTREMITIES:  diffuse  muscle  wasting  LYMPH:   SKIN: pale  : no  rahman    LABS:                        9.0    2.23  )-----------( 106      ( 14 Feb 2024 03:10 )             29.1     02-15    144  |  111<H>  |  27.6<H>  ----------------------------<  89  3.6   |  22.0  |  1.84<H>    Ca    8.4      15 Feb 2024 05:46  Mg     1.9     02-15    TPro  5.8<L>  /  Alb  3.4  /  TBili  1.4  /  DBili  x   /  AST  25  /  ALT  17  /  AlkPhos  62  02-15      Urinalysis Basic - ( 15 Feb 2024 05:46 )    Color: x / Appearance: x / SG: x / pH: x  Gluc: 89 mg/dL / Ketone: x  / Bili: x / Urobili: x   Blood: x / Protein: x / Nitrite: x   Leuk Esterase: x / RBC: x / WBC x   Sq Epi: x / Non Sq Epi: x / Bacteria: x      Magnesium: 1.9 mg/dL (02-15 @ 05:46)          RADIOLOGY & ADDITIONAL TESTS:  
Follow up     Patient is a 73y old  Female admitted for hypernatremia , MONICA     Pt is seen in am she is feeling well   no complaints  poor historian     chart reviewed     MEDICATIONS  (STANDING):  amLODIPine   Tablet 5 milliGRAM(s) Oral daily  ascorbic acid 500 milliGRAM(s) Oral daily  atorvastatin 10 milliGRAM(s) Oral at bedtime  calcitriol   Capsule 0.25 MICROGram(s) Oral daily  desmopressin Injectable 0.2 MICROGram(s) SubCutaneous two times a day  escitalopram 30 milliGRAM(s) Oral daily  levothyroxine 112 MICROGram(s) Oral daily  multivitamin 1 Tablet(s) Oral daily  OLANZapine 15 milliGRAM(s) Oral daily  pantoprazole    Tablet 40 milliGRAM(s) Oral before breakfast  polyethylene glycol 3350 17 Gram(s) Oral at bedtime  senna 2 Tablet(s) Oral at bedtime  sodium bicarbonate 650 milliGRAM(s) Oral two times a day  sodium chloride 0.225%. 1000 milliLiter(s) (120 mL/Hr) IV Continuous <Continuous>  thiamine 100 milliGRAM(s) Oral daily    MEDICATIONS  (PRN):  LORazepam     Tablet 0.5 milliGRAM(s) Oral two times a day PRN Anxiety  simethicone 80 milliGRAM(s) Chew every 8 hours PRN Gas      Vital Signs Last 24 Hrs  T(C): 37 (15 Feb 2024 10:56), Max: 37.1 (15 Feb 2024 04:53)  T(F): 98.6 (15 Feb 2024 10:56), Max: 98.7 (15 Feb 2024 04:53)  HR: 68 (15 Feb 2024 10:56) (54 - 71)  BP: 145/72 (15 Feb 2024 10:56) (127/81 - 191/83)  BP(mean): --  RR: 19 (15 Feb 2024 10:56) (16 - 20)  SpO2: 97% (15 Feb 2024 10:56) (95% - 97%)    Parameters below as of 15 Feb 2024 04:53  Patient On (Oxygen Delivery Method): room air      Constitutional: NAD  Lungs: CTA B/L, Non-labored breathing  Cardio: RRR, S1/S2, No murmur  Abdomen: Soft, Nontender, Nondistended; Bowel sounds present, hernia noted  Extremities: No calf tenderness, No pitting edema  Neuro: Awake and alert, incoherently mumbles name, states shes here, does not answer where or time        02-15    144  |  111<H>  |  27.6<H>  ----------------------------<  89  3.6   |  22.0  |  1.84<H>    Ca    8.4      15 Feb 2024 05:46  Mg     1.9     02-15    TPro  5.8<L>  /  Alb  3.4  /  TBili  1.4  /  DBili  x   /  AST  25  /  ALT  17  /  AlkPhos  62  02-15             29.1         146<H>  |  110<H>  |  29.2<H>  ----------------------------<  98  3.7   |  23.0  |  1.57<H>    Ca    8.3<L>      2024 03:10    TPro  5.8<L>  /  Alb  3.2<L>  /  TBili  1.1  /  DBili  x   /  AST  28  /  ALT  20  /  AlkPhos  56            Urinalysis Basic - ( 2024 13:22 )    Color: Yellow / Appearance: Clear / S.007 / pH: x  Gluc: x / Ketone: Negative mg/dL  / Bili: Negative / Urobili: 0.2 mg/dL   Blood: x / Protein: Trace mg/dL / Nitrite: Negative   Leuk Esterase: Negative / RBC: 0 /HPF / WBC 0 /HPF   Sq Epi: x / Non Sq Epi: 1 /HPF / Bacteria: Negative /HPF        CAPILLARY BLOOD GLUCOSE            RADIOLOGY REVIEWED  
"[birth year] - I'm 73"  calm verbal cooperative with care  discharge note appreciated in chart  no behavioral issues documented  Vital Signs Last 24 Hrs  T(C): 37.1 (15 Feb 2024 04:53), Max: 37.1 (15 Feb 2024 04:53)  T(F): 98.7 (15 Feb 2024 04:53), Max: 98.7 (15 Feb 2024 04:53)  HR: 57 (15 Feb 2024 04:53) (54 - 83)  BP: 180/79 (15 Feb 2024 04:53) (127/81 - 191/83)  BP(mean): --  RR: 20 (15 Feb 2024 04:53) (16 - 20)  SpO2: 96% (15 Feb 2024 04:53) (95% - 96%)    Parameters below as of 15 Feb 2024 04:53  Patient On (Oxygen Delivery Method): room air                          9.0    2.23  )-----------( 106      ( 14 Feb 2024 03:10 )             29.1     02-15    144  |  111<H>  |  27.6<H>  ----------------------------<  89  3.6   |  22.0  |  1.84<H>    Ca    8.4      15 Feb 2024 05:46  Mg     1.9     02-15    TPro  5.8<L>  /  Alb  3.4  /  TBili  1.4  /  DBili  x   /  AST  25  /  ALT  17  /  AlkPhos  62  02-15    LIVER FUNCTIONS - ( 15 Feb 2024 05:46 )  Alb: 3.4 g/dL / Pro: 5.8 g/dL / ALK PHOS: 62 U/L / ALT: 17 U/L / AST: 25 U/L / GGT: x             Urinalysis Basic - ( 15 Feb 2024 05:46 )    Color: x / Appearance: x / SG: x / pH: x  Gluc: 89 mg/dL / Ketone: x  / Bili: x / Urobili: x   Blood: x / Protein: x / Nitrite: x   Leuk Esterase: x / RBC: x / WBC x   Sq Epi: x / Non Sq Epi: x / Bacteria: x    MEDICATIONS  (STANDING):  amLODIPine   Tablet 5 milliGRAM(s) Oral daily  ascorbic acid 500 milliGRAM(s) Oral daily  atorvastatin 10 milliGRAM(s) Oral at bedtime  calcitriol   Capsule 0.25 MICROGram(s) Oral daily  desmopressin Injectable 0.2 MICROGram(s) SubCutaneous two times a day  escitalopram 30 milliGRAM(s) Oral daily  levothyroxine 112 MICROGram(s) Oral daily  multivitamin 1 Tablet(s) Oral daily  OLANZapine 15 milliGRAM(s) Oral daily  pantoprazole    Tablet 40 milliGRAM(s) Oral before breakfast  polyethylene glycol 3350 17 Gram(s) Oral at bedtime  senna 2 Tablet(s) Oral at bedtime  sodium bicarbonate 650 milliGRAM(s) Oral two times a day  sodium chloride 0.225%. 1000 milliLiter(s) (100 mL/Hr) IV Continuous <Continuous>  thiamine 100 milliGRAM(s) Oral daily    MEDICATIONS  (PRN):  LORazepam     Tablet 0.5 milliGRAM(s) Oral two times a day PRN Anxiety  simethicone 80 milliGRAM(s) Chew every 8 hours PRN Gas  
Hospitalist Daily Progress Note    Chief Complaint:  Patient is a 73y old  Female who presents with a chief complaint of sent in from [place] with worsening renal failure (2024 09:24)      SUBJECTIVE / OVERNIGHT EVENTS:  Patient was seen and examined at bedside.   Mumbles incoherently  Unable to do ROS.    MEDICATIONS  (STANDING):  amLODIPine   Tablet 5 milliGRAM(s) Oral daily  ascorbic acid 500 milliGRAM(s) Oral daily  atorvastatin 10 milliGRAM(s) Oral at bedtime  calcitriol   Capsule 0.25 MICROGram(s) Oral daily  desmopressin Injectable 0.2 MICROGram(s) SubCutaneous two times a day  escitalopram 30 milliGRAM(s) Oral daily  levothyroxine 112 MICROGram(s) Oral daily  multivitamin 1 Tablet(s) Oral daily  OLANZapine 15 milliGRAM(s) Oral daily  pantoprazole    Tablet 40 milliGRAM(s) Oral before breakfast  polyethylene glycol 3350 17 Gram(s) Oral at bedtime  senna 2 Tablet(s) Oral at bedtime  sodium bicarbonate 650 milliGRAM(s) Oral two times a day  sodium chloride 0.225%. 1000 milliLiter(s) (120 mL/Hr) IV Continuous <Continuous>  thiamine 100 milliGRAM(s) Oral daily    MEDICATIONS  (PRN):  LORazepam     Tablet 0.5 milliGRAM(s) Oral two times a day PRN Anxiety  simethicone 80 milliGRAM(s) Chew every 8 hours PRN Gas        I&O's Summary    2024 07:01  -  2024 07:00  --------------------------------------------------------  IN: 720 mL / OUT: 1000 mL / NET: -280 mL        PHYSICAL EXAM:  Vital Signs Last 24 Hrs  T(C): 36.9 (2024 11:21), Max: 37.2 (2024 16:20)  T(F): 98.5 (2024 11:21), Max: 98.9 (2024 16:20)  HR: 83 (2024 11:21) (60 - 83)  BP: 164/78 (2024 11:21) (155/78 - 187/94)  BP(mean): --  RR: 18 (2024 11:21) (18 - 18)  SpO2: 96% (2024 11:21) (95% - 97%)    Parameters below as of 2024 08:16  Patient On (Oxygen Delivery Method): room air      Constitutional: NAD, Chronically ill appearing female   ENT: Supple, No JVD  Lungs: CTA B/L, Non-labored breathing  Cardio: RRR, S1/S2, No murmur  Abdomen: Soft, Nontender, Nondistended; Bowel sounds present, hernia noted  Extremities: No calf tenderness, No pitting edema  Musculoskeletal:   No joint swelling  Psych: Calm, cooperative   Neuro: Awake and alert, incoherently mumbles name, states shes here, does not answer where or time. unaware of rest  Skin: No rashes; no palpable lesions    LABS:                        9.0    2.23  )-----------( 106      ( 2024 03:10 )             29.1     02-14    146<H>  |  110<H>  |  29.2<H>  ----------------------------<  98  3.7   |  23.0  |  1.57<H>    Ca    8.3<L>      2024 03:10    TPro  5.8<L>  /  Alb  3.2<L>  /  TBili  1.1  /  DBili  x   /  AST  28  /  ALT  20  /  AlkPhos  56            Urinalysis Basic - ( 2024 13:22 )    Color: Yellow / Appearance: Clear / S.007 / pH: x  Gluc: x / Ketone: Negative mg/dL  / Bili: Negative / Urobili: 0.2 mg/dL   Blood: x / Protein: Trace mg/dL / Nitrite: Negative   Leuk Esterase: Negative / RBC: 0 /HPF / WBC 0 /HPF   Sq Epi: x / Non Sq Epi: 1 /HPF / Bacteria: Negative /HPF        CAPILLARY BLOOD GLUCOSE            RADIOLOGY REVIEWED  
NEPHROLOGY INTERVAL HPI/OVERNIGHT EVENTS:    Psych follow up noted   Meds noted   DDAVP added     MEDICATIONS  (STANDING):  amLODIPine   Tablet 5 milliGRAM(s) Oral daily  ascorbic acid 500 milliGRAM(s) Oral daily  atorvastatin 10 milliGRAM(s) Oral at bedtime  calcitriol   Capsule 0.25 MICROGram(s) Oral daily  chlorhexidine 2% Cloths 1 Application(s) Topical <User Schedule>  desmopressin Injectable 0.2 MICROGram(s) SubCutaneous two times a day  escitalopram 30 milliGRAM(s) Oral daily  heparin   Injectable 5000 Unit(s) SubCutaneous every 12 hours  levothyroxine 112 MICROGram(s) Oral daily  multivitamin 1 Tablet(s) Oral daily  OLANZapine 15 milliGRAM(s) Oral daily  pantoprazole    Tablet 40 milliGRAM(s) Oral before breakfast  polyethylene glycol 3350 17 Gram(s) Oral at bedtime  potassium chloride   Powder 20 milliEquivalent(s) Oral daily  senna 2 Tablet(s) Oral at bedtime  sodium bicarbonate 650 milliGRAM(s) Oral two times a day  thiamine 100 milliGRAM(s) Oral daily    MEDICATIONS  (PRN):  LORazepam     Tablet 0.5 milliGRAM(s) Oral two times a day PRN Anxiety  simethicone 80 milliGRAM(s) Chew every 8 hours PRN Gas      Allergies    Neupogen (Other)  clozapine (Other)  Depakote (Other)  erythromycin (Unknown)    Intolerances        Vital Signs Last 24 Hrs  T(C): 36.9 (16 Feb 2024 05:05), Max: 37.4 (15 Feb 2024 15:59)  T(F): 98.5 (16 Feb 2024 05:05), Max: 99.3 (15 Feb 2024 15:59)  HR: 59 (16 Feb 2024 05:05) (59 - 65)  BP: 112/55 (16 Feb 2024 05:05) (112/55 - 134/75)  BP(mean): --  RR: 17 (16 Feb 2024 05:05) (17 - 18)  SpO2: 94% (16 Feb 2024 05:05) (94% - 98%)    Parameters below as of 15 Feb 2024 20:03  Patient On (Oxygen Delivery Method): room air      Daily     Daily   I&O's Detail    15 Feb 2024 07:01  -  16 Feb 2024 07:00  --------------------------------------------------------  IN:  Total IN: 0 mL    OUT:    Stool (mL): 1 mL    Voided (mL): 900 mL  Total OUT: 901 mL    Total NET: -901 mL      16 Feb 2024 07:01  -  16 Feb 2024 14:57  --------------------------------------------------------  IN:  Total IN: 0 mL    OUT:    Voided (mL): 350 mL  Total OUT: 350 mL    Total NET: -350 mL        I&O's Summary    15 Feb 2024 07:01  -  16 Feb 2024 07:00  --------------------------------------------------------  IN: 0 mL / OUT: 901 mL / NET: -901 mL    16 Feb 2024 07:01  -  16 Feb 2024 14:57  --------------------------------------------------------  IN: 0 mL / OUT: 350 mL / NET: -350 mL        PHYSICAL EXAM:      GENERAL: Appears  chronically ill  in bed  NECK: veins  flat upright  NERVOUS SYSTEM: verbal, mentation  same   CHEST/LUNG: no 02, no  wheezes  HEART: no  rub  ABDOMEN: NT, large right  hernia  same  EXTREMITIES:  diffuse  muscle  wasting  LABS:                        9.0    2.91  )-----------( 116      ( 16 Feb 2024 07:10 )             28.3     02-16    144  |  109<H>  |  27.1<H>  ----------------------------<  97  4.0   |  24.0  |  1.78<H>    Ca    8.9      16 Feb 2024 07:10  Mg     2.0     02-16    TPro  5.8<L>  /  Alb  3.4  /  TBili  1.4  /  DBili  x   /  AST  25  /  ALT  17  /  AlkPhos  62  02-15      Urinalysis Basic - ( 16 Feb 2024 07:10 )    Color: x / Appearance: x / SG: x / pH: x  Gluc: 97 mg/dL / Ketone: x  / Bili: x / Urobili: x   Blood: x / Protein: x / Nitrite: x   Leuk Esterase: x / RBC: x / WBC x   Sq Epi: x / Non Sq Epi: x / Bacteria: x      Magnesium: 2.0 mg/dL (02-16 @ 07:10)          RADIOLOGY & ADDITIONAL TESTS:  
"I am not hungry"  aide report's she refused breakfast Is irritable but follows direction  More alert and conversant today Recognizes me Has no specific complaints  Vital Signs Last 24 Hrs  T(C): 36.6 (14 Feb 2024 08:16), Max: 37.2 (13 Feb 2024 16:20)  T(F): 97.8 (14 Feb 2024 08:16), Max: 98.9 (13 Feb 2024 16:20)  HR: 65 (14 Feb 2024 08:16) (60 - 79)  BP: 171/76 (14 Feb 2024 08:16) (155/78 - 187/94)  BP(mean): --  RR: 18 (14 Feb 2024 08:16) (18 - 18)  SpO2: 95% (14 Feb 2024 08:16) (95% - 97%)    Parameters below as of 14 Feb 2024 08:16  Patient On (Oxygen Delivery Method): room air                          9.0    2.23  )-----------( 106      ( 14 Feb 2024 03:10 )             29.1     02-14    146<H>  |  110<H>  |  29.2<H>  ----------------------------<  98  3.7   |  23.0  |  1.57<H>    Ca    8.3<L>      14 Feb 2024 03:10    TPro  5.8<L>  /  Alb  3.2<L>  /  TBili  1.1  /  DBili  x   /  AST  28  /  ALT  20  /  AlkPhos  56  02-14    LIVER FUNCTIONS - ( 14 Feb 2024 03:10 )  Alb: 3.2 g/dL / Pro: 5.8 g/dL / ALK PHOS: 56 U/L / ALT: 20 U/L / AST: 28 U/L / GGT: x             Urinalysis Basic - ( 14 Feb 2024 03:10 )    Color: x / Appearance: x / SG: x / pH: x  Gluc: 98 mg/dL / Ketone: x  / Bili: x / Urobili: x   Blood: x / Protein: x / Nitrite: x   Leuk Esterase: x / RBC: x / WBC x   Sq Epi: x / Non Sq Epi: x / Bacteria: x  MEDICATIONS  (STANDING):  amLODIPine   Tablet 5 milliGRAM(s) Oral daily  ascorbic acid 500 milliGRAM(s) Oral daily  atorvastatin 10 milliGRAM(s) Oral at bedtime  calcitriol   Capsule 0.25 MICROGram(s) Oral daily  desmopressin Injectable 0.2 MICROGram(s) SubCutaneous two times a day  escitalopram 30 milliGRAM(s) Oral daily  levothyroxine 112 MICROGram(s) Oral daily  multivitamin 1 Tablet(s) Oral daily  OLANZapine 15 milliGRAM(s) Oral daily  pantoprazole    Tablet 40 milliGRAM(s) Oral before breakfast  polyethylene glycol 3350 17 Gram(s) Oral at bedtime  senna 2 Tablet(s) Oral at bedtime  sodium bicarbonate 650 milliGRAM(s) Oral two times a day  sodium chloride 0.225%. 1000 milliLiter(s) (120 mL/Hr) IV Continuous <Continuous>  thiamine 100 milliGRAM(s) Oral daily    MEDICATIONS  (PRN):  LORazepam     Tablet 0.5 milliGRAM(s) Oral two times a day PRN Anxiety  simethicone 80 milliGRAM(s) Chew every 8 hours PRN Gas      
seen at 0755  verbal expressing bizarre delusions about " [place] the brother last night" points to head to show wound (?)  hyperverbal and anxious but apparently cooperative with care  hospitalist and nephrology notes appreciated  renal functions being followed  Vital Signs Last 24 Hrs  T(C): 36.9 (16 Feb 2024 05:05), Max: 37.4 (15 Feb 2024 15:59)  T(F): 98.5 (16 Feb 2024 05:05), Max: 99.3 (15 Feb 2024 15:59)  HR: 59 (16 Feb 2024 05:05) (59 - 68)  BP: 112/55 (16 Feb 2024 05:05) (112/55 - 145/72)  BP(mean): --  RR: 17 (16 Feb 2024 05:05) (17 - 19)  SpO2: 94% (16 Feb 2024 05:05) (94% - 98%)    Parameters below as of 15 Feb 2024 20:03  Patient On (Oxygen Delivery Method): room air                          9.0    2.91  )-----------( 116      ( 16 Feb 2024 07:10 )             28.3     02-16    144  |  109<H>  |  27.1<H>  ----------------------------<  97  4.0   |  24.0  |  1.78<H>    Ca    8.9      16 Feb 2024 07:10  Mg     2.0     02-16    TPro  5.8<L>  /  Alb  3.4  /  TBili  1.4  /  DBili  x   /  AST  25  /  ALT  17  /  AlkPhos  62  02-15    LIVER FUNCTIONS - ( 15 Feb 2024 05:46 )  Alb: 3.4 g/dL / Pro: 5.8 g/dL / ALK PHOS: 62 U/L / ALT: 17 U/L / AST: 25 U/L / GGT: x             Urinalysis Basic - ( 16 Feb 2024 07:10 )    Color: x / Appearance: x / SG: x / pH: x  Gluc: 97 mg/dL / Ketone: x  / Bili: x / Urobili: x   Blood: x / Protein: x / Nitrite: x   Leuk Esterase: x / RBC: x / WBC x   Sq Epi: x / Non Sq Epi: x / Bacteria: x    MEDICATIONS  (STANDING):  amLODIPine   Tablet 5 milliGRAM(s) Oral daily  ascorbic acid 500 milliGRAM(s) Oral daily  atorvastatin 10 milliGRAM(s) Oral at bedtime  calcitriol   Capsule 0.25 MICROGram(s) Oral daily  chlorhexidine 2% Cloths 1 Application(s) Topical <User Schedule>  desmopressin Injectable 0.2 MICROGram(s) SubCutaneous two times a day  escitalopram 30 milliGRAM(s) Oral daily  heparin   Injectable 5000 Unit(s) SubCutaneous every 12 hours  levothyroxine 112 MICROGram(s) Oral daily  multivitamin 1 Tablet(s) Oral daily  OLANZapine 15 milliGRAM(s) Oral daily  pantoprazole    Tablet 40 milliGRAM(s) Oral before breakfast  polyethylene glycol 3350 17 Gram(s) Oral at bedtime  potassium chloride   Powder 20 milliEquivalent(s) Oral daily  senna 2 Tablet(s) Oral at bedtime  sodium bicarbonate 650 milliGRAM(s) Oral two times a day  thiamine 100 milliGRAM(s) Oral daily    MEDICATIONS  (PRN):  LORazepam     Tablet 0.5 milliGRAM(s) Oral two times a day PRN Anxiety  simethicone 80 milliGRAM(s) Chew every 8 hours PRN Gas

## 2024-02-16 NOTE — PROGRESS NOTE ADULT - REASON FOR ADMISSION
sent in from [facility] with worsening renal failure
sent in from [facility] with worsening renal failure on lab result

## 2024-02-16 NOTE — DIETITIAN INITIAL EVALUATION ADULT - PERTINENT MEDS FT
MEDICATIONS  (STANDING):  ascorbic acid 500 milliGRAM(s) Oral daily  calcitriol   Capsule 0.25 MICROGram(s) Oral daily  escitalopram 30 milliGRAM(s) Oral daily  levothyroxine 112 MICROGram(s) Oral daily  multivitamin 1 Tablet(s) Oral daily  OLANZapine 15 milliGRAM(s) Oral daily  pantoprazole    Tablet 40 milliGRAM(s) Oral before breakfast  polyethylene glycol 3350 17 Gram(s) Oral at bedtime  potassium chloride   Powder 20 milliEquivalent(s) Oral daily  senna 2 Tablet(s) Oral at bedtime  sodium bicarbonate 650 milliGRAM(s) Oral two times a day  thiamine 100 milliGRAM(s) Oral daily

## 2024-02-16 NOTE — DIETITIAN INITIAL EVALUATION ADULT - ADD RECOMMEND
1) Continue diet as tolerated.   2) Add Ensure Plus High Protein BID to optimize PO intake and provide an additional 350 kcal and 20g protein per serving.   3) Rx: MVI daily.   4) Obtain daily weights to monitor trends.

## 2024-03-22 NOTE — ED BEHAVIORAL HEALTH ASSESSMENT NOTE - NS ED BHA PLAN MSU NEED FOR 1 TO 1 ON INPATIENT UNIT YN
Immediate Brief Procedure Note    Patient: [place] Primer    Pre-op Diagnosis:  Rt pleural effusion    Post-op Diagnosis: Same    Procedure: right chest tube placement    Proceduralist: Janneth Gamino MD    Assistants:     Anesthesia Staff: No anesthesia staff entered.     Anesthesia Type: local    Findings: 25 F Partha placed    Estimated Blood Loss: none    Complications: none    Specimens Removed:
Yes

## 2024-04-03 LAB
CULTURE RESULTS: SIGNIFICANT CHANGE UP
SPECIMEN SOURCE: SIGNIFICANT CHANGE UP

## 2024-04-26 ENCOUNTER — EMERGENCY (EMERGENCY)
Facility: HOSPITAL | Age: 74
LOS: 1 days | Discharge: HOME | End: 2024-04-26
Attending: STUDENT IN AN ORGANIZED HEALTH CARE EDUCATION/TRAINING PROGRAM
Payer: MEDICARE

## 2024-04-26 VITALS
DIASTOLIC BLOOD PRESSURE: 77 MMHG | HEART RATE: 84 BPM | TEMPERATURE: 99 F | OXYGEN SATURATION: 98 % | RESPIRATION RATE: 20 BRPM | SYSTOLIC BLOOD PRESSURE: 173 MMHG

## 2024-04-26 DIAGNOSIS — K43.5 PARASTOMAL HERNIA WITHOUT OBSTRUCTION OR GANGRENE: Chronic | ICD-10-CM

## 2024-04-26 DIAGNOSIS — Z93.2 ILEOSTOMY STATUS: Chronic | ICD-10-CM

## 2024-04-26 LAB
ALBUMIN SERPL ELPH-MCNC: 3.6 G/DL — SIGNIFICANT CHANGE UP (ref 3.3–5.2)
ALP SERPL-CCNC: 58 U/L — SIGNIFICANT CHANGE UP (ref 40–120)
ALT FLD-CCNC: 16 U/L — SIGNIFICANT CHANGE UP
ANION GAP SERPL CALC-SCNC: 10 MMOL/L — SIGNIFICANT CHANGE UP (ref 5–17)
APTT BLD: 30.6 SEC — SIGNIFICANT CHANGE UP (ref 24.5–35.6)
AST SERPL-CCNC: 18 U/L — SIGNIFICANT CHANGE UP
BASE EXCESS BLDV CALC-SCNC: 3.9 MMOL/L — HIGH (ref -2–3)
BASOPHILS # BLD AUTO: 0.02 K/UL — SIGNIFICANT CHANGE UP (ref 0–0.2)
BASOPHILS NFR BLD AUTO: 0.5 % — SIGNIFICANT CHANGE UP (ref 0–2)
BILIRUB SERPL-MCNC: 0.3 MG/DL — LOW (ref 0.4–2)
BLD GP AB SCN SERPL QL: SIGNIFICANT CHANGE UP
BUN SERPL-MCNC: 32.6 MG/DL — HIGH (ref 8–20)
CA-I SERPL-SCNC: 1.28 MMOL/L — SIGNIFICANT CHANGE UP (ref 1.15–1.33)
CALCIUM SERPL-MCNC: 9.1 MG/DL — SIGNIFICANT CHANGE UP (ref 8.4–10.5)
CHLORIDE BLDV-SCNC: 106 MMOL/L — SIGNIFICANT CHANGE UP (ref 96–108)
CHLORIDE SERPL-SCNC: 104 MMOL/L — SIGNIFICANT CHANGE UP (ref 96–108)
CO2 SERPL-SCNC: 27 MMOL/L — SIGNIFICANT CHANGE UP (ref 22–29)
CREAT SERPL-MCNC: 1.98 MG/DL — HIGH (ref 0.5–1.3)
EGFR: 26 ML/MIN/1.73M2 — LOW
EOSINOPHIL # BLD AUTO: 0.1 K/UL — SIGNIFICANT CHANGE UP (ref 0–0.5)
EOSINOPHIL NFR BLD AUTO: 2.4 % — SIGNIFICANT CHANGE UP (ref 0–6)
GAS PNL BLDV: 141 MMOL/L — SIGNIFICANT CHANGE UP (ref 136–145)
GAS PNL BLDV: SIGNIFICANT CHANGE UP
GLUCOSE BLDV-MCNC: 99 MG/DL — SIGNIFICANT CHANGE UP (ref 70–99)
GLUCOSE SERPL-MCNC: 100 MG/DL — HIGH (ref 70–99)
HCO3 BLDV-SCNC: 30 MMOL/L — HIGH (ref 22–29)
HCT VFR BLD CALC: 25.8 % — LOW (ref 34.5–45)
HCT VFR BLDA CALC: 24 % — SIGNIFICANT CHANGE UP
HGB BLD CALC-MCNC: 8.1 G/DL — LOW (ref 11.7–16.1)
HGB BLD-MCNC: 8.1 G/DL — LOW (ref 11.5–15.5)
IMM GRANULOCYTES NFR BLD AUTO: 0 % — SIGNIFICANT CHANGE UP (ref 0–0.9)
INR BLD: 1.17 RATIO — SIGNIFICANT CHANGE UP (ref 0.85–1.18)
LACTATE BLDV-MCNC: 0.8 MMOL/L — SIGNIFICANT CHANGE UP (ref 0.5–2)
LIDOCAIN IGE QN: 116 U/L — HIGH (ref 22–51)
LYMPHOCYTES # BLD AUTO: 1.07 K/UL — SIGNIFICANT CHANGE UP (ref 1–3.3)
LYMPHOCYTES # BLD AUTO: 25.5 % — SIGNIFICANT CHANGE UP (ref 13–44)
MCHC RBC-ENTMCNC: 29.9 PG — SIGNIFICANT CHANGE UP (ref 27–34)
MCHC RBC-ENTMCNC: 31.4 GM/DL — LOW (ref 32–36)
MCV RBC AUTO: 95.2 FL — SIGNIFICANT CHANGE UP (ref 80–100)
MONOCYTES # BLD AUTO: 0.53 K/UL — SIGNIFICANT CHANGE UP (ref 0–0.9)
MONOCYTES NFR BLD AUTO: 12.6 % — SIGNIFICANT CHANGE UP (ref 2–14)
NEUTROPHILS # BLD AUTO: 2.47 K/UL — SIGNIFICANT CHANGE UP (ref 1.8–7.4)
NEUTROPHILS NFR BLD AUTO: 59 % — SIGNIFICANT CHANGE UP (ref 43–77)
PCO2 BLDV: 55 MMHG — HIGH (ref 39–42)
PH BLDV: 7.34 — SIGNIFICANT CHANGE UP (ref 7.32–7.43)
PLATELET # BLD AUTO: 138 K/UL — LOW (ref 150–400)
PO2 BLDV: 110 MMHG — HIGH (ref 25–45)
POTASSIUM BLDV-SCNC: 3.4 MMOL/L — LOW (ref 3.5–5.1)
POTASSIUM SERPL-MCNC: 3.6 MMOL/L — SIGNIFICANT CHANGE UP (ref 3.5–5.3)
POTASSIUM SERPL-SCNC: 3.6 MMOL/L — SIGNIFICANT CHANGE UP (ref 3.5–5.3)
PROT SERPL-MCNC: 5.8 G/DL — LOW (ref 6.6–8.7)
PROTHROM AB SERPL-ACNC: 12.9 SEC — SIGNIFICANT CHANGE UP (ref 9.5–13)
RBC # BLD: 2.71 M/UL — LOW (ref 3.8–5.2)
RBC # FLD: 13.7 % — SIGNIFICANT CHANGE UP (ref 10.3–14.5)
SAO2 % BLDV: 97.2 % — SIGNIFICANT CHANGE UP
SODIUM SERPL-SCNC: 141 MMOL/L — SIGNIFICANT CHANGE UP (ref 135–145)
WBC # BLD: 4.19 K/UL — SIGNIFICANT CHANGE UP (ref 3.8–10.5)
WBC # FLD AUTO: 4.19 K/UL — SIGNIFICANT CHANGE UP (ref 3.8–10.5)

## 2024-04-26 PROCEDURE — 99285 EMERGENCY DEPT VISIT HI MDM: CPT | Mod: GC

## 2024-04-26 RX ORDER — IOHEXOL 300 MG/ML
30 INJECTION, SOLUTION INTRAVENOUS ONCE
Refills: 0 | Status: COMPLETED | OUTPATIENT
Start: 2024-04-26 | End: 2024-04-26

## 2024-04-26 RX ORDER — ACETAMINOPHEN 500 MG
1000 TABLET ORAL ONCE
Refills: 0 | Status: COMPLETED | OUTPATIENT
Start: 2024-04-26 | End: 2024-04-27

## 2024-04-26 RX ADMIN — IOHEXOL 30 MILLILITER(S): 300 INJECTION, SOLUTION INTRAVENOUS at 21:21

## 2024-04-26 NOTE — CHART NOTE - NSCHARTNOTEFT_GEN_A_CORE
Chart review for patient from [facility] ([facility]). Patient well known to Ethics Service from prior admissions.     The patient is under the auspices of Office of Mental Health. Patient completed a Health Care Proxy (HCP) Form on 9/4/2014. Patient has named her brother, Tom Toribio ([phone]) as her HCP, and her brother Sukh Toribio ([phone]) as her alternate HCP. If patient is without capacity she has protected her autonomy by naming her brothers as HCPs to make medical decisions for her.     IF end of life decisions need to be made and the patient has capacity, she could complete a MOLST without the need for MHLS ([facility]) review. If she does not have capacity, her brother/HCP could request a MOLST be completed, if indicated. Of note, the patient did not indicate on the health care proxy form that her proxy is aware of her wishes regarding artificial nutrition and hydration (HANNA). Any decisions regarding HANNA would have to be reviewed and approved by [facility].  Ethics and Palliative Care can assist with this.     Jennifer Pearl MA  Ethics Fellow  [phone]

## 2024-04-26 NOTE — ED PROVIDER NOTE - NSFOLLOWUPINSTRUCTIONS_ED_ALL_ED_FT
Take stool softener daily to promote bowel regularity    -Please follow-up with your primary care doctor in the next 2 days.  Please call tomorrow for an appointment.  If you cannot follow-up with your primary care doctor please return to the ED for any urgent issues.  - You were given a copy of the tests performed today.  Please bring the results with you and review them with your primary care doctor.  - If you have any worsening of symptoms or any other concerns please return to the ED immediately.  - Please continue taking your home medications as directed.    Constipation    Constipation is when a person has fewer than three bowel movements a week, has difficulty having a bowel movement, or has stools that are dry, hard, or larger than normal. Other symptoms can include abdominal pain or bloating. As people grow older, constipation is more common. A low-fiber diet, not taking in enough fluids, and taking certain medicines, including opioid painkillers, may make constipation worse. Treatment varies but may include dietary modifications (more fiber-rich foods), lifestyle modifications, and possible medications.     SEEK IMMEDIATE MEDICAL CARE IF YOU HAVE ANY OF THE FOLLOWING SYMPTOMS: bright red blood in your stool, constipation for longer than 4 days, abdominal or rectal pain, unexplained weight loss, or inability to pass gas.

## 2024-04-26 NOTE — ED PROVIDER NOTE - PROGRESS NOTE DETAILS
Pt received in sign out. CT was concerning for strangulation within her chronic hernia. Surgery evaluated patient. No change from prior CT, labs and exam no consistent w/ strangulation. Recommending bowel regimen outpatient. Spoke w/ [facility] who accept patient back. -Jeanine Pt received in sign out. CT was concerning for strangulation within her chronic hernia. Surgery evaluated patient. No change from prior CT, labs and exam no consistent w/ strangulation. Recommending bowel regimen outpatient. Spoke w/ [facility] who accept patient back. -Jeanine    I personally saw the patient with the resident  Pt signed out to me pending surgery consult, who cleared patient back to [facility] no acute findings or intervnetions needed at this time

## 2024-04-26 NOTE — ED PROVIDER NOTE - OBJECTIVE STATEMENT
75 yo F hx of schizophrenia, hypothyroidism, HTN, nephrogenic DI, CKD, abdominal hernia with hx of SBO Sent in from [place] for abdominal pain no bowel movement today.  patient report diffuse lower abdominal pain, no dysuria.  No nausea or vomiting.  She was sent in to rule out obstruction.

## 2024-04-26 NOTE — ED PROVIDER NOTE - PHYSICAL EXAMINATION
VITAL SIGNS: I have reviewed nursing notes and confirm.  CONSTITUTIONAL:  in no acute distress.  SKIN: Skin exam is warm and dry, no acute rash.  HEAD: Normocephalic; atraumatic.  EYES: PERRL, EOM intact; conjunctiva and sclera clear.  ENT: No nasal discharge; airway clear. Throat clear.  NECK: Supple; non tender.    CARD: Regular rate and rhythm.  RESP: No wheezes,  no rales or rhonchi.   ABD:  soft; (+) distended abdomen with multiple abdominal wall hernia.   EXT: Normal ROM. No clubbing, cyanosis or edema.  NEURO: Alert, oriented. Grossly unremarkable. No focal deficits.  moves all extremities,    PSYCH: Cooperative,

## 2024-04-26 NOTE — ED ADULT NURSE NOTE - OBJECTIVE STATEMENT
patient presents from [place] for RLQ abd pain for rule out of bowel obstruction. patient denies nausea, vomiting or diarrhea. Aide at bedside

## 2024-04-26 NOTE — ED PROVIDER NOTE - PATIENT PORTAL LINK FT
You can access the FollowMyHealth Patient Portal offered by [facility] by registering at the following website: [email or web address]. By joining [facility]’s FollowMyHealth portal, you will also be able to view your health information using other applications (apps) compatible with our system.

## 2024-04-26 NOTE — ED PROVIDER NOTE - CLINICAL SUMMARY MEDICAL DECISION MAKING FREE TEXT BOX
73 yo F hx of schizophrenia, hypothyroidism, HTN, nephrogenic DI, CKD, abdominal hernia with hx of SBO Sent in from [place] for abdominal pain no bowel movement today.  patient report diffuse lower abdominal pain, no dysuria.  No nausea or vomiting.  She was sent in to rule out obstruction.    patient has  distended abdomen with multiple abdominal wall hernia. Lab values do not require emergent intervention. pending CT abdomen to r/o obstruction. IV tyelnol given for pain.

## 2024-04-26 NOTE — ED ADULT TRIAGE NOTE - CHIEF COMPLAINT QUOTE
Pt brought by [facility] c/o RLQ abd pain started today - pt was send to r/o abstraction- pt at her baseline

## 2024-04-27 VITALS
SYSTOLIC BLOOD PRESSURE: 166 MMHG | OXYGEN SATURATION: 96 % | DIASTOLIC BLOOD PRESSURE: 74 MMHG | TEMPERATURE: 98 F | RESPIRATION RATE: 19 BRPM

## 2024-04-27 PROCEDURE — 86900 BLOOD TYPING SEROLOGIC ABO: CPT

## 2024-04-27 PROCEDURE — 85730 THROMBOPLASTIN TIME PARTIAL: CPT

## 2024-04-27 PROCEDURE — 93010 ELECTROCARDIOGRAM REPORT: CPT

## 2024-04-27 PROCEDURE — 82803 BLOOD GASES ANY COMBINATION: CPT

## 2024-04-27 PROCEDURE — 82330 ASSAY OF CALCIUM: CPT

## 2024-04-27 PROCEDURE — 80053 COMPREHEN METABOLIC PANEL: CPT

## 2024-04-27 PROCEDURE — 83605 ASSAY OF LACTIC ACID: CPT

## 2024-04-27 PROCEDURE — 74176 CT ABD & PELVIS W/O CONTRAST: CPT | Mod: 26,MC

## 2024-04-27 PROCEDURE — 85610 PROTHROMBIN TIME: CPT

## 2024-04-27 PROCEDURE — 93005 ELECTROCARDIOGRAM TRACING: CPT

## 2024-04-27 PROCEDURE — 84295 ASSAY OF SERUM SODIUM: CPT

## 2024-04-27 PROCEDURE — 99285 EMERGENCY DEPT VISIT HI MDM: CPT | Mod: 25

## 2024-04-27 PROCEDURE — 36415 COLL VENOUS BLD VENIPUNCTURE: CPT

## 2024-04-27 PROCEDURE — 85014 HEMATOCRIT: CPT

## 2024-04-27 PROCEDURE — 84132 ASSAY OF SERUM POTASSIUM: CPT

## 2024-04-27 PROCEDURE — 82947 ASSAY GLUCOSE BLOOD QUANT: CPT

## 2024-04-27 PROCEDURE — 82435 ASSAY OF BLOOD CHLORIDE: CPT

## 2024-04-27 PROCEDURE — 96374 THER/PROPH/DIAG INJ IV PUSH: CPT

## 2024-04-27 PROCEDURE — 85025 COMPLETE CBC W/AUTO DIFF WBC: CPT

## 2024-04-27 PROCEDURE — 86850 RBC ANTIBODY SCREEN: CPT

## 2024-04-27 PROCEDURE — 74176 CT ABD & PELVIS W/O CONTRAST: CPT | Mod: MC

## 2024-04-27 PROCEDURE — 83690 ASSAY OF LIPASE: CPT

## 2024-04-27 PROCEDURE — 86901 BLOOD TYPING SEROLOGIC RH(D): CPT

## 2024-04-27 PROCEDURE — 99281 EMR DPT VST MAYX REQ PHY/QHP: CPT

## 2024-04-27 PROCEDURE — 85018 HEMOGLOBIN: CPT

## 2024-04-27 RX ADMIN — Medication 400 MILLIGRAM(S): at 01:15

## 2024-04-27 RX ADMIN — Medication 1000 MILLIGRAM(S): at 02:16

## 2024-04-27 NOTE — CONSULT NOTE ADULT - CONSULT REQUESTED DATE/TIME
27-Apr-2024 03:59 Neurology Progress Note    Chief Complaint   Patient presents with   • Neurologic Problem       History Of Present Illness  Ms. Marr is a 73 y/o woman with no known PMH who p/w dysarthria and a mechanical fall off her bed (no LOC/headstrike).  Patient denies HA, dizziness, nausea, tinnitus, neck pain/stiffness, dysarthria, dysphagia, word-finding difficulty, diplopia, blurry vision, numbness, tingling or weakness.      Subjective  No acute events overnight.    Review of Systems  14 point ROS performed and all negative except as indicated in the HPI.      Past Medical History  History reviewed. No pertinent past medical history.     Surgical History  History reviewed. No pertinent surgical history.     Social History  Social History     Tobacco Use   • Smoking status: Every Day     Current packs/day: 0.50     Average packs/day: 0.5 packs/day     Types: Cigarettes     Start date: 8/16/2023   • Smokeless tobacco: Never   Vaping Use   • Vaping Use: never used   Substance Use Topics   • Alcohol use: Never   • Drug use: Never       Family History  History reviewed. No pertinent family history.     Allergies  ALLERGIES:  Patient has no allergy information on record.    Home Medications  No medications prior to admission.       Inpatient Medications  Current Facility-Administered Medications   Medication Dose Route Frequency Provider Last Rate Last Admin   • sodium chloride (PF) 0.9 % injection 10 mL  10 mL Injection 2 times per day Boogie Soto MD   10 mL at 08/30/23 0839   • aspirin (ECOTRIN) enteric coated tablet 81 mg  81 mg Oral Daily Ulices Armijo MD   81 mg at 08/30/23 0837   • enoxaparin (LOVENOX) injection 40 mg  40 mg Subcutaneous Nightly Ulices Armijo MD   40 mg at 08/29/23 2037   • oxacillin 2 g in sodium chloride 0.9 % 100 mL IVPB  2,000 mg Intravenous 6 times per day Germán Hamm  mL/hr at 08/30/23 0846 2 g at 08/30/23 0846   • lidocaine (LIDOCARE) 4 % patch 1 patch  1 patch Transdermal Daily  Ulices Armijo MD   1 patch at 08/30/23 0838   • atorvastatin (LIPITOR) tablet 40 mg  40 mg Oral Nightly Ulices Armijo MD   40 mg at 08/29/23 2037   • sodium chloride (PF) 0.9 % injection 2 mL  2 mL Intracatheter 2 times per day Ulices Armijo MD   2 mL at 08/30/23 0839   • Potassium Standard Replacement Protocol (Levels 3.5 and lower)   Does not apply See Admin Instructions Ulices Armijo MD       • Potassium Replacement (Levels 3.6 - 4)   Does not apply See Admin Instructions Ulices Armijo MD       • Magnesium Standard Replacement Protocol   Does not apply See Admin Instructions Ulices Armijo MD       • Phosphorus Standard Replacement Protocol   Does not apply See Admin Instructions Ulices Armijo MD       • nicotine (NICODERM) 14 MG/24HR patch 1 patch  1 patch Transdermal Daily Ulices Armijo MD   1 patch at 08/30/23 0838     Current Facility-Administered Medications   Medication Dose Route Frequency Provider Last Rate Last Admin   • sodium chloride (PF) 0.9 % injection 10 mL  10 mL Injection PRN Boogie Soto MD       • sodium chloride (PF) 0.9 % injection 20 mL  20 mL Injection PRN Boogie Soto MD       • sodium chloride 0.9 % flush bag 25 mL  25 mL Intravenous PRN Ulices Armijo MD       • sodium chloride (NORMAL SALINE) 0.9 % bolus 500 mL  500 mL Intravenous PRN Ulices Armijo MD       • HYDROcodone-acetaminophen (NORCO) 5-325 MG per tablet 1 tablet  1 tablet Oral Daily PRN Ulices Armijo MD   1 tablet at 08/23/23 1808   • polyethylene glycol (MIRALAX) packet 17 g  17 g Oral Daily PRN Ulices Armijo MD       • ondansetron (ZOFRAN ODT) disintegrating tablet 4 mg  4 mg Oral BID PRN Ulices Armijo MD       • melatonin tablet 6 mg  6 mg Oral Nightly PRN Ulices Armijo MD       • acetaminophen (TYLENOL) tablet 650 mg  650 mg Oral Q4H PRN Ulices Armijo MD   650 mg at 08/24/23 0709   • hydrALAZINE (APRESOLINE) tablet 25 mg  25 mg Oral TID PRN Ulices Armijo, MD       • guaiFENesin-DM (ROBITUSSIN DM) 100-10 MG/5ML syrup 10  mL  10 mL Oral Q4H PRN Ulices Armijo MD            Last Recorded Vitals  Visit Vitals  BP 96/65 (BP Location: LUE - Left upper extremity, Patient Position: Supine)   Pulse 92   Temp 99.9 °F (37.7 °C) (Oral)   Resp 16   Ht 5' 5\" (1.651 m)   Wt 64.9 kg (143 lb)   SpO2 97%   BMI 23.80 kg/m²       Physical Exam  GEN: Pt is in no pain or distress, pleasant, cooperative  CVS: Regular rate and rhythm  CHEST: No signs of resp distress, on room air  ABD: Soft, non-tender, non-distended  SKIN: no obvious rash or lesion    Neuro Exam  MENTAL STATUS: Pt is awake, alert, oriented to person, place and time, remote and recent memory intact, fund of knowledge appropriate, conversant and appropriate  LANGUAGE/SPEECH: No dysarthria, naming and repetition intact, fluent speech, follows 3-step lateralizing complex commands  CRANIAL NERVES:   II: Pupils equal and reactive, no afferent or relative afferent pupillary defect, visual fields intact to confrontation in all four quadrants     III, IV, VI: extra-ocular movements intact in all directions, no gaze preference or deviation, no nystagmus     V: normal sensation in V1, V2, and V3 segments bilaterally to light touch   VII: face is symmetric at rest and with facial expression   VIII: normal hearing to speech   IX, X: uvula and palate elevate normally and symmetrically normal    XI: 5/5 head turn and 5/5 shoulder shrug bilaterally   XII: midline tongue protrusion with no deviation  MOTOR: Normal bulk and tone.    RIGHT: 5/5 muscle power in shoulder abductors/adductors, elbow flexors/extensors, wrist flexors/extensors, finger abductors/adductors.  5/5 in hip flexors/extensors, knee flexors/extensors, ankle dorsiflexors and planter flexors.  LEFT: 5/5 muscle power in shoulder abductors/adductors, elbow flexors/extensors, wrist flexors/extensors, finger abductors/adductors.  5/5 in Lt hip flexors/extensors, knee flexors/extensors, ankle dorsiflexors and planter flexors.  REFLEXES: 2+ and  symmetric throughout, bilateral flexor planter response, no Keith's, no clonus  SENSORY: Intact and symmetric to light touch, pinprick, vibration, and temp all limbs. No hemineglect  COORDINATION: Normal finger to nose and heel to shin, no tremor, no dysmetria  STATION: deferred  GAIT: deferred    Labs     Recent Results (from the past 24 hour(s))   Potassium    Collection Time: 08/30/23  5:39 AM   Result Value Ref Range    Potassium 4.2 3.4 - 5.1 mmol/L   Phosphorus    Collection Time: 08/30/23  5:39 AM   Result Value Ref Range    Phosphorus 3.5 2.4 - 4.7 mg/dL   Magnesium    Collection Time: 08/30/23  5:39 AM   Result Value Ref Range    Magnesium 1.8 1.7 - 2.4 mg/dL   Basic Metabolic Panel    Collection Time: 08/30/23  5:39 AM   Result Value Ref Range    Fasting Status      Sodium 137 135 - 145 mmol/L    Potassium 4.3 3.4 - 5.1 mmol/L    Chloride 106 97 - 110 mmol/L    Carbon Dioxide 25 21 - 32 mmol/L    Anion Gap 10 7 - 19 mmol/L    Glucose 103 (H) 70 - 99 mg/dL    BUN 6 6 - 20 mg/dL    Creatinine 0.72 0.51 - 0.95 mg/dL    Glomerular Filtration Rate 90 >=60    BUN/Cr 8 7 - 25    Calcium 9.5 8.4 - 10.2 mg/dL   CBC with Automated Differential (performable only)    Collection Time: 08/30/23  5:39 AM   Result Value Ref Range    WBC 11.8 (H) 4.2 - 11.0 K/mcL    RBC 2.97 (L) 4.00 - 5.20 mil/mcL    HGB 9.4 (L) 12.0 - 15.5 g/dL    HCT 27.9 (L) 36.0 - 46.5 %    MCV 93.9 78.0 - 100.0 fl    MCH 31.6 26.0 - 34.0 pg    MCHC 33.7 32.0 - 36.5 g/dL    RDW-CV 15.2 (H) 11.0 - 15.0 %    RDW-SD 52.3 (H) 39.0 - 50.0 fL     140 - 450 K/mcL    NRBC 0 <=0 /100 WBC   Manual Differential    Collection Time: 08/30/23  5:39 AM   Result Value Ref Range    Neutrophil, Percent 51 %    Lymphocytes, Percent 19 %    Mono, Percent 5 %    Eosinophils, Percent 1 %    Bands, Percent 20 (H) 0 - 10 %    Metamyelocytes, Percent  3 (H) 0 - 2 %    Myelocytes, Percent 1 (H) <=0 %    Absolute Neutrophil 8.4 (H) 1.8 - 7.7 K/mcL    Absolute  Lymphocytes 2.2 1.0 - 4.0 K/mcL    Absolute Monocytes 0.6 0.3 - 0.9 K/mcL    Absolute Eosinophils 0.1 0.0 - 0.5 K/mcL       Imaging  MRI THORACIC SPINE W WO CONTRAST   Final Result      1. At level C6-C7 there is mild to moderate concentric stenosis and neural   foramen encroachment due to degenerative spondylosis at this level   described above and there is mild degenerative spondylosis mid and lower   thoracic spine.   2. Small bilateral pleural effusions.            Electronically Signed by: JOSE ENCARNACION M.D.    Signed on: 8/29/2023 10:26 AM    Workstation ID: [id number]      MRI LUMBAR SPINE W WO CONTRAST   Final Result         1. Small central disc protrusion at level L3-L4 and there is multilevel   degenerative lumbar spondylosis as described above.   2. Probable mildly distended bladder with fluid fluid level partially   imaged. Recommend correlation with clinical assessment and if additional   imaging is warranted, ultrasound examination of the pelvis/bladder is   recommended to characterize this finding further.      Electronically Signed by: JOSE ENCARNACION M.D.    Signed on: 8/29/2023 10:12 AM    Workstation ID: [id number]      MRI BRAIN WO CONTRAST   Final Result      1. Chronic periventricular and deep white matter ischemic change.   2. Punctate chronic microhemorrhage deep white matter left frontal lobe.   3. Mild volume loss.   4. Mild mucosal thickening ethmoid air cells.            Electronically Signed by: JOSE ENCARNACION M.D.    Signed on: 8/29/2023 10:17 AM    Workstation ID: [id number]      XR KNEE 3 VIEW LEFT   Final Result   FINDINGS/IMPRESSION: Mild degenerative changes of left knee are identified.   No definitive acute fracture or dislocation of visualized left knee is   seen.  Recommend clinical correlation and followup as clinically warranted.      Electronically Signed by: JUSTINE MCCLURE M.D.    Signed on: 8/24/2023 6:19 PM    Workstation ID: [id number]      XR WRIST 3 OR MORE VIEWS  LEFT   Final Result   FINDINGS/IMPRESSION: Probable ulnar negative variance and degenerative   changes of left first metacarpal carpal joint are identified. No definitive   acute fracture or dislocation of visualized left wrist is seen.  Recommend   clinical correlation and followup as clinically warranted.      Electronically Signed by: JUSTINE MCCLURE M.D.    Signed on: 8/24/2023 6:23 PM    Workstation ID: [id number]      CTA HEAD AND NECK   Final Result      1. At level C5-C6, there is moderate concentric stenosis and bilateral   neuroforaminal encroachment due to central calcified disc protrusion and/or   partial calcification of the annulus fibrosis and degenerative spondylosis   at this level as described above. There is less pronounced stenosis and   neuroforaminal encroachment level C3-C4, C4-C5 and C6-C7 due to   degenerative spondylosis at these levels as described above. If additional   imaging is warranted, MRI examination of the cervical spine could be   considered to characterize findings further.   2. Mild dependent edema or atelectasis posterior aspect left and right   upper lobes.      No evidence of large vessel occlusion at level of the head and neck.      Electronically Signed by: JOSE ENCARNACION M.D.    Signed on: 8/24/2023 4:39 PM    Workstation ID: [id number]      CT CHEST ABDOMEN PELVIS W CONTRAST   Final Result      1. Mild infiltrate/atelectasis posterior aspect left lower lobe at the left   lung base and there is mild linear atelectasis or fibrosis posterior aspect   right lung base.   2. Coronary atherosclerosis, atherosclerosis thoracic aorta, abdominal   aorta and iliac vessels.   3. There is a 2 cm hypodense lesion within the midpole right kidney with   Hounsfield units quantified above and may represent hyperdense cyst;   however I cannot entirely exclude solid lesion/neoplastic lesion. If   warranted, ultrasound examination with attention to the midpole right   kidney is recommended  in attempt to characterize this finding further.   4. Several subcentimeter hypodense lesions within the liver and spleen   which are too small to characterize but statistically most likely represent   benign lesions. Recommend correlation with clinical assessment and   follow-up imaging as warranted.   5. Atherosclerosis abdominal aorta and iliac vessels.   6. Degenerative spondylosis as described above.      Electronically Signed by: JOSE ENCARNACION M.D.    Signed on: 8/25/2023 10:21 AM    Workstation ID: [id number]      US LIVER GALLBLADDER PANCREAS (RUQ)   Final Result      1.   Normal Ultrasound examination of the gallbladder, liver, and   visualized pancreas.         FOR PHYSICIAN USE ONLY - Please note that this report was generated using   voice recognition software.  If you require clarification or feel that   there has been an error in this report please contact me through [employer].  Thank you very much for allowing me to participate in the care of   your patient.          Electronically Signed by: CHIDI STINSON M.D.    Signed on: 8/23/2023 9:51 PM    Workstation ID: [id number]      XR CHEST PA OR AP 1 VIEW   Final Result   No definitive acute cardiopulmonary findings.  Recommend   followup as clinically warranted.      Electronically Signed by: JUSTINE MCCLURE M.D.    Signed on: 8/23/2023 11:17 AM    Workstation ID: [id number]      CT HEAD WO CONTRAST   Final Result   1.   No acute intracranial hemorrhage, mass effect, or midline shift.   2.   Mild volume loss and senescent changes, as discussed.      FOR PHYSICIAN USE ONLY - Please note that this report was generated using   voice recognition software.  If you require clarification or feel that   there has been an error in this report please contact me through   [employer].  Thank you very much for allowing me to participate in the   care of your patient.          Electronically Signed by: SLIM DUFFY M.D.    Signed on: 8/23/2023 11:15  AM    Workstation ID: [id number]          TESTS REVIEWED: MRI brain    ASSESSMENT AND PLAN:  Ms. Marr is a 73 y/o woman with no known PMH who p/w dysarthria and a mechanical fall off her bed (no LOC/headstrike). Exam continues to be nonfocal.  CTH with no acute pathology.  CTA H/N with moderate stenosis at C5-C6, and less pronounced stenosis at C3-C4, C4-C5 and C6-C7, but no LVO at level of the head and neck.  MRI brain with no acute findings.    [] ASA 81 mg daily, statin for goal LDL<70  [] goal normotension  [] PT/OT        DVT prophylaxis: SCD's, Encourage Ambulation, enoxaparin - 40 MG/0.4ML   Code Status:   Code Status Information     Code Status    Full Resuscitation           Please page Dr. Torres for any acute neurological changes, questions, or concerns.    HARMONY Salvador

## 2024-04-27 NOTE — CONSULT NOTE ADULT - ATTENDING COMMENTS
Patient seen and examined in ED. Well known to ACS service. Constipated on exam, had BM and passing gas per pt, no pain, on exam hernia present, abd soft, NTTP, ND, labs WNL, no leukocytosis, normal lactate, CT scan unchanged from prior CTs. Needs bowel regimen. No indication for admission from a surgical standpoint. Dispo per ED.

## 2024-04-27 NOTE — CONSULT NOTE ADULT - ASSESSMENT
74 year old female known to the surgical service, from [place] with multiple abdominal surgeries, multiple admission for partial SBO managed conservatively.  sent from [facility] for 4 days abdominal pain  No nausea/ vomiting, having bowel function  Denies dysuria  on evaluation is not having abdominal pain  Physcial exam vitals are stable, abdominal exam benign with no signs of incarceration of the complex hernia, able to reduce the hernia, non tender and non peritonitic abdomen  labs with no leucocytosis, lactate normal.  CT scan with oral contrast, able to see contrast passing to large bowel passed the hernial component, evidence of fecal material indicating constipation    Patient seems to have constipation, would suggest good bowel regimen, there is no indication for any acute surgical intervention.    Case seen and discussed with Dr Villegas and ED attending.

## 2024-04-27 NOTE — CONSULT NOTE ADULT - SUBJECTIVE AND OBJECTIVE BOX
ACUTE CARE SURGERY CONSULT    HPI:    74 year old female known to the surgical service, from [place] with multiple abdominal surgeries, multiple admission for partial SBO managed conservatively.  sent from [facility] for 4 days abdominal pain  No nausea/ vomiting, having bowel function  Denies dysuria  on evaluation is not having abdominal pain      PAST MEDICAL HISTORY:  CVA (cerebral vascular accident)    Hypertension    Obesity    Venous insufficiency (chronic) (peripheral)    Neutropenia    Constipation    Sensory hearing loss    Vaginal prolapse without mention of uterine prolapse    GERD (gastroesophageal reflux disease)    Osteopenia    Hypothyroidism    Schizoaffective disorder, bipolar type    Suicide attempt    Behavior problems    Hemiparesis, left    Seizure    Hypothyroid    Osteopenia    Constipation    Fall    Vaginal prolapse    Neutropenia    Anemia    Splenomegaly    Venous insufficiency    CVA (cerebral vascular accident)    HTN (hypertension)    GERD (gastroesophageal reflux disease)    Obesity    Urinary incontinence    Schizo affective schizophrenia    Displaced fracture of olecranon process with intraarticular extension of left ulna    Urinary bladder incontinence    Venous insufficiency    Rectal prolapse    Onychomycosis    Uterine prolapse        PAST SURGICAL HISTORY:  No significant past surgical history    H/O ileostomy    Parastomal hernia without obstruction or gangrene        ALLERGIES:  Depakote (Other)  erythromycin (Unknown)  clozapine (Other)  Neupogen (Other)      FAMILY HISTORY: Noncontributory    SOCIAL HISTORY: Denies tobacco, EtOH, illicit substance use.     HOME MEDICATIONS:    MEDICATIONS  (STANDING):    MEDICATIONS  (PRN):      VITALS & I/Os:  Vital Signs Last 24 Hrs  T(C): 36.9 (26 Apr 2024 23:37), Max: 37.1 (26 Apr 2024 18:40)  T(F): 98.5 (26 Apr 2024 23:37), Max: 98.7 (26 Apr 2024 18:40)  HR: 84 (26 Apr 2024 18:40) (84 - 84)  BP: 101/63 (26 Apr 2024 23:37) (101/63 - 173/77)  BP(mean): 82 (26 Apr 2024 21:22) (82 - 82)  RR: 18 (26 Apr 2024 23:37) (18 - 20)  SpO2: 94% (26 Apr 2024 23:37) (94% - 98%)    Parameters below as of 26 Apr 2024 23:37  Patient On (Oxygen Delivery Method): room air      CAPILLARY BLOOD GLUCOSE          I&O's Summary      GENERAL: Alert,  in no acute distress.  Unlabored breathing  RRR  Abdomen with complex ventral hernias, reducible, non peritonitic and no guarding and non tender      LABS:                        8.1    4.19  )-----------( 138      ( 26 Apr 2024 20:12 )             25.8     04-26    141  |  104  |  32.6<H>  ----------------------------<  100<H>  3.6   |  27.0  |  1.98<H>    Ca    9.1      26 Apr 2024 20:12    TPro  5.8<L>  /  Alb  3.6  /  TBili  0.3<L>  /  DBili  x   /  AST  18  /  ALT  16  /  AlkPhos  58  04-26    Lactate:    PT/INR - ( 26 Apr 2024 20:12 )   PT: 12.9 sec;   INR: 1.17 ratio         PTT - ( 26 Apr 2024 20:12 )  PTT:30.6 sec        04-26 @ 20:12  0.80    Urinalysis Basic - ( 26 Apr 2024 20:12 )    Color: x / Appearance: x / SG: x / pH: x  Gluc: 100 mg/dL / Ketone: x  / Bili: x / Urobili: x   Blood: x / Protein: x / Nitrite: x   Leuk Esterase: x / RBC: x / WBC x   Sq Epi: x / Non Sq Epi: x / Bacteria: x        IMAGING:

## 2024-05-24 NOTE — PHYSICAL THERAPY INITIAL EVALUATION ADULT - PRECAUTIONS/LIMITATIONS, REHAB EVAL
Anesthesia Post Evaluation    Patient: Melani Holloway    Procedure(s) Performed: Procedure(s) (LRB):  XI ROBOTIC LEFT UPPER LOBECTOMY (Left)  LYMPHADENECTOMY (N/A)  BLOCK, NERVE, INTERCOSTAL, 2 OR MORE (Left)  BRONCHOSCOPY (Left)    Final Anesthesia Type: general      Patient location during evaluation: ICU  Patient participation: Yes- Able to Participate  Level of consciousness: awake  Post-procedure vital signs: reviewed and stable  Pain management: adequate  Airway patency: patent    PONV status at discharge: No PONV  Anesthetic complications: no      Cardiovascular status: hemodynamically stable  Respiratory status: nasal cannula  Follow-up not needed.              Vitals Value Taken Time   /57 05/24/24 0602   Temp 36.8 °C (98.3 °F) 05/24/24 0300   Pulse 59 05/24/24 0643   Resp 29 05/24/24 0643   SpO2 96 % 05/24/24 0643   Vitals shown include unfiled device data.      Event Time   Out of Recovery 18:24:00         Pain/Tara Score: Pain Rating Prior to Med Admin: 3 (5/24/2024  6:40 AM)  Pain Rating Post Med Admin: 2 (5/23/2024 11:27 PM)  Tara Score: 8 (5/23/2024  3:30 PM)          
fall precautions

## 2024-05-30 NOTE — DISCHARGE NOTE ADULT - DO YOU HAVE DIFFICULTY CLIMBING STAIRS
Patient agreeable to DOS: 12/17/24 right total knee arthroplasty, she did not feel that a follow up was needed by Dr. Elise to go over pre-op instruction. Patient had left total knee done on 3/5/24, she will  bag/folder sometime in September. I will leave this up in the drawer at [address], pt to call if she has any questions on pre-op/soap instruction.    PSR: Please call to schedule [facility], pre-op physical (within 30 days of surgery), post op PT, lab (to be completed 11/17-12/6), post op appt and phone call with BRAULIO Armstrong.           Yes

## 2024-06-09 ENCOUNTER — EMERGENCY (EMERGENCY)
Facility: HOSPITAL | Age: 74
LOS: 1 days | Discharge: HOME | End: 2024-06-09
Attending: STUDENT IN AN ORGANIZED HEALTH CARE EDUCATION/TRAINING PROGRAM
Payer: MEDICARE

## 2024-06-09 VITALS
DIASTOLIC BLOOD PRESSURE: 77 MMHG | TEMPERATURE: 99 F | OXYGEN SATURATION: 98 % | SYSTOLIC BLOOD PRESSURE: 175 MMHG | HEART RATE: 75 BPM | RESPIRATION RATE: 18 BRPM

## 2024-06-09 DIAGNOSIS — K43.5 PARASTOMAL HERNIA WITHOUT OBSTRUCTION OR GANGRENE: Chronic | ICD-10-CM

## 2024-06-09 DIAGNOSIS — Z93.2 ILEOSTOMY STATUS: Chronic | ICD-10-CM

## 2024-06-09 LAB
ALBUMIN SERPL ELPH-MCNC: 4 G/DL — SIGNIFICANT CHANGE UP (ref 3.3–5.2)
ALP SERPL-CCNC: 64 U/L — SIGNIFICANT CHANGE UP (ref 40–120)
ALT FLD-CCNC: 18 U/L — SIGNIFICANT CHANGE UP
ANION GAP SERPL CALC-SCNC: 10 MMOL/L — SIGNIFICANT CHANGE UP (ref 5–17)
APTT BLD: 33.4 SEC — SIGNIFICANT CHANGE UP (ref 24.5–35.6)
AST SERPL-CCNC: 14 U/L — SIGNIFICANT CHANGE UP
BASOPHILS # BLD AUTO: 0.02 K/UL — SIGNIFICANT CHANGE UP (ref 0–0.2)
BASOPHILS NFR BLD AUTO: 0.5 % — SIGNIFICANT CHANGE UP (ref 0–2)
BILIRUB SERPL-MCNC: 0.4 MG/DL — SIGNIFICANT CHANGE UP (ref 0.4–2)
BLD GP AB SCN SERPL QL: SIGNIFICANT CHANGE UP
BUN SERPL-MCNC: 33.7 MG/DL — HIGH (ref 8–20)
CALCIUM SERPL-MCNC: 9.4 MG/DL — SIGNIFICANT CHANGE UP (ref 8.4–10.5)
CHLORIDE SERPL-SCNC: 104 MMOL/L — SIGNIFICANT CHANGE UP (ref 96–108)
CO2 SERPL-SCNC: 29 MMOL/L — SIGNIFICANT CHANGE UP (ref 22–29)
CREAT SERPL-MCNC: 1.75 MG/DL — HIGH (ref 0.5–1.3)
EGFR: 31 ML/MIN/1.73M2 — LOW
EOSINOPHIL # BLD AUTO: 0.04 K/UL — SIGNIFICANT CHANGE UP (ref 0–0.5)
EOSINOPHIL NFR BLD AUTO: 0.9 % — SIGNIFICANT CHANGE UP (ref 0–6)
GLUCOSE SERPL-MCNC: 103 MG/DL — HIGH (ref 70–99)
HCT VFR BLD CALC: 27.1 % — LOW (ref 34.5–45)
HGB BLD-MCNC: 8.6 G/DL — LOW (ref 11.5–15.5)
IMM GRANULOCYTES NFR BLD AUTO: 0.5 % — SIGNIFICANT CHANGE UP (ref 0–0.9)
INR BLD: 1.15 RATIO — SIGNIFICANT CHANGE UP (ref 0.85–1.18)
LACTATE BLDV-MCNC: 0.9 MMOL/L — SIGNIFICANT CHANGE UP (ref 0.5–2)
LIDOCAIN IGE QN: 169 U/L — HIGH (ref 22–51)
LYMPHOCYTES # BLD AUTO: 0.78 K/UL — LOW (ref 1–3.3)
LYMPHOCYTES # BLD AUTO: 18 % — SIGNIFICANT CHANGE UP (ref 13–44)
MCHC RBC-ENTMCNC: 29.8 PG — SIGNIFICANT CHANGE UP (ref 27–34)
MCHC RBC-ENTMCNC: 31.7 GM/DL — LOW (ref 32–36)
MCV RBC AUTO: 93.8 FL — SIGNIFICANT CHANGE UP (ref 80–100)
MONOCYTES # BLD AUTO: 0.44 K/UL — SIGNIFICANT CHANGE UP (ref 0–0.9)
MONOCYTES NFR BLD AUTO: 10.2 % — SIGNIFICANT CHANGE UP (ref 2–14)
NEUTROPHILS # BLD AUTO: 3.03 K/UL — SIGNIFICANT CHANGE UP (ref 1.8–7.4)
NEUTROPHILS NFR BLD AUTO: 69.9 % — SIGNIFICANT CHANGE UP (ref 43–77)
PLATELET # BLD AUTO: 125 K/UL — LOW (ref 150–400)
POTASSIUM SERPL-MCNC: 4 MMOL/L — SIGNIFICANT CHANGE UP (ref 3.5–5.3)
POTASSIUM SERPL-SCNC: 4 MMOL/L — SIGNIFICANT CHANGE UP (ref 3.5–5.3)
PROT SERPL-MCNC: 6.6 G/DL — SIGNIFICANT CHANGE UP (ref 6.6–8.7)
PROTHROM AB SERPL-ACNC: 12.7 SEC — SIGNIFICANT CHANGE UP (ref 9.5–13)
RBC # BLD: 2.89 M/UL — LOW (ref 3.8–5.2)
RBC # FLD: 13.5 % — SIGNIFICANT CHANGE UP (ref 10.3–14.5)
SODIUM SERPL-SCNC: 143 MMOL/L — SIGNIFICANT CHANGE UP (ref 135–145)
WBC # BLD: 4.33 K/UL — SIGNIFICANT CHANGE UP (ref 3.8–10.5)
WBC # FLD AUTO: 4.33 K/UL — SIGNIFICANT CHANGE UP (ref 3.8–10.5)

## 2024-06-09 PROCEDURE — 74176 CT ABD & PELVIS W/O CONTRAST: CPT | Mod: 26,MC

## 2024-06-09 PROCEDURE — 93005 ELECTROCARDIOGRAM TRACING: CPT

## 2024-06-09 PROCEDURE — 85025 COMPLETE CBC W/AUTO DIFF WBC: CPT

## 2024-06-09 PROCEDURE — 96374 THER/PROPH/DIAG INJ IV PUSH: CPT

## 2024-06-09 PROCEDURE — 99283 EMERGENCY DEPT VISIT LOW MDM: CPT | Mod: GC

## 2024-06-09 PROCEDURE — 85730 THROMBOPLASTIN TIME PARTIAL: CPT

## 2024-06-09 PROCEDURE — 99284 EMERGENCY DEPT VISIT MOD MDM: CPT | Mod: GC

## 2024-06-09 PROCEDURE — 83605 ASSAY OF LACTIC ACID: CPT

## 2024-06-09 PROCEDURE — 85610 PROTHROMBIN TIME: CPT

## 2024-06-09 PROCEDURE — 86850 RBC ANTIBODY SCREEN: CPT

## 2024-06-09 PROCEDURE — 99285 EMERGENCY DEPT VISIT HI MDM: CPT | Mod: 25

## 2024-06-09 PROCEDURE — 83690 ASSAY OF LIPASE: CPT

## 2024-06-09 PROCEDURE — 93010 ELECTROCARDIOGRAM REPORT: CPT

## 2024-06-09 PROCEDURE — 86900 BLOOD TYPING SEROLOGIC ABO: CPT

## 2024-06-09 PROCEDURE — 96375 TX/PRO/DX INJ NEW DRUG ADDON: CPT

## 2024-06-09 PROCEDURE — 74176 CT ABD & PELVIS W/O CONTRAST: CPT | Mod: MC

## 2024-06-09 PROCEDURE — 36415 COLL VENOUS BLD VENIPUNCTURE: CPT

## 2024-06-09 PROCEDURE — 80053 COMPREHEN METABOLIC PANEL: CPT

## 2024-06-09 PROCEDURE — 86901 BLOOD TYPING SEROLOGIC RH(D): CPT

## 2024-06-09 RX ORDER — SODIUM CHLORIDE 9 MG/ML
1000 INJECTION INTRAMUSCULAR; INTRAVENOUS; SUBCUTANEOUS ONCE
Refills: 0 | Status: COMPLETED | OUTPATIENT
Start: 2024-06-09 | End: 2024-06-09

## 2024-06-09 RX ORDER — ONDANSETRON 8 MG/1
4 TABLET, FILM COATED ORAL ONCE
Refills: 0 | Status: COMPLETED | OUTPATIENT
Start: 2024-06-09 | End: 2024-06-09

## 2024-06-09 RX ORDER — IOHEXOL 300 MG/ML
30 INJECTION, SOLUTION INTRAVENOUS ONCE
Refills: 0 | Status: COMPLETED | OUTPATIENT
Start: 2024-06-09 | End: 2024-06-09

## 2024-06-09 RX ORDER — ACETAMINOPHEN 500 MG
1000 TABLET ORAL ONCE
Refills: 0 | Status: COMPLETED | OUTPATIENT
Start: 2024-06-09 | End: 2024-06-09

## 2024-06-09 RX ADMIN — Medication 400 MILLIGRAM(S): at 17:23

## 2024-06-09 RX ADMIN — ONDANSETRON 4 MILLIGRAM(S): 8 TABLET, FILM COATED ORAL at 17:23

## 2024-06-09 RX ADMIN — SODIUM CHLORIDE 1000 MILLILITER(S): 9 INJECTION INTRAMUSCULAR; INTRAVENOUS; SUBCUTANEOUS at 17:24

## 2024-06-09 RX ADMIN — IOHEXOL 30 MILLILITER(S): 300 INJECTION, SOLUTION INTRAVENOUS at 17:23

## 2024-06-09 NOTE — ED ADULT NURSE NOTE - NSFALLRISKINTERV_ED_ALL_ED

## 2024-06-09 NOTE — ED PROVIDER NOTE - PATIENT PORTAL LINK FT
You can access the FollowMyHealth Patient Portal offered by [facility] by registering at the following website: [email or web address]. By joining [facility]’s FollowMyHealth portal, you will also be able to view your health information using other applications (apps) compatible with our system.

## 2024-06-09 NOTE — CONSULT NOTE ADULT - SUBJECTIVE AND OBJECTIVE BOX
SURGERY CONSULT  HPI reviewed as below.  73 yo F from [place] with multiple abdominal surgeries with large R side ventral hernia with small bowel and colon presents from [place] after emesis.   Patient reports that she had bowel movements this afternoon and has been passing gas, last time while being evaluated.   Patient denies abdominal pain and no nausea at the moment.     ==============================================================================================================  HPI: 75 y/o F with PMHx schizophrenia, hypothyroidism, HTN, nephrogenic DI, prior SBOs, who presents to the ED for abdominal pain, nausea, and vomiting for the past two days. Patient unable to provide reliable history on assessment due to garbled speech, per aide at bedside, patient is at her baseline mental status. Otherwise denies any other complaints at this time.      PAST MEDICAL & SURGICAL HISTORY:  Venous insufficiency (chronic) (peripheral)      Constipation      Sensory hearing loss      GERD (gastroesophageal reflux disease)      Hypothyroidism      Schizoaffective disorder, bipolar type      Suicide attempt      Behavior problems  assaultive      Hemiparesis, left      Seizure      Osteopenia      Fall      Vaginal prolapse      Neutropenia      Anemia      Splenomegaly      CVA (cerebral vascular accident)      HTN (hypertension)      Obesity      Urinary incontinence      Schizo affective schizophrenia      Displaced fracture of olecranon process with intraarticular extension of left ulna      Rectal prolapse      Onychomycosis      Uterine prolapse      H/O ileostomy  4/2015      Parastomal hernia without obstruction or gangrene        Home Meds: Home Medications:  ascorbic acid 500 mg oral tablet: 1 tab(s) orally 2 times a day (13 Feb 2024 10:59)  atorvastatin 10 mg oral tablet: 1 tab(s) orally once a day (13 Feb 2024 11:00)  calcitriol 0.25 mcg oral capsule: 1 cap(s) orally once a day (13 Feb 2024 11:00)  calcium carbonate 500 mg (200 mg elemental calcium) oral tablet, chewable: 2 tab(s) orally 3 times a day (13 Feb 2024 11:00)  cholecalciferol 50 mcg (2000 intl units) oral capsule: 1 cap(s) orally once a day (13 Feb 2024 11:00)  cyanocobalamin 500 mcg oral tablet: 1 tab(s) orally once a day (13 Feb 2024 11:00)  docusate sodium 100 mg oral capsule: 1 cap(s) orally 3 times a day as needed for  constipation (13 Feb 2024 11:00)  levothyroxine 112 mcg (0.112 mg) oral tablet: 1 tab(s) orally once a day (13 Feb 2024 11:00)  Lexapro 10 mg oral tablet: 3 tab(s) orally once a day (13 Feb 2024 11:00)  LORazepam 0.5 mg oral tablet: 0.5 milligram(s) orally once a day Take 2 tablets (1 mG) in the morning and 1 tablet at 20:30 (16 Feb 2024 11:24)  Multiple Vitamins oral tablet: 1 tab(s) orally once a day (13 Feb 2024 11:00)  OLANZapine 15 mg oral tablet: 1 tab(s) orally once a day (13 Feb 2024 10:58)  pantoprazole 40 mg oral delayed release tablet: 1 tab(s) orally once a day (before a meal) (13 Feb 2024 11:00)  polyethylene glycol 3350 oral powder for reconstitution: 17 gram(s) orally once a day (at bedtime) (13 Feb 2024 11:00)  senna (sennosides) 8.6 mg oral tablet: 2 tab(s) orally once a day (at bedtime) as needed for  constipation (13 Feb 2024 11:00)  simethicone 80 mg oral tablet, chewable: 1 tab(s) chewed every 8 hours for gas (13 Feb 2024 11:00)  sodium bicarbonate 650 mg oral tablet: 1 tab(s) orally 2 times a day (13 Feb 2024 11:00)  thiamine 100 mg oral tablet: 1 tab(s) orally once a day (13 Feb 2024 11:00)    Allergies: Allergies    Neupogen (Other)  Depakote (Other)  clozapine (Other)  erythromycin (Unknown)    Intolerances      Soc:   Advanced Directives: Presumed Full Code     CURRENT MEDICATIONS:   --------------------------------------------------------------------------------------  Neurologic Medications    Respiratory Medications    Cardiovascular Medications    Gastrointestinal Medications    Genitourinary Medications    Hematologic/Oncologic Medications    Antimicrobial/Immunologic Medications    Endocrine/Metabolic Medications    Topical/Other Medications    --------------------------------------------------------------------------------------    VITAL SIGNS, INS/OUTS (last 24 hours):  --------------------------------------------------------------------------------------  ICU Vital Signs Last 24 Hrs  T(C): 37 (09 Jun 2024 15:31), Max: 37 (09 Jun 2024 15:31)  T(F): 98.6 (09 Jun 2024 15:31), Max: 98.6 (09 Jun 2024 15:31)  HR: 75 (09 Jun 2024 15:31) (75 - 75)  BP: 175/77 (09 Jun 2024 15:31) (175/77 - 175/77)  BP(mean): --  ABP: --  ABP(mean): --  RR: 18 (09 Jun 2024 15:31) (18 - 18)  SpO2: 98% (09 Jun 2024 15:31) (98% - 98%)    O2 Parameters below as of 09 Jun 2024 15:31  Patient On (Oxygen Delivery Method): room air          I&O's Summary    --------------------------------------------------------------------------------------    PHYSICAL EXAM:  GENERAL: NAD  HEAD:  Atraumatic  EYES: EOMI, PERRLA, conjunctiva and sclera clear  NECK: Supple, No JVD  CHEST/LUNG: non-labored breathing on room air.   HEART: Regular rate and rhythm  ABDOMEN: Soft, Nontender, Nondistended, large right ventral hernia withuot skin changes, soft without tenderness.     LABS  --------------------------------------------------------------------------------------  Labs:  CAPILLARY BLOOD GLUCOSE                              8.6    4.33  )-----------( 125      ( 09 Jun 2024 17:19 )             27.1       Auto Neutrophil %: 69.9 % (06-09-24 @ 17:19)  Auto Immature Granulocyte %: 0.5 % (06-09-24 @ 17:19)    06-09    143  |  104  |  33.7<H>  ----------------------------<  103<H>  4.0   |  29.0  |  1.75<H>      Calcium: 9.4 mg/dL (06-09-24 @ 17:19)      LFTs:             6.6  | 0.4  | 14       ------------------[64      ( 09 Jun 2024 17:19 )  4.0  | x    | 18          Lipase:169    Amylase:x         Blood Gas Venous - Lactate: 0.90 mmol/L (06-09-24 @ 17:19)      Coags:     12.7   ----< 1.15    ( 09 Jun 2024 17:19 )     33.4                Urinalysis Basic - ( 09 Jun 2024 17:19 )    Color: x / Appearance: x / SG: x / pH: x  Gluc: 103 mg/dL / Ketone: x  / Bili: x / Urobili: x   Blood: x / Protein: x / Nitrite: x   Leuk Esterase: x / RBC: x / WBC x   Sq Epi: x / Non Sq Epi: x / Bacteria: x          --------------------------------------------------------------------------------------

## 2024-06-09 NOTE — ED PROVIDER NOTE - NSFOLLOWUPINSTRUCTIONS_ED_ALL_ED_FT
Follow up with your primary care provider.    Come back for any new or worsening symptoms.    Nausea and Vomiting, Adult  Nausea is the feeling that you have an upset stomach or that you are about to vomit. As nausea gets worse, it can lead to vomiting. Vomiting is when stomach contents forcefully come out of your mouth as a result of nausea. Vomiting can make you feel weak and cause you to become dehydrated.    Dehydration can make you feel tired and thirsty, cause you to have a dry mouth, and decrease how often you urinate. Older adults and people with other diseases or a weak disease-fighting system (immune system) are at higher risk for dehydration. It is important to treat your nausea and vomiting as told by your health care provider.    Follow these instructions at home:  Watch your symptoms for any changes. Tell your health care provider about them.    Eating and drinking    A bottle of clear fruit juice and glass of water.  A sign showing that a person should not drink alcohol.  Take an oral rehydration solution (ORS). This is a drink that is sold at pharmacies and retail stores.  Drink clear fluids slowly and in small amounts as you are able. Clear fluids include water, ice chips, low-calorie sports drinks, and fruit juice that has water added (diluted fruit juice).  Eat bland, easy-to-digest foods in small amounts as you are able. These foods include bananas, applesauce, rice, lean meats, toast, and crackers.  Avoid fluids that contain a lot of sugar or caffeine, such as energy drinks, sports drinks, and soda.  Avoid alcohol.  Avoid spicy or fatty foods.  General instructions    Take over-the-counter and prescription medicines only as told by your health care provider.  Drink enough fluid to keep your urine pale yellow.  Wash your hands often using soap and water for at least 20 seconds. If soap and water are not available, use hand .  Make sure that everyone in your household washes their hands well and often.  Rest at home while you recover.  Watch your condition for any changes.  Take slow and deep breaths when you feel nauseous.  Keep all follow-up visits. This is important.  Contact a health care provider if:  Your symptoms get worse.  You have new symptoms.  You have a fever.  You cannot drink fluids without vomiting.  Your nausea does not go away after 2 days.  You feel light-headed or dizzy.  You have a headache.  You have muscle cramps.  You have a rash.  You have pain while urinating.  Get help right away if:  You have pain in your chest, neck, arm, or jaw.  You feel extremely weak or you faint.  You have persistent vomiting.  You have vomit that is bright red or looks like black coffee grounds.  You have bloody or black stools (feces) or stools that look like tar.  You have a severe headache, a stiff neck, or both.  You have severe pain, cramping, or bloating in your abdomen.  You have difficulty breathing, or you are breathing very quickly.  Your heart is beating very quickly.  Your skin feels cold and clammy.  You feel confused.  You have signs of dehydration, such as:  Dark urine, very little urine, or no urine.  Cracked lips.  Dry mouth.  Sunken eyes.  Sleepiness.  Weakness.  These symptoms may be an emergency. Get help right away. Call 911.  Do not wait to see if the symptoms will go away.  Do not drive yourself to the hospital.  Summary  Nausea is the feeling that you have an upset stomach or that you are about to vomit. As nausea gets worse, it can lead to vomiting. Vomiting can make you feel weak and cause you to become dehydrated.  Follow instructions from your health care provider about eating and drinking to prevent dehydration.  Take over-the-counter and prescription medicines only as told by your health care provider.  Contact your health care provider if your symptoms get worse, or you have new symptoms.  Keep all follow-up visits. This is important.  This information is not intended to replace advice given to you by your health care provider. Make sure you discuss any questions you have with your health care provider.

## 2024-06-09 NOTE — ED ADULT NURSE NOTE - OBJECTIVE STATEMENT
Pt awake, c/o abdominal pain, nausea, and vomiting for the past two days. Respirations are equal and unlabored on room air, pt has garbled speech, per aide at bedside, patient is at her baseline mental status. Pt is from [place] state. Pt denies SOB or CP at this time. Pt left in position of comfort, wheels of stretcher locked and in the lowest position. 1-1 at bed side at this time.

## 2024-06-09 NOTE — ED PROVIDER NOTE - CLINICAL SUMMARY MEDICAL DECISION MAKING FREE TEXT BOX
73 y/o F with PMHx schizophrenia, hypothyroidism, HTN, nephrogenic DI, prior SBOs, who presents to the ED for abdominal pain, nausea, and vomiting for the past two days. Will obtain basic labs, coags, lactate, tx pain/nausea, CT ab/pelvis with PO and IV contrast, and reassess. 75 y/o F with PMHx schizophrenia, hypothyroidism, HTN, nephrogenic DI, prior SBOs, who presents to the ED for abdominal pain, nausea, and vomiting for the past two days. Will obtain basic labs, coags, lactate, tx pain/nausea, CT ab/pelvis with PO and IV contrast, and reassess.    Pt reassessed. Pt reports feeling well at this time. Though Previous team had difficulty understanding patient, patient able to report that she had BM today and is currently passing flatus.  Reports feeling well at this time.  CT could not rule out partial obstruction.  Surgery assessed and given patient not clinically obstructed, no need for admission for obstruction.  Surgery recommended rehydration.  Patient had all her been rehydrated, creatinine 1.75 from 1.9 previously. Has already been rehydrated. Will DC back to [place]. Call placed for doc to doc sign out.

## 2024-06-09 NOTE — ED PROVIDER NOTE - OBJECTIVE STATEMENT
73 y/o F with PMHx schizophrenia, hypothyroidism, HTN, nephrogenic DI, prior SBOs, who presents to the ED for abdominal pain, nausea, and vomiting for the past two days. Patient unable to provide reliable history on assessment due to garbled speech, per aide at bedside, patient is at her baseline mental status. Otherwise denies any other complaints at this time.

## 2024-06-09 NOTE — ED PROVIDER NOTE - PROGRESS NOTE DETAILS
Given the significant and immediate threats to this patient based on initial presentation, the benefits of emergency contrast-enhanced CT imaging without obtaining GFR/creatinine serum level results greatly outweigh the potential risk of harm due to contrast-induced nephropathy. - [facility]

## 2024-06-09 NOTE — ED PROVIDER NOTE - ATTENDING CONTRIBUTION TO CARE
74-year-old female with a past medical history of schizophrenia, hypothyroidism, hypertension, nephrogenic diabetes insipidus, SBO's presents for abdominal pain nausea and vomiting from [place] for the past 2 days.  Exam diffuse abdominal tenderness.  Will do labs and CAT scan to evaluate for obstruction versus intra-abdominal pathology

## 2024-06-09 NOTE — CONSULT NOTE ADULT - ATTENDING COMMENTS
I have seen and examined the patient.  details as able,   episode of emesis, last BM today, passing flatus,  dry mucous membranes, and soft, no tender rlq hernia reducible, incohesive.  CT reviewed, no TO, contrast in and out of hernia.  Not clinically obstructed suggest hydration  dispo per ED

## 2024-06-09 NOTE — CONSULT NOTE ADULT - ASSESSMENT
75 yo F with large ventral hernia with colon and SB that enter and exits the hernia with no evidence of obstruction.  Patient having BM and passing gas, oral contrast exiting the hernia and no evidence of clinical obstruction.    Plan:  No surgical intervention required  Continue bowel regimen  Dispo per ED

## 2024-06-09 NOTE — ED PROVIDER NOTE - PHYSICAL EXAMINATION
General: NAD  Head: NC, AT  EENT: no scleral icterus  Cardiac: RRR, no apparent murmurs, no lower extremity edema  Respiratory: CTABL, no respiratory distress   Abdomen: soft, diffuse ttp nonperitonitic  MSK/Vascular: warm extremities  Neuro: grossly intact  Psych: calm, cooperative

## 2024-06-09 NOTE — ED ADULT TRIAGE NOTE - CHIEF COMPLAINT QUOTE
Pt BIBA from [place], c/o abdominal pain and vomiting x 2 days, at baseline mental status, speaks with garbled speech

## 2024-06-10 VITALS
HEART RATE: 69 BPM | SYSTOLIC BLOOD PRESSURE: 124 MMHG | TEMPERATURE: 98 F | OXYGEN SATURATION: 94 % | RESPIRATION RATE: 18 BRPM | DIASTOLIC BLOOD PRESSURE: 76 MMHG

## 2024-06-21 ENCOUNTER — APPOINTMENT (OUTPATIENT)
Dept: RADIOLOGY | Facility: CLINIC | Age: 74
End: 2024-06-21

## 2024-06-24 NOTE — CONSULT NOTE ADULT - SUBJECTIVE AND OBJECTIVE BOX
On flagyl   67F known to cr service [facility] s/f schizoaffective disorder, HTN, CVA in 2005 with residual left sided weakness, seizures, recently admitted in April 2017 for sepsis 2/2 proctocolitis s/p reversal of ileostomy after repair of rectal prolapse in 2015 with small bowel resection, repair of ventral/parasternal hernia who presents with fever and positive blood cultures.   Patient with 1 of 2 cultures growing gram positive cocci in clusters. within identified source at this time     patient unable to provider history at this time   denies abd pain   denies n/v/ diarrhea   feels hunry wants to eat and tolerating reg diet     alert awake NAD   only allowed for abd exam   abd: large softly distended per her norm, no difference in abd exam from all other admissions/stays   NTTP   no rebound   all incisions healed

## 2024-07-17 NOTE — ED PROVIDER NOTE - CROS ED MARK PERT SYS NEG
Patient has been in his room most of the evening and has a quiet demeanor although he is pleasant on approach. He is fairly scant verbally. He was cooperative with HS Melatonin and is hopeful he will get some sleep tonight.  He denies S.I.H.I.A/H V/H   kimberley all pertinent systems negative

## 2024-08-19 NOTE — ED ADULT TRIAGE NOTE - TEMPERATURE IN FAHRENHEIT (DEGREES F)
Cassy Zamudio    Patient falls a sleep for only 4 hrs a night but not longer, she is asking for something stronger to help her sleep.    Pharmacy:  [id number] - [place] - [address]  [address] [id number]  Phone: [phone]  Fax: [phone]     CB: [phone]  
100

## 2024-08-21 ENCOUNTER — NON-APPOINTMENT (OUTPATIENT)
Age: 74
End: 2024-08-21

## 2024-08-22 ENCOUNTER — APPOINTMENT (OUTPATIENT)
Dept: NEPHROLOGY | Facility: CLINIC | Age: 74
End: 2024-08-22
Payer: MEDICARE

## 2024-08-22 VITALS — WEIGHT: 170 LBS | HEART RATE: 59 BPM | BODY MASS INDEX: 27.32 KG/M2 | HEIGHT: 66 IN | OXYGEN SATURATION: 98 %

## 2024-08-22 PROCEDURE — 99204 OFFICE O/P NEW MOD 45 MIN: CPT

## 2024-08-22 NOTE — ASSESSMENT
[FreeTextEntry1] : 74-year-old female with past medical history significant for chronic kidney disease, hypertension, schizophrenia who is a [facility] resident accompanied by caregiver seen in outpatient nephrology clinic today for discussion of labs and evaluation of renal function.  1. Chronic Kidney Disease Stage IIIb/IV  -ETIO? renal atherosclerotic disease. (asymmetric kidneys) -SCr:  1.7-1.9 mg/dl range for an eGFR ~ 30 -Proteinuria: will check UACR -Renal imaging: CT obtained in ER reviewed, Asymmetric kidneys  COMPLICATIONS OF CKD: -BMD w/ CKD: will check labs -Anemia w/ CKD: follows hematology -Edema: no significant edema -Hyperuricemia: will check labs -Catabolic acidosis: Continue on current dose of sodium bicarbonate 650 mg oral twice daily  2. HTN: controlled on current medications, on RAAS inhibitor. 3. Hyperlipidemia: controlled on current medications. 4. Bipolar schizophrenia: Management per psychiatry

## 2024-08-22 NOTE — HISTORY OF PRESENT ILLNESS
[FreeTextEntry1] : Patient is a 74-year-old female with past medical history significant for chronic kidney disease, hypertension, schizophrenia who is a [facility] resident accompanied by caregiver seen in outpatient nephrology clinic today for discussion of labs and evaluation of renal function.  Patient not a good historian, kept repeating that she has no issues with her kidneys.

## 2024-08-22 NOTE — PHYSICAL EXAM
[TextEntry] : Gen: NAD, well-appearing; in Wheelchair HEENT: Supple neck, MMM Pulm: CTA CV: RRR, no rub Back: No spinal or CVA tenderness Abd: +BS, soft, nontender/nondistended LE: Warm, no edema Neuro: No focal deficits Skin: Warm, without rashes

## 2024-09-03 NOTE — DISCHARGE NOTE NURSING/CASE MANAGEMENT/SOCIAL WORK - FLU SEASON?
Encourage healthy diet and exercise   Encouraged monthly self testicular exams  Check labs  Encourage flu, COVID, and shingles vaccine  
Yes...
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
## 2024-09-28 NOTE — ED ADULT TRIAGE NOTE - SPO2 (%)
[Joint Pain] : joint pain [Fever] : no fever [Chills] : no chills [Fatigue] : no fatigue [Discharge] : no discharge [Pain] : no pain [Itching] : no itching [Earache] : no earache [Hearing Loss] : no hearing loss [Nosebleed] : no nosebleeds [Sore Throat] : no sore throat [Postnasal Drip] : no postnasal drip [Chest Pain] : no chest pain [Palpitations] : no palpitations [Shortness Of Breath] : no shortness of breath [Leg Claudication] : no leg claudication [Wheezing] : no wheezing 94 [Cough] : no cough [Abdominal Pain] : no abdominal pain [Nausea] : no nausea [Constipation] : no constipation [Vomiting] : no vomiting [Heartburn] : no heartburn [Dysuria] : no dysuria [Incontinence] : no incontinence [Frequency] : no frequency [Skin Rash] : no skin rash [Headache] : no headache [Dizziness] : no dizziness [FreeTextEntry9] : Fingers Neck and knees

## 2024-11-15 ENCOUNTER — APPOINTMENT (OUTPATIENT)
Dept: NEPHROLOGY | Facility: CLINIC | Age: 74
End: 2024-11-15

## 2024-11-15 VITALS
SYSTOLIC BLOOD PRESSURE: 152 MMHG | DIASTOLIC BLOOD PRESSURE: 80 MMHG | BODY MASS INDEX: 25.39 KG/M2 | WEIGHT: 158 LBS | HEIGHT: 66 IN | HEART RATE: 71 BPM | OXYGEN SATURATION: 98 %

## 2024-11-15 PROCEDURE — 99213 OFFICE O/P EST LOW 20 MIN: CPT

## 2024-12-05 NOTE — ED PROVIDER NOTE - CONSTITUTIONAL NEGATIVE STATEMENT, MLM
On 6/5/24, the patient said he was unhappy with his last GI, so he was seeking another opinion. He said he had been having "leaky gut" for a little under 2 years - almost daily watery diarrhea and mucus. He had 1-3 BMs/day with incomplete evac. He had a formed BM 1x/couple weeks. His watery BMs could start out with formed pieces then turn completely water. No blood or abdominal pain. He had C. diff in 2016. He had a lot of bloating and gas. He would sometimes feel bloated, go to have a BM then only mucus came out. He also had migraines - now associated with dietary triggers, but prev there was no pattern with certain foods. Other symptoms were low blood pressure, "watery bloating," and fatigue. He said his number one symptom was fatigue. He only had nausea if he did not sleep well. He was "informally" dx'd with IBS 10 years ago. Never taken amitriptyline. He took Xifaxin - no benefit. He said Xifaxin caused him to have diarrhea, a "food sensitivity," and an "alcohol sensitivity."  Reported reflux symptoms were throat burning, nausea, and an "irregular lower esophageal sphincter." He had an EGD in college. He got heartburn/regurgitaiton. He took pantoprazole 20 mg 1.5 pills/day - 0.5 pill at breakfast, 1 pill at bedtime. Heartburn/regurgitation was less of the antacid, but not relieved. Throat burning was improved. He used Carafate suspension TID. He had been on it since 2017.  On 7/26/24, he said he was hesitant to start on amitriptyline because mirtazepine did "bad things" to him and "withdrawal" was not a good experience for him. Bowel habit was the same. He'd done stool H. pylori and stool Candida testing at the [facility] - negative. He continued on sucralfate TID, Baclofen BID, and pantoprazole 20 mg 0.5 pill QAM. He noted a significant difference w/ sucralfate. EGD done about a decade ago noted HH. He felt like he had more "reflux" symptoms that he associated with the HH - burning in the back of throat, nausea, "hot flashes" with certain foods/seasonings like black pepper. He could wake up in the middle of the night and morning with a "hot" feeling in throat and nausea. He denied heartburn. He noted these worsening symptoms started having an acute illness w/ nausea/vomiting. He took famotidine PRN. He had ocular migraines since having GI symptoms he stated.  HPI: Today, pt presents for f/u. At last visit, obtaining EGD with Bravo pH study, obtaining GES, and trial of higher dose of acid suppression discussed. Patient was to consider these options and decide if he wanted to proceed. He messaged indicating that he wanted to schedule an EGD but preferred to do barium swallow or some imaging procedure prior to this. Dr. Davis ordered upper GI series with barium tablet, which showed mild thickening of gastric folds, evidence of reflux; structurally normal esophagus and stomach without evidence of significant hiatal hernia.   Labs 11/15/23 - CMP normal except sodium 135, bilirubin 1.2. CBC normal. 3/16/23 - Stool study negative. Fecal elastase normal. 3/15/23 - CBC normal except platelets 139. CRP < 1. Celiac panel negative. CMP, CRP all normal. 9/11/22 - CBC normal except platelets 140. CMP all normal.  Ryan no fever and no chills.

## 2025-02-05 ENCOUNTER — NON-APPOINTMENT (OUTPATIENT)
Age: 75
End: 2025-02-05

## 2025-02-05 ENCOUNTER — APPOINTMENT (OUTPATIENT)
Dept: NEPHROLOGY | Facility: CLINIC | Age: 75
End: 2025-02-05
Payer: MEDICARE

## 2025-02-05 VITALS
BODY MASS INDEX: 25.66 KG/M2 | WEIGHT: 159 LBS | SYSTOLIC BLOOD PRESSURE: 122 MMHG | DIASTOLIC BLOOD PRESSURE: 64 MMHG | HEART RATE: 70 BPM | OXYGEN SATURATION: 100 %

## 2025-02-05 PROCEDURE — 99213 OFFICE O/P EST LOW 20 MIN: CPT

## 2025-02-13 ENCOUNTER — APPOINTMENT (OUTPATIENT)
Dept: GASTROENTEROLOGY | Facility: CLINIC | Age: 75
End: 2025-02-13
Payer: MEDICARE

## 2025-02-13 DIAGNOSIS — R33.9 RETENTION OF URINE, UNSPECIFIED: ICD-10-CM

## 2025-02-13 DIAGNOSIS — N81.9 FEMALE GENITAL PROLAPSE, UNSPECIFIED: ICD-10-CM

## 2025-02-13 DIAGNOSIS — Z78.9 OTHER SPECIFIED HEALTH STATUS: ICD-10-CM

## 2025-02-13 PROCEDURE — 99202 OFFICE O/P NEW SF 15 MIN: CPT

## 2025-02-13 RX ORDER — ESCITALOPRAM OXALATE 5 MG/1
TABLET, FILM COATED ORAL
Refills: 0 | Status: ACTIVE | COMMUNITY

## 2025-02-13 RX ORDER — POLYETHYLENE GLYCOL 3350 17 G/17G
POWDER, FOR SOLUTION ORAL
Refills: 0 | Status: ACTIVE | COMMUNITY

## 2025-02-13 RX ORDER — ATORVASTATIN CALCIUM 10 MG/1
10 TABLET, FILM COATED ORAL
Refills: 0 | Status: ACTIVE | COMMUNITY

## 2025-02-13 RX ORDER — UBIDECARENONE/VIT E ACET 100MG-5
25 MCG CAPSULE ORAL
Refills: 0 | Status: ACTIVE | COMMUNITY

## 2025-02-13 RX ORDER — SENNOSIDES 8.6 MG/1
CAPSULE, GELATIN COATED ORAL
Refills: 0 | Status: ACTIVE | COMMUNITY

## 2025-02-15 NOTE — INPATIENT CERTIFICATION FOR MEDICARE PATIENTS - CURRENT MEDICAL NEEDS AND CARE PLANS
PLACE OF SERVICE:  [facility]    This is a subsequent visit.    Subjective   Patient ID: Riya Hernandez is a 88 y.o. female who presents for Follow-up.    Ms. Riya Hernandez is an 88-year-old female with history of dementia with COPD.  She has medical issues.  She is unable to care for herself.  She requires supportive care.    Review of Systems   Constitutional:  Negative for chills and fever.   Cardiovascular:  Negative for chest pain.   All other systems reviewed and are negative.    Objective   /82   Pulse 78   Temp 36.8 °C (98.2 °F)   Resp 18     Physical Exam  Vitals reviewed.   Constitutional:       Comments: This is a well-developed and well-nourished female, lying in bed, appearing weak and lethargic.   HENT:      Right Ear: Tympanic membrane, ear canal and external ear normal.      Left Ear: Tympanic membrane, ear canal and external ear normal.   Eyes:      General: No scleral icterus.     Pupils: Pupils are equal, round, and reactive to light.   Neck:      Vascular: No carotid bruit.   Cardiovascular:      Heart sounds: Normal heart sounds, S1 normal and S2 normal. No murmur heard.     No friction rub.   Pulmonary:      Effort: Pulmonary effort is normal.      Breath sounds: Decreased breath sounds (throughout.) present.   Abdominal:      Palpations: There is no hepatomegaly, splenomegaly or mass.   Musculoskeletal:         General: No swelling or deformity. Normal range of motion.      Cervical back: Neck supple.      Right lower leg: No edema.      Left lower leg: No edema.   Lymphadenopathy:      Cervical: No cervical adenopathy.      Upper Body:      Right upper body: No axillary adenopathy.      Left upper body: No axillary adenopathy.      Lower Body: No right inguinal adenopathy. No left inguinal adenopathy.   Neurological:      Cranial Nerves: Cranial nerves 2-12 are intact. No cranial nerve deficit.      Sensory: No sensory deficit.      Motor: Motor function is  intact. No weakness.      Gait: Gait is intact.      Deep Tendon Reflexes: Reflexes normal.      Comments: The patient is alert and oriented x2.   Psychiatric:         Mood and Affect: Mood normal. Mood is not anxious or depressed. Affect is not angry.         Behavior: Behavior is not agitated.         Thought Content: Thought content normal.         Judgment: Judgment normal.     LAB WORK:  Laboratory studies were reviewed.    Assessment/Plan   Problem List Items Addressed This Visit             ICD-10-CM       Cardiac and Vasculature    Hypertensive disorder I10    Hyperlipidemia E78.5       Gastrointestinal and Abdominal    Gastrointestinal hemorrhage, unspecified gastrointestinal hemorrhage type K92.2       Hematology and Neoplasia    Anemia D64.9       Neuro    Dementia F03.90     Other Visit Diagnoses         Codes    Chronic obstructive pulmonary disease, unspecified COPD type (Multi)    -  Primary J44.9    ASHD (arteriosclerotic heart disease)     I25.10    Osteoarthritis, unspecified osteoarthritis type, unspecified site     M19.90    Weakness     R53.1    At risk for falling     Z91.81        1. COPD, on bronchodilator therapy.  2. Dementia, unchanged.  3. History of GI bleed.  Continue to monitor.  4. Anemia.  Follow CBC.  5. ASHD, on aspirin.  6. Osteoarthritis, on Tylenol.  7. Hypertension, medically controlled.  8. Hyperlipidemia, on statin.  9. Weakness, on PT/OT.  10. Fall risk, on fall precautions.    Scribe Attestation  By signing my name below, IEleanor Scribe attest that this documentation has been prepared under the direction and in the presence of Hermann Regan MD.     All medical record entries made by the scribe were personally dictated by me I have reviewed the chart and agree the record accurately reflects my personal performance of his history physical examination and management     Other:/[place]

## 2025-02-24 NOTE — ED ADULT TRIAGE NOTE - CCCP TRG CHIEF CMPLNT
complication, access device/ileostomy complication minimum assist (75% patients effort)/moderate assist (50% patients effort)

## 2025-03-06 ENCOUNTER — INPATIENT (INPATIENT)
Facility: HOSPITAL | Age: 75
LOS: 5 days | Discharge: NURSING, REHAB OR LONG-TERM CARE | DRG: 390 | End: 2025-03-12
Attending: STUDENT IN AN ORGANIZED HEALTH CARE EDUCATION/TRAINING PROGRAM | Admitting: STUDENT IN AN ORGANIZED HEALTH CARE EDUCATION/TRAINING PROGRAM
Payer: MEDICARE

## 2025-03-06 VITALS
DIASTOLIC BLOOD PRESSURE: 68 MMHG | TEMPERATURE: 97 F | RESPIRATION RATE: 18 BRPM | HEART RATE: 74 BPM | HEIGHT: 68 IN | WEIGHT: 149.91 LBS | OXYGEN SATURATION: 97 % | SYSTOLIC BLOOD PRESSURE: 151 MMHG

## 2025-03-06 DIAGNOSIS — K43.5 PARASTOMAL HERNIA WITHOUT OBSTRUCTION OR GANGRENE: Chronic | ICD-10-CM

## 2025-03-06 DIAGNOSIS — Z93.2 ILEOSTOMY STATUS: Chronic | ICD-10-CM

## 2025-03-06 LAB
ALBUMIN SERPL ELPH-MCNC: 4.1 G/DL — SIGNIFICANT CHANGE UP (ref 3.3–5.2)
ALP SERPL-CCNC: 88 U/L — SIGNIFICANT CHANGE UP (ref 40–120)
ALT FLD-CCNC: 15 U/L — SIGNIFICANT CHANGE UP
ANION GAP SERPL CALC-SCNC: 11 MMOL/L — SIGNIFICANT CHANGE UP (ref 5–17)
APTT BLD: 27.7 SEC — SIGNIFICANT CHANGE UP (ref 24.5–35.6)
AST SERPL-CCNC: 15 U/L — SIGNIFICANT CHANGE UP
BASOPHILS # BLD AUTO: 0.01 K/UL — SIGNIFICANT CHANGE UP (ref 0–0.2)
BASOPHILS NFR BLD AUTO: 0.2 % — SIGNIFICANT CHANGE UP (ref 0–2)
BILIRUB SERPL-MCNC: 0.4 MG/DL — SIGNIFICANT CHANGE UP (ref 0.4–2)
BLD GP AB SCN SERPL QL: SIGNIFICANT CHANGE UP
BUN SERPL-MCNC: 32.3 MG/DL — HIGH (ref 8–20)
CALCIUM SERPL-MCNC: 9.6 MG/DL — SIGNIFICANT CHANGE UP (ref 8.4–10.5)
CHLORIDE SERPL-SCNC: 102 MMOL/L — SIGNIFICANT CHANGE UP (ref 96–108)
CO2 SERPL-SCNC: 27 MMOL/L — SIGNIFICANT CHANGE UP (ref 22–29)
CREAT SERPL-MCNC: 1.86 MG/DL — HIGH (ref 0.5–1.3)
EGFR: 28 ML/MIN/1.73M2 — LOW
EGFR: 28 ML/MIN/1.73M2 — LOW
EOSINOPHIL # BLD AUTO: 0.01 K/UL — SIGNIFICANT CHANGE UP (ref 0–0.5)
EOSINOPHIL NFR BLD AUTO: 0.2 % — SIGNIFICANT CHANGE UP (ref 0–6)
GLUCOSE SERPL-MCNC: 107 MG/DL — HIGH (ref 70–99)
HCT VFR BLD CALC: 29.6 % — LOW (ref 34.5–45)
HGB BLD-MCNC: 9 G/DL — LOW (ref 11.5–15.5)
IMM GRANULOCYTES # BLD AUTO: 0.01 K/UL — SIGNIFICANT CHANGE UP (ref 0–0.07)
IMM GRANULOCYTES NFR BLD AUTO: 0.2 % — SIGNIFICANT CHANGE UP (ref 0–0.9)
INR BLD: 1.17 RATIO — HIGH (ref 0.85–1.16)
LYMPHOCYTES # BLD AUTO: 0.89 K/UL — LOW (ref 1–3.3)
LYMPHOCYTES NFR BLD AUTO: 18.6 % — SIGNIFICANT CHANGE UP (ref 13–44)
MCHC RBC-ENTMCNC: 28 PG — SIGNIFICANT CHANGE UP (ref 27–34)
MCHC RBC-ENTMCNC: 30.4 G/DL — LOW (ref 32–36)
MCV RBC AUTO: 91.9 FL — SIGNIFICANT CHANGE UP (ref 80–100)
MONOCYTES # BLD AUTO: 0.52 K/UL — SIGNIFICANT CHANGE UP (ref 0–0.9)
MONOCYTES NFR BLD AUTO: 10.9 % — SIGNIFICANT CHANGE UP (ref 2–14)
NEUTROPHILS # BLD AUTO: 3.34 K/UL — SIGNIFICANT CHANGE UP (ref 1.8–7.4)
NEUTROPHILS NFR BLD AUTO: 69.9 % — SIGNIFICANT CHANGE UP (ref 43–77)
NRBC # BLD AUTO: 0 K/UL — SIGNIFICANT CHANGE UP (ref 0–0)
NRBC # FLD: 0 K/UL — SIGNIFICANT CHANGE UP (ref 0–0)
NRBC BLD AUTO-RTO: 0 /100 WBCS — SIGNIFICANT CHANGE UP (ref 0–0)
PLATELET # BLD AUTO: 131 K/UL — LOW (ref 150–400)
PMV BLD: 10.6 FL — SIGNIFICANT CHANGE UP (ref 7–13)
POTASSIUM SERPL-MCNC: 4.7 MMOL/L — SIGNIFICANT CHANGE UP (ref 3.5–5.3)
POTASSIUM SERPL-SCNC: 4.7 MMOL/L — SIGNIFICANT CHANGE UP (ref 3.5–5.3)
PROT SERPL-MCNC: 6.5 G/DL — LOW (ref 6.6–8.7)
PROTHROM AB SERPL-ACNC: 13.2 SEC — SIGNIFICANT CHANGE UP (ref 9.9–13.4)
RBC # BLD: 3.22 M/UL — LOW (ref 3.8–5.2)
RBC # FLD: 15.1 % — HIGH (ref 10.3–14.5)
SODIUM SERPL-SCNC: 140 MMOL/L — SIGNIFICANT CHANGE UP (ref 135–145)
WBC # BLD: 4.78 K/UL — SIGNIFICANT CHANGE UP (ref 3.8–10.5)
WBC # FLD AUTO: 4.78 K/UL — SIGNIFICANT CHANGE UP (ref 3.8–10.5)

## 2025-03-06 PROCEDURE — 99285 EMERGENCY DEPT VISIT HI MDM: CPT

## 2025-03-06 PROCEDURE — 74177 CT ABD & PELVIS W/CONTRAST: CPT | Mod: 26

## 2025-03-06 RX ORDER — MIDAZOLAM IN 0.9 % SOD.CHLORID 1 MG/ML
2 PLASTIC BAG, INJECTION (ML) INTRAVENOUS ONCE
Refills: 0 | Status: DISCONTINUED | OUTPATIENT
Start: 2025-03-06 | End: 2025-03-06

## 2025-03-06 RX ORDER — HALOPERIDOL 10 MG/1
5 TABLET ORAL ONCE
Refills: 0 | Status: COMPLETED | OUTPATIENT
Start: 2025-03-06 | End: 2025-03-06

## 2025-03-06 RX ORDER — PIPERACILLIN-TAZO-DEXTROSE,ISO 3.375G/5
3.38 IV SOLUTION, PIGGYBACK PREMIX FROZEN(ML) INTRAVENOUS ONCE
Refills: 0 | Status: COMPLETED | OUTPATIENT
Start: 2025-03-06 | End: 2025-03-06

## 2025-03-06 RX ORDER — IOHEXOL 350 MG/ML
30 INJECTION, SOLUTION INTRAVENOUS ONCE
Refills: 0 | Status: COMPLETED | OUTPATIENT
Start: 2025-03-06 | End: 2025-03-06

## 2025-03-06 RX ADMIN — IOHEXOL 30 MILLILITER(S): 350 INJECTION, SOLUTION INTRAVENOUS at 21:08

## 2025-03-06 RX ADMIN — Medication 2 MILLIGRAM(S): at 22:48

## 2025-03-06 RX ADMIN — HALOPERIDOL 5 MILLIGRAM(S): 10 TABLET ORAL at 22:48

## 2025-03-06 NOTE — ED PROVIDER NOTE - OBJECTIVE STATEMENT
74y female w/ pmh of schizophrenia, hypothyroidism, HTN, nephrogenic DI, prior SBOs presenting from inpatient [place] for abdominal pain and nausea. patient unable to provide much history due to baseline speech and mental status. aid at bedside states this is her baseline mental status. aid states she has been complaining of pain, pointing at her stomach, and complaining of nausea. denies any fever, cough, congestion, vomiting, diarrhea, falls, urinary symptoms. 75y female w/ pmh of schizophrenia, hypothyroidism, HTN, nephrogenic DI, prior SBOs presenting from inpatient [place] for abdominal pain and nausea. patient unable to provide much history due to baseline speech and mental status. aid at bedside states this is her baseline mental status. aid states she has been complaining of pain, pointing at her stomach, and complaining of nausea. denies any fever, cough, congestion, vomiting, diarrhea, falls, urinary symptoms.

## 2025-03-06 NOTE — ED PROVIDER NOTE - CLINICAL SUMMARY MEDICAL DECISION MAKING FREE TEXT BOX
H&P as stated. H&P as stated.    Pt presents from inpatient [place] for abdominal pain. Found to have high grade SBO on CT. Admitted to surgery service. H&P as stated. abdomen tender without peritoneal signs, vitals stable.     Pt presents from inpatient [place] for abdominal pain. Found to have high grade SBO on CT. Admitted to surgery service.

## 2025-03-06 NOTE — ED ADULT TRIAGE NOTE - CHIEF COMPLAINT QUOTE
presents to ed with abdominal pain from inpatient [place]. patient offers no other information at this time.

## 2025-03-06 NOTE — ED PROVIDER NOTE - NSTIMEPROVIDERCAREINITIATE_GEN_ER
CERTIFICATE OF WORK        May 1, 2018      Re:   Natasha Anne  [id number] [facility] [id number]                        This is to certify that Natasha Anne has been under my care on 5/1/2018 and  May please be excused from work today 5/1 and tomorrow, 5/2.            SIGNATURE:___________________________________________,   5/1/2018      NITIN Magana      [facility]  [address] [place] [id number] ([phone]   06-Mar-2025 20:08

## 2025-03-06 NOTE — ED PROVIDER NOTE - IV ALTEPLASE ADMIN OUTSIDE HIDDEN
Ok- there were only 2 blood tests listed in her open orders, fasting glucose & A1C.    Please call patient -A1C is 6.1, in pre diabetes range- better than 3 months ago- that's excellent.  Please ask if she came fasting today- & inform that fasting glucose was not obtained by mistake & we apolize for that .  If she has questions about SMBG- best is to make return office visit in clinic - meanwhile keep taking all current medications     Thanks      Last OV was jan 2018 for convience    (R73.9,  T50.182T) Hyperglycemia, drug-induced  Plan: advised to resume metformin ER 1000 mg once daily  Recheck a1c and fasting glucose in 3 months  encouarged to watch diet- its been hard for her- as does admit to using food for comfort        Lab Results   Component Value Date     A1C 7.1 01/15/2018     A1C 7.3 06/08/2015     A1C 5.6 03/31/2011     A1C 5.7 07/03/2008     A1C 5.7 04/17/2006         Thanks Dear Triage.   show

## 2025-03-06 NOTE — ED PROVIDER NOTE - ATTENDING APP SHARED VISIT CONTRIBUTION OF CARE
75y female w/ pmh of schizophrenia, hypothyroidism, HTN, nephrogenic DI, prior SBOs presenting from inpatient [place] for abdominal pain and nausea. patient unable to provide much history due to baseline speech and mental status. aid at bedside states this is her baseline mental status. aid states she has been complaining of pain, pointing at her stomach, and complaining of nausea. denies any fever, cough, congestion, vomiting, diarrhea, falls, urinary symptoms.    generalized abd ttp on exam. will obtain labs, ct a/p, symptom control re-evl

## 2025-03-06 NOTE — ED PROVIDER NOTE - PHYSICAL EXAMINATION
General: Awake, alert, lying in bed in NAD  HEENT: Normocephalic, atraumatic. No scleral icterus or conjunctival injection. EOMI. poor dentition.   Neck:. Soft and supple.  Cardiac: RRR, Peripheral pulses 2+ and symmetric. No LE edema.  Resp: Lungs CTAB. No accessory muscle use  Abd: Soft, mild epigastric tenderness, non-distended. No guarding, rebound, or rigidity.  Back: Spine midline and non-tender.   Skin: No rashes, abrasions, or lacerations.  Neuro: AO x 2. Moves all extremities symmetrically. Motor strength and sensation grossly intact. General: Awake, alert, lying in bed in NAD  HEENT: Normocephalic, atraumatic. No scleral icterus or conjunctival injection. EOMI. poor dentition.   Neck:. Soft and supple.  Cardiac: RRR, Peripheral pulses 2+ and symmetric. No LE edema.  Resp: Lungs CTAB. No accessory muscle use  Abd: Soft, mild epigastric tenderness, distended, right abdominal hernia. No guarding, rebound, or rigidity.  Back: Spine midline and non-tender.   Skin: No rashes, abrasions, or lacerations.  Neuro: AO x 2. Moves all extremities symmetrically. Motor strength and sensation grossly intact.

## 2025-03-06 NOTE — ED PROVIDER NOTE - PROGRESS NOTE DETAILS
Given the significant and immediate threats to this patient based on initial presentation, the benefits of emergency contrast-enhanced CT imaging without obtaining GFR/creatinine serum level results greatly outweigh the potential risk of harm due to contrast-induced nephropathy.     pt unable to consent for IV contrast and CT given baseline psychiatric condition. Will proceed with Ct via administrative consent given the need to rule out intra-abdominal emergency [place]: Pt received in signout from Dr. Stafford. Surgery consulted for SBO.

## 2025-03-07 DIAGNOSIS — K56.609 UNSPECIFIED INTESTINAL OBSTRUCTION, UNSPECIFIED AS TO PARTIAL VERSUS COMPLETE OBSTRUCTION: ICD-10-CM

## 2025-03-07 LAB — LACTATE BLDV-MCNC: 1 MMOL/L — SIGNIFICANT CHANGE UP (ref 0.5–2)

## 2025-03-07 PROCEDURE — 99222 1ST HOSP IP/OBS MODERATE 55: CPT

## 2025-03-07 PROCEDURE — 99284 EMERGENCY DEPT VISIT MOD MDM: CPT

## 2025-03-07 RX ORDER — ENOXAPARIN SODIUM 100 MG/ML
30 INJECTION SUBCUTANEOUS EVERY 24 HOURS
Refills: 0 | Status: DISCONTINUED | OUTPATIENT
Start: 2025-03-07 | End: 2025-03-08

## 2025-03-07 RX ORDER — MIDAZOLAM IN 0.9 % SOD.CHLORID 1 MG/ML
2 PLASTIC BAG, INJECTION (ML) INTRAVENOUS ONCE
Refills: 0 | Status: DISCONTINUED | OUTPATIENT
Start: 2025-03-07 | End: 2025-03-07

## 2025-03-07 RX ORDER — HALOPERIDOL 10 MG/1
5 TABLET ORAL ONCE
Refills: 0 | Status: COMPLETED | OUTPATIENT
Start: 2025-03-07 | End: 2025-03-07

## 2025-03-07 RX ORDER — LORAZEPAM 4 MG/ML
1 VIAL (ML) INJECTION DAILY
Refills: 0 | Status: DISCONTINUED | OUTPATIENT
Start: 2025-03-07 | End: 2025-03-10

## 2025-03-07 RX ORDER — OLANZAPINE 10 MG/1
5 TABLET ORAL ONCE
Refills: 0 | Status: COMPLETED | OUTPATIENT
Start: 2025-03-07 | End: 2025-03-07

## 2025-03-07 RX ORDER — ACETAMINOPHEN 500 MG/5ML
975 LIQUID (ML) ORAL EVERY 6 HOURS
Refills: 0 | Status: DISCONTINUED | OUTPATIENT
Start: 2025-03-07 | End: 2025-03-12

## 2025-03-07 RX ORDER — ONDANSETRON HCL/PF 4 MG/2 ML
4 VIAL (ML) INJECTION EVERY 6 HOURS
Refills: 0 | Status: DISCONTINUED | OUTPATIENT
Start: 2025-03-07 | End: 2025-03-12

## 2025-03-07 RX ADMIN — ENOXAPARIN SODIUM 30 MILLIGRAM(S): 100 INJECTION SUBCUTANEOUS at 06:50

## 2025-03-07 RX ADMIN — Medication 200 GRAM(S): at 01:05

## 2025-03-07 RX ADMIN — OLANZAPINE 5 MILLIGRAM(S): 10 TABLET ORAL at 14:55

## 2025-03-07 RX ADMIN — Medication 975 MILLIGRAM(S): at 23:52

## 2025-03-07 RX ADMIN — Medication 2 MILLIGRAM(S): at 02:13

## 2025-03-07 RX ADMIN — Medication 1000 MILLILITER(S): at 01:05

## 2025-03-07 RX ADMIN — Medication 120 MILLILITER(S): at 21:19

## 2025-03-07 RX ADMIN — HALOPERIDOL 5 MILLIGRAM(S): 10 TABLET ORAL at 01:34

## 2025-03-07 RX ADMIN — Medication 975 MILLIGRAM(S): at 06:49

## 2025-03-07 RX ADMIN — Medication 2 MILLIGRAM(S): at 01:34

## 2025-03-07 RX ADMIN — Medication 120 MILLILITER(S): at 07:01

## 2025-03-07 NOTE — BH CONSULTATION LIAISON ASSESSMENT NOTE - HPI (INCLUDE ILLNESS QUALITY, SEVERITY, DURATION, TIMING, CONTEXT, MODIFYING FACTORS, ASSOCIATED SIGNS AND SYMPTOMS)
Patient is a 75F resident of [place] with significant PMH including hypothyroidism, HTN, nephrogenic DI, hx of splenomegaly, multiple prior SBOs, exlap with SBR and ileostomy take down (2017), Appendectomy (2015), hx of CVA with hemiparesis,  pphx of schizoaffective d/o bipolar type, unknown hx of ALISON, unknown hx inpatient hospitalizations, hx of 2 prior suicide attempts (1979 and 2000),  hx of self harm and aggression toward others, who presents to the ED with abdominal pain and vomiting. Patient found to have SBO with transition point within the large ventral hernia, currently admitted for observation per trauma team.      Psychiatry consulted for medication management.      Patient is a 75F resident of [place] with significant PMH including hypothyroidism, HTN, nephrogenic DI, hx of splenomegaly, multiple prior SBOs, exlap with SBR and ileostomy take down (2017), Appendectomy (2015), hx of CVA with hemiparesis,  pphx of schizoaffective d/o bipolar type, unknown hx of ALISON, unknown hx inpatient hospitalizations, hx of 2 prior suicide attempts (1979 and 2000),  hx of self harm and aggression toward others, who presents to the ED with abdominal pain and vomiting. Patient found to have SBO with transition point within the large ventral hernia, currently admitted for observation per trauma team.      Psychiatry consulted for medication management.    Patient seen today by writer and BUNNY.  One to one staff and [place] aide at bedside.  Per nursing staff patient agitated last night, required IM medication  and now has been sleeping most of morning.  Patient noted to have blanket over head, allowed writer to pull it down.  Noted to be grimacing, yelling when taken down, stating "tired!"  When asked about pain, patient reports "no!"  [place] pulled back up- interview concluded.

## 2025-03-07 NOTE — H&P ADULT - ASSESSMENT
Patient is a 75F resident of [place] with PMH of schizophrenia, HTN, multiple prior SBOs, exlap with SBR and ileostomy take down (2017), Appendectomy (2015) who presents to the ED with abdominal pain and vomiting. Patient poor history and history obtained from [facility] nursing staff. On day of presentation patient was found to have mulitple episodes of vomiting and complaining of pain to her stomach. She last had a bowel movement day prior to presentation and passage of gas is uncertain. On evaluation, patient normal WBC, MONICA with CR of 1.8, lactate pending. CT imaging shows high grade SBO with transition point within the large ventral hernia and small foci of air ?perforation.    Perforation unlikely as patient non-peritonitic with minimal pain on examination.    Plan:  - Admit to floor   - Place NGT - unsuccessful despite numerous attempts with and without medication  - IVF  - Strict Is & Os  - DVT ppx: LVX SCDs  - Further plans to follow     Patient seen and discussed with Dr. Wolf

## 2025-03-07 NOTE — BH CONSULTATION LIAISON ASSESSMENT NOTE - SUMMARY
Patient is a 75F resident of [place] with significant PMH including hypothyroidism, HTN, nephrogenic DI, hx of splenomegaly, multiple prior SBOs, exlap with SBR and ileostomy take down (2017), Appendectomy (2015), hx of CVA with hemiparesis,  pphx of schizoaffective d/o bipolar type, unknown hx of ALISON, unknown hx inpatient hospitalizations, hx of 2 prior suicide attempts (1979 and 2000),  hx of self harm and aggression toward others, who presents to the ED with abdominal pain and vomiting. Patient found to have SBO with transition point within the large ventral hernia, currently admitted for observation per trauma team.      Psychiatry consulted for medication management.    Psych meds from [place]:  lexapro 30mg po qdaily  ativan 1mg po q AM and 0.5mg po qHS  olanzapine 15mg po qHS    *Patient NPO.  Would attempt to avoid antipsychotics for now as they can contribute to constipation.      RECS  -continue one to one obs for danger to self and others  Patient is a 75F resident of [place] with significant PMH including hypothyroidism, HTN, nephrogenic DI, hx of splenomegaly, multiple prior SBOs, exlap with SBR and ileostomy take down (2017), Appendectomy (2015), hx of CVA with hemiparesis,  pphx of schizoaffective d/o bipolar type, unknown hx of ALISON, unknown hx inpatient hospitalizations, hx of 2 prior suicide attempts (1979 and 2000),  hx of self harm and aggression toward others, who presents to the ED with abdominal pain and vomiting. Patient found to have SBO with transition point within the large ventral hernia, currently admitted for observation per trauma team.      Psychiatry consulted for medication management.    Psych meds from [place]:  lexapro 30mg po qdaily  ativan 1mg po q AM and 0.5mg po qHS  olanzapine 15mg po qHS    *Patient NPO.  Would attempt to avoid antipsychotics (unless PRN for severe agitation) for now as they can contribute to constipation.     RECS  -continue one to one obs for danger to self and others  -recommend obtaining EKG  -an hold lexapro and when can tolerate PO intake, can restart at lexapro 10mg po qdaily  -Can given ativan 1mg q AM/ 0.5mg qHS via IV push route  -hold standing olanzapine as can contribute to constipation  -PRN- first line: can given ativan 2mg IM q6hrs PRN, FOR severe agitation can add haldol 5mg IM one time dosages (monitor for qtc <500msec)

## 2025-03-07 NOTE — H&P ADULT - HISTORY OF PRESENT ILLNESS
HPI: Patient is a 75F resident of [place] with PMH of schizophrenia, HTN, multiple prior SBOs, exlap with SBR and ileostomy take down (2017), Appendectomy (2015) who presents to the ED with abdominal pain and vomiting. Patient poor history and history obtained from [facility] nursing staff. On day of presentation patient was found to have mulitple episodes of vomiting and complaining of pain to her stomach. She last had a bowel movement day prior to presentation and passage of gas is uncertain. On evaluation, patient normal WBC, MONICA with CR of 1.8, lactate pending. CT imaging shows high grade SBO with transition point within the large ventral hernia and small foci of air ?perforation.      PAST MEDICAL & SURGICAL HISTORY:  Venous insufficiency (chronic) (peripheral)      Constipation      Sensory hearing loss      GERD (gastroesophageal reflux disease)      Hypothyroidism      Schizoaffective disorder, bipolar type      Suicide attempt      Behavior problems  assaultive      Hemiparesis, left      Seizure      Osteopenia      Fall      Vaginal prolapse      Neutropenia      Anemia      Splenomegaly      CVA (cerebral vascular accident)      HTN (hypertension)      Obesity      Urinary incontinence      Schizo affective schizophrenia      Displaced fracture of olecranon process with intraarticular extension of left ulna      Rectal prolapse      Onychomycosis      Uterine prolapse      H/O ileostomy  4/2015      Parastomal hernia without obstruction or gangrene          CONSTITUTIONAL: No weakness, fevers or chills  EYES/ENT: No visual changes;  No vertigo or throat pain   NECK: No pain or stiffness  RESPIRATORY: No cough, wheezing, hemoptysis; No shortness of breath  CARDIOVASCULAR: No chest pain or palpitations  GASTROINTESTINAL: No abdominal or epigastric pain. No nausea, vomiting, or hematemesis; No diarrhea or constipation. No melena or hematochezia.  GENITOURINARY: No dysuria, frequency or hematuria  NEUROLOGICAL: No numbness or weakness  SKIN: No itching, burning, rashes, or lesions   All other review of systems is negative unless indicated above.    MEDICATIONS  (STANDING):  acetaminophen     Tablet .. 975 milliGRAM(s) Oral every 6 hours  enoxaparin Injectable 30 milliGRAM(s) SubCutaneous every 24 hours  sodium chloride 0.9%. 1000 milliLiter(s) (120 mL/Hr) IV Continuous <Continuous>    MEDICATIONS  (PRN):  ondansetron Injectable 4 milliGRAM(s) IV Push every 6 hours PRN Nausea      Allergies    Depakote (Other)  erythromycin (Unknown)  clozapine (Other)  Neupogen (Other)    Intolerances        SOCIAL HISTORY:  Nonsmoker.  No ETOH or illicit drug use    FAMILY HISTORY:  Family history of psoriasis        Vital Signs Last 24 Hrs  T(C): 36.7 (07 Mar 2025 03:19), Max: 36.8 (07 Mar 2025 00:40)  T(F): 98 (07 Mar 2025 03:19), Max: 98.3 (07 Mar 2025 00:40)  HR: 77 (07 Mar 2025 03:19) (73 - 77)  BP: 134/62 (07 Mar 2025 03:19) (134/62 - 161/71)  BP(mean): 86 (07 Mar 2025 03:19) (86 - 86)  RR: 19 (07 Mar 2025 03:19) (18 - 19)  SpO2: 96% (07 Mar 2025 03:19) (96% - 97%)    Parameters below as of 07 Mar 2025 03:19  Patient On (Oxygen Delivery Method): room air    Physical Exam:    GENERAL:  disheveled female lying in bed in no apparent distress  HEENT:  NC/AT. Sclera white. Mucous membranes moist.  CARDIO:  Regular rate and rhythm.  RESPIRATORY:  Nonlabored breathing, no accessory muscle use.  ABDOMEN:  Soft, nondistended, nontender. Large right ventral wall hernia, minimal tenderness to palpation, irreducible.  No rebound tenderness or guarding.  EXTREMITIES: No calf tenderness bilaterally.  SKIN:  No jaundice, pallor, or cyanosis  NEURO:  A&O x 3    LABS:                        9.0    4.78  )-----------( 131      ( 06 Mar 2025 22:50 )             29.6     03-06    140  |  102  |  32.3[H]  ----------------------------<  107[H]  4.7   |  27.0  |  1.86[H]    Ca    9.6      06 Mar 2025 22:50    TPro  6.5[L]  /  Alb  4.1  /  TBili  0.4  /  DBili  x   /  AST  15  /  ALT  15  /  AlkPhos  88  03-06    PT/INR - ( 06 Mar 2025 22:50 )   PT: 13.2 sec;   INR: 1.17 ratio         PTT - ( 06 Mar 2025 22:50 )  PTT:27.7 sec  Urinalysis Basic - ( 06 Mar 2025 22:50 )    Color: x / Appearance: x / SG: x / pH: x  Gluc: 107 mg/dL / Ketone: x  / Bili: x / Urobili: x   Blood: x / Protein: x / Nitrite: x   Leuk Esterase: x / RBC: x / WBC x   Sq Epi: x / Non Sq Epi: x / Bacteria: x

## 2025-03-07 NOTE — BH CONSULTATION LIAISON ASSESSMENT NOTE - NSBHATTESTAPPBILLTIME_PSY_A_CORE
I attest my time as CHRISTINE is greater than 50% of the total combined time spent on qualifying patient care activities. I have reviewed and verified the documentation.

## 2025-03-07 NOTE — H&P ADULT - NS ATTEND AMEND GEN_ALL_CORE FT
Patient seen and examined at bedside.  75F with schizophrenia from [place] who presents with nausea/vomiting, worsening abdominal pain, and distension.  Patient is unable to give a history.  On exam, she has a large ventral hernia and has a benign exam with no abdominal tenderness or skin changes.  CT A/P shows a high-grade SBO with a questionable area of perforation, however this appears to be an over-read of the CT upon review.  Patient will require admission, NPO, IVF, NGT, and plan for gastrografin challenge.

## 2025-03-07 NOTE — PROGRESS NOTE ADULT - ASSESSMENT
The patient is under the auspices of [facility]. Patient completed a Health Care Proxy (HCP) Form on 9/4/2014. Patient has named her brother, Tom Toribio ([phone]) as her HCP, and her brother Sukh Toribio ([phone]) as her alternate HCP.     Please call with questions.     Scarlett Ferreira MD  Ethics Attending  [phone] The patient is under the auspices of [facility]. Patient completed a Health Care Proxy (HCP) Form on 9/4/2014. Patient has named her brother, Tom Toribio ([phone]) as her HCP, and her brother Sukh Toribio ([phone]) as her alternate HCP.     Please call with questions.     Scarlett Ferreira MD  Ethics Attending  [phone]    ADDENDUM 3.7.25 - CARMEN WHELAN RN MBE (Ethics Fellow) obtained physical copy of HCP and placed in chart. CARMEN Knight (Ethics Fellow) also discussed above and case with Dr. Pastor (Surgery Attending).     Scarlett Ferreira MD  Ethics Attending

## 2025-03-07 NOTE — BH CONSULTATION LIAISON ASSESSMENT NOTE - NS_RISKASSESSMENTINTER_PSY_ALL_CORE
Post-Care Instructions: - Avoid picking at any of the treated lesions. Include Z78.9 (Other Specified Conditions Influencing Health Status) As An Associated Diagnosis?: No Consent: - Verbal and written consent was obtained, and risks were reviewed prior to procedure today. \\n- Risks discussed include but are not limited to pain, crusting, scabbing, blistering, scarring, temporary or permanent darker or lighter pigmentary change, recurrence, incomplete resolution, and infection. Detail Level: Detailed Medical Necessity Information: It is in your best interest to select a reason for this procedure from the list below. All of these items fulfill various CMS LCD requirements except the new and changing color options. Low Acute Suicide Risk Render Post-Care Instructions In Note?: yes Medical Necessity Clause: This procedure was medically necessary because the lesions that were treated were: Spray Paint Text: The liquid nitrogen was applied to the skin utilizing a spray paint frosting technique.

## 2025-03-07 NOTE — PATIENT PROFILE ADULT - FALL HARM RISK - HARM RISK INTERVENTIONS

## 2025-03-07 NOTE — BH CONSULTATION LIAISON ASSESSMENT NOTE - NSBHCHARTREVIEWVS_PSY_A_CORE FT
Vital Signs Last 24 Hrs  T(C): 36.7 (07 Mar 2025 03:19), Max: 36.8 (07 Mar 2025 00:40)  T(F): 98 (07 Mar 2025 03:19), Max: 98.3 (07 Mar 2025 00:40)  HR: 76 (07 Mar 2025 08:36) (73 - 77)  BP: 129/69 (07 Mar 2025 08:36) (129/69 - 161/71)  BP(mean): 88 (07 Mar 2025 08:36) (86 - 88)  RR: 18 (07 Mar 2025 08:36) (18 - 19)  SpO2: 93% (07 Mar 2025 08:36) (93% - 97%)    Parameters below as of 07 Mar 2025 08:36  Patient On (Oxygen Delivery Method): room air

## 2025-03-07 NOTE — BH CONSULTATION LIAISON ASSESSMENT NOTE - CURRENT MEDICATION
MEDICATIONS  (STANDING):  acetaminophen     Tablet .. 975 milliGRAM(s) Oral every 6 hours  enoxaparin Injectable 30 milliGRAM(s) SubCutaneous every 24 hours  sodium chloride 0.9% Bolus 1000 milliLiter(s) IV Bolus once  sodium chloride 0.9%. 1000 milliLiter(s) (120 mL/Hr) IV Continuous <Continuous>    MEDICATIONS  (PRN):  ondansetron Injectable 4 milliGRAM(s) IV Push every 6 hours PRN Nausea

## 2025-03-07 NOTE — BH CONSULTATION LIAISON ASSESSMENT NOTE - OTHER
collateral from [place] other residents [facility] irritable disheveled, one tooth

## 2025-03-08 LAB
ALBUMIN SERPL ELPH-MCNC: 3.3 G/DL — SIGNIFICANT CHANGE UP (ref 3.3–5.2)
ALP SERPL-CCNC: 73 U/L — SIGNIFICANT CHANGE UP (ref 40–120)
ALT FLD-CCNC: 13 U/L — SIGNIFICANT CHANGE UP
ANION GAP SERPL CALC-SCNC: 14 MMOL/L — SIGNIFICANT CHANGE UP (ref 5–17)
ANION GAP SERPL CALC-SCNC: 16 MMOL/L — SIGNIFICANT CHANGE UP (ref 5–17)
ANISOCYTOSIS BLD QL: SLIGHT — SIGNIFICANT CHANGE UP
AST SERPL-CCNC: 14 U/L — SIGNIFICANT CHANGE UP
BASOPHILS # BLD AUTO: 0 K/UL — SIGNIFICANT CHANGE UP (ref 0–0.2)
BASOPHILS # BLD MANUAL: 0 K/UL — SIGNIFICANT CHANGE UP (ref 0–0.2)
BASOPHILS NFR BLD AUTO: 0 % — SIGNIFICANT CHANGE UP (ref 0–2)
BASOPHILS NFR BLD MANUAL: 0 % — SIGNIFICANT CHANGE UP (ref 0–2)
BILIRUB SERPL-MCNC: 0.4 MG/DL — SIGNIFICANT CHANGE UP (ref 0.4–2)
BUN SERPL-MCNC: 31.3 MG/DL — HIGH (ref 8–20)
BUN SERPL-MCNC: 31.3 MG/DL — HIGH (ref 8–20)
CALCIUM SERPL-MCNC: 8.8 MG/DL — SIGNIFICANT CHANGE UP (ref 8.4–10.5)
CALCIUM SERPL-MCNC: 8.9 MG/DL — SIGNIFICANT CHANGE UP (ref 8.4–10.5)
CHLORIDE SERPL-SCNC: 116 MMOL/L — HIGH (ref 96–108)
CHLORIDE SERPL-SCNC: 117 MMOL/L — HIGH (ref 96–108)
CO2 SERPL-SCNC: 19 MMOL/L — LOW (ref 22–29)
CO2 SERPL-SCNC: 21 MMOL/L — LOW (ref 22–29)
CREAT SERPL-MCNC: 2.03 MG/DL — HIGH (ref 0.5–1.3)
CREAT SERPL-MCNC: 2.06 MG/DL — HIGH (ref 0.5–1.3)
EGFR: 25 ML/MIN/1.73M2 — LOW
ELLIPTOCYTES BLD QL SMEAR: SLIGHT — SIGNIFICANT CHANGE UP
EOSINOPHIL # BLD AUTO: 0 K/UL — SIGNIFICANT CHANGE UP (ref 0–0.5)
EOSINOPHIL # BLD MANUAL: 0 K/UL — SIGNIFICANT CHANGE UP (ref 0–0.5)
EOSINOPHIL NFR BLD AUTO: 0 % — SIGNIFICANT CHANGE UP (ref 0–6)
EOSINOPHIL NFR BLD MANUAL: 0 % — SIGNIFICANT CHANGE UP (ref 0–6)
GAS PNL BLDV: SIGNIFICANT CHANGE UP
GIANT PLATELETS BLD QL SMEAR: PRESENT
GLUCOSE SERPL-MCNC: 61 MG/DL — LOW (ref 70–99)
GLUCOSE SERPL-MCNC: 71 MG/DL — SIGNIFICANT CHANGE UP (ref 70–99)
HCT VFR BLD CALC: 28.5 % — LOW (ref 34.5–45)
HCT VFR BLD CALC: 29.5 % — LOW (ref 34.5–45)
HGB BLD-MCNC: 8.3 G/DL — LOW (ref 11.5–15.5)
HGB BLD-MCNC: 8.4 G/DL — LOW (ref 11.5–15.5)
IMM GRANULOCYTES # BLD AUTO: 0 K/UL — SIGNIFICANT CHANGE UP (ref 0–0.07)
IMM GRANULOCYTES NFR BLD AUTO: 0 % — SIGNIFICANT CHANGE UP (ref 0–0.9)
LYMPHOCYTES # BLD AUTO: 0.69 K/UL — LOW (ref 1–3.3)
LYMPHOCYTES # BLD MANUAL: 0.43 K/UL — LOW (ref 1–3.3)
LYMPHOCYTES NFR BLD AUTO: 25.3 % — SIGNIFICANT CHANGE UP (ref 13–44)
LYMPHOCYTES NFR BLD MANUAL: 15.7 % — SIGNIFICANT CHANGE UP (ref 13–44)
MAGNESIUM SERPL-MCNC: 2.2 MG/DL — SIGNIFICANT CHANGE UP (ref 1.6–2.6)
MANUAL NEUTROPHIL BANDS #: 0.5 K/UL — SIGNIFICANT CHANGE UP (ref 0–0.84)
MANUAL PROMYELOCYTE #: 0.02 K/UL — HIGH (ref 0–0)
MANUAL REACTIVE LYMPHOCYTES #: 0.17 K/UL — SIGNIFICANT CHANGE UP (ref 0–0.63)
MCHC RBC-ENTMCNC: 27.3 PG — SIGNIFICANT CHANGE UP (ref 27–34)
MCHC RBC-ENTMCNC: 28 PG — SIGNIFICANT CHANGE UP (ref 27–34)
MCHC RBC-ENTMCNC: 28.5 G/DL — LOW (ref 32–36)
MCHC RBC-ENTMCNC: 29.1 G/DL — LOW (ref 32–36)
MCV RBC AUTO: 95.8 FL — SIGNIFICANT CHANGE UP (ref 80–100)
MCV RBC AUTO: 96.3 FL — SIGNIFICANT CHANGE UP (ref 80–100)
MICROCYTES BLD QL: SLIGHT — SIGNIFICANT CHANGE UP
MONOCYTES # BLD AUTO: 0.26 K/UL — SIGNIFICANT CHANGE UP (ref 0–0.9)
MONOCYTES # BLD MANUAL: 0.12 K/UL — SIGNIFICANT CHANGE UP (ref 0–0.9)
MONOCYTES NFR BLD AUTO: 9.5 % — SIGNIFICANT CHANGE UP (ref 2–14)
MONOCYTES NFR BLD MANUAL: 4.3 % — SIGNIFICANT CHANGE UP (ref 2–14)
NEUTROPHILS # BLD AUTO: 1.78 K/UL — LOW (ref 1.8–7.4)
NEUTROPHILS # BLD MANUAL: 1.49 K/UL — LOW (ref 1.8–7.4)
NEUTROPHILS NFR BLD AUTO: 65.2 % — SIGNIFICANT CHANGE UP (ref 43–77)
NEUTROPHILS NFR BLD MANUAL: 54.7 % — SIGNIFICANT CHANGE UP (ref 43–77)
NEUTS BAND # BLD: 18.3 % — HIGH (ref 0–8)
NEUTS BAND NFR BLD: 18.3 % — HIGH (ref 0–8)
NRBC # BLD AUTO: 0 K/UL — SIGNIFICANT CHANGE UP (ref 0–0)
NRBC # BLD AUTO: 0 K/UL — SIGNIFICANT CHANGE UP (ref 0–0)
NRBC # FLD: 0 K/UL — SIGNIFICANT CHANGE UP (ref 0–0)
NRBC # FLD: 0 K/UL — SIGNIFICANT CHANGE UP (ref 0–0)
NRBC BLD AUTO-RTO: 0 /100 WBCS — SIGNIFICANT CHANGE UP (ref 0–0)
NRBC BLD AUTO-RTO: 0 /100 WBCS — SIGNIFICANT CHANGE UP (ref 0–0)
OVALOCYTES BLD QL SMEAR: SLIGHT — SIGNIFICANT CHANGE UP
PHOSPHATE SERPL-MCNC: 3.6 MG/DL — SIGNIFICANT CHANGE UP (ref 2.4–4.7)
PLAT MORPH BLD: ABNORMAL
PLATELET # BLD AUTO: 116 K/UL — LOW (ref 150–400)
PLATELET # BLD AUTO: 117 K/UL — LOW (ref 150–400)
PLATELET COUNT - ESTIMATE: NORMAL — SIGNIFICANT CHANGE UP
PMV BLD: 10.5 FL — SIGNIFICANT CHANGE UP (ref 7–13)
PMV BLD: 11.3 FL — SIGNIFICANT CHANGE UP (ref 7–13)
POIKILOCYTOSIS BLD QL AUTO: SLIGHT — SIGNIFICANT CHANGE UP
POLYCHROMASIA BLD QL SMEAR: SLIGHT — SIGNIFICANT CHANGE UP
POTASSIUM SERPL-MCNC: 3.7 MMOL/L — SIGNIFICANT CHANGE UP (ref 3.5–5.3)
POTASSIUM SERPL-MCNC: 3.8 MMOL/L — SIGNIFICANT CHANGE UP (ref 3.5–5.3)
POTASSIUM SERPL-SCNC: 3.7 MMOL/L — SIGNIFICANT CHANGE UP (ref 3.5–5.3)
POTASSIUM SERPL-SCNC: 3.8 MMOL/L — SIGNIFICANT CHANGE UP (ref 3.5–5.3)
PROMYELOCYTES # FLD: 0.9 % — HIGH (ref 0–0)
PROMYELOCYTES NFR BLD: 0.9 % — HIGH (ref 0–0)
PROT SERPL-MCNC: 5.6 G/DL — LOW (ref 6.6–8.7)
RBC # BLD: 2.96 M/UL — LOW (ref 3.8–5.2)
RBC # BLD: 3.08 M/UL — LOW (ref 3.8–5.2)
RBC # FLD: 15.4 % — HIGH (ref 10.3–14.5)
RBC # FLD: 15.6 % — HIGH (ref 10.3–14.5)
RBC BLD AUTO: ABNORMAL
SODIUM SERPL-SCNC: 151 MMOL/L — HIGH (ref 135–145)
SODIUM SERPL-SCNC: 152 MMOL/L — HIGH (ref 135–145)
VARIANT LYMPHS # BLD: 6.1 % — HIGH (ref 0–6)
VARIANT LYMPHS NFR BLD MANUAL: 6.1 % — HIGH (ref 0–6)
WBC # BLD: 2.73 K/UL — LOW (ref 3.8–10.5)
WBC # BLD: 2.95 K/UL — LOW (ref 3.8–10.5)
WBC # FLD AUTO: 2.73 K/UL — LOW (ref 3.8–10.5)
WBC # FLD AUTO: 2.95 K/UL — LOW (ref 3.8–10.5)

## 2025-03-08 PROCEDURE — 74018 RADEX ABDOMEN 1 VIEW: CPT | Mod: 26,76

## 2025-03-08 PROCEDURE — 99231 SBSQ HOSP IP/OBS SF/LOW 25: CPT

## 2025-03-08 RX ORDER — SODIUM CHLORIDE 9 G/1000ML
1000 INJECTION, SOLUTION INTRAVENOUS
Refills: 0 | Status: DISCONTINUED | OUTPATIENT
Start: 2025-03-08 | End: 2025-03-10

## 2025-03-08 RX ORDER — SODIUM CHLORIDE 9 G/1000ML
1000 INJECTION, SOLUTION INTRAVENOUS
Refills: 0 | Status: DISCONTINUED | OUTPATIENT
Start: 2025-03-08 | End: 2025-03-08

## 2025-03-08 RX ORDER — SODIUM CHLORIDE 9 G/1000ML
1000 INJECTION, SOLUTION INTRAVENOUS ONCE
Refills: 0 | Status: COMPLETED | OUTPATIENT
Start: 2025-03-08 | End: 2025-03-08

## 2025-03-08 RX ORDER — HEPARIN SODIUM 1000 [USP'U]/ML
5000 INJECTION INTRAVENOUS; SUBCUTANEOUS EVERY 12 HOURS
Refills: 0 | Status: DISCONTINUED | OUTPATIENT
Start: 2025-03-08 | End: 2025-03-12

## 2025-03-08 RX ADMIN — Medication 975 MILLIGRAM(S): at 12:31

## 2025-03-08 RX ADMIN — Medication 975 MILLIGRAM(S): at 17:41

## 2025-03-08 RX ADMIN — Medication 975 MILLIGRAM(S): at 23:42

## 2025-03-08 RX ADMIN — Medication 40 MILLIGRAM(S): at 12:31

## 2025-03-08 RX ADMIN — Medication 1 MILLIGRAM(S): at 12:31

## 2025-03-08 RX ADMIN — Medication 120 MILLILITER(S): at 08:40

## 2025-03-08 RX ADMIN — SODIUM CHLORIDE 125 MILLILITER(S): 9 INJECTION, SOLUTION INTRAVENOUS at 14:46

## 2025-03-08 RX ADMIN — HEPARIN SODIUM 5000 UNIT(S): 1000 INJECTION INTRAVENOUS; SUBCUTANEOUS at 17:42

## 2025-03-08 RX ADMIN — SODIUM CHLORIDE 125 MILLILITER(S): 9 INJECTION, SOLUTION INTRAVENOUS at 18:52

## 2025-03-08 RX ADMIN — SODIUM CHLORIDE 1000 MILLILITER(S): 9 INJECTION, SOLUTION INTRAVENOUS at 14:55

## 2025-03-08 NOTE — CHART NOTE - NSCHARTNOTEFT_GEN_A_CORE
Pt noted to be hypernatremic to 152, repeat 151, was 140 yesterday.   when arrived to pt bedside, was holding stomach and grimacing, but when asked, she denies having abdominal pain, nausea, or vomiting.   Pt continued to grimace when abdomen was palpated, but denies pain.   pt to remain npo, kub from this am reviewed, final read pending, labs ordered, IVFs changed
Medication rec obtained and confirmed with RN at [facility] in unit 201.
Pt seen at bedside. Currently sleeping comfortable.   Follow up GI function, monitor vitals  Hernia will be very difficult to manage should she have an ongoing obstruction or perforation.  Observation for now  con't current meds - observing off of abx, afebrile and without peritonitis   dvt prophylaxis      Vipul Pastor MD FACS  Acute and Critical Care Surgery  Director of Emergency General Surgery @ [facility]  Associate  - General Surgery @ [facility]

## 2025-03-09 LAB
ANION GAP SERPL CALC-SCNC: 11 MMOL/L — SIGNIFICANT CHANGE UP (ref 5–17)
ANION GAP SERPL CALC-SCNC: 9 MMOL/L — SIGNIFICANT CHANGE UP (ref 5–17)
BUN SERPL-MCNC: 25.4 MG/DL — HIGH (ref 8–20)
BUN SERPL-MCNC: 26.7 MG/DL — HIGH (ref 8–20)
CALCIUM SERPL-MCNC: 8.4 MG/DL — SIGNIFICANT CHANGE UP (ref 8.4–10.5)
CALCIUM SERPL-MCNC: 8.6 MG/DL — SIGNIFICANT CHANGE UP (ref 8.4–10.5)
CHLORIDE SERPL-SCNC: 119 MMOL/L — HIGH (ref 96–108)
CHLORIDE SERPL-SCNC: 120 MMOL/L — HIGH (ref 96–108)
CO2 SERPL-SCNC: 22 MMOL/L — SIGNIFICANT CHANGE UP (ref 22–29)
CO2 SERPL-SCNC: 23 MMOL/L — SIGNIFICANT CHANGE UP (ref 22–29)
CREAT SERPL-MCNC: 1.98 MG/DL — HIGH (ref 0.5–1.3)
CREAT SERPL-MCNC: 1.98 MG/DL — HIGH (ref 0.5–1.3)
EGFR: 26 ML/MIN/1.73M2 — LOW
GLUCOSE SERPL-MCNC: 111 MG/DL — HIGH (ref 70–99)
GLUCOSE SERPL-MCNC: 111 MG/DL — HIGH (ref 70–99)
HCT VFR BLD CALC: 24.3 % — LOW (ref 34.5–45)
HCT VFR BLD CALC: 26.8 % — LOW (ref 34.5–45)
HGB BLD-MCNC: 7.1 G/DL — LOW (ref 11.5–15.5)
HGB BLD-MCNC: 8 G/DL — LOW (ref 11.5–15.5)
MAGNESIUM SERPL-MCNC: 2.1 MG/DL — SIGNIFICANT CHANGE UP (ref 1.6–2.6)
MCHC RBC-ENTMCNC: 27.6 PG — SIGNIFICANT CHANGE UP (ref 27–34)
MCHC RBC-ENTMCNC: 28.1 PG — SIGNIFICANT CHANGE UP (ref 27–34)
MCHC RBC-ENTMCNC: 29.2 G/DL — LOW (ref 32–36)
MCHC RBC-ENTMCNC: 29.9 G/DL — LOW (ref 32–36)
MCV RBC AUTO: 94 FL — SIGNIFICANT CHANGE UP (ref 80–100)
MCV RBC AUTO: 94.6 FL — SIGNIFICANT CHANGE UP (ref 80–100)
NRBC # BLD AUTO: 0 K/UL — SIGNIFICANT CHANGE UP (ref 0–0)
NRBC # BLD AUTO: 0 K/UL — SIGNIFICANT CHANGE UP (ref 0–0)
NRBC # FLD: 0 K/UL — SIGNIFICANT CHANGE UP (ref 0–0)
NRBC # FLD: 0 K/UL — SIGNIFICANT CHANGE UP (ref 0–0)
NRBC BLD AUTO-RTO: 0 /100 WBCS — SIGNIFICANT CHANGE UP (ref 0–0)
NRBC BLD AUTO-RTO: 0 /100 WBCS — SIGNIFICANT CHANGE UP (ref 0–0)
PHOSPHATE SERPL-MCNC: 3.2 MG/DL — SIGNIFICANT CHANGE UP (ref 2.4–4.7)
PLATELET # BLD AUTO: 111 K/UL — LOW (ref 150–400)
PLATELET # BLD AUTO: 122 K/UL — LOW (ref 150–400)
PMV BLD: 10.8 FL — SIGNIFICANT CHANGE UP (ref 7–13)
PMV BLD: 11.3 FL — SIGNIFICANT CHANGE UP (ref 7–13)
POTASSIUM SERPL-MCNC: 3.3 MMOL/L — LOW (ref 3.5–5.3)
POTASSIUM SERPL-MCNC: 3.5 MMOL/L — SIGNIFICANT CHANGE UP (ref 3.5–5.3)
POTASSIUM SERPL-SCNC: 3.3 MMOL/L — LOW (ref 3.5–5.3)
POTASSIUM SERPL-SCNC: 3.5 MMOL/L — SIGNIFICANT CHANGE UP (ref 3.5–5.3)
RBC # BLD: 2.57 M/UL — LOW (ref 3.8–5.2)
RBC # BLD: 2.85 M/UL — LOW (ref 3.8–5.2)
RBC # FLD: 15.4 % — HIGH (ref 10.3–14.5)
RBC # FLD: 15.5 % — HIGH (ref 10.3–14.5)
SODIUM SERPL-SCNC: 151 MMOL/L — HIGH (ref 135–145)
SODIUM SERPL-SCNC: 153 MMOL/L — HIGH (ref 135–145)
WBC # BLD: 2.56 K/UL — LOW (ref 3.8–10.5)
WBC # BLD: 2.93 K/UL — LOW (ref 3.8–10.5)
WBC # FLD AUTO: 2.56 K/UL — LOW (ref 3.8–10.5)
WBC # FLD AUTO: 2.93 K/UL — LOW (ref 3.8–10.5)

## 2025-03-09 PROCEDURE — 74018 RADEX ABDOMEN 1 VIEW: CPT | Mod: 26

## 2025-03-09 RX ADMIN — Medication 975 MILLIGRAM(S): at 06:11

## 2025-03-09 RX ADMIN — SODIUM CHLORIDE 125 MILLILITER(S): 9 INJECTION, SOLUTION INTRAVENOUS at 03:32

## 2025-03-09 RX ADMIN — SODIUM CHLORIDE 125 MILLILITER(S): 9 INJECTION, SOLUTION INTRAVENOUS at 11:35

## 2025-03-09 RX ADMIN — Medication 975 MILLIGRAM(S): at 11:31

## 2025-03-09 RX ADMIN — HEPARIN SODIUM 5000 UNIT(S): 1000 INJECTION INTRAVENOUS; SUBCUTANEOUS at 17:30

## 2025-03-09 RX ADMIN — Medication 975 MILLIGRAM(S): at 00:38

## 2025-03-09 RX ADMIN — Medication 975 MILLIGRAM(S): at 18:39

## 2025-03-09 RX ADMIN — Medication 975 MILLIGRAM(S): at 07:11

## 2025-03-09 RX ADMIN — Medication 975 MILLIGRAM(S): at 11:30

## 2025-03-09 RX ADMIN — HEPARIN SODIUM 5000 UNIT(S): 1000 INJECTION INTRAVENOUS; SUBCUTANEOUS at 06:11

## 2025-03-09 RX ADMIN — Medication 975 MILLIGRAM(S): at 17:30

## 2025-03-09 RX ADMIN — SODIUM CHLORIDE 125 MILLILITER(S): 9 INJECTION, SOLUTION INTRAVENOUS at 17:31

## 2025-03-09 RX ADMIN — Medication 40 MILLIGRAM(S): at 11:31

## 2025-03-09 RX ADMIN — Medication 1 MILLIGRAM(S): at 11:31

## 2025-03-09 NOTE — DISCHARGE NOTE PROVIDER - NSFOLLOWUPCLINICS_GEN_ALL_ED_FT
[facility] Acute Care Surgery  Acute Care Surgery  [address] [place] [id number]  Phone: [phone]  Fax:   Follow Up Time: 2 weeks

## 2025-03-09 NOTE — DISCHARGE NOTE PROVIDER - HOSPITAL COURSE
Admission H&P: HPI: Patient is a 75F resident of [place] with PMH of schizophrenia, HTN, multiple prior SBOs, exlap with SBR and ileostomy take down (2017), Appendectomy (2015) who presents to the ED with abdominal pain and vomiting. Patient poor history and history obtained from [facility] nursing staff. On day of presentation patient was found to have mulitple episodes of vomiting and complaining of pain to her stomach. She last had a bowel movement day prior to presentation and passage of gas is uncertain. On evaluation, patient normal WBC, MONICA with CR of 1.8, lactate pending. CT imaging shows high grade SBO with transition point within the large ventral hernia and small foci of air ?perforation.    Hospital Course: Patient was admitted to ACS service for management of SBO. Patient's hospital course was c/b hypernatremia and MONICA, which resolved. Patient had return of bowel function and NGT was removed. Diet was gradually advanced until tolerating regular diet.     The rest of the patient's hospital course remained uneventful. She was discharged in stable condition, tolerating diet and voiding as expected. Admission H&P: HPI: Patient is a 75F resident of [place] with PMH of schizophrenia, HTN, multiple prior SBOs, exlap with SBR and ileostomy take down (2017), Appendectomy (2015) who presents to the ED with abdominal pain and vomiting. Patient poor historian and history obtained from [place] nursing staff. On day of presentation patient was found to have mulitple episodes of vomiting and complaining of pain to her stomach. She last had a bowel movement day prior to presentation and passage of gas is uncertain. On evaluation, patient normal WBC, MONICA with CR of 1.8, lactate pending. CT imaging shows high grade SBO with transition point within the large ventral hernia and small foci of air ?perforation.    Hospital Course: Patient was admitted to ACS service for management of SBO. Patient's hospital course was c/b hypernatremia and MONICA, which resolved. Patient refused NGT. Patient had return of bowel function on her own. Diet was gradually advanced until tolerating regular diet.     The rest of the patient's hospital course remained uneventful. She was discharged in stable condition, tolerating diet and voiding as expected. Admission H&P: HPI: Patient is a 75F resident of [place] with PMH of schizophrenia, HTN, multiple prior SBOs, exlap with SBR and ileostomy take down (2017), Appendectomy (2015) who presents to the ED with abdominal pain and vomiting. Patient poor historian and history obtained from [place] nursing staff. On day of presentation patient was found to have mulitple episodes of vomiting and complaining of pain to her stomach. She last had a bowel movement day prior to presentation and passage of gas is uncertain. On evaluation, patient normal WBC, MONICA with CR of 1.8, lactate pending. CT imaging shows high grade SBO with transition point within the large ventral hernia and small foci of air ?perforation.    Hospital Course: Patient was admitted to ACS service for management of SBO. Patient's hospital course was c/b hypernatremia and MONICA, which resolved. Patient refused NGT. Patient had return of bowel function on her own. Diet was gradually advanced until tolerating regular diet. Behavioral health was consulted and patient's meds were adjusted throughout hospital stay. Final regimen was Ativan 1 mg PO in AM, Ativan 05 mg PO at bedtime and Zyprexa 5 mg PO at bedtime.     The rest of the patient's hospital course remained uneventful. She was discharged in stable condition, tolerating diet and voiding as expected.

## 2025-03-09 NOTE — DISCHARGE NOTE PROVIDER - NSDCCPCAREPLAN_GEN_ALL_CORE_FT
PRINCIPAL DISCHARGE DIAGNOSIS  Diagnosis: SBO (small bowel obstruction)  Assessment and Plan of Treatment: ACTIVITY: No heavy lifting or straining. Otherwise, you may return to your usual level of physical activity.   DIET: Return to your usual diet.  NOTIFY YOUR SURGEON IF: You have any fever (over 100.4 F) or chills, persistent nausea/vomiting, persistent diarrhea, or if your pain is not controlled on your discharge pain medications.  FOLLOW-UP: Please follow up with your primary care physician and Acute Care Surgery clinic [phone] in one week regarding your hospitalization. Call for appointment upon discharge.     PRINCIPAL DISCHARGE DIAGNOSIS  Diagnosis: SBO (small bowel obstruction)  Assessment and Plan of Treatment: ACTIVITY: You may return to your usual level of physical activity.   DIET: Patient is advised to RETURN TO THE EMERGENCY DEPARTMENT for any of the following - worsening pain, fever/chills, nausea/vomiting, altered mental status, chest pain, shortness of breath, or any other new / worsening symptom. to your usual diet.  MEDICATIONS: Patient was taking Ativan 1 mg PO in the AM, Ativan 0.5 mg PO at bedtime, and Zyprexa 5 mg PO at bedtime. Please adjust medication regimen at [facility] after discharge as you feel necessary.   NOTIFY YOUR SURGEON IF: You have any fever (over 100.4 F) or chills, persistent nausea/vomiting, persistent diarrhea, or if your pain is not controlled on your discharge pain medications.  FOLLOW-UP: Please follow up with your primary care physician 2-3 weeks after discharge.

## 2025-03-09 NOTE — DISCHARGE NOTE PROVIDER - NSDCACTIVITY_GEN_ALL_CORE
Showering allowed/No heavy lifting/straining/Follow Instructions Provided by your Surgical Team/Activity as tolerated Showering allowed/Activity as tolerated

## 2025-03-09 NOTE — DISCHARGE NOTE PROVIDER - NSDCMRMEDTOKEN_GEN_ALL_CORE_FT
ascorbic acid 500 mg oral tablet: 1 tab(s) orally 2 times a day  Ativan 1 mg oral tablet: 1 tab(s) orally once a day  atorvastatin 10 mg oral tablet: 1 tab(s) orally once a day  calcitriol 0.25 mcg oral capsule: 1 cap(s) orally once a day  calcium carbonate 500 mg (200 mg elemental calcium) oral tablet, chewable: 2 tab(s) orally 3 times a day  cholecalciferol 50 mcg (2000 intl units) oral capsule: 1 cap(s) orally once a day  cyanocobalamin 500 mcg oral tablet: 1 tab(s) orally once a day  docusate sodium 100 mg oral capsule: 1 cap(s) orally 3 times a day as needed for  constipation  levothyroxine 112 mcg (0.112 mg) oral tablet: 1 tab(s) orally once a day  Lexapro 10 mg oral tablet: 3 tab(s) orally once a day  Lipitor 10 mg oral tablet: 1 tab(s) orally once a day  LORazepam 0.5 mg oral tablet: 0.5 milligram(s) orally once a day Take 2 tablets (1 mG) in the morning and 1 tablet at 20:30  Multiple Vitamins oral tablet: 1 tab(s) orally once a day  OLANZapine 15 mg oral tablet: 1 tab(s) orally once a day  pantoprazole 40 mg oral delayed release tablet: 1 tab(s) orally once a day (before a meal)  polyethylene glycol 3350 oral powder for reconstitution: 17 gram(s) orally once a day (at bedtime)  senna (sennosides) 8.6 mg oral tablet: 2 tab(s) orally once a day (at bedtime) as needed for  constipation  simethicone 80 mg oral tablet, chewable: 1 tab(s) chewed every 8 hours for gas  sodium bicarbonate 650 mg oral tablet: 1 tab(s) orally 2 times a day  thiamine 100 mg oral tablet: 1 tab(s) orally once a day   ascorbic acid 500 mg oral tablet: 1 tab(s) orally 2 times a day  atorvastatin 10 mg oral tablet: 1 tab(s) orally once a day  calcitriol 0.25 mcg oral capsule: 1 cap(s) orally once a day  calcium carbonate 500 mg (200 mg elemental calcium) oral tablet, chewable: 2 tab(s) orally 3 times a day  cholecalciferol 50 mcg (2000 intl units) oral capsule: 1 cap(s) orally once a day  cyanocobalamin 500 mcg oral tablet: 1 tab(s) orally once a day  docusate sodium 100 mg oral capsule: 1 cap(s) orally 3 times a day as needed for  constipation  levothyroxine 112 mcg (0.112 mg) oral tablet: 1 tab(s) orally once a day  Lipitor 10 mg oral tablet: 1 tab(s) orally once a day  LORazepam 0.5 mg oral tablet: 0.5 milligram(s) orally once a day Take 2 tablets (1 mG) in the morning and 1 tablet at 20:30  LORazepam 1 mg oral tablet: 1 tab(s) orally once a day  Multiple Vitamins oral tablet: 1 tab(s) orally once a day  OLANZapine 5 mg oral tablet: 1 tab(s) orally once a day (at bedtime)  pantoprazole 40 mg oral delayed release tablet: 1 tab(s) orally once a day (before a meal)  polyethylene glycol 3350 oral powder for reconstitution: 17 gram(s) orally once a day (at bedtime)  senna (sennosides) 8.6 mg oral tablet: 2 tab(s) orally once a day (at bedtime) as needed for  constipation  simethicone 80 mg oral tablet, chewable: 1 tab(s) chewed every 8 hours for gas  sodium bicarbonate 650 mg oral tablet: 1 tab(s) orally 2 times a day  thiamine 100 mg oral tablet: 1 tab(s) orally once a day

## 2025-03-10 ENCOUNTER — RESULT REVIEW (OUTPATIENT)
Age: 75
End: 2025-03-10

## 2025-03-10 LAB
ANION GAP SERPL CALC-SCNC: 11 MMOL/L — SIGNIFICANT CHANGE UP (ref 5–17)
ANISOCYTOSIS BLD QL: SLIGHT — SIGNIFICANT CHANGE UP
BASOPHILS # BLD MANUAL: 0 K/UL — SIGNIFICANT CHANGE UP (ref 0–0.2)
BASOPHILS NFR BLD MANUAL: 0 % — SIGNIFICANT CHANGE UP (ref 0–2)
BUN SERPL-MCNC: 24.9 MG/DL — HIGH (ref 8–20)
CALCIUM SERPL-MCNC: 8.8 MG/DL — SIGNIFICANT CHANGE UP (ref 8.4–10.5)
CHLORIDE SERPL-SCNC: 116 MMOL/L — HIGH (ref 96–108)
CHLORIDE UR-SCNC: 130 MMOL/24HR — LOW (ref 170–250)
CO2 SERPL-SCNC: 24 MMOL/L — SIGNIFICANT CHANGE UP (ref 22–29)
COLLECT DURATION TIME UR: 24 HR — SIGNIFICANT CHANGE UP
COLLECT DURATION TIME UR: 24 HR — SIGNIFICANT CHANGE UP
CREAT SERPL-MCNC: 2.06 MG/DL — HIGH (ref 0.5–1.3)
EGFR: 25 ML/MIN/1.73M2 — LOW
EGFR: 25 ML/MIN/1.73M2 — LOW
ELLIPTOCYTES BLD QL SMEAR: SLIGHT — SIGNIFICANT CHANGE UP
EOSINOPHIL # BLD MANUAL: 0.05 K/UL — SIGNIFICANT CHANGE UP (ref 0–0.5)
EOSINOPHIL NFR BLD MANUAL: 1.8 % — SIGNIFICANT CHANGE UP (ref 0–6)
GIANT PLATELETS BLD QL SMEAR: PRESENT
GLUCOSE SERPL-MCNC: 97 MG/DL — SIGNIFICANT CHANGE UP (ref 70–99)
HCT VFR BLD CALC: 27.9 % — LOW (ref 34.5–45)
HGB BLD-MCNC: 8.3 G/DL — LOW (ref 11.5–15.5)
HYPOCHROMIA BLD QL: SLIGHT — SIGNIFICANT CHANGE UP
LYMPHOCYTES # BLD MANUAL: 0.51 K/UL — LOW (ref 1–3.3)
LYMPHOCYTES NFR BLD MANUAL: 19.3 % — SIGNIFICANT CHANGE UP (ref 13–44)
MACROCYTES BLD QL: SLIGHT — SIGNIFICANT CHANGE UP
MAGNESIUM SERPL-MCNC: 2 MG/DL — SIGNIFICANT CHANGE UP (ref 1.8–2.6)
MANUAL REACTIVE LYMPHOCYTES #: 0.05 K/UL — SIGNIFICANT CHANGE UP (ref 0–0.63)
MCHC RBC-ENTMCNC: 27.8 PG — SIGNIFICANT CHANGE UP (ref 27–34)
MCHC RBC-ENTMCNC: 29.7 G/DL — LOW (ref 32–36)
MCV RBC AUTO: 93.3 FL — SIGNIFICANT CHANGE UP (ref 80–100)
MICROCYTES BLD QL: SLIGHT — SIGNIFICANT CHANGE UP
MONOCYTES # BLD MANUAL: 0.1 K/UL — SIGNIFICANT CHANGE UP (ref 0–0.9)
MONOCYTES NFR BLD MANUAL: 3.7 % — SIGNIFICANT CHANGE UP (ref 2–14)
MRSA PCR RESULT.: SIGNIFICANT CHANGE UP
NEUTROPHILS # BLD MANUAL: 1.94 K/UL — SIGNIFICANT CHANGE UP (ref 1.8–7.4)
NEUTROPHILS NFR BLD MANUAL: 73.4 % — SIGNIFICANT CHANGE UP (ref 43–77)
NRBC # BLD AUTO: 0 K/UL — SIGNIFICANT CHANGE UP (ref 0–0)
NRBC # FLD: 0 K/UL — SIGNIFICANT CHANGE UP (ref 0–0)
NRBC BLD AUTO-RTO: 0 /100 WBCS — SIGNIFICANT CHANGE UP (ref 0–0)
OSMOLALITY SERPL: 316 MOSMOL/KG — HIGH (ref 280–301)
OVALOCYTES BLD QL SMEAR: SLIGHT — SIGNIFICANT CHANGE UP
PHOSPHATE SERPL-MCNC: 2.2 MG/DL — LOW (ref 2.4–4.7)
PLAT MORPH BLD: NORMAL — SIGNIFICANT CHANGE UP
PLATELET # BLD AUTO: 131 K/UL — LOW (ref 150–400)
PLATELET COUNT - ESTIMATE: ABNORMAL
PMV BLD: 10.8 FL — SIGNIFICANT CHANGE UP (ref 7–13)
POIKILOCYTOSIS BLD QL AUTO: SLIGHT — SIGNIFICANT CHANGE UP
POLYCHROMASIA BLD QL SMEAR: SLIGHT — SIGNIFICANT CHANGE UP
POTASSIUM 24H UR-MRATE: 25 MMOL/24HR — SIGNIFICANT CHANGE UP (ref 25–125)
POTASSIUM SERPL-MCNC: 3.6 MMOL/L — SIGNIFICANT CHANGE UP (ref 3.5–5.3)
POTASSIUM SERPL-SCNC: 3.6 MMOL/L — SIGNIFICANT CHANGE UP (ref 3.5–5.3)
RBC # BLD: 2.99 M/UL — LOW (ref 3.8–5.2)
RBC # FLD: 15.5 % — HIGH (ref 10.3–14.5)
RBC BLD AUTO: ABNORMAL
S AUREUS DNA NOSE QL NAA+PROBE: DETECTED
SODIUM SERPL-SCNC: 151 MMOL/L — HIGH (ref 135–145)
TOTAL VOLUME - 24 HOUR: 1550 ML — SIGNIFICANT CHANGE UP
TOTAL VOLUME - 24 HOUR: 1550 ML — SIGNIFICANT CHANGE UP
URINE CREATININE CALCULATION: 0.6 G/24 HR — LOW (ref 0.8–1.8)
URINE CREATININE CALCULATION: 0.6 G/24 HR — LOW (ref 0.8–1.8)
VARIANT LYMPHS # BLD: 1.8 % — SIGNIFICANT CHANGE UP (ref 0–6)
VARIANT LYMPHS NFR BLD MANUAL: 1.8 % — SIGNIFICANT CHANGE UP (ref 0–6)
WBC # BLD: 2.64 K/UL — LOW (ref 3.8–10.5)
WBC # FLD AUTO: 2.64 K/UL — LOW (ref 3.8–10.5)

## 2025-03-10 PROCEDURE — 74018 RADEX ABDOMEN 1 VIEW: CPT | Mod: 26

## 2025-03-10 PROCEDURE — 93010 ELECTROCARDIOGRAM REPORT: CPT

## 2025-03-10 PROCEDURE — 99232 SBSQ HOSP IP/OBS MODERATE 35: CPT

## 2025-03-10 PROCEDURE — 93306 TTE W/DOPPLER COMPLETE: CPT | Mod: 26

## 2025-03-10 RX ORDER — LORAZEPAM 4 MG/ML
0.5 VIAL (ML) INJECTION AT BEDTIME
Refills: 0 | Status: DISCONTINUED | OUTPATIENT
Start: 2025-03-10 | End: 2025-03-12

## 2025-03-10 RX ORDER — OLANZAPINE 10 MG/1
5 TABLET ORAL AT BEDTIME
Refills: 0 | Status: DISCONTINUED | OUTPATIENT
Start: 2025-03-10 | End: 2025-03-12

## 2025-03-10 RX ORDER — SODIUM CHLORIDE 9 G/1000ML
1000 INJECTION, SOLUTION INTRAVENOUS
Refills: 0 | Status: DISCONTINUED | OUTPATIENT
Start: 2025-03-10 | End: 2025-03-12

## 2025-03-10 RX ORDER — POTASSIUM PHOSPHATE, MONOBASIC POTASSIUM PHOSPHATE, DIBASIC INJECTION, 236; 224 MG/ML; MG/ML
15 SOLUTION, CONCENTRATE INTRAVENOUS ONCE
Refills: 0 | Status: COMPLETED | OUTPATIENT
Start: 2025-03-10 | End: 2025-03-10

## 2025-03-10 RX ORDER — OLANZAPINE 10 MG/1
5 TABLET ORAL
Refills: 0 | Status: DISCONTINUED | OUTPATIENT
Start: 2025-03-10 | End: 2025-03-10

## 2025-03-10 RX ORDER — POLYETHYLENE GLYCOL 3350 17 G/17G
17 POWDER, FOR SOLUTION ORAL DAILY
Refills: 0 | Status: DISCONTINUED | OUTPATIENT
Start: 2025-03-10 | End: 2025-03-12

## 2025-03-10 RX ORDER — LORAZEPAM 4 MG/ML
1 VIAL (ML) INJECTION DAILY
Refills: 0 | Status: DISCONTINUED | OUTPATIENT
Start: 2025-03-10 | End: 2025-03-12

## 2025-03-10 RX ORDER — OLANZAPINE 10 MG/1
2.5 TABLET ORAL ONCE
Refills: 0 | Status: DISCONTINUED | OUTPATIENT
Start: 2025-03-10 | End: 2025-03-10

## 2025-03-10 RX ORDER — SENNA 187 MG
2 TABLET ORAL AT BEDTIME
Refills: 0 | Status: DISCONTINUED | OUTPATIENT
Start: 2025-03-10 | End: 2025-03-12

## 2025-03-10 RX ADMIN — Medication 1 APPLICATION(S): at 11:49

## 2025-03-10 RX ADMIN — Medication 975 MILLIGRAM(S): at 11:55

## 2025-03-10 RX ADMIN — POLYETHYLENE GLYCOL 3350 17 GRAM(S): 17 POWDER, FOR SOLUTION ORAL at 18:12

## 2025-03-10 RX ADMIN — Medication 975 MILLIGRAM(S): at 13:20

## 2025-03-10 RX ADMIN — Medication 975 MILLIGRAM(S): at 00:30

## 2025-03-10 RX ADMIN — HEPARIN SODIUM 5000 UNIT(S): 1000 INJECTION INTRAVENOUS; SUBCUTANEOUS at 05:24

## 2025-03-10 RX ADMIN — Medication 40 MILLIGRAM(S): at 11:48

## 2025-03-10 RX ADMIN — Medication 975 MILLIGRAM(S): at 05:24

## 2025-03-10 RX ADMIN — POTASSIUM PHOSPHATE, MONOBASIC POTASSIUM PHOSPHATE, DIBASIC INJECTION, 62.5 MILLIMOLE(S): 236; 224 SOLUTION, CONCENTRATE INTRAVENOUS at 08:37

## 2025-03-10 RX ADMIN — Medication 975 MILLIGRAM(S): at 18:11

## 2025-03-10 RX ADMIN — Medication 1 MILLIGRAM(S): at 11:48

## 2025-03-10 RX ADMIN — SODIUM CHLORIDE 125 MILLILITER(S): 9 INJECTION, SOLUTION INTRAVENOUS at 08:39

## 2025-03-10 RX ADMIN — SODIUM CHLORIDE 125 MILLILITER(S): 9 INJECTION, SOLUTION INTRAVENOUS at 00:33

## 2025-03-10 RX ADMIN — HEPARIN SODIUM 5000 UNIT(S): 1000 INJECTION INTRAVENOUS; SUBCUTANEOUS at 18:11

## 2025-03-10 RX ADMIN — Medication 975 MILLIGRAM(S): at 06:24

## 2025-03-10 RX ADMIN — Medication 975 MILLIGRAM(S): at 01:26

## 2025-03-10 RX ADMIN — SODIUM CHLORIDE 125 MILLILITER(S): 9 INJECTION, SOLUTION INTRAVENOUS at 18:10

## 2025-03-10 RX ADMIN — Medication 975 MILLIGRAM(S): at 18:50

## 2025-03-10 NOTE — PROGRESS NOTE ADULT - ASSESSMENT
75F resident of [place] with PMH of schizophrenia, HTN, multiple prior SBOs, exlap with SBR and ileostomy take down (2017), Appendectomy (2015) admitted with high grade SBO with transition point within the large ventral hernia, unsuccessful NGT placement and patient refusing further attempts. GG challenge showing contrast in colon on 3/8. Patient was started on clear liquid diet on 3/8, tolerating. Normal BM this morning.    Plan:  - Likely advance diet  - Monitor labs and vitals (hypernatremia and hyperchloremia)  - Replete lytes  - DVT PPx: lov  - 1:1 in place  - Dcp

## 2025-03-10 NOTE — PROGRESS NOTE ADULT - NS ATTEND AMEND GEN_ALL_CORE FT
Above assessment noted.  The patient was seen and examined by myself with the surgical PA.  The patient is without abdominal pain, nausea, or vomit.  Abdomen is soft without localizing tenderness.  KUB reveals contrast in colon.  Will start PO clears.
76yo female resident of [place] with PMH of schizophrenia, HTN, multiple prior SBOs, s/p exlap with SBR and ileostomy take down (2017), Appendectomy (2015) admitted with high grade SBO with transition point within the large ventral hernia, currently SBO resolved with medical management. Tolerates CLD, having BM and pass flatus. Abdomen soft, ventral hernia noted, nontender on palpation. Lab showed hypernatremia and elevated Cr, most likely 2/2 dehydration. Will adv to regular diet, continue D51/2NS, encourage PO fluid intake, trend BMP daily, monitor UOP. F/u Psych for schizophrenia medication management.
Above assessment noted.  The patient was seen and examined by myself with the surgical PA.  The patient is without abdominal pain, nausea, or vomit overnight.  Abdomen is soft with a large complex abdominal wall hernia that is soft and nontender.   The patient is without localizing tenderness, no guarding, no rebound.  The patient was reported to be passing flatus and had a formed bowel movement this morning.  Will get KUB, monitor abdominal exam.

## 2025-03-11 LAB
ANION GAP SERPL CALC-SCNC: 13 MMOL/L — SIGNIFICANT CHANGE UP (ref 5–17)
ANION GAP SERPL CALC-SCNC: 13 MMOL/L — SIGNIFICANT CHANGE UP (ref 5–17)
BASOPHILS # BLD AUTO: 0.01 K/UL — SIGNIFICANT CHANGE UP (ref 0–0.2)
BASOPHILS NFR BLD AUTO: 0.3 % — SIGNIFICANT CHANGE UP (ref 0–2)
BUN SERPL-MCNC: 17.9 MG/DL — SIGNIFICANT CHANGE UP (ref 8–20)
BUN SERPL-MCNC: 21.6 MG/DL — HIGH (ref 8–20)
CALCIUM SERPL-MCNC: 8.8 MG/DL — SIGNIFICANT CHANGE UP (ref 8.4–10.5)
CALCIUM SERPL-MCNC: 8.9 MG/DL — SIGNIFICANT CHANGE UP (ref 8.4–10.5)
CHLORIDE SERPL-SCNC: 111 MMOL/L — HIGH (ref 96–108)
CHLORIDE SERPL-SCNC: 112 MMOL/L — HIGH (ref 96–108)
CO2 SERPL-SCNC: 23 MMOL/L — SIGNIFICANT CHANGE UP (ref 22–29)
CO2 SERPL-SCNC: 24 MMOL/L — SIGNIFICANT CHANGE UP (ref 22–29)
CREAT SERPL-MCNC: 1.84 MG/DL — HIGH (ref 0.5–1.3)
CREAT SERPL-MCNC: 1.95 MG/DL — HIGH (ref 0.5–1.3)
EGFR: 27 ML/MIN/1.73M2 — LOW
EGFR: 27 ML/MIN/1.73M2 — LOW
EGFR: 29 ML/MIN/1.73M2 — LOW
EGFR: 29 ML/MIN/1.73M2 — LOW
EOSINOPHIL # BLD AUTO: 0.1 K/UL — SIGNIFICANT CHANGE UP (ref 0–0.5)
EOSINOPHIL NFR BLD AUTO: 3.4 % — SIGNIFICANT CHANGE UP (ref 0–6)
GLUCOSE SERPL-MCNC: 105 MG/DL — HIGH (ref 70–99)
GLUCOSE SERPL-MCNC: 77 MG/DL — SIGNIFICANT CHANGE UP (ref 70–99)
HCT VFR BLD CALC: 34.2 % — LOW (ref 34.5–45)
HGB BLD-MCNC: 9.8 G/DL — LOW (ref 11.5–15.5)
IMM GRANULOCYTES # BLD AUTO: 0.01 K/UL — SIGNIFICANT CHANGE UP (ref 0–0.07)
IMM GRANULOCYTES NFR BLD AUTO: 0.3 % — SIGNIFICANT CHANGE UP (ref 0–0.9)
LYMPHOCYTES # BLD AUTO: 0.88 K/UL — LOW (ref 1–3.3)
LYMPHOCYTES NFR BLD AUTO: 29.9 % — SIGNIFICANT CHANGE UP (ref 13–44)
MAGNESIUM SERPL-MCNC: 1.8 MG/DL — SIGNIFICANT CHANGE UP (ref 1.6–2.6)
MCHC RBC-ENTMCNC: 26.8 PG — LOW (ref 27–34)
MCHC RBC-ENTMCNC: 28.7 G/DL — LOW (ref 32–36)
MCV RBC AUTO: 93.7 FL — SIGNIFICANT CHANGE UP (ref 80–100)
MONOCYTES # BLD AUTO: 0.34 K/UL — SIGNIFICANT CHANGE UP (ref 0–0.9)
MONOCYTES NFR BLD AUTO: 11.6 % — SIGNIFICANT CHANGE UP (ref 2–14)
NEUTROPHILS # BLD AUTO: 1.6 K/UL — LOW (ref 1.8–7.4)
NEUTROPHILS NFR BLD AUTO: 54.5 % — SIGNIFICANT CHANGE UP (ref 43–77)
NRBC # BLD AUTO: 0 K/UL — SIGNIFICANT CHANGE UP (ref 0–0)
NRBC # FLD: 0 K/UL — SIGNIFICANT CHANGE UP (ref 0–0)
NRBC BLD AUTO-RTO: 0 /100 WBCS — SIGNIFICANT CHANGE UP (ref 0–0)
PHOSPHATE SERPL-MCNC: 2.7 MG/DL — SIGNIFICANT CHANGE UP (ref 2.4–4.7)
PLATELET # BLD AUTO: 145 K/UL — LOW (ref 150–400)
PMV BLD: 10.7 FL — SIGNIFICANT CHANGE UP (ref 7–13)
POTASSIUM SERPL-MCNC: 3.7 MMOL/L — SIGNIFICANT CHANGE UP (ref 3.5–5.3)
POTASSIUM SERPL-MCNC: 3.8 MMOL/L — SIGNIFICANT CHANGE UP (ref 3.5–5.3)
POTASSIUM SERPL-SCNC: 3.7 MMOL/L — SIGNIFICANT CHANGE UP (ref 3.5–5.3)
POTASSIUM SERPL-SCNC: 3.8 MMOL/L — SIGNIFICANT CHANGE UP (ref 3.5–5.3)
RBC # BLD: 3.65 M/UL — LOW (ref 3.8–5.2)
RBC # FLD: 15.1 % — HIGH (ref 10.3–14.5)
SODIUM SERPL-SCNC: 147 MMOL/L — HIGH (ref 135–145)
SODIUM SERPL-SCNC: 149 MMOL/L — HIGH (ref 135–145)
WBC # BLD: 2.94 K/UL — LOW (ref 3.8–10.5)
WBC # FLD AUTO: 2.94 K/UL — LOW (ref 3.8–10.5)

## 2025-03-11 PROCEDURE — 99231 SBSQ HOSP IP/OBS SF/LOW 25: CPT

## 2025-03-11 RX ORDER — PIPERACILLIN-TAZO-DEXTROSE,ISO 3.375G/5
3.38 IV SOLUTION, PIGGYBACK PREMIX FROZEN(ML) INTRAVENOUS ONCE
Refills: 0 | Status: DISCONTINUED | OUTPATIENT
Start: 2025-03-11 | End: 2025-03-11

## 2025-03-11 RX ORDER — VANCOMYCIN HCL IN 5 % DEXTROSE 1.5G/250ML
1250 PLASTIC BAG, INJECTION (ML) INTRAVENOUS ONCE
Refills: 0 | Status: DISCONTINUED | OUTPATIENT
Start: 2025-03-11 | End: 2025-03-11

## 2025-03-11 RX ADMIN — Medication 975 MILLIGRAM(S): at 13:55

## 2025-03-11 RX ADMIN — Medication 1 MILLIGRAM(S): at 11:41

## 2025-03-11 RX ADMIN — POLYETHYLENE GLYCOL 3350 17 GRAM(S): 17 POWDER, FOR SOLUTION ORAL at 11:41

## 2025-03-11 RX ADMIN — Medication 975 MILLIGRAM(S): at 11:41

## 2025-03-11 NOTE — BH CONSULTATION LIAISON PROGRESS NOTE - NSBHFUPINTERVALHXFT_PSY_A_CORE
Patient is a 75F resident of [place] with significant PMH including hypothyroidism, HTN, nephrogenic DI, hx of splenomegaly, multiple prior SBOs, exlap with SBR and ileostomy take down (2017), Appendectomy (2015), hx of CVA with hemiparesis,  pphx of schizoaffective d/o bipolar type, unknown hx of ALISON, unknown hx inpatient hospitalizations, hx of 2 prior suicide attempts (1979 and 2000),  hx of self harm and aggression toward others, who presents to the ED with abdominal pain and vomiting. Patient found to have SBO with transition point within the large ventral hernia, currently admitted under care of trauma team.      Psychiatry consulted for medication management.    Chart reviewed.  Patient with BM this AM.  Currently on clear liquid diet.  No PRNs required this weekend.    Patient seen this AM at bedside.  Patient reports mood as "great."  Relaying that she wants to be discharged home.  Advised that she would need to stay until labs WNL.   Points to IV fluid and states "they're giving me AIDS."  Patient not able to be redirected. Patient denies any SI/HI, intent or plan when asked directly.  Patient denies any avh.      Overheard yelling and screaming at staff shortly after.  Responds to verbal redirection. Spoke with trauma about PRNs.
Patient is a 75F resident of [place] with significant PMH including hypothyroidism, HTN, nephrogenic DI, hx of splenomegaly, multiple prior SBOs, exlap with SBR and ileostomy take down (2017), Appendectomy (2015), hx of CVA with hemiparesis,  pphx of schizoaffective d/o bipolar type, unknown hx of ALISON, unknown hx inpatient hospitalizations, hx of 2 prior suicide attempts (1979 and 2000),  hx of self harm and aggression toward others, who presents to the ED with abdominal pain and vomiting. Patient found to have SBO with transition point within the large ventral hernia, currently admitted under care of trauma team.      Psychiatry consulted for medication management.    Chart reviewed.  Per staff, no BM since last night.    Patient seen this AM at bedside.  Patient reports mood as "great."  Patient calm and cooperative during interview.  Overall, disorganized and illogical.    References "Serena switching bodies," then refers to writer as "Serena."  Mentions "the prophet" though unclear what the context is.  Was restarted on zyprexa and q HS ativan last night.    Patient denies any SI/HI, intent or plan when asked directly.  Patient denies any avh.

## 2025-03-11 NOTE — BH CONSULTATION LIAISON PROGRESS NOTE - NSBHATTESTTYPEVISIT_PSY_A_CORE
On-site Attending supervising CHRISTINE (99XXX codes)
On-site Attending supervising CHRISTINE (99XXX codes)

## 2025-03-11 NOTE — BH CONSULTATION LIAISON PROGRESS NOTE - CURRENT MEDICATION
MEDICATIONS  (STANDING):  acetaminophen     Tablet .. 975 milliGRAM(s) Oral every 6 hours  chlorhexidine 2% Cloths 1 Application(s) Topical <User Schedule>  dextrose 5% + lactated ringers. 1000 milliLiter(s) (125 mL/Hr) IV Continuous <Continuous>  heparin   Injectable 5000 Unit(s) SubCutaneous every 12 hours  LORazepam   Injectable 1 milliGRAM(s) IV Push daily  OLANZapine 2.5 milliGRAM(s) Oral once  pantoprazole  Injectable 40 milliGRAM(s) IV Push daily    MEDICATIONS  (PRN):  ondansetron Injectable 4 milliGRAM(s) IV Push every 6 hours PRN Nausea  
MEDICATIONS  (STANDING):  acetaminophen     Tablet .. 975 milliGRAM(s) Oral every 6 hours  chlorhexidine 2% Cloths 1 Application(s) Topical <User Schedule>  dextrose 5% + sodium chloride 0.45%. 1000 milliLiter(s) (75 mL/Hr) IV Continuous <Continuous>  heparin   Injectable 5000 Unit(s) SubCutaneous every 12 hours  LORazepam     Tablet 1 milliGRAM(s) Oral daily  LORazepam     Tablet 0.5 milliGRAM(s) Oral at bedtime  OLANZapine 5 milliGRAM(s) Oral at bedtime  pantoprazole  Injectable 40 milliGRAM(s) IV Push daily  polyethylene glycol 3350 17 Gram(s) Oral daily  senna 2 Tablet(s) Oral at bedtime    MEDICATIONS  (PRN):  ondansetron Injectable 4 milliGRAM(s) IV Push every 6 hours PRN Nausea

## 2025-03-11 NOTE — BH CONSULTATION LIAISON PROGRESS NOTE - NSBHCHARTREVIEWVS_PSY_A_CORE FT
Vital Signs Last 24 Hrs  T(C): 36.9 (10 Mar 2025 04:23), Max: 37.3 (09 Mar 2025 15:32)  T(F): 98.5 (10 Mar 2025 04:23), Max: 99.2 (09 Mar 2025 15:32)  HR: 56 (10 Mar 2025 04:23) (56 - 73)  BP: 160/72 (10 Mar 2025 04:23) (156/76 - 168/74)  BP(mean): 92 (10 Mar 2025 04:23) (92 - 103)  RR: 18 (10 Mar 2025 04:23) (18 - 19)  SpO2: 94% (10 Mar 2025 04:23) (91% - 94%)    Parameters below as of 10 Mar 2025 04:23  Patient On (Oxygen Delivery Method): room air    
Vital Signs Last 24 Hrs  T(C): 36.8 (11 Mar 2025 07:55), Max: 36.9 (11 Mar 2025 04:50)  T(F): 98.3 (11 Mar 2025 07:55), Max: 98.4 (11 Mar 2025 04:50)  HR: 55 (11 Mar 2025 07:55) (54 - 61)  BP: 172/74 (11 Mar 2025 07:55) (149/74 - 185/73)  BP(mean): --  RR: 18 (11 Mar 2025 07:55) (18 - 19)  SpO2: 98% (11 Mar 2025 07:55) (96% - 98%)    Parameters below as of 11 Mar 2025 04:50  Patient On (Oxygen Delivery Method): room air

## 2025-03-11 NOTE — BH CONSULTATION LIAISON PROGRESS NOTE - NSBHASSESSMENTFT_PSY_ALL_CORE
Patient is a 75F resident of [place] with significant PMH including hypothyroidism, HTN, nephrogenic DI, hx of splenomegaly, multiple prior SBOs, exlap with SBR and ileostomy take down (2017), Appendectomy (2015), hx of CVA with hemiparesis,  pphx of schizoaffective d/o bipolar type, unknown hx of ALISON, unknown hx inpatient hospitalizations, hx of 2 prior suicide attempts (1979 and 2000),  hx of self harm and aggression toward others, who presents to the ED with abdominal pain and vomiting. Patient found to have SBO with transition point within the large ventral hernia, currently admitted under care of trauma team.      Psychiatry consulted for medication management.    Patient seen today at bedside with one to one staff member present.  Reports mood as "great," affect euthymic but overall labile.  Patient making disorganized and illogical statements.  Articulation poor so patient difficult to understand.  Some evidence of delusions (people switching bodies) and Yazdanism preoccupations (prophets).  Patient denies any SI/HI, intent or plan when asked directly.  Denies any avh.    Psych meds from [place]:  lexapro 30mg po qdaily ---- stopped  ativan 1mg po q AM and 0.5mg po qHS --- currently only receiving AM dosage  olanzapine 15mg po qHS    *Patient with clear liquid diet, had BM per staff.    RECS  -continue one to one obs for danger to self and others  -would recommend bowel regimen to prevent constipation  -recommend obtaining EKG --- none on file  -can restart lexapro 10 mg po qdaily  -Continue given ativan 1mg q AM/ can add restart ativan 0.5mg qHS -- can switch IVP to PO -- HOLD FOR SEDATION  -can increase to olanzapine 10mg po qHS   -PRN- if qtc <500msec, can use olanzapine 2.5mg IM q6hrs PRN, can repeat if needed, MDD 20mg total  ***IM OLANZAPINE CANNOT BE GIVEN WITHIN ONE HOUR OF PARENTERAL ATIVAN DUE TO RISK OF RESPIRATORY DEPRESSION
Patient is a 75F resident of [place] with significant PMH including hypothyroidism, HTN, nephrogenic DI, hx of splenomegaly, multiple prior SBOs, exlap with SBR and ileostomy take down (2017), Appendectomy (2015), hx of CVA with hemiparesis,  pphx of schizoaffective d/o bipolar type, unknown hx of ALISON, unknown hx inpatient hospitalizations, hx of 2 prior suicide attempts (1979 and 2000),  hx of self harm and aggression toward others, who presents to the ED with abdominal pain and vomiting. Patient found to have SBO with transition point within the large ventral hernia, currently admitted under care of trauma team.      Psychiatry consulted for medication management.    Patient seen today at bedside with one to one staff member present.  Reports mood as "great," affect labile.  Yelling at staff, able to be redirected verbally.  Questionable paranoia vs. persecutory delusions - "they're giving me AIDS."  Spoke with RN Liaison, patient with hx of non-compliance.  Now reports she does not want psychotropic medication.   Patient denies any SI/HI, intent or plan when asked directly.  Denies any avh.    Psych meds from [place]:  lexapro 30mg po qdaily ---- stopped  ativan 1mg po q AM and 0.5mg po qHS --- currently only receiving AM dosage  olanzapine 15mg po qHS    *Patient with clear liquid diet, had BM per staff.    RECS  -continue one to one obs for danger to self and others  -would recommend bowel regimen to prevent constipation  -recommend obtaining EKG --- none on file  -can continue to hold lexapro for now while other meds restarted (avoid restarting too many at once)  -Continue given ativan 1mg q AM/ can add restart ativan 0.5mg qHS -- can switch IVP to PO -- HOLD FOR SEDATION  -can restart olanzapine 5mg po qHS   -PRN- if qtc <500msec, can use olanzapine 2.5mg IM q6hrs PRN, can repeat if needed, MDD 20mg total  ***IM OLANZAPINE CANNOT BE GIVEN WITHIN ONE HOUR OF PARENTERAL ATIVAN DUE TO RISK OF RESPIRATORY DEPRESSION

## 2025-03-11 NOTE — BH CONSULTATION LIAISON PROGRESS NOTE - NSBHCHARTREVIEWLAB_PSY_A_CORE FT
Basic Metabolic Panel - STAT (03.09.25 @ 11:23)   Sodium: 151 mmol/L  Potassium: 3.5 mmol/L  Chloride: 119: Chloride reference range changed from ..10/26/2022 mmol/L  Carbon Dioxide: 23.0 mmol/L  Anion Gap: 9 mmol/L  Blood Urea Nitrogen: 25.4 mg/dL  Creatinine: 1.98 mg/dL  Glucose: 111 mg/dL  Calcium: 8.6 mg/dL  eGFR: 26: The estimated glomerular filtration rate (eGFR) calculation is based on   the 2021 CKD-EPI creatinine equation, which is validated in male and   female population 18 years of age and older (N Engl J Med 2021;   385:5279-4224). mL/min/1.73m2
Basic Metabolic Panel - STAT (03.09.25 @ 11:23)   Sodium: 151 mmol/L  Potassium: 3.5 mmol/L  Chloride: 119: Chloride reference range changed from ..10/26/2022 mmol/L  Carbon Dioxide: 23.0 mmol/L  Anion Gap: 9 mmol/L  Blood Urea Nitrogen: 25.4 mg/dL  Creatinine: 1.98 mg/dL  Glucose: 111 mg/dL  Calcium: 8.6 mg/dL  eGFR: 26: The estimated glomerular filtration rate (eGFR) calculation is based on   the 2021 CKD-EPI creatinine equation, which is validated in male and   female population 18 years of age and older (N Engl J Med 2021;   385:5944-8322). mL/min/1.73m2

## 2025-03-11 NOTE — BH CONSULTATION LIAISON PROGRESS NOTE - NSBHATTESTAPPBILLTIME_PSY_A_CORE
I attest my time as CHRISTINE is greater than 50% of the total combined time spent on qualifying patient care activities. I have reviewed and verified the documentation.
I attest my time as CHRISTINE is greater than 50% of the total combined time spent on qualifying patient care activities. I have reviewed and verified the documentation.

## 2025-03-12 ENCOUNTER — TRANSCRIPTION ENCOUNTER (OUTPATIENT)
Age: 75
End: 2025-03-12

## 2025-03-12 VITALS
TEMPERATURE: 98 F | RESPIRATION RATE: 18 BRPM | HEART RATE: 88 BPM | OXYGEN SATURATION: 95 % | SYSTOLIC BLOOD PRESSURE: 111 MMHG | DIASTOLIC BLOOD PRESSURE: 69 MMHG

## 2025-03-12 LAB
ANION GAP SERPL CALC-SCNC: 10 MMOL/L — SIGNIFICANT CHANGE UP (ref 5–17)
BASOPHILS # BLD AUTO: 0.01 K/UL — SIGNIFICANT CHANGE UP (ref 0–0.2)
BASOPHILS NFR BLD AUTO: 0.3 % — SIGNIFICANT CHANGE UP (ref 0–2)
BUN SERPL-MCNC: 27.4 MG/DL — HIGH (ref 8–20)
CALCIUM SERPL-MCNC: 8.5 MG/DL — SIGNIFICANT CHANGE UP (ref 8.4–10.5)
CHLORIDE SERPL-SCNC: 109 MMOL/L — HIGH (ref 96–108)
CO2 SERPL-SCNC: 24 MMOL/L — SIGNIFICANT CHANGE UP (ref 22–29)
CREAT SERPL-MCNC: 2.24 MG/DL — HIGH (ref 0.5–1.3)
EGFR: 23 ML/MIN/1.73M2 — LOW
EGFR: 23 ML/MIN/1.73M2 — LOW
EOSINOPHIL # BLD AUTO: 0.08 K/UL — SIGNIFICANT CHANGE UP (ref 0–0.5)
EOSINOPHIL NFR BLD AUTO: 2.5 % — SIGNIFICANT CHANGE UP (ref 0–6)
GLUCOSE SERPL-MCNC: 101 MG/DL — HIGH (ref 70–99)
HCT VFR BLD CALC: 28.9 % — LOW (ref 34.5–45)
HGB BLD-MCNC: 8.7 G/DL — LOW (ref 11.5–15.5)
IMM GRANULOCYTES # BLD AUTO: 0.01 K/UL — SIGNIFICANT CHANGE UP (ref 0–0.07)
IMM GRANULOCYTES NFR BLD AUTO: 0.3 % — SIGNIFICANT CHANGE UP (ref 0–0.9)
LYMPHOCYTES # BLD AUTO: 1.24 K/UL — SIGNIFICANT CHANGE UP (ref 1–3.3)
LYMPHOCYTES NFR BLD AUTO: 38.8 % — SIGNIFICANT CHANGE UP (ref 13–44)
MAGNESIUM SERPL-MCNC: 1.8 MG/DL — SIGNIFICANT CHANGE UP (ref 1.6–2.6)
MCHC RBC-ENTMCNC: 27.8 PG — SIGNIFICANT CHANGE UP (ref 27–34)
MCHC RBC-ENTMCNC: 30.1 G/DL — LOW (ref 32–36)
MCV RBC AUTO: 92.3 FL — SIGNIFICANT CHANGE UP (ref 80–100)
MONOCYTES # BLD AUTO: 0.43 K/UL — SIGNIFICANT CHANGE UP (ref 0–0.9)
MONOCYTES NFR BLD AUTO: 13.4 % — SIGNIFICANT CHANGE UP (ref 2–14)
NEUTROPHILS # BLD AUTO: 1.43 K/UL — LOW (ref 1.8–7.4)
NEUTROPHILS NFR BLD AUTO: 44.7 % — SIGNIFICANT CHANGE UP (ref 43–77)
NRBC # BLD AUTO: 0 K/UL — SIGNIFICANT CHANGE UP (ref 0–0)
NRBC # FLD: 0 K/UL — SIGNIFICANT CHANGE UP (ref 0–0)
NRBC BLD AUTO-RTO: 0 /100 WBCS — SIGNIFICANT CHANGE UP (ref 0–0)
PHOSPHATE SERPL-MCNC: 3 MG/DL — SIGNIFICANT CHANGE UP (ref 2.4–4.7)
PLATELET # BLD AUTO: 128 K/UL — LOW (ref 150–400)
PMV BLD: 10.3 FL — SIGNIFICANT CHANGE UP (ref 7–13)
POTASSIUM SERPL-MCNC: 3.9 MMOL/L — SIGNIFICANT CHANGE UP (ref 3.5–5.3)
POTASSIUM SERPL-SCNC: 3.9 MMOL/L — SIGNIFICANT CHANGE UP (ref 3.5–5.3)
RBC # BLD: 3.13 M/UL — LOW (ref 3.8–5.2)
RBC # FLD: 15.4 % — HIGH (ref 10.3–14.5)
SODIUM SERPL-SCNC: 143 MMOL/L — SIGNIFICANT CHANGE UP (ref 135–145)
WBC # BLD: 3.2 K/UL — LOW (ref 3.8–10.5)
WBC # FLD AUTO: 3.2 K/UL — LOW (ref 3.8–10.5)

## 2025-03-12 PROCEDURE — 82947 ASSAY GLUCOSE BLOOD QUANT: CPT

## 2025-03-12 PROCEDURE — 85027 COMPLETE CBC AUTOMATED: CPT

## 2025-03-12 PROCEDURE — 84295 ASSAY OF SERUM SODIUM: CPT

## 2025-03-12 PROCEDURE — 83605 ASSAY OF LACTIC ACID: CPT

## 2025-03-12 PROCEDURE — 83930 ASSAY OF BLOOD OSMOLALITY: CPT

## 2025-03-12 PROCEDURE — 82803 BLOOD GASES ANY COMBINATION: CPT

## 2025-03-12 PROCEDURE — 86901 BLOOD TYPING SEROLOGIC RH(D): CPT

## 2025-03-12 PROCEDURE — 84132 ASSAY OF SERUM POTASSIUM: CPT

## 2025-03-12 PROCEDURE — 96374 THER/PROPH/DIAG INJ IV PUSH: CPT

## 2025-03-12 PROCEDURE — 86850 RBC ANTIBODY SCREEN: CPT

## 2025-03-12 PROCEDURE — 87641 MR-STAPH DNA AMP PROBE: CPT

## 2025-03-12 PROCEDURE — 87640 STAPH A DNA AMP PROBE: CPT

## 2025-03-12 PROCEDURE — 99285 EMERGENCY DEPT VISIT HI MDM: CPT

## 2025-03-12 PROCEDURE — 83735 ASSAY OF MAGNESIUM: CPT

## 2025-03-12 PROCEDURE — 80053 COMPREHEN METABOLIC PANEL: CPT

## 2025-03-12 PROCEDURE — 86900 BLOOD TYPING SEROLOGIC ABO: CPT

## 2025-03-12 PROCEDURE — 85018 HEMOGLOBIN: CPT

## 2025-03-12 PROCEDURE — 84100 ASSAY OF PHOSPHORUS: CPT

## 2025-03-12 PROCEDURE — 85025 COMPLETE CBC W/AUTO DIFF WBC: CPT

## 2025-03-12 PROCEDURE — 74018 RADEX ABDOMEN 1 VIEW: CPT

## 2025-03-12 PROCEDURE — 80048 BASIC METABOLIC PNL TOTAL CA: CPT

## 2025-03-12 PROCEDURE — 74177 CT ABD & PELVIS W/CONTRAST: CPT | Mod: MC

## 2025-03-12 PROCEDURE — 84133 ASSAY OF URINE POTASSIUM: CPT

## 2025-03-12 PROCEDURE — 36415 COLL VENOUS BLD VENIPUNCTURE: CPT

## 2025-03-12 PROCEDURE — 82436 ASSAY OF URINE CHLORIDE: CPT

## 2025-03-12 PROCEDURE — 85610 PROTHROMBIN TIME: CPT

## 2025-03-12 PROCEDURE — 82435 ASSAY OF BLOOD CHLORIDE: CPT

## 2025-03-12 PROCEDURE — 93306 TTE W/DOPPLER COMPLETE: CPT

## 2025-03-12 PROCEDURE — 85014 HEMATOCRIT: CPT

## 2025-03-12 PROCEDURE — 96372 THER/PROPH/DIAG INJ SC/IM: CPT

## 2025-03-12 PROCEDURE — 82330 ASSAY OF CALCIUM: CPT

## 2025-03-12 PROCEDURE — 99231 SBSQ HOSP IP/OBS SF/LOW 25: CPT

## 2025-03-12 PROCEDURE — 93005 ELECTROCARDIOGRAM TRACING: CPT

## 2025-03-12 PROCEDURE — 85730 THROMBOPLASTIN TIME PARTIAL: CPT

## 2025-03-12 RX ORDER — OLANZAPINE 10 MG/1
1 TABLET ORAL
Qty: 0 | Refills: 0 | DISCHARGE
Start: 2025-03-12

## 2025-03-12 RX ORDER — LORAZEPAM 4 MG/ML
1 VIAL (ML) INJECTION
Refills: 0 | DISCHARGE

## 2025-03-12 RX ORDER — LORAZEPAM 4 MG/ML
1 VIAL (ML) INJECTION
Qty: 0 | Refills: 0 | DISCHARGE
Start: 2025-03-12

## 2025-03-12 RX ADMIN — Medication 1 MILLIGRAM(S): at 12:04

## 2025-03-12 NOTE — DISCHARGE NOTE NURSING/CASE MANAGEMENT/SOCIAL WORK - FINANCIAL ASSISTANCE
[facility] provides services at a reduced cost to those who are determined to be eligible through [facility]’s financial assistance program. Information regarding [facility]’s financial assistance program can be found by going to [email or web address] or by calling [phone].

## 2025-03-12 NOTE — DIETITIAN INITIAL EVALUATION ADULT - ADD RECOMMEND
- Continue diet as tolerated.  - Monitor weights daily for trend/accuracy  - Rx MVI daily, vit C 500mg

## 2025-03-12 NOTE — DISCHARGE NOTE NURSING/CASE MANAGEMENT/SOCIAL WORK - NSDCVIVACCINE_GEN_ALL_CORE_FT
Tdap; 17-Sep-2018 10:04; Melina Montoya (JOSETTE); [employer]; [id number] (Exp. Date: 11-Jun-2020); IntraMuscular; Deltoid Right.; 0.5 milliLiter(s); VIS (VIS Published: 09-May-2013, VIS Presented: 17-Sep-2018);

## 2025-03-12 NOTE — PROGRESS NOTE ADULT - ASSESSMENT
Assessment:  75F resident of [place] with PMH of schizophrenia, HTN, multiple prior SBOs, exlap with SBR and ileostomy take down (2017), appendectomy (2015) admitted with high grade SBO with transition point within the large ventral hernia, unsuccessful NGT placement and patient refusing further attempts. GG challenge showing contrast in colon on 3/8. Patient was started on clear liquid diet on 3/8, tolerating. Normal BM this morning.  She was advanced, she is tolerating diet.     Plan:  - Sodium normalized, DC planning   - Reorder home meds   - DVT PPx: lov  - 1:1 in place  - Dispo: [place]

## 2025-03-12 NOTE — PROGRESS NOTE ADULT - ATTENDING COMMENTS
74yo female resident of [place] with PMH of schizophrenia, HTN, multiple prior SBOs, s/p exlap with SBR and ileostomy take down (2017), Appendectomy (2015) admitted with high grade SBO with transition point within the large ventral hernia, currently SBO resolved with medical management. Tolerates regular diet, having BM and pass flatus. Abdomen soft, ventral hernia noted, nontender on palpation. Lab showed hypernatremia, but improving. Continue regular diet, continue D51/2NS, encourage PO fluid intake, trend BMP daily, monitor UOP. F/u Psych for schizophrenia medication management.
76yo female resident of [place] with PMH of schizophrenia, HTN, multiple prior SBOs, s/p exlap with SBR and ileostomy take down (2017), Appendectomy (2015) admitted with high grade SBO with transition point within the large ventral hernia, currently SBO resolved with medical management. Tolerates regular diet, having BM and pass flatus. Abdomen soft, ventral hernia noted, nontender on palpation. Lab showed hypernatremia resolved. Continue regular diet, DC IVF, encourage PO fluid intake. F/u Psych for schizophrenia medication management. Pt is medically cleared to be discharged back to [place].

## 2025-03-12 NOTE — PROGRESS NOTE ADULT - SUBJECTIVE AND OBJECTIVE BOX
HPI/Overnight Events:   Patient seen and examined at bedside this AM. No overnight events. No complaints. Denies fever, chills, nausea, vomiting, chest pain, SOB, dizziness, abd pain or any other concerning symptoms.    Vital Signs Last 24 Hrs  T(C): 36.8 (11 Mar 2025 07:55), Max: 36.9 (11 Mar 2025 04:50)  T(F): 98.3 (11 Mar 2025 07:55), Max: 98.4 (11 Mar 2025 04:50)  HR: 55 (11 Mar 2025 07:55) (54 - 61)  BP: 172/74 (11 Mar 2025 07:55) (149/74 - 185/73)  BP(mean): --  RR: 18 (11 Mar 2025 07:55) (18 - 19)  SpO2: 98% (11 Mar 2025 07:55) (96% - 98%)    Parameters below as of 11 Mar 2025 04:50  Patient On (Oxygen Delivery Method): room air        I&O's Detail    10 Mar 2025 07:01  -  11 Mar 2025 07:00  --------------------------------------------------------  IN:    Oral Fluid: 630 mL  Total IN: 630 mL    OUT:    Voided (mL): 3300 mL  Total OUT: 3300 mL    Total NET: -2670 mL      11 Mar 2025 07:01  -  11 Mar 2025 14:51  --------------------------------------------------------  IN:    Oral Fluid: 720 mL  Total IN: 720 mL    OUT:  Total OUT: 0 mL    Total NET: 720 mL          MEDICATIONS  (STANDING):  acetaminophen     Tablet .. 975 milliGRAM(s) Oral every 6 hours  chlorhexidine 2% Cloths 1 Application(s) Topical <User Schedule>  dextrose 5% + sodium chloride 0.45%. 1000 milliLiter(s) (75 mL/Hr) IV Continuous <Continuous>  heparin   Injectable 5000 Unit(s) SubCutaneous every 12 hours  LORazepam     Tablet 1 milliGRAM(s) Oral daily  LORazepam     Tablet 0.5 milliGRAM(s) Oral at bedtime  OLANZapine 5 milliGRAM(s) Oral at bedtime  pantoprazole  Injectable 40 milliGRAM(s) IV Push daily  polyethylene glycol 3350 17 Gram(s) Oral daily  senna 2 Tablet(s) Oral at bedtime    MEDICATIONS  (PRN):  ondansetron Injectable 4 milliGRAM(s) IV Push every 6 hours PRN Nausea      Physical Exam  Constitutional: patient resting comfortably in bed, in no acute distress  Neuro: awake, alert, at baseline  HEENT: EOMI, MMM  Respiratory: non-labored breathing on RA  Gastrointestinal: abdomen soft, non-tender, non-distended, no rebound tenderness/guarding        LABS:                        9.8    2.94  )-----------( 145      ( 11 Mar 2025 04:04 )             34.2     03-11    147[H]  |  111[H]  |  17.9  ----------------------------<  77  3.7   |  23.0  |  1.84[H]    Ca    8.8      11 Mar 2025 04:04  Phos  2.7     03-11  Mg     1.8     03-11        Assessment:  75F resident of [place] with PMH of schizophrenia, HTN, multiple prior SBOs, exlap with SBR and ileostomy take down (2017), appendectomy (2015) admitted with high grade SBO with transition point within the large ventral hernia, unsuccessful NGT placement and patient refusing further attempts. GG challenge showing contrast in colon on 3/8. Patient was started on clear liquid diet on 3/8, tolerating. Normal BM this morning.    Plan:  - Repeat BMP in PM,  - DVT PPx: lov  - 1:1 in place  - Dispo: [place] pending normal Na    Ynes Cain MD
Subjective:  Pt offers no new complaints      MEDICATIONS  (STANDING):  acetaminophen     Tablet .. 975 milliGRAM(s) Oral every 6 hours  dextrose 5% + lactated ringers. 1000 milliLiter(s) (125 mL/Hr) IV Continuous <Continuous>  heparin   Injectable 5000 Unit(s) SubCutaneous every 12 hours  LORazepam   Injectable 1 milliGRAM(s) IV Push daily  pantoprazole  Injectable 40 milliGRAM(s) IV Push daily    MEDICATIONS  (PRN):  ondansetron Injectable 4 milliGRAM(s) IV Push every 6 hours PRN Nausea      Vital Signs Last 24 Hrs  T(C): 37.1 (09 Mar 2025 00:04), Max: 37.7 (08 Mar 2025 14:54)  T(F): 98.7 (09 Mar 2025 00:04), Max: 99.8 (08 Mar 2025 14:54)  HR: 71 (09 Mar 2025 00:04) (71 - 88)  BP: 137/66 (09 Mar 2025 00:04) (123/68 - 155/71)  BP(mean): --  RR: 18 (09 Mar 2025 00:04) (18 - 18)  SpO2: 92% (09 Mar 2025 00:04) (92% - 95%)    Parameters below as of 09 Mar 2025 00:04  Patient On (Oxygen Delivery Method): room air        Physical Exam:    Constitutional: NAD  HEENT: PERRL, EOMI  Neck: No JVD, FROM without pain  Respiratory: Respirations non-labored, no accessory muscle use  Gastrointestinal: Soft, without tenderness, large abdominal wall hernia  Neurological: A&O x 3; without gross deficit      LABS:  Pending        A: 75F resident of [place] with PMH of schizophrenia, HTN, multiple prior SBOs, exlap with SBR and ileostomy take down (2017), Appendectomy (2015) admitted with high grade SBO with transition point within the large ventral hernia, unsuccessful NGT placement and patient refusing further attempts. GG challenge    P:  f/u KUB to track progression of contrast  Pain control  NPO  IVF  DVT PPx: lov  1:1 in place  AM labs/ replete lytes as needed    
SUBJECTIVE / 24H EVENTS:    Pt seen and examined at bedside by the team during rounds. No overnight events or new complaints. Had a normal BM this morning. Tolerating clear liquid diet. Pt denies CP, SOB, N/V, fever, chills.     MEDICATIONS  (STANDING):  acetaminophen     Tablet .. 975 milliGRAM(s) Oral every 6 hours  dextrose 5% + lactated ringers. 1000 milliLiter(s) (125 mL/Hr) IV Continuous <Continuous>  heparin   Injectable 5000 Unit(s) SubCutaneous every 12 hours  LORazepam   Injectable 1 milliGRAM(s) IV Push daily  pantoprazole  Injectable 40 milliGRAM(s) IV Push daily    MEDICATIONS  (PRN):  ondansetron Injectable 4 milliGRAM(s) IV Push every 6 hours PRN Nausea      Vital Signs Last 24 Hrs  T(C): 36.9 (10 Mar 2025 04:23), Max: 37.3 (09 Mar 2025 15:32)  T(F): 98.5 (10 Mar 2025 04:23), Max: 99.2 (09 Mar 2025 15:32)  HR: 56 (10 Mar 2025 04:23) (56 - 73)  BP: 160/72 (10 Mar 2025 04:23) (153/62 - 168/74)  BP(mean): 92 (10 Mar 2025 04:23) (92 - 103)  RR: 18 (10 Mar 2025 04:23) (18 - 19)  SpO2: 94% (10 Mar 2025 04:23) (91% - 94%)    Parameters below as of 10 Mar 2025 04:23  Patient On (Oxygen Delivery Method): room air        Physical Exam  Constitutional: patient appears comfortable resting in bed, in no apparent distress  HEENT: EOMI  Respiratory: respirations are unlabored, no accessory muscle use, no conversational dyspnea  Cardiovascular: regular rate & rhythm  Gastrointestinal: abdomen is soft, mild distention, large abdominal wall hernia, non-tender, no rebound tenderness / guarding      I&O's Detail    09 Mar 2025 07:01  -  10 Mar 2025 07:00  --------------------------------------------------------  IN:    Oral Fluid: 1180 mL  Total IN: 1180 mL    OUT:    Voided (mL): 1200 mL  Total OUT: 1200 mL    Total NET: -20 mL          LABS:                        8.3    2.64  )-----------( 131      ( 10 Mar 2025 04:45 )             27.9     03-10    151[H]  |  116[H]  |  24.9[H]  ----------------------------<  97  3.6   |  24.0  |  2.06[H]    Ca    8.8      10 Mar 2025 04:45  Phos  2.2     03-10  Mg     2.0     03-10        Urinalysis Basic - ( 10 Mar 2025 04:45 )    Color: x / Appearance: x / SG: x / pH: x  Gluc: 97 mg/dL / Ketone: x  / Bili: x / Urobili: x   Blood: x / Protein: x / Nitrite: x   Leuk Esterase: x / RBC: x / WBC x   Sq Epi: x / Non Sq Epi: x / Bacteria: x      
Subjective: Patient resting comfortably, per RN no current complaints, no overnight events, no episodes of emesis      MEDICATIONS  (STANDING):  acetaminophen     Tablet .. 975 milliGRAM(s) Oral every 6 hours  enoxaparin Injectable 30 milliGRAM(s) SubCutaneous every 24 hours  LORazepam   Injectable 1 milliGRAM(s) IV Push daily  pantoprazole  Injectable 40 milliGRAM(s) IV Push daily  sodium chloride 0.9% Bolus 1000 milliLiter(s) IV Bolus once  sodium chloride 0.9%. 1000 milliLiter(s) (120 mL/Hr) IV Continuous <Continuous>    MEDICATIONS  (PRN):  ondansetron Injectable 4 milliGRAM(s) IV Push every 6 hours PRN Nausea      Vital Signs Last 24 Hrs  T(C): 36.9 (07 Mar 2025 23:50), Max: 37 (07 Mar 2025 08:36)  T(F): 98.5 (07 Mar 2025 23:50), Max: 98.6 (07 Mar 2025 08:36)  HR: 80 (07 Mar 2025 23:50) (73 - 95)  BP: 132/70 (07 Mar 2025 23:50) (129/69 - 161/71)  BP(mean): 90 (07 Mar 2025 23:50) (86 - 96)  RR: 17 (07 Mar 2025 23:50) (17 - 19)  SpO2: 95% (07 Mar 2025 23:50) (93% - 96%)    Parameters below as of 07 Mar 2025 23:50  Patient On (Oxygen Delivery Method): room air        Physical Exam:  DEFERRED TO DAY SHIFT      LABS:                        9.0    4.78  )-----------( 131      ( 06 Mar 2025 22:50 )             29.6     03-06    140  |  102  |  32.3[H]  ----------------------------<  107[H]  4.7   |  27.0  |  1.86[H]    Ca    9.6      06 Mar 2025 22:50    TPro  6.5[L]  /  Alb  4.1  /  TBili  0.4  /  DBili  x   /  AST  15  /  ALT  15  /  AlkPhos  88  03-06    PT/INR - ( 06 Mar 2025 22:50 )   PT: 13.2 sec;   INR: 1.17 ratio         PTT - ( 06 Mar 2025 22:50 )  PTT:27.7 sec  Urinalysis Basic - ( 06 Mar 2025 22:50 )    Color: x / Appearance: x / SG: x / pH: x  Gluc: 107 mg/dL / Ketone: x  / Bili: x / Urobili: x   Blood: x / Protein: x / Nitrite: x   Leuk Esterase: x / RBC: x / WBC x   Sq Epi: x / Non Sq Epi: x / Bacteria: x        A: 75F resident of [place] with PMH of schizophrenia, HTN, multiple prior SBOs, exlap with SBR and ileostomy take down (2017), Appendectomy (2015) admitted with high grade SBO with transition point within the large ventral hernia, patient aggressive and refused NGT yesterday, monitoring closely for signs of worsening clinical picture    P:  Pain control  NPO  IVF  DVT PPx: lov  1:1 in place  Home med rec done, discussed with [place]  Appreciate psych recs  AM labs/ replete lytes as needed    
Chart review for patient from [facility] ([facility] Patient well known to Ethics Service from prior admissions.     The patient is under the auspices of Office of Mental Health. Patient completed a Health Care Proxy (HCP) Form on 9/4/2014. Patient has named her brother, Tom Toribio ([phone]) as her HCP, and her brother Sukh Toribio ([phone]) as her alternate HCP. If patient is without capacity she has protected her autonomy by naming her brothers as HCPs to make medical decisions for her.     IF end of life decisions need to be made and the patient has capacity, she could complete a MOLST without the need for MHLS ([facility]) review. If she does not have capacity, her brother/HCP could request a MOLST be completed, if indicated. Of note, the patient did not indicate on the health care proxy form that her proxy is aware of her wishes regarding artificial nutrition and hydration (HANNA). Any decisions regarding HANNA would have to be reviewed and approved by [facility].  Ethics and Palliative Care can assist with this. 
INTERVAL HPI/OVERNIGHT EVENTS:    Patient evaluated at bedside. No acute distress. Tolerating diet.      MEDICATIONS  (STANDING):  acetaminophen     Tablet .. 975 milliGRAM(s) Oral every 6 hours  chlorhexidine 2% Cloths 1 Application(s) Topical <User Schedule>  dextrose 5% + sodium chloride 0.45%. 1000 milliLiter(s) (75 mL/Hr) IV Continuous <Continuous>  heparin   Injectable 5000 Unit(s) SubCutaneous every 12 hours  LORazepam     Tablet 1 milliGRAM(s) Oral daily  LORazepam     Tablet 0.5 milliGRAM(s) Oral at bedtime  OLANZapine 5 milliGRAM(s) Oral at bedtime  pantoprazole  Injectable 40 milliGRAM(s) IV Push daily  polyethylene glycol 3350 17 Gram(s) Oral daily  senna 2 Tablet(s) Oral at bedtime    MEDICATIONS  (PRN):  ondansetron Injectable 4 milliGRAM(s) IV Push every 6 hours PRN Nausea      Vital Signs Last 24 Hrs  T(C): 37.1 (12 Mar 2025 07:56), Max: 37.3 (12 Mar 2025 04:10)  T(F): 98.7 (12 Mar 2025 07:56), Max: 99.1 (12 Mar 2025 04:10)  HR: 66 (12 Mar 2025 07:56) (61 - 66)  BP: 185/67 (12 Mar 2025 07:56) (122/64 - 185/67)  BP(mean): 112 (11 Mar 2025 16:18) (112 - 112)  RR: 18 (12 Mar 2025 04:10) (18 - 18)  SpO2: 93% (12 Mar 2025 07:56) (93% - 97%)    Parameters below as of 12 Mar 2025 04:10  Patient On (Oxygen Delivery Method): room air        Constitutional: NAD  Respiratory: Nonlabored   Cardiovascular: Regular rate & rhythm  Gastrointestinal: Soft, non-tender, large ventral hernia  Extremities: No peripheral edema, No cyanosis, clubbing     I&O's Detail    11 Mar 2025 07:01  -  12 Mar 2025 07:00  --------------------------------------------------------  IN:    Oral Fluid: 1080 mL  Total IN: 1080 mL    OUT:    Voided (mL): 775 mL  Total OUT: 775 mL    Total NET: 305 mL      12 Mar 2025 07:01  -  12 Mar 2025 10:50  --------------------------------------------------------  IN:  Total IN: 0 mL    OUT:    Voided (mL): 1000 mL  Total OUT: 1000 mL    Total NET: -1000 mL          LABS:                        8.7    3.20  )-----------( 128      ( 12 Mar 2025 04:04 )             28.9     03-12    143  |  109[H]  |  27.4[H]  ----------------------------<  101[H]  3.9   |  24.0  |  2.24[H]    Ca    8.5      12 Mar 2025 04:04  Phos  3.0     03-12  Mg     1.8     03-12        Urinalysis Basic - ( 12 Mar 2025 04:04 )    Color: x / Appearance: x / SG: x / pH: x  Gluc: 101 mg/dL / Ketone: x  / Bili: x / Urobili: x   Blood: x / Protein: x / Nitrite: x   Leuk Esterase: x / RBC: x / WBC x   Sq Epi: x / Non Sq Epi: x / Bacteria: x        RADIOLOGY & ADDITIONAL STUDIES:

## 2025-03-12 NOTE — DISCHARGE NOTE NURSING/CASE MANAGEMENT/SOCIAL WORK - PATIENT PORTAL LINK FT
You can access the FollowMyHealth Patient Portal offered by [facility] by registering at the following website: [email or web address]. By joining [facility]’s FollowMyHealth portal, you will also be able to view your health information using other applications (apps) compatible with our system.

## 2025-03-12 NOTE — DIETITIAN INITIAL EVALUATION ADULT - ORAL INTAKE PTA/DIET HISTORY
consult received for MST Score = />2. spoke with pt this AM and 1:1 at bedside. Breakfast tray present at bedside, consumed 100% of meal. Tolerating at this time. Unable to obtain nutrition interview, confusion noted. Pt resides at inpatient [facility]. Last BM documented 3/11. Nutrition/diet education not appropriate at this time. RD to remain available.

## 2025-03-12 NOTE — DIETITIAN INITIAL EVALUATION ADULT - PERTINENT LABORATORY DATA
03-12    143  |  109[H]  |  27.4[H]  ----------------------------<  101[H]  3.9   |  24.0  |  2.24[H]    Ca    8.5      12 Mar 2025 04:04  Phos  3.0     03-12  Mg     1.8     03-12

## 2025-03-12 NOTE — DIETITIAN NUTRITION RISK NOTIFICATION - LOSS OF SUBCUTANEOUS FAT
Thanks for visiting us today!    Remember these important phone numbers:    [phone] for phone nurses during the day and our nurse answering service at night    [phone]  for scheduling or changing future appointments    [phone] for the Poison Control Center    When leaving a message for our staff, please include:   • the spelling of your child’s full name and date of birth  • your full name and relationship to child  • best phone number and time to reach you   • reason for the call      Is your child signed up for [employer]? If you do this, you can message us rather than playing phone tag! You can also look at labs, pay your bills, and do some scheduling. Go to your own account first. (If you don't have one yet, you can set one up at the website below or at your doctor's office).  Using a web browser (not the phone nestor), on the right hand side of your page, click the button marked \"Request Access to my Child's Records.\" Fill out the information. In a few days your child's information will be linked to your account. It's that simple!! Here is the website for more information:     [email or web address]      If you haven't already liked us on Facebook, please do so!  Just search for \"[facility]\"      --------------------------------------------------------------------------------------------------------------------        
Orbital.../Buccal...

## 2025-03-20 ENCOUNTER — INPATIENT (INPATIENT)
Facility: HOSPITAL | Age: 75
LOS: 4 days | Discharge: PSYCHIATRIC OR SUBSTANCE USE FACILITY | DRG: 390 | End: 2025-03-25
Attending: SURGERY | Admitting: SURGERY
Payer: MEDICARE

## 2025-03-20 VITALS
SYSTOLIC BLOOD PRESSURE: 153 MMHG | RESPIRATION RATE: 16 BRPM | HEIGHT: 68 IN | DIASTOLIC BLOOD PRESSURE: 77 MMHG | OXYGEN SATURATION: 98 % | TEMPERATURE: 98 F | HEART RATE: 69 BPM

## 2025-03-20 DIAGNOSIS — K56.609 UNSPECIFIED INTESTINAL OBSTRUCTION, UNSPECIFIED AS TO PARTIAL VERSUS COMPLETE OBSTRUCTION: ICD-10-CM

## 2025-03-20 DIAGNOSIS — K43.5 PARASTOMAL HERNIA WITHOUT OBSTRUCTION OR GANGRENE: Chronic | ICD-10-CM

## 2025-03-20 DIAGNOSIS — Z93.2 ILEOSTOMY STATUS: Chronic | ICD-10-CM

## 2025-03-20 LAB
ALBUMIN SERPL ELPH-MCNC: 3.8 G/DL — SIGNIFICANT CHANGE UP (ref 3.3–5.2)
ALP SERPL-CCNC: 76 U/L — SIGNIFICANT CHANGE UP (ref 40–120)
ALT FLD-CCNC: 14 U/L — SIGNIFICANT CHANGE UP
ANION GAP SERPL CALC-SCNC: 12 MMOL/L — SIGNIFICANT CHANGE UP (ref 5–17)
APTT BLD: 26.2 SEC — SIGNIFICANT CHANGE UP (ref 24.5–35.6)
AST SERPL-CCNC: 13 U/L — SIGNIFICANT CHANGE UP
BASOPHILS # BLD AUTO: 0 K/UL — SIGNIFICANT CHANGE UP (ref 0–0.2)
BASOPHILS NFR BLD AUTO: 0 % — SIGNIFICANT CHANGE UP (ref 0–2)
BILIRUB SERPL-MCNC: 0.4 MG/DL — SIGNIFICANT CHANGE UP (ref 0.4–2)
BUN SERPL-MCNC: 29.8 MG/DL — HIGH (ref 8–20)
CALCIUM SERPL-MCNC: 9.3 MG/DL — SIGNIFICANT CHANGE UP (ref 8.4–10.5)
CHLORIDE SERPL-SCNC: 105 MMOL/L — SIGNIFICANT CHANGE UP (ref 96–108)
CO2 SERPL-SCNC: 26 MMOL/L — SIGNIFICANT CHANGE UP (ref 22–29)
CREAT SERPL-MCNC: 2.21 MG/DL — HIGH (ref 0.5–1.3)
EGFR: 23 ML/MIN/1.73M2 — LOW
EGFR: 23 ML/MIN/1.73M2 — LOW
EOSINOPHIL # BLD AUTO: 0.03 K/UL — SIGNIFICANT CHANGE UP (ref 0–0.5)
EOSINOPHIL NFR BLD AUTO: 0.7 % — SIGNIFICANT CHANGE UP (ref 0–6)
GLUCOSE SERPL-MCNC: 99 MG/DL — SIGNIFICANT CHANGE UP (ref 70–99)
HCT VFR BLD CALC: 29.1 % — LOW (ref 34.5–45)
HGB BLD-MCNC: 8.8 G/DL — LOW (ref 11.5–15.5)
IMM GRANULOCYTES # BLD AUTO: 0.01 K/UL — SIGNIFICANT CHANGE UP (ref 0–0.07)
IMM GRANULOCYTES NFR BLD AUTO: 0.2 % — SIGNIFICANT CHANGE UP (ref 0–0.9)
INR BLD: 1.16 RATIO — SIGNIFICANT CHANGE UP (ref 0.85–1.16)
LACTATE BLDV-MCNC: 0.7 MMOL/L — SIGNIFICANT CHANGE UP (ref 0.5–2)
LYMPHOCYTES # BLD AUTO: 0.94 K/UL — LOW (ref 1–3.3)
LYMPHOCYTES NFR BLD AUTO: 23.3 % — SIGNIFICANT CHANGE UP (ref 13–44)
MCHC RBC-ENTMCNC: 27.8 PG — SIGNIFICANT CHANGE UP (ref 27–34)
MCHC RBC-ENTMCNC: 30.2 G/DL — LOW (ref 32–36)
MCV RBC AUTO: 92.1 FL — SIGNIFICANT CHANGE UP (ref 80–100)
MONOCYTES # BLD AUTO: 0.38 K/UL — SIGNIFICANT CHANGE UP (ref 0–0.9)
MONOCYTES NFR BLD AUTO: 9.4 % — SIGNIFICANT CHANGE UP (ref 2–14)
NEUTROPHILS # BLD AUTO: 2.67 K/UL — SIGNIFICANT CHANGE UP (ref 1.8–7.4)
NEUTROPHILS NFR BLD AUTO: 66.4 % — SIGNIFICANT CHANGE UP (ref 43–77)
NRBC # BLD AUTO: 0 K/UL — SIGNIFICANT CHANGE UP (ref 0–0)
NRBC # FLD: 0 K/UL — SIGNIFICANT CHANGE UP (ref 0–0)
NRBC BLD AUTO-RTO: 0 /100 WBCS — SIGNIFICANT CHANGE UP (ref 0–0)
PLATELET # BLD AUTO: 174 K/UL — SIGNIFICANT CHANGE UP (ref 150–400)
PMV BLD: 10.2 FL — SIGNIFICANT CHANGE UP (ref 7–13)
POTASSIUM SERPL-MCNC: 4.2 MMOL/L — SIGNIFICANT CHANGE UP (ref 3.5–5.3)
POTASSIUM SERPL-SCNC: 4.2 MMOL/L — SIGNIFICANT CHANGE UP (ref 3.5–5.3)
PROT SERPL-MCNC: 6.3 G/DL — LOW (ref 6.6–8.7)
PROTHROM AB SERPL-ACNC: 13.1 SEC — SIGNIFICANT CHANGE UP (ref 9.9–13.4)
RBC # BLD: 3.16 M/UL — LOW (ref 3.8–5.2)
RBC # FLD: 14.8 % — HIGH (ref 10.3–14.5)
SODIUM SERPL-SCNC: 143 MMOL/L — SIGNIFICANT CHANGE UP (ref 135–145)
WBC # BLD: 4.03 K/UL — SIGNIFICANT CHANGE UP (ref 3.8–10.5)
WBC # FLD AUTO: 4.03 K/UL — SIGNIFICANT CHANGE UP (ref 3.8–10.5)

## 2025-03-20 PROCEDURE — 74176 CT ABD & PELVIS W/O CONTRAST: CPT | Mod: 26

## 2025-03-20 PROCEDURE — 99285 EMERGENCY DEPT VISIT HI MDM: CPT | Mod: GC

## 2025-03-20 PROCEDURE — 74018 RADEX ABDOMEN 1 VIEW: CPT | Mod: 26

## 2025-03-20 RX ORDER — ACETAMINOPHEN 500 MG/5ML
1000 LIQUID (ML) ORAL ONCE
Refills: 0 | Status: COMPLETED | OUTPATIENT
Start: 2025-03-20 | End: 2025-03-20

## 2025-03-20 RX ORDER — HYDROMORPHONE/SOD CHLOR,ISO/PF 2 MG/10 ML
1 SYRINGE (ML) INJECTION ONCE
Refills: 0 | Status: DISCONTINUED | OUTPATIENT
Start: 2025-03-20 | End: 2025-03-20

## 2025-03-20 RX ORDER — IOHEXOL 350 MG/ML
30 INJECTION, SOLUTION INTRAVENOUS ONCE
Refills: 0 | Status: COMPLETED | OUTPATIENT
Start: 2025-03-20 | End: 2025-03-20

## 2025-03-20 RX ORDER — LORAZEPAM 4 MG/ML
0.5 VIAL (ML) INJECTION ONCE
Refills: 0 | Status: DISCONTINUED | OUTPATIENT
Start: 2025-03-20 | End: 2025-03-20

## 2025-03-20 RX ORDER — OLANZAPINE 10 MG/1
5 TABLET ORAL DAILY
Refills: 0 | Status: DISCONTINUED | OUTPATIENT
Start: 2025-03-20 | End: 2025-03-21

## 2025-03-20 RX ORDER — LEVOTHYROXINE SODIUM 300 MCG
112 TABLET ORAL DAILY
Refills: 0 | Status: DISCONTINUED | OUTPATIENT
Start: 2025-03-20 | End: 2025-03-25

## 2025-03-20 RX ORDER — ATORVASTATIN CALCIUM 80 MG/1
10 TABLET, FILM COATED ORAL AT BEDTIME
Refills: 0 | Status: DISCONTINUED | OUTPATIENT
Start: 2025-03-20 | End: 2025-03-25

## 2025-03-20 RX ORDER — SODIUM CHLORIDE 9 G/1000ML
1000 INJECTION, SOLUTION INTRAVENOUS
Refills: 0 | Status: DISCONTINUED | OUTPATIENT
Start: 2025-03-20 | End: 2025-03-21

## 2025-03-20 RX ORDER — ONDANSETRON HCL/PF 4 MG/2 ML
4 VIAL (ML) INJECTION ONCE
Refills: 0 | Status: COMPLETED | OUTPATIENT
Start: 2025-03-20 | End: 2025-03-20

## 2025-03-20 RX ADMIN — Medication 1000 MILLIGRAM(S): at 17:20

## 2025-03-20 RX ADMIN — IOHEXOL 30 MILLILITER(S): 350 INJECTION, SOLUTION INTRAVENOUS at 19:21

## 2025-03-20 RX ADMIN — Medication 1000 MILLILITER(S): at 19:19

## 2025-03-20 RX ADMIN — Medication 4 MILLIGRAM(S): at 16:46

## 2025-03-20 RX ADMIN — SODIUM CHLORIDE 120 MILLILITER(S): 9 INJECTION, SOLUTION INTRAVENOUS at 22:44

## 2025-03-20 RX ADMIN — Medication 400 MILLIGRAM(S): at 16:47

## 2025-03-20 RX ADMIN — Medication 1000 MILLIGRAM(S): at 17:30

## 2025-03-20 RX ADMIN — Medication 1000 MILLILITER(S): at 16:46

## 2025-03-20 NOTE — PROGRESS NOTE ADULT - SUBJECTIVE AND OBJECTIVE BOX
Chart review for patient from [facility] ([facility]). Patient well known to Ethics Service from prior admissions.     The patient is under the auspices of Office of Mental Health. Patient completed a Health Care Proxy (HCP) Form on 9/4/2014. Patient has named her brother, Tom Toribio ([phone]) as her HCP, and her brother Sukh Troibio ([phone]) as her alternate HCP. If patient is without capacity she has protected her autonomy by naming her brothers as HCPs to make medical decisions for her.     IF end of life decisions need to be made and the patient has capacity, she could complete a MOLST without the need for MHLS ([facility]) review. If she does not have capacity, her brother/HCP could request a MOLST be completed, if indicated. Of note, the patient did not indicate on the health care proxy form that her proxy is aware of her wishes regarding artificial nutrition and hydration (HANNA). Any decisions regarding HANNA would have to be reviewed and approved by [facility].  Ethics and Palliative Care can assist with this.

## 2025-03-20 NOTE — ED ADULT TRIAGE NOTE - CHIEF COMPLAINT QUOTE
generalized abdominal pain since yesterday. history of abdominal sx and SBO. from [place] in patient, at baseline.

## 2025-03-20 NOTE — ED PROVIDER NOTE - ATTENDING CONTRIBUTION TO CARE
Recent history of a high grade SBO now with similar symptoms and concern for recurrence. XR was nondiagnostic, will need CT to evaluate for SBO. Further treatment and disposition pending CT.

## 2025-03-20 NOTE — ED ADULT NURSE NOTE - OBJECTIVE STATEMENT
assumed care of pt from triage, pt AAOX4, resp. even and unlabored on RA, pt comes from [place] oriented with incoherent speech at baseline, pt c/o generalized abd. pain and N/V x1 day, hx of SBO and ileostomy, pt recently DC'd from this hospital after having an SBO, hx of schizophrenia and comes from [place], neg. CP/SOB/HA/dizziness/vision changes/fever/chills, pt has not taken anything for pain today, pt on 1:1

## 2025-03-20 NOTE — ED PROVIDER NOTE - OBJECTIVE STATEMENT
75 year old male w/PMHx schizophrenia, HTN, multiple prior SBOs, exlap with SBR and ileostomy take down, appendectomy presents to the ED from [place] with abd pain. Patient states she developed abd pain earlier this morning  that has been persistent since then. Associated with nausea and x1 episode of NBNB vomiting. Denies any nausea at this time. Passing gas. Denies fevers, chills, chest pain, SOB, melena, hematuria or dyusria. Has not taken pain meds today. Of note recent admission to ACS floor for high grade SBO, discharged a week ago. At the time medically managed.

## 2025-03-20 NOTE — ED PROVIDER NOTE - PROGRESS NOTE DETAILS
high grade SBO on CT imaging. Surgery consulted. Patient without active vomiting or nausea. Resting in bed currently asleep.   Samer Chicho Sahni MD Paul: pt signed out to me pending surgery. admitted to Dr. Ku.

## 2025-03-20 NOTE — PROGRESS NOTE ADULT - ASSESSMENT
The patient is under the auspices of Office of Mental Health. Patient completed a Health Care Proxy (HCP) Form on 9/4/2014. Patient has named her brother, Tom Toribio ([phone]) as her HCP, and her brother Sukh Toribio ([phone]) as her alternate HCP.     Ethics will ensure HCP form is in the physical chart on 3/21/25.     Please call with questions.     Scarlett Ferreira MD  Ethics Attending  [phone]

## 2025-03-20 NOTE — ED ADULT NURSE REASSESSMENT NOTE - NS ED NURSE REASSESS COMMENT FT1
pt refused PO meds. pt educated about purpose of medications, pt states understanding. per pt she does not want to take any medications. RN called to bedside per nursing assistant pt ripped out IV. pt educated about purpose of IV placement, pt refusing IV placement. MD james queen made aware.

## 2025-03-20 NOTE — ED ADULT NURSE REASSESSMENT NOTE - NS ED NURSE REASSESS COMMENT FT1
Assumed care of pt from eve FINCH at 1915. Pt is resting in stretcher. NAD. Respirations are even and unlabored on RA. constant observation remains in place. nursing assistant at bedside. safety maintained.

## 2025-03-20 NOTE — ED PROVIDER NOTE - PHYSICAL EXAMINATION
Gen: AOx2, mild distress  Head: NCAT  HEENT: EOMI, oral mucosa moist, normal conjunctiva, neck supple  Lung: CTAB, no respiratory distress  CV: rrr, no murmur, Normal perfusion  Abd: generalized abd pain without rigidity, rebound tenderness or distension.   MSK: No edema, no visible deformities  Neuro: No focal neurologic deficits  Skin: No rash   Psych: anxious

## 2025-03-20 NOTE — H&P ADULT - ATTENDING COMMENTS
Patient seen and examined in ED with surgical house staff.  75 year old with significant psych hx and very large postoperative abdominal wall hernia, recently discharged from [facility] ACS service after successful non-operative treatment of SBO now returns to ED c/o abdominal pain and vomiting.    On my exam, patient states that she is not nauseous    Awake, alert,  Hemodynamically stable  afebrile  abd - multiple well-healed surgical scars. Non tender.  Very large mostly right sided abdominal wall hernia.      WBC=4, electrolytes=WNL, BUN & creatinine moderately elevated but stable from last hospitalization    CT-scan= High-grade small bowel obstruction within a right ventral abdominal wall hernia. Obstructed loop is markedly dilated.    - Hernia not reducible secondary to loss of domain.  Abdominal exam is very benign.  Will attempt non-operative treatment (again).  Many need consideration of complex abdominal wall reconstruction in future

## 2025-03-20 NOTE — ED PROVIDER NOTE - CLINICAL SUMMARY MEDICAL DECISION MAKING FREE TEXT BOX
On arrival HD stable, anxious appearing in mild distress. Unclear if recurrent SBO at this time. Will obtain KUB, blood work, lactate. If elevated lactate, imaging abnormalities will move forwards with repeat CT imaging to assess for SBO. Surgery consulted for further eval given recent admission.

## 2025-03-20 NOTE — H&P ADULT - HISTORY OF PRESENT ILLNESS
HPI:  " 75 year old male w/PMHx schizophrenia, HTN, multiple prior SBOs, exlap with SBR and ileostomy take down, appendectomy presents to the ED from [place] with abd pain. Patient states she developed abd pain earlier this morning  that has been persistent since then. Associated with nausea and x1 episode of NBNB vomiting. Denies any nausea at this time. Passing gas. Denies fevers, chills, chest pain, SOB, melena, hematuria or dyusria. Has not taken pain meds today. Of note recent admission to ACS floor for high grade SBO, discharged a week ago. At the time medically managed. "     ROS: 10-system review is otherwise negative except HPI above.      PAST MEDICAL & SURGICAL HISTORY:  Venous insufficiency (chronic) (peripheral)      Constipation      Sensory hearing loss      GERD (gastroesophageal reflux disease)      Hypothyroidism      Schizoaffective disorder, bipolar type      Suicide attempt      Behavior problems  assaultive      Hemiparesis, left      Seizure      Osteopenia      Fall      Vaginal prolapse      Neutropenia      Anemia      Splenomegaly      CVA (cerebral vascular accident)      HTN (hypertension)      Obesity      Urinary incontinence      Schizo affective schizophrenia      Displaced fracture of olecranon process with intraarticular extension of left ulna      Rectal prolapse      Onychomycosis      Uterine prolapse      H/O ileostomy  4/2015      Parastomal hernia without obstruction or gangrene        FAMILY HISTORY:  Family history of psoriasis      Family history not pertinent as reviewed with the patient.    SOCIAL HISTORY:  Denies any toxic habits    ALLERGIES: NKA Depakote (Other)  Neupogen (Other)  erythromycin (Unknown)  clozapine (Other)      HOME MEDICATIONS:   Home Medications:  ascorbic acid 500 mg oral tablet: 1 tab(s) orally 2 times a day (13 Feb 2024 10:59)  atorvastatin 10 mg oral tablet: 1 tab(s) orally once a day (13 Feb 2024 11:00)  calcitriol 0.25 mcg oral capsule: 1 cap(s) orally once a day (13 Feb 2024 11:00)  calcium carbonate 500 mg (200 mg elemental calcium) oral tablet, chewable: 2 tab(s) orally 3 times a day (13 Feb 2024 11:00)  cholecalciferol 50 mcg (2000 intl units) oral capsule: 1 cap(s) orally once a day (13 Feb 2024 11:00)  cyanocobalamin 500 mcg oral tablet: 1 tab(s) orally once a day (13 Feb 2024 11:00)  docusate sodium 100 mg oral capsule: 1 cap(s) orally 3 times a day as needed for  constipation (13 Feb 2024 11:00)  levothyroxine 112 mcg (0.112 mg) oral tablet: 1 tab(s) orally once a day (13 Feb 2024 11:00)  Lipitor 10 mg oral tablet: 1 tab(s) orally once a day (07 Mar 2025 17:43)  LORazepam 0.5 mg oral tablet: 0.5 milligram(s) orally once a day Take 2 tablets (1 mG) in the morning and 1 tablet at 20:30 (16 Feb 2024 11:24)  LORazepam 1 mg oral tablet: 1 tab(s) orally once a day (12 Mar 2025 13:09)  Multiple Vitamins oral tablet: 1 tab(s) orally once a day (13 Feb 2024 11:00)  OLANZapine 5 mg oral tablet: 1 tab(s) orally once a day (at bedtime) (12 Mar 2025 13:09)  pantoprazole 40 mg oral delayed release tablet: 1 tab(s) orally once a day (before a meal) (13 Feb 2024 11:00)  polyethylene glycol 3350 oral powder for reconstitution: 17 gram(s) orally once a day (at bedtime) (13 Feb 2024 11:00)  senna (sennosides) 8.6 mg oral tablet: 2 tab(s) orally once a day (at bedtime) as needed for  constipation (13 Feb 2024 11:00)  simethicone 80 mg oral tablet, chewable: 1 tab(s) chewed every 8 hours for gas (13 Feb 2024 11:00)  sodium bicarbonate 650 mg oral tablet: 1 tab(s) orally 2 times a day (13 Feb 2024 11:00)  thiamine 100 mg oral tablet: 1 tab(s) orally once a day (13 Feb 2024 11:00)      --------------------------------------------------------------------------------------------    PHYSICAL EXAM:   General: NAD, Lying in bed comfortably  Neuro: A+Ox3  HEENT: EOMI, PERRLA, MMM  Cardio: RRR  Resp: Non labored breathing on RA  GI/Abd: Soft, NT/obese,  hernia on the R side of abdomen soft   --------------------------------------------------------------------------------------------    LABS                 8.8    4.03   )----------(  174       ( 20 Mar 2025 16:39 )               29.1      143    |  105    |  29.8   ----------------------------<  99         ( 20 Mar 2025 16:39 )  4.2     |  26.0   |  2.21     Ca    9.3        ( 20 Mar 2025 16:39 )    TPro  6.3    /  Alb  3.8    /  TBili  0.4    /  DBili  x      /  AST  13     /  ALT  14     /  AlkPhos  76     ( 20 Mar 2025 16:39 )    LIVER FUNCTIONS - ( 20 Mar 2025 16:39 )  Alb: 3.8 g/dL / Pro: 6.3 g/dL / ALK PHOS: 76 U/L / ALT: 14 U/L / AST: 13 U/L / GGT: x           PT/INR -  13.1 sec / 1.16 ratio   ( 20 Mar 2025 16:39 )       PTT -  26.2 sec   ( 20 Mar 2025 16:39 )  CAPILLARY BLOOD GLUCOSE        Urinalysis Basic - ( 20 Mar 2025 16:39 )    Color: x / Appearance: x / SG: x / pH: x  Gluc: 99 mg/dL / Ketone: x  / Bili: x / Urobili: x   Blood: x / Protein: x / Nitrite: x   Leuk Esterase: x / RBC: x / WBC x   Sq Epi: x / Non Sq Epi: x / Bacteria: x        16:39 - VBG - pH:       | pCO2:       | pO2:       | Lactate: 0.70     --------------------------------------------------------------------------------------------

## 2025-03-20 NOTE — ED ADULT NURSE NOTE - ED STAT RN HANDOFF DETAILS
pt at baseline mental status. reps even and unlabored on RA. constant observation remains in place. report given to Juan Ryan. Receiving RN made aware of pt refusing IV placement. receiving RN made aware of 1:1. Md bank made aware of Pt refusing IV.

## 2025-03-20 NOTE — ED ADULT NURSE NOTE - NSFALLRISKINTERV_ED_ALL_ED

## 2025-03-20 NOTE — H&P ADULT - ASSESSMENT
75F resident of [place] with PMH of schizophrenia, HTN, multiple prior SBOs, exlap with SBR and ileostomy take down (2017), Appendectomy (2015) who presents to the ED with abdominal pain and vomiting. Patient poor history and history obtained from [facility] nursing staff. On day of presentation patient was found to have pain to her stomach. She last had a bowel movement day prior to presentation and passage of gas is uncertain. CT scan with high grade SBO        Plan:  - Admit to floor   - Place NGT - pt agitated   - IVF/ NPO   - Strict Is & Os  - DVT ppx: LVX SCDs  - GGC tomorrow

## 2025-03-21 LAB
ALBUMIN SERPL ELPH-MCNC: 3.5 G/DL — SIGNIFICANT CHANGE UP (ref 3.3–5.2)
ALP SERPL-CCNC: 76 U/L — SIGNIFICANT CHANGE UP (ref 40–120)
ALT FLD-CCNC: 13 U/L — SIGNIFICANT CHANGE UP
ANION GAP SERPL CALC-SCNC: 12 MMOL/L — SIGNIFICANT CHANGE UP (ref 5–17)
ANION GAP SERPL CALC-SCNC: 13 MMOL/L — SIGNIFICANT CHANGE UP (ref 5–17)
APPEARANCE UR: CLEAR — SIGNIFICANT CHANGE UP
APTT BLD: 33 SEC — SIGNIFICANT CHANGE UP (ref 24.5–35.6)
AST SERPL-CCNC: 11 U/L — SIGNIFICANT CHANGE UP
BASOPHILS # BLD AUTO: 0.01 K/UL — SIGNIFICANT CHANGE UP (ref 0–0.2)
BASOPHILS NFR BLD AUTO: 0.3 % — SIGNIFICANT CHANGE UP (ref 0–2)
BILIRUB SERPL-MCNC: 0.4 MG/DL — SIGNIFICANT CHANGE UP (ref 0.4–2)
BILIRUB UR-MCNC: NEGATIVE — SIGNIFICANT CHANGE UP
BUN SERPL-MCNC: 25.9 MG/DL — HIGH (ref 8–20)
BUN SERPL-MCNC: 27 MG/DL — HIGH (ref 8–20)
CALCIUM SERPL-MCNC: 9 MG/DL — SIGNIFICANT CHANGE UP (ref 8.4–10.5)
CALCIUM SERPL-MCNC: 9.1 MG/DL — SIGNIFICANT CHANGE UP (ref 8.4–10.5)
CHLORIDE SERPL-SCNC: 112 MMOL/L — HIGH (ref 96–108)
CHLORIDE SERPL-SCNC: 113 MMOL/L — HIGH (ref 96–108)
CO2 SERPL-SCNC: 25 MMOL/L — SIGNIFICANT CHANGE UP (ref 22–29)
CO2 SERPL-SCNC: 26 MMOL/L — SIGNIFICANT CHANGE UP (ref 22–29)
COLOR SPEC: YELLOW — SIGNIFICANT CHANGE UP
CREAT SERPL-MCNC: 2.06 MG/DL — HIGH (ref 0.5–1.3)
CREAT SERPL-MCNC: 2.08 MG/DL — HIGH (ref 0.5–1.3)
DIFF PNL FLD: NEGATIVE — SIGNIFICANT CHANGE UP
EGFR: 25 ML/MIN/1.73M2 — LOW
EOSINOPHIL # BLD AUTO: 0.05 K/UL — SIGNIFICANT CHANGE UP (ref 0–0.5)
EOSINOPHIL NFR BLD AUTO: 1.7 % — SIGNIFICANT CHANGE UP (ref 0–6)
GLUCOSE SERPL-MCNC: 70 MG/DL — SIGNIFICANT CHANGE UP (ref 70–99)
GLUCOSE SERPL-MCNC: 79 MG/DL — SIGNIFICANT CHANGE UP (ref 70–99)
GLUCOSE UR QL: NEGATIVE MG/DL — SIGNIFICANT CHANGE UP
HCT VFR BLD CALC: 28.4 % — LOW (ref 34.5–45)
HGB BLD-MCNC: 8.6 G/DL — LOW (ref 11.5–15.5)
IMM GRANULOCYTES # BLD AUTO: 0.01 K/UL — SIGNIFICANT CHANGE UP (ref 0–0.07)
IMM GRANULOCYTES NFR BLD AUTO: 0.3 % — SIGNIFICANT CHANGE UP (ref 0–0.9)
INR BLD: 1.16 RATIO — SIGNIFICANT CHANGE UP (ref 0.85–1.16)
KETONES UR-MCNC: NEGATIVE MG/DL — SIGNIFICANT CHANGE UP
LEUKOCYTE ESTERASE UR-ACNC: NEGATIVE — SIGNIFICANT CHANGE UP
LYMPHOCYTES # BLD AUTO: 0.93 K/UL — LOW (ref 1–3.3)
LYMPHOCYTES NFR BLD AUTO: 31.2 % — SIGNIFICANT CHANGE UP (ref 13–44)
MCHC RBC-ENTMCNC: 27.8 PG — SIGNIFICANT CHANGE UP (ref 27–34)
MCHC RBC-ENTMCNC: 30.3 G/DL — LOW (ref 32–36)
MCV RBC AUTO: 91.9 FL — SIGNIFICANT CHANGE UP (ref 80–100)
MONOCYTES # BLD AUTO: 0.31 K/UL — SIGNIFICANT CHANGE UP (ref 0–0.9)
MONOCYTES NFR BLD AUTO: 10.4 % — SIGNIFICANT CHANGE UP (ref 2–14)
NEUTROPHILS # BLD AUTO: 1.67 K/UL — LOW (ref 1.8–7.4)
NEUTROPHILS NFR BLD AUTO: 56.1 % — SIGNIFICANT CHANGE UP (ref 43–77)
NITRITE UR-MCNC: NEGATIVE — SIGNIFICANT CHANGE UP
NRBC # BLD AUTO: 0 K/UL — SIGNIFICANT CHANGE UP (ref 0–0)
NRBC # FLD: 0 K/UL — SIGNIFICANT CHANGE UP (ref 0–0)
NRBC BLD AUTO-RTO: 0 /100 WBCS — SIGNIFICANT CHANGE UP (ref 0–0)
PH UR: 8 — SIGNIFICANT CHANGE UP (ref 5–8)
PLATELET # BLD AUTO: 158 K/UL — SIGNIFICANT CHANGE UP (ref 150–400)
PMV BLD: 10.1 FL — SIGNIFICANT CHANGE UP (ref 7–13)
POTASSIUM SERPL-MCNC: 4.1 MMOL/L — SIGNIFICANT CHANGE UP (ref 3.5–5.3)
POTASSIUM SERPL-MCNC: 4.1 MMOL/L — SIGNIFICANT CHANGE UP (ref 3.5–5.3)
POTASSIUM SERPL-SCNC: 4.1 MMOL/L — SIGNIFICANT CHANGE UP (ref 3.5–5.3)
POTASSIUM SERPL-SCNC: 4.1 MMOL/L — SIGNIFICANT CHANGE UP (ref 3.5–5.3)
PROT SERPL-MCNC: 6 G/DL — LOW (ref 6.6–8.7)
PROT UR-MCNC: SIGNIFICANT CHANGE UP MG/DL
PROTHROM AB SERPL-ACNC: 13.4 SEC — SIGNIFICANT CHANGE UP (ref 9.9–13.4)
RBC # BLD: 3.09 M/UL — LOW (ref 3.8–5.2)
RBC # FLD: 14.7 % — HIGH (ref 10.3–14.5)
SODIUM SERPL-SCNC: 150 MMOL/L — HIGH (ref 135–145)
SODIUM SERPL-SCNC: 151 MMOL/L — HIGH (ref 135–145)
SP GR SPEC: 1.01 — SIGNIFICANT CHANGE UP (ref 1–1.03)
UROBILINOGEN FLD QL: 0.2 MG/DL — SIGNIFICANT CHANGE UP (ref 0.2–1)
WBC # BLD: 2.98 K/UL — LOW (ref 3.8–10.5)
WBC # FLD AUTO: 2.98 K/UL — LOW (ref 3.8–10.5)

## 2025-03-21 PROCEDURE — 93010 ELECTROCARDIOGRAM REPORT: CPT

## 2025-03-21 PROCEDURE — 99222 1ST HOSP IP/OBS MODERATE 55: CPT

## 2025-03-21 RX ORDER — HALOPERIDOL 10 MG/1
5 TABLET ORAL EVERY 6 HOURS
Refills: 0 | Status: DISCONTINUED | OUTPATIENT
Start: 2025-03-21 | End: 2025-03-25

## 2025-03-21 RX ORDER — SODIUM CHLORIDE 9 G/1000ML
1000 INJECTION, SOLUTION INTRAVENOUS
Refills: 0 | Status: DISCONTINUED | OUTPATIENT
Start: 2025-03-21 | End: 2025-03-22

## 2025-03-21 RX ORDER — LORAZEPAM 4 MG/ML
2 VIAL (ML) INJECTION EVERY 6 HOURS
Refills: 0 | Status: DISCONTINUED | OUTPATIENT
Start: 2025-03-21 | End: 2025-03-23

## 2025-03-21 RX ORDER — OLANZAPINE 10 MG/1
5 TABLET ORAL ONCE
Refills: 0 | Status: COMPLETED | OUTPATIENT
Start: 2025-03-21 | End: 2025-03-21

## 2025-03-21 RX ADMIN — OLANZAPINE 5 MILLIGRAM(S): 10 TABLET ORAL at 10:10

## 2025-03-21 RX ADMIN — OLANZAPINE 5 MILLIGRAM(S): 10 TABLET ORAL at 14:16

## 2025-03-21 RX ADMIN — Medication 2 MILLIGRAM(S): at 17:00

## 2025-03-21 RX ADMIN — ATORVASTATIN CALCIUM 10 MILLIGRAM(S): 80 TABLET, FILM COATED ORAL at 21:05

## 2025-03-21 NOTE — PROGRESS NOTE ADULT - NS ATTEND AMEND GEN_ALL_CORE FT
Patient seen this morning. Very agitated. Refusing any intervention. Pulled out her peripheral access. Refused physical exam.   Unable to assess the patient  Will obtain behavioral consult to assess for capacity and recs for agitation

## 2025-03-21 NOTE — BH CONSULTATION LIAISON ASSESSMENT NOTE - OTHER
[facility] collateral from [facility] disheveled, one tooth irritable other residents

## 2025-03-21 NOTE — PATIENT PROFILE ADULT - FALL HARM RISK - HARM RISK INTERVENTIONS

## 2025-03-21 NOTE — PATIENT PROFILE ADULT - FUNCTIONAL ASSESSMENT - BASIC MOBILITY 6.
1-calculated by average/Not able to assess (calculate score using [facility] averaging method)

## 2025-03-21 NOTE — BH CONSULTATION LIAISON ASSESSMENT NOTE - HPI (INCLUDE ILLNESS QUALITY, SEVERITY, DURATION, TIMING, CONTEXT, MODIFYING FACTORS, ASSOCIATED SIGNS AND SYMPTOMS)
Patient is a 75F resident of [place] with significant PMH including hypothyroidism, HTN, nephrogenic DI, hx of splenomegaly, multiple prior SBOs, exlap with SBR and ileostomy take down (2017), Appendectomy (2015), hx of CVA with hemiparesis,  pphx of schizoaffective d/o bipolar type, unknown hx of ALISON, unknown hx inpatient hospitalizations, hx of 2 prior suicide attempts (1979 and 2000),  hx of self harm and aggression toward others, who was recently admitted to [facility] with SBO, now again presents to the ED with abdominal pain and vomiting. Patient found to have SBO, currently admitted under care per trauma team.      Psychiatry consulted for medication management.    Chart reviewed.  Patient NPO except meds.  Received zyprexa 5mg IM this am for agitation/aggression.    Patient seen today by writer and SW.  One to one staff present.  Patient received PRN medication and had fallen asleep prior to psych team arrival.  Unable to assess majority of eval.

## 2025-03-21 NOTE — BH CONSULTATION LIAISON ASSESSMENT NOTE - CURRENT MEDICATION
MEDICATIONS  (STANDING):  atorvastatin 10 milliGRAM(s) Oral at bedtime  dextrose 5% + sodium chloride 0.45%. 1000 milliLiter(s) (100 mL/Hr) IV Continuous <Continuous>  levothyroxine 112 MICROGram(s) Oral daily  OLANZapine 5 milliGRAM(s) Oral daily    MEDICATIONS  (PRN):

## 2025-03-21 NOTE — PATIENT PROFILE ADULT - FUNCTIONAL ASSESSMENT - DAILY ACTIVITY 2.
Detail Level: Detailed Additional Notes: no meds. Quality 130: Documentation Of Current Medications In The Medical Record: Documentation of a medical reason(s) for not documenting, updating, or reviewing the patientâs current medications list (e.g., patient is in an urgent or emergent medical situation) 1 = Total assistance

## 2025-03-21 NOTE — PATIENT PROFILE ADULT - HAS THE PATIENT RECEIVED THE INFLUENZA VACCINE THIS SEASON?
[Consultation] : a consultation visit [FreeTextEntry1] : LE swelling and varicose veins unable to assess immunization status...

## 2025-03-21 NOTE — BH CONSULTATION LIAISON ASSESSMENT NOTE - SUMMARY
Patient is a 75F resident of [place] with significant PMH including hypothyroidism, HTN, nephrogenic DI, hx of splenomegaly, multiple prior SBOs, exlap with SBR and ileostomy take down (2017), Appendectomy (2015), hx of CVA with hemiparesis,  pphx of schizoaffective d/o bipolar type, unknown hx of ALISON, unknown hx inpatient hospitalizations, hx of 2 prior suicide attempts (1979 and 2000),  hx of self harm and aggression toward others, who presents to the ED with abdominal pain and vomiting. Patient found to have SBO with transition point within the large ventral hernia, currently admitted for observation per trauma team.      Psychiatry consulted for medication management.    Psych meds from [place]:  ativan 1mg po q AM and 0.5mg po qHS  olanzapine 5mg po qdaily     *Patient NPO except medications.  Would attempt to avoid antipsychotics (unless PRN for severe agitation) for now as they can contribute to constipation.     RECS  -continue one to one obs for danger to self and others  -recommend obtaining EKG  -ativan 1mg q AM/ 0.5mg qHS PO, can give via IV push route if IV access established  -hold standing olanzapine as can contribute to constipation  -PRN- first line: can given ativan 2mg IM q6hrs PRN, FOR severe agitation can add haldol 5mg IM one time dosages (monitor for qtc <500msec)

## 2025-03-21 NOTE — PATIENT PROFILE ADULT - FUNCTIONAL ASSESSMENT - DAILY ACTIVITY SCORE.
8 Burow's Graft Text: The defect edges were debeveled with a #15 scalpel blade.  Given the location of the defect, shape of the defect, the proximity to free margins and the presence of a standing cone deformity a Burow's skin graft was deemed most appropriate. The standing cone was removed and this tissue was then trimmed to the shape of the primary defect. The adipose tissue was also removed until only dermis and epidermis were left.  The skin margins of the secondary defect were undermined to an appropriate distance in all directions utilizing iris scissors.  The secondary defect was closed with interrupted buried subcutaneous sutures.  The skin edges were then re-apposed with running  sutures.  The skin graft was then placed in the primary defect and oriented appropriately.

## 2025-03-21 NOTE — BH CONSULTATION LIAISON ASSESSMENT NOTE - NSBHCHARTREVIEWVS_PSY_A_CORE FT
Vital Signs Last 24 Hrs  T(C): 36.5 (21 Mar 2025 07:30), Max: 36.8 (21 Mar 2025 04:01)  T(F): 97.7 (21 Mar 2025 07:30), Max: 98.2 (21 Mar 2025 04:01)  HR: 63 (21 Mar 2025 07:30) (61 - 72)  BP: 166/73 (21 Mar 2025 07:30) (134/61 - 166/73)  BP(mean): --  RR: 18 (21 Mar 2025 07:30) (16 - 20)  SpO2: 96% (21 Mar 2025 07:30) (93% - 98%)    Parameters below as of 21 Mar 2025 07:30  Patient On (Oxygen Delivery Method): room air

## 2025-03-21 NOTE — PATIENT PROFILE ADULT - DO YOU NEED ADDITIONAL SERVICES TO MANAGE ANY OF THESE MEDICAL CONDITIONS AT HOME?
Subjective:       Patient ID: Sol Le is a 70 y.o. female.    Chief Complaint: Follow-up (3 month/ lab )    Follow-up of hypertension blood pressure is under good control cardiovascular no chest pain palpitations PND orthopnea.  Hyperlipidemia on Lipitor and Zetia LDL 82.  CBC is normal.  Using her aspirin regularly.  No bleeding.  Hypothyroidism.  Dosage was increased and feels better.  TSH now 1.17 down from 2.95.  Gastroesophageal reflux disease using p.r.n. Pepcid doing well.  No dysphagia.  BMI of 25.4 up slightly.  Colonoscopy done in 2018 not due again until 2028.    Physical examination.  Vital signs noted.  Neck without bruit.  Chest clear.  Heart regular rate rhythm without murmur gallop.  Abdomen bowel sounds are positive soft nontender.  Extremities without edema positive pedal pulses.        Objective:        Assessment:       1. Hypertension, essential    2. Hyperlipidemia, unspecified hyperlipidemia type    3. Aspirin long-term use    4. Hypothyroidism, unspecified type    5. Gastroesophageal reflux disease, unspecified whether esophagitis present    6. BMI 25.0-25.9,adult    7. Screening for diabetes mellitus (DM)    8. Abnormal finding of blood chemistry, unspecified        Plan:       Hypertension, essential  -     Hemoglobin A1C; Future; Expected date: 06/27/2024  -     Comprehensive Metabolic Panel; Future; Expected date: 06/27/2024    Hyperlipidemia, unspecified hyperlipidemia type  -     Hemoglobin A1C; Future; Expected date: 06/27/2024  -     Lipid Panel; Future; Expected date: 06/27/2024    Aspirin long-term use    Hypothyroidism, unspecified type  -     Hemoglobin A1C; Future; Expected date: 06/27/2024  -     Lipid Panel; Future; Expected date: 06/27/2024  -     TSH; Future; Expected date: 06/27/2024    Gastroesophageal reflux disease, unspecified whether esophagitis present  -     Hemoglobin A1C; Future; Expected date: 06/27/2024    BMI 25.0-25.9,adult    Screening for diabetes  mellitus (DM)  -     Hemoglobin A1C; Future; Expected date: 06/27/2024    Abnormal finding of blood chemistry, unspecified  -     Hemoglobin A1C; Future; Expected date: 06/27/2024    Follow-up in 3 months with an A1c CMP lipid TSH.  All care gaps addressed.  Continue current medications.  Refills as needed.     no

## 2025-03-21 NOTE — PROGRESS NOTE ADULT - ASSESSMENT
75 year old with significant psych hx and very large postoperative abdominal wall hernia, recently discharged from [facility] ACS service after successful non-operative treatment of SBO now returns to ED c/o abdominal pain and vomiting.    Plan:  - Serial abdominal exams: Pt currently refuses abdominal exams  - Monitor bowel function: PT will not disclose if she is having bowel function  - Multimodal pain control: Refusing meds  - Hypernatremia: Plan for D5 1/2 NS @ 100 cc/hr but patient is refusing IV access   - DVT prophylaxis: Pt is refusing  - Encourage OOB to chair: Pt is refusing to get out of bed  - Continue incentive spirometer and pulmonary toilet: Pt also refusing    Pt is currently refusing IV access, medications, NGT, serial exams, and treatments.   We will obtain Psychiatric consult to determine capacity to make decisions as patient is demonstrating a lack of insight

## 2025-03-21 NOTE — PROGRESS NOTE ADULT - SUBJECTIVE AND OBJECTIVE BOX
INTERVAL HPI/OVERNIGHT EVENTS:    Pt admitted for SBO but is currently refusing to disclose insight to her condition and is refusing all treatment and physical exam.     MEDICATIONS  (STANDING):  atorvastatin 10 milliGRAM(s) Oral at bedtime  dextrose 5% + sodium chloride 0.45%. 1000 milliLiter(s) (100 mL/Hr) IV Continuous <Continuous>  levothyroxine 112 MICROGram(s) Oral daily  OLANZapine 5 milliGRAM(s) Oral daily    MEDICATIONS  (PRN):      Vital Signs Last 24 Hrs  T(C): 36.5 (21 Mar 2025 07:30), Max: 36.8 (21 Mar 2025 04:01)  T(F): 97.7 (21 Mar 2025 07:30), Max: 98.2 (21 Mar 2025 04:01)  HR: 63 (21 Mar 2025 07:30) (61 - 72)  BP: 166/73 (21 Mar 2025 07:30) (134/61 - 166/73)  BP(mean): --  RR: 18 (21 Mar 2025 07:30) (16 - 20)  SpO2: 96% (21 Mar 2025 07:30) (93% - 98%)    Parameters below as of 21 Mar 2025 07:30  Patient On (Oxygen Delivery Method): room air        Physical Exam:    Pt is yelling at all staff and refusing physical exam      I&O's Detail    20 Mar 2025 07:01  -  21 Mar 2025 07:00  --------------------------------------------------------  IN:  Total IN: 0 mL    OUT:    Voided (mL): 1350 mL  Total OUT: 1350 mL    Total NET: -1350 mL      21 Mar 2025 07:01  -  21 Mar 2025 13:29  --------------------------------------------------------  IN:  Total IN: 0 mL    OUT:    Voided (mL): 700 mL  Total OUT: 700 mL    Total NET: -700 mL          LABS:                        8.6    2.98  )-----------( 158      ( 21 Mar 2025 06:30 )             28.4     03-21    150[H]  |  112[H]  |  27.0[H]  ----------------------------<  79  4.1   |  26.0  |  2.06[H]    Ca    9.0      21 Mar 2025 06:30    TPro  6.0[L]  /  Alb  3.5  /  TBili  0.4  /  DBili  x   /  AST  11  /  ALT  13  /  AlkPhos  76  03-21    PT/INR - ( 21 Mar 2025 06:30 )   PT: 13.4 sec;   INR: 1.16 ratio         PTT - ( 21 Mar 2025 06:30 )  PTT:33.0 sec  Urinalysis Basic - ( 21 Mar 2025 12:00 )    Color: Yellow / Appearance: Clear / S.009 / pH: x  Gluc: x / Ketone: Negative mg/dL  / Bili: Negative / Urobili: 0.2 mg/dL   Blood: x / Protein: Trace mg/dL / Nitrite: Negative   Leuk Esterase: Negative / RBC: x / WBC x   Sq Epi: x / Non Sq Epi: x / Bacteria: x        RADIOLOGY & ADDITIONAL STUDIES:

## 2025-03-22 LAB
ANION GAP SERPL CALC-SCNC: 20 MMOL/L — HIGH (ref 5–17)
BASOPHILS # BLD AUTO: 0.02 K/UL — SIGNIFICANT CHANGE UP (ref 0–0.2)
BASOPHILS NFR BLD AUTO: 0.9 % — SIGNIFICANT CHANGE UP (ref 0–2)
BUN SERPL-MCNC: 34.5 MG/DL — HIGH (ref 8–20)
CALCIUM SERPL-MCNC: 9.2 MG/DL — SIGNIFICANT CHANGE UP (ref 8.4–10.5)
CHLORIDE SERPL-SCNC: 112 MMOL/L — HIGH (ref 96–108)
CO2 SERPL-SCNC: 18 MMOL/L — LOW (ref 22–29)
CREAT SERPL-MCNC: 2.25 MG/DL — HIGH (ref 0.5–1.3)
EGFR: 22 ML/MIN/1.73M2 — LOW
EGFR: 22 ML/MIN/1.73M2 — LOW
EOSINOPHIL # BLD AUTO: 0.02 K/UL — SIGNIFICANT CHANGE UP (ref 0–0.5)
EOSINOPHIL NFR BLD AUTO: 0.9 % — SIGNIFICANT CHANGE UP (ref 0–6)
GLUCOSE SERPL-MCNC: 64 MG/DL — LOW (ref 70–99)
HCT VFR BLD CALC: 33.9 % — LOW (ref 34.5–45)
HGB BLD-MCNC: 10 G/DL — LOW (ref 11.5–15.5)
IMM GRANULOCYTES # BLD AUTO: 0 K/UL — SIGNIFICANT CHANGE UP (ref 0–0.07)
IMM GRANULOCYTES NFR BLD AUTO: 0 % — SIGNIFICANT CHANGE UP (ref 0–0.9)
LYMPHOCYTES # BLD AUTO: 0.82 K/UL — LOW (ref 1–3.3)
LYMPHOCYTES NFR BLD AUTO: 38.3 % — SIGNIFICANT CHANGE UP (ref 13–44)
MAGNESIUM SERPL-MCNC: 2.6 MG/DL — SIGNIFICANT CHANGE UP (ref 1.6–2.6)
MCHC RBC-ENTMCNC: 27.9 PG — SIGNIFICANT CHANGE UP (ref 27–34)
MCHC RBC-ENTMCNC: 29.5 G/DL — LOW (ref 32–36)
MCV RBC AUTO: 94.7 FL — SIGNIFICANT CHANGE UP (ref 80–100)
MONOCYTES # BLD AUTO: 0.22 K/UL — SIGNIFICANT CHANGE UP (ref 0–0.9)
MONOCYTES NFR BLD AUTO: 10.3 % — SIGNIFICANT CHANGE UP (ref 2–14)
NEUTROPHILS # BLD AUTO: 1.06 K/UL — LOW (ref 1.8–7.4)
NEUTROPHILS NFR BLD AUTO: 49.6 % — SIGNIFICANT CHANGE UP (ref 43–77)
NRBC # BLD AUTO: 0 K/UL — SIGNIFICANT CHANGE UP (ref 0–0)
NRBC # FLD: 0 K/UL — SIGNIFICANT CHANGE UP (ref 0–0)
NRBC BLD AUTO-RTO: 0 /100 WBCS — SIGNIFICANT CHANGE UP (ref 0–0)
PHOSPHATE SERPL-MCNC: 3.9 MG/DL — SIGNIFICANT CHANGE UP (ref 2.4–4.7)
PLATELET # BLD AUTO: 161 K/UL — SIGNIFICANT CHANGE UP (ref 150–400)
PMV BLD: 10.2 FL — SIGNIFICANT CHANGE UP (ref 7–13)
POTASSIUM SERPL-MCNC: 4.1 MMOL/L — SIGNIFICANT CHANGE UP (ref 3.5–5.3)
POTASSIUM SERPL-SCNC: 4.1 MMOL/L — SIGNIFICANT CHANGE UP (ref 3.5–5.3)
RBC # BLD: 3.58 M/UL — LOW (ref 3.8–5.2)
RBC # FLD: 14.7 % — HIGH (ref 10.3–14.5)
SODIUM SERPL-SCNC: 150 MMOL/L — HIGH (ref 135–145)
WBC # BLD: 2.14 K/UL — LOW (ref 3.8–10.5)
WBC # FLD AUTO: 2.14 K/UL — LOW (ref 3.8–10.5)

## 2025-03-22 PROCEDURE — 74018 RADEX ABDOMEN 1 VIEW: CPT | Mod: 26

## 2025-03-22 PROCEDURE — 99231 SBSQ HOSP IP/OBS SF/LOW 25: CPT

## 2025-03-22 RX ORDER — SODIUM CHLORIDE 9 G/1000ML
1000 INJECTION, SOLUTION INTRAVENOUS ONCE
Refills: 0 | Status: COMPLETED | OUTPATIENT
Start: 2025-03-22 | End: 2025-03-22

## 2025-03-22 RX ORDER — HEPARIN SODIUM 1000 [USP'U]/ML
5000 INJECTION INTRAVENOUS; SUBCUTANEOUS EVERY 8 HOURS
Refills: 0 | Status: DISCONTINUED | OUTPATIENT
Start: 2025-03-22 | End: 2025-03-25

## 2025-03-22 RX ORDER — SODIUM CHLORIDE 9 G/1000ML
1000 INJECTION, SOLUTION INTRAVENOUS ONCE
Refills: 0 | Status: DISCONTINUED | OUTPATIENT
Start: 2025-03-22 | End: 2025-03-25

## 2025-03-22 RX ORDER — OLANZAPINE 10 MG/1
5 TABLET ORAL ONCE
Refills: 0 | Status: COMPLETED | OUTPATIENT
Start: 2025-03-22 | End: 2025-03-22

## 2025-03-22 RX ORDER — SODIUM CHLORIDE 9 G/1000ML
1000 INJECTION, SOLUTION INTRAVENOUS
Refills: 0 | Status: DISCONTINUED | OUTPATIENT
Start: 2025-03-22 | End: 2025-03-25

## 2025-03-22 RX ADMIN — SODIUM CHLORIDE 100 MILLILITER(S): 9 INJECTION, SOLUTION INTRAVENOUS at 17:12

## 2025-03-22 RX ADMIN — HEPARIN SODIUM 5000 UNIT(S): 1000 INJECTION INTRAVENOUS; SUBCUTANEOUS at 21:38

## 2025-03-22 RX ADMIN — HALOPERIDOL 5 MILLIGRAM(S): 10 TABLET ORAL at 06:30

## 2025-03-22 RX ADMIN — ATORVASTATIN CALCIUM 10 MILLIGRAM(S): 80 TABLET, FILM COATED ORAL at 21:38

## 2025-03-22 RX ADMIN — OLANZAPINE 5 MILLIGRAM(S): 10 TABLET ORAL at 13:32

## 2025-03-22 RX ADMIN — Medication 112 MICROGRAM(S): at 05:24

## 2025-03-22 RX ADMIN — OLANZAPINE 5 MILLIGRAM(S): 10 TABLET ORAL at 14:51

## 2025-03-22 RX ADMIN — SODIUM CHLORIDE 1000 MILLILITER(S): 9 INJECTION, SOLUTION INTRAVENOUS at 16:09

## 2025-03-22 NOTE — PROGRESS NOTE ADULT - ASSESSMENT
Pt is a 74yo female with significant psych hx and very large postoperative abdominal wall hernia, recently discharged from [facility] ACS service after successful non-operative treatment of SBO now returns to ED c/o abdominal pain and vomiting.    - Serial abdominal exams   - Monitor bowel function: PT will not disclose if she is having bowel function  - Multimodal pain control: Refusing meds  - Hypernatremia: Plan for D5 1/2 NS @ 100 cc/hr but patient is refusing IV access   - DVT prophylaxis: Pt is refusing  - Encourage OOB to chair: Pt is refusing to get out of bed  - Continue incentive spirometer and pulmonary toilet: Pt also refusing    Pt is currently refusing IV access, medications, NGT, serial exams, and treatments.   We will obtain Psychiatric consult to determine capacity to make decisions as patient is demonstrating a lack of insight  Pt is a 74yo female with significant psych hx and very large postoperative abdominal wall hernia, recently discharged from [facility] ACS service after successful non-operative treatment of SBO now returns to ED c/o abdominal pain and vomiting, and reoccuring SBO.     - Serial abdominal exams  - Appreciate behavioral health recs - no zyprexa due to constipation side effect  - Monitor bowel function  - Multimodal pain control  - Hypernatremia: Plan for D5 1/2 NS @ 100 cc/hr but patient is refusing IV access   - DVT prophylaxis  - Encourage OOB to chair

## 2025-03-22 NOTE — CHART NOTE - NSCHARTNOTEFT_GEN_A_CORE
Pt with significant hypovolemia (SBO, with MONICA, poor skin turgor, dry mucous membranes, and electrolyte abnormalities) but refused an attempt at IV access. Pt difficult with difficult to understand speech but no evidence that she can comprehend the significance of her medical condition or the importance of treatment which would require an IV. Pt was given 5mg of IM zyprexa which did not lessen her agitation. A second dose of 5mg zyprexa IM was administered. She was then calm enough to allow for reluctant IV placement. 20g IV placed in the RUE with US guidance. 2 attempts were made. The first unsuccessful, pressure was held until hemostasis was achieved. RN notified of the IV access and a dressing was put in place to help protect the IV. IV bolus and continuous fluids to follow were ordered. KUB also has been ordered to follow the passage of oral contrast (which had been given for the index CT) through the bowel. Additionally the aide at bedside was instructed to monitor for passage of flatus or a bowel movement. Pt with significant hypovolemia (SBO, poor skin turgor, dry mucous membranes, and electrolyte abnormalities) but refused an attempt at IV access. Pt difficult with difficult to understand speech but no evidence that she can comprehend the significance of her medical condition or the importance of treatment which would require an IV. Pt was given 5mg of IM zyprexa which did not lessen her agitation. A second dose of 5mg zyprexa IM was administered. She was then calm enough to allow for reluctant IV placement. 20g IV placed in the RUE with US guidance. 2 attempts were made. The first unsuccessful, pressure was held until hemostasis was achieved. RN notified of the IV access and a dressing was put in place to help protect the IV. IV bolus and continuous fluids to follow were ordered. KUB also has been ordered to follow the passage of oral contrast (which had been given for the index CT) through the bowel. Additionally the aide at bedside was instructed to monitor for passage of flatus or a bowel movement.

## 2025-03-22 NOTE — PROVIDER CONTACT NOTE (OTHER) - SITUATION
Pts PAMELA US guided IV is infiltrated, pt receiving LR @100 and due for LR bolus. Pt refusing tele.

## 2025-03-22 NOTE — PROGRESS NOTE ADULT - SUBJECTIVE AND OBJECTIVE BOX
SUBJECTIVE / 24H EVENTS:    Pt seen and examined at bedside by the team during rounds. Pt found to be resting in bed comfortably. 1:1 at bedside due to patient being aggressive/refusing interventions.     MEDICATIONS  (STANDING):  atorvastatin 10 milliGRAM(s) Oral at bedtime  dextrose 5% + sodium chloride 0.45%. 1000 milliLiter(s) (100 mL/Hr) IV Continuous <Continuous>  levothyroxine 112 MICROGram(s) Oral daily    MEDICATIONS  (PRN):  haloperidol    Injectable 5 milliGRAM(s) IntraMuscular every 6 hours PRN agitation  LORazepam   Injectable 2 milliGRAM(s) IntraMuscular every 6 hours PRN Agitation      Vital Signs Last 24 Hrs  T(C): 36.4 (21 Mar 2025 19:31), Max: 36.9 (21 Mar 2025 16:36)  T(F): 97.6 (21 Mar 2025 19:31), Max: 98.5 (21 Mar 2025 16:36)  HR: 70 (21 Mar 2025 19:31) (63 - 74)  BP: 151/70 (21 Mar 2025 19:31) (123/71 - 166/73)  BP(mean): --  RR: 19 (21 Mar 2025 19:31) (17 - 20)  SpO2: 94% (21 Mar 2025 19:31) (93% - 97%)    Parameters below as of 21 Mar 2025 19:31  Patient On (Oxygen Delivery Method): room air        Physical Exam  Constitutional: patient appears comfortable resting in bed, in no apparent distress  HEENT: head normocephalic and atraumatic, no blood in nares or oral cavity  Respiratory: respirations are unlabored, no accessory muscle use, no conversational dyspnea  Cardiovascular: regular rate & rhythm  Gastrointestinal: abdomen is soft & non-distended, non-tender, no rebound tenderness / guarding  Musculoskeletal: BERTRAND x 4 spontaneously, extremities are without point tenderness or deformity  Skin: mucous membranes moist, no diaphoresis, pallor, cyanosis or jaundice      I&O's Detail    20 Mar 2025 07:01  -  21 Mar 2025 07:00  --------------------------------------------------------  IN:  Total IN: 0 mL    OUT:    Voided (mL): 1350 mL  Total OUT: 1350 mL    Total NET: -1350 mL      21 Mar 2025 07:01  -  22 Mar 2025 00:27  --------------------------------------------------------  IN:  Total IN: 0 mL    OUT:    Voided (mL): 1680 mL  Total OUT: 1680 mL    Total NET: -1680 mL          LABS:                        8.6    2.98  )-----------( 158      ( 21 Mar 2025 06:30 )             28.4     03-21    151[H]  |  113[H]  |  25.9[H]  ----------------------------<  70  4.1   |  25.0  |  2.08[H]    Ca    9.1      21 Mar 2025 13:35    TPro  6.0[L]  /  Alb  3.5  /  TBili  0.4  /  DBili  x   /  AST  11  /  ALT  13  /  AlkPhos  76  03-21    PT/INR - ( 21 Mar 2025 06:30 )   PT: 13.4 sec;   INR: 1.16 ratio         PTT - ( 21 Mar 2025 06:30 )  PTT:33.0 sec  Urinalysis Basic - ( 21 Mar 2025 13:35 )    Color: x / Appearance: x / SG: x / pH: x  Gluc: 70 mg/dL / Ketone: x  / Bili: x / Urobili: x   Blood: x / Protein: x / Nitrite: x   Leuk Esterase: x / RBC: x / WBC x   Sq Epi: x / Non Sq Epi: x / Bacteria: x       SUBJECTIVE / 24H EVENTS:    Pt seen and examined at bedside by the team during rounds. Pt found to be resting in bed comfortably. 1:1 at bedside due to patient being aggressive/refusing interventions.     MEDICATIONS  (STANDING):  atorvastatin 10 milliGRAM(s) Oral at bedtime  dextrose 5% + sodium chloride 0.45%. 1000 milliLiter(s) (100 mL/Hr) IV Continuous <Continuous>  levothyroxine 112 MICROGram(s) Oral daily    MEDICATIONS  (PRN):  haloperidol    Injectable 5 milliGRAM(s) IntraMuscular every 6 hours PRN agitation  LORazepam   Injectable 2 milliGRAM(s) IntraMuscular every 6 hours PRN Agitation      Vital Signs Last 24 Hrs  T(C): 36.4 (21 Mar 2025 19:31), Max: 36.9 (21 Mar 2025 16:36)  T(F): 97.6 (21 Mar 2025 19:31), Max: 98.5 (21 Mar 2025 16:36)  HR: 70 (21 Mar 2025 19:31) (63 - 74)  BP: 151/70 (21 Mar 2025 19:31) (123/71 - 166/73)  BP(mean): --  RR: 19 (21 Mar 2025 19:31) (17 - 20)  SpO2: 94% (21 Mar 2025 19:31) (93% - 97%)    Parameters below as of 21 Mar 2025 19:31  Patient On (Oxygen Delivery Method): room air        Physical Exam  Unable to assess due to pt refusal       I&O's Detail    20 Mar 2025 07:01  -  21 Mar 2025 07:00  --------------------------------------------------------  IN:  Total IN: 0 mL    OUT:    Voided (mL): 1350 mL  Total OUT: 1350 mL    Total NET: -1350 mL      21 Mar 2025 07:01  -  22 Mar 2025 00:27  --------------------------------------------------------  IN:  Total IN: 0 mL    OUT:    Voided (mL): 1680 mL  Total OUT: 1680 mL    Total NET: -1680 mL          LABS:                        8.6    2.98  )-----------( 158      ( 21 Mar 2025 06:30 )             28.4     03-21    151[H]  |  113[H]  |  25.9[H]  ----------------------------<  70  4.1   |  25.0  |  2.08[H]    Ca    9.1      21 Mar 2025 13:35    TPro  6.0[L]  /  Alb  3.5  /  TBili  0.4  /  DBili  x   /  AST  11  /  ALT  13  /  AlkPhos  76  03-21    PT/INR - ( 21 Mar 2025 06:30 )   PT: 13.4 sec;   INR: 1.16 ratio         PTT - ( 21 Mar 2025 06:30 )  PTT:33.0 sec  Urinalysis Basic - ( 21 Mar 2025 13:35 )    Color: x / Appearance: x / SG: x / pH: x  Gluc: 70 mg/dL / Ketone: x  / Bili: x / Urobili: x   Blood: x / Protein: x / Nitrite: x   Leuk Esterase: x / RBC: x / WBC x   Sq Epi: x / Non Sq Epi: x / Bacteria: x       [Negative] : Genitourinary

## 2025-03-22 NOTE — PROVIDER CONTACT NOTE (OTHER) - ASSESSMENT
PAMELA swollen, IV difficult to flush, no blood return noted. IVF turned off. Pt denies pain to PAMELA at this time.

## 2025-03-22 NOTE — PROGRESS NOTE ADULT - NS ATTEND AMEND GEN_ALL_CORE FT
Above assessment noted.  The patient was seen and examined by myself with the surgical PA.  The patient is without abdominal pain, nausea, and vomit.  She states she wants to drink water.  Abdomen is soft without localizing tenderness, no guarding, no  rebound.  The complex hernia of the abdominal wall is reducible and soft.  Patient is refusing IV access and NGT.  Will have psych assess the patient. Above assessment noted.  The patient was seen and examined by myself with the surgical PA.  The patient is without abdominal pain, nausea, and vomit.  She states she wants to drink water.  Abdomen is soft without localizing tenderness, no guarding, no  rebound.  The complex hernia of the abdominal wall is reducible and soft.  Patient is refusing IV access and NGT.  Will get KUB to assess for passage of contrast from prior CT scan. Will also have psych assess the patient.

## 2025-03-23 LAB
ANION GAP SERPL CALC-SCNC: 17 MMOL/L — SIGNIFICANT CHANGE UP (ref 5–17)
BASOPHILS # BLD AUTO: 0.01 K/UL — SIGNIFICANT CHANGE UP (ref 0–0.2)
BASOPHILS NFR BLD AUTO: 0.4 % — SIGNIFICANT CHANGE UP (ref 0–2)
BUN SERPL-MCNC: 30.3 MG/DL — HIGH (ref 8–20)
CALCIUM SERPL-MCNC: 9.6 MG/DL — SIGNIFICANT CHANGE UP (ref 8.4–10.5)
CHLORIDE SERPL-SCNC: 114 MMOL/L — HIGH (ref 96–108)
CO2 SERPL-SCNC: 23 MMOL/L — SIGNIFICANT CHANGE UP (ref 22–29)
CREAT SERPL-MCNC: 2.26 MG/DL — HIGH (ref 0.5–1.3)
EGFR: 22 ML/MIN/1.73M2 — LOW
EGFR: 22 ML/MIN/1.73M2 — LOW
EOSINOPHIL # BLD AUTO: 0.02 K/UL — SIGNIFICANT CHANGE UP (ref 0–0.5)
EOSINOPHIL NFR BLD AUTO: 0.8 % — SIGNIFICANT CHANGE UP (ref 0–6)
GLUCOSE SERPL-MCNC: 97 MG/DL — SIGNIFICANT CHANGE UP (ref 70–99)
HCT VFR BLD CALC: 33.8 % — LOW (ref 34.5–45)
HGB BLD-MCNC: 10 G/DL — LOW (ref 11.5–15.5)
IMM GRANULOCYTES # BLD AUTO: 0.02 K/UL — SIGNIFICANT CHANGE UP (ref 0–0.07)
IMM GRANULOCYTES NFR BLD AUTO: 0.8 % — SIGNIFICANT CHANGE UP (ref 0–0.9)
LYMPHOCYTES # BLD AUTO: 1.03 K/UL — SIGNIFICANT CHANGE UP (ref 1–3.3)
LYMPHOCYTES NFR BLD AUTO: 41.5 % — SIGNIFICANT CHANGE UP (ref 13–44)
MAGNESIUM SERPL-MCNC: 2.5 MG/DL — SIGNIFICANT CHANGE UP (ref 1.6–2.6)
MCHC RBC-ENTMCNC: 27.7 PG — SIGNIFICANT CHANGE UP (ref 27–34)
MCHC RBC-ENTMCNC: 29.6 G/DL — LOW (ref 32–36)
MCV RBC AUTO: 93.6 FL — SIGNIFICANT CHANGE UP (ref 80–100)
MONOCYTES # BLD AUTO: 0.24 K/UL — SIGNIFICANT CHANGE UP (ref 0–0.9)
MONOCYTES NFR BLD AUTO: 9.7 % — SIGNIFICANT CHANGE UP (ref 2–14)
NEUTROPHILS # BLD AUTO: 1.16 K/UL — LOW (ref 1.8–7.4)
NEUTROPHILS NFR BLD AUTO: 46.8 % — SIGNIFICANT CHANGE UP (ref 43–77)
NRBC # BLD AUTO: 0 K/UL — SIGNIFICANT CHANGE UP (ref 0–0)
NRBC # FLD: 0 K/UL — SIGNIFICANT CHANGE UP (ref 0–0)
NRBC BLD AUTO-RTO: 0 /100 WBCS — SIGNIFICANT CHANGE UP (ref 0–0)
PHOSPHATE SERPL-MCNC: 3.4 MG/DL — SIGNIFICANT CHANGE UP (ref 2.4–4.7)
PLATELET # BLD AUTO: 156 K/UL — SIGNIFICANT CHANGE UP (ref 150–400)
PMV BLD: 10.6 FL — SIGNIFICANT CHANGE UP (ref 7–13)
POTASSIUM SERPL-MCNC: 3.8 MMOL/L — SIGNIFICANT CHANGE UP (ref 3.5–5.3)
POTASSIUM SERPL-SCNC: 3.8 MMOL/L — SIGNIFICANT CHANGE UP (ref 3.5–5.3)
RBC # BLD: 3.61 M/UL — LOW (ref 3.8–5.2)
RBC # FLD: 14.7 % — HIGH (ref 10.3–14.5)
SODIUM SERPL-SCNC: 154 MMOL/L — HIGH (ref 135–145)
WBC # BLD: 2.48 K/UL — LOW (ref 3.8–10.5)
WBC # FLD AUTO: 2.48 K/UL — LOW (ref 3.8–10.5)

## 2025-03-23 PROCEDURE — 74018 RADEX ABDOMEN 1 VIEW: CPT | Mod: 26

## 2025-03-23 PROCEDURE — 99231 SBSQ HOSP IP/OBS SF/LOW 25: CPT | Mod: GC

## 2025-03-23 RX ORDER — LORAZEPAM 4 MG/ML
0.5 VIAL (ML) INJECTION AT BEDTIME
Refills: 0 | Status: DISCONTINUED | OUTPATIENT
Start: 2025-03-23 | End: 2025-03-25

## 2025-03-23 RX ORDER — CALCITRIOL 0.5 UG/1
0.25 CAPSULE, GELATIN COATED ORAL DAILY
Refills: 0 | Status: DISCONTINUED | OUTPATIENT
Start: 2025-03-23 | End: 2025-03-25

## 2025-03-23 RX ORDER — SODIUM BICARBONATE 1 MEQ/ML
650 SYRINGE (ML) INTRAVENOUS
Refills: 0 | Status: DISCONTINUED | OUTPATIENT
Start: 2025-03-23 | End: 2025-03-24

## 2025-03-23 RX ORDER — SENNA 187 MG
2 TABLET ORAL AT BEDTIME
Refills: 0 | Status: DISCONTINUED | OUTPATIENT
Start: 2025-03-23 | End: 2025-03-25

## 2025-03-23 RX ORDER — CYANOCOBALAMIN 1000 UG/ML
500 INJECTION INTRAMUSCULAR; SUBCUTANEOUS DAILY
Refills: 0 | Status: DISCONTINUED | OUTPATIENT
Start: 2025-03-23 | End: 2025-03-25

## 2025-03-23 RX ORDER — ATORVASTATIN CALCIUM 80 MG/1
1 TABLET, FILM COATED ORAL
Refills: 0 | DISCHARGE

## 2025-03-23 RX ORDER — B1/B2/B3/B5/B6/B12/VIT C/FOLIC 500-0.5 MG
1 TABLET ORAL DAILY
Refills: 0 | Status: DISCONTINUED | OUTPATIENT
Start: 2025-03-23 | End: 2025-03-25

## 2025-03-23 RX ORDER — LORAZEPAM 4 MG/ML
1 VIAL (ML) INJECTION DAILY
Refills: 0 | Status: DISCONTINUED | OUTPATIENT
Start: 2025-03-23 | End: 2025-03-25

## 2025-03-23 RX ORDER — CALCIUM CARBONATE 750 MG/1
2 TABLET ORAL THREE TIMES A DAY
Refills: 0 | Status: DISCONTINUED | OUTPATIENT
Start: 2025-03-23 | End: 2025-03-25

## 2025-03-23 RX ORDER — POLYETHYLENE GLYCOL 3350 17 G/17G
17 POWDER, FOR SOLUTION ORAL AT BEDTIME
Refills: 0 | Status: DISCONTINUED | OUTPATIENT
Start: 2025-03-23 | End: 2025-03-25

## 2025-03-23 RX ORDER — SIMETHICONE 80 MG
80 TABLET,CHEWABLE ORAL EVERY 8 HOURS
Refills: 0 | Status: DISCONTINUED | OUTPATIENT
Start: 2025-03-23 | End: 2025-03-25

## 2025-03-23 RX ADMIN — POLYETHYLENE GLYCOL 3350 17 GRAM(S): 17 POWDER, FOR SOLUTION ORAL at 21:21

## 2025-03-23 RX ADMIN — Medication 112 MICROGRAM(S): at 06:10

## 2025-03-23 RX ADMIN — Medication 100 MILLIGRAM(S): at 14:24

## 2025-03-23 RX ADMIN — Medication 80 MILLIGRAM(S): at 21:22

## 2025-03-23 RX ADMIN — CYANOCOBALAMIN 500 MICROGRAM(S): 1000 INJECTION INTRAMUSCULAR; SUBCUTANEOUS at 14:22

## 2025-03-23 RX ADMIN — CALCIUM CARBONATE 2 TABLET(S): 750 TABLET ORAL at 21:22

## 2025-03-23 RX ADMIN — Medication 1 TABLET(S): at 14:25

## 2025-03-23 RX ADMIN — HEPARIN SODIUM 5000 UNIT(S): 1000 INJECTION INTRAVENOUS; SUBCUTANEOUS at 14:25

## 2025-03-23 RX ADMIN — Medication 500 MILLIGRAM(S): at 17:31

## 2025-03-23 RX ADMIN — ATORVASTATIN CALCIUM 10 MILLIGRAM(S): 80 TABLET, FILM COATED ORAL at 21:22

## 2025-03-23 RX ADMIN — Medication 2000 UNIT(S): at 14:23

## 2025-03-23 RX ADMIN — HEPARIN SODIUM 5000 UNIT(S): 1000 INJECTION INTRAVENOUS; SUBCUTANEOUS at 21:21

## 2025-03-23 RX ADMIN — Medication 2 TABLET(S): at 21:22

## 2025-03-23 RX ADMIN — Medication 1 MILLIGRAM(S): at 14:24

## 2025-03-23 RX ADMIN — Medication 0.5 MILLIGRAM(S): at 21:22

## 2025-03-23 RX ADMIN — CALCITRIOL 0.25 MICROGRAM(S): 0.5 CAPSULE, GELATIN COATED ORAL at 14:25

## 2025-03-23 RX ADMIN — CALCIUM CARBONATE 2 TABLET(S): 750 TABLET ORAL at 14:26

## 2025-03-23 RX ADMIN — HEPARIN SODIUM 5000 UNIT(S): 1000 INJECTION INTRAVENOUS; SUBCUTANEOUS at 06:10

## 2025-03-23 RX ADMIN — Medication 2 MILLIGRAM(S): at 01:46

## 2025-03-23 RX ADMIN — Medication 650 MILLIGRAM(S): at 17:31

## 2025-03-23 RX ADMIN — Medication 80 MILLIGRAM(S): at 14:24

## 2025-03-23 RX ADMIN — HALOPERIDOL 5 MILLIGRAM(S): 10 TABLET ORAL at 06:09

## 2025-03-23 NOTE — PROGRESS NOTE ADULT - ASSESSMENT
Pt is a 74yo female with significant psych hx and very large postoperative abdominal wall hernia, recently discharged from [facility] ACS service after successful non-operative treatment of SBO now returns to ED c/o abdominal pain and vomiting, and reoccuring SBO. Pt given 60ml of GG at 2030, tolerated well. Pt continues to be hypernatremic. Pt refusing all other interventions     - Serial abdominal exams  - Appreciate behavioral health recs - no zyprexa due to constipation side effect  - Monitor bowel function  - Multimodal pain control  - Hypernatremia: Plan for D5 1/2 NS @ 100 cc/hr but patient is refusing new IV access   - DVT prophylaxis  - Encourage OOB to chair

## 2025-03-23 NOTE — PROVIDER CONTACT NOTE (CHANGE IN STATUS NOTIFICATION) - SITUATION
attempted to get iv access, pt still refusing
Pt refusing care including iv access, refused ekg
Pt refusing iv access and cm

## 2025-03-23 NOTE — DIETITIAN NUTRITION RISK NOTIFICATION - ADDITIONAL COMMENTS/DIETITIAN RECOMMENDATIONS
advance diet to soft and bite sized  add ensure plus BID to optimize po intake and provide an additional 350 kcal, 20g protein per serving   Rec MVI, Vit C

## 2025-03-23 NOTE — DIETITIAN INITIAL EVALUATION ADULT - ORAL INTAKE PTA/DIET HISTORY
Met with pt from [facility] on 1:1 who reports 30# weight loss.  Pt with good po intake presently. Pt still on clears and taking Ensure clear supplement well. Pt with poor dentition.

## 2025-03-23 NOTE — DIETITIAN INITIAL EVALUATION ADULT - PERTINENT MEDS FT
MEDICATIONS  (STANDING):  ascorbic acid 500 milliGRAM(s) Oral two times a day  atorvastatin 10 milliGRAM(s) Oral at bedtime  calcitriol   Capsule 0.25 MICROGram(s) Oral daily  calcium carbonate    500 mG (Tums) Chewable 2 Tablet(s) Chew three times a day  cholecalciferol 2000 Unit(s) Oral daily  cyanocobalamin 500 MICROGram(s) Oral daily  heparin   Injectable 5000 Unit(s) SubCutaneous every 8 hours  levothyroxine 112 MICROGram(s) Oral daily  LORazepam     Tablet 0.5 milliGRAM(s) Oral at bedtime  LORazepam     Tablet 1 milliGRAM(s) Oral daily  multivitamin 1 Tablet(s) Oral daily  pantoprazole    Tablet 40 milliGRAM(s) Oral before breakfast  polyethylene glycol 3350 17 Gram(s) Oral at bedtime  senna 2 Tablet(s) Oral at bedtime  simethicone 80 milliGRAM(s) Chew every 8 hours  sodium bicarbonate 650 milliGRAM(s) Oral two times a day  thiamine 100 milliGRAM(s) Oral daily    MEDICATIONS  (PRN):  haloperidol    Injectable 5 milliGRAM(s) IntraMuscular every 6 hours PRN agitation

## 2025-03-23 NOTE — DIETITIAN INITIAL EVALUATION ADULT - PERTINENT LABORATORY DATA
03-23    154[H]  |  114[H]  |  30.3[H]  ----------------------------<  97  3.8   |  23.0  |  2.26[H]    Ca    9.6      23 Mar 2025 06:28  Phos  3.4     03-23  Mg     2.5     03-23

## 2025-03-23 NOTE — PROGRESS NOTE ADULT - SUBJECTIVE AND OBJECTIVE BOX
SUBJECTIVE / 24H EVENTS:    Pt seen and examined at bedside by the team during rounds. Pt found to be resting in bed. Pt IV access infiltrated and pt refusing placement of new IV. Pt requesting oral fluids and states she doesn't need IV fluids and that she would like to leave. Pt also states that she has had BMs, however can not confirm with clinical staff. 1:1 at bedside. Pt denies CP, SOB, N/V, fever, chills.     MEDICATIONS  (STANDING):  atorvastatin 10 milliGRAM(s) Oral at bedtime  heparin   Injectable 5000 Unit(s) SubCutaneous every 8 hours  lactated ringers Bolus 1000 milliLiter(s) IV Bolus once  lactated ringers. 1000 milliLiter(s) (100 mL/Hr) IV Continuous <Continuous>  levothyroxine 112 MICROGram(s) Oral daily    MEDICATIONS  (PRN):  haloperidol    Injectable 5 milliGRAM(s) IntraMuscular every 6 hours PRN agitation  LORazepam   Injectable 2 milliGRAM(s) IntraMuscular every 6 hours PRN Agitation      Vital Signs Last 24 Hrs  T(C): 36.7 (23 Mar 2025 00:09), Max: 37.1 (22 Mar 2025 11:48)  T(F): 98.1 (23 Mar 2025 00:09), Max: 98.8 (22 Mar 2025 11:48)  HR: 76 (23 Mar 2025 00:09) (73 - 99)  BP: 159/74 (23 Mar 2025 00:09) (144/68 - 164/49)  BP(mean): --  RR: 18 (23 Mar 2025 00:09) (18 - 20)  SpO2: 95% (23 Mar 2025 00:09) (95% - 98%)    Parameters below as of 23 Mar 2025 00:09  Patient On (Oxygen Delivery Method): room air        Physical Exam  Constitutional: patient appears comfortable resting in bed, wanting oral fluids  Respiratory: respirations are unlabored, no accessory muscle use, no conversational dyspnea  Skin: mucous membranes moist, no diaphoresis, pallor, cyanosis or jaundice  Unable to assess for any other physical exam findings due to pt refusal      I&O's Detail    21 Mar 2025 07:01  -  22 Mar 2025 07:00  --------------------------------------------------------  IN:  Total IN: 0 mL    OUT:    Voided (mL): 1681 mL  Total OUT: 1681 mL    Total NET: -1681 mL      22 Mar 2025 07:01  -  23 Mar 2025 00:16  --------------------------------------------------------  IN:    Lactated Ringers: 300 mL    Lactated Ringers Bolus: 1000 mL  Total IN: 1300 mL    OUT:  Total OUT: 0 mL    Total NET: 1300 mL          LABS:                        10.0   2.14  )-----------( 161      ( 22 Mar 2025 06:58 )             33.9     03-22    150[H]  |  112[H]  |  34.5[H]  ----------------------------<  64[L]  4.1   |  18.0[L]  |  2.25[H]    Ca    9.2      22 Mar 2025 06:58  Phos  3.9     03-22  Mg     2.6     03-22    TPro  6.0[L]  /  Alb  3.5  /  TBili  0.4  /  DBili  x   /  AST  11  /  ALT  13  /  AlkPhos  76  03-21    PT/INR - ( 21 Mar 2025 06:30 )   PT: 13.4 sec;   INR: 1.16 ratio         PTT - ( 21 Mar 2025 06:30 )  PTT:33.0 sec  Urinalysis Basic - ( 22 Mar 2025 06:58 )    Color: x / Appearance: x / SG: x / pH: x  Gluc: 64 mg/dL / Ketone: x  / Bili: x / Urobili: x   Blood: x / Protein: x / Nitrite: x   Leuk Esterase: x / RBC: x / WBC x   Sq Epi: x / Non Sq Epi: x / Bacteria: x

## 2025-03-23 NOTE — DIETITIAN INITIAL EVALUATION ADULT - OTHER INFO
4yo female with significant psych hx and very large postoperative abdominal wall hernia, recently discharged from [facility] ACS service after successful non-operative treatment of SBO now returns to ED c/o abdominal pain and vomiting, and reoccuring SBO. Pt given 60ml of GG at 2030, tolerated well. Pt continues to be hypernatremic. Pt refusing all other interventions.

## 2025-03-24 LAB
ANION GAP SERPL CALC-SCNC: 13 MMOL/L — SIGNIFICANT CHANGE UP (ref 5–17)
ANISOCYTOSIS BLD QL: SLIGHT — SIGNIFICANT CHANGE UP
BASOPHILS # BLD AUTO: 0.02 K/UL — SIGNIFICANT CHANGE UP (ref 0–0.2)
BASOPHILS # BLD MANUAL: 0 K/UL — SIGNIFICANT CHANGE UP (ref 0–0.2)
BASOPHILS NFR BLD AUTO: 0.9 % — SIGNIFICANT CHANGE UP (ref 0–2)
BASOPHILS NFR BLD MANUAL: 0 % — SIGNIFICANT CHANGE UP (ref 0–2)
BUN SERPL-MCNC: 21.2 MG/DL — HIGH (ref 8–20)
CALCIUM SERPL-MCNC: 9.1 MG/DL — SIGNIFICANT CHANGE UP (ref 8.4–10.5)
CHLORIDE SERPL-SCNC: 109 MMOL/L — HIGH (ref 96–108)
CO2 SERPL-SCNC: 21 MMOL/L — LOW (ref 22–29)
CREAT SERPL-MCNC: 2.1 MG/DL — HIGH (ref 0.5–1.3)
EGFR: 24 ML/MIN/1.73M2 — LOW
EGFR: 24 ML/MIN/1.73M2 — LOW
ELLIPTOCYTES BLD QL SMEAR: SLIGHT — SIGNIFICANT CHANGE UP
EOSINOPHIL # BLD AUTO: 0.03 K/UL — SIGNIFICANT CHANGE UP (ref 0–0.5)
EOSINOPHIL # BLD MANUAL: 0 K/UL — SIGNIFICANT CHANGE UP (ref 0–0.5)
EOSINOPHIL NFR BLD AUTO: 1.3 % — SIGNIFICANT CHANGE UP (ref 0–6)
EOSINOPHIL NFR BLD MANUAL: 0 % — SIGNIFICANT CHANGE UP (ref 0–6)
GIANT PLATELETS BLD QL SMEAR: PRESENT
GLUCOSE SERPL-MCNC: 85 MG/DL — SIGNIFICANT CHANGE UP (ref 70–99)
HCT VFR BLD CALC: 31.3 % — LOW (ref 34.5–45)
HGB BLD-MCNC: 9.1 G/DL — LOW (ref 11.5–15.5)
IMM GRANULOCYTES # BLD AUTO: 0 K/UL — SIGNIFICANT CHANGE UP (ref 0–0.07)
IMM GRANULOCYTES NFR BLD AUTO: 0 % — SIGNIFICANT CHANGE UP (ref 0–0.9)
LYMPHOCYTES # BLD AUTO: 1.17 K/UL — SIGNIFICANT CHANGE UP (ref 1–3.3)
LYMPHOCYTES # BLD MANUAL: 0.92 K/UL — LOW (ref 1–3.3)
LYMPHOCYTES NFR BLD AUTO: 51.3 % — HIGH (ref 13–44)
LYMPHOCYTES NFR BLD MANUAL: 40.5 % — SIGNIFICANT CHANGE UP (ref 13–44)
MAGNESIUM SERPL-MCNC: 2 MG/DL — SIGNIFICANT CHANGE UP (ref 1.6–2.6)
MANUAL REACTIVE LYMPHOCYTES #: 0.12 K/UL — SIGNIFICANT CHANGE UP (ref 0–0.63)
MCHC RBC-ENTMCNC: 27.5 PG — SIGNIFICANT CHANGE UP (ref 27–34)
MCHC RBC-ENTMCNC: 29.1 G/DL — LOW (ref 32–36)
MCV RBC AUTO: 94.6 FL — SIGNIFICANT CHANGE UP (ref 80–100)
MICROCYTES BLD QL: SLIGHT — SIGNIFICANT CHANGE UP
MONOCYTES # BLD AUTO: 0.23 K/UL — SIGNIFICANT CHANGE UP (ref 0–0.9)
MONOCYTES # BLD MANUAL: 0.12 K/UL — SIGNIFICANT CHANGE UP (ref 0–0.9)
MONOCYTES NFR BLD AUTO: 10.1 % — SIGNIFICANT CHANGE UP (ref 2–14)
MONOCYTES NFR BLD MANUAL: 5.4 % — SIGNIFICANT CHANGE UP (ref 2–14)
NEUTROPHILS # BLD AUTO: 0.83 K/UL — LOW (ref 1.8–7.4)
NEUTROPHILS # BLD MANUAL: 1.11 K/UL — LOW (ref 1.8–7.4)
NEUTROPHILS NFR BLD AUTO: 36.4 % — LOW (ref 43–77)
NEUTROPHILS NFR BLD MANUAL: 48.7 % — SIGNIFICANT CHANGE UP (ref 43–77)
NRBC # BLD AUTO: 0 K/UL — SIGNIFICANT CHANGE UP (ref 0–0)
NRBC # FLD: 0 K/UL — SIGNIFICANT CHANGE UP (ref 0–0)
NRBC BLD AUTO-RTO: 0 /100 WBCS — SIGNIFICANT CHANGE UP (ref 0–0)
OVALOCYTES BLD QL SMEAR: SLIGHT — SIGNIFICANT CHANGE UP
PHOSPHATE SERPL-MCNC: 3 MG/DL — SIGNIFICANT CHANGE UP (ref 2.4–4.7)
PLAT MORPH BLD: NORMAL — SIGNIFICANT CHANGE UP
PLATELET # BLD AUTO: 153 K/UL — SIGNIFICANT CHANGE UP (ref 150–400)
PMV BLD: 10.9 FL — SIGNIFICANT CHANGE UP (ref 7–13)
POIKILOCYTOSIS BLD QL AUTO: SLIGHT — SIGNIFICANT CHANGE UP
POLYCHROMASIA BLD QL SMEAR: SLIGHT — SIGNIFICANT CHANGE UP
POTASSIUM SERPL-MCNC: 3.8 MMOL/L — SIGNIFICANT CHANGE UP (ref 3.5–5.3)
POTASSIUM SERPL-SCNC: 3.8 MMOL/L — SIGNIFICANT CHANGE UP (ref 3.5–5.3)
RBC # BLD: 3.31 M/UL — LOW (ref 3.8–5.2)
RBC # FLD: 14.6 % — HIGH (ref 10.3–14.5)
RBC BLD AUTO: ABNORMAL
SODIUM SERPL-SCNC: 143 MMOL/L — SIGNIFICANT CHANGE UP (ref 135–145)
VARIANT LYMPHS # BLD: 5.4 % — SIGNIFICANT CHANGE UP (ref 0–6)
VARIANT LYMPHS NFR BLD MANUAL: 5.4 % — SIGNIFICANT CHANGE UP (ref 0–6)
WBC # BLD: 2.28 K/UL — LOW (ref 3.8–10.5)
WBC # FLD AUTO: 2.28 K/UL — LOW (ref 3.8–10.5)

## 2025-03-24 PROCEDURE — 99232 SBSQ HOSP IP/OBS MODERATE 35: CPT | Mod: GC

## 2025-03-24 PROCEDURE — 99232 SBSQ HOSP IP/OBS MODERATE 35: CPT

## 2025-03-24 RX ADMIN — Medication 1 TABLET(S): at 11:49

## 2025-03-24 RX ADMIN — Medication 0.5 MILLIGRAM(S): at 21:07

## 2025-03-24 RX ADMIN — Medication 500 MILLIGRAM(S): at 05:38

## 2025-03-24 RX ADMIN — CALCITRIOL 0.25 MICROGRAM(S): 0.5 CAPSULE, GELATIN COATED ORAL at 11:51

## 2025-03-24 RX ADMIN — Medication 2000 UNIT(S): at 11:49

## 2025-03-24 RX ADMIN — Medication 80 MILLIGRAM(S): at 13:20

## 2025-03-24 RX ADMIN — CALCIUM CARBONATE 2 TABLET(S): 750 TABLET ORAL at 13:19

## 2025-03-24 RX ADMIN — Medication 80 MILLIGRAM(S): at 05:38

## 2025-03-24 RX ADMIN — CALCIUM CARBONATE 2 TABLET(S): 750 TABLET ORAL at 05:39

## 2025-03-24 RX ADMIN — HEPARIN SODIUM 5000 UNIT(S): 1000 INJECTION INTRAVENOUS; SUBCUTANEOUS at 05:38

## 2025-03-24 RX ADMIN — ATORVASTATIN CALCIUM 10 MILLIGRAM(S): 80 TABLET, FILM COATED ORAL at 21:07

## 2025-03-24 RX ADMIN — Medication 100 MILLIGRAM(S): at 11:49

## 2025-03-24 RX ADMIN — Medication 80 MILLIGRAM(S): at 21:06

## 2025-03-24 RX ADMIN — CYANOCOBALAMIN 500 MICROGRAM(S): 1000 INJECTION INTRAMUSCULAR; SUBCUTANEOUS at 17:54

## 2025-03-24 RX ADMIN — Medication 1 MILLIGRAM(S): at 11:49

## 2025-03-24 RX ADMIN — POLYETHYLENE GLYCOL 3350 17 GRAM(S): 17 POWDER, FOR SOLUTION ORAL at 21:06

## 2025-03-24 RX ADMIN — Medication 500 MILLIGRAM(S): at 18:39

## 2025-03-24 RX ADMIN — HEPARIN SODIUM 5000 UNIT(S): 1000 INJECTION INTRAVENOUS; SUBCUTANEOUS at 21:06

## 2025-03-24 RX ADMIN — Medication 112 MICROGRAM(S): at 05:38

## 2025-03-24 RX ADMIN — HEPARIN SODIUM 5000 UNIT(S): 1000 INJECTION INTRAVENOUS; SUBCUTANEOUS at 13:21

## 2025-03-24 RX ADMIN — Medication 40 MILLIGRAM(S): at 05:38

## 2025-03-24 RX ADMIN — CALCIUM CARBONATE 2 TABLET(S): 750 TABLET ORAL at 21:06

## 2025-03-24 RX ADMIN — Medication 2 TABLET(S): at 21:06

## 2025-03-24 NOTE — PROGRESS NOTE ADULT - SUBJECTIVE AND OBJECTIVE BOX
Ethics continues to follow. See prior notes.    Discussion with CARMEN WHELAN RN, MBE (Ethics Fellow), LORRIANE Ramos NP ( NP), and the patient at the bedside. The interdisciplinary team members introduced themselves and their respective roles. The patient was alert and oriented to person and place, though was disoriented to exact time and situation. She expressed frustration that she has been unable to eat solid foods and has been on "a liquid diet" only. She confirmed that she agreed to a lab draw yesterday, but has not yet had one today. She informed the team members that she will agree to another lab draw today, now that she has been approved for a regular diet.    Discussion with CARMEN WHELAN RN, MBE (Ethics Fellow) and ESTELLE Coats PA-C (Surgery PA). ESTELLE Coats PA-C was informed that the patient is currently agreeable to a lab draw and he confirmed that STAT labs will be ordered. The fellow encouraged the Surgery team to reach out with any questions or guidance if the patient continues to decline treatments and interventions.

## 2025-03-24 NOTE — PROGRESS NOTE ADULT - ATTENDING COMMENTS
Above assessment noted.  The patient was seen and examined by myself with the surgical PA and resident.  The patient is without abdominal pain.  She was reported by the bedside aide to have had a large bowel movement with copious flatus this morning. Abdomen is soft without localizing tenderness, no guarding, no rebound.  Will start PO clears.
Patient evaluated by me on AM rounds.     Vitals stable.   Baseline mental status.  No tachypnea or increased WOB.   Abdomen non tender, non distended.    Na 154 on yesterday's labs. Refusing labs today and pulled out IV.     A/P: Adhesive SBO resolved.   -Regular diet.   -Encourage PO water intake for hypernatremia.

## 2025-03-24 NOTE — PROGRESS NOTE ADULT - NUTRITIONAL ASSESSMENT
This patient has been assessed with a concern for Malnutrition and has been determined to have a diagnosis/diagnoses of Moderate protein-calorie malnutrition.    This patient is being managed with:   Diet Regular-  Entered: Mar 24 2025  7:47AM

## 2025-03-24 NOTE — BH CONSULTATION LIAISON PROGRESS NOTE - NSBHCHARTREVIEWLAB_PSY_A_CORE FT
Basic Metabolic Panel (03.23.25 @ 06:28)   Sodium: 154 mmol/L  Potassium: 3.8 mmol/L  Chloride: 114: Chloride reference range changed from ..10/26/2022 mmol/L  Carbon Dioxide: 23.0 mmol/L  Anion Gap: 17 mmol/L  Blood Urea Nitrogen: 30.3 mg/dL  Creatinine: 2.26 mg/dL  Glucose: 97 mg/dL  Calcium: 9.6 mg/dL  eGFR: 22: The estimated glomerular filtration rate (eGFR) calculation is based on   the 2021 CKD-EPI creatinine equation, which is validated in male and   female population 18 years of age and older (N Engl J Med 2021;   385:0806-1362). mL/min/1.73m2

## 2025-03-24 NOTE — PROGRESS NOTE ADULT - SUBJECTIVE AND OBJECTIVE BOX
INTERVAL HPI/OVERNIGHT EVENTS:    Patient evaluated at bedside. No acute distress. No acute events overnight.    MEDICATIONS  (STANDING):  ascorbic acid 500 milliGRAM(s) Oral two times a day  atorvastatin 10 milliGRAM(s) Oral at bedtime  calcitriol   Capsule 0.25 MICROGram(s) Oral daily  calcium carbonate    500 mG (Tums) Chewable 2 Tablet(s) Chew three times a day  cholecalciferol 2000 Unit(s) Oral daily  cyanocobalamin 500 MICROGram(s) Oral daily  heparin   Injectable 5000 Unit(s) SubCutaneous every 8 hours  lactated ringers Bolus 1000 milliLiter(s) IV Bolus once  lactated ringers. 1000 milliLiter(s) (100 mL/Hr) IV Continuous <Continuous>  levothyroxine 112 MICROGram(s) Oral daily  LORazepam     Tablet 0.5 milliGRAM(s) Oral at bedtime  LORazepam     Tablet 1 milliGRAM(s) Oral daily  multivitamin 1 Tablet(s) Oral daily  pantoprazole    Tablet 40 milliGRAM(s) Oral before breakfast  polyethylene glycol 3350 17 Gram(s) Oral at bedtime  senna 2 Tablet(s) Oral at bedtime  simethicone 80 milliGRAM(s) Chew every 8 hours  thiamine 100 milliGRAM(s) Oral daily    MEDICATIONS  (PRN):  haloperidol    Injectable 5 milliGRAM(s) IntraMuscular every 6 hours PRN agitation      Vital Signs Last 24 Hrs  T(C): 36.6 (24 Mar 2025 07:54), Max: 36.9 (23 Mar 2025 08:23)  T(F): 97.8 (24 Mar 2025 07:54), Max: 98.5 (23 Mar 2025 11:44)  HR: 63 (24 Mar 2025 07:54) (63 - 74)  BP: 160/79 (24 Mar 2025 07:54) (110/68 - 163/87)  BP(mean): 101 (23 Mar 2025 20:22) (83 - 101)  RR: 18 (24 Mar 2025 07:54) (18 - 18)  SpO2: 94% (24 Mar 2025 07:54) (94% - 96%)    Parameters below as of 24 Mar 2025 07:54  Patient On (Oxygen Delivery Method): room air        Constitutional: NAD  Respiratory: nonlabored breathing   Cardiovascular: Regular rate & rhythm  Gastrointestinal: Soft, large defect, loss of domain, non-peritoneal     I&O's Detail    23 Mar 2025 07:01  -  24 Mar 2025 07:00  --------------------------------------------------------  IN:    Free Water: 325 mL    Oral Fluid: 1680 mL  Total IN: 2005 mL    OUT:  Total OUT: 0 mL    Total NET: 2005 mL          LABS:                        10.0   2.48  )-----------( 156      ( 23 Mar 2025 06:28 )             33.8     03-23    154[H]  |  114[H]  |  30.3[H]  ----------------------------<  97  3.8   |  23.0  |  2.26[H]    Ca    9.6      23 Mar 2025 06:28  Phos  3.4     03-23  Mg     2.5     03-23        Urinalysis Basic - ( 23 Mar 2025 06:28 )    Color: x / Appearance: x / SG: x / pH: x  Gluc: 97 mg/dL / Ketone: x  / Bili: x / Urobili: x   Blood: x / Protein: x / Nitrite: x   Leuk Esterase: x / RBC: x / WBC x   Sq Epi: x / Non Sq Epi: x / Bacteria: x        RADIOLOGY & ADDITIONAL STUDIES:

## 2025-03-24 NOTE — PROGRESS NOTE ADULT - ASSESSMENT
The patient is under the auspices of [facility]. Patient completed a Health Care Proxy (HCP) Form on 9/4/2014. Patient has named her brother, Tom Toribio ([phone]) as her HCP, and her brother Sukh Toribio ([phone]) as her alternate HCP. If the patient is without capacity she has protected her autonomy by naming her brothers as HCPs to make medical decisions for her. A copy of the patient's HCP form can be found in her physical chart (placed in chart by Ethics on 3/21/25).    It is important to note that Ms. Toribio retains the right to refuse medical interventions, even if her capacity for medical decision making is impaired. IF an intervention is deemed to be emergent to save life or limb, it may be appropriate to proceed with that intervention against the patient’s objection. To compel any intervention absent this patient’s assent is ethically permissible in cases of life-threatening emergency. Treatment decisions should be made in this light.      Specific processes must be followed for any non-urgent major medical decisions. Ethics can assist with this if necessary.     IF end of life decisions need to be made and the patient has capacity, she could complete a MOLST without the need for LS (Counts include 234 beds at the [facility]) review. If she does not have capacity, her brother/HCP could request a MOLST be completed, if indicated. Of note, the patient did not indicate on the health care proxy form that her proxy is aware of her wishes regarding artificial nutrition and hydration (HANNA). Any decisions regarding HANNA would have to be reviewed and approved by [facility].  Ethics and Palliative Care can assist with this.       Apurva WHELAN RN, MBE  Ethics Fellow  [phone]   The patient is under the auspices of [facility]. Patient completed a Health Care Proxy (HCP) Form on 9/4/2014. Patient has named her brother, Tom Toribio ([phone]) as her HCP, and her brother Sukh Toribio ([phone]) as her alternate HCP. If the patient is without capacity she has protected her autonomy by naming her brothers as HCPs to make medical decisions for her. A copy of the patient's HCP form can be found in her physical chart (placed in chart by Ethics on 3/21/25).    It is important to note that Ms. Toribio retains the right to refuse medical interventions, even if her capacity for medical decision making is impaired. IF an intervention is deemed to be emergent to save life or limb, it may be appropriate to proceed with that intervention against the patient’s objection. To compel any intervention absent this patient’s assent is ethically permissible in cases of life-threatening emergency. Treatment decisions should be made in this light.      Specific processes must be followed for any non-urgent major medical decisions. Ethics can assist with this if necessary.     IF end of life decisions need to be made and the patient has capacity, she could complete a MOLST without the need for LS ([facility]) review. If she does not have capacity, her brother/HCP could request a MOLST be completed, if indicated. Of note, the patient did not indicate on the health care proxy form that her proxy is aware of her wishes regarding artificial nutrition and hydration (HANNA). Any decisions regarding HANNA would have to be reviewed and approved by [facility].  Ethics and Palliative Care can assist with this.       Apurva WHELAN RN, MBE  Ethics Fellow  [phone]    Agree with above.  Scarlett Ferreira MD  Ethics Attending

## 2025-03-24 NOTE — PROGRESS NOTE ADULT - ASSESSMENT
Pt is a 74yo female with significant psych hx and very large postoperative abdominal wall hernia, recently discharged from [facility] ACS service after successful non-operative treatment of SBO now returns to ED c/o abdominal pain and vomiting, and reoccuring SBO. Pt given 60ml of GG at 2030, tolerated well. Pt continues to be hypernatremic. Pt refusing all other interventions     - Serial abdominal exams  - Appreciate behavioral health recs - no zyprexa due to constipation side effect  - Monitor bowel function  - Multimodal pain control  - Hypernatremia: Free water flushes, patient refusing IV access, monitor sodium   - DVT prophylaxis  - Encourage OOB to chair

## 2025-03-25 ENCOUNTER — TRANSCRIPTION ENCOUNTER (OUTPATIENT)
Age: 75
End: 2025-03-25

## 2025-03-25 VITALS
OXYGEN SATURATION: 96 % | TEMPERATURE: 98 F | DIASTOLIC BLOOD PRESSURE: 80 MMHG | HEART RATE: 62 BPM | RESPIRATION RATE: 17 BRPM | SYSTOLIC BLOOD PRESSURE: 161 MMHG

## 2025-03-25 PROCEDURE — 85025 COMPLETE CBC W/AUTO DIFF WBC: CPT

## 2025-03-25 PROCEDURE — 85730 THROMBOPLASTIN TIME PARTIAL: CPT

## 2025-03-25 PROCEDURE — 93005 ELECTROCARDIOGRAM TRACING: CPT

## 2025-03-25 PROCEDURE — 99232 SBSQ HOSP IP/OBS MODERATE 35: CPT | Mod: FS

## 2025-03-25 PROCEDURE — 96375 TX/PRO/DX INJ NEW DRUG ADDON: CPT

## 2025-03-25 PROCEDURE — 80048 BASIC METABOLIC PNL TOTAL CA: CPT

## 2025-03-25 PROCEDURE — 83735 ASSAY OF MAGNESIUM: CPT

## 2025-03-25 PROCEDURE — 81003 URINALYSIS AUTO W/O SCOPE: CPT

## 2025-03-25 PROCEDURE — 74018 RADEX ABDOMEN 1 VIEW: CPT

## 2025-03-25 PROCEDURE — 99231 SBSQ HOSP IP/OBS SF/LOW 25: CPT | Mod: FS

## 2025-03-25 PROCEDURE — 83605 ASSAY OF LACTIC ACID: CPT

## 2025-03-25 PROCEDURE — 80053 COMPREHEN METABOLIC PANEL: CPT

## 2025-03-25 PROCEDURE — 99285 EMERGENCY DEPT VISIT HI MDM: CPT

## 2025-03-25 PROCEDURE — 84100 ASSAY OF PHOSPHORUS: CPT

## 2025-03-25 PROCEDURE — 36415 COLL VENOUS BLD VENIPUNCTURE: CPT

## 2025-03-25 PROCEDURE — 74176 CT ABD & PELVIS W/O CONTRAST: CPT | Mod: MC

## 2025-03-25 PROCEDURE — 96365 THER/PROPH/DIAG IV INF INIT: CPT

## 2025-03-25 PROCEDURE — 85610 PROTHROMBIN TIME: CPT

## 2025-03-25 RX ADMIN — Medication 500 MILLIGRAM(S): at 05:10

## 2025-03-25 RX ADMIN — Medication 100 MILLIGRAM(S): at 11:34

## 2025-03-25 RX ADMIN — Medication 1 TABLET(S): at 11:34

## 2025-03-25 RX ADMIN — Medication 2000 UNIT(S): at 11:34

## 2025-03-25 RX ADMIN — HEPARIN SODIUM 5000 UNIT(S): 1000 INJECTION INTRAVENOUS; SUBCUTANEOUS at 05:09

## 2025-03-25 RX ADMIN — Medication 1 MILLIGRAM(S): at 11:34

## 2025-03-25 NOTE — DISCHARGE NOTE PROVIDER - NSDCCPCAREPLAN_GEN_ALL_CORE_FT
PRINCIPAL DISCHARGE DIAGNOSIS  Diagnosis: SBO (small bowel obstruction)  Assessment and Plan of Treatment: Follow up: Please call and make an appointment with the Acute Care Surgery Clinic 10-14 days after discharge. Also, please call and make an appointment with your primary care physician as per your usual schedule.   Activity: May return to normal activities as tolerated.  Diet: May continue regular diet.  Medications: Please take all medications listed on your discharge paperwork as prescribed.   You are encouraged to take over-the-counter tylenol and/or ibuprofen for pain relief when as directed.  Patient is advised to RETURN TO THE EMERGENCY DEPARTMENT for any of the following - worsening pain, fever/chills, nausea/vomiting, altered mental status, chest pain, shortness of breath, or any other new / worsening symptom.    
30

## 2025-03-25 NOTE — BH CONSULTATION LIAISON PROGRESS NOTE - NSBHASSESSMENTFT_PSY_ALL_CORE
Patient is a 75F resident of [place] with significant PMH including hypothyroidism, HTN, nephrogenic DI, hx of splenomegaly, multiple prior SBOs, exlap with SBR and ileostomy take down (2017), Appendectomy (2015), hx of CVA with hemiparesis,  pphx of schizoaffective d/o bipolar type, unknown hx of ALISON, unknown hx inpatient hospitalizations, hx of 2 prior suicide attempts (1979 and 2000),  hx of self harm and aggression toward others, who was recently admitted to [facility] with SBO, now again presents to the ED with abdominal pain and vomiting. Patient found to have SBO, currently admitted under care per trauma team.      Psychiatry consulted for medication management.    Patient seen today at bedside with one to one staff present.  Patient a/ox4.  Overall cooperative.  Some evidence of delusional thought process- referring to writer as another person and becomes irritable when redirected.  Advised of need for labs and PO fluid prior to discharge- patient in agreement at time of interview. Patient denies any SI/HI, intent or plan when asked directly.     Patient now upgraded to regular diet.  Has not had BM yet today per nursing staff.  Would hold off on restarting standing zyprexa for today.      RECS  -continue one to one obs for danger to self and others  -recommend obtaining EKG  -ativan 1mg q AM/ 0.5mg qHS PO, can give via IV push route if IV access established  -hold standing olanzapine as can contribute to constipation  -PRN- first line: can given ativan 2mg IM q6hrs PRN, FOR severe agitation can add haldol 5mg IM one time dosages (monitor for qtc <500msec)
Patient is a 75F resident of [place] with significant PMH including hypothyroidism, HTN, nephrogenic DI, hx of splenomegaly, multiple prior SBOs, exlap with SBR and ileostomy take down (2017), Appendectomy (2015), hx of CVA with hemiparesis,  pphx of schizoaffective d/o bipolar type, unknown hx of ALISON, unknown hx inpatient hospitalizations, hx of 2 prior suicide attempts (1979 and 2000),  hx of self harm and aggression toward others, who was recently admitted to [facility] with SBO, now again presents to the ED with abdominal pain and vomiting. Patient found to have SBO, currently admitted under care per trauma team.      Psychiatry consulted for medication management.    Patient seen today at bedside with one to one staff present.  Patient a/ox4. Able to identify where she is, why she is here, month/date, and self.  Overall cooperative. Note to have written illogical statements on paper, laughing inappropriately.  Patient denies any SI/HI, intent or plan when asked directly.           RECS  -continue one to one obs for danger to self and others  -recommend obtaining EKG  -ativan 1mg q AM/ 0.5mg qHS PO, can give via IV push route if IV access established  -can restart olanzapine 5mg po qdaily- would recommend encouraging PO fluid and aggressive bowel regimen as olanzapine can contribute to constipation  -PRN- first line: can given ativan 2mg IM q6hrs PRN, FOR severe agitation can add haldol 5mg IM one time dosages (monitor for qtc <500msec)

## 2025-03-25 NOTE — DISCHARGE NOTE PROVIDER - HOSPITAL COURSE
75F known to our service with PMHx schizophrenia, HTN, multiple prior SBOs, exlap with SBR and ileostomy take down, appendectomy, recent admission for high grade SBO discharged 1 week ago who presented to the ED from [place] with abd pain associated with nausea and x1 episode of NBNB vomiting.  Imaging in the ED once again showed high grade SBO.  Admitted to the Surgery Service for conservative management.  Pt. initially refusing all treatment.  refused NGT, pulled ou IV.  On 3/23 started pt on clears since floor staff acknowledged pt was passing flatus.  With initial clear liquids, pt. given gastrograffin.  A few hours later she began to have multiple bowel movements.  Currently tolerating regular diet and medically stable for discharge back to [place].  Discussed with Dr. lyons and she accepted pt. back.

## 2025-03-25 NOTE — PROGRESS NOTE ADULT - ASSESSMENT
Pt is a 74yo female with significant psych hx and very large postoperative abdominal wall hernia, recently discharged from [facility] ACS service after successful non-operative treatment of SBO now returns to ED c/o abdominal pain and vomiting, and reoccuring SBO. Pt abner reg diet, hypernatremia resolved.     - Serial abdominal exams  - Appreciate behavioral health recs - no zyprexa due to constipation side effect  - Monitor bowel function  - Multimodal pain control  - Hypernatremia: Free water flushes, patient refusing IV access, monitor sodium   - DVT prophylaxis  - Encourage OOB to chair  - Dispo back to [facility] today

## 2025-03-25 NOTE — BH CONSULTATION LIAISON PROGRESS NOTE - PRN MEDICATIONS SINCE LAST EVAL
Continue prednisone. Atarax as needed for itching up to 3 times daily. Follow-up with Jm HERNANDEZ in dermatology and your primary care provider, call to schedule appointments with both. Return to the emergency department with any new or worsening symptoms.  
no
yes

## 2025-03-25 NOTE — DISCHARGE NOTE NURSING/CASE MANAGEMENT/SOCIAL WORK - NSDCVIVACCINE_GEN_ALL_CORE_FT
Tdap; 17-Sep-2018 10:04; Melina Montoya (JOSETTE); [employer]; [id number] (Exp. Date: 11-Jun-2020); IntraMuscular; Deltoid Right.; 0.5 milliLiter(s); VIS (VIS Published: 09-May-2013, VIS Presented: 17-Sep-2018);

## 2025-03-25 NOTE — DISCHARGE NOTE PROVIDER - NSFOLLOWUPCLINICS_GEN_ALL_ED_FT
[facility] Acute Care Surgery  Acute Care Surgery  [address] [place] [address]  Phone: [phone]  Fax:

## 2025-03-25 NOTE — PROGRESS NOTE ADULT - NS ATTEND AMEND GEN_ALL_CORE FT
Patient evaluated by me on AM rounds.   No acute events.   Tolerating diet. Denies abdominal pain.     Vitals stable.   Baseline mental status.  No tachypnea or increased WOB.   Abdomen non tender, non distended.    Na 143.     A/P: Adhesive SBO resolved. Hyponatremia resolved.   -Regular diet.   -DC planning back to previous psych facility.

## 2025-03-25 NOTE — DISCHARGE NOTE PROVIDER - NSDCMRMEDTOKEN_GEN_ALL_CORE_FT
ascorbic acid 500 mg oral tablet: 1 tab(s) orally 2 times a day  atorvastatin 10 mg oral tablet: 1 tab(s) orally once a day  calcitriol 0.25 mcg oral capsule: 1 cap(s) orally once a day  calcium carbonate 500 mg (200 mg elemental calcium) oral tablet, chewable: 2 tab(s) orally 3 times a day  cholecalciferol 50 mcg (2000 intl units) oral capsule: 1 cap(s) orally once a day  cyanocobalamin 500 mcg oral tablet: 1 tab(s) orally once a day  docusate sodium 100 mg oral capsule: 1 cap(s) orally 3 times a day as needed for  constipation  levothyroxine 112 mcg (0.112 mg) oral tablet: 1 tab(s) orally once a day  LORazepam 0.5 mg oral tablet: 0.5 milligram(s) orally once a day Take 2 tablets (1 mG) in the morning and 1 tablet at 20:30  LORazepam 1 mg oral tablet: 1 tab(s) orally once a day  Multiple Vitamins oral tablet: 1 tab(s) orally once a day  OLANZapine 5 mg oral tablet: 1 tab(s) orally once a day (at bedtime)  pantoprazole 40 mg oral delayed release tablet: 1 tab(s) orally once a day (before a meal)  polyethylene glycol 3350 oral powder for reconstitution: 17 gram(s) orally once a day (at bedtime)  senna (sennosides) 8.6 mg oral tablet: 2 tab(s) orally once a day (at bedtime) as needed for  constipation  simethicone 80 mg oral tablet, chewable: 1 tab(s) chewed every 8 hours for gas  sodium bicarbonate 650 mg oral tablet: 1 tab(s) orally 2 times a day  thiamine 100 mg oral tablet: 1 tab(s) orally once a day

## 2025-03-25 NOTE — DISCHARGE NOTE NURSING/CASE MANAGEMENT/SOCIAL WORK - PATIENT PORTAL LINK FT
You can access the FollowMyHealth Patient Portal offered by [facility] by registering at the following website: [email or web address]. By joining [employer]’s FollowMyHealth portal, you will also be able to view your health information using other applications (apps) compatible with our system.

## 2025-03-25 NOTE — BH CONSULTATION LIAISON PROGRESS NOTE - NSBHCHARTREVIEWVS_PSY_A_CORE FT
Vital Signs Last 24 Hrs  T(C): 36.6 (25 Mar 2025 07:42), Max: 36.9 (24 Mar 2025 21:02)  T(F): 97.8 (25 Mar 2025 07:42), Max: 98.4 (24 Mar 2025 21:02)  HR: 62 (25 Mar 2025 07:42) (60 - 62)  BP: 161/80 (25 Mar 2025 07:42) (135/76 - 163/79)  BP(mean): --  RR: 17 (25 Mar 2025 07:42) (17 - 18)  SpO2: 96% (25 Mar 2025 07:42) (96% - 98%)    Parameters below as of 25 Mar 2025 07:42  Patient On (Oxygen Delivery Method): room air    
Vital Signs Last 24 Hrs  T(C): 36.6 (24 Mar 2025 07:54), Max: 36.7 (23 Mar 2025 15:44)  T(F): 97.8 (24 Mar 2025 07:54), Max: 98 (23 Mar 2025 15:44)  HR: 63 (24 Mar 2025 07:54) (63 - 69)  BP: 160/79 (24 Mar 2025 07:54) (119/65 - 160/79)  BP(mean): 101 (23 Mar 2025 20:22) (83 - 101)  RR: 18 (24 Mar 2025 07:54) (18 - 18)  SpO2: 94% (24 Mar 2025 07:54) (94% - 94%)    Parameters below as of 24 Mar 2025 07:54  Patient On (Oxygen Delivery Method): room air

## 2025-03-25 NOTE — PROGRESS NOTE ADULT - SUBJECTIVE AND OBJECTIVE BOX
SUBJECTIVE / 24H EVENTS:    Pt seen and examined at bedside by the team during rounds. Pt found to be resting in bed comfortably with no new acute complaints at the time of examination. Pt denies CP, SOB, N/V, fever, chills.     MEDICATIONS  (STANDING):  ascorbic acid 500 milliGRAM(s) Oral two times a day  atorvastatin 10 milliGRAM(s) Oral at bedtime  calcitriol   Capsule 0.25 MICROGram(s) Oral daily  calcium carbonate    500 mG (Tums) Chewable 2 Tablet(s) Chew three times a day  cholecalciferol 2000 Unit(s) Oral daily  cyanocobalamin 500 MICROGram(s) Oral daily  heparin   Injectable 5000 Unit(s) SubCutaneous every 8 hours  lactated ringers. 1000 milliLiter(s) (100 mL/Hr) IV Continuous <Continuous>  levothyroxine 112 MICROGram(s) Oral daily  LORazepam     Tablet 0.5 milliGRAM(s) Oral at bedtime  LORazepam     Tablet 1 milliGRAM(s) Oral daily  multivitamin 1 Tablet(s) Oral daily  pantoprazole    Tablet 40 milliGRAM(s) Oral before breakfast  polyethylene glycol 3350 17 Gram(s) Oral at bedtime  senna 2 Tablet(s) Oral at bedtime  simethicone 80 milliGRAM(s) Chew every 8 hours  thiamine 100 milliGRAM(s) Oral daily    MEDICATIONS  (PRN):  haloperidol    Injectable 5 milliGRAM(s) IntraMuscular every 6 hours PRN agitation      Vital Signs Last 24 Hrs  T(C): 36.6 (25 Mar 2025 07:42), Max: 36.9 (24 Mar 2025 21:02)  T(F): 97.8 (25 Mar 2025 07:42), Max: 98.4 (24 Mar 2025 21:02)  HR: 62 (25 Mar 2025 07:42) (60 - 62)  BP: 161/80 (25 Mar 2025 07:42) (135/76 - 163/79)  BP(mean): --  RR: 17 (25 Mar 2025 07:42) (17 - 18)  SpO2: 96% (25 Mar 2025 07:42) (96% - 98%)    Parameters below as of 25 Mar 2025 07:42  Patient On (Oxygen Delivery Method): room air        Physical Exam  Constitutional: patient appears comfortable resting in bed, in no apparent distress  Respiratory: respirations are unlabored, no accessory muscle use, no conversational dyspnea  Cardiovascular: regular rate & rhythm  Gastrointestinal: soft, large defect, loss of domain  Skin: mucous membranes moist, no diaphoresis, pallor, cyanosis or jaundice      I&O's Detail    24 Mar 2025 07:01  -  25 Mar 2025 07:00  --------------------------------------------------------  IN:    Oral Fluid: 1080 mL  Total IN: 1080 mL    OUT:    Voided (mL): 400 mL  Total OUT: 400 mL    Total NET: 680 mL          LABS:                        9.1    2.28  )-----------( 153      ( 24 Mar 2025 13:07 )             31.3     03-24    143  |  109[H]  |  21.2[H]  ----------------------------<  85  3.8   |  21.0[L]  |  2.10[H]    Ca    9.1      24 Mar 2025 13:07  Phos  3.0     03-24  Mg     2.0     03-24        Urinalysis Basic - ( 24 Mar 2025 13:07 )    Color: x / Appearance: x / SG: x / pH: x  Gluc: 85 mg/dL / Ketone: x  / Bili: x / Urobili: x   Blood: x / Protein: x / Nitrite: x   Leuk Esterase: x / RBC: x / WBC x   Sq Epi: x / Non Sq Epi: x / Bacteria: x

## 2025-03-25 NOTE — DISCHARGE NOTE NURSING/CASE MANAGEMENT/SOCIAL WORK - FINANCIAL ASSISTANCE
[facility] provides services at a reduced cost to those who are determined to be eligible through [facility]’s financial assistance program. Information regarding [facility]’s financial assistance program can be found by going to [email or web address] or by calling [phone].

## 2025-03-25 NOTE — BH CONSULTATION LIAISON PROGRESS NOTE - NSBHFUPINTERVALHXFT_PSY_A_CORE
Patient is a 75F resident of [place] with significant PMH including hypothyroidism, HTN, nephrogenic DI, hx of splenomegaly, multiple prior SBOs, exlap with SBR and ileostomy take down (2017), Appendectomy (2015), hx of CVA with hemiparesis,  pphx of schizoaffective d/o bipolar type, unknown hx of ALISON, unknown hx inpatient hospitalizations, hx of 2 prior suicide attempts (1979 and 2000),  hx of self harm and aggression toward others, who was recently admitted to [facility] with SBO, now again presents to the ED with abdominal pain and vomiting. Patient now with resolved SBO, currently admitted under care per trauma team.      Psychiatry consulted for medication management.    Patient seen today at bedside with one to one staff present.  Reports "I'm good."  Reports she "had a good breakfast." Per NA at bedside, appetite good.  Had BM on 3/24/25, but does not appear to have had a BM yesterday.  Denies any acute complaints.  Patient denies any SI/HI, intent or plan when asked directly.      Patient noted to be coloring. Shows picture to CL staff.  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  Patient then laughing to herself and staring at staff.
Patient is a 75F resident of [place] with significant PMH including hypothyroidism, HTN, nephrogenic DI, hx of splenomegaly, multiple prior SBOs, exlap with SBR and ileostomy take down (2017), Appendectomy (2015), hx of CVA with hemiparesis,  pphx of schizoaffective d/o bipolar type, unknown hx of ALISON, unknown hx inpatient hospitalizations, hx of 2 prior suicide attempts (1979 and 2000),  hx of self harm and aggression toward others, who was recently admitted to [facility] with SBO, now again presents to the ED with abdominal pain and vomiting. Patient found to have SBO, currently admitted under care per trauma team.      Psychiatry consulted for medication management.    Patient seen today at bedside.  Referring to writer as "Moni Blanton."  Slightly irritable when writer introduced self again, redirecting writer, "no your name is Moni Blanton."  Otherwise calm.  Requesting a "croissant."  Diet advanced to regular.  Patient refusing labs.  Requesting information on how to leave here.  Advised that she would need to get lab work done.  Patient in agreement.  Patient denies any AVH or paranoia.   Patient denies any SI/HI, intent or plan when asked directly.

## 2025-03-25 NOTE — DISCHARGE NOTE PROVIDER - DETAILS OF MALNUTRITION DIAGNOSIS/DIAGNOSES
This patient has been assessed with a concern for Malnutrition and was treated during this hospitalization for the following Nutrition diagnosis/diagnoses:     -  03/23/2025: Moderate protein-calorie malnutrition

## 2025-03-25 NOTE — BH CONSULTATION LIAISON PROGRESS NOTE - NSBHCHARTREVIEWLAB_PSY_A_CORE FT
Basic Metabolic Panel (03.23.25 @ 06:28)   Sodium: 154 mmol/L  Potassium: 3.8 mmol/L  Chloride: 114: Chloride reference range changed from ..10/26/2022 mmol/L  Carbon Dioxide: 23.0 mmol/L  Anion Gap: 17 mmol/L  Blood Urea Nitrogen: 30.3 mg/dL  Creatinine: 2.26 mg/dL  Glucose: 97 mg/dL  Calcium: 9.6 mg/dL  eGFR: 22: The estimated glomerular filtration rate (eGFR) calculation is based on   the 2021 CKD-EPI creatinine equation, which is validated in male and   female population 18 years of age and older (N Engl J Med 2021;   [id number]). mL/min/1.73m2

## 2025-03-25 NOTE — BH CONSULTATION LIAISON PROGRESS NOTE - CURRENT MEDICATION
MEDICATIONS  (STANDING):  ascorbic acid 500 milliGRAM(s) Oral two times a day  atorvastatin 10 milliGRAM(s) Oral at bedtime  calcitriol   Capsule 0.25 MICROGram(s) Oral daily  calcium carbonate    500 mG (Tums) Chewable 2 Tablet(s) Chew three times a day  cholecalciferol 2000 Unit(s) Oral daily  cyanocobalamin 500 MICROGram(s) Oral daily  heparin   Injectable 5000 Unit(s) SubCutaneous every 8 hours  lactated ringers Bolus 1000 milliLiter(s) IV Bolus once  lactated ringers. 1000 milliLiter(s) (100 mL/Hr) IV Continuous <Continuous>  levothyroxine 112 MICROGram(s) Oral daily  LORazepam     Tablet 0.5 milliGRAM(s) Oral at bedtime  LORazepam     Tablet 1 milliGRAM(s) Oral daily  multivitamin 1 Tablet(s) Oral daily  pantoprazole    Tablet 40 milliGRAM(s) Oral before breakfast  polyethylene glycol 3350 17 Gram(s) Oral at bedtime  senna 2 Tablet(s) Oral at bedtime  simethicone 80 milliGRAM(s) Chew every 8 hours  thiamine 100 milliGRAM(s) Oral daily    MEDICATIONS  (PRN):  haloperidol    Injectable 5 milliGRAM(s) IntraMuscular every 6 hours PRN agitation  
MEDICATIONS  (STANDING):  ascorbic acid 500 milliGRAM(s) Oral two times a day  atorvastatin 10 milliGRAM(s) Oral at bedtime  calcitriol   Capsule 0.25 MICROGram(s) Oral daily  calcium carbonate    500 mG (Tums) Chewable 2 Tablet(s) Chew three times a day  cholecalciferol 2000 Unit(s) Oral daily  cyanocobalamin 500 MICROGram(s) Oral daily  heparin   Injectable 5000 Unit(s) SubCutaneous every 8 hours  lactated ringers. 1000 milliLiter(s) (100 mL/Hr) IV Continuous <Continuous>  levothyroxine 112 MICROGram(s) Oral daily  LORazepam     Tablet 0.5 milliGRAM(s) Oral at bedtime  LORazepam     Tablet 1 milliGRAM(s) Oral daily  multivitamin 1 Tablet(s) Oral daily  pantoprazole    Tablet 40 milliGRAM(s) Oral before breakfast  polyethylene glycol 3350 17 Gram(s) Oral at bedtime  senna 2 Tablet(s) Oral at bedtime  simethicone 80 milliGRAM(s) Chew every 8 hours  thiamine 100 milliGRAM(s) Oral daily    MEDICATIONS  (PRN):  haloperidol    Injectable 5 milliGRAM(s) IntraMuscular every 6 hours PRN agitation

## 2025-05-16 NOTE — ED ADULT NURSE NOTE - THOUGHTS OF SUICIDE/SELF-HARM YN, MLM
You can access the FollowMyHealth Patient Portal offered by [facility] by registering at the following website: [email or web address] By joining [email or web address]’s FollowMyHealth portal, you will also be able to view your health information using other applications (apps) compatible with our system. No

## 2025-05-23 NOTE — ED PROVIDER NOTE - PMH
Anemia    Behavior problems  assaultive  Constipation    CVA (cerebral vascular accident)    Displaced fracture of olecranon process with intraarticular extension of left ulna    Fall    GERD (gastroesophageal reflux disease)    Hemiparesis, left    HTN (hypertension)    Hypothyroidism    Neutropenia    Obesity    Onychomycosis    Osteopenia    Rectal prolapse    Schizo affective schizophrenia    Schizoaffective disorder, bipolar type    Seizure    Sensory hearing loss    Splenomegaly    Suicide attempt    Urinary incontinence    Uterine prolapse    Vaginal prolapse    Venous insufficiency (chronic) (peripheral)
CHG wipes used at home x2 , yesterday and the day before yesterday
